# Patient Record
Sex: FEMALE | Race: BLACK OR AFRICAN AMERICAN | NOT HISPANIC OR LATINO | Employment: OTHER | URBAN - METROPOLITAN AREA
[De-identification: names, ages, dates, MRNs, and addresses within clinical notes are randomized per-mention and may not be internally consistent; named-entity substitution may affect disease eponyms.]

---

## 2017-02-03 ENCOUNTER — CLINICAL SUPPORT (OUTPATIENT)
Dept: INTERNAL MEDICINE | Facility: CLINIC | Age: 62
End: 2017-02-03
Payer: COMMERCIAL

## 2017-02-03 DIAGNOSIS — I10 ESSENTIAL HYPERTENSION: ICD-10-CM

## 2017-02-03 DIAGNOSIS — E78.5 HYPERLIPIDEMIA, UNSPECIFIED HYPERLIPIDEMIA TYPE: ICD-10-CM

## 2017-02-03 DIAGNOSIS — E78.5 HYPERLIPIDEMIA: ICD-10-CM

## 2017-02-03 LAB
ALBUMIN SERPL BCP-MCNC: 3.6 G/DL
ALP SERPL-CCNC: 63 U/L
ALT SERPL W/O P-5'-P-CCNC: 21 U/L
ANION GAP SERPL CALC-SCNC: 10 MMOL/L
AST SERPL-CCNC: 22 U/L
BASOPHILS # BLD AUTO: 0.01 K/UL
BASOPHILS NFR BLD: 0.2 %
BILIRUB SERPL-MCNC: 0.4 MG/DL
BUN SERPL-MCNC: 12 MG/DL
CALCIUM SERPL-MCNC: 9.2 MG/DL
CHLORIDE SERPL-SCNC: 105 MMOL/L
CHOLEST/HDLC SERPL: 3 {RATIO}
CO2 SERPL-SCNC: 28 MMOL/L
CREAT SERPL-MCNC: 0.7 MG/DL
DIFFERENTIAL METHOD: ABNORMAL
EOSINOPHIL # BLD AUTO: 0.1 K/UL
EOSINOPHIL NFR BLD: 2.2 %
ERYTHROCYTE [DISTWIDTH] IN BLOOD BY AUTOMATED COUNT: 15.6 %
EST. GFR  (AFRICAN AMERICAN): >60 ML/MIN/1.73 M^2
EST. GFR  (NON AFRICAN AMERICAN): >60 ML/MIN/1.73 M^2
GLUCOSE SERPL-MCNC: 100 MG/DL
HCT VFR BLD AUTO: 37.8 %
HDL/CHOLESTEROL RATIO: 33.5 %
HDLC SERPL-MCNC: 173 MG/DL
HDLC SERPL-MCNC: 58 MG/DL
HGB BLD-MCNC: 12 G/DL
LDLC SERPL CALC-MCNC: 93.6 MG/DL
LYMPHOCYTES # BLD AUTO: 2.2 K/UL
LYMPHOCYTES NFR BLD: 43.7 %
MCH RBC QN AUTO: 23.2 PG
MCHC RBC AUTO-ENTMCNC: 31.7 %
MCV RBC AUTO: 73 FL
MONOCYTES # BLD AUTO: 0.6 K/UL
MONOCYTES NFR BLD: 11.3 %
NEUTROPHILS # BLD AUTO: 2.2 K/UL
NEUTROPHILS NFR BLD: 42.6 %
NONHDLC SERPL-MCNC: 115 MG/DL
PLATELET # BLD AUTO: 210 K/UL
PMV BLD AUTO: 11.1 FL
POTASSIUM SERPL-SCNC: 4 MMOL/L
PROT SERPL-MCNC: 7 G/DL
RBC # BLD AUTO: 5.18 M/UL
SODIUM SERPL-SCNC: 143 MMOL/L
TRIGL SERPL-MCNC: 107 MG/DL
WBC # BLD AUTO: 5.04 K/UL

## 2017-02-03 PROCEDURE — 80053 COMPREHEN METABOLIC PANEL: CPT

## 2017-02-03 PROCEDURE — 80061 LIPID PANEL: CPT

## 2017-02-03 PROCEDURE — 36415 COLL VENOUS BLD VENIPUNCTURE: CPT | Mod: S$GLB,,, | Performed by: NURSE PRACTITIONER

## 2017-02-03 PROCEDURE — 85025 COMPLETE CBC W/AUTO DIFF WBC: CPT

## 2017-02-20 ENCOUNTER — OFFICE VISIT (OUTPATIENT)
Dept: INTERNAL MEDICINE | Facility: CLINIC | Age: 62
End: 2017-02-20
Payer: COMMERCIAL

## 2017-02-20 VITALS
BODY MASS INDEX: 29.12 KG/M2 | HEIGHT: 66 IN | WEIGHT: 181.19 LBS | SYSTOLIC BLOOD PRESSURE: 104 MMHG | RESPIRATION RATE: 18 BRPM | DIASTOLIC BLOOD PRESSURE: 56 MMHG | HEART RATE: 48 BPM

## 2017-02-20 DIAGNOSIS — E78.5 HYPERLIPIDEMIA, UNSPECIFIED HYPERLIPIDEMIA TYPE: ICD-10-CM

## 2017-02-20 DIAGNOSIS — R00.1 BRADYCARDIA: ICD-10-CM

## 2017-02-20 DIAGNOSIS — I10 ESSENTIAL HYPERTENSION: ICD-10-CM

## 2017-02-20 DIAGNOSIS — Z09 FOLLOW UP: Primary | ICD-10-CM

## 2017-02-20 PROCEDURE — 3078F DIAST BP <80 MM HG: CPT | Mod: S$GLB,,, | Performed by: NURSE PRACTITIONER

## 2017-02-20 PROCEDURE — 99213 OFFICE O/P EST LOW 20 MIN: CPT | Mod: S$GLB,,, | Performed by: NURSE PRACTITIONER

## 2017-02-20 PROCEDURE — 3074F SYST BP LT 130 MM HG: CPT | Mod: S$GLB,,, | Performed by: NURSE PRACTITIONER

## 2017-02-20 PROCEDURE — 99999 PR PBB SHADOW E&M-EST. PATIENT-LVL III: CPT | Mod: PBBFAC,,, | Performed by: NURSE PRACTITIONER

## 2017-02-20 RX ORDER — ATORVASTATIN CALCIUM 20 MG/1
20 TABLET, FILM COATED ORAL DAILY
Qty: 30 TABLET | Refills: 5 | Status: SHIPPED | OUTPATIENT
Start: 2017-02-20 | End: 2017-11-09 | Stop reason: SDUPTHER

## 2017-02-20 RX ORDER — LISINOPRIL AND HYDROCHLOROTHIAZIDE 12.5; 2 MG/1; MG/1
1 TABLET ORAL DAILY
Qty: 30 TABLET | Refills: 5 | Status: SHIPPED | OUTPATIENT
Start: 2017-02-20 | End: 2017-08-28 | Stop reason: SDUPTHER

## 2017-02-20 NOTE — MR AVS SNAPSHOT
[facility]  [address] [facility] [address]  Phone: [phone]  Fax: [phone]                  Nan Simms   2017 8:30 AM   Office Visit    Description:  Female : 1955   Provider:  Reny Chambers NP   Department:  [facility]           Reason for Visit     Follow-up           Diagnoses this Visit        Comments    Follow up    -  Primary     Essential hypertension         Hyperlipidemia, unspecified hyperlipidemia type         Bradycardia                To Do List           Future Appointments        Provider Department Dept Phone    2017 8:00 AM NURSE, [facility] [phone]    2017 12:30 PM Reny Chambers NP [facility] [phone]      Goals (5 Years of Data)     None      Follow-Up and Disposition     Return in about 6 months (around 2017).    Follow-up and Disposition History       These Medications        Disp Refills Start End    atorvastatin (LIPITOR) 20 MG tablet 30 tablet 5 2017    Take 1 tablet (20 mg total) by mouth once daily. - Oral    Pharmacy: [facility]/pharmacy #[id number] [place], [place] - [address] Ph #: [phone]       lisinopril-hydrochlorothiazide (PRINZIDE,ZESTORETIC) 20-12.5 mg per tablet 30 tablet 5 2017    Take 1 tablet by mouth once daily. - Oral    Pharmacy: [facility]/pharmacy #[id number] [place], [place] - [address] Ph #: [phone]         [facility] On Call     [facility] Care Line -  Assistance  Registered nurses in the [facility] provide clinical advisement, health education, appointment booking, and other advisory services.  Call for this free service at [phone].             Medications           Message regarding Medications     Verify the changes and/or additions to your medication regime listed below are the same as discussed with your clinician today.  If any of these changes or additions are  "incorrect, please notify your healthcare provider.             Verify that the below list of medications is an accurate representation of the medications you are currently taking.  If none reported, the list may be blank. If incorrect, please contact your healthcare provider. Carry this list with you in case of emergency.           Current Medications     atorvastatin (LIPITOR) 20 MG tablet Take 1 tablet (20 mg total) by mouth once daily.    lisinopril-hydrochlorothiazide (PRINZIDE,ZESTORETIC) 20-12.5 mg per tablet Take 1 tablet by mouth once daily.           Clinical Reference Information           Your Vitals Were     BP Pulse Resp Height Weight BMI    104/56 48 18 5' 6" (1.676 m) 82.2 kg (181 lb 3.5 oz) 29.25 kg/m2      Blood Pressure          Most Recent Value    BP  (!)  104/56      Allergies as of 2/20/2017     No Known Allergies      Immunizations Administered on Date of Encounter - 2/20/2017     None      Orders Placed During Today's Visit     Future Labs/Procedures Expected by Expires    CBC auto differential  8/19/2017 (Approximate) 2/20/2018    Comprehensive metabolic panel  8/19/2017 (Approximate) 2/20/2018    Lipid panel  8/19/2017 (Approximate) 2/20/2018    TSH  8/19/2017 (Approximate) 2/20/2018      [facility] Sign-Up     Activating your [facility] account is as easy as 1-2-3!     1) Visit [email or web address], select Sign Up Now, enter this activation code and your date of birth, then select Next.  Activation code not generated  Current Patient Portal Status: Account disabled      2) Create a username and password to use when you visit [facility] in the future and select a security question in case you lose your password and select Next.    3) Enter your e-mail address and click Sign Up!    Additional Information  If you have questions, please e-mail [email or web address] or call [phone] to talk to our [facility] staff. Remember, [facility] is NOT to be used for urgent needs. For medical emergencies, dial " 191.         Smoking Cessation     If you would like to quit smoking:   You may be eligible for free services if you are a [place] resident and started smoking cigarettes before September 1, 1988.  Call the Smoking Cessation Trust (SCT) toll free at (254) 411-0336 or (225) 964-9442.   Call 1-800-QUIT-NOW if you do not meet the above criteria.            Language Assistance Services     ATTENTION: Language assistance services are available, free of charge. Please call [phone].      ATENCIÓN: Si habla marian, tiene a cobb disposición servicios gratuitos de asistencia lingüística. Llame al [phone].     [facility] Ý: N?u b?n nói Ti?ng Vi?t, có các d?ch v? h? tr? ngôn ng? mi?n phí dành cho b?n. G?i s? [phone].         [facility] complies with applicable Federal civil rights laws and does not discriminate on the basis of race, color, national origin, age, disability, or sex.

## 2017-02-20 NOTE — PROGRESS NOTES
Subjective:       Patient ID: Nan Simms is a 61 y.o. female.    Chief Complaint: Follow-up    HPI : pt known to me presents for f/up. Reports appetite is not as good, but very busy. Has two part-time jobs right now.     Lab Results   Component Value Date    WBC 5.04 02/03/2017    HGB 12.0 02/03/2017    HCT 37.8 02/03/2017     02/03/2017    CHOL 173 02/03/2017    TRIG 107 02/03/2017    HDL 58 02/03/2017    ALT 21 02/03/2017    AST 22 02/03/2017     02/03/2017    K 4.0 02/03/2017     02/03/2017    CREATININE 0.7 02/03/2017    BUN 12 02/03/2017    CO2 28 02/03/2017    TSH 0.877 08/05/2016    HGBA1C 5.8 08/05/2016     Review of Systems   Constitutional: Negative for chills, diaphoresis, fatigue and fever.   Respiratory: Negative for cough, shortness of breath and wheezing.    Cardiovascular: Negative for chest pain.   Gastrointestinal: Negative for abdominal distention, abdominal pain, constipation, diarrhea, nausea and vomiting.   Musculoskeletal: Negative for arthralgias, back pain and myalgias.   Neurological: Negative for headaches.   Psychiatric/Behavioral: Negative for sleep disturbance.       Objective:      Physical Exam   Constitutional: She is oriented to person, place, and time. She appears well-developed and well-nourished.   HENT:   Head: Normocephalic and atraumatic.   Cardiovascular: Normal rate, regular rhythm and normal heart sounds.    Pulmonary/Chest: Effort normal and breath sounds normal.   Abdominal: Soft. Bowel sounds are normal. There is no tenderness.   Musculoskeletal: She exhibits no edema.   Neurological: She is alert and oriented to person, place, and time.   Skin: Skin is warm and dry.   Psychiatric: She has a normal mood and affect. Her behavior is normal. Judgment and thought content normal.   Nursing note and vitals reviewed.      Assessment:       1. Follow up    2. Essential hypertension    3. Hyperlipidemia, unspecified hyperlipidemia type    4. Bradycardia         Plan:   1. Follow up      2. Essential hypertension  Doing well.   - lisinopril-hydrochlorothiazide (PRINZIDE,ZESTORETIC) 20-12.5 mg per tablet; Take 1 tablet by mouth once daily.  Dispense: 30 tablet; Refill: 5  - CBC auto differential; Future  - Comprehensive metabolic panel; Future  - TSH; Future    3. Hyperlipidemia, unspecified hyperlipidemia type  Doing well  - atorvastatin (LIPITOR) 20 MG tablet; Take 1 tablet (20 mg total) by mouth once daily.  Dispense: 30 tablet; Refill: 5  - Lipid panel; Future    4. Bradycardia  Sees Dr. Salas, asymptomatic.     Denies c-scope. Will think about stool cards.

## 2017-07-12 ENCOUNTER — OFFICE VISIT (OUTPATIENT)
Dept: INTERNAL MEDICINE | Facility: CLINIC | Age: 62
End: 2017-07-12
Payer: COMMERCIAL

## 2017-07-12 VITALS
DIASTOLIC BLOOD PRESSURE: 62 MMHG | RESPIRATION RATE: 16 BRPM | WEIGHT: 171.31 LBS | SYSTOLIC BLOOD PRESSURE: 120 MMHG | HEIGHT: 65 IN | BODY MASS INDEX: 28.54 KG/M2 | HEART RATE: 60 BPM

## 2017-07-12 DIAGNOSIS — R00.1 BRADYCARDIA: ICD-10-CM

## 2017-07-12 DIAGNOSIS — R00.2 HEART PALPITATIONS: Primary | ICD-10-CM

## 2017-07-12 PROCEDURE — 99999 PR PBB SHADOW E&M-EST. PATIENT-LVL III: CPT | Mod: PBBFAC,,, | Performed by: NURSE PRACTITIONER

## 2017-07-12 PROCEDURE — 93010 ELECTROCARDIOGRAM REPORT: CPT | Mod: S$GLB,,, | Performed by: INTERNAL MEDICINE

## 2017-07-12 PROCEDURE — 99214 OFFICE O/P EST MOD 30 MIN: CPT | Mod: S$GLB,,, | Performed by: NURSE PRACTITIONER

## 2017-07-12 PROCEDURE — 93005 ELECTROCARDIOGRAM TRACING: CPT | Mod: S$GLB,,, | Performed by: NURSE PRACTITIONER

## 2017-07-12 NOTE — PROGRESS NOTES
Subjective:       Patient ID: Nan Simms is a 62 y.o. female.    Chief Complaint: over heated and Tachycardia    HPI: Pt presents to clinic today new to me but known to Reny with c/o feeling palpitations. She reports that it started Monday. She feels like when she gets up and starts moving around it gets fast. Hen she is sitting and resting she feels normal. It has been coming and going. She reports that it last about 10 min when it happens. She repots that she has no SOB. NO chest pain.     Never felt that before Monday. She does drink coffee every morning and smokes. She reports that she has been smoking a lot lately as well. She also reports that she has been drinking an energy drink the last couple days.   Review of Systems   Constitutional: Negative for chills, fatigue, fever and unexpected weight change.   HENT: Negative for congestion, ear pain, sore throat and trouble swallowing.    Eyes: Negative for pain and visual disturbance.   Respiratory: Negative for cough, chest tightness and shortness of breath.    Cardiovascular: Positive for palpitations. Negative for chest pain and leg swelling.   Gastrointestinal: Negative for abdominal distention, abdominal pain, constipation, diarrhea and vomiting.   Genitourinary: Negative for difficulty urinating, dysuria, flank pain, frequency and hematuria.   Musculoskeletal: Negative for back pain, gait problem, joint swelling, neck pain and neck stiffness.   Skin: Negative for rash and wound.   Neurological: Negative for dizziness, seizures, speech difficulty, light-headedness and headaches.       Objective:      Physical Exam   Constitutional: She is oriented to person, place, and time. She appears well-developed and well-nourished.   HENT:   Head: Normocephalic and atraumatic.   Right Ear: External ear normal.   Left Ear: External ear normal.   Nose: Nose normal.   Mouth/Throat: Oropharynx is clear and moist.   Eyes: Conjunctivae and EOM are normal. Pupils are  equal, round, and reactive to light.   Neck: Normal range of motion. Neck supple.   Cardiovascular: Normal rate, regular rhythm, normal heart sounds and intact distal pulses.    No murmur heard.  SLOW   Pulmonary/Chest: Effort normal and breath sounds normal. No respiratory distress. She has no wheezes. She has no rales.   Abdominal: Soft. Bowel sounds are normal. She exhibits no distension and no mass. There is no tenderness. There is no guarding.   Musculoskeletal: Normal range of motion.   Neurological: She is alert and oriented to person, place, and time.   Skin: Skin is warm and dry.   Psychiatric: She has a normal mood and affect. Her behavior is normal. Judgment and thought content normal.   Nursing note and vitals reviewed.      Assessment:       1. Heart palpitations    2. Bradycardia        Plan:   Nan was seen today for over heated and tachycardia.    Diagnoses and all orders for this visit:    Heart palpitations  -     IN OFFICE EKG 12-LEAD (to [place])  -     Ambulatory Referral to Cardiology    Bradycardia  -     Ambulatory Referral to Cardiology    HR 42 on EKG sinus. I had her walk in hallway and tried to reproduce the feeling of palpitations that she was feeling. We were unable to reproduce that. Last visit with Reny her HR was 48. She denies SOB or chest pain. She reports that she saw Dr Salas 1 year ago for stress test. I will refer back there for bradycardia and palpitations. Till then she was encouraged to decrease caffeine/ nicotene

## 2017-08-14 ENCOUNTER — PATIENT OUTREACH (OUTPATIENT)
Dept: ADMINISTRATIVE | Facility: HOSPITAL | Age: 62
End: 2017-08-14

## 2017-08-14 NOTE — LETTER
August 14, 2017    Nan Simms  [address] [place] [id number]             [facility]  [address]  [facility] [address]  Phone: [phone] Dear MsJames Demi:    [facility] is committed to your overall health.  To help you get the most out of each of your visits, we will review your information to make sure you are up to date on all of your recommended tests and/or procedures.      Payal Moreland NP has found that you may be due for a tetanus vaccine, a pneumonia vaccine, a mammogram, and a colonoscopy.     If you have had any of the above done at another facility, please bring the records or information with you so that your record at [facility] will be complete.  If you would like to schedule any of these, please contact me.    If you are currently taking medication, please bring it with you to your appointment for review.    If you have any questions or concerns, please don't hesitate to call.    Sincerely,        Oralia Dunn LPN Clinical Care Coordinator  [facility]  [phone]

## 2017-08-21 ENCOUNTER — OFFICE VISIT (OUTPATIENT)
Dept: OBSTETRICS AND GYNECOLOGY | Facility: CLINIC | Age: 62
End: 2017-08-21
Payer: COMMERCIAL

## 2017-08-21 ENCOUNTER — CLINICAL SUPPORT (OUTPATIENT)
Dept: INTERNAL MEDICINE | Facility: CLINIC | Age: 62
End: 2017-08-21
Payer: COMMERCIAL

## 2017-08-21 VITALS
SYSTOLIC BLOOD PRESSURE: 116 MMHG | DIASTOLIC BLOOD PRESSURE: 68 MMHG | HEART RATE: 57 BPM | BODY MASS INDEX: 28.99 KG/M2 | WEIGHT: 174 LBS | RESPIRATION RATE: 13 BRPM | HEIGHT: 65 IN

## 2017-08-21 DIAGNOSIS — Z78.0 POSTMENOPAUSAL STATUS: ICD-10-CM

## 2017-08-21 DIAGNOSIS — Z90.79 HISTORY OF TOTAL HYSTERECTOMY WITH BILATERAL SALPINGO-OOPHORECTOMY (BSO): ICD-10-CM

## 2017-08-21 DIAGNOSIS — Z11.59 NEED FOR HEPATITIS C SCREENING TEST: ICD-10-CM

## 2017-08-21 DIAGNOSIS — Z90.710 HISTORY OF TOTAL HYSTERECTOMY WITH BILATERAL SALPINGO-OOPHORECTOMY (BSO): ICD-10-CM

## 2017-08-21 DIAGNOSIS — Z12.31 ENCOUNTER FOR SCREENING MAMMOGRAM FOR BREAST CANCER: ICD-10-CM

## 2017-08-21 DIAGNOSIS — E78.5 HYPERLIPIDEMIA, UNSPECIFIED HYPERLIPIDEMIA TYPE: ICD-10-CM

## 2017-08-21 DIAGNOSIS — Z90.722 HISTORY OF TOTAL HYSTERECTOMY WITH BILATERAL SALPINGO-OOPHORECTOMY (BSO): ICD-10-CM

## 2017-08-21 DIAGNOSIS — I10 ESSENTIAL HYPERTENSION: ICD-10-CM

## 2017-08-21 DIAGNOSIS — Z01.419 WELL WOMAN EXAM WITH ROUTINE GYNECOLOGICAL EXAM: Primary | ICD-10-CM

## 2017-08-21 LAB
ALBUMIN SERPL BCP-MCNC: 3.6 G/DL
ALP SERPL-CCNC: 57 U/L
ALT SERPL W/O P-5'-P-CCNC: 20 U/L
ANION GAP SERPL CALC-SCNC: 9 MMOL/L
AST SERPL-CCNC: 21 U/L
BASOPHILS # BLD AUTO: 0.01 K/UL
BASOPHILS NFR BLD: 0.2 %
BILIRUB SERPL-MCNC: 0.6 MG/DL
BUN SERPL-MCNC: 18 MG/DL
CALCIUM SERPL-MCNC: 9.3 MG/DL
CHLORIDE SERPL-SCNC: 105 MMOL/L
CHOLEST/HDLC SERPL: 2.5 {RATIO}
CO2 SERPL-SCNC: 28 MMOL/L
CREAT SERPL-MCNC: 0.7 MG/DL
DIFFERENTIAL METHOD: ABNORMAL
EOSINOPHIL # BLD AUTO: 0.1 K/UL
EOSINOPHIL NFR BLD: 1.9 %
ERYTHROCYTE [DISTWIDTH] IN BLOOD BY AUTOMATED COUNT: 16.7 %
EST. GFR  (AFRICAN AMERICAN): >60 ML/MIN/1.73 M^2
EST. GFR  (NON AFRICAN AMERICAN): >60 ML/MIN/1.73 M^2
GLUCOSE SERPL-MCNC: 93 MG/DL
HCT VFR BLD AUTO: 38.1 %
HDL/CHOLESTEROL RATIO: 40.2 %
HDLC SERPL-MCNC: 179 MG/DL
HDLC SERPL-MCNC: 72 MG/DL
HGB BLD-MCNC: 12 G/DL
LDLC SERPL CALC-MCNC: 91.2 MG/DL
LYMPHOCYTES # BLD AUTO: 1.9 K/UL
LYMPHOCYTES NFR BLD: 46.4 %
MCH RBC QN AUTO: 23.4 PG
MCHC RBC AUTO-ENTMCNC: 31.5 G/DL
MCV RBC AUTO: 74 FL
MONOCYTES # BLD AUTO: 0.4 K/UL
MONOCYTES NFR BLD: 10.6 %
NEUTROPHILS # BLD AUTO: 1.7 K/UL
NEUTROPHILS NFR BLD: 40.9 %
NONHDLC SERPL-MCNC: 107 MG/DL
PLATELET # BLD AUTO: 226 K/UL
PMV BLD AUTO: 11.8 FL
POTASSIUM SERPL-SCNC: 3.8 MMOL/L
PROT SERPL-MCNC: 7.2 G/DL
RBC # BLD AUTO: 5.13 M/UL
SODIUM SERPL-SCNC: 142 MMOL/L
TRIGL SERPL-MCNC: 79 MG/DL
TSH SERPL DL<=0.005 MIU/L-ACNC: 0.98 UIU/ML
WBC # BLD AUTO: 4.14 K/UL

## 2017-08-21 PROCEDURE — 80061 LIPID PANEL: CPT

## 2017-08-21 PROCEDURE — 86803 HEPATITIS C AB TEST: CPT

## 2017-08-21 PROCEDURE — 85025 COMPLETE CBC W/AUTO DIFF WBC: CPT

## 2017-08-21 PROCEDURE — 80053 COMPREHEN METABOLIC PANEL: CPT

## 2017-08-21 PROCEDURE — 36415 COLL VENOUS BLD VENIPUNCTURE: CPT | Mod: S$GLB,,, | Performed by: NURSE PRACTITIONER

## 2017-08-21 PROCEDURE — 84443 ASSAY THYROID STIM HORMONE: CPT

## 2017-08-21 PROCEDURE — 99999 PR PBB SHADOW E&M-EST. PATIENT-LVL III: CPT | Mod: PBBFAC,,, | Performed by: OBSTETRICS & GYNECOLOGY

## 2017-08-21 PROCEDURE — 99396 PREV VISIT EST AGE 40-64: CPT | Mod: S$GLB,,, | Performed by: OBSTETRICS & GYNECOLOGY

## 2017-08-21 NOTE — PROGRESS NOTES
Subjective:    Patient ID: Nan Simms is a 62 y.o. y.o. female.     Chief Complaint: Annual Well Woman Exam     History of Present Illness:  Nan presents today for Annual Well Woman exam. She has a history of hysterectomy with BSO.  She is currently using no hormone replacement therapy and she reports no problems with menopausal symptoms.   She denies breast tenderness, masses, nipple discharge.  She is due for screening mammogram.  She denies difficulty with urination or bowel movements.   She had colonoscopy last year.  She is current with health maintenance.  She had normal bone density evaluation in 2016.  She has no complaints today.    Past Medical History:   Diagnosis Date    Abnormal Pap smear of cervix     Hypertension     Hyperthyroidism      Past Surgical History:   Procedure Laterality Date    HYSTERECTOMY       Review of patient's allergies indicates:  No Known Allergies  Current Outpatient Prescriptions on File Prior to Visit   Medication Sig Dispense Refill    atorvastatin (LIPITOR) 20 MG tablet Take 1 tablet (20 mg total) by mouth once daily. 30 tablet 5    lisinopril-hydrochlorothiazide (PRINZIDE,ZESTORETIC) 20-12.5 mg per tablet Take 1 tablet by mouth once daily. 30 tablet 5     No current facility-administered medications on file prior to visit.      Social History   Substance Use Topics    Smoking status: Current Some Day Smoker     Packs/day: 0.50     Years: 30.00     Types: Cigarettes    Smokeless tobacco: Current User    Alcohol use 0.6 oz/week     1 Cans of beer per week     Family History   Problem Relation Age of Onset    Cancer Mother     Breast cancer Neg Hx     Colon cancer Neg Hx     Ovarian cancer Neg Hx      The following portions of the patient's history were reviewed and updated as appropriate: allergies, current medications, past family history, past medical history, past social history, past surgical history and problem list.      Menstrual History:   No LMP  recorded. Patient has had a hysterectomy.    OB History      Para Term  AB Living    1       1      SAB TAB Ectopic Multiple Live Births    1                    ROS:   CONSTITUTIONAL: Negative for fever, chills, diaphoresis, weakness, fatigue, weight loss, weight gain  ENT: negative for sore throat, nasal congestion, nasal discharge, epistaxis, tinnitus, hearing loss  EYES: negative for blurry vision, decreased vision, loss of vision, eye pain, diplopia, photophobia, discharge  SKIN: Negative for rash, itching, hives  RESPIRATORY: negative for cough, hemoptysis, shortness of breath, pleuritic chest pain, wheezing  CARDIOVASCULAR: negative for chest pain, dyspnea on exertion, orthopnea, paroxysmal nocturnal dyspnea, edema, palpitations  BREAST: negative for breast pain, mass, nipple changes, nipple discharge  GASTROINTESTINAL: negative for abdominal pain, flank pain, nausea, vomiting, diarrhea, constipation, black stool, blood in stool  GENITOURINARY: negative for abnormal vaginal bleeding, amenorrhea, decreased libido, dysuria, genital sores, hematuria, incontinence, menorrhagia, pelvic pain, urinary frequency, vaginal discharge  HEMATOLOGIC/LYMPHATIC: negative for swollen lymph nodes, bleeding, bruising  MUSCULOSKELETAL: negative for back pain, joint pain, joint stiffness, joint swelling, muscle pain, muscle weakness  NEUROLOGICAL: negative for dizzy/vertigo, headache, focal weakness, numbness/tingling, speech problems, loss of consciousness, confusion, memory loss  BEHAVORIAL/PSYCH: negative for anxiety, depression, psychosis  ENDOCRINE: negative for polydipsia/polyuria, palpitations, skin changes, temperature intolerance, unexpected weight changes  ALLERGIC/IMMUNOLOGIC: negative for urticaria, hay fever, angioedema      Objective:    Vital Signs:  Vitals:    17 0945   BP: 116/68   Pulse: (!) 57   Resp: 13       Physical Exam:  General:  alert; oriented x 4; well-nourished female   Skin:  Skin  color, texture, turgor normal. No rashes or lesions   HEENT:  conjunctivae/corneas clear.    Neck: supple, trachea midline   Respiratory:  clear to auscultation bilaterally   Heart:  regular rate and rhythm   Breasts: Symmetrical; no palpable masses or lymphadenopathy; no discharge, erythema, or tenderness   Abdomen:  soft, non-tender; bowel sounds normal   Pelvis: External genitalia: normal general appearance  Urinary system: urethral meatus normal, bladder nontender  Vaginal: atrophic mucosa; no prolapse or lesions  Cervix: removed surgically  Uterus: removed surgically  Adnexa: removed surgically; nontender; no palpable masses   Extremities: Normal ROM; no edema, no cyanosis   Neurologial: Normal strength and tone. No focal numbness or weakness. Reflexes 2+ and equal.   Psychiatric: normal mood, speech, dress, and thought processes         Assessment:      1. Well woman exam with routine gynecological exam    2. Postmenopausal status    3. History of total hysterectomy with bilateral salpingo-oophorectomy (BSO)    4. Encounter for screening mammogram for breast cancer          Plan:      Well woman exam with routine gynecological exam    Postmenopausal status    History of total hysterectomy with bilateral salpingo-oophorectomy (BSO)    Encounter for screening mammogram for breast cancer  -     Mammo Digital Screening Bilat with CAD; Future; Expected date: 08/21/2017        COUNSELING:  Nan was counseled on A.C.O.G. Pap guidelines and recommendations for yearly pelvic exams in addition to recommendations for yearly mammograms and monthly self breast exams. In addition she was counseled on adequate intake of calcium and vitamin D.  She is to see her PCP for other health maintenance.

## 2017-08-22 ENCOUNTER — HOSPITAL ENCOUNTER (OUTPATIENT)
Dept: RADIOLOGY | Facility: HOSPITAL | Age: 62
Discharge: HOME | End: 2017-08-22
Attending: OBSTETRICS & GYNECOLOGY
Payer: COMMERCIAL

## 2017-08-22 VITALS — WEIGHT: 174 LBS | BODY MASS INDEX: 28.99 KG/M2 | HEIGHT: 65 IN

## 2017-08-22 DIAGNOSIS — Z12.31 ENCOUNTER FOR SCREENING MAMMOGRAM FOR BREAST CANCER: ICD-10-CM

## 2017-08-22 LAB — HCV AB SERPL QL IA: NEGATIVE

## 2017-08-22 PROCEDURE — 77063 BREAST TOMOSYNTHESIS BI: CPT | Mod: 26,,, | Performed by: RADIOLOGY

## 2017-08-22 PROCEDURE — 77067 SCR MAMMO BI INCL CAD: CPT | Mod: TC

## 2017-08-22 PROCEDURE — 77067 SCR MAMMO BI INCL CAD: CPT | Mod: 26,,, | Performed by: RADIOLOGY

## 2017-08-28 ENCOUNTER — OFFICE VISIT (OUTPATIENT)
Dept: INTERNAL MEDICINE | Facility: CLINIC | Age: 62
End: 2017-08-28
Payer: COMMERCIAL

## 2017-08-28 VITALS
TEMPERATURE: 97 F | RESPIRATION RATE: 15 BRPM | DIASTOLIC BLOOD PRESSURE: 64 MMHG | HEART RATE: 41 BPM | BODY MASS INDEX: 27.06 KG/M2 | SYSTOLIC BLOOD PRESSURE: 100 MMHG | HEIGHT: 67 IN | WEIGHT: 172.38 LBS

## 2017-08-28 DIAGNOSIS — Z12.11 ENCOUNTER FOR SCREENING FOR MALIGNANT NEOPLASM OF COLON: ICD-10-CM

## 2017-08-28 DIAGNOSIS — I10 ESSENTIAL HYPERTENSION: ICD-10-CM

## 2017-08-28 DIAGNOSIS — R79.89 ABNORMAL CBC: ICD-10-CM

## 2017-08-28 DIAGNOSIS — E78.5 HYPERLIPIDEMIA, UNSPECIFIED HYPERLIPIDEMIA TYPE: Primary | ICD-10-CM

## 2017-08-28 PROCEDURE — 99999 PR PBB SHADOW E&M-EST. PATIENT-LVL III: CPT | Mod: PBBFAC,,, | Performed by: NURSE PRACTITIONER

## 2017-08-28 PROCEDURE — 3078F DIAST BP <80 MM HG: CPT | Mod: S$GLB,,, | Performed by: NURSE PRACTITIONER

## 2017-08-28 PROCEDURE — 3008F BODY MASS INDEX DOCD: CPT | Mod: S$GLB,,, | Performed by: NURSE PRACTITIONER

## 2017-08-28 PROCEDURE — 3074F SYST BP LT 130 MM HG: CPT | Mod: S$GLB,,, | Performed by: NURSE PRACTITIONER

## 2017-08-28 PROCEDURE — 99214 OFFICE O/P EST MOD 30 MIN: CPT | Mod: S$GLB,,, | Performed by: NURSE PRACTITIONER

## 2017-08-28 RX ORDER — LISINOPRIL AND HYDROCHLOROTHIAZIDE 12.5; 2 MG/1; MG/1
0.5 TABLET ORAL DAILY
Qty: 30 TABLET | Refills: 5 | Status: SHIPPED | OUTPATIENT
Start: 2017-08-28 | End: 2018-02-28 | Stop reason: SDUPTHER

## 2017-08-28 RX ORDER — ASPIRIN 81 MG/1
81 TABLET ORAL DAILY
Refills: 0 | Status: ON HOLD | COMMUNITY
Start: 2017-08-28 | End: 2023-04-12

## 2017-08-28 NOTE — PROGRESS NOTES
Subjective:       Patient ID: Nan Simms is a 62 y.o. female.    Chief Complaint: 6 month checkup    HPI: Pt presents to clinic today known time and clinic with c/o needing f/u. She reports that she has no complaints feels great. We talked about her low HR but she denies fatigue or SOB or syncopy/presyncope episode, Has f/u with Dr Salas in am.   Lab Results   Component Value Date    WBC 4.14 08/21/2017    HGB 12.0 08/21/2017    HCT 38.1 08/21/2017    MCV 74 (L) 08/21/2017     08/21/2017     BMP  Lab Results   Component Value Date     08/21/2017    K 3.8 08/21/2017     08/21/2017    CO2 28 08/21/2017    BUN 18 08/21/2017    CREATININE 0.7 08/21/2017    CALCIUM 9.3 08/21/2017    ANIONGAP 9 08/21/2017    ESTGFRAFRICA >60 08/21/2017    EGFRNONAA >60 08/21/2017       Lab Results   Component Value Date    ALT 20 08/21/2017    AST 21 08/21/2017    ALKPHOS 57 08/21/2017    BILITOT 0.6 08/21/2017     Lab Results   Component Value Date    TSH 0.979 08/21/2017       Review of Systems   Constitutional: Negative for chills, fatigue, fever and unexpected weight change.   HENT: Negative for congestion, ear pain, sore throat and trouble swallowing.    Eyes: Negative for pain and visual disturbance.   Respiratory: Negative for cough, chest tightness and shortness of breath.    Cardiovascular: Negative for chest pain, palpitations and leg swelling.   Gastrointestinal: Negative for abdominal distention, abdominal pain, constipation, diarrhea and vomiting.   Genitourinary: Negative for difficulty urinating, dysuria, flank pain, frequency and hematuria.   Musculoskeletal: Negative for back pain, gait problem, joint swelling, neck pain and neck stiffness.   Skin: Negative for rash and wound.   Neurological: Negative for dizziness, seizures, speech difficulty, light-headedness and headaches.       Objective:      Physical Exam   Constitutional: She is oriented to person, place, and time. She appears well-developed  and well-nourished.   HENT:   Head: Normocephalic and atraumatic.   Right Ear: External ear normal.   Left Ear: External ear normal.   Nose: Nose normal.   Mouth/Throat: Oropharynx is clear and moist.   Eyes: Conjunctivae and EOM are normal. Pupils are equal, round, and reactive to light.   Neck: Normal range of motion. Neck supple.   Cardiovascular: Normal rate, regular rhythm, normal heart sounds and intact distal pulses.    Pulmonary/Chest: Effort normal and breath sounds normal.   Abdominal: Soft. Bowel sounds are normal.   Musculoskeletal: Normal range of motion.   Neurological: She is alert and oriented to person, place, and time.   Skin: Skin is warm and dry.   Psychiatric: She has a normal mood and affect. Her behavior is normal. Judgment and thought content normal.       Assessment:       1. Hyperlipidemia, unspecified hyperlipidemia type    2. Essential hypertension        Plan:   Nan was seen today for 6 month checkup.    Diagnoses and all orders for this visit:    Hyperlipidemia, unspecified hyperlipidemia type  Cont lipitor    Essential hypertension  -     lisinopril-hydrochlorothiazide (PRINZIDE,ZESTORETIC) 20-12.5 mg per tablet; Take 0.5 tablets by mouth once daily.  -     aspirin (ECOTRIN) 81 MG EC tablet; Take 1 tablet (81 mg total) by mouth once daily. Added to med list, she was already onit    She refuses smoking cessation. She report stat she has tied and she is not ready. Smokes 1/2 pack per day.     *She also refuses colonoscopy. She Will do stool study. Ordered FIT test

## 2017-09-21 ENCOUNTER — TELEPHONE (OUTPATIENT)
Dept: OBSTETRICS AND GYNECOLOGY | Facility: CLINIC | Age: 62
End: 2017-09-21

## 2017-09-21 NOTE — TELEPHONE ENCOUNTER
----- Message from Shea Segundo sent at 9/21/2017 12:31 PM CDT -----  Contact: self  Nan Simms  MRN: [id number]  Home Phone      [phone]  Work Phone      Not on file.  Mobile          [phone]    Patient Care Team:  Payal Moreland NP as PCP - General (Family Medicine)  Alexandra Spencer MD as Obstetrician (Obstetrics)  OB? No  What phone number can you be reached at? [phone]  Message: pt is returning a nurse call

## 2017-10-26 ENCOUNTER — HOSPITAL ENCOUNTER (EMERGENCY)
Facility: HOSPITAL | Age: 62
Discharge: HOME | End: 2017-10-26
Attending: SURGERY
Payer: COMMERCIAL

## 2017-10-26 VITALS
HEIGHT: 66 IN | SYSTOLIC BLOOD PRESSURE: 140 MMHG | RESPIRATION RATE: 18 BRPM | WEIGHT: 182 LBS | OXYGEN SATURATION: 98 % | BODY MASS INDEX: 29.25 KG/M2 | DIASTOLIC BLOOD PRESSURE: 70 MMHG | HEART RATE: 70 BPM | TEMPERATURE: 98 F

## 2017-10-26 DIAGNOSIS — L02.91 ABSCESS: Primary | ICD-10-CM

## 2017-10-26 PROCEDURE — 10060 I&D ABSCESS SIMPLE/SINGLE: CPT

## 2017-10-26 PROCEDURE — 99284 EMERGENCY DEPT VISIT MOD MDM: CPT | Mod: 25

## 2017-10-26 PROCEDURE — 87070 CULTURE OTHR SPECIMN AEROBIC: CPT

## 2017-10-26 PROCEDURE — 25000003 PHARM REV CODE 250: Performed by: SURGERY

## 2017-10-26 RX ORDER — MUPIROCIN 20 MG/G
OINTMENT TOPICAL 3 TIMES DAILY
Qty: 15 G | Refills: 0 | Status: SHIPPED | OUTPATIENT
Start: 2017-10-26 | End: 2017-11-05

## 2017-10-26 RX ORDER — MUPIROCIN 20 MG/G
1 OINTMENT TOPICAL
Status: COMPLETED | OUTPATIENT
Start: 2017-10-26 | End: 2017-10-26

## 2017-10-26 RX ORDER — IBUPROFEN 800 MG/1
800 TABLET ORAL EVERY 6 HOURS PRN
Qty: 20 TABLET | Refills: 0 | Status: SHIPPED | OUTPATIENT
Start: 2017-10-26 | End: 2017-12-27 | Stop reason: ALTCHOICE

## 2017-10-26 RX ORDER — CEPHALEXIN 500 MG/1
500 CAPSULE ORAL EVERY 6 HOURS
Qty: 28 CAPSULE | Refills: 0 | Status: SHIPPED | OUTPATIENT
Start: 2017-10-26 | End: 2017-11-02

## 2017-10-26 RX ORDER — LIDOCAINE HYDROCHLORIDE AND EPINEPHRINE 20; 10 MG/ML; UG/ML
5 INJECTION, SOLUTION INFILTRATION; PERINEURAL
Status: COMPLETED | OUTPATIENT
Start: 2017-10-26 | End: 2017-10-26

## 2017-10-26 RX ADMIN — MUPIROCIN 22 G: 20 OINTMENT TOPICAL at 10:10

## 2017-10-26 RX ADMIN — LIDOCAINE HYDROCHLORIDE,EPINEPHRINE BITARTRATE 5 ML: 20; .01 INJECTION, SOLUTION INFILTRATION; PERINEURAL at 10:10

## 2017-10-26 NOTE — ED TRIAGE NOTES
"Patient presents to the ER with an abscess to her right upper back which patient reports has been there for at least 2 months.  Patient reports that "it busted and was stinking."  Patient has bandage over area at this time, and reports constant pain in the area.   "

## 2017-10-26 NOTE — ED NOTES
Patient discharged home with family.  Patient verbalizes understanding of discharge instructions.  Patient will follow up with PCP next week.  Patient has no questions or concerns at this time.

## 2017-10-26 NOTE — ED PROVIDER NOTES
[facility]                                     October 26, 2017                   Chief Complaint  62 y.o. female with Abscess    History of Present Illness  Nan Simms presents to the emergency room with right shoulder abscess  Patient states that she is had pain and swelling in the right posterior shoulder  Patient on exam has a 7 cm abscess in the right posterior shoulder area now  No signs or erythema or cellulitis, appears to be an infected sebaceous cyst     The history is provided by the patient  Medical history: Abnormal Pap smear, hypertension, hypothyroidism  Surgical history: Oophorectomy and hysterectomy  No Known Allergies     Review of Systems and Physical Exam     Review of Systems  -- Constitution - no fever, denies fatigue, no weakness, no chills  -- Eyes - no tearing or redness, no visual disturbance  -- Ear, Nose - no tinnitus or earache, no nasal congestion or discharge  -- Mouth,Throat - no sore throat, no toothache, normal voice, normal swallowing  -- Respiratory - denies cough and congestion, no shortness of breath, no LEWIS  -- Cardiovascular - denies chest pain, no palpitations, denies claudication  -- Gastrointestinal - denies abdominal pain, nausea, vomiting, or diarrhea  -- Musculoskeletal - denies back pain, negative for myalgias and arthralgias   -- Neurological - no headache, denies weakness or seizure; no LOC  -- Skin - right back abscess    Vital Signs  -- Her oral temperature is 97 °F (36.1 °C).   -- Her blood pressure is 157/75 and her pulse is 69.   -- Her respiration is 18 and oxygen saturation is 98%.      Physical Exam  -- Nursing note and vitals reviewed  -- Head: Atraumatic. Normocephalic. No obvious abnormality  -- Eyes: Pupils are equal and reactive to light. Normal conjunctiva and lids  -- Cardiac: Normal rate, regular rhythm and normal heart sounds  -- Pulmonary: Normal respiratory effort, breath sounds clear to auscultation  -- Abdominal: Soft, no  tenderness. Normal bowel sounds. Normal liver edge  -- Musculoskeletal: Normal range of motion, no effusions. Joints stable   -- Neurological: No focal deficits. Showed good interaction with staff  -- Vascular: Posterior tibial, dorsalis pedis and radial pulses 2+ bilaterally    -- Lymphatics: No cervical or peripheral lymphadenopathy. No edema noted  -- Skin: 6 x 7 cm right upper back abscess    Emergency Room Course     I & D - Incision and Drainage  -- Performed by: Red Kelly M.D.  -- Date/Time: 11:29 AM 10/26/2017    -- Type: abscess  -- Location: right back  -- Anesthesia: local infiltration  -- Local anesthetic: lidocaine 1%   -- Anesthetic total: 10 ml  -- Scalpel size: 11  -- Incision type: single straight  -- Complexity: simple  -- Drainage: pus  -- Drainage amount: scant  -- Wound treatment: incision  -- Packing material: 1 in gauze  -- Wound culture taken     Diagnosis  -- The encounter diagnosis was Abscess.    Disposition and Plan  -- Disposition: home  -- Condition: stable  -- Follow-up: Patient to follow up with Payal Moreland NP in 1-2 days.  -- I advised the patient that we have found no life threatening condition today  -- At this time, I believe the patient is clinically stable for discharge.   -- The patient acknowledges that close follow up with a MD is required   -- Patient agrees to comply with all instruction and direction given in the ER    This note is dictated on Dragon Natural Speaking word recognition program.  There are word recognition mistakes that are occasionally missed on review.           Red Kelly MD  10/26/17 2031

## 2017-10-30 ENCOUNTER — OFFICE VISIT (OUTPATIENT)
Dept: INTERNAL MEDICINE | Facility: CLINIC | Age: 62
End: 2017-10-30
Payer: COMMERCIAL

## 2017-10-30 VITALS
HEIGHT: 66 IN | HEART RATE: 44 BPM | BODY MASS INDEX: 28.27 KG/M2 | WEIGHT: 175.94 LBS | DIASTOLIC BLOOD PRESSURE: 52 MMHG | SYSTOLIC BLOOD PRESSURE: 120 MMHG | RESPIRATION RATE: 18 BRPM

## 2017-10-30 DIAGNOSIS — L02.91 ABSCESS: Primary | ICD-10-CM

## 2017-10-30 LAB — BACTERIA SPEC AEROBE CULT: NO GROWTH

## 2017-10-30 PROCEDURE — 99214 OFFICE O/P EST MOD 30 MIN: CPT | Mod: S$GLB,,, | Performed by: NURSE PRACTITIONER

## 2017-10-30 PROCEDURE — 99999 PR PBB SHADOW E&M-EST. PATIENT-LVL III: CPT | Mod: PBBFAC,,, | Performed by: NURSE PRACTITIONER

## 2017-10-30 RX ORDER — HYDROCODONE BITARTRATE AND ACETAMINOPHEN 7.5; 325 MG/1; MG/1
1 TABLET ORAL EVERY 6 HOURS PRN
Qty: 20 TABLET | Refills: 0 | Status: SHIPPED | OUTPATIENT
Start: 2017-10-30 | End: 2018-01-12

## 2017-10-30 NOTE — PROGRESS NOTES
Subjective:       Patient ID: Nan Simms is a 62 y.o. female.    Chief Complaint: Follow-up (cyst removal)    HPI: Pt presents to clinic today known to me for f/u from ER. I reviewed her chart and she had a fairly large abscess opened ion ER. She had it packed and returns today for f/u./ She has minimal redness except for left lower corner of incision still hard and indurated. She has packing and we pulled it. Almost 2ft of 1in packing removed. Pt had a good bit of pain with that removal. She had no drainage small amount of bleeding inside of wound. Has black grey are to lower right inside of wound. The left looked like it tracked  More towards left and pretty deep to the left where she is still indurated and tender. NO fever. Only taking keflex and motrin  Review of Systems   Constitutional: Positive for activity change. Negative for fever.   Respiratory: Negative for cough, choking and shortness of breath.    Cardiovascular: Negative for chest pain and palpitations.   Gastrointestinal: Negative for abdominal distention, abdominal pain, diarrhea, nausea and vomiting.   Skin: Positive for wound.   Neurological: Negative for dizziness, weakness and light-headedness.       Objective:      Physical Exam   Constitutional: She appears well-developed and well-nourished.   Cardiovascular: Normal rate and regular rhythm.    Pulmonary/Chest: Effort normal and breath sounds normal.   Abdominal: Soft. Bowel sounds are normal. She exhibits no distension. There is no tenderness. There is no guarding.   Skin: Skin is warm and dry. There is erythema.        Incision to back packed. See HPI   Nursing note and vitals reviewed.      Assessment:       1. Abscess        Plan:   Nan was seen today for follow-up.    Diagnoses and all orders for this visit:    Abscess  -     Ambulatory Referral to General Surgery  -     hydrocodone-acetaminophen 7.5-325mg (NORCO) 7.5-325 mg per tablet; Take 1 tablet by mouth every 6 (six) hours as  needed for Pain.    not sure if it was a sebaceous cyst he removed and left the wall of it or if she has necrotic tissue starting there. I tried to clean it in office and she was not able to tolerate due to pain. She needs this wound debrided. Appt with Dr Azar surgery 10am tomorrow morning. Given pain meds to take today and prior to visit in am. She was instructed to have some one  her. I repacked the wound with 12in of 1in iodoform gauze and redressed with triple abx oint and gauze dressing

## 2017-11-09 DIAGNOSIS — E78.5 HYPERLIPIDEMIA, UNSPECIFIED HYPERLIPIDEMIA TYPE: ICD-10-CM

## 2017-11-09 RX ORDER — ATORVASTATIN CALCIUM 20 MG/1
20 TABLET, FILM COATED ORAL DAILY
Qty: 30 TABLET | Refills: 5 | Status: SHIPPED | OUTPATIENT
Start: 2017-11-09 | End: 2017-11-14 | Stop reason: SDUPTHER

## 2017-11-14 DIAGNOSIS — E78.5 HYPERLIPIDEMIA, UNSPECIFIED HYPERLIPIDEMIA TYPE: ICD-10-CM

## 2017-11-14 RX ORDER — ATORVASTATIN CALCIUM 20 MG/1
20 TABLET, FILM COATED ORAL DAILY
Qty: 90 TABLET | Refills: 1 | Status: SHIPPED | OUTPATIENT
Start: 2017-11-14 | End: 2018-02-28

## 2017-11-14 NOTE — TELEPHONE ENCOUNTER
LOV: 10/30/2017    Received refill request for #90 day for Atorvastatin 20 mg     Pharmacy: [facility] in [place]

## 2017-12-27 ENCOUNTER — HOSPITAL ENCOUNTER (EMERGENCY)
Facility: HOSPITAL | Age: 62
Discharge: HOME | End: 2017-12-27
Attending: SURGERY
Payer: COMMERCIAL

## 2017-12-27 VITALS
BODY MASS INDEX: 28.25 KG/M2 | HEART RATE: 63 BPM | RESPIRATION RATE: 16 BRPM | WEIGHT: 175 LBS | SYSTOLIC BLOOD PRESSURE: 156 MMHG | TEMPERATURE: 99 F | DIASTOLIC BLOOD PRESSURE: 77 MMHG

## 2017-12-27 DIAGNOSIS — L90.5 SCAR IRRITATION: Primary | ICD-10-CM

## 2017-12-27 PROCEDURE — 63600175 PHARM REV CODE 636 W HCPCS: Performed by: SURGERY

## 2017-12-27 PROCEDURE — 96372 THER/PROPH/DIAG INJ SC/IM: CPT

## 2017-12-27 PROCEDURE — 25000003 PHARM REV CODE 250: Performed by: SURGERY

## 2017-12-27 PROCEDURE — 99283 EMERGENCY DEPT VISIT LOW MDM: CPT | Mod: 25

## 2017-12-27 RX ORDER — MUPIROCIN 20 MG/G
OINTMENT TOPICAL 3 TIMES DAILY
Qty: 15 G | Refills: 0 | Status: SHIPPED | OUTPATIENT
Start: 2017-12-27 | End: 2018-01-06

## 2017-12-27 RX ORDER — SULFAMETHOXAZOLE AND TRIMETHOPRIM 800; 160 MG/1; MG/1
1 TABLET ORAL 2 TIMES DAILY
Qty: 14 TABLET | Refills: 0 | Status: SHIPPED | OUTPATIENT
Start: 2017-12-27 | End: 2018-01-03

## 2017-12-27 RX ORDER — CEFTRIAXONE 1 G/1
1 INJECTION, POWDER, FOR SOLUTION INTRAMUSCULAR; INTRAVENOUS ONCE
Status: COMPLETED | OUTPATIENT
Start: 2017-12-27 | End: 2017-12-27

## 2017-12-27 RX ORDER — KETOROLAC TROMETHAMINE 30 MG/ML
60 INJECTION, SOLUTION INTRAMUSCULAR; INTRAVENOUS
Status: COMPLETED | OUTPATIENT
Start: 2017-12-27 | End: 2017-12-27

## 2017-12-27 RX ORDER — LIDOCAINE HYDROCHLORIDE 10 MG/ML
1 INJECTION INFILTRATION; PERINEURAL ONCE
Status: COMPLETED | OUTPATIENT
Start: 2017-12-27 | End: 2017-12-27

## 2017-12-27 RX ORDER — IBUPROFEN 800 MG/1
800 TABLET ORAL EVERY 6 HOURS PRN
Qty: 20 TABLET | Refills: 0 | Status: SHIPPED | OUTPATIENT
Start: 2017-12-27 | End: 2018-02-28

## 2017-12-27 RX ADMIN — KETOROLAC TROMETHAMINE 60 MG: 30 INJECTION, SOLUTION INTRAMUSCULAR at 05:12

## 2017-12-27 RX ADMIN — LIDOCAINE HYDROCHLORIDE 1 ML: 10 INJECTION, SOLUTION INFILTRATION; PERINEURAL at 05:12

## 2017-12-27 RX ADMIN — CEFTRIAXONE SODIUM 1 G: 1 INJECTION, POWDER, FOR SOLUTION INTRAMUSCULAR; INTRAVENOUS at 05:12

## 2017-12-27 NOTE — ED TRIAGE NOTES
"62 y.o. female presents to ER ED 04/ED 04   Chief Complaint   Patient presents with    Incisional Pain     left side neck incision has been "bothering her" especially at night. Appears to be open slightly   . No acute distress noted.    "

## 2018-01-04 ENCOUNTER — OFFICE VISIT (OUTPATIENT)
Dept: INTERNAL MEDICINE | Facility: CLINIC | Age: 63
End: 2018-01-04
Payer: COMMERCIAL

## 2018-01-04 VITALS
HEIGHT: 66 IN | SYSTOLIC BLOOD PRESSURE: 108 MMHG | BODY MASS INDEX: 28.38 KG/M2 | DIASTOLIC BLOOD PRESSURE: 54 MMHG | RESPIRATION RATE: 18 BRPM | WEIGHT: 176.56 LBS | HEART RATE: 41 BPM

## 2018-01-04 DIAGNOSIS — T14.8XXD DELAYED WOUND HEALING: Primary | ICD-10-CM

## 2018-01-04 DIAGNOSIS — R00.1 BRADYCARDIA: ICD-10-CM

## 2018-01-04 PROCEDURE — 99999 PR PBB SHADOW E&M-EST. PATIENT-LVL III: CPT | Mod: PBBFAC,,, | Performed by: NURSE PRACTITIONER

## 2018-01-04 PROCEDURE — 99213 OFFICE O/P EST LOW 20 MIN: CPT | Mod: S$PBB,,, | Performed by: NURSE PRACTITIONER

## 2018-01-04 NOTE — PROGRESS NOTES
Subjective:       Patient ID: Nan Simms is a 62 y.o. female.    Chief Complaint: ER Follow up (Abscess)    HPI: Pt presents to clinic today known to me with c/o needing f/u from ER. She reports that she saw Dr Azar after leaving this office last rtime.She reports that he cleaned it and repacked it and she had cared for it since then. She was having burning and stinging to the site so she went to ER 12/27. She still had a small 1cm opening to the site when she went there and still has it now. She has no drainage from this site she received rocephin and toradol in ER. She was sent home with bactrim and bactroban oint as well as ibuprofen. She report that she still has a stinging where it is opened. She is not sure if it closed and reopened or never closed. This is since over 2 months ago. She should have healed by now.     She does see Dr Salas every couple months for her HR/palpitations and bp.   Review of Systems   Constitutional: Negative for chills, fatigue, fever and unexpected weight change.   HENT: Negative for congestion, ear pain, sore throat and trouble swallowing.    Eyes: Negative for pain and visual disturbance.   Respiratory: Negative for cough, chest tightness and shortness of breath.    Cardiovascular: Negative for chest pain, palpitations and leg swelling.   Gastrointestinal: Negative for abdominal distention, abdominal pain, constipation, diarrhea and vomiting.   Genitourinary: Negative for difficulty urinating, dysuria, flank pain, frequency and hematuria.   Musculoskeletal: Negative for back pain, gait problem, joint swelling, neck pain and neck stiffness.   Skin: Positive for wound (right upper shoulder scar with 1cm opening). Negative for rash.   Neurological: Negative for dizziness, seizures, speech difficulty, light-headedness and headaches.       Objective:      Physical Exam   Constitutional: She is oriented to person, place, and time. She appears well-developed and well-nourished.    HENT:   Head: Normocephalic and atraumatic.   Right Ear: External ear normal.   Left Ear: External ear normal.   Nose: Nose normal.   Mouth/Throat: Oropharynx is clear and moist.   Eyes: Conjunctivae and EOM are normal. Pupils are equal, round, and reactive to light.   Neck: Normal range of motion. Neck supple.   Cardiovascular: Normal rate, regular rhythm, normal heart sounds and intact distal pulses.    Pulmonary/Chest: Effort normal and breath sounds normal.   Abdominal: Soft. Bowel sounds are normal.   Musculoskeletal: Normal range of motion.   Neurological: She is alert and oriented to person, place, and time.   Skin: Skin is warm and dry.        Healed scar to upper right shoulder blade with 1cm opening, brown/black tissue noted inside. No drainage, no redness around the site   Psychiatric: She has a normal mood and affect. Her behavior is normal. Judgment and thought content normal.   Nursing note and vitals reviewed.      Assessment:       1. Delayed wound healing    2. Bradycardia        Plan:   Nan was seen today for er follow up.    Diagnoses and all orders for this visit:    Delayed wound healing  -     Ambulatory Referral to Wound Clinic  Not sure if this is delayed wound healing or a dehiscence. Will send to wound care for further care. Does not look infected. No need to cont abx. Ok to cont bactroban to site.     Bradycardia    Cont to f/u with Dr Salas

## 2018-01-08 ENCOUNTER — TELEPHONE (OUTPATIENT)
Dept: INTERNAL MEDICINE | Facility: CLINIC | Age: 63
End: 2018-01-08

## 2018-01-08 NOTE — TELEPHONE ENCOUNTER
----- Message from Taylor Ahumada RN sent at 1/8/2018 10:10 AM CST -----  I see a wound care referral on this patient.  Is she being referred to the Wound Center at [facility]?  If so, is your office coordinating this as I am unable to schedule for that facility.  Please do not hesitate to contact me if you have any questions.    Berta ADAMS, RN, CWS  Wound Care Nurse Navigator  [phone]

## 2018-01-12 ENCOUNTER — OFFICE VISIT (OUTPATIENT)
Dept: WOUND CARE | Facility: HOSPITAL | Age: 63
End: 2018-01-12
Attending: NURSE PRACTITIONER
Payer: COMMERCIAL

## 2018-01-12 VITALS
TEMPERATURE: 98 F | RESPIRATION RATE: 20 BRPM | HEART RATE: 43 BPM | DIASTOLIC BLOOD PRESSURE: 69 MMHG | SYSTOLIC BLOOD PRESSURE: 146 MMHG

## 2018-01-12 DIAGNOSIS — T81.89XA NON-HEALING SURGICAL WOUND, INITIAL ENCOUNTER: ICD-10-CM

## 2018-01-12 PROCEDURE — 11042 DBRDMT SUBQ TIS 1ST 20SQCM/<: CPT

## 2018-01-12 PROCEDURE — 99212 OFFICE O/P EST SF 10 MIN: CPT | Mod: 25

## 2018-01-12 PROCEDURE — 27201912 HC WOUND CARE DEBRIDEMENT SUPPLIES

## 2018-01-12 NOTE — PROGRESS NOTES
[facility]  Wound Care  Progress Note    Problem List Items Addressed This Visit        Other    Non-healing surgical wound    Overview     HPI Pt is 62 yr old female with history of HTN, and hyperlipidemia. Had a abscessed cyst opened up in the ER on 10-26-17 was initially packed by ER but pt never packed wound. Now is open with no drainage but filled with old, black sebaceous residue.    Location: right shoulder    Duration: 10-26-17    Context: surgical    Associated Signs & Symptoms: denies pain    Timing:     Severity:     Quality:     Modifying Factors:                  Pt AAO X 3, denies chest pain or SOB, ambulates independently. Old abscessed cyst site cleaned out of old debris and debrided non- viable tissue. Will pack with 1/4 inch packing strip coated with bactroban daily.  See wound doc progress notes. Documents will be scanned.        Graciela Schilling  [facility]

## 2018-01-19 ENCOUNTER — OFFICE VISIT (OUTPATIENT)
Dept: WOUND CARE | Facility: HOSPITAL | Age: 63
End: 2018-01-19
Attending: NURSE PRACTITIONER
Payer: COMMERCIAL

## 2018-01-19 VITALS
RESPIRATION RATE: 18 BRPM | SYSTOLIC BLOOD PRESSURE: 156 MMHG | DIASTOLIC BLOOD PRESSURE: 66 MMHG | TEMPERATURE: 98 F | HEART RATE: 43 BPM

## 2018-01-19 DIAGNOSIS — T81.89XA NON-HEALING SURGICAL WOUND, INITIAL ENCOUNTER: Primary | ICD-10-CM

## 2018-01-19 PROCEDURE — 11042 DBRDMT SUBQ TIS 1ST 20SQCM/<: CPT

## 2018-01-19 PROCEDURE — 27201912 HC WOUND CARE DEBRIDEMENT SUPPLIES

## 2018-01-19 NOTE — PROGRESS NOTES
[facility]  Wound Care  Progress Note    Problem List Items Addressed This Visit        Other    Non-healing surgical wound - Primary    Overview     HPI Pt is 62 yr old female with history of HTN, and hyperlipidemia. Had a abscessed cyst opened up in the ER on 10-26-17 was initially packed by ER but pt never packed wound. Now is open with no drainage but filled with old, black sebaceous residue.    Location: right shoulder    Duration: 10-26-17    Context: surgical    Associated Signs & Symptoms: denies pain    Timing:     Severity:     Quality:     Modifying Factors:                  Pt AAO X 3, denies chest pain or SOB, walks independently, wound is slightly smaller, old debris removed from wound, debrided wound bed of non-viable tissue, will change to moistened mesalt packing  See wound doc progress notes. Documents will be scanned.        Graciela Schilling  [facility]

## 2018-01-26 ENCOUNTER — OFFICE VISIT (OUTPATIENT)
Dept: WOUND CARE | Facility: HOSPITAL | Age: 63
End: 2018-01-26
Attending: NURSE PRACTITIONER
Payer: COMMERCIAL

## 2018-01-26 VITALS
SYSTOLIC BLOOD PRESSURE: 115 MMHG | HEART RATE: 46 BPM | RESPIRATION RATE: 18 BRPM | DIASTOLIC BLOOD PRESSURE: 60 MMHG | TEMPERATURE: 98 F

## 2018-01-26 DIAGNOSIS — T81.89XA NON-HEALING SURGICAL WOUND, INITIAL ENCOUNTER: Primary | ICD-10-CM

## 2018-01-26 PROCEDURE — 99212 OFFICE O/P EST SF 10 MIN: CPT

## 2018-01-26 NOTE — PROGRESS NOTES
[facility]  Wound Care  Progress Note    Problem List Items Addressed This Visit        Other    Non-healing surgical wound - Primary    Overview     HPI Pt is 62 yr old female with history of HTN, and hyperlipidemia. Had a abscessed cyst opened up in the ER on 10-26-17 was initially packed by ER but pt never packed wound. Now is open with no drainage but filled with old, black sebaceous residue.    Location: right shoulder    Duration: 10-26-17    Context: surgical    Associated Signs & Symptoms: denies pain    Timing:     Severity:     Quality:     Modifying Factors:                  Physical Exam   Constitutional: She is oriented to person, place, and time. She appears well-developed and well-nourished.   HENT:   Head: Normocephalic.   Cardiovascular: Normal rate and regular rhythm.    Pulmonary/Chest: Effort normal.   Musculoskeletal: Normal range of motion.   Neurological: She is alert and oriented to person, place, and time.   Skin: Skin is warm and dry.   Psychiatric: She has a normal mood and affect. Her behavior is normal. Judgment and thought content normal.     Wound was an abcess that was opened and not packed and therefore closed with an epithelialized hole. Will send to a surgeon for excision and closure. Discharged from wound care.  See wound doc progress notes. Documents will be scanned.        Graciela Schilling  [facility]

## 2018-02-21 ENCOUNTER — CLINICAL SUPPORT (OUTPATIENT)
Dept: INTERNAL MEDICINE | Facility: CLINIC | Age: 63
End: 2018-02-21
Payer: MEDICAID

## 2018-02-21 DIAGNOSIS — I10 ESSENTIAL HYPERTENSION: ICD-10-CM

## 2018-02-21 DIAGNOSIS — E78.5 HYPERLIPIDEMIA, UNSPECIFIED HYPERLIPIDEMIA TYPE: ICD-10-CM

## 2018-02-21 LAB
ALBUMIN SERPL BCP-MCNC: 3.7 G/DL
ALP SERPL-CCNC: 69 U/L
ALT SERPL W/O P-5'-P-CCNC: 23 U/L
ANION GAP SERPL CALC-SCNC: 8 MMOL/L
AST SERPL-CCNC: 21 U/L
BASOPHILS # BLD AUTO: 0.01 K/UL
BASOPHILS NFR BLD: 0.2 %
BILIRUB SERPL-MCNC: 0.6 MG/DL
BUN SERPL-MCNC: 16 MG/DL
CALCIUM SERPL-MCNC: 9.5 MG/DL
CHLORIDE SERPL-SCNC: 107 MMOL/L
CHOLEST SERPL-MCNC: 213 MG/DL
CHOLEST/HDLC SERPL: 2.8 {RATIO}
CO2 SERPL-SCNC: 27 MMOL/L
CREAT SERPL-MCNC: 0.6 MG/DL
CREAT UR-MCNC: 119.4 MG/DL
DIFFERENTIAL METHOD: ABNORMAL
EOSINOPHIL # BLD AUTO: 0.2 K/UL
EOSINOPHIL NFR BLD: 3.3 %
ERYTHROCYTE [DISTWIDTH] IN BLOOD BY AUTOMATED COUNT: 16.6 %
EST. GFR  (AFRICAN AMERICAN): >60 ML/MIN/1.73 M^2
EST. GFR  (NON AFRICAN AMERICAN): >60 ML/MIN/1.73 M^2
GLUCOSE SERPL-MCNC: 94 MG/DL
HCT VFR BLD AUTO: 38.9 %
HDLC SERPL-MCNC: 75 MG/DL
HDLC SERPL: 35.2 %
HGB BLD-MCNC: 12.4 G/DL
LDLC SERPL CALC-MCNC: 116 MG/DL
LYMPHOCYTES # BLD AUTO: 1.9 K/UL
LYMPHOCYTES NFR BLD: 42.4 %
MCH RBC QN AUTO: 23.1 PG
MCHC RBC AUTO-ENTMCNC: 31.9 G/DL
MCV RBC AUTO: 73 FL
MICROALBUMIN UR DL<=1MG/L-MCNC: 22 UG/ML
MICROALBUMIN/CREATININE RATIO: 18.4 UG/MG
MONOCYTES # BLD AUTO: 0.4 K/UL
MONOCYTES NFR BLD: 8.9 %
NEUTROPHILS # BLD AUTO: 2 K/UL
NEUTROPHILS NFR BLD: 45.2 %
NONHDLC SERPL-MCNC: 138 MG/DL
PLATELET # BLD AUTO: 208 K/UL
PMV BLD AUTO: 11.6 FL
POTASSIUM SERPL-SCNC: 3.9 MMOL/L
PROT SERPL-MCNC: 7.5 G/DL
RBC # BLD AUTO: 5.36 M/UL
SODIUM SERPL-SCNC: 142 MMOL/L
TRIGL SERPL-MCNC: 110 MG/DL
WBC # BLD AUTO: 4.48 K/UL

## 2018-02-21 PROCEDURE — 82043 UR ALBUMIN QUANTITATIVE: CPT

## 2018-02-21 PROCEDURE — 80053 COMPREHEN METABOLIC PANEL: CPT

## 2018-02-21 PROCEDURE — 99211 OFF/OP EST MAY X REQ PHY/QHP: CPT | Mod: PBBFAC

## 2018-02-21 PROCEDURE — 36415 COLL VENOUS BLD VENIPUNCTURE: CPT | Mod: PBBFAC

## 2018-02-21 PROCEDURE — 80061 LIPID PANEL: CPT

## 2018-02-21 PROCEDURE — 99999 PR PBB SHADOW E&M-EST. PATIENT-LVL I: CPT | Mod: PBBFAC,,,

## 2018-02-21 PROCEDURE — 85025 COMPLETE CBC W/AUTO DIFF WBC: CPT

## 2018-02-28 ENCOUNTER — OFFICE VISIT (OUTPATIENT)
Dept: INTERNAL MEDICINE | Facility: CLINIC | Age: 63
End: 2018-02-28
Payer: MEDICAID

## 2018-02-28 VITALS
RESPIRATION RATE: 18 BRPM | TEMPERATURE: 99 F | HEART RATE: 56 BPM | WEIGHT: 174.81 LBS | BODY MASS INDEX: 28.09 KG/M2 | DIASTOLIC BLOOD PRESSURE: 64 MMHG | SYSTOLIC BLOOD PRESSURE: 138 MMHG | HEIGHT: 66 IN

## 2018-02-28 DIAGNOSIS — Z12.11 ENCOUNTER FOR SCREENING FOR MALIGNANT NEOPLASM OF COLON: ICD-10-CM

## 2018-02-28 DIAGNOSIS — E78.5 HYPERLIPIDEMIA, UNSPECIFIED HYPERLIPIDEMIA TYPE: ICD-10-CM

## 2018-02-28 DIAGNOSIS — I10 ESSENTIAL HYPERTENSION: Primary | ICD-10-CM

## 2018-02-28 PROCEDURE — 99999 PR PBB SHADOW E&M-EST. PATIENT-LVL III: CPT | Mod: PBBFAC,,, | Performed by: NURSE PRACTITIONER

## 2018-02-28 PROCEDURE — 99214 OFFICE O/P EST MOD 30 MIN: CPT | Mod: S$PBB,,, | Performed by: NURSE PRACTITIONER

## 2018-02-28 PROCEDURE — 99213 OFFICE O/P EST LOW 20 MIN: CPT | Mod: PBBFAC | Performed by: NURSE PRACTITIONER

## 2018-02-28 RX ORDER — LISINOPRIL AND HYDROCHLOROTHIAZIDE 12.5; 2 MG/1; MG/1
1 TABLET ORAL DAILY
Qty: 30 TABLET | Refills: 5 | Status: SHIPPED | OUTPATIENT
Start: 2018-02-28 | End: 2018-04-30

## 2018-02-28 RX ORDER — PRAVASTATIN SODIUM 40 MG/1
40 TABLET ORAL DAILY
Qty: 30 TABLET | Refills: 3 | Status: SHIPPED | OUTPATIENT
Start: 2018-02-28 | End: 2018-03-14

## 2018-02-28 NOTE — PROGRESS NOTES
"Subjective:       Patient ID: Nan Simms is a 62 y.o. female.    Chief Complaint: 6 month checkup and Cough (at night)    HPI: Pt presents to clinic today for 6 month follow up. Feeling good.    Patient no longer has insurance, waiting on medicaid to be approved. She is not sure how she will continue to pay for her medications they are $400 a month at [facility].       Lab Results   Component Value Date    CHOL 213 (H) 02/21/2018    CHOL 179 08/21/2017    CHOL 173 02/03/2017     Lab Results   Component Value Date    HDL 75 02/21/2018    HDL 72 08/21/2017    HDL 58 02/03/2017     Lab Results   Component Value Date    LDLCALC 116.0 02/21/2018    LDLCALC 91.2 08/21/2017    LDLCALC 93.6 02/03/2017     Lab Results   Component Value Date    TRIG 110 02/21/2018    TRIG 79 08/21/2017    TRIG 107 02/03/2017     Lab Results   Component Value Date    CHOLHDL 35.2 02/21/2018    CHOLHDL 40.2 08/21/2017    CHOLHDL 33.5 02/03/2017       BMP  Lab Results   Component Value Date     02/21/2018    K 3.9 02/21/2018     02/21/2018    CO2 27 02/21/2018    BUN 16 02/21/2018    CREATININE 0.6 02/21/2018    CALCIUM 9.5 02/21/2018    ANIONGAP 8 02/21/2018    ESTGFRAFRICA >60 02/21/2018    EGFRNONAA >60 02/21/2018     Lab Results   Component Value Date    ALT 23 02/21/2018    AST 21 02/21/2018    ALKPHOS 69 02/21/2018    BILITOT 0.6 02/21/2018     Lab Results   Component Value Date    WBC 4.48 02/21/2018    HGB 12.4 02/21/2018    HCT 38.9 02/21/2018    MCV 73 (L) 02/21/2018     02/21/2018   fe studies ordered but not draWN  Review of Systems   Constitutional: Negative for chills, fatigue, fever and unexpected weight change.   HENT: Negative for congestion, ear pain, sore throat and trouble swallowing.    Eyes: Negative for pain and visual disturbance.   Respiratory: Positive for cough (cough at night in the last week). Negative for chest tightness and shortness of breath.         Has not taken anything OTC "not that bad" No " SOB/wheezing   Cardiovascular: Negative for chest pain, palpitations and leg swelling.   Gastrointestinal: Negative for abdominal distention, abdominal pain, constipation, diarrhea and vomiting.   Genitourinary: Negative for difficulty urinating, dysuria, flank pain, frequency and hematuria.   Musculoskeletal: Negative for back pain, gait problem, joint swelling, neck pain and neck stiffness.   Skin: Negative for rash and wound.   Neurological: Negative for dizziness, seizures, speech difficulty, light-headedness and headaches.       Objective:      Physical Exam   Constitutional: She is oriented to person, place, and time. She appears well-developed and well-nourished.   HENT:   Head: Normocephalic.   Right Ear: External ear normal.   Left Ear: External ear normal.   Mouth/Throat: Oropharynx is clear and moist.   Eyes: EOM are normal. Pupils are equal, round, and reactive to light.   Neck: Normal range of motion. Neck supple.   Cardiovascular: Normal rate, regular rhythm and normal heart sounds.    Pulmonary/Chest: Effort normal and breath sounds normal.   Abdominal: Soft. Bowel sounds are normal.   Musculoskeletal: Normal range of motion.   Neurological: She is alert and oriented to person, place, and time.   Skin: Skin is warm and dry.   Nursing note and vitals reviewed.      Assessment:       1. Essential hypertension    2. Hyperlipidemia, unspecified hyperlipidemia type        Plan:   Nan was seen today for 6 month checkup and cough.    Diagnoses and all orders for this visit:    Essential hypertension  -     lisinopril-hydrochlorothiazide (PRINZIDE,ZESTORETIC) 20-12.5 mg per tablet; Take 1 tablet by mouth once daily.    Hyperlipidemia, unspecified hyperlipidemia type  -     pravastatin (PRAVACHOL) 40 MG tablet; Take 1 tablet (40 mg total) by mouth once daily.      Will order 6 month labs, mammo once iron studies complete going in am. FiT test given in office with instructions

## 2018-03-01 ENCOUNTER — LAB VISIT (OUTPATIENT)
Dept: LAB | Facility: HOSPITAL | Age: 63
End: 2018-03-01
Attending: NURSE PRACTITIONER
Payer: MEDICAID

## 2018-03-01 DIAGNOSIS — R79.89 ABNORMAL CBC: ICD-10-CM

## 2018-03-01 LAB
FERRITIN SERPL-MCNC: 64 NG/ML
IRON SERPL-MCNC: 62 UG/DL
SATURATED IRON: 13 %
TOTAL IRON BINDING CAPACITY: 465 UG/DL
TRANSFERRIN SERPL-MCNC: 314 MG/DL

## 2018-03-01 PROCEDURE — 83540 ASSAY OF IRON: CPT

## 2018-03-01 PROCEDURE — 82728 ASSAY OF FERRITIN: CPT

## 2018-03-01 PROCEDURE — 36415 COLL VENOUS BLD VENIPUNCTURE: CPT

## 2018-03-07 ENCOUNTER — LAB VISIT (OUTPATIENT)
Dept: LAB | Facility: HOSPITAL | Age: 63
End: 2018-03-07
Attending: NURSE PRACTITIONER
Payer: MEDICAID

## 2018-03-07 ENCOUNTER — TELEPHONE (OUTPATIENT)
Dept: INTERNAL MEDICINE | Facility: CLINIC | Age: 63
End: 2018-03-07

## 2018-03-07 DIAGNOSIS — Z12.11 COLON CANCER SCREENING: ICD-10-CM

## 2018-03-07 DIAGNOSIS — Z12.11 COLON CANCER SCREENING: Primary | ICD-10-CM

## 2018-03-07 PROCEDURE — 82274 ASSAY TEST FOR BLOOD FECAL: CPT

## 2018-03-09 LAB — HEMOCCULT STL QL IA: NEGATIVE

## 2018-03-13 ENCOUNTER — TELEPHONE (OUTPATIENT)
Dept: INTERNAL MEDICINE | Facility: CLINIC | Age: 63
End: 2018-03-13

## 2018-03-13 NOTE — TELEPHONE ENCOUNTER
Pt requesting refill on prescription atorvastatin 20mg please advised.she says that since she has been tacking the lisinopril 20-12.5 mg makes her leg cramp

## 2018-03-13 NOTE — TELEPHONE ENCOUNTER
We switched her to pravastatin at last visit for cost reasons. Did she get insurance and now wants atorvastatin back?

## 2018-03-13 NOTE — TELEPHONE ENCOUNTER
Contacted pt she did notify me that she did get her insurance back today that is why she would like to switch back to Atorvastatin.please advise

## 2018-03-14 ENCOUNTER — TELEPHONE (OUTPATIENT)
Dept: INTERNAL MEDICINE | Facility: CLINIC | Age: 63
End: 2018-03-14

## 2018-03-14 RX ORDER — ATORVASTATIN CALCIUM 20 MG/1
20 TABLET, FILM COATED ORAL DAILY
Qty: 90 TABLET | Refills: 3 | Status: SHIPPED | OUTPATIENT
Start: 2018-03-14 | End: 2018-05-21 | Stop reason: SDUPTHER

## 2018-03-14 NOTE — TELEPHONE ENCOUNTER
Pt does have Medicaid insurance and is requesting Atorvastatin refill not Pravastatin. Thank you!!        [facility]

## 2018-04-30 ENCOUNTER — OFFICE VISIT (OUTPATIENT)
Dept: INTERNAL MEDICINE | Facility: CLINIC | Age: 63
End: 2018-04-30
Payer: MEDICAID

## 2018-04-30 VITALS
SYSTOLIC BLOOD PRESSURE: 138 MMHG | WEIGHT: 171.31 LBS | BODY MASS INDEX: 27.53 KG/M2 | DIASTOLIC BLOOD PRESSURE: 76 MMHG | RESPIRATION RATE: 18 BRPM | HEART RATE: 56 BPM | HEIGHT: 66 IN

## 2018-04-30 DIAGNOSIS — I10 ESSENTIAL HYPERTENSION: ICD-10-CM

## 2018-04-30 DIAGNOSIS — T81.89XA NON-HEALING SURGICAL WOUND, INITIAL ENCOUNTER: Primary | ICD-10-CM

## 2018-04-30 PROCEDURE — 99999 PR PBB SHADOW E&M-EST. PATIENT-LVL III: CPT | Mod: PBBFAC,,, | Performed by: NURSE PRACTITIONER

## 2018-04-30 PROCEDURE — 99213 OFFICE O/P EST LOW 20 MIN: CPT | Mod: PBBFAC | Performed by: NURSE PRACTITIONER

## 2018-04-30 PROCEDURE — 99214 OFFICE O/P EST MOD 30 MIN: CPT | Mod: S$PBB,,, | Performed by: NURSE PRACTITIONER

## 2018-04-30 RX ORDER — LOSARTAN POTASSIUM AND HYDROCHLOROTHIAZIDE 12.5; 5 MG/1; MG/1
1 TABLET ORAL DAILY
Qty: 30 TABLET | Refills: 3 | Status: SHIPPED | OUTPATIENT
Start: 2018-04-30 | End: 2018-09-05 | Stop reason: SDUPTHER

## 2018-04-30 NOTE — PROGRESS NOTES
Subjective:       Patient ID: Nan Simms is a 62 y.o. female.    Chief Complaint: Back Irritation    HPI: Pt presents to clinic today for pain to wound on her back. She has an I and D of the area  10/2017. Area never close all the way. She saw wound care a few times. Wound care discharge her and told her it was healed but would remain open. Started bothering her about 3 weeks ago. Denies any drainage, fever, or pain. It itch's severely.     Review of Systems   Constitutional: Negative for chills, fatigue, fever and unexpected weight change.   HENT: Negative for congestion, ear pain, sore throat and trouble swallowing.    Eyes: Negative for pain and visual disturbance.   Respiratory: Positive for cough (sicne starting lisinopril only at night). Negative for chest tightness and shortness of breath.    Cardiovascular: Negative for chest pain, palpitations and leg swelling.   Gastrointestinal: Negative for abdominal distention, abdominal pain, constipation, diarrhea and vomiting.   Genitourinary: Negative for difficulty urinating, dysuria, flank pain, frequency and hematuria.   Musculoskeletal: Negative for back pain, gait problem, joint swelling, neck pain and neck stiffness.   Skin: Positive for wound (upper back ). Negative for rash.   Neurological: Negative for dizziness, seizures, speech difficulty, light-headedness and headaches.       Objective:      Physical Exam   Constitutional: She is oriented to person, place, and time. She appears well-developed and well-nourished.   HENT:   Head: Normocephalic and atraumatic.   Right Ear: External ear normal.   Left Ear: External ear normal.   Nose: Nose normal.   Mouth/Throat: Oropharynx is clear and moist.   Eyes: EOM are normal. Pupils are equal, round, and reactive to light.   Neck: Normal range of motion.   Cardiovascular: Normal rate, regular rhythm and normal heart sounds.    Pulmonary/Chest: Effort normal and breath sounds normal.   Abdominal: Soft. Bowel  sounds are normal.   Musculoskeletal: Normal range of motion.   Neurological: She is alert and oriented to person, place, and time.   Skin: Skin is warm and dry.        Scarred cavity to R upper back, no drainage, redness, or swelling    Nursing note and vitals reviewed.      Assessment:       1. Non-healing surgical wound, initial encounter    2. Essential hypertension        Plan:   Nan was seen today for back irritation.    Diagnoses and all orders for this visit:    Non-healing surgical wound, initial encounter  -     Ambulatory Referral to Dermatology    Essential hypertension  -     losartan-hydrochlorothiazide 50-12.5 mg (HYZAAR) 50-12.5 mg per tablet; Take 1 tablet by mouth once daily.  DC lisinopril due to cough     2 week nurse visit for BP check , then FU in Aug as scheduled with labs

## 2018-05-14 ENCOUNTER — TELEPHONE (OUTPATIENT)
Dept: INTERNAL MEDICINE | Facility: CLINIC | Age: 63
End: 2018-05-14

## 2018-05-14 ENCOUNTER — CLINICAL SUPPORT (OUTPATIENT)
Dept: INTERNAL MEDICINE | Facility: CLINIC | Age: 63
End: 2018-05-14
Payer: MEDICAID

## 2018-05-14 VITALS — SYSTOLIC BLOOD PRESSURE: 122 MMHG | DIASTOLIC BLOOD PRESSURE: 60 MMHG

## 2018-05-21 RX ORDER — ATORVASTATIN CALCIUM 20 MG/1
20 TABLET, FILM COATED ORAL DAILY
Qty: 90 TABLET | Refills: 3 | Status: SHIPPED | OUTPATIENT
Start: 2018-05-21 | End: 2019-05-09 | Stop reason: SDUPTHER

## 2018-05-21 NOTE — TELEPHONE ENCOUNTER
----- Message from Celia Davis sent at 2018 10:07 AM CDT -----  Contact: pt  Nan Simms  MRN: [id number]  : 1955  PCP: Payal Moreland  Home Phone      [phone]  Work Phone      Not on file.  Mobile          [phone]      MESSAGE:  Pt misplaced her cholesterol medication (Lipitor?) over the weekend and wants to know if we can call in some more. Please advise.  PHONE:  [phone]  PHARMACY:  [facility]

## 2018-09-05 DIAGNOSIS — I10 ESSENTIAL HYPERTENSION: ICD-10-CM

## 2018-09-06 RX ORDER — LOSARTAN POTASSIUM AND HYDROCHLOROTHIAZIDE 12.5; 5 MG/1; MG/1
TABLET ORAL
Qty: 30 TABLET | Refills: 0 | Status: SHIPPED | OUTPATIENT
Start: 2018-09-06 | End: 2018-10-05 | Stop reason: SDUPTHER

## 2018-09-09 ENCOUNTER — HOSPITAL ENCOUNTER (EMERGENCY)
Facility: HOSPITAL | Age: 63
Discharge: HOME | End: 2018-09-09
Attending: SURGERY
Payer: MEDICAID

## 2018-09-09 VITALS
RESPIRATION RATE: 18 BRPM | HEART RATE: 41 BPM | OXYGEN SATURATION: 100 % | WEIGHT: 171 LBS | TEMPERATURE: 95 F | DIASTOLIC BLOOD PRESSURE: 72 MMHG | SYSTOLIC BLOOD PRESSURE: 145 MMHG | BODY MASS INDEX: 27.6 KG/M2

## 2018-09-09 DIAGNOSIS — R42 DIZZINESS: ICD-10-CM

## 2018-09-09 DIAGNOSIS — N30.00 ACUTE CYSTITIS WITHOUT HEMATURIA: Primary | ICD-10-CM

## 2018-09-09 LAB
ALBUMIN SERPL BCP-MCNC: 3.7 G/DL
ALP SERPL-CCNC: 69 U/L
ALT SERPL W/O P-5'-P-CCNC: 30 U/L
AMPHET+METHAMPHET UR QL: NEGATIVE
ANION GAP SERPL CALC-SCNC: 11 MMOL/L
APTT BLDCRRT: 25.5 SEC
AST SERPL-CCNC: 26 U/L
BACTERIA #/AREA URNS HPF: ABNORMAL /HPF
BARBITURATES UR QL SCN>200 NG/ML: NEGATIVE
BASOPHILS # BLD AUTO: 0.02 K/UL
BASOPHILS NFR BLD: 0.4 %
BENZODIAZ UR QL SCN>200 NG/ML: NEGATIVE
BILIRUB SERPL-MCNC: 0.5 MG/DL
BILIRUB UR QL STRIP: NEGATIVE
BNP SERPL-MCNC: 45 PG/ML
BUN SERPL-MCNC: 17 MG/DL
BZE UR QL SCN: NEGATIVE
CALCIUM SERPL-MCNC: 9.6 MG/DL
CANNABINOIDS UR QL SCN: NEGATIVE
CHLORIDE SERPL-SCNC: 105 MMOL/L
CK MB SERPL-MCNC: 1.3 NG/ML
CK MB SERPL-MCNC: 1.4 NG/ML
CK MB SERPL-RTO: 0.9 %
CK MB SERPL-RTO: 1 %
CK SERPL-CCNC: 144 U/L
CK SERPL-CCNC: 144 U/L
CK SERPL-CCNC: 148 U/L
CK SERPL-CCNC: 148 U/L
CLARITY UR: ABNORMAL
CO2 SERPL-SCNC: 28 MMOL/L
COLOR UR: YELLOW
CREAT SERPL-MCNC: 0.7 MG/DL
CREAT UR-MCNC: 138.8 MG/DL
D DIMER PPP IA.FEU-MCNC: 0.58 MG/L FEU
DIFFERENTIAL METHOD: ABNORMAL
EOSINOPHIL # BLD AUTO: 0.1 K/UL
EOSINOPHIL NFR BLD: 2 %
ERYTHROCYTE [DISTWIDTH] IN BLOOD BY AUTOMATED COUNT: 15.2 %
EST. GFR  (AFRICAN AMERICAN): >60 ML/MIN/1.73 M^2
EST. GFR  (NON AFRICAN AMERICAN): >60 ML/MIN/1.73 M^2
GLUCOSE SERPL-MCNC: 98 MG/DL
GLUCOSE UR QL STRIP: NEGATIVE
HCT VFR BLD AUTO: 39.5 %
HGB BLD-MCNC: 12.8 G/DL
HGB UR QL STRIP: ABNORMAL
INR PPP: 1
KETONES UR QL STRIP: NEGATIVE
LEUKOCYTE ESTERASE UR QL STRIP: ABNORMAL
LYMPHOCYTES # BLD AUTO: 1.9 K/UL
LYMPHOCYTES NFR BLD: 37 %
MAGNESIUM SERPL-MCNC: 2.1 MG/DL
MCH RBC QN AUTO: 23.9 PG
MCHC RBC AUTO-ENTMCNC: 32.4 G/DL
MCV RBC AUTO: 74 FL
METHADONE UR QL SCN>300 NG/ML: NEGATIVE
MICROSCOPIC COMMENT: ABNORMAL
MONOCYTES # BLD AUTO: 0.5 K/UL
MONOCYTES NFR BLD: 9.6 %
NEUTROPHILS # BLD AUTO: 2.6 K/UL
NEUTROPHILS NFR BLD: 51 %
NITRITE UR QL STRIP: NEGATIVE
OPIATES UR QL SCN: NEGATIVE
PCP UR QL SCN>25 NG/ML: NEGATIVE
PH UR STRIP: 6 [PH] (ref 5–8)
PHOSPHATE SERPL-MCNC: 3.4 MG/DL
PLATELET # BLD AUTO: 214 K/UL
PMV BLD AUTO: 11 FL
POTASSIUM SERPL-SCNC: 3.9 MMOL/L
PROT SERPL-MCNC: 7.2 G/DL
PROT UR QL STRIP: NEGATIVE
PROTHROMBIN TIME: 9.8 SEC
RBC # BLD AUTO: 5.35 M/UL
RBC #/AREA URNS HPF: 0 /HPF (ref 0–4)
SODIUM SERPL-SCNC: 144 MMOL/L
SP GR UR STRIP: 1.02 (ref 1–1.03)
SQUAMOUS #/AREA URNS HPF: 10 /HPF
TOXICOLOGY INFORMATION: NORMAL
TROPONIN I SERPL DL<=0.01 NG/ML-MCNC: <0.006 NG/ML
TROPONIN I SERPL DL<=0.01 NG/ML-MCNC: <0.006 NG/ML
TSH SERPL DL<=0.005 MIU/L-ACNC: 0.81 UIU/ML
URN SPEC COLLECT METH UR: ABNORMAL
UROBILINOGEN UR STRIP-ACNC: NEGATIVE EU/DL
WBC # BLD AUTO: 5 K/UL
WBC #/AREA URNS HPF: 15 /HPF (ref 0–5)

## 2018-09-09 PROCEDURE — 93010 ELECTROCARDIOGRAM REPORT: CPT | Mod: ,,, | Performed by: INTERNAL MEDICINE

## 2018-09-09 PROCEDURE — 83880 ASSAY OF NATRIURETIC PEPTIDE: CPT

## 2018-09-09 PROCEDURE — 36415 COLL VENOUS BLD VENIPUNCTURE: CPT

## 2018-09-09 PROCEDURE — 80053 COMPREHEN METABOLIC PANEL: CPT

## 2018-09-09 PROCEDURE — 82553 CREATINE MB FRACTION: CPT

## 2018-09-09 PROCEDURE — 83735 ASSAY OF MAGNESIUM: CPT

## 2018-09-09 PROCEDURE — 84443 ASSAY THYROID STIM HORMONE: CPT

## 2018-09-09 PROCEDURE — 85025 COMPLETE CBC W/AUTO DIFF WBC: CPT

## 2018-09-09 PROCEDURE — 84100 ASSAY OF PHOSPHORUS: CPT

## 2018-09-09 PROCEDURE — 85730 THROMBOPLASTIN TIME PARTIAL: CPT

## 2018-09-09 PROCEDURE — 81000 URINALYSIS NONAUTO W/SCOPE: CPT

## 2018-09-09 PROCEDURE — 87086 URINE CULTURE/COLONY COUNT: CPT

## 2018-09-09 PROCEDURE — 82550 ASSAY OF CK (CPK): CPT

## 2018-09-09 PROCEDURE — 85610 PROTHROMBIN TIME: CPT

## 2018-09-09 PROCEDURE — 80307 DRUG TEST PRSMV CHEM ANLYZR: CPT

## 2018-09-09 PROCEDURE — 96372 THER/PROPH/DIAG INJ SC/IM: CPT

## 2018-09-09 PROCEDURE — 99284 EMERGENCY DEPT VISIT MOD MDM: CPT | Mod: 25

## 2018-09-09 PROCEDURE — 63600175 PHARM REV CODE 636 W HCPCS: Performed by: SURGERY

## 2018-09-09 PROCEDURE — 84484 ASSAY OF TROPONIN QUANT: CPT | Mod: 91

## 2018-09-09 PROCEDURE — 93005 ELECTROCARDIOGRAM TRACING: CPT

## 2018-09-09 PROCEDURE — 85379 FIBRIN DEGRADATION QUANT: CPT

## 2018-09-09 RX ORDER — PHENAZOPYRIDINE HYDROCHLORIDE 200 MG/1
200 TABLET, FILM COATED ORAL 3 TIMES DAILY
Qty: 6 TABLET | Refills: 0 | Status: SHIPPED | OUTPATIENT
Start: 2018-09-09 | End: 2018-09-19

## 2018-09-09 RX ORDER — NITROFURANTOIN 25; 75 MG/1; MG/1
100 CAPSULE ORAL 2 TIMES DAILY
Qty: 14 CAPSULE | Refills: 0 | Status: SHIPPED | OUTPATIENT
Start: 2018-09-09 | End: 2018-09-16

## 2018-09-09 RX ORDER — CEFTRIAXONE 1 G/1
1 INJECTION, POWDER, FOR SOLUTION INTRAMUSCULAR; INTRAVENOUS
Status: COMPLETED | OUTPATIENT
Start: 2018-09-09 | End: 2018-09-09

## 2018-09-09 RX ADMIN — CEFTRIAXONE SODIUM 1 G: 1 INJECTION, POWDER, FOR SOLUTION INTRAMUSCULAR; INTRAVENOUS at 01:09

## 2018-09-09 NOTE — ED NOTES
Patient discharged home after verbalizing understanding of instructions given. Patient will follow up with PCP next week.  Patient has no questions or concerns at this time.

## 2018-09-09 NOTE — ED PROVIDER NOTES
[facility]                                                 Chief Complaint  63 y.o. female with Dizziness    History of Present Illness  Nan Simms presents to the emergency room with dizziness issues today  Patient has dizziness this morning felt weak, stable vital signs in the ER today  Patient now states she no longer feels weak and dizzy, now that she is sitting down  Patient denies chest pain or shortness of breath, no nausea vomiting or diarrhea  Patient denies any abdominal pain, denies any fever or weight loss on interview  Patient on evaluation today has urinary tract infection, stable on ER presentation     The history is provided by the patient   device was not used during this ER visit  Medical history: Abnormal Pap smear, hypertension, hypothyroidism  Surgical history: Oophorectomy and hysterectomy  No Known Allergies     Review of Systems and Physical Exam      Review of Systems  -- Constitution - no fever, denies fatigue, no weakness, no chills  -- Eyes - no tearing or redness, no visual disturbance  -- Ear, Nose - no tinnitus or earache, no nasal congestion or discharge  -- Mouth,Throat - no sore throat, no toothache, normal voice, normal swallowing  -- Respiratory - denies cough and congestion, no shortness of breath, no LEWIS  -- Cardiovascular - denies chest pain, no palpitations, denies claudication  -- Gastrointestinal - denies abdominal pain, nausea, vomiting, or diarrhea  -- Genitourinary - no dysuria, denies flank pain, no hematuria, no STD risk  -- Musculoskeletal - denies back pain, negative for myalgias and arthralgias   -- Neurological - dizziness, no headache, denies weakness or seizure; no LOC  -- Skin - denies pallor, rash, or changes in skin. no hives or welts noted    Vital Signs  Her oral temperature is 94.5 °F (34.7 °C)  Her blood pressure is 145/72 and her pulse is 41  Her respiration is 18 and oxygen saturation is 100%.     Physical  Exam  -- Nursing note and vitals reviewed  -- Constitutional: Appears well-developed and well-nourished  -- Head: Atraumatic. Normocephalic. No obvious abnormality  -- Eyes: Pupils are equal and reactive to light. Normal conjunctiva and lids  -- Cardiac: Normal rate, regular rhythm and normal heart sounds  -- Pulmonary: Normal respiratory effort, breath sounds clear to auscultation  -- Abdominal: Soft, no tenderness. Normal bowel sounds. Normal liver edge  -- Genitourinary: no flank pain on exam, no suprapubic pain by palpation   -- Musculoskeletal: Normal range of motion, no effusions. Joints stable   -- Neurological: No focal deficits. Showed good interaction with staff  -- Vascular: Posterior tibial, dorsalis pedis and radial pulses 2+ bilaterally      Emergency Room Course      Lab Results     K 3.9      CO2 28   BUN 17   CREATININE 0.7   GLU 98   ALKPHOS 69   AST 26   ALT 30   BILITOT 0.5   ALBUMIN 3.7   PROT 7.2   WBC 5.00   HGB 12.8   HCT 39.5            CPKMB 1.4   TROPONINI <0.006   INR 1.0   BNP 45   DDIMER 0.58 (H)   MG 2.1   TSH 0.813     Urinalysis  -- Urinalysis performed during this ER visit showed signs of infection  -- The urine today has been sent for lab culture, results pending      EKG  -- The EKG findings today were without concerning findings from baseline    Radiology  -- The CT of the head performed in the ER today was negative for acute pathology  -- Chest x-ray showed no infiltrate and showed no acute pathology    Medications Given  cefTRIAXone injection 1 g (1 g Intramuscular Given 9/9/18 1309)     Diagnosis  -- The primary encounter diagnosis was Acute cystitis without hematuria.   -- A diagnosis of Dizziness was also pertinent to this visit.    Disposition and Plan  -- Disposition: home  -- Condition: stable  -- Follow-up: Patient to follow up with Payal Moreland NP in 1-2 days.  -- I advised the patient that we have found no life threatening condition  today  -- At this time, I believe the patient is clinically stable for discharge.   -- The patient acknowledges that close follow up with a MD is required   -- Patient agrees to comply with all instruction and direction given in the ER    This note is dictated on Dragon Natural Speaking word recognition program.  There are word recognition mistakes that are occasionally missed on review.         Red Kelly MD  09/09/18 1552

## 2018-09-10 LAB
BACTERIA UR CULT: NORMAL
BACTERIA UR CULT: NORMAL

## 2018-09-16 ENCOUNTER — HOSPITAL ENCOUNTER (EMERGENCY)
Facility: HOSPITAL | Age: 63
Discharge: HOME | End: 2018-09-16
Attending: SURGERY
Payer: MEDICAID

## 2018-09-16 VITALS
HEIGHT: 66 IN | TEMPERATURE: 98 F | RESPIRATION RATE: 17 BRPM | SYSTOLIC BLOOD PRESSURE: 141 MMHG | HEART RATE: 83 BPM | DIASTOLIC BLOOD PRESSURE: 67 MMHG | WEIGHT: 173.06 LBS | BODY MASS INDEX: 27.81 KG/M2

## 2018-09-16 DIAGNOSIS — R19.7 DIARRHEA, UNSPECIFIED TYPE: Primary | ICD-10-CM

## 2018-09-16 DIAGNOSIS — R53.83 FATIGUE: ICD-10-CM

## 2018-09-16 LAB
ALBUMIN SERPL BCP-MCNC: 3.9 G/DL
ALP SERPL-CCNC: 69 U/L
ALT SERPL W/O P-5'-P-CCNC: 21 U/L
ANION GAP SERPL CALC-SCNC: 12 MMOL/L
ANISOCYTOSIS BLD QL SMEAR: SLIGHT
AST SERPL-CCNC: 16 U/L
BACTERIA #/AREA URNS HPF: ABNORMAL /HPF
BASOPHILS # BLD AUTO: 0.01 K/UL
BASOPHILS NFR BLD: 0.2 %
BILIRUB SERPL-MCNC: 0.4 MG/DL
BILIRUB UR QL STRIP: NEGATIVE
BUN SERPL-MCNC: 20 MG/DL
CALCIUM SERPL-MCNC: 10.1 MG/DL
CHLORIDE SERPL-SCNC: 105 MMOL/L
CK MB SERPL-MCNC: 1.1 NG/ML
CK MB SERPL-RTO: 1.3 %
CK SERPL-CCNC: 83 U/L
CK SERPL-CCNC: 83 U/L
CLARITY UR: CLEAR
CO2 SERPL-SCNC: 24 MMOL/L
COLOR UR: YELLOW
CREAT SERPL-MCNC: 0.7 MG/DL
DIFFERENTIAL METHOD: ABNORMAL
EOSINOPHIL # BLD AUTO: 0.1 K/UL
EOSINOPHIL NFR BLD: 2.2 %
ERYTHROCYTE [DISTWIDTH] IN BLOOD BY AUTOMATED COUNT: 15 %
EST. GFR  (AFRICAN AMERICAN): >60 ML/MIN/1.73 M^2
EST. GFR  (NON AFRICAN AMERICAN): >60 ML/MIN/1.73 M^2
GLUCOSE SERPL-MCNC: 109 MG/DL
GLUCOSE UR QL STRIP: NEGATIVE
HCT VFR BLD AUTO: 40.7 %
HGB BLD-MCNC: 13 G/DL
HGB UR QL STRIP: ABNORMAL
KETONES UR QL STRIP: NEGATIVE
LEUKOCYTE ESTERASE UR QL STRIP: ABNORMAL
LIPASE SERPL-CCNC: 88 U/L
LYMPHOCYTES # BLD AUTO: 1.6 K/UL
LYMPHOCYTES NFR BLD: 25 %
MCH RBC QN AUTO: 23.5 PG
MCHC RBC AUTO-ENTMCNC: 31.9 G/DL
MCV RBC AUTO: 74 FL
MICROSCOPIC COMMENT: ABNORMAL
MONOCYTES # BLD AUTO: 0.6 K/UL
MONOCYTES NFR BLD: 10 %
NEUTROPHILS # BLD AUTO: 3.9 K/UL
NEUTROPHILS NFR BLD: 62.8 %
NITRITE UR QL STRIP: NEGATIVE
OVALOCYTES BLD QL SMEAR: ABNORMAL
PH UR STRIP: 6 [PH] (ref 5–8)
PLATELET # BLD AUTO: 230 K/UL
PMV BLD AUTO: 11 FL
POIKILOCYTOSIS BLD QL SMEAR: SLIGHT
POTASSIUM SERPL-SCNC: 3.1 MMOL/L
PROT SERPL-MCNC: 7.8 G/DL
PROT UR QL STRIP: NEGATIVE
RBC # BLD AUTO: 5.54 M/UL
RBC #/AREA URNS HPF: 10 /HPF (ref 0–4)
SODIUM SERPL-SCNC: 141 MMOL/L
SP GR UR STRIP: 1.02 (ref 1–1.03)
TROPONIN I SERPL DL<=0.01 NG/ML-MCNC: <0.006 NG/ML
URN SPEC COLLECT METH UR: ABNORMAL
UROBILINOGEN UR STRIP-ACNC: NEGATIVE EU/DL
WBC # BLD AUTO: 6.29 K/UL
WBC #/AREA URNS HPF: >100 /HPF (ref 0–5)
WBC CLUMPS URNS QL MICRO: ABNORMAL

## 2018-09-16 PROCEDURE — 63600175 PHARM REV CODE 636 W HCPCS: Performed by: SURGERY

## 2018-09-16 PROCEDURE — 80053 COMPREHEN METABOLIC PANEL: CPT

## 2018-09-16 PROCEDURE — 83690 ASSAY OF LIPASE: CPT

## 2018-09-16 PROCEDURE — 96360 HYDRATION IV INFUSION INIT: CPT

## 2018-09-16 PROCEDURE — 99284 EMERGENCY DEPT VISIT MOD MDM: CPT | Mod: 25

## 2018-09-16 PROCEDURE — 85025 COMPLETE CBC W/AUTO DIFF WBC: CPT

## 2018-09-16 PROCEDURE — 96372 THER/PROPH/DIAG INJ SC/IM: CPT

## 2018-09-16 PROCEDURE — 81000 URINALYSIS NONAUTO W/SCOPE: CPT

## 2018-09-16 PROCEDURE — 82550 ASSAY OF CK (CPK): CPT

## 2018-09-16 PROCEDURE — 36415 COLL VENOUS BLD VENIPUNCTURE: CPT

## 2018-09-16 PROCEDURE — 93010 ELECTROCARDIOGRAM REPORT: CPT | Mod: ,,, | Performed by: INTERNAL MEDICINE

## 2018-09-16 PROCEDURE — 25000003 PHARM REV CODE 250: Performed by: SURGERY

## 2018-09-16 PROCEDURE — 82553 CREATINE MB FRACTION: CPT

## 2018-09-16 PROCEDURE — 87086 URINE CULTURE/COLONY COUNT: CPT

## 2018-09-16 PROCEDURE — 84484 ASSAY OF TROPONIN QUANT: CPT

## 2018-09-16 PROCEDURE — 93005 ELECTROCARDIOGRAM TRACING: CPT

## 2018-09-16 RX ORDER — PANTOPRAZOLE SODIUM 40 MG/1
40 TABLET, DELAYED RELEASE ORAL DAILY
Qty: 30 TABLET | Refills: 0 | Status: SHIPPED | OUTPATIENT
Start: 2018-09-16 | End: 2019-05-09

## 2018-09-16 RX ORDER — DICYCLOMINE HYDROCHLORIDE 20 MG/1
20 TABLET ORAL 4 TIMES DAILY PRN
Qty: 15 TABLET | Refills: 0 | Status: SHIPPED | OUTPATIENT
Start: 2018-09-16 | End: 2018-10-16

## 2018-09-16 RX ORDER — CEFTRIAXONE 1 G/1
1 INJECTION, POWDER, FOR SOLUTION INTRAMUSCULAR; INTRAVENOUS
Status: COMPLETED | OUTPATIENT
Start: 2018-09-16 | End: 2018-09-16

## 2018-09-16 RX ORDER — LOPERAMIDE HYDROCHLORIDE 2 MG/1
2 CAPSULE ORAL 4 TIMES DAILY PRN
Qty: 12 CAPSULE | Refills: 0 | Status: SHIPPED | OUTPATIENT
Start: 2018-09-16 | End: 2018-09-26

## 2018-09-16 RX ADMIN — CEFTRIAXONE SODIUM 1 G: 1 INJECTION, POWDER, FOR SOLUTION INTRAMUSCULAR; INTRAVENOUS at 08:09

## 2018-09-16 RX ADMIN — SODIUM CHLORIDE 500 ML: 0.9 INJECTION, SOLUTION INTRAVENOUS at 06:09

## 2018-09-16 RX ADMIN — SODIUM CHLORIDE 500 ML: 0.9 INJECTION, SOLUTION INTRAVENOUS at 07:09

## 2018-09-16 NOTE — ED NOTES
Discharge instruction/escript instruction given.   Patient verbalized understanding.  Discharge home in stable condition.  Ambulated out of ER with steady gait.  No acute distress.

## 2018-09-16 NOTE — ED PROVIDER NOTES
[facility]                                                 Chief Complaint  63 y.o. female with Diarrhea    History of Present Illness  Nan Simms presents to the emergency room with diarrhea this week  Patient was seen in the emergency room last week and diagnosed with UTI  Patient was started on Macrobid antibiotic with watery diarrhea for 2 days  Patient on exam has a soft flat abdomen with no signs of peritonitis noted  Patient has no signs of fever, no weight loss, no blood in the diarrhea today  Patient has no history of Clostridium infection or Clostridium diarrhea issues    The history is provided by the patient   device was not used during this ER visit  Medical history: Abnormal Pap smear, hypertension, hypothyroidism  Surgical history: Oophorectomy and hysterectomy  No Known Allergies     Review of Systems and Physical Exam      Review of Systems  -- Constitution - no fever, denies fatigue, no weakness, no chills  -- Eyes - no tearing or redness, no visual disturbance  -- Ear, Nose - no tinnitus or earache, no nasal congestion or discharge  -- Mouth,Throat - no sore throat, no toothache, normal voice, normal swallowing  -- Respiratory - denies cough and congestion, no shortness of breath, no LEWIS  -- Cardiovascular - denies chest pain, no palpitations, denies claudication  -- Gastrointestinal - diarrhea, denies abdominal pain, nausea, vomiting  -- Genitourinary - no dysuria, denies flank pain, no hematuria, no STD risk  -- Musculoskeletal - denies back pain, negative for myalgias and arthralgias   -- Neurological - no headache, denies weakness or seizure; no LOC  -- Skin - denies pallor, rash, or changes in skin. no hives or welts noted  -- Psychiatric - Denies SI or HI, no psychosis or fractured thought noted     Vital Signs  Her tympanic temperature is 96.4 °F (35.8 °C).   Her blood pressure is 150/68 and her pulse is 66.   Her respiration is 17.     Physical  Exam  -- Nursing note and vitals reviewed  -- Constitutional: Appears well-developed and well-nourished  -- Head: Atraumatic. Normocephalic. No obvious abnormality  -- Eyes: Pupils are equal and reactive to light. Normal conjunctiva and lids  -- Nose: Nose normal in appearance, nares grossly normal. No discharge  -- Throat: Mucous membranes moist, pharynx normal, normal tonsils. No lesions   -- Ears: External ears and TM normal bilaterally. Normal hearing and no drainage  -- Neck: Normal range of motion. Neck supple. No masses, trachea midline  -- Cardiac: Normal rate, regular rhythm and normal heart sounds  -- Pulmonary: Normal respiratory effort, breath sounds clear to auscultation  -- Abdominal: Soft, no tenderness. Normal bowel sounds. Normal liver edge  -- Musculoskeletal: Normal range of motion, no effusions. Joints stable   -- Neurological: No focal deficits. Showed good interaction with staff  -- Vascular: Posterior tibial, dorsalis pedis and radial pulses 2+ bilaterally    -- Lymphatics: No cervical or peripheral lymphadenopathy. No edema noted  -- Skin: Warm and dry. No evidence of rash or cellulitis  -- Psychiatric: Normal mood and affect. Bedside behavior is appropriate    Emergency Room Course      Lab Results     K 3.1 (L)      CO2 24   BUN 20   CREATININE 0.7      ALKPHOS 69   AST 16   ALT 21   BILITOT 0.4   ALBUMIN 3.9   PROT 7.8   WBC 6.29   HGB 13.0   HCT 40.7      CPK 83   CPK 83   CPKMB 1.1   TROPONINI <0.006     EKG  -- The EKG findings today were without concerning findings from baseline    Radiology  -- Flat and erect abdominal x-ray performed in the ER today was within normal limits    Medications Given  sodium chloride 0.9% bolus 500 mL (0 mLs Intravenous Stopped 9/16/18 5515)   sodium chloride 0.9% bolus 500 mL (0 mLs Intravenous Stopped 9/16/18 9401)     Diagnosis  -- The primary encounter diagnosis was Diarrhea, unspecified type.   -- A diagnosis of Fatigue was  also pertinent to this visit.    Disposition and Plan  -- Disposition: home  -- Condition: stable  -- Follow-up: Patient to follow up with Payal Moreland NP in 1-2 days.  -- I advised the patient that we have found no life threatening condition today  -- At this time, I believe the patient is clinically stable for discharge.   -- The patient acknowledges that close follow up with a MD is required   -- Patient agrees to comply with all instruction and direction given in the ER    This note is dictated on Dragon Natural Speaking word recognition program.  There are word recognition mistakes that are occasionally missed on review.         Red Kelly MD  09/16/18 0752

## 2018-09-17 LAB — BACTERIA UR CULT: NO GROWTH

## 2018-09-26 ENCOUNTER — OFFICE VISIT (OUTPATIENT)
Dept: INTERNAL MEDICINE | Facility: CLINIC | Age: 63
End: 2018-09-26
Payer: MEDICAID

## 2018-09-26 VITALS
BODY MASS INDEX: 28.6 KG/M2 | OXYGEN SATURATION: 99 % | WEIGHT: 177.94 LBS | SYSTOLIC BLOOD PRESSURE: 110 MMHG | HEART RATE: 53 BPM | HEIGHT: 66 IN | DIASTOLIC BLOOD PRESSURE: 68 MMHG

## 2018-09-26 DIAGNOSIS — F17.200 SMOKER: ICD-10-CM

## 2018-09-26 DIAGNOSIS — I10 ESSENTIAL HYPERTENSION: Primary | ICD-10-CM

## 2018-09-26 DIAGNOSIS — E78.5 HYPERLIPIDEMIA, UNSPECIFIED HYPERLIPIDEMIA TYPE: ICD-10-CM

## 2018-09-26 DIAGNOSIS — E87.6 HYPOKALEMIA: ICD-10-CM

## 2018-09-26 DIAGNOSIS — Z12.39 BREAST CANCER SCREENING: ICD-10-CM

## 2018-09-26 PROCEDURE — 99999 PR PBB SHADOW E&M-EST. PATIENT-LVL III: CPT | Mod: PBBFAC,,, | Performed by: NURSE PRACTITIONER

## 2018-09-26 PROCEDURE — 99213 OFFICE O/P EST LOW 20 MIN: CPT | Mod: S$PBB,,, | Performed by: NURSE PRACTITIONER

## 2018-09-26 PROCEDURE — 99213 OFFICE O/P EST LOW 20 MIN: CPT | Mod: PBBFAC | Performed by: NURSE PRACTITIONER

## 2018-09-26 NOTE — PROGRESS NOTES
Subjective:           Patient ID: Nan Simms is a 63 y.o. female.    Chief Complaint: Follow-up (ER)    64 YO AAF seen in ER 9/9 for UTI and tx with macrobid; she returned to ER 9/16 with c/o diarrhea. She  Denied  abdominal pain, nausea, vomiting  -- Genitourinary - no dysuria, denies flank pain, no hematuria, no STD risk  9/16 urine cultue negative  She presents today for ER follow up ; known to JENY Moreland NP   She reports feeling much better; no more diarrhea or abdominal pain; no urine symptoms    Defers flu shot- made her sick; defers pneumonia; would like to get Tdap but wants to get on her day off; works in [employer] at [employer].   Planning for MMG; will get lipids and Tdap then   Recent labs reviewed; CMp, CBC, TSH; lipase;   Noted hypokalemia ; K - 3.1+ when having diarrhea- will re- check with lipids      Review of Systems   Constitutional: Negative for chills, fatigue and fever.   HENT: Negative for congestion, ear pain, postnasal drip, sinus pressure, sneezing and sore throat.    Eyes: Negative for discharge.   Respiratory: Negative for cough, chest tightness and shortness of breath.    Cardiovascular: Negative.    Gastrointestinal: Negative for abdominal pain, constipation, diarrhea, nausea and vomiting.   Genitourinary: Negative for difficulty urinating, flank pain and hematuria.   Musculoskeletal: Negative.  Negative for arthralgias and joint swelling.   Skin: Negative.    Neurological: Negative for dizziness and headaches.   Psychiatric/Behavioral: Negative for behavioral problems and confusion.       Objective:      Physical Exam   Constitutional: She is oriented to person, place, and time. She appears well-developed and well-nourished.   HENT:   Head: Normocephalic and atraumatic.   Nose: Nose normal.   Eyes: EOM are normal. Pupils are equal, round, and reactive to light.   Neck: Normal range of motion. Neck supple.   Cardiovascular: Normal rate, regular rhythm and normal heart sounds.   No murmur  heard.  Pulmonary/Chest: Effort normal and breath sounds normal.   Abdominal: Soft. Bowel sounds are normal.   Musculoskeletal: Normal range of motion. She exhibits no edema or tenderness.   Neurological: She is alert and oriented to person, place, and time.   Skin: Skin is warm and dry. Capillary refill takes less than 2 seconds.   Psychiatric: She has a normal mood and affect. Her behavior is normal. Judgment and thought content normal.       Assessment:       1. Essential hypertension    2. Hyperlipidemia, unspecified hyperlipidemia type    3. Hypokalemia    4. Breast cancer screening    5. Smoker        Plan:   Nan was seen today for follow-up.    Diagnoses and all orders for this visit:    Essential hypertension    Hyperlipidemia, unspecified hyperlipidemia type  -     Lipid panel; Future    Hypokalemia  -     Basic metabolic panel; Future    Breast cancer screening  -     Mammo Digital Diagnostic Bilat with Tomosynthesis_CAD; Future  -     Cancel: Mammo Digital Screening Bilat with Tomosynthesis CAD; Future    Smoker  Comments:  1/2 ppd      Problem List Items Addressed This Visit        Cardiac/Vascular    Essential hypertension - Primary    Hyperlipidemia    Relevant Orders    Lipid panel      Other Visit Diagnoses     Hypokalemia        Relevant Orders    Basic metabolic panel    Breast cancer screening        Relevant Orders    Mammo Digital Diagnostic Bilat with Tomosynthesis_CAD    Smoker        1/2 ppd      defers Flu and pneumonia; wants to return for TDap when off  Will schedule MMG; she will get BMP and lipids that morning

## 2018-10-02 ENCOUNTER — LAB VISIT (OUTPATIENT)
Dept: LAB | Facility: HOSPITAL | Age: 63
End: 2018-10-02
Attending: NURSE PRACTITIONER
Payer: MEDICAID

## 2018-10-02 DIAGNOSIS — E87.6 HYPOKALEMIA: ICD-10-CM

## 2018-10-02 DIAGNOSIS — E78.5 HYPERLIPIDEMIA, UNSPECIFIED HYPERLIPIDEMIA TYPE: ICD-10-CM

## 2018-10-02 LAB
ANION GAP SERPL CALC-SCNC: 8 MMOL/L
BUN SERPL-MCNC: 14 MG/DL
CALCIUM SERPL-MCNC: 9.2 MG/DL
CHLORIDE SERPL-SCNC: 108 MMOL/L
CHOLEST SERPL-MCNC: 187 MG/DL
CHOLEST/HDLC SERPL: 3.2 {RATIO}
CO2 SERPL-SCNC: 25 MMOL/L
CREAT SERPL-MCNC: 0.7 MG/DL
EST. GFR  (AFRICAN AMERICAN): >60 ML/MIN/1.73 M^2
EST. GFR  (NON AFRICAN AMERICAN): >60 ML/MIN/1.73 M^2
GLUCOSE SERPL-MCNC: 94 MG/DL
HDLC SERPL-MCNC: 59 MG/DL
HDLC SERPL: 31.6 %
LDLC SERPL CALC-MCNC: 102.2 MG/DL
NONHDLC SERPL-MCNC: 128 MG/DL
POTASSIUM SERPL-SCNC: 4 MMOL/L
SODIUM SERPL-SCNC: 141 MMOL/L
TRIGL SERPL-MCNC: 129 MG/DL

## 2018-10-02 PROCEDURE — 80061 LIPID PANEL: CPT

## 2018-10-02 PROCEDURE — 36415 COLL VENOUS BLD VENIPUNCTURE: CPT

## 2018-10-02 PROCEDURE — 80048 BASIC METABOLIC PNL TOTAL CA: CPT

## 2018-10-04 ENCOUNTER — HOSPITAL ENCOUNTER (OUTPATIENT)
Dept: RADIOLOGY | Facility: HOSPITAL | Age: 63
Discharge: HOME | End: 2018-10-04
Attending: NURSE PRACTITIONER
Payer: MEDICAID

## 2018-10-04 ENCOUNTER — CLINICAL SUPPORT (OUTPATIENT)
Dept: INTERNAL MEDICINE | Facility: CLINIC | Age: 63
End: 2018-10-04
Payer: MEDICAID

## 2018-10-04 VITALS — HEIGHT: 66 IN | BODY MASS INDEX: 28.45 KG/M2 | WEIGHT: 177 LBS

## 2018-10-04 DIAGNOSIS — Z00.00 HEALTH CARE MAINTENANCE: Primary | ICD-10-CM

## 2018-10-04 DIAGNOSIS — Z12.39 BREAST CANCER SCREENING: ICD-10-CM

## 2018-10-04 PROCEDURE — 90471 IMMUNIZATION ADMIN: CPT | Mod: PBBFAC

## 2018-10-04 PROCEDURE — 77063 BREAST TOMOSYNTHESIS BI: CPT | Mod: 26,,, | Performed by: RADIOLOGY

## 2018-10-04 PROCEDURE — 77063 BREAST TOMOSYNTHESIS BI: CPT | Mod: TC

## 2018-10-04 PROCEDURE — 77067 SCR MAMMO BI INCL CAD: CPT | Mod: 26,,, | Performed by: RADIOLOGY

## 2018-10-05 DIAGNOSIS — I10 ESSENTIAL HYPERTENSION: ICD-10-CM

## 2018-10-11 RX ORDER — LOSARTAN POTASSIUM AND HYDROCHLOROTHIAZIDE 12.5; 5 MG/1; MG/1
TABLET ORAL
Qty: 30 TABLET | Refills: 0 | Status: SHIPPED | OUTPATIENT
Start: 2018-10-11 | End: 2018-11-18 | Stop reason: SDUPTHER

## 2018-11-18 DIAGNOSIS — I10 ESSENTIAL HYPERTENSION: ICD-10-CM

## 2018-11-18 RX ORDER — LOSARTAN POTASSIUM AND HYDROCHLOROTHIAZIDE 12.5; 5 MG/1; MG/1
TABLET ORAL
Qty: 30 TABLET | Refills: 0 | Status: SHIPPED | OUTPATIENT
Start: 2018-11-18 | End: 2019-05-09

## 2018-12-15 ENCOUNTER — HOSPITAL ENCOUNTER (EMERGENCY)
Facility: HOSPITAL | Age: 63
Discharge: HOME | End: 2018-12-15
Attending: SURGERY
Payer: MEDICAID

## 2018-12-15 VITALS
HEART RATE: 86 BPM | BODY MASS INDEX: 27.44 KG/M2 | TEMPERATURE: 99 F | OXYGEN SATURATION: 97 % | SYSTOLIC BLOOD PRESSURE: 180 MMHG | DIASTOLIC BLOOD PRESSURE: 71 MMHG | WEIGHT: 170 LBS | RESPIRATION RATE: 16 BRPM

## 2018-12-15 DIAGNOSIS — F43.21 GRIEF REACTION: Primary | ICD-10-CM

## 2018-12-15 PROCEDURE — 99283 EMERGENCY DEPT VISIT LOW MDM: CPT

## 2018-12-15 RX ORDER — HYDROXYZINE PAMOATE 25 MG/1
25 CAPSULE ORAL EVERY 6 HOURS PRN
Qty: 20 CAPSULE | Refills: 0 | Status: SHIPPED | OUTPATIENT
Start: 2018-12-15 | End: 2019-05-09 | Stop reason: SDUPTHER

## 2018-12-15 RX ORDER — LORAZEPAM 2 MG/ML
1 INJECTION INTRAMUSCULAR
Status: DISCONTINUED | OUTPATIENT
Start: 2018-12-15 | End: 2018-12-15
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
## 2019-01-23 NOTE — ED PROVIDER NOTES
"[facility]                                       December 27, 2017                   Chief Complaint  62 y.o. female with Incisional Pain (incision has been "bothering her" especially at night)    History of Present Illness  Nan Simms presents to the emergency room with right shoulder incisional pain today  Patient had an incision and drainage of an infected cyst on her right posterior shoulder area  Patient had this I&D in October 2017, has been residually painful and burning since that time  Patient on exam has a healing I&D area in the right posterior shoulder, small 1 cm opening  This opening has no purulent drainage, no signs of cellulitis, good granulation tissue noted  Patient has no fever complicating symptoms, no history of MRSA infections reported today    The history is provided by the patient  Medical history: Abnormal Pap smear, hypertension, hypothyroidism  Surgical history: Oophorectomy and hysterectomy  No Known Allergies      Review of Systems and Physical Exam     Review of Systems  -- Constitution - no fever, denies fatigue, no weakness, no chills  -- Eyes - no tearing or redness, no visual disturbance  -- Ear, Nose - no tinnitus or earache, no nasal congestion or discharge  -- Mouth,Throat - no sore throat, no toothache, normal voice, normal swallowing  -- Respiratory - denies cough and congestion, no shortness of breath, no LEWIS  -- Cardiovascular - denies chest pain, no palpitations, denies claudication  -- Gastrointestinal - denies abdominal pain, nausea, vomiting, or diarrhea  -- Musculoskeletal - denies back pain, negative for myalgias and arthralgias   -- Neurological - no headache, denies weakness or seizure; no LOC  -- Skin - pain in the area of a previous I&D of the right shoulder    Vital Signs  -- Her blood pressure is 156/77 and her pulse is 63.  -- Her respiration is 16.      Physical Exam  -- Nursing note and vitals reviewed  -- Head: Atraumatic. " Normocephalic. No obvious abnormality  -- Eyes: Pupils are equal and reactive to light. Normal conjunctiva and lids  -- Cardiac: Normal rate, regular rhythm and normal heart sounds  -- Pulmonary: Normal respiratory effort, breath sounds clear to auscultation  -- Abdominal: Soft, no tenderness. Normal bowel sounds. Normal liver edge  -- Musculoskeletal: Normal range of motion, no effusions. Joints stable   -- Neurological: No focal deficits. Showed good interaction with staff  -- Vascular: Posterior tibial, dorsalis pedis and radial pulses 2+ bilaterally    -- Lymphatics: No cervical or peripheral lymphadenopathy. No edema noted  -- Skin: Healing scar in the right posterior shoulder area with a 1 cm opening    Emergency Room Course     Medications Given  -- cefTRIAXone injection 1 g (not administered)   -- lidocaine HCL 10 mg/ml (1%) injection 1 mL (not administered)   -- ketorolac injection 60 mg (not administered)     Diagnosis  -- The encounter diagnosis was Scar irritation.    Disposition and Plan  -- Disposition: home  -- Condition: stable  -- Follow-up: Patient to follow up with Payal Moreland NP in 1-2 days.  -- I advised the patient that we have found no life threatening condition today  -- At this time, I believe the patient is clinically stable for discharge.   -- The patient acknowledges that close follow up with a MD is required   -- Patient agrees to comply with all instruction and direction given in the ER    This note is dictated on Dragon Natural Speaking word recognition program.  There are word recognition mistakes that are occasionally missed on review.           Red Kelly MD  12/27/17 9993     0 = independent

## 2019-05-09 ENCOUNTER — OFFICE VISIT (OUTPATIENT)
Dept: INTERNAL MEDICINE | Facility: CLINIC | Age: 64
End: 2019-05-09
Payer: MEDICAID

## 2019-05-09 VITALS
WEIGHT: 176.13 LBS | HEART RATE: 52 BPM | SYSTOLIC BLOOD PRESSURE: 140 MMHG | RESPIRATION RATE: 16 BRPM | HEIGHT: 66 IN | DIASTOLIC BLOOD PRESSURE: 80 MMHG | BODY MASS INDEX: 28.31 KG/M2

## 2019-05-09 DIAGNOSIS — I10 HYPERTENSION, UNSPECIFIED TYPE: ICD-10-CM

## 2019-05-09 DIAGNOSIS — E78.5 HYPERLIPIDEMIA, UNSPECIFIED HYPERLIPIDEMIA TYPE: ICD-10-CM

## 2019-05-09 DIAGNOSIS — F41.9 ANXIETY: ICD-10-CM

## 2019-05-09 DIAGNOSIS — Z13.29 SCREENING FOR HYPOTHYROIDISM: Primary | ICD-10-CM

## 2019-05-09 DIAGNOSIS — Z13.220 SCREENING FOR HYPERLIPIDEMIA: ICD-10-CM

## 2019-05-09 PROCEDURE — 99213 OFFICE O/P EST LOW 20 MIN: CPT | Mod: PBBFAC | Performed by: NURSE PRACTITIONER

## 2019-05-09 PROCEDURE — 99999 PR PBB SHADOW E&M-EST. PATIENT-LVL III: CPT | Mod: PBBFAC,,, | Performed by: NURSE PRACTITIONER

## 2019-05-09 PROCEDURE — 99214 OFFICE O/P EST MOD 30 MIN: CPT | Mod: S$PBB,,, | Performed by: NURSE PRACTITIONER

## 2019-05-09 PROCEDURE — 99999 PR PBB SHADOW E&M-EST. PATIENT-LVL III: ICD-10-PCS | Mod: PBBFAC,,, | Performed by: NURSE PRACTITIONER

## 2019-05-09 PROCEDURE — 99214 PR OFFICE/OUTPT VISIT, EST, LEVL IV, 30-39 MIN: ICD-10-PCS | Mod: S$PBB,,, | Performed by: NURSE PRACTITIONER

## 2019-05-09 RX ORDER — LISINOPRIL AND HYDROCHLOROTHIAZIDE 12.5; 2 MG/1; MG/1
1 TABLET ORAL DAILY
Qty: 30 TABLET | Refills: 11 | Status: SHIPPED | OUTPATIENT
Start: 2019-05-09 | End: 2020-03-02 | Stop reason: SDUPTHER

## 2019-05-09 RX ORDER — ATORVASTATIN CALCIUM 20 MG/1
20 TABLET, FILM COATED ORAL DAILY
Qty: 90 TABLET | Refills: 3 | Status: SHIPPED | OUTPATIENT
Start: 2019-05-09 | End: 2020-04-29

## 2019-05-09 RX ORDER — LISINOPRIL AND HYDROCHLOROTHIAZIDE 12.5; 2 MG/1; MG/1
1 TABLET ORAL DAILY
Refills: 11 | COMMUNITY
Start: 2019-01-30 | End: 2019-05-09 | Stop reason: SDUPTHER

## 2019-05-09 RX ORDER — HYDROXYZINE PAMOATE 25 MG/1
25 CAPSULE ORAL EVERY 6 HOURS PRN
Qty: 20 CAPSULE | Refills: 0 | Status: SHIPPED | OUTPATIENT
Start: 2019-05-09 | End: 2019-07-30
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
## 2019-05-15 ENCOUNTER — CLINICAL SUPPORT (OUTPATIENT)
Dept: INTERNAL MEDICINE | Facility: CLINIC | Age: 64
End: 2019-05-15
Payer: MEDICAID

## 2019-05-15 DIAGNOSIS — E78.5 HYPERLIPIDEMIA, UNSPECIFIED HYPERLIPIDEMIA TYPE: ICD-10-CM

## 2019-05-15 DIAGNOSIS — I10 HYPERTENSION, UNSPECIFIED TYPE: ICD-10-CM

## 2019-05-15 DIAGNOSIS — Z13.29 SCREENING FOR HYPOTHYROIDISM: ICD-10-CM

## 2019-05-15 LAB
ALBUMIN SERPL BCP-MCNC: 3.6 G/DL (ref 3.5–5.2)
ALP SERPL-CCNC: 70 U/L (ref 55–135)
ALT SERPL W/O P-5'-P-CCNC: 17 U/L (ref 10–44)
ANION GAP SERPL CALC-SCNC: 6 MMOL/L (ref 8–16)
AST SERPL-CCNC: 20 U/L (ref 10–40)
BASOPHILS # BLD AUTO: 0.01 K/UL (ref 0–0.2)
BASOPHILS NFR BLD: 0.2 % (ref 0–1.9)
BILIRUB SERPL-MCNC: 0.5 MG/DL (ref 0.1–1)
BUN SERPL-MCNC: 15 MG/DL (ref 8–23)
CALCIUM SERPL-MCNC: 9.4 MG/DL (ref 8.7–10.5)
CHLORIDE SERPL-SCNC: 105 MMOL/L (ref 95–110)
CHOLEST SERPL-MCNC: 195 MG/DL (ref 120–199)
CHOLEST/HDLC SERPL: 2.7 {RATIO} (ref 2–5)
CO2 SERPL-SCNC: 30 MMOL/L (ref 23–29)
CREAT SERPL-MCNC: 0.7 MG/DL (ref 0.5–1.4)
DIFFERENTIAL METHOD: ABNORMAL
EOSINOPHIL # BLD AUTO: 0.1 K/UL (ref 0–0.5)
EOSINOPHIL NFR BLD: 1.6 % (ref 0–8)
ERYTHROCYTE [DISTWIDTH] IN BLOOD BY AUTOMATED COUNT: 15.8 % (ref 11.5–14.5)
EST. GFR  (AFRICAN AMERICAN): >60 ML/MIN/1.73 M^2
EST. GFR  (NON AFRICAN AMERICAN): >60 ML/MIN/1.73 M^2
GLUCOSE SERPL-MCNC: 91 MG/DL (ref 70–110)
HCT VFR BLD AUTO: 39.6 % (ref 37–48.5)
HDLC SERPL-MCNC: 72 MG/DL (ref 40–75)
HDLC SERPL: 36.9 % (ref 20–50)
HGB BLD-MCNC: 12.6 G/DL (ref 12–16)
LDLC SERPL CALC-MCNC: 101.6 MG/DL (ref 63–159)
LYMPHOCYTES # BLD AUTO: 2.1 K/UL (ref 1–4.8)
LYMPHOCYTES NFR BLD: 36.7 % (ref 18–48)
MCH RBC QN AUTO: 23.7 PG (ref 27–31)
MCHC RBC AUTO-ENTMCNC: 31.8 G/DL (ref 32–36)
MCV RBC AUTO: 75 FL (ref 82–98)
MONOCYTES # BLD AUTO: 0.6 K/UL (ref 0.3–1)
MONOCYTES NFR BLD: 10.2 % (ref 4–15)
NEUTROPHILS # BLD AUTO: 3 K/UL (ref 1.8–7.7)
NEUTROPHILS NFR BLD: 51.3 % (ref 38–73)
NONHDLC SERPL-MCNC: 123 MG/DL
PLATELET # BLD AUTO: 206 K/UL (ref 150–350)
PMV BLD AUTO: 12 FL (ref 9.2–12.9)
POTASSIUM SERPL-SCNC: 4 MMOL/L (ref 3.5–5.1)
PROT SERPL-MCNC: 7 G/DL (ref 6–8.4)
RBC # BLD AUTO: 5.31 M/UL (ref 4–5.4)
SODIUM SERPL-SCNC: 141 MMOL/L (ref 136–145)
TRIGL SERPL-MCNC: 107 MG/DL (ref 30–150)
TSH SERPL DL<=0.005 MIU/L-ACNC: 0.82 UIU/ML (ref 0.4–4)
WBC # BLD AUTO: 5.77 K/UL (ref 3.9–12.7)

## 2019-05-15 PROCEDURE — 80053 COMPREHEN METABOLIC PANEL: CPT

## 2019-05-15 PROCEDURE — 80061 LIPID PANEL: CPT

## 2019-05-15 PROCEDURE — 85025 COMPLETE CBC W/AUTO DIFF WBC: CPT

## 2019-05-15 PROCEDURE — 36415 COLL VENOUS BLD VENIPUNCTURE: CPT | Mod: PBBFAC

## 2019-05-15 PROCEDURE — 84443 ASSAY THYROID STIM HORMONE: CPT

## 2019-07-30 ENCOUNTER — OFFICE VISIT (OUTPATIENT)
Dept: INTERNAL MEDICINE | Facility: CLINIC | Age: 64
End: 2019-07-30
Payer: MEDICARE

## 2019-07-30 VITALS
SYSTOLIC BLOOD PRESSURE: 116 MMHG | DIASTOLIC BLOOD PRESSURE: 60 MMHG | HEIGHT: 66 IN | WEIGHT: 175.69 LBS | RESPIRATION RATE: 18 BRPM | BODY MASS INDEX: 28.23 KG/M2 | HEART RATE: 64 BPM

## 2019-07-30 DIAGNOSIS — Z01.818 PRE-OP EVALUATION: Primary | ICD-10-CM

## 2019-07-30 DIAGNOSIS — E78.5 HYPERLIPIDEMIA, UNSPECIFIED HYPERLIPIDEMIA TYPE: ICD-10-CM

## 2019-07-30 DIAGNOSIS — I10 ESSENTIAL HYPERTENSION: ICD-10-CM

## 2019-07-30 PROCEDURE — 99214 OFFICE O/P EST MOD 30 MIN: CPT | Mod: S$PBB | Performed by: INTERNAL MEDICINE

## 2019-07-30 PROCEDURE — 99999 PR PBB SHADOW E&M-EST. PATIENT-LVL III: CPT | Mod: PBBFAC,,, | Performed by: INTERNAL MEDICINE

## 2019-07-30 PROCEDURE — 99999 PR STA SHADOW: ICD-10-PCS | Mod: PBBFAC,,, | Performed by: INTERNAL MEDICINE

## 2019-07-30 PROCEDURE — 99213 OFFICE O/P EST LOW 20 MIN: CPT | Mod: PBBFAC | Performed by: INTERNAL MEDICINE

## 2019-07-30 PROCEDURE — 99999 PR STA SHADOW: CPT | Mod: PBBFAC,,, | Performed by: INTERNAL MEDICINE

## 2019-07-30 NOTE — H&P (VIEW-ONLY)
Subjective:       Patient ID: Nan Simms is a 64 y.o. female.    Chief Complaint: surgery clearance      HPI:  Patient is new to me but known to clinic and presents for pre-op eval for cataract surgery. She has HTN and HLD. She has no known h/o CAD, CVA, TIA, DM, CKD. Labs from 5/15/19 personally reviewed and discussed with patient. METS > 4. She is a current smoker--about 10 cig a day. Denies daily EtOH use. She is on ASA daily otherwise denies use of blood thinners.     Past Medical History:   Diagnosis Date    Abnormal Pap smear of cervix     Hypertension     Hyperthyroidism        Family History   Problem Relation Age of Onset    Cancer Mother     Breast cancer Neg Hx     Colon cancer Neg Hx     Ovarian cancer Neg Hx        Social History     Socioeconomic History    Marital status:      Spouse name: Not on file    Number of children: Not on file    Years of education: Not on file    Highest education level: Not on file   Occupational History    Not on file   Social Needs    Financial resource strain: Not on file    Food insecurity:     Worry: Not on file     Inability: Not on file    Transportation needs:     Medical: Not on file     Non-medical: Not on file   Tobacco Use    Smoking status: Current Some Day Smoker     Packs/day: 0.50     Years: 30.00     Pack years: 15.00     Types: Cigarettes    Smokeless tobacco: Current User   Substance and Sexual Activity    Alcohol use: Yes     Alcohol/week: 0.6 oz     Types: 1 Cans of beer per week    Drug use: No    Sexual activity: Yes     Partners: Male   Lifestyle    Physical activity:     Days per week: Not on file     Minutes per session: Not on file    Stress: Not on file   Relationships    Social connections:     Talks on phone: Not on file     Gets together: Not on file     Attends Yarsanism service: Not on file     Active member of club or organization: Not on file     Attends meetings of clubs or organizations: Not on file      Relationship status: Not on file   Other Topics Concern    Not on file   Social History Narrative    Not on file       Review of Systems   Constitutional: Negative for activity change, fatigue, fever and unexpected weight change.   HENT: Negative for congestion, ear pain, hearing loss, rhinorrhea and sore throat.    Eyes: Negative for pain, redness and visual disturbance.   Respiratory: Negative for cough, shortness of breath and wheezing.    Cardiovascular: Negative for chest pain, palpitations and leg swelling.   Gastrointestinal: Negative for abdominal pain, constipation, diarrhea, nausea and vomiting.   Genitourinary: Negative for dysuria, frequency, pelvic pain and urgency.   Musculoskeletal: Negative for back pain, joint swelling and neck pain.   Skin: Negative for color change, rash and wound.   Neurological: Negative for dizziness, tremors, weakness, light-headedness and headaches.         Objective:      Physical Exam   Constitutional: She is oriented to person, place, and time. She appears well-developed and well-nourished. No distress.   HENT:   Head: Normocephalic and atraumatic.   Right Ear: External ear normal.   Left Ear: External ear normal.   Eyes: Pupils are equal, round, and reactive to light. Conjunctivae and EOM are normal. Right eye exhibits no discharge. Left eye exhibits no discharge.   Neck: Neck supple. No tracheal deviation present.   Cardiovascular: Normal rate and regular rhythm. Exam reveals no gallop and no friction rub.   No murmur heard.  Pulmonary/Chest: Effort normal and breath sounds normal. No respiratory distress. She has no wheezes. She has no rales.   Abdominal: Soft. Bowel sounds are normal. She exhibits no distension. There is no tenderness.   Musculoskeletal: She exhibits no edema.   Neurological: She is alert and oriented to person, place, and time. No cranial nerve deficit.   Skin: Skin is warm and dry.   Psychiatric: She has a normal mood and affect. Her behavior is  normal.   Vitals reviewed.      Assessment:       1. Pre-op evaluation    2. Essential hypertension    3. Hyperlipidemia, unspecified hyperlipidemia type        Plan:       Nan was seen today for surgery clearance.    Diagnoses and all orders for this visit:    Pre-op evaluation    Essential hypertension    Hyperlipidemia, unspecified hyperlipidemia type       Using Aleksander Revised Cardiac Risk Index she is low risk for surgery. From a medical standpoint she can proceed with her scheduled surgery without any further testing.  CAD: no  CHF: no  CVA: no  DM on insulin: no  Cr >2: no  High risk surgery: no  MET >4: yes  Tobacco: yes  EtOH: no    She can continue all medications in the perioperative period including her ASA as this is safe during cataract surgery which has very low risk of bleeding.

## 2019-07-30 NOTE — PROGRESS NOTES
Subjective:       Patient ID: Nan Simms is a 64 y.o. female.    Chief Complaint: surgery clearance      HPI:  Patient is new to me but known to clinic and presents for pre-op eval for cataract surgery. She has HTN and HLD. She has no known h/o CAD, CVA, TIA, DM, CKD. Labs from 5/15/19 personally reviewed and discussed with patient. METS > 4. She is a current smoker--about 10 cig a day. Denies daily EtOH use. She is on ASA daily otherwise denies use of blood thinners.     Past Medical History:   Diagnosis Date    Abnormal Pap smear of cervix     Hypertension     Hyperthyroidism        Family History   Problem Relation Age of Onset    Cancer Mother     Breast cancer Neg Hx     Colon cancer Neg Hx     Ovarian cancer Neg Hx        Social History     Socioeconomic History    Marital status:      Spouse name: Not on file    Number of children: Not on file    Years of education: Not on file    Highest education level: Not on file   Occupational History    Not on file   Social Needs    Financial resource strain: Not on file    Food insecurity:     Worry: Not on file     Inability: Not on file    Transportation needs:     Medical: Not on file     Non-medical: Not on file   Tobacco Use    Smoking status: Current Some Day Smoker     Packs/day: 0.50     Years: 30.00     Pack years: 15.00     Types: Cigarettes    Smokeless tobacco: Current User   Substance and Sexual Activity    Alcohol use: Yes     Alcohol/week: 0.6 oz     Types: 1 Cans of beer per week    Drug use: No    Sexual activity: Yes     Partners: Male   Lifestyle    Physical activity:     Days per week: Not on file     Minutes per session: Not on file    Stress: Not on file   Relationships    Social connections:     Talks on phone: Not on file     Gets together: Not on file     Attends Jainism service: Not on file     Active member of club or organization: Not on file     Attends meetings of clubs or organizations: Not on file      Relationship status: Not on file   Other Topics Concern    Not on file   Social History Narrative    Not on file       Review of Systems   Constitutional: Negative for activity change, fatigue, fever and unexpected weight change.   HENT: Negative for congestion, ear pain, hearing loss, rhinorrhea and sore throat.    Eyes: Negative for pain, redness and visual disturbance.   Respiratory: Negative for cough, shortness of breath and wheezing.    Cardiovascular: Negative for chest pain, palpitations and leg swelling.   Gastrointestinal: Negative for abdominal pain, constipation, diarrhea, nausea and vomiting.   Genitourinary: Negative for dysuria, frequency, pelvic pain and urgency.   Musculoskeletal: Negative for back pain, joint swelling and neck pain.   Skin: Negative for color change, rash and wound.   Neurological: Negative for dizziness, tremors, weakness, light-headedness and headaches.         Objective:      Physical Exam   Constitutional: She is oriented to person, place, and time. She appears well-developed and well-nourished. No distress.   HENT:   Head: Normocephalic and atraumatic.   Right Ear: External ear normal.   Left Ear: External ear normal.   Eyes: Pupils are equal, round, and reactive to light. Conjunctivae and EOM are normal. Right eye exhibits no discharge. Left eye exhibits no discharge.   Neck: Neck supple. No tracheal deviation present.   Cardiovascular: Normal rate and regular rhythm. Exam reveals no gallop and no friction rub.   No murmur heard.  Pulmonary/Chest: Effort normal and breath sounds normal. No respiratory distress. She has no wheezes. She has no rales.   Abdominal: Soft. Bowel sounds are normal. She exhibits no distension. There is no tenderness.   Musculoskeletal: She exhibits no edema.   Neurological: She is alert and oriented to person, place, and time. No cranial nerve deficit.   Skin: Skin is warm and dry.   Psychiatric: She has a normal mood and affect. Her behavior is  normal.   Vitals reviewed.      Assessment:       1. Pre-op evaluation    2. Essential hypertension    3. Hyperlipidemia, unspecified hyperlipidemia type        Plan:       Nan was seen today for surgery clearance.    Diagnoses and all orders for this visit:    Pre-op evaluation    Essential hypertension    Hyperlipidemia, unspecified hyperlipidemia type       Using Aleksander Revised Cardiac Risk Index she is low risk for surgery. From a medical standpoint she can proceed with her scheduled surgery without any further testing.  CAD: no  CHF: no  CVA: no  DM on insulin: no  Cr >2: no  High risk surgery: no  MET >4: yes  Tobacco: yes  EtOH: no    She can continue all medications in the perioperative period including her ASA as this is safe during cataract surgery which has very low risk of bleeding.

## 2019-08-06 ENCOUNTER — HOSPITAL ENCOUNTER (OUTPATIENT)
Dept: PREADMISSION TESTING | Facility: HOSPITAL | Age: 64
Discharge: HOME | End: 2019-08-06
Attending: OPHTHALMOLOGY
Payer: MEDICARE

## 2019-08-06 DIAGNOSIS — H26.8 OTHER CATARACT OF RIGHT EYE: Primary | ICD-10-CM

## 2019-08-06 DIAGNOSIS — H26.9 CATARACT, RIGHT EYE: ICD-10-CM

## 2019-08-06 NOTE — DISCHARGE INSTRUCTIONS
Pre Admit Instructions    Day and Date of your Surgery :   Thursday 8/8/19  Arrival time: 815am    · Call your doctor if you become ill before your surgery  · Someone will call you between 1 p.m. And 5 p.m.the workday before the procedure to give you an arrival time       - Before 7 a.m. Enter through Emergency Room       - 7 a.m. To 5 p.m. Enter through Patient Registration Main Lobby  · You must have a responsible  to bring you home    Do NOT eat or drink anything   past midnight before your procedure day    Please    · Do not wear makeup, jewelry, nail polish or body piercings  · Bring containers/solution for contacts, dentures, bridges - these and hearing aids will be removed before your procedure  · Do not bring cash, jewelry or valuables the day of your procedure   · No smoking at least 24 hours before your procedure  · Wear clothing that is comfortable and easy to take off and put on  · Do NOT shave for at least 5 days before your surgery      Information About Your Procedure  We would like to welcome you to [facility].      1. Cafeteria Meals: 7am to 10am; 11am to 1:30 pm; Dinner/Supper must may be ordered between 11:00 am and 4 pm from the [facility] [employer] After [facility] Menu. Food will be available to  between 5 pm and 6 pm. The kitchen phone extension is [phone].  2. After Checking in someone will escort you to 4th floor  3. Wear loose fitting clothes that button down the front.  The hospital will provide a gown.  4. Wash hair the night before surgery with your regular soap/shampoo.  5. [facility] is a non-smoking hospital.  NO SMOKING is allowed inside or outside of the hospital.  6. The nurse will review and follow the doctors orders.  The nurse will check vital signs, lungs and heart.  A nurse will begin putting eye drops every 10 minutes until the eyes are dilated (30 mins - 1 hr).    INFORMATION ON THE SURGERY  · The Nurse Henriquez from surgery will bring  you to a holding area by wheel chair.  · In the holding area, an IV, heart monitor, and oxygen will be started.  · Medication jelly will be put in your eye to numb your eye and then surgery will begin.  · You will be awake during the surgery, so if pain is felt, let the physician know at that time.  · After surgery let someone know if you feel any pain.  · The physician will put a clear patch over your eye to allow time to heal.  The physician will remove patch at the office the following day.  · The IV will be discontinued after the procedure.  · You will return upstairs after the surgery.   INFORMATION After the Surgery  · Notify nurse if you feel any eye pain, headache, weakness or dizziness.  · You must have a nurse present for the first time up to the bathroom.  · Instructions will be given on home care at time of discharge.  · The following activities cause increased pressure to the surgery eye.  Please do not lift, strain, bend down to tie shoelaces,  objects on the floor, or lie on the side of the surgery until the physician instructs you differently.  · Follow home care instructions recommended by:     Dr. Azar  - GOALS FOR ONE DAY SURGERY:  Pain controlled by oral medication; tolerating liquids well; able to walk without feeling dizzy or weak; able to urinate without difficulty.  - PATIENT WILL NEED SOMEONE TO DRIVE HIM/HER HOME FOLLOWING SURGERY.  We do want your stay to be as pleasant as possible.  We value your business and ask that you make us aware of those things you liked or did not like about your   care on the forthcoming questionnaire so we can work on constantly improving our service.

## 2019-08-08 ENCOUNTER — HOSPITAL ENCOUNTER (OUTPATIENT)
Facility: HOSPITAL | Age: 64
Discharge: HOME | End: 2019-08-08
Attending: OPHTHALMOLOGY | Admitting: OPHTHALMOLOGY
Payer: MEDICARE

## 2019-08-08 ENCOUNTER — ANESTHESIA EVENT (OUTPATIENT)
Dept: SURGERY | Facility: HOSPITAL | Age: 64
End: 2019-08-08
Payer: MEDICARE

## 2019-08-08 ENCOUNTER — ANESTHESIA (OUTPATIENT)
Dept: SURGERY | Facility: HOSPITAL | Age: 64
End: 2019-08-08
Payer: MEDICARE

## 2019-08-08 VITALS
RESPIRATION RATE: 18 BRPM | OXYGEN SATURATION: 96 % | SYSTOLIC BLOOD PRESSURE: 131 MMHG | DIASTOLIC BLOOD PRESSURE: 67 MMHG | HEART RATE: 43 BPM | TEMPERATURE: 97 F

## 2019-08-08 DIAGNOSIS — H26.9 CATARACT, RIGHT EYE: ICD-10-CM

## 2019-08-08 DIAGNOSIS — H26.8 OTHER CATARACT OF RIGHT EYE: ICD-10-CM

## 2019-08-08 PROCEDURE — 37000009 HC ANESTHESIA EA ADD 15 MINS: Performed by: OPHTHALMOLOGY

## 2019-08-08 PROCEDURE — 37000008 HC ANESTHESIA 1ST 15 MINUTES: Performed by: OPHTHALMOLOGY

## 2019-08-08 PROCEDURE — V2632 POST CHMBR INTRAOCULAR LENS: HCPCS | Performed by: OPHTHALMOLOGY

## 2019-08-08 PROCEDURE — 25000003 PHARM REV CODE 250: Performed by: OPHTHALMOLOGY

## 2019-08-08 PROCEDURE — 36000706: Performed by: OPHTHALMOLOGY

## 2019-08-08 PROCEDURE — 36000707: Performed by: OPHTHALMOLOGY

## 2019-08-08 PROCEDURE — 71000033 HC RECOVERY, INTIAL HOUR: Performed by: OPHTHALMOLOGY

## 2019-08-08 PROCEDURE — 00142 ANES PX ON EYE LENS SURGERY: CPT | Mod: QZ,P3,QS | Performed by: NURSE ANESTHETIST, CERTIFIED REGISTERED

## 2019-08-08 PROCEDURE — 63600175 PHARM REV CODE 636 W HCPCS: Performed by: NURSE ANESTHETIST, CERTIFIED REGISTERED

## 2019-08-08 DEVICE — IMPLANTABLE DEVICE: Type: IMPLANTABLE DEVICE | Site: EYE | Status: FUNCTIONAL

## 2019-08-08 RX ORDER — PROPOFOL 10 MG/ML
INJECTION, EMULSION INTRAVENOUS
Status: DISCONTINUED | OUTPATIENT
Start: 2019-08-08 | End: 2019-08-08

## 2019-08-08 RX ORDER — TETRACAINE HYDROCHLORIDE 5 MG/ML
1 SOLUTION OPHTHALMIC
Status: DISCONTINUED | OUTPATIENT
Start: 2019-08-08 | End: 2019-08-08 | Stop reason: HOSPADM

## 2019-08-08 RX ORDER — LIDOCAINE HYDROCHLORIDE 20 MG/ML
JELLY TOPICAL ONCE
Status: COMPLETED | OUTPATIENT
Start: 2019-08-08 | End: 2019-08-08

## 2019-08-08 RX ORDER — NEOMYCIN SULFATE, POLYMYXIN B SULFATE, AND DEXAMETHASONE 3.5; 10000; 1 MG/G; [USP'U]/G; MG/G
OINTMENT OPHTHALMIC
Status: DISCONTINUED | OUTPATIENT
Start: 2019-08-08 | End: 2019-08-08 | Stop reason: HOSPADM

## 2019-08-08 RX ORDER — PHENYLEPHRINE HYDROCHLORIDE 25 MG/ML
1 SOLUTION/ DROPS OPHTHALMIC
Status: COMPLETED | OUTPATIENT
Start: 2019-08-08 | End: 2019-08-08

## 2019-08-08 RX ORDER — CYCLOPENTOLATE HYDROCHLORIDE 10 MG/ML
1 SOLUTION/ DROPS OPHTHALMIC
Status: COMPLETED | OUTPATIENT
Start: 2019-08-08 | End: 2019-08-08

## 2019-08-08 RX ORDER — LIDOCAINE HYDROCHLORIDE 20 MG/ML
JELLY TOPICAL
Status: DISCONTINUED | OUTPATIENT
Start: 2019-08-08 | End: 2019-08-08 | Stop reason: HOSPADM

## 2019-08-08 RX ORDER — LIDOCAINE HCL/PF 100 MG/5ML
SYRINGE (ML) INTRAVENOUS
Status: DISCONTINUED | OUTPATIENT
Start: 2019-08-08 | End: 2019-08-08

## 2019-08-08 RX ORDER — ONDANSETRON 2 MG/ML
4 INJECTION INTRAMUSCULAR; INTRAVENOUS EVERY 12 HOURS PRN
Status: DISCONTINUED | OUTPATIENT
Start: 2019-08-08 | End: 2019-08-08 | Stop reason: HOSPADM

## 2019-08-08 RX ORDER — ACETAMINOPHEN 325 MG/1
650 TABLET ORAL EVERY 4 HOURS PRN
Status: DISCONTINUED | OUTPATIENT
Start: 2019-08-08 | End: 2019-08-08 | Stop reason: HOSPADM

## 2019-08-08 RX ADMIN — LIDOCAINE HYDROCHLORIDE 20 MG: 20 INJECTION, SOLUTION INTRAVENOUS at 10:08

## 2019-08-08 RX ADMIN — PROPOFOL 40 MG: 10 INJECTION, EMULSION INTRAVENOUS at 10:08

## 2019-08-08 RX ADMIN — TETRACAINE HYDROCHLORIDE 1 DROP: 5 SOLUTION OPHTHALMIC at 09:08

## 2019-08-08 RX ADMIN — PHENYLEPHRINE HYDROCHLORIDE 1 DROP: 25 SOLUTION/ DROPS OPHTHALMIC at 09:08

## 2019-08-08 RX ADMIN — PHENYLEPHRINE HYDROCHLORIDE 1 DROP: 25 SOLUTION/ DROPS OPHTHALMIC at 10:08

## 2019-08-08 RX ADMIN — CYCLOPENTOLATE HYDROCHLORIDE 1 DROP: 10 SOLUTION/ DROPS OPHTHALMIC at 10:08

## 2019-08-08 RX ADMIN — CYCLOPENTOLATE HYDROCHLORIDE 1 DROP: 10 SOLUTION/ DROPS OPHTHALMIC at 09:08

## 2019-08-08 RX ADMIN — LIDOCAINE HYDROCHLORIDE 10 ML: 20 JELLY TOPICAL at 10:08

## 2019-08-08 NOTE — BRIEF OP NOTE
Operative Note     SUMMARY     Surgery Date: 8/8/2019     Surgeon(s) and Role:     * Spenser Lee MD - Primary    Pre-op Diagnosis:  Cataract, right eye [H26.9]    Post-op Diagnosis:  same    Procedure(s) (LRB):  PHACOEMULSIFICATION, CATARACT (Right)  EXTRACTION, CATARACT, WITH IOL INSERTION (Right)    Anesthesia: Monitor Anesthesia Care    Estimated Blood Loss: None             Discharge Note      SUMMARY     Admit Date: 8/8/2019    Attending Physician: Spenser Lee MD     Discharge Physician: Spenser Lee MD    Discharge Date: 8/8/2019     Condition at Discharge: Good    Problem List:   Active Hospital Problems   No active problems to display.      Resolved Hospital Problems    Diagnosis Date Resolved POA    Cataract, right eye [H26.9] 08/08/2019 Yes       Final Diagnosis: Same    Disposition: Home or Self Care    Patient Instructions:   Current Discharge Medication List      CONTINUE these medications which have NOT CHANGED    Details   aspirin (ECOTRIN) 81 MG EC tablet Take 1 tablet (81 mg total) by mouth once daily.  Refills: 0    Associated Diagnoses: Essential hypertension      atorvastatin (LIPITOR) 20 MG tablet Take 1 tablet (20 mg total) by mouth once daily.  Qty: 90 tablet, Refills: 3    Associated Diagnoses: Hyperlipidemia, unspecified hyperlipidemia type      ciprofloxacin HCl (CILOXAN) 0.3 % ophthalmic solution Instill 1 drop into the surgical eye four times a day  Qty: 10 mL, Refills: 1      lisinopril-hydrochlorothiazide (PRINZIDE,ZESTORETIC) 20-12.5 mg per tablet Take 1 tablet by mouth once daily.  Qty: 30 tablet, Refills: 11    Associated Diagnoses: Hypertension, unspecified type      prednisoLONE acetate (PRED FORTE) 1 % DrpS Instill 1 drop into the surgical eye four times a day  Qty: 10 mL, Refills: 1             Discharge Procedure Orders (must include Diet, Follow-up, Activity)   Discharge Procedure Orders (must include Diet, Follow-up, Activity)   Diet general      Lifting restrictions     Leave dressing on - Keep it clean, dry, and intact until clinic visit        Follow-up Information     Evelyne Buckner, OD In 1 day.    Specialty:  Optometry  Contact information:  [address] [address] [phone]

## 2019-08-08 NOTE — INTERVAL H&P NOTE
H&P completed   It has been reviewed, the patient has been examined and:  I concur with the findings and no changes have occurred since H&P was written.      Patient cleared for Anesthesia:  MAC    Anesthesia/Surgery risks, benefits, and alternative options discussed and understood by patient/family.    There are no hospital problems to display for this patient.      Family History   Problem Relation Age of Onset    Cancer Mother     Breast cancer Neg Hx     Colon cancer Neg Hx     Ovarian cancer Neg Hx        Social History     Socioeconomic History    Marital status:      Spouse name: Not on file    Number of children: Not on file    Years of education: Not on file    Highest education level: Not on file   Occupational History    Not on file   Social Needs    Financial resource strain: Not on file    Food insecurity:     Worry: Not on file     Inability: Not on file    Transportation needs:     Medical: Not on file     Non-medical: Not on file   Tobacco Use    Smoking status: Current Some Day Smoker     Packs/day: 0.50     Years: 30.00     Pack years: 15.00     Types: Cigarettes    Smokeless tobacco: Current User   Substance and Sexual Activity    Alcohol use: Yes     Alcohol/week: 0.6 oz     Types: 1 Cans of beer per week    Drug use: No    Sexual activity: Yes     Partners: Male   Lifestyle    Physical activity:     Days per week: Not on file     Minutes per session: Not on file    Stress: Not on file   Relationships    Social connections:     Talks on phone: Not on file     Gets together: Not on file     Attends Jain service: Not on file     Active member of club or organization: Not on file     Attends meetings of clubs or organizations: Not on file     Relationship status: Not on file   Other Topics Concern    Not on file   Social History Narrative    Not on file       Review of patient's allergies indicates:   Allergen Reactions    Pcn [penicillins]      Childhood

## 2019-08-08 NOTE — ANESTHESIA PREPROCEDURE EVALUATION
08/08/2019  Nan Simms is a 64 y.o., female.    Anesthesia Evaluation    I have reviewed the Patient Summary Reports.    I have reviewed the Nursing Notes.   I have reviewed the Medications.     Review of Systems  Anesthesia Hx:  No problems with previous Anesthesia    Social:  Non-Smoker, No Alcohol Use    Hematology/Oncology:  Hematology Normal   Oncology Normal     EENT/Dental:EENT/Dental Normal   Cardiovascular:   Exercise tolerance: good Hypertension    Pulmonary:  Pulmonary Normal    Renal/:  Renal/ Normal     Hepatic/GI:  Hepatic/GI Normal    Musculoskeletal:  Musculoskeletal Normal    Neurological:  Neurology Normal    Endocrine:   Hyperthyroidism    Dermatological:  Skin Normal    Psych:  Psychiatric Normal           Physical Exam  General:  Well nourished    Airway/Jaw/Neck:  Airway Findings: Mouth Opening: Normal Tongue: Normal  General Airway Assessment: Adult  Mallampati: II  TM Distance: Normal, at least 6 cm  Jaw/Neck Findings:  Neck ROM: Normal ROM      Dental:  Dental Findings: In tact        Mental Status:  Mental Status Findings:  Cooperative         Anesthesia Plan  Type of Anesthesia, risks & benefits discussed:  Anesthesia Type:  general, MAC  Patient's Preference:   Intra-op Monitoring Plan: standard ASA monitors  Intra-op Monitoring Plan Comments:   Post Op Pain Control Plan: multimodal analgesia  Post Op Pain Control Plan Comments:   Induction:   IV  Beta Blocker:  Patient is not currently on a Beta-Blocker (No further documentation required).       Informed Consent: Patient understands risks and agrees with Anesthesia plan.  Questions answered. Anesthesia consent signed with patient.  ASA Score: 3     Day of Surgery Review of History & Physical: I have interviewed and examined the patient. I have reviewed the patient's H&P dated: 8/8/19. There are no significant changes.  H&P  update referred to the surgeon.         Ready For Surgery From Anesthesia Perspective.

## 2019-08-08 NOTE — OP NOTE
DATE OF PROCEDURE: 08/08/2019    PREOPERATIVE DIAGNOSIS: Cataract OD    POSTOPERATIVE DIAGNOSIS: Same    PROCEDURE PERFORMED:   1. complex Cataract extraction with phacoemulsification and posterior   chamber intraocular lens placement OD    SURGEON: Christiano Lee MD    COMPLICATIONS: None.     BLOOD LOSS: Less than 5 mL.     ANESTHESIA: Monitored anesthesia care.     IMPLANT: See chart    PROCEDURE IN DETAIL: After informed consent including the risks, benefits and alternatives, the patient was brought to the Operating Room table and placed in a supine position. Monitored anesthesia care was used throughout the entire procedure without any complications. Once adequate anesthesia was achieved with topical tetracaine, the patient was then prepped and draped in usual sterile fashion for intraocular surgery.  A 1.0mm sideport blade was used to make a paracentesis wound. Vision blue was used to stain the anterior capsule due to poor red reflex.  Viscoat was used to fill the anterior chamber. A 2.4 mm keratome blade was then used to make a clear corneal cataract wound. Cystotome was used to make a tear in the anterior capsular flap, which was continued around with Utrata forceps for continuous curvilinear capsulorrhexis. BSS was then used for hydrodissection and hydrodelineation of lens. The lens was noted to spin freely in the bag. Lens was then removed in a divide and conquer manner with the phacoemulsification handpiece.  Irrigation and aspiration handpiece was then used to remove the remaining cortical material. A viscocohesive was then used to fill the capsular bag and an intraocular lens  was placed in the bag without any complications. Irrigation aspiration handpiece removed the remaining viscocohesive and the wounds were hydrated with BSS.The eye was noted to have a good physiological pressure and all wounds were water tight. The lid speculum was removed under microscope without any shallowing. Topical Tobradex  ointment was placed in the eye. The eye was then covered with a shield. The patient tolerated the procedure well without any complication. The patient will follow-up with the next day.

## 2019-08-08 NOTE — ANESTHESIA POSTPROCEDURE EVALUATION
Anesthesia Post Evaluation    Patient: Nan Simms    Procedure(s) Performed: Procedure(s) (LRB):  PHACOEMULSIFICATION, CATARACT (Right)  EXTRACTION, CATARACT, WITH IOL INSERTION (Right)    Final Anesthesia Type: MAC  Patient location during evaluation: PACU  Patient participation: Yes- Able to Participate  Level of consciousness: awake and alert, oriented and awake  Post-procedure vital signs: reviewed and stable  Pain management: adequate  Airway patency: patent  PONV status at discharge: No PONV  Anesthetic complications: no      Cardiovascular status: blood pressure returned to baseline, hemodynamically stable and stable  Respiratory status: unassisted, spontaneous ventilation and room air  Hydration status: euvolemic  Follow-up not needed.          Vitals Value Taken Time   /68 8/8/2019  9:52 AM   Temp 36.3 °C (97.3 °F) 8/8/2019  9:52 AM   Pulse 42 8/8/2019  9:52 AM   Resp 16   8/8/2019 11:15 AM   SpO2 97 % 8/8/2019  9:52 AM         No case tracking events are documented in the log.      Pain/Tapan Score: No data recorded

## 2019-09-25 ENCOUNTER — TELEPHONE (OUTPATIENT)
Dept: INTERNAL MEDICINE | Facility: CLINIC | Age: 64
End: 2019-09-25

## 2019-09-25 DIAGNOSIS — Z12.39 BREAST CANCER SCREENING: Primary | ICD-10-CM

## 2019-09-25 NOTE — TELEPHONE ENCOUNTER
----- Message from Pat Rees sent at 2019  2:36 PM CDT -----  Contact: Self  Nan Simms  MRN: [id number]  : 1955  PCP: Payal Moreland  Home Phone      [phone]  Work Phone      Not on file.  Mobile          [phone]      MESSAGE:   Patient requesting orders  Name of test (labs, mammo, x-ray, etc.):  mammo  Where is test to be performed: [facility]  Date of test (if scheduled): any day  Reason (be specific): Annual  Current Insurance: medicare  Comments:      Phone: [phone]

## 2019-10-28 ENCOUNTER — TELEPHONE (OUTPATIENT)
Dept: INTERNAL MEDICINE | Facility: CLINIC | Age: 64
End: 2019-10-28

## 2019-10-28 DIAGNOSIS — Z12.31 ENCOUNTER FOR SCREENING MAMMOGRAM FOR BREAST CANCER: Primary | ICD-10-CM

## 2019-10-28 NOTE — TELEPHONE ENCOUNTER
----- Message from Hailee Campos sent at 10/28/2019  1:55 PM CDT -----  Contact: Self  Nan Simms  MRN: [id number]  : 1955  PCP: Payal Moreland  Home Phone      [phone]  Work Phone      Not on file.  Mobile          [phone]      MESSAGE:   Calling to schedule mammogram. Please call.    Phone: [phone]

## 2019-10-31 ENCOUNTER — TELEPHONE (OUTPATIENT)
Dept: ADMINISTRATIVE | Facility: HOSPITAL | Age: 64
End: 2019-10-31

## 2019-10-31 DIAGNOSIS — Z12.11 COLON CANCER SCREENING: Primary | ICD-10-CM

## 2019-11-05 ENCOUNTER — HOSPITAL ENCOUNTER (OUTPATIENT)
Dept: RADIOLOGY | Facility: HOSPITAL | Age: 64
Discharge: HOME | End: 2019-11-05
Attending: NURSE PRACTITIONER
Payer: MEDICARE

## 2019-11-05 VITALS — HEIGHT: 66 IN | BODY MASS INDEX: 28.12 KG/M2 | WEIGHT: 175 LBS

## 2019-11-05 DIAGNOSIS — Z12.31 ENCOUNTER FOR SCREENING MAMMOGRAM FOR BREAST CANCER: ICD-10-CM

## 2019-11-05 PROCEDURE — 77067 SCR MAMMO BI INCL CAD: CPT | Mod: TC

## 2019-11-05 PROCEDURE — 77067 MAMMO DIGITAL SCREENING BILAT WITH TOMOSYNTHESIS_CAD: ICD-10-PCS | Mod: 26,ICN,, | Performed by: RADIOLOGY

## 2019-11-05 PROCEDURE — 77063 MAMMO DIGITAL SCREENING BILAT WITH TOMOSYNTHESIS_CAD: ICD-10-PCS | Mod: 26,ICN,, | Performed by: RADIOLOGY

## 2019-11-05 PROCEDURE — 77063 BREAST TOMOSYNTHESIS BI: CPT | Mod: 26,ICN,, | Performed by: RADIOLOGY

## 2019-11-05 PROCEDURE — 77067 SCR MAMMO BI INCL CAD: CPT | Mod: 26,ICN,, | Performed by: RADIOLOGY

## 2019-12-12 ENCOUNTER — TELEPHONE (OUTPATIENT)
Dept: ADMINISTRATIVE | Facility: HOSPITAL | Age: 64
End: 2019-12-12

## 2019-12-21 ENCOUNTER — LAB VISIT (OUTPATIENT)
Dept: LAB | Facility: HOSPITAL | Age: 64
End: 2019-12-21
Attending: NURSE PRACTITIONER
Payer: MEDICARE

## 2019-12-21 DIAGNOSIS — Z12.11 COLON CANCER SCREENING: ICD-10-CM

## 2019-12-21 PROCEDURE — 82274 ASSAY TEST FOR BLOOD FECAL: CPT

## 2019-12-30 LAB — HEMOCCULT STL QL IA: NEGATIVE

## 2020-01-07 ENCOUNTER — TELEPHONE (OUTPATIENT)
Dept: INTERNAL MEDICINE | Facility: CLINIC | Age: 65
End: 2020-01-07

## 2020-01-07 DIAGNOSIS — Z13.29 SCREENING FOR HYPOTHYROIDISM: ICD-10-CM

## 2020-01-07 DIAGNOSIS — D64.9 ANEMIA, UNSPECIFIED TYPE: Primary | ICD-10-CM

## 2020-01-07 DIAGNOSIS — E11.65 TYPE 2 DIABETES MELLITUS WITH HYPERGLYCEMIA, WITHOUT LONG-TERM CURRENT USE OF INSULIN: ICD-10-CM

## 2020-01-07 DIAGNOSIS — Z13.220 SCREENING FOR HYPERLIPIDEMIA: ICD-10-CM

## 2020-01-07 NOTE — TELEPHONE ENCOUNTER
----- Message from Hailee Campos sent at 2020  2:24 PM CST -----  Contact: Self  Nan Simms  MRN: [id number]  : 1955  PCP: Payal Moreland  Home Phone      [phone]  Work Phone      Not on file.  Mobile          [phone]      MESSAGE:   Would like to get Payal to add orders for her to have labs drawn. Please call to let her know when this is done.    Phone: [phone]

## 2020-01-09 ENCOUNTER — LAB VISIT (OUTPATIENT)
Dept: LAB | Facility: HOSPITAL | Age: 65
End: 2020-01-09
Attending: NURSE PRACTITIONER
Payer: MEDICARE

## 2020-01-09 DIAGNOSIS — Z13.220 SCREENING FOR HYPERLIPIDEMIA: ICD-10-CM

## 2020-01-09 DIAGNOSIS — E11.65 TYPE 2 DIABETES MELLITUS WITH HYPERGLYCEMIA, WITHOUT LONG-TERM CURRENT USE OF INSULIN: ICD-10-CM

## 2020-01-09 DIAGNOSIS — D64.9 ANEMIA, UNSPECIFIED TYPE: ICD-10-CM

## 2020-01-09 DIAGNOSIS — Z13.29 SCREENING FOR HYPOTHYROIDISM: ICD-10-CM

## 2020-01-09 LAB
ALBUMIN SERPL BCP-MCNC: 3.5 G/DL (ref 3.5–5.2)
ALP SERPL-CCNC: 63 U/L (ref 55–135)
ALT SERPL W/O P-5'-P-CCNC: 23 U/L (ref 10–44)
ANION GAP SERPL CALC-SCNC: 6 MMOL/L (ref 8–16)
AST SERPL-CCNC: 19 U/L (ref 10–40)
BASOPHILS # BLD AUTO: 0.02 K/UL (ref 0–0.2)
BASOPHILS NFR BLD: 0.4 % (ref 0–1.9)
BILIRUB SERPL-MCNC: 0.5 MG/DL (ref 0.1–1)
BUN SERPL-MCNC: 14 MG/DL (ref 8–23)
CALCIUM SERPL-MCNC: 9.5 MG/DL (ref 8.7–10.5)
CHLORIDE SERPL-SCNC: 103 MMOL/L (ref 95–110)
CHOLEST SERPL-MCNC: 203 MG/DL (ref 120–199)
CHOLEST/HDLC SERPL: 2.9 {RATIO} (ref 2–5)
CO2 SERPL-SCNC: 33 MMOL/L (ref 23–29)
CREAT SERPL-MCNC: 0.7 MG/DL (ref 0.5–1.4)
DIFFERENTIAL METHOD: ABNORMAL
EOSINOPHIL # BLD AUTO: 0.1 K/UL (ref 0–0.5)
EOSINOPHIL NFR BLD: 2 % (ref 0–8)
ERYTHROCYTE [DISTWIDTH] IN BLOOD BY AUTOMATED COUNT: 16 % (ref 11.5–14.5)
EST. GFR  (AFRICAN AMERICAN): >60 ML/MIN/1.73 M^2
EST. GFR  (NON AFRICAN AMERICAN): >60 ML/MIN/1.73 M^2
ESTIMATED AVG GLUCOSE: 114 MG/DL (ref 68–131)
GLUCOSE SERPL-MCNC: 99 MG/DL (ref 70–110)
HBA1C MFR BLD HPLC: 5.6 % (ref 4–5.6)
HCT VFR BLD AUTO: 42.3 % (ref 37–48.5)
HDLC SERPL-MCNC: 71 MG/DL (ref 40–75)
HDLC SERPL: 35 % (ref 20–50)
HGB BLD-MCNC: 12.9 G/DL (ref 12–16)
IMM GRANULOCYTES # BLD AUTO: 0.02 K/UL (ref 0–0.04)
IMM GRANULOCYTES NFR BLD AUTO: 0.4 % (ref 0–0.5)
LDLC SERPL CALC-MCNC: 107 MG/DL (ref 63–159)
LYMPHOCYTES # BLD AUTO: 2.2 K/UL (ref 1–4.8)
LYMPHOCYTES NFR BLD: 40.9 % (ref 18–48)
MCH RBC QN AUTO: 23.5 PG (ref 27–31)
MCHC RBC AUTO-ENTMCNC: 30.5 G/DL (ref 32–36)
MCV RBC AUTO: 77 FL (ref 82–98)
MONOCYTES # BLD AUTO: 0.6 K/UL (ref 0.3–1)
MONOCYTES NFR BLD: 10.5 % (ref 4–15)
NEUTROPHILS # BLD AUTO: 2.5 K/UL (ref 1.8–7.7)
NEUTROPHILS NFR BLD: 45.8 % (ref 38–73)
NONHDLC SERPL-MCNC: 132 MG/DL
NRBC BLD-RTO: 0 /100 WBC
PLATELET # BLD AUTO: 204 K/UL (ref 150–350)
PMV BLD AUTO: 10.9 FL (ref 9.2–12.9)
POTASSIUM SERPL-SCNC: 3.8 MMOL/L (ref 3.5–5.1)
PROT SERPL-MCNC: 7.1 G/DL (ref 6–8.4)
RBC # BLD AUTO: 5.48 M/UL (ref 4–5.4)
SODIUM SERPL-SCNC: 142 MMOL/L (ref 136–145)
TRIGL SERPL-MCNC: 125 MG/DL (ref 30–150)
TSH SERPL DL<=0.005 MIU/L-ACNC: 1.17 UIU/ML (ref 0.4–4)
WBC # BLD AUTO: 5.43 K/UL (ref 3.9–12.7)

## 2020-01-09 PROCEDURE — 82728 ASSAY OF FERRITIN: CPT

## 2020-01-09 PROCEDURE — 36415 COLL VENOUS BLD VENIPUNCTURE: CPT

## 2020-01-09 PROCEDURE — 84443 ASSAY THYROID STIM HORMONE: CPT

## 2020-01-09 PROCEDURE — 80061 LIPID PANEL: CPT

## 2020-01-09 PROCEDURE — 83540 ASSAY OF IRON: CPT

## 2020-01-09 PROCEDURE — 80053 COMPREHEN METABOLIC PANEL: CPT

## 2020-01-09 PROCEDURE — 83036 HEMOGLOBIN GLYCOSYLATED A1C: CPT

## 2020-01-09 PROCEDURE — 85025 COMPLETE CBC W/AUTO DIFF WBC: CPT

## 2020-01-10 LAB
FERRITIN SERPL-MCNC: 40 NG/ML (ref 20–300)
IRON SERPL-MCNC: 117 UG/DL (ref 30–160)
SATURATED IRON: 23 % (ref 20–50)
TOTAL IRON BINDING CAPACITY: 511 UG/DL (ref 250–450)
TRANSFERRIN SERPL-MCNC: 345 MG/DL (ref 200–375)

## 2020-01-23 ENCOUNTER — OFFICE VISIT (OUTPATIENT)
Dept: INTERNAL MEDICINE | Facility: CLINIC | Age: 65
End: 2020-01-23
Payer: MEDICARE

## 2020-01-23 VITALS
DIASTOLIC BLOOD PRESSURE: 78 MMHG | SYSTOLIC BLOOD PRESSURE: 144 MMHG | HEIGHT: 66 IN | BODY MASS INDEX: 28.91 KG/M2 | HEART RATE: 54 BPM | WEIGHT: 179.88 LBS | RESPIRATION RATE: 16 BRPM | OXYGEN SATURATION: 98 %

## 2020-01-23 DIAGNOSIS — N95.9 UNSPECIFIED MENOPAUSAL AND PERIMENOPAUSAL DISORDER: ICD-10-CM

## 2020-01-23 DIAGNOSIS — M79.674 TOE PAIN, RIGHT: ICD-10-CM

## 2020-01-23 DIAGNOSIS — I10 ESSENTIAL HYPERTENSION: Primary | ICD-10-CM

## 2020-01-23 DIAGNOSIS — E78.5 HYPERLIPIDEMIA, UNSPECIFIED HYPERLIPIDEMIA TYPE: ICD-10-CM

## 2020-01-23 DIAGNOSIS — Z13.820 SCREENING FOR OSTEOPOROSIS: ICD-10-CM

## 2020-01-23 PROCEDURE — 99999 PR STA SHADOW: CPT | Mod: PBBFAC,,, | Performed by: NURSE PRACTITIONER

## 2020-01-23 PROCEDURE — 99214 OFFICE O/P EST MOD 30 MIN: CPT | Mod: S$PBB | Performed by: NURSE PRACTITIONER

## 2020-01-23 PROCEDURE — 99999 PR STA SHADOW: ICD-10-PCS | Mod: PBBFAC,,, | Performed by: NURSE PRACTITIONER

## 2020-01-23 PROCEDURE — 99999 PR PBB SHADOW E&M-EST. PATIENT-LVL III: CPT | Mod: PBBFAC,,, | Performed by: NURSE PRACTITIONER

## 2020-01-23 PROCEDURE — 99213 OFFICE O/P EST LOW 20 MIN: CPT | Mod: PBBFAC | Performed by: NURSE PRACTITIONER

## 2020-01-30 ENCOUNTER — CLINICAL SUPPORT (OUTPATIENT)
Dept: INTERNAL MEDICINE | Facility: CLINIC | Age: 65
End: 2020-01-30
Payer: MEDICARE

## 2020-01-30 VITALS — DIASTOLIC BLOOD PRESSURE: 74 MMHG | HEART RATE: 73 BPM | OXYGEN SATURATION: 96 % | SYSTOLIC BLOOD PRESSURE: 160 MMHG

## 2020-01-30 PROCEDURE — 99999 PR PBB SHADOW E&M-EST. PATIENT-LVL II: CPT | Mod: PBBFAC,,,

## 2020-01-30 PROCEDURE — 99999 PR PBB SHADOW E&M-EST. PATIENT-LVL II: ICD-10-PCS | Mod: PBBFAC,,,

## 2020-01-30 PROCEDURE — 99212 OFFICE O/P EST SF 10 MIN: CPT | Mod: PBBFAC

## 2020-02-06 ENCOUNTER — CLINICAL SUPPORT (OUTPATIENT)
Dept: INTERNAL MEDICINE | Facility: CLINIC | Age: 65
End: 2020-02-06
Payer: MEDICARE

## 2020-02-06 VITALS — DIASTOLIC BLOOD PRESSURE: 60 MMHG | SYSTOLIC BLOOD PRESSURE: 130 MMHG

## 2020-02-26 ENCOUNTER — HOSPITAL ENCOUNTER (EMERGENCY)
Facility: HOSPITAL | Age: 65
Discharge: HOME | End: 2020-02-26
Attending: SURGERY
Payer: MEDICARE

## 2020-02-26 VITALS
WEIGHT: 185.06 LBS | RESPIRATION RATE: 18 BRPM | DIASTOLIC BLOOD PRESSURE: 73 MMHG | BODY MASS INDEX: 29.87 KG/M2 | OXYGEN SATURATION: 97 % | TEMPERATURE: 99 F | HEART RATE: 68 BPM | SYSTOLIC BLOOD PRESSURE: 154 MMHG

## 2020-02-26 DIAGNOSIS — M54.32 SCIATICA OF LEFT SIDE: Primary | ICD-10-CM

## 2020-02-26 DIAGNOSIS — M79.606 LEG PAIN: ICD-10-CM

## 2020-02-26 PROCEDURE — 25000003 PHARM REV CODE 250: Performed by: NURSE PRACTITIONER

## 2020-02-26 PROCEDURE — 99284 EMERGENCY DEPT VISIT MOD MDM: CPT | Mod: 25

## 2020-02-26 PROCEDURE — 63600175 PHARM REV CODE 636 W HCPCS: Performed by: NURSE PRACTITIONER

## 2020-02-26 PROCEDURE — 96372 THER/PROPH/DIAG INJ SC/IM: CPT

## 2020-02-26 RX ORDER — CYCLOBENZAPRINE HCL 10 MG
10 TABLET ORAL EVERY 8 HOURS PRN
Qty: 12 TABLET | Refills: 0 | Status: SHIPPED | OUTPATIENT
Start: 2020-02-26 | End: 2020-03-09 | Stop reason: SDUPTHER

## 2020-02-26 RX ORDER — KETOROLAC TROMETHAMINE 30 MG/ML
30 INJECTION, SOLUTION INTRAMUSCULAR; INTRAVENOUS
Status: COMPLETED | OUTPATIENT
Start: 2020-02-26 | End: 2020-02-26

## 2020-02-26 RX ORDER — METHYLPREDNISOLONE SOD SUCC 125 MG
125 VIAL (EA) INJECTION
Status: COMPLETED | OUTPATIENT
Start: 2020-02-26 | End: 2020-02-26

## 2020-02-26 RX ORDER — NAPROXEN 500 MG/1
500 TABLET ORAL EVERY 12 HOURS PRN
Qty: 10 TABLET | Refills: 0 | Status: SHIPPED | OUTPATIENT
Start: 2020-02-26 | End: 2020-03-09 | Stop reason: SDUPTHER

## 2020-02-26 RX ORDER — CLONIDINE HYDROCHLORIDE 0.1 MG/1
0.2 TABLET ORAL
Status: COMPLETED | OUTPATIENT
Start: 2020-02-26 | End: 2020-02-26

## 2020-02-26 RX ORDER — CYCLOBENZAPRINE HCL 10 MG
10 TABLET ORAL
Status: DISCONTINUED | OUTPATIENT
Start: 2020-02-26 | End: 2020-02-26 | Stop reason: HOSPADM

## 2020-02-26 RX ADMIN — METHYLPREDNISOLONE SODIUM SUCCINATE 125 MG: 125 INJECTION, POWDER, FOR SOLUTION INTRAMUSCULAR; INTRAVENOUS at 07:02

## 2020-02-26 RX ADMIN — KETOROLAC TROMETHAMINE 30 MG: 30 INJECTION, SOLUTION INTRAMUSCULAR at 07:02

## 2020-02-26 RX ADMIN — CLONIDINE HYDROCHLORIDE 0.2 MG: 0.1 TABLET ORAL at 07:02

## 2020-02-26 NOTE — ED TRIAGE NOTES
64 y.o. female presents to ER Room/bed info not found   Chief Complaint   Patient presents with    Leg Pain   .   C/o left leg nontraumatic pain for three days

## 2020-02-27 NOTE — DISCHARGE INSTRUCTIONS
**Follow up with PCP in 24-48 hours. Return to ER with worsening of symptoms.     **Our goal in the emergency department is to always give you outstanding care and exceptional service. You may receive a survey by mail or e-mail in the next week regarding your experience in our ED. We would greatly appreciate your completing and returning the survey. Your feedback provides us with a way to recognize our staff who give very good care and it helps us learn how to improve when your experience was below our aspiration of excellence.

## 2020-02-27 NOTE — ED PROVIDER NOTES
"Encounter Date: 2/26/2020       History     Chief Complaint   Patient presents with    Leg Pain     64-year-old female presents with 3 day history left leg pain. She denies pain to her foot, ankle, or knee.  Denies back or hip pain. Denies any injury or trauma.  Reports intermittent numbness.  No swelling.  No erythema.  No calf tenderness. Remains with full range of motion. No tenderness. Reports that pain is "in the bone."    The history is provided by the patient.     Review of patient's allergies indicates:   Allergen Reactions    Pcn [penicillins]      Childhood      Past Medical History:   Diagnosis Date    Abnormal Pap smear of cervix     Hypertension     Hyperthyroidism      Past Surgical History:   Procedure Laterality Date    CATARACT EXTRACTION W/  INTRAOCULAR LENS IMPLANT Right 8/8/2019    Procedure: EXTRACTION, CATARACT, WITH IOL INSERTION;  Surgeon: Spenser Lee MD;  Location: [facility];  Service: Ophthalmology;  Laterality: Right;    HYSTERECTOMY      OOPHORECTOMY      PHACOEMULSIFICATION OF CATARACT Right 8/8/2019    Procedure: PHACOEMULSIFICATION, CATARACT;  Surgeon: Spenser Lee MD;  Location[facility] OR;  Service: Ophthalmology;  Laterality: Right;     Family History   Problem Relation Age of Onset    Cancer Mother     Breast cancer Neg Hx     Colon cancer Neg Hx     Ovarian cancer Neg Hx      Social History     Tobacco Use    Smoking status: Current Some Day Smoker     Packs/day: 0.50     Years: 30.00     Pack years: 15.00     Types: Cigarettes    Smokeless tobacco: Current User   Substance Use Topics    Alcohol use: Yes     Alcohol/week: 1.0 standard drinks     Types: 1 Cans of beer per week    Drug use: No     Review of Systems   Constitutional: Negative for fever.   HENT: Negative for congestion, ear pain, rhinorrhea, sore throat and trouble swallowing.    Eyes: Negative for pain and redness.   Respiratory: Negative for cough and shortness of breath.  "   Cardiovascular: Negative for chest pain.   Gastrointestinal: Negative for abdominal pain.   Genitourinary: Negative for difficulty urinating and dysuria.   Musculoskeletal: Positive for myalgias (L leg). Negative for arthralgias, back pain and neck pain.   Skin: Negative for rash and wound.   Neurological: Negative for seizures, weakness and headaches.   Psychiatric/Behavioral: Negative.        Physical Exam     Initial Vitals [02/26/20 1754]   BP Pulse Resp Temp SpO2   (!) 225/98 73 19 97.3 °F (36.3 °C) 99 %      MAP       --         Physical Exam    Nursing note and vitals reviewed.  Constitutional: No distress.   HENT:   Head: Normocephalic and atraumatic.   Right Ear: External ear normal.   Left Ear: External ear normal.   Nose: Nose normal.   Mouth/Throat: Oropharynx is clear and moist and mucous membranes are normal.   Eyes: Conjunctivae, EOM and lids are normal. Pupils are equal, round, and reactive to light.   Neck: Neck supple.   Cardiovascular: Normal rate, regular rhythm, S1 normal, S2 normal, normal heart sounds and intact distal pulses.   Pulmonary/Chest: Effort normal and breath sounds normal. No respiratory distress.   Abdominal: Soft. Bowel sounds are normal. There is no tenderness.   Musculoskeletal: Normal range of motion.        Left hip: Normal.        Lumbar back: Normal.        Left upper leg: She exhibits no tenderness, no bony tenderness, no swelling, no edema, no deformity and no laceration.        Left lower leg: She exhibits no tenderness, no bony tenderness, no swelling, no edema, no deformity and no laceration.   Neurological: She is alert and oriented to person, place, and time. She has normal strength. GCS eye subscore is 4. GCS verbal subscore is 5. GCS motor subscore is 6.   Skin: Skin is warm and dry. Capillary refill takes less than 2 seconds. No rash noted.   Psychiatric: She has a normal mood and affect. Her speech is normal and behavior is normal.         ED Course    Procedures  Labs Reviewed - No data to display       Imaging Results          X-Ray Lumbar Spine Ap And Lateral (Final result)  Result time 02/26/20 19:05:27    Final result by Ervin Ramirez III, MD (02/26/20 19:05:27)                 Impression:      Slight scoliosis.  Minimal multilevel arthritic lipping.  No acute lumbar abnormality suggested.      Electronically signed by: Ervin Ramirez MD  Date:    02/26/2020  Time:    19:05             Narrative:    EXAMINATION:  XR LUMBAR SPINE AP AND LATERAL    CLINICAL HISTORY:  Low back pain, risk factors (osteoporosis or chronic steroid use or elderly);    FINDINGS:  Slight scoliosis, convexity to the right.  Minimal multilevel arthritic lipping.  No acute lumbar fracture or significant subluxation.                               X-Ray Tibia Fibula 2 View Left (Final result)  Result time 02/26/20 19:11:01    Final result by Ervin Ramirez III, MD (02/26/20 19:11:01)                 Impression:      No acute bony abnormality suggested.      Electronically signed by: Ervin Ramirez MD  Date:    02/26/2020  Time:    19:11             Narrative:    EXAMINATION:  XR TIBIA FIBULA 2 VIEW LEFT    CLINICAL HISTORY:  Pain in leg, unspecified    COMPARISON:  None    FINDINGS:  No fracture.  No dislocation.  No acute abnormality suggested.  Mild degenerative change about the knee medially.                               X-Ray Femur 2 View Left (Final result)  Result time 02/26/20 19:06:22    Final result by Ervin Ramirez III, MD (02/26/20 19:06:22)                 Impression:      No acute abnormality suggested about the femur.      Electronically signed by: Ervin Ramirez MD  Date:    02/26/2020  Time:    19:06             Narrative:    EXAMINATION:  XR FEMUR 2 VIEW LEFT    CLINICAL HISTORY:  Pain in leg, unspecified    COMPARISON:  None    FINDINGS:  No fracture or dislocation.  No lytic or blastic change.  Degenerative change about the knee, greatest  medially and along the patellofemoral articulation.                                              Medications   cyclobenzaprine tablet 10 mg (10 mg Oral Not Given 2/26/20 1935)   cloNIDine tablet 0.2 mg (0.2 mg Oral Given 2/26/20 1935)   methylPREDNISolone sodium succinate injection 125 mg (125 mg Intramuscular Given 2/26/20 1935)   ketorolac injection 30 mg (30 mg Intramuscular Given 2/26/20 1935)                           Clinical Impression:       ICD-10-CM ICD-9-CM   1. Sciatica of left side M54.32 724.3   2. Leg pain M79.606 729.5     The patient acknowledges that close follow up with medical provider is required. Instructed to follow up with PCP within 2 days. Patient was given specific return precautions. The patient agrees to comply with all instruction and directions given in the ER.       Disposition:   Disposition: Discharged  Condition: Stable     ED Disposition Condition    Discharge Stable        ED Prescriptions     Medication Sig Dispense Start Date End Date Auth. Provider    naproxen (NAPROSYN) 500 MG tablet Take 1 tablet (500 mg total) by mouth every 12 (twelve) hours as needed (pain). Take with food. 10 tablet 2/26/2020  Shanae Webb NP    cyclobenzaprine (FLEXERIL) 10 MG tablet Take 1 tablet (10 mg total) by mouth every 8 (eight) hours as needed for Muscle spasms. 12 tablet 2/26/2020  Shanae Webb NP        Follow-up Information     Follow up With Specialties Details Why Contact Info    Payal Moreland NP Family Medicine Schedule an appointment as soon as possible for a visit in 2 days  [address] [facility] [address]  [phone]                                       Shanae Webb NP  02/26/20 2036

## 2020-02-28 ENCOUNTER — TELEPHONE (OUTPATIENT)
Dept: INTERNAL MEDICINE | Facility: CLINIC | Age: 65
End: 2020-02-28

## 2020-02-28 NOTE — TELEPHONE ENCOUNTER
Payal does not have any availability for next week. Appt scheduled for 3/9/20 @ 3:30 pm. Patient notified.

## 2020-02-28 NOTE — TELEPHONE ENCOUNTER
----- Message from Hailee Campos sent at 2020  3:18 PM CST -----  Contact: Self  Nan Simms  MRN: [id number]  : 1955  PCP: Payal Moreland  Home Phone      [phone]  Work Phone      Not on file.  Mobile          [phone]    MESSAGE:     Requesting assistance in scheduling an ER F/U with Payal for one day next week. Please call to assist in scheduling.    Phone: [phone]

## 2020-03-02 DIAGNOSIS — I10 HYPERTENSION, UNSPECIFIED TYPE: ICD-10-CM

## 2020-03-02 NOTE — TELEPHONE ENCOUNTER
----- Message from Pat Rees sent at 3/2/2020  9:17 AM CST -----  Contact: Self  Nan Simms  MRN: [id number]  : 1955  PCP: Payal Moreland  Home Phone      [phone]  Work Phone      Not on file.  Mobile          [phone]      MESSAGE:   Pt requesting refill or new Rx.   Is this a refill or new RX:  refill  RX name and strength: Blood pressure pills, patient doesn't know the name states she threw away the bottle  Last office visit:   Is this a 30-day or 90-day RX:  30-Day  Pharmacy name and location:  [facility]  Comments:      Phone:  [phone]

## 2020-03-03 RX ORDER — LISINOPRIL AND HYDROCHLOROTHIAZIDE 12.5; 2 MG/1; MG/1
1 TABLET ORAL DAILY
Qty: 30 TABLET | Refills: 11 | Status: SHIPPED | OUTPATIENT
Start: 2020-03-03 | End: 2020-12-03 | Stop reason: SDUPTHER

## 2020-03-09 ENCOUNTER — OFFICE VISIT (OUTPATIENT)
Dept: INTERNAL MEDICINE | Facility: CLINIC | Age: 65
End: 2020-03-09
Payer: MEDICARE

## 2020-03-09 VITALS
BODY MASS INDEX: 29.16 KG/M2 | SYSTOLIC BLOOD PRESSURE: 132 MMHG | OXYGEN SATURATION: 99 % | RESPIRATION RATE: 16 BRPM | DIASTOLIC BLOOD PRESSURE: 64 MMHG | HEART RATE: 54 BPM | WEIGHT: 181.44 LBS | HEIGHT: 66 IN

## 2020-03-09 DIAGNOSIS — E78.5 HYPERLIPIDEMIA, UNSPECIFIED HYPERLIPIDEMIA TYPE: ICD-10-CM

## 2020-03-09 DIAGNOSIS — M79.605 PAIN OF LEFT LOWER EXTREMITY: ICD-10-CM

## 2020-03-09 DIAGNOSIS — I10 ESSENTIAL HYPERTENSION: Primary | ICD-10-CM

## 2020-03-09 PROCEDURE — 99999 PR PBB SHADOW E&M-EST. PATIENT-LVL III: ICD-10-PCS | Mod: PBBFAC,,, | Performed by: NURSE PRACTITIONER

## 2020-03-09 PROCEDURE — 99999 PR STA SHADOW: CPT | Mod: PBBFAC,,, | Performed by: NURSE PRACTITIONER

## 2020-03-09 PROCEDURE — 99999 PR PBB SHADOW E&M-EST. PATIENT-LVL III: CPT | Mod: PBBFAC,,, | Performed by: NURSE PRACTITIONER

## 2020-03-09 PROCEDURE — 99213 OFFICE O/P EST LOW 20 MIN: CPT | Mod: PBBFAC | Performed by: NURSE PRACTITIONER

## 2020-03-09 PROCEDURE — 99214 OFFICE O/P EST MOD 30 MIN: CPT | Mod: S$PBB | Performed by: NURSE PRACTITIONER

## 2020-03-09 RX ORDER — CYCLOBENZAPRINE HCL 10 MG
10 TABLET ORAL EVERY 8 HOURS PRN
Qty: 12 TABLET | Refills: 0 | OUTPATIENT
Start: 2020-03-09 | End: 2020-08-14

## 2020-03-09 RX ORDER — NAPROXEN 500 MG/1
500 TABLET ORAL EVERY 12 HOURS PRN
Qty: 10 TABLET | Refills: 0 | OUTPATIENT
Start: 2020-03-09 | End: 2020-08-14

## 2020-03-09 NOTE — PROGRESS NOTES
Subjective:       Patient ID: Nan Simms is a 64 y.o. female.    Chief Complaint: Hospital Follow Up    HPI: Pt presents to clinic today known to me with c/o f/u from ER. She reports that she was seen there for leg pain. She was goven 2 inj medrol and toradol and she was sent rx of flexeril and naproxen. She reports that she took it all and it did help. She did have x rays while there.     X ray femur No acute abnormality suggested about the femur.  X ray tibia No acute bony abnormality suggested.  X ray lumbar Slight scoliosis.  Minimal multilevel arthritic lipping.  No acute lumbar abnormality suggested.  Review of Systems   Constitutional: Negative for chills, fatigue, fever and unexpected weight change.   HENT: Negative for congestion, ear pain, sore throat and trouble swallowing.    Eyes: Negative for pain and visual disturbance.   Respiratory: Negative for cough, chest tightness and shortness of breath.    Cardiovascular: Negative for chest pain, palpitations and leg swelling.   Gastrointestinal: Negative for abdominal distention, abdominal pain, constipation, diarrhea and vomiting.   Genitourinary: Negative for difficulty urinating, dysuria, flank pain, frequency and hematuria.   Musculoskeletal: Positive for arthralgias and myalgias. Negative for back pain, gait problem, joint swelling, neck pain and neck stiffness.        Nothing hurts today  Pain is usually in lower left leg   Skin: Negative for rash and wound.   Neurological: Negative for dizziness, seizures, speech difficulty, light-headedness and headaches.       Objective:      Physical Exam   Constitutional: She is oriented to person, place, and time. She appears well-developed and well-nourished.   HENT:   Head: Normocephalic and atraumatic.   Right Ear: External ear normal.   Left Ear: External ear normal.   Nose: Nose normal.   Mouth/Throat: Oropharynx is clear and moist. No oropharyngeal exudate.   Eyes: Pupils are equal, round, and reactive  to light. Conjunctivae and EOM are normal.   Neck: Normal range of motion. Neck supple. No thyromegaly present.   Cardiovascular: Normal rate, regular rhythm, normal heart sounds and intact distal pulses.   No murmur heard.  Pulmonary/Chest: Effort normal and breath sounds normal. No stridor. No respiratory distress. She has no wheezes. She has no rales.   Abdominal: Soft. Bowel sounds are normal. She exhibits no distension and no mass. There is no tenderness. There is no guarding.   Musculoskeletal: Normal range of motion. She exhibits no edema.   Lymphadenopathy:     She has no cervical adenopathy.   Neurological: She is alert and oriented to person, place, and time.   Skin: Skin is warm and dry. Capillary refill takes less than 2 seconds.   Psychiatric: She has a normal mood and affect. Her behavior is normal. Judgment and thought content normal.   Nursing note and vitals reviewed.      Assessment:       1. Essential hypertension    2. Hyperlipidemia, unspecified hyperlipidemia type    3. Pain of left lower extremity        Plan:     Problem List Items Addressed This Visit     Essential hypertension - Primary    Hyperlipidemia      Other Visit Diagnoses     Pain of left lower extremity        Relevant Medications    naproxen (NAPROSYN) 500 MG tablet    cyclobenzaprine (FLEXERIL) 10 MG tablet      [facility] in Summer for labs and f/u  bp much better today  Refilled NSAID and flexeril as needed no pain today.

## 2020-04-29 DIAGNOSIS — E78.5 HYPERLIPIDEMIA, UNSPECIFIED HYPERLIPIDEMIA TYPE: ICD-10-CM

## 2020-04-30 RX ORDER — ATORVASTATIN CALCIUM 20 MG/1
20 TABLET, FILM COATED ORAL DAILY
Qty: 90 TABLET | Refills: 3 | Status: SHIPPED | OUTPATIENT
Start: 2020-04-30 | End: 2020-12-03 | Stop reason: SDUPTHER

## 2020-07-15 DIAGNOSIS — Z71.89 COMPLEX CARE COORDINATION: ICD-10-CM

## 2020-07-17 ENCOUNTER — OFFICE VISIT (OUTPATIENT)
Dept: INTERNAL MEDICINE | Facility: CLINIC | Age: 65
End: 2020-07-17
Payer: COMMERCIAL

## 2020-07-17 VITALS
SYSTOLIC BLOOD PRESSURE: 140 MMHG | WEIGHT: 187.19 LBS | RESPIRATION RATE: 19 BRPM | HEART RATE: 67 BPM | HEIGHT: 66 IN | BODY MASS INDEX: 30.08 KG/M2 | DIASTOLIC BLOOD PRESSURE: 90 MMHG | TEMPERATURE: 97 F | OXYGEN SATURATION: 98 %

## 2020-07-17 DIAGNOSIS — E78.5 HYPERLIPIDEMIA, UNSPECIFIED HYPERLIPIDEMIA TYPE: ICD-10-CM

## 2020-07-17 DIAGNOSIS — Z01.818 PRE-OP EVALUATION: Primary | ICD-10-CM

## 2020-07-17 DIAGNOSIS — I10 ESSENTIAL HYPERTENSION: ICD-10-CM

## 2020-07-17 PROCEDURE — 99999 PR PBB SHADOW E&M-EST. PATIENT-LVL IV: CPT | Mod: PBBFAC,,, | Performed by: INTERNAL MEDICINE

## 2020-07-17 PROCEDURE — 3080F PR MOST RECENT DIASTOLIC BLOOD PRESSURE >= 90 MM HG: ICD-10-PCS | Mod: CPTII,S$GLB,, | Performed by: INTERNAL MEDICINE

## 2020-07-17 PROCEDURE — 99214 PR OFFICE/OUTPT VISIT, EST, LEVL IV, 30-39 MIN: ICD-10-PCS | Mod: S$GLB,,, | Performed by: INTERNAL MEDICINE

## 2020-07-17 PROCEDURE — 1101F PT FALLS ASSESS-DOCD LE1/YR: CPT | Mod: CPTII,S$GLB,, | Performed by: INTERNAL MEDICINE

## 2020-07-17 PROCEDURE — 1159F PR MEDICATION LIST DOCUMENTED IN MEDICAL RECORD: ICD-10-PCS | Mod: S$GLB,,, | Performed by: INTERNAL MEDICINE

## 2020-07-17 PROCEDURE — 1101F PR PT FALLS ASSESS DOC 0-1 FALLS W/OUT INJ PAST YR: ICD-10-PCS | Mod: CPTII,S$GLB,, | Performed by: INTERNAL MEDICINE

## 2020-07-17 PROCEDURE — 3008F PR BODY MASS INDEX (BMI) DOCUMENTED: ICD-10-PCS | Mod: CPTII,S$GLB,, | Performed by: INTERNAL MEDICINE

## 2020-07-17 PROCEDURE — 1126F AMNT PAIN NOTED NONE PRSNT: CPT | Mod: S$GLB,,, | Performed by: INTERNAL MEDICINE

## 2020-07-17 PROCEDURE — 99214 OFFICE O/P EST MOD 30 MIN: CPT | Mod: S$GLB,,, | Performed by: INTERNAL MEDICINE

## 2020-07-17 PROCEDURE — 3077F SYST BP >= 140 MM HG: CPT | Mod: CPTII,S$GLB,, | Performed by: INTERNAL MEDICINE

## 2020-07-17 PROCEDURE — 99999 PR PBB SHADOW E&M-EST. PATIENT-LVL IV: ICD-10-PCS | Mod: PBBFAC,,, | Performed by: INTERNAL MEDICINE

## 2020-07-17 PROCEDURE — 1126F PR PAIN SEVERITY QUANTIFIED, NO PAIN PRESENT: ICD-10-PCS | Mod: S$GLB,,, | Performed by: INTERNAL MEDICINE

## 2020-07-17 PROCEDURE — 3077F PR MOST RECENT SYSTOLIC BLOOD PRESSURE >= 140 MM HG: ICD-10-PCS | Mod: CPTII,S$GLB,, | Performed by: INTERNAL MEDICINE

## 2020-07-17 PROCEDURE — 3008F BODY MASS INDEX DOCD: CPT | Mod: CPTII,S$GLB,, | Performed by: INTERNAL MEDICINE

## 2020-07-17 PROCEDURE — 1159F MED LIST DOCD IN RCRD: CPT | Mod: S$GLB,,, | Performed by: INTERNAL MEDICINE

## 2020-07-17 PROCEDURE — 3080F DIAST BP >= 90 MM HG: CPT | Mod: CPTII,S$GLB,, | Performed by: INTERNAL MEDICINE

## 2020-07-17 NOTE — PROGRESS NOTES
Subjective:       Patient ID: Nan Simms is a 65 y.o. female.    Chief Complaint: Pre-op Exam      HPI:  Patient is new to me but known to clinic and presents for pre-op eval for cataract surgery. She has HTN and HLD. She has no known h/o CAD, CVA, TIA, DM, CKD. Labs from Jan 2020 personally reviewed and discussed with patient. METS > 4. She is a current smoker--<1/2 PPD per patient. Denies daily EtOH use. She is on ASA daily otherwise denies use of blood thinners.       Past Medical History:   Diagnosis Date    Abnormal Pap smear of cervix     Hypertension     Hyperthyroidism        Family History   Problem Relation Age of Onset    Cancer Mother     Breast cancer Neg Hx     Colon cancer Neg Hx     Ovarian cancer Neg Hx        Social History     Socioeconomic History    Marital status:      Spouse name: Not on file    Number of children: Not on file    Years of education: Not on file    Highest education level: Not on file   Occupational History    Not on file   Social Needs    Financial resource strain: Not on file    Food insecurity     Worry: Not on file     Inability: Not on file    Transportation needs     Medical: Not on file     Non-medical: Not on file   Tobacco Use    Smoking status: Current Some Day Smoker     Packs/day: 0.50     Years: 30.00     Pack years: 15.00     Types: Cigarettes    Smokeless tobacco: Current User   Substance and Sexual Activity    Alcohol use: Yes     Alcohol/week: 1.0 standard drinks     Types: 1 Cans of beer per week    Drug use: No    Sexual activity: Yes     Partners: Male   Lifestyle    Physical activity     Days per week: Not on file     Minutes per session: Not on file    Stress: Not on file   Relationships    Social connections     Talks on phone: Not on file     Gets together: Not on file     Attends Druze service: Not on file     Active member of club or organization: Not on file     Attends meetings of clubs or organizations: Not on  file     Relationship status: Not on file   Other Topics Concern    Not on file   Social History Narrative    Not on file       Review of Systems   Constitutional: Negative for activity change, fatigue, fever and unexpected weight change.   HENT: Negative for congestion, ear pain, hearing loss, rhinorrhea and sore throat.    Eyes: Positive for visual disturbance. Negative for pain and redness.   Respiratory: Negative for cough, shortness of breath and wheezing.    Cardiovascular: Negative for chest pain, palpitations and leg swelling.   Gastrointestinal: Negative for abdominal pain, constipation, diarrhea, nausea and vomiting.   Genitourinary: Negative for dysuria, frequency, pelvic pain and urgency.   Musculoskeletal: Negative for back pain, joint swelling and neck pain.   Skin: Negative for color change, rash and wound.   Neurological: Negative for dizziness, tremors, weakness, light-headedness and headaches.         Objective:      Physical Exam  Vitals signs reviewed.   Constitutional:       General: She is not in acute distress.     Appearance: She is well-developed.   HENT:      Head: Normocephalic and atraumatic.      Right Ear: External ear normal.      Left Ear: External ear normal.      Nose: Nose normal.   Eyes:      General:         Right eye: No discharge.         Left eye: No discharge.      Conjunctiva/sclera: Conjunctivae normal.      Pupils: Pupils are equal, round, and reactive to light.   Neck:      Musculoskeletal: Neck supple.      Thyroid: No thyromegaly.   Cardiovascular:      Rate and Rhythm: Normal rate and regular rhythm.      Heart sounds: No murmur. No friction rub. No gallop.    Pulmonary:      Effort: Pulmonary effort is normal. No respiratory distress.      Breath sounds: Normal breath sounds. No wheezing or rales.   Abdominal:      General: Bowel sounds are normal. There is no distension.      Palpations: Abdomen is soft.      Tenderness: There is no abdominal tenderness.    Lymphadenopathy:      Cervical: No cervical adenopathy.   Skin:     General: Skin is warm and dry.   Neurological:      Mental Status: She is alert and oriented to person, place, and time.      Cranial Nerves: No cranial nerve deficit.   Psychiatric:         Behavior: Behavior normal.         Assessment:       1. Pre-op evaluation    2. Essential hypertension    3. Hyperlipidemia, unspecified hyperlipidemia type        Plan:       Nan was seen today for pre-op exam.    Diagnoses and all orders for this visit:    Pre-op evaluation    Essential hypertension    Hyperlipidemia, unspecified hyperlipidemia type         Using Aleksander Revised Cardiac Risk Index she is low risk for surgery. From a medical standpoint she can proceed with her scheduled surgery without any further testing.  CAD: no  CHF: no  CVA: no  DM on insulin: no  Cr >2: no  High risk surgery: no  MET >4: yes  Tobacco: yes  EtOH: no     She can continue all medications in the perioperative period including her ASA as this is safe during cataract surgery which has very low risk of bleeding.

## 2020-07-21 ENCOUNTER — HOSPITAL ENCOUNTER (OUTPATIENT)
Dept: RADIOLOGY | Facility: HOSPITAL | Age: 65
Discharge: HOME | End: 2020-07-21
Attending: NURSE PRACTITIONER
Payer: COMMERCIAL

## 2020-07-21 DIAGNOSIS — N95.9 UNSPECIFIED MENOPAUSAL AND PERIMENOPAUSAL DISORDER: ICD-10-CM

## 2020-07-21 DIAGNOSIS — Z13.820 SCREENING FOR OSTEOPOROSIS: ICD-10-CM

## 2020-07-21 PROCEDURE — 77080 DXA BONE DENSITY AXIAL: CPT | Mod: TC

## 2020-08-05 ENCOUNTER — HOSPITAL ENCOUNTER (OUTPATIENT)
Dept: PREADMISSION TESTING | Facility: HOSPITAL | Age: 65
Discharge: HOME | End: 2020-08-05
Attending: OPHTHALMOLOGY
Payer: COMMERCIAL

## 2020-08-05 DIAGNOSIS — H26.9 CATARACT: ICD-10-CM

## 2020-08-05 DIAGNOSIS — H26.9 CATARACT, UNSPECIFIED CATARACT TYPE, UNSPECIFIED LATERALITY: Primary | ICD-10-CM

## 2020-08-05 RX ORDER — CYCLOPENTOLATE HYDROCHLORIDE 10 MG/ML
1 SOLUTION/ DROPS OPHTHALMIC
OUTPATIENT
Start: 2020-08-05 | End: 2020-08-05

## 2020-08-05 RX ORDER — PHENYLEPHRINE HYDROCHLORIDE 25 MG/ML
1 SOLUTION/ DROPS OPHTHALMIC
OUTPATIENT
Start: 2020-08-05 | End: 2020-08-05

## 2020-08-05 RX ORDER — TETRACAINE HYDROCHLORIDE 5 MG/ML
1 SOLUTION OPHTHALMIC ONCE
OUTPATIENT
Start: 2020-08-05

## 2020-08-05 RX ORDER — DEXTROSE MONOHYDRATE AND SODIUM CHLORIDE 5; .45 G/100ML; G/100ML
INJECTION, SOLUTION INTRAVENOUS CONTINUOUS
OUTPATIENT
Start: 2020-08-05

## 2020-08-05 RX ORDER — LIDOCAINE HYDROCHLORIDE 20 MG/ML
JELLY TOPICAL ONCE
OUTPATIENT
Start: 2020-08-05

## 2020-08-10 ENCOUNTER — HOSPITAL ENCOUNTER (OUTPATIENT)
Dept: PREADMISSION TESTING | Facility: HOSPITAL | Age: 65
Discharge: HOME | End: 2020-08-10
Attending: NURSE PRACTITIONER
Payer: COMMERCIAL

## 2020-08-10 DIAGNOSIS — Z01.818 PRE-OP TESTING: ICD-10-CM

## 2020-08-10 PROCEDURE — U0003 INFECTIOUS AGENT DETECTION BY NUCLEIC ACID (DNA OR RNA); SEVERE ACUTE RESPIRATORY SYNDROME CORONAVIRUS 2 (SARS-COV-2) (CORONAVIRUS DISEASE [COVID-19]), AMPLIFIED PROBE TECHNIQUE, MAKING USE OF HIGH THROUGHPUT TECHNOLOGIES AS DESCRIBED BY CMS-2020-01-R: HCPCS

## 2020-08-11 LAB — SARS-COV-2 RNA RESP QL NAA+PROBE: NOT DETECTED

## 2020-08-14 ENCOUNTER — HOSPITAL ENCOUNTER (EMERGENCY)
Facility: HOSPITAL | Age: 65
Discharge: HOME | End: 2020-08-14
Attending: SURGERY
Payer: COMMERCIAL

## 2020-08-14 ENCOUNTER — TELEPHONE (OUTPATIENT)
Dept: INTERNAL MEDICINE | Facility: CLINIC | Age: 65
End: 2020-08-14

## 2020-08-14 VITALS
WEIGHT: 185.19 LBS | SYSTOLIC BLOOD PRESSURE: 178 MMHG | TEMPERATURE: 99 F | RESPIRATION RATE: 18 BRPM | OXYGEN SATURATION: 98 % | HEART RATE: 47 BPM | DIASTOLIC BLOOD PRESSURE: 72 MMHG | BODY MASS INDEX: 29.89 KG/M2

## 2020-08-14 DIAGNOSIS — M25.561 ACUTE PAIN OF RIGHT KNEE: Primary | ICD-10-CM

## 2020-08-14 PROCEDURE — 63600175 PHARM REV CODE 636 W HCPCS: Performed by: SURGERY

## 2020-08-14 PROCEDURE — 96372 THER/PROPH/DIAG INJ SC/IM: CPT

## 2020-08-14 PROCEDURE — 99284 EMERGENCY DEPT VISIT MOD MDM: CPT | Mod: 25

## 2020-08-14 PROCEDURE — 25000003 PHARM REV CODE 250: Performed by: SURGERY

## 2020-08-14 RX ORDER — CLONIDINE HYDROCHLORIDE 0.1 MG/1
0.1 TABLET ORAL
Status: COMPLETED | OUTPATIENT
Start: 2020-08-14 | End: 2020-08-14

## 2020-08-14 RX ORDER — KETOROLAC TROMETHAMINE 30 MG/ML
30 INJECTION, SOLUTION INTRAMUSCULAR; INTRAVENOUS
Status: COMPLETED | OUTPATIENT
Start: 2020-08-14 | End: 2020-08-14

## 2020-08-14 RX ORDER — TIZANIDINE 4 MG/1
4 TABLET ORAL 4 TIMES DAILY PRN
Qty: 10 TABLET | Refills: 0 | Status: SHIPPED | OUTPATIENT
Start: 2020-08-14 | End: 2020-08-24

## 2020-08-14 RX ORDER — IBUPROFEN 800 MG/1
800 TABLET ORAL EVERY 6 HOURS PRN
Qty: 20 TABLET | Refills: 0 | Status: SHIPPED | OUTPATIENT
Start: 2020-08-14 | End: 2020-08-26 | Stop reason: SDUPTHER

## 2020-08-14 RX ADMIN — CLONIDINE HYDROCHLORIDE 0.1 MG: 0.1 TABLET ORAL at 11:08

## 2020-08-14 RX ADMIN — KETOROLAC TROMETHAMINE 30 MG: 30 INJECTION, SOLUTION INTRAMUSCULAR at 11:08

## 2020-08-14 NOTE — TELEPHONE ENCOUNTER
----- Message from Fany Maloney sent at 2020 12:21 PM CDT -----  Regarding: Appointment Acess with PCP  Contact: self  Nan Simms  MRN: [id number]  : 1955  PCP: Payal Moreland  Home Phone      [phone]  Work Phone      Not on file.  Mobile          [phone]      MESSAGE:    Patient request appointment access with PCP from ER visit today. . The ER MD instructed to see PCP in 2 days.     Phone # [phone]    Pharmacy - [facility]

## 2020-08-14 NOTE — ED TRIAGE NOTES
65 y.o. female presents to ER qTrack 02/qTrk 02   Chief Complaint   Patient presents with    Knee Pain     Right knee pain and swelling,  pt denies injury   . No acute distress noted.  PT states she normally has a low heart rate and she took her Blood pressure medication this morning PTA

## 2020-08-14 NOTE — ED PROVIDER NOTES
[facility]                                                 Chief Complaint  65 y.o. female with Knee Pain (Right knee pain and swelling,  pt denies injury)      History of Present Illness  Nan Simms presents to the emergency room with right knee pain today  Patient with chronic right knee pain for several years, history of osteoarthritis  Patient states that any activity makes any worse this past year, no trauma HX  Patient denies any acute injury the patient denies any acute trauma history  Patient denies any gout history, no erythema, degenerative changes noted    The history is provided by the patient   device was not used during this ER visit  Medical history: Abnormal Pap smear, hypertension, hypothyroidism  Surgical history: Oophorectomy and hysterectomy  No Known Allergies      I have reviewed all of this patient's past medical, surgical, family, and social   histories as well as active allergies and medications documented in the  electronic medical record    Review of Systems and Physical Exam      Review of Systems  -- Constitution - no fever, denies fatigue, no weakness, no chills  -- Eyes - no tearing or redness, no visual disturbance  -- Ear, Nose - no tinnitus or earache, no nasal congestion or discharge  -- Mouth,Throat - no sore throat, no toothache, normal voice, normal swallowing  -- Respiratory - denies cough and congestion, no shortness of breath, no LEWIS  -- Cardiovascular - denies chest pain, no palpitations, denies claudication  -- Gastrointestinal - denies abdominal pain, nausea, vomiting, or diarrhea  -- Genitourinary - no dysuria, denies flank pain, no hematuria, no STD risk  -- Musculoskeletal - denies back pain, negative for trauma or injury  -- Neurological - no headache, denies weakness or seizure; no LOC  -- Skin - denies pallor, rash, or changes in skin. no hives or welts noted    Vital Signs  Her oral temperature is 98.7 °F (37.1 °C).   Her  blood pressure is 194/75 and her pulse is 45  Her respiration is 20 and oxygen saturation is 98%.     Physical Exam  -- Nursing note and vitals reviewed  -- Constitutional: Appears well-developed and well-nourished  -- Head: Atraumatic. Normocephalic. No obvious abnormality  -- Eyes: Pupils are equal and reactive to light. Normal conjunctiva and lids  -- Cardiac: Normal rate, regular rhythm and normal heart sounds  -- Pulmonary: Normal respiratory effort, breath sounds clear to auscultation  -- Abdominal: Soft, no tenderness. Normal bowel sounds. Normal liver edge  -- Musculoskeletal: right-sided osteoarthritis changes, no deformity  -- Neurological: No focal deficits. Showed good interaction with staff  -- Vascular: Posterior tibial, dorsalis pedis and radial pulses 2+ bilaterally      Emergency Room Course      Right knee x-ray  Mild degenerative change without fracture or dislocation.     Medications Given  ketorolac injection 30 mg (has no administration in time range)   cloNIDine tablet 0.1 mg (has no administration in time range)     ED Physician Management  -- Diagnosis management comments: 65 y.o. female with right knee pain  -- osteoarthritic changes in the right knee on x-ray, denies acute injury now  -- patient has had longstanding issues with right knee pain, worse recently  -- describes a history of chronic degenerative issues with the right knee  -- instructed to follow-up with orthopedic as an outpatient for evaluation    Diagnosis  [M25.561] Acute pain of right knee (Primary)    Disposition and Plan  -- Disposition: home  -- Condition: stable  -- Follow-up: Patient to follow up with Payal Moreland NP in 1-2 days.  -- I advised the patient that we have found no life threatening condition today  -- At this time, I believe the patient is clinically stable for discharge.   -- The patient acknowledges that close follow up with a MD is required   -- Patient agrees to comply with all instruction and  direction given in the ER    This note is dictated on M*Modal word recognition program.  There are word recognition mistakes that are occasionally missed on review.         Red Kelly MD  08/14/20 2434

## 2020-08-14 NOTE — ED NOTES
Reported patient's blood pressure, heart rate and patient's statements about her Heart rate history and taking her medication PTA to Dr Leticia MATIAS and states he will review her history and order medication for her blood pressure

## 2020-08-18 ENCOUNTER — OFFICE VISIT (OUTPATIENT)
Dept: INTERNAL MEDICINE | Facility: CLINIC | Age: 65
End: 2020-08-18
Payer: COMMERCIAL

## 2020-08-18 VITALS
TEMPERATURE: 97 F | HEIGHT: 66 IN | WEIGHT: 184.06 LBS | SYSTOLIC BLOOD PRESSURE: 128 MMHG | HEART RATE: 60 BPM | DIASTOLIC BLOOD PRESSURE: 78 MMHG | BODY MASS INDEX: 29.58 KG/M2 | RESPIRATION RATE: 16 BRPM

## 2020-08-18 DIAGNOSIS — E11.65 TYPE 2 DIABETES MELLITUS WITH HYPERGLYCEMIA, WITHOUT LONG-TERM CURRENT USE OF INSULIN: ICD-10-CM

## 2020-08-18 DIAGNOSIS — S86.911A KNEE STRAIN, RIGHT, INITIAL ENCOUNTER: ICD-10-CM

## 2020-08-18 DIAGNOSIS — I10 ESSENTIAL HYPERTENSION: ICD-10-CM

## 2020-08-18 DIAGNOSIS — M17.11 PRIMARY OSTEOARTHRITIS OF RIGHT KNEE: Primary | ICD-10-CM

## 2020-08-18 DIAGNOSIS — Z09 HOSPITAL DISCHARGE FOLLOW-UP: ICD-10-CM

## 2020-08-18 PROCEDURE — 3074F PR MOST RECENT SYSTOLIC BLOOD PRESSURE < 130 MM HG: ICD-10-PCS | Mod: CPTII,S$GLB,, | Performed by: NURSE PRACTITIONER

## 2020-08-18 PROCEDURE — 1159F PR MEDICATION LIST DOCUMENTED IN MEDICAL RECORD: ICD-10-PCS | Mod: S$GLB,,, | Performed by: NURSE PRACTITIONER

## 2020-08-18 PROCEDURE — 3078F DIAST BP <80 MM HG: CPT | Mod: CPTII,S$GLB,, | Performed by: NURSE PRACTITIONER

## 2020-08-18 PROCEDURE — 3044F PR MOST RECENT HEMOGLOBIN A1C LEVEL <7.0%: ICD-10-PCS | Mod: CPTII,S$GLB,, | Performed by: NURSE PRACTITIONER

## 2020-08-18 PROCEDURE — 1101F PT FALLS ASSESS-DOCD LE1/YR: CPT | Mod: CPTII,S$GLB,, | Performed by: NURSE PRACTITIONER

## 2020-08-18 PROCEDURE — 3008F BODY MASS INDEX DOCD: CPT | Mod: CPTII,S$GLB,, | Performed by: NURSE PRACTITIONER

## 2020-08-18 PROCEDURE — 1101F PR PT FALLS ASSESS DOC 0-1 FALLS W/OUT INJ PAST YR: ICD-10-PCS | Mod: CPTII,S$GLB,, | Performed by: NURSE PRACTITIONER

## 2020-08-18 PROCEDURE — 3074F SYST BP LT 130 MM HG: CPT | Mod: CPTII,S$GLB,, | Performed by: NURSE PRACTITIONER

## 2020-08-18 PROCEDURE — 99213 OFFICE O/P EST LOW 20 MIN: CPT | Mod: S$GLB,,, | Performed by: NURSE PRACTITIONER

## 2020-08-18 PROCEDURE — 1159F MED LIST DOCD IN RCRD: CPT | Mod: S$GLB,,, | Performed by: NURSE PRACTITIONER

## 2020-08-18 PROCEDURE — 99999 PR PBB SHADOW E&M-EST. PATIENT-LVL IV: ICD-10-PCS | Mod: PBBFAC,,, | Performed by: NURSE PRACTITIONER

## 2020-08-18 PROCEDURE — 3008F PR BODY MASS INDEX (BMI) DOCUMENTED: ICD-10-PCS | Mod: CPTII,S$GLB,, | Performed by: NURSE PRACTITIONER

## 2020-08-18 PROCEDURE — 99999 PR PBB SHADOW E&M-EST. PATIENT-LVL IV: CPT | Mod: PBBFAC,,, | Performed by: NURSE PRACTITIONER

## 2020-08-18 PROCEDURE — 1125F PR PAIN SEVERITY QUANTIFIED, PAIN PRESENT: ICD-10-PCS | Mod: S$GLB,,, | Performed by: NURSE PRACTITIONER

## 2020-08-18 PROCEDURE — 1125F AMNT PAIN NOTED PAIN PRSNT: CPT | Mod: S$GLB,,, | Performed by: NURSE PRACTITIONER

## 2020-08-18 PROCEDURE — 3044F HG A1C LEVEL LT 7.0%: CPT | Mod: CPTII,S$GLB,, | Performed by: NURSE PRACTITIONER

## 2020-08-18 PROCEDURE — 3078F PR MOST RECENT DIASTOLIC BLOOD PRESSURE < 80 MM HG: ICD-10-PCS | Mod: CPTII,S$GLB,, | Performed by: NURSE PRACTITIONER

## 2020-08-18 PROCEDURE — 99213 PR OFFICE/OUTPT VISIT, EST, LEVL III, 20-29 MIN: ICD-10-PCS | Mod: S$GLB,,, | Performed by: NURSE PRACTITIONER

## 2020-08-18 RX ORDER — DICLOFENAC SODIUM 10 MG/G
4 GEL TOPICAL 4 TIMES DAILY
Qty: 150 G | Refills: 2 | Status: SHIPPED | OUTPATIENT
Start: 2020-08-18 | End: 2020-10-13

## 2020-08-18 NOTE — PATIENT INSTRUCTIONS
Living with Osteoarthritis    Osteoarthritis is a chronic disease and the most common type of arthritis. But it doesnt have to keep you from leading an active life. You can help control symptoms by exercising and losing weight if you are overweight. Using special tools also helps make life easier. Be sure to see your healthcare provider for scheduled checkups and lab work. If you have questions or concerns between office visits, call your healthcare provider's office.  Make exercise part of your life  Gentle exercise can help lessen your pain. Keep the following in mind:  · Choose exercises that improve joint motion and make your muscles stronger. Your healthcare provider or a physical therapist may suggest a few.  · Stretching and flexibility activities such as yoga and ru chi may improve pain and joint motion.  · Try low-impact sports, such as walking, biking, or doing exercises in a warm pool.  · Most people should exercise for at least 30 minutes a day on most days of the week. This can be broken up into shorter periods throughout the day.  · Dont push yourself too hard at first. Slowly build up over time.  · Make sure you warm up for 5 to 10 minutes before you exercise.  · If pain and stiffness increase, don't exercise as hard or as long.  Watch your weight  If you weigh more than you should, your weight-bearing joints are under extra pressure. This makes your symptoms worse. To reduce pain and stiffness, try shedding a few of those extra pounds. The tips below may help:  · Start a weight-loss program with the help of your healthcare provider.  · Ask your friends and family for support.  · Join a weight-loss group.  Use special tools  Even simple tasks can be hard to do when your joints hurt. Special tools called assistive devices can make things easier by reducing strain and protecting your joints. Ask your healthcare provider where to find these and other helpful tools:  · Long-handled reachers or  grabbers  · Jar openers and button threaders  · Large  for pencils, garden tools, and other hand-held objects  Use mobility and other aids  People with arthritis and other joint problems often use mobility aids to help with walking. For example, they may use canes or walkers. They may also use splints or braces to support joints. Talk with your healthcare provider or physical therapist about these aids:  · A cane to reduce knee or hip pain and help prevent falls  · Splints for your wrists or other joints  · A brace to support a weak knee joint  · Orthotics for toe and foot involvement  Medical and surgical treatments  Discuss medical treatments with your healthcare provider to help reduce your pain and improve joint mobility.  · Topical medicines such as lidocaine, capsaicin, and diclofenac gel  · Oral medicines such as acetaminophen, NSAIDs (nonsteroidal anti-inflammatory medicines) such as ibuprofen and naproxen, or opioid narcotics  · Injections in affected joints such as corticosteroids in various joints, or hyaluronic acid in the knee joints  · Surgical repair or surgical joint replacement with artificial joints  · Complementary therapies such as heat and cold treatment, massage, acupuncture, supplements, cognitive training, meditation, and others. Discuss these options with your healthcare provider.  Date Last Reviewed: 2/14/2016 © 2000-2017 [employer]. [address] [place] [address], [place] [address]. All rights reserved. This information is not intended as a substitute for professional medical care. Always follow your healthcare professional's instructions.        Osteoarthritis: Common Sites  Osteoarthritis (OA) is sometimes called degenerative joint disease or wear-and-tear arthritis. It's the most common type of arthritis. In OA, the cartilage wears away. Cartilage covers the ends of bones and acts as a cushion. If enough cartilage wears away, bone rubs against bone. The joint changes in OA  cause pain, stiffness, and trouble moving. OA may occur in any joint. Some joints that are commonly affected are the spine, neck,  hips, knees, fingers, and toes.     Neck  Joints between small bones in the neck may wear out. Pain may travel to the shoulder or the base of the skull.  Lumbar Spine  Bony spurs may form on the joints between the vertebrae (spinal bones). And disks (cushions of cartilage between vertebrae) may wear down. Pain may affect the lower back or leg.  Hip  Cartilage damage can occur in the large ball and socket joint that connects the pelvis and thighbone (femur). Pain may travel to the groin, buttocks, or knee.  Knee  The cartilage in the knee joint may wear down. Weakness or instability in the knee joint may make walking or climbing stairs difficult.  Fingers  Finger joints may become enlarged and knobby. Grasping objects may be hard, especially if the joint at the base of the thumb is affected.  Toes  Toes may be affected. Arthritis may cause a bunion, a bump at the base of the big toe. Standing or walking may be painful.  Date Last Reviewed: 2/14/2016  © 7889-7938 [employer]. [address] [place] [address], [place] [address]. All rights reserved. This information is not intended as a substitute for professional medical care. Always follow your healthcare professional's instructions.

## 2020-08-18 NOTE — LETTER
August 18, 2020      [facility]  [address] [place] [id number]  Phone: [phone]  Fax: [phone]       Patient: Nan Simms   YOB: 1955  Date of Visit: 08/18/2020    To Whom It May Concern:    Nan Simms  was at [facility] on 08/18/2020. She may return to work on 08/21/2020 with no restrictions. If you have any questions or concerns, or if I can be of further assistance, please do not hesitate to contact me.    Sincerely,          Shea Aguirre LPN

## 2020-08-18 NOTE — PROGRESS NOTES
Subjective:           Patient ID: Nan Simms is a 65 y.o. female.    Chief Complaint: Follow-up (Er )    Patient is new to me but known to clinic and presents for ER follow up he has no known h/o CAD, CVA, TIA, DM, CKD.  She is a current smoker--<1/2 PPD per patient. Denies daily EtOH use. She is on ASA daily otherwise denies use of blood thinners.       Seen in ER 8/14/2020  for Knee Pain (Right knee pain and swelling,  pt denies injury  Right knee xray- demonstrates- Bones are osteopenic.  Tricompartmental degenerative changes with mild joint space narrowing and subchondral sclerosis.  No fracture or dislocations are seen.  No joint effusions  She was given IM  toradol and Rx for ibuprofen and tizanidine. Not meds have been helping. Right knee much better, pain mild now. Pain to medial right knee. Denies injury. Notes worse with overuse. Xray reviewed- + joint space narrowing noted to medial knee. Discussed tx measures for osteoarthritis.   Can reserve referral to ortho if worsens     BP in /25 - given oral clonidine. , Taking Lisinopril HCT 20/25mg   At July appnt with Dr. Tejeda /90   Today /60; Repeated /75 ; instructed pt to check BP at home and record  She has f/u appnt with PCP 8/27         Review of Systems   Constitutional: Negative for chills, fatigue, fever and unexpected weight change.   HENT: Negative for congestion, ear pain, sore throat and trouble swallowing.    Eyes: Negative for pain and visual disturbance.   Respiratory: Negative for cough, chest tightness and shortness of breath.    Cardiovascular: Negative for chest pain, palpitations and leg swelling.   Gastrointestinal: Negative for abdominal distention, abdominal pain, constipation, diarrhea and vomiting.   Genitourinary: Negative for difficulty urinating, dysuria, flank pain, frequency and hematuria.   Musculoskeletal: Positive for arthralgias. Negative for back pain, gait problem, joint swelling, myalgias, neck  pain and neck stiffness.        Right medial knee tenderness    Skin: Negative for rash and wound.   Neurological: Negative for dizziness, seizures, speech difficulty, light-headedness and headaches.       Objective:      Physical Exam  Vitals signs and nursing note reviewed.   Constitutional:       Appearance: She is well-developed.   HENT:      Head: Normocephalic and atraumatic.      Right Ear: External ear normal.      Left Ear: External ear normal.      Nose: Nose normal.      Mouth/Throat:      Pharynx: No oropharyngeal exudate.   Eyes:      Conjunctiva/sclera: Conjunctivae normal.      Pupils: Pupils are equal, round, and reactive to light.   Neck:      Musculoskeletal: Normal range of motion and neck supple.      Thyroid: No thyromegaly.   Cardiovascular:      Rate and Rhythm: Normal rate and regular rhythm.      Heart sounds: Normal heart sounds. No murmur.   Pulmonary:      Effort: Pulmonary effort is normal. No respiratory distress.      Breath sounds: Normal breath sounds. No stridor. No wheezing or rales.   Abdominal:      General: Bowel sounds are normal. There is no distension.      Palpations: Abdomen is soft. There is no mass.      Tenderness: There is no abdominal tenderness. There is no guarding.   Musculoskeletal: Normal range of motion.         General: Tenderness (rigth medial knee, + Crepitus ) present.   Lymphadenopathy:      Cervical: No cervical adenopathy.   Skin:     General: Skin is warm and dry.      Capillary Refill: Capillary refill takes less than 2 seconds.   Neurological:      Mental Status: She is alert and oriented to person, place, and time.   Psychiatric:         Behavior: Behavior normal.         Thought Content: Thought content normal.         Judgment: Judgment normal.         Assessment:       1. Primary osteoarthritis of right knee    2. Essential hypertension    3. Knee strain, right, initial encounter    4. Type 2 diabetes mellitus with hyperglycemia, without long-term  current use of insulin    5. Hospital discharge follow-up        Plan:   Nan was seen today for follow-up.    Diagnoses and all orders for this visit:    Primary osteoarthritis of right knee  -     diclofenac sodium (VOLTAREN) 1 % Gel; Apply 4 g topically 4 (four) times daily. Right knee    Essential hypertension    Knee strain, right, initial encounter  -     diclofenac sodium (VOLTAREN) 1 % Gel; Apply 4 g topically 4 (four) times daily. Right knee    Type 2 diabetes mellitus with hyperglycemia, without long-term current use of insulin    Hospital discharge follow-up      Problem List Items Addressed This Visit        Cardiac/Vascular    Essential hypertension      Other Visit Diagnoses     Primary osteoarthritis of right knee    -  Primary    Relevant Medications    diclofenac sodium (VOLTAREN) 1 % Gel    Knee strain, right, initial encounter        Relevant Medications    diclofenac sodium (VOLTAREN) 1 % Gel    Type 2 diabetes mellitus with hyperglycemia, without long-term current use of insulin        Hospital discharge follow-up          monitor BP at home   Reviewed home measures for knee pain/osteoarthitis   If worsens refer to ortho

## 2020-08-26 ENCOUNTER — TELEPHONE (OUTPATIENT)
Dept: INTERNAL MEDICINE | Facility: CLINIC | Age: 65
End: 2020-08-26

## 2020-08-26 RX ORDER — TIZANIDINE 4 MG/1
4 TABLET ORAL EVERY 6 HOURS PRN
Qty: 12 TABLET | Refills: 0 | Status: SHIPPED | OUTPATIENT
Start: 2020-08-26 | End: 2020-09-05

## 2020-08-26 RX ORDER — IBUPROFEN 800 MG/1
800 TABLET ORAL EVERY 6 HOURS PRN
Qty: 20 TABLET | Refills: 0 | Status: SHIPPED | OUTPATIENT
Start: 2020-08-26 | End: 2020-08-26 | Stop reason: SDUPTHER

## 2020-08-26 RX ORDER — IBUPROFEN 800 MG/1
800 TABLET ORAL EVERY 6 HOURS PRN
Qty: 12 TABLET | Refills: 0 | Status: SHIPPED | OUTPATIENT
Start: 2020-08-26 | End: 2020-10-12 | Stop reason: SDUPTHER

## 2020-08-26 NOTE — TELEPHONE ENCOUNTER
----- Message from Hailee Campos sent at 2020 10:51 AM CDT -----  Contact: Self  Nan Simms  MRN: [id number]  : 1955  PCP: Payal Moreland  Home Phone      [phone]  Work Phone      Not on file.  Mobile          [phone]      MESSAGE:   Refill  ibuprofen (ADVIL,MOTRIN) 800 MG tablet      [facility]    Phone: [phone]

## 2020-08-26 NOTE — TELEPHONE ENCOUNTER
----- Message from Fany Maloney sent at 2020 12:01 PM CDT -----  Regarding: Rx Refills  Contact: tiffanie Simms  MRN: [id number]  : 1955  PCP: Payal Moreland  Home Phone      [phone]  Work Phone      Not on file.  Mobile          [phone]      MESSAGE:    Rx Refills:  ibuprofen (ADVIL,MOTRIN) 800 MG tablet  Tizanidine 4 mg - 1 4 times daily    Phone #  [phone]    Pharmacy - [facility]

## 2020-08-27 ENCOUNTER — OFFICE VISIT (OUTPATIENT)
Dept: INTERNAL MEDICINE | Facility: CLINIC | Age: 65
End: 2020-08-27
Payer: COMMERCIAL

## 2020-08-27 VITALS
RESPIRATION RATE: 16 BRPM | SYSTOLIC BLOOD PRESSURE: 138 MMHG | HEIGHT: 66 IN | HEART RATE: 53 BPM | OXYGEN SATURATION: 98 % | BODY MASS INDEX: 29.72 KG/M2 | DIASTOLIC BLOOD PRESSURE: 60 MMHG | WEIGHT: 184.94 LBS

## 2020-08-27 DIAGNOSIS — Z01.818 PREOPERATIVE CLEARANCE: Primary | ICD-10-CM

## 2020-08-27 DIAGNOSIS — I10 HYPERTENSION, UNSPECIFIED TYPE: ICD-10-CM

## 2020-08-27 DIAGNOSIS — E78.5 HYPERLIPIDEMIA, UNSPECIFIED HYPERLIPIDEMIA TYPE: ICD-10-CM

## 2020-08-27 PROCEDURE — 1159F PR MEDICATION LIST DOCUMENTED IN MEDICAL RECORD: ICD-10-PCS | Mod: S$GLB,,, | Performed by: NURSE PRACTITIONER

## 2020-08-27 PROCEDURE — 3078F PR MOST RECENT DIASTOLIC BLOOD PRESSURE < 80 MM HG: ICD-10-PCS | Mod: CPTII,S$GLB,, | Performed by: NURSE PRACTITIONER

## 2020-08-27 PROCEDURE — 1101F PT FALLS ASSESS-DOCD LE1/YR: CPT | Mod: CPTII,S$GLB,, | Performed by: NURSE PRACTITIONER

## 2020-08-27 PROCEDURE — 99214 PR OFFICE/OUTPT VISIT, EST, LEVL IV, 30-39 MIN: ICD-10-PCS | Mod: S$GLB,,, | Performed by: NURSE PRACTITIONER

## 2020-08-27 PROCEDURE — 3008F BODY MASS INDEX DOCD: CPT | Mod: CPTII,S$GLB,, | Performed by: NURSE PRACTITIONER

## 2020-08-27 PROCEDURE — 3075F SYST BP GE 130 - 139MM HG: CPT | Mod: CPTII,S$GLB,, | Performed by: NURSE PRACTITIONER

## 2020-08-27 PROCEDURE — 99214 OFFICE O/P EST MOD 30 MIN: CPT | Mod: S$GLB,,, | Performed by: NURSE PRACTITIONER

## 2020-08-27 PROCEDURE — 1126F AMNT PAIN NOTED NONE PRSNT: CPT | Mod: S$GLB,,, | Performed by: NURSE PRACTITIONER

## 2020-08-27 PROCEDURE — 99999 PR PBB SHADOW E&M-EST. PATIENT-LVL IV: ICD-10-PCS | Mod: PBBFAC,,, | Performed by: NURSE PRACTITIONER

## 2020-08-27 PROCEDURE — 3078F DIAST BP <80 MM HG: CPT | Mod: CPTII,S$GLB,, | Performed by: NURSE PRACTITIONER

## 2020-08-27 PROCEDURE — 1126F PR PAIN SEVERITY QUANTIFIED, NO PAIN PRESENT: ICD-10-PCS | Mod: S$GLB,,, | Performed by: NURSE PRACTITIONER

## 2020-08-27 PROCEDURE — 3008F PR BODY MASS INDEX (BMI) DOCUMENTED: ICD-10-PCS | Mod: CPTII,S$GLB,, | Performed by: NURSE PRACTITIONER

## 2020-08-27 PROCEDURE — 99999 PR PBB SHADOW E&M-EST. PATIENT-LVL IV: CPT | Mod: PBBFAC,,, | Performed by: NURSE PRACTITIONER

## 2020-08-27 PROCEDURE — 1101F PR PT FALLS ASSESS DOC 0-1 FALLS W/OUT INJ PAST YR: ICD-10-PCS | Mod: CPTII,S$GLB,, | Performed by: NURSE PRACTITIONER

## 2020-08-27 PROCEDURE — 1159F MED LIST DOCD IN RCRD: CPT | Mod: S$GLB,,, | Performed by: NURSE PRACTITIONER

## 2020-08-27 PROCEDURE — 3075F PR MOST RECENT SYSTOLIC BLOOD PRESS GE 130-139MM HG: ICD-10-PCS | Mod: CPTII,S$GLB,, | Performed by: NURSE PRACTITIONER

## 2020-08-27 NOTE — PROGRESS NOTES
Subjective:       Patient ID: Nan Simms is a 65 y.o. female.    Chief Complaint: Pre-op Exam    HPI: Pt presents to clinic today known to me with c/o needing pre op. She reports that she is having eye surgery on the 8th for cataract. She had right done and now needs rhe left. She is feeling well with no complaints today. No labs since 1/2020. Still taking bp meds as well as lipitor and asa   Review of Systems   Constitutional: Negative for chills, fatigue, fever and unexpected weight change.   HENT: Negative for congestion, ear pain, sore throat and trouble swallowing.    Eyes: Negative for pain and visual disturbance.   Respiratory: Negative for cough, chest tightness and shortness of breath.    Cardiovascular: Negative for chest pain, palpitations and leg swelling.   Gastrointestinal: Negative for abdominal distention, abdominal pain, constipation, diarrhea and vomiting.   Genitourinary: Negative for difficulty urinating, dysuria, flank pain, frequency and hematuria.   Musculoskeletal: Negative for back pain, gait problem, joint swelling, neck pain and neck stiffness.   Skin: Negative for rash and wound.   Neurological: Negative for dizziness, seizures, speech difficulty, light-headedness and headaches.       Objective:      Physical Exam  Vitals signs and nursing note reviewed.   Constitutional:       Appearance: Normal appearance. She is well-developed.   HENT:      Head: Normocephalic and atraumatic.      Right Ear: External ear normal.      Left Ear: External ear normal.      Nose: Nose normal.   Eyes:      Conjunctiva/sclera: Conjunctivae normal.      Pupils: Pupils are equal, round, and reactive to light.   Neck:      Musculoskeletal: Normal range of motion and neck supple.   Cardiovascular:      Rate and Rhythm: Normal rate and regular rhythm.      Heart sounds: Normal heart sounds.   Pulmonary:      Effort: Pulmonary effort is normal.      Breath sounds: Normal breath sounds.   Abdominal:       General: Bowel sounds are normal.      Palpations: Abdomen is soft.   Musculoskeletal: Normal range of motion.   Skin:     General: Skin is warm and dry.   Neurological:      Mental Status: She is alert and oriented to person, place, and time.   Psychiatric:         Behavior: Behavior normal.         Thought Content: Thought content normal.         Judgment: Judgment normal.         Assessment:       1. Preoperative clearance    2. Hyperlipidemia, unspecified hyperlipidemia type    3. Hypertension, unspecified type        Plan:     Problem List Items Addressed This Visit     Hyperlipidemia    Relevant Orders    Lipid Panel      Other Visit Diagnoses     Preoperative clearance    -  Primary  Pt has labs ordered for routine f/u. She is cleared for her cataract surgery      Hypertension, unspecified type        Relevant Orders    CBC auto differential    Comprehensive metabolic panel

## 2020-09-01 ENCOUNTER — LAB VISIT (OUTPATIENT)
Dept: LAB | Facility: HOSPITAL | Age: 65
End: 2020-09-01
Attending: NURSE PRACTITIONER
Payer: COMMERCIAL

## 2020-09-01 DIAGNOSIS — I10 HYPERTENSION, UNSPECIFIED TYPE: ICD-10-CM

## 2020-09-01 DIAGNOSIS — E78.5 HYPERLIPIDEMIA, UNSPECIFIED HYPERLIPIDEMIA TYPE: ICD-10-CM

## 2020-09-01 LAB
ALBUMIN SERPL BCP-MCNC: 3.8 G/DL (ref 3.5–5.2)
ALP SERPL-CCNC: 65 U/L (ref 55–135)
ALT SERPL W/O P-5'-P-CCNC: 20 U/L (ref 10–44)
ANION GAP SERPL CALC-SCNC: 12 MMOL/L (ref 8–16)
AST SERPL-CCNC: 20 U/L (ref 10–40)
BASOPHILS # BLD AUTO: 0.01 K/UL (ref 0–0.2)
BASOPHILS NFR BLD: 0.2 % (ref 0–1.9)
BILIRUB SERPL-MCNC: 0.5 MG/DL (ref 0.1–1)
BUN SERPL-MCNC: 19 MG/DL (ref 8–23)
CALCIUM SERPL-MCNC: 9.6 MG/DL (ref 8.7–10.5)
CHLORIDE SERPL-SCNC: 104 MMOL/L (ref 95–110)
CHOLEST SERPL-MCNC: 177 MG/DL (ref 120–199)
CHOLEST/HDLC SERPL: 2.6 {RATIO} (ref 2–5)
CO2 SERPL-SCNC: 29 MMOL/L (ref 23–29)
CREAT SERPL-MCNC: 0.8 MG/DL (ref 0.5–1.4)
DIFFERENTIAL METHOD: ABNORMAL
EOSINOPHIL # BLD AUTO: 0.1 K/UL (ref 0–0.5)
EOSINOPHIL NFR BLD: 2.1 % (ref 0–8)
ERYTHROCYTE [DISTWIDTH] IN BLOOD BY AUTOMATED COUNT: 15.6 % (ref 11.5–14.5)
EST. GFR  (AFRICAN AMERICAN): >60 ML/MIN/1.73 M^2
EST. GFR  (NON AFRICAN AMERICAN): >60 ML/MIN/1.73 M^2
GLUCOSE SERPL-MCNC: 95 MG/DL (ref 70–110)
HCT VFR BLD AUTO: 39.9 % (ref 37–48.5)
HDLC SERPL-MCNC: 68 MG/DL (ref 40–75)
HDLC SERPL: 38.4 % (ref 20–50)
HGB BLD-MCNC: 12.2 G/DL (ref 12–16)
IMM GRANULOCYTES # BLD AUTO: 0.01 K/UL (ref 0–0.04)
IMM GRANULOCYTES NFR BLD AUTO: 0.2 % (ref 0–0.5)
LDLC SERPL CALC-MCNC: 76.6 MG/DL (ref 63–159)
LYMPHOCYTES # BLD AUTO: 1.5 K/UL (ref 1–4.8)
LYMPHOCYTES NFR BLD: 28.2 % (ref 18–48)
MCH RBC QN AUTO: 23.5 PG (ref 27–31)
MCHC RBC AUTO-ENTMCNC: 30.6 G/DL (ref 32–36)
MCV RBC AUTO: 77 FL (ref 82–98)
MONOCYTES # BLD AUTO: 0.6 K/UL (ref 0.3–1)
MONOCYTES NFR BLD: 12.3 % (ref 4–15)
NEUTROPHILS # BLD AUTO: 3 K/UL (ref 1.8–7.7)
NEUTROPHILS NFR BLD: 57 % (ref 38–73)
NONHDLC SERPL-MCNC: 109 MG/DL
NRBC BLD-RTO: 0 /100 WBC
PLATELET # BLD AUTO: 219 K/UL (ref 150–350)
PMV BLD AUTO: 11.9 FL (ref 9.2–12.9)
POTASSIUM SERPL-SCNC: 3.5 MMOL/L (ref 3.5–5.1)
PROT SERPL-MCNC: 7.6 G/DL (ref 6–8.4)
RBC # BLD AUTO: 5.19 M/UL (ref 4–5.4)
SODIUM SERPL-SCNC: 145 MMOL/L (ref 136–145)
TRIGL SERPL-MCNC: 162 MG/DL (ref 30–150)
WBC # BLD AUTO: 5.21 K/UL (ref 3.9–12.7)

## 2020-09-01 PROCEDURE — 80061 LIPID PANEL: CPT

## 2020-09-01 PROCEDURE — 85025 COMPLETE CBC W/AUTO DIFF WBC: CPT

## 2020-09-01 PROCEDURE — 80053 COMPREHEN METABOLIC PANEL: CPT

## 2020-09-01 PROCEDURE — 36415 COLL VENOUS BLD VENIPUNCTURE: CPT

## 2020-09-02 ENCOUNTER — TELEPHONE (OUTPATIENT)
Dept: INTERNAL MEDICINE | Facility: CLINIC | Age: 65
End: 2020-09-02

## 2020-09-02 NOTE — TELEPHONE ENCOUNTER
----- Message from Fany Maloney sent at 2020 11:50 AM CDT -----  Regarding: Speak to a nurse  Contact: tiffanie Simms  MRN: [id number]  : 1955  PCP: Payal Moreland  Home Phone      [phone]  Work Phone      Not on file.  Mobile          [phone]      MESSAGE:    Request to speak to a nurse regarding surgery scheduled on 2020, and required COVID 19 testing prior to surgery.     Phone # [phone]    Pharmacy - [facility] [address] [place] [address]

## 2020-10-12 ENCOUNTER — OFFICE VISIT (OUTPATIENT)
Dept: INTERNAL MEDICINE | Facility: CLINIC | Age: 65
End: 2020-10-12
Payer: COMMERCIAL

## 2020-10-12 VITALS
DIASTOLIC BLOOD PRESSURE: 85 MMHG | SYSTOLIC BLOOD PRESSURE: 139 MMHG | WEIGHT: 186.94 LBS | HEART RATE: 68 BPM | RESPIRATION RATE: 16 BRPM | HEIGHT: 66 IN | OXYGEN SATURATION: 98 % | TEMPERATURE: 98 F | BODY MASS INDEX: 30.04 KG/M2

## 2020-10-12 DIAGNOSIS — M25.561 CHRONIC PAIN OF RIGHT KNEE: Primary | ICD-10-CM

## 2020-10-12 DIAGNOSIS — G89.29 CHRONIC PAIN OF RIGHT KNEE: Primary | ICD-10-CM

## 2020-10-12 PROCEDURE — 3075F PR MOST RECENT SYSTOLIC BLOOD PRESS GE 130-139MM HG: ICD-10-PCS | Mod: CPTII,S$GLB,, | Performed by: NURSE PRACTITIONER

## 2020-10-12 PROCEDURE — 3079F DIAST BP 80-89 MM HG: CPT | Mod: CPTII,S$GLB,, | Performed by: NURSE PRACTITIONER

## 2020-10-12 PROCEDURE — 99999 PR PBB SHADOW E&M-EST. PATIENT-LVL IV: CPT | Mod: PBBFAC,,, | Performed by: NURSE PRACTITIONER

## 2020-10-12 PROCEDURE — 99213 OFFICE O/P EST LOW 20 MIN: CPT | Mod: S$GLB,,, | Performed by: NURSE PRACTITIONER

## 2020-10-12 PROCEDURE — 1159F MED LIST DOCD IN RCRD: CPT | Mod: S$GLB,,, | Performed by: NURSE PRACTITIONER

## 2020-10-12 PROCEDURE — 3008F PR BODY MASS INDEX (BMI) DOCUMENTED: ICD-10-PCS | Mod: CPTII,S$GLB,, | Performed by: NURSE PRACTITIONER

## 2020-10-12 PROCEDURE — 99999 PR PBB SHADOW E&M-EST. PATIENT-LVL IV: ICD-10-PCS | Mod: PBBFAC,,, | Performed by: NURSE PRACTITIONER

## 2020-10-12 PROCEDURE — 1125F PR PAIN SEVERITY QUANTIFIED, PAIN PRESENT: ICD-10-PCS | Mod: S$GLB,,, | Performed by: NURSE PRACTITIONER

## 2020-10-12 PROCEDURE — 1125F AMNT PAIN NOTED PAIN PRSNT: CPT | Mod: S$GLB,,, | Performed by: NURSE PRACTITIONER

## 2020-10-12 PROCEDURE — 1101F PT FALLS ASSESS-DOCD LE1/YR: CPT | Mod: CPTII,S$GLB,, | Performed by: NURSE PRACTITIONER

## 2020-10-12 PROCEDURE — 99213 PR OFFICE/OUTPT VISIT, EST, LEVL III, 20-29 MIN: ICD-10-PCS | Mod: S$GLB,,, | Performed by: NURSE PRACTITIONER

## 2020-10-12 PROCEDURE — 1101F PR PT FALLS ASSESS DOC 0-1 FALLS W/OUT INJ PAST YR: ICD-10-PCS | Mod: CPTII,S$GLB,, | Performed by: NURSE PRACTITIONER

## 2020-10-12 PROCEDURE — 1159F PR MEDICATION LIST DOCUMENTED IN MEDICAL RECORD: ICD-10-PCS | Mod: S$GLB,,, | Performed by: NURSE PRACTITIONER

## 2020-10-12 PROCEDURE — 3075F SYST BP GE 130 - 139MM HG: CPT | Mod: CPTII,S$GLB,, | Performed by: NURSE PRACTITIONER

## 2020-10-12 PROCEDURE — 3079F PR MOST RECENT DIASTOLIC BLOOD PRESSURE 80-89 MM HG: ICD-10-PCS | Mod: CPTII,S$GLB,, | Performed by: NURSE PRACTITIONER

## 2020-10-12 PROCEDURE — 3008F BODY MASS INDEX DOCD: CPT | Mod: CPTII,S$GLB,, | Performed by: NURSE PRACTITIONER

## 2020-10-12 RX ORDER — IBUPROFEN 800 MG/1
800 TABLET ORAL EVERY 6 HOURS PRN
Qty: 20 TABLET | Refills: 0 | Status: SHIPPED | OUTPATIENT
Start: 2020-10-12 | End: 2020-12-03

## 2020-10-12 NOTE — PROGRESS NOTES
Subjective:       Patient ID: Nan Simms is a 65 y.o. female.    Chief Complaint: Knee Pain (pt requests ortho referral)    HPI: Pt presents to clinic today known to me with c/o knee pain.last seen by co worker 8/18 and treated with NSAID. Prior to that was seen in ER  X ray Right knee xray- demonstrates- Bones are osteopenic.  Tricompartmental degenerative changes with mild joint space narrowing and subchondral sclerosis.  No fracture or dislocations are seen.  No joint effusions    Now ready for ortho referral- takign ibuprofen which helps but it does not get rid of it  Review of Systems   Constitutional: Negative for chills, fatigue, fever and unexpected weight change.   HENT: Negative for congestion, ear pain, sore throat and trouble swallowing.    Eyes: Negative for pain and visual disturbance.   Respiratory: Negative for cough, chest tightness and shortness of breath.    Cardiovascular: Negative for chest pain, palpitations and leg swelling.   Gastrointestinal: Negative for abdominal distention, abdominal pain, constipation, diarrhea and vomiting.   Genitourinary: Negative for difficulty urinating, dysuria, flank pain, frequency and hematuria.   Musculoskeletal: Positive for arthralgias (right knee/medial). Negative for back pain, gait problem, joint swelling, neck pain and neck stiffness.   Skin: Negative for rash and wound.   Neurological: Negative for dizziness, seizures, speech difficulty, light-headedness and headaches.       Objective:      Physical Exam  Vitals signs and nursing note reviewed.   Constitutional:       Appearance: Normal appearance. She is well-developed and normal weight.   HENT:      Head: Normocephalic and atraumatic.      Right Ear: External ear normal.      Left Ear: External ear normal.      Nose: Nose normal.   Eyes:      Conjunctiva/sclera: Conjunctivae normal.      Pupils: Pupils are equal, round, and reactive to light.   Neck:      Musculoskeletal: Normal range of motion and  neck supple.   Cardiovascular:      Rate and Rhythm: Normal rate and regular rhythm.      Heart sounds: Normal heart sounds.   Pulmonary:      Effort: Pulmonary effort is normal.      Breath sounds: Normal breath sounds.   Abdominal:      General: Bowel sounds are normal.      Palpations: Abdomen is soft.   Musculoskeletal: Normal range of motion.         General: Tenderness present. No swelling or deformity.        Legs:       Comments: Full ROM point tenderness to area marked   Skin:     General: Skin is warm and dry.      Capillary Refill: Capillary refill takes less than 2 seconds.   Neurological:      General: No focal deficit present.      Mental Status: She is alert and oriented to person, place, and time.   Psychiatric:         Behavior: Behavior normal.         Thought Content: Thought content normal.         Judgment: Judgment normal.         Assessment:       1. Chronic pain of right knee        Plan:     Problem List Items Addressed This Visit     None      Visit Diagnoses     Chronic pain of right knee    -  Primary    Relevant Medications    ibuprofen (ADVIL,MOTRIN) 800 MG tablet    Other Relevant Orders    Ambulatory referral/consult to Orthopedics        Has had inj in past with relief. Would like to repeat that

## 2020-10-13 ENCOUNTER — OFFICE VISIT (OUTPATIENT)
Dept: ORTHOPEDICS | Facility: CLINIC | Age: 65
End: 2020-10-13
Payer: COMMERCIAL

## 2020-10-13 ENCOUNTER — HOSPITAL ENCOUNTER (OUTPATIENT)
Dept: RADIOLOGY | Facility: HOSPITAL | Age: 65
Discharge: HOME | End: 2020-10-13
Attending: PHYSICIAN ASSISTANT
Payer: COMMERCIAL

## 2020-10-13 VITALS
DIASTOLIC BLOOD PRESSURE: 74 MMHG | RESPIRATION RATE: 18 BRPM | HEIGHT: 66 IN | SYSTOLIC BLOOD PRESSURE: 134 MMHG | HEART RATE: 70 BPM | BODY MASS INDEX: 30.09 KG/M2 | WEIGHT: 187.25 LBS | TEMPERATURE: 97 F

## 2020-10-13 DIAGNOSIS — M25.561 PAIN IN BOTH KNEES, UNSPECIFIED CHRONICITY: ICD-10-CM

## 2020-10-13 DIAGNOSIS — M25.561 PAIN IN BOTH KNEES, UNSPECIFIED CHRONICITY: Primary | ICD-10-CM

## 2020-10-13 DIAGNOSIS — G89.29 CHRONIC PAIN OF RIGHT KNEE: Primary | ICD-10-CM

## 2020-10-13 DIAGNOSIS — M25.562 PAIN IN BOTH KNEES, UNSPECIFIED CHRONICITY: ICD-10-CM

## 2020-10-13 DIAGNOSIS — M25.561 CHRONIC PAIN OF RIGHT KNEE: Primary | ICD-10-CM

## 2020-10-13 DIAGNOSIS — M25.562 PAIN IN BOTH KNEES, UNSPECIFIED CHRONICITY: Primary | ICD-10-CM

## 2020-10-13 PROCEDURE — 3075F SYST BP GE 130 - 139MM HG: CPT | Mod: CPTII,S$GLB,, | Performed by: PHYSICIAN ASSISTANT

## 2020-10-13 PROCEDURE — 1159F PR MEDICATION LIST DOCUMENTED IN MEDICAL RECORD: ICD-10-PCS | Mod: S$GLB,,, | Performed by: PHYSICIAN ASSISTANT

## 2020-10-13 PROCEDURE — 73564 X-RAY EXAM KNEE 4 OR MORE: CPT | Mod: TC,50

## 2020-10-13 PROCEDURE — 99999 PR PBB SHADOW E&M-EST. PATIENT-LVL IV: ICD-10-PCS | Mod: PBBFAC,,, | Performed by: PHYSICIAN ASSISTANT

## 2020-10-13 PROCEDURE — 1101F PT FALLS ASSESS-DOCD LE1/YR: CPT | Mod: CPTII,S$GLB,, | Performed by: PHYSICIAN ASSISTANT

## 2020-10-13 PROCEDURE — 99203 OFFICE O/P NEW LOW 30 MIN: CPT | Mod: S$GLB,,, | Performed by: PHYSICIAN ASSISTANT

## 2020-10-13 PROCEDURE — 3078F PR MOST RECENT DIASTOLIC BLOOD PRESSURE < 80 MM HG: ICD-10-PCS | Mod: CPTII,S$GLB,, | Performed by: PHYSICIAN ASSISTANT

## 2020-10-13 PROCEDURE — 73564 X-RAY EXAM KNEE 4 OR MORE: CPT | Mod: 26,RT,, | Performed by: RADIOLOGY

## 2020-10-13 PROCEDURE — 73564 X-RAY EXAM KNEE 4 OR MORE: CPT | Mod: 26,LT,, | Performed by: RADIOLOGY

## 2020-10-13 PROCEDURE — 99999 PR PBB SHADOW E&M-EST. PATIENT-LVL IV: CPT | Mod: PBBFAC,,, | Performed by: PHYSICIAN ASSISTANT

## 2020-10-13 PROCEDURE — 73564 PR  X-RAY KNEE 4+ VIEW: ICD-10-PCS | Mod: 26,LT,, | Performed by: RADIOLOGY

## 2020-10-13 PROCEDURE — 99203 PR OFFICE/OUTPT VISIT, NEW, LEVL III, 30-44 MIN: ICD-10-PCS | Mod: S$GLB,,, | Performed by: PHYSICIAN ASSISTANT

## 2020-10-13 PROCEDURE — 3008F PR BODY MASS INDEX (BMI) DOCUMENTED: ICD-10-PCS | Mod: CPTII,S$GLB,, | Performed by: PHYSICIAN ASSISTANT

## 2020-10-13 PROCEDURE — 1159F MED LIST DOCD IN RCRD: CPT | Mod: S$GLB,,, | Performed by: PHYSICIAN ASSISTANT

## 2020-10-13 PROCEDURE — 1101F PR PT FALLS ASSESS DOC 0-1 FALLS W/OUT INJ PAST YR: ICD-10-PCS | Mod: CPTII,S$GLB,, | Performed by: PHYSICIAN ASSISTANT

## 2020-10-13 PROCEDURE — 1125F AMNT PAIN NOTED PAIN PRSNT: CPT | Mod: S$GLB,,, | Performed by: PHYSICIAN ASSISTANT

## 2020-10-13 PROCEDURE — 1125F PR PAIN SEVERITY QUANTIFIED, PAIN PRESENT: ICD-10-PCS | Mod: S$GLB,,, | Performed by: PHYSICIAN ASSISTANT

## 2020-10-13 PROCEDURE — 3078F DIAST BP <80 MM HG: CPT | Mod: CPTII,S$GLB,, | Performed by: PHYSICIAN ASSISTANT

## 2020-10-13 PROCEDURE — 3075F PR MOST RECENT SYSTOLIC BLOOD PRESS GE 130-139MM HG: ICD-10-PCS | Mod: CPTII,S$GLB,, | Performed by: PHYSICIAN ASSISTANT

## 2020-10-13 PROCEDURE — 3008F BODY MASS INDEX DOCD: CPT | Mod: CPTII,S$GLB,, | Performed by: PHYSICIAN ASSISTANT

## 2020-10-13 RX ORDER — MELOXICAM 15 MG/1
15 TABLET ORAL DAILY
Qty: 30 TABLET | Refills: 0 | Status: SHIPPED | OUTPATIENT
Start: 2020-10-13 | End: 2020-11-24 | Stop reason: SDUPTHER

## 2020-10-13 NOTE — PROGRESS NOTES
Subjective:      Patient ID: Nan Simms is a 65 y.o. female.    Chief Complaint: Pain of the Right Knee    Review of patient's allergies indicates:   Allergen Reactions    Pcn [penicillins]      Childhood       64 yo F presents to clinic with c/o intermittent bilateral knee pain x years.  Right worse than left.  No injury or trauma.  Pain is located to medial knees, described as sharp and achy, worse with prolonged walking/stanging and at night.  No swelling.  No numbness or tingling.  No mechanical symptoms.  She has been taking ibuprofen with little improvement of sx.        Review of Systems   Constitution: Negative for chills, diaphoresis and fever.   HENT: Negative for congestion, ear discharge and ear pain.    Eyes: Negative for blurred vision, discharge, double vision and pain.   Cardiovascular: Negative for chest pain, claudication and cyanosis.   Respiratory: Negative for cough, hemoptysis and shortness of breath.    Endocrine: Negative for cold intolerance and heat intolerance.   Skin: Negative for color change, dry skin, itching and rash.   Musculoskeletal: Positive for joint pain. Negative for arthritis, back pain, falls, gout, joint swelling, muscle weakness and neck pain.   Gastrointestinal: Negative for abdominal pain and change in bowel habit.   Neurological: Negative for brief paralysis, disturbances in coordination and dizziness.   Psychiatric/Behavioral: Negative for altered mental status and depression.         Objective:          General    Constitutional: She is oriented to person, place, and time. She appears well-developed and well-nourished. No distress.   HENT:   Head: Atraumatic.   Eyes: EOM are normal. Right eye exhibits no discharge. Left eye exhibits no discharge.   Cardiovascular: Normal rate.    Pulmonary/Chest: Effort normal. No respiratory distress.   Abdominal: Soft.   Neurological: She is alert and oriented to person, place, and time.   Psychiatric: She has a normal mood and  affect. Her behavior is normal.     General Musculoskeletal Exam   Gait: normal       Right Knee Exam     Inspection   Erythema: absent  Scars: absent  Swelling: absent  Effusion: absent  Deformity: absent  Bruising: absent    Tenderness   The patient is tender to palpation of the medial joint line.    Crepitus   The patient has crepitus of the patella and medial joint line.    Range of Motion   Extension: 0   Flexion: 140     Tests   Ligament Examination   MCL - Valgus: normal (0 to 2mm)  LCL - Varus: normal    Left Knee Exam     Inspection   Erythema: absent  Scars: absent  Swelling: absent  Effusion: absent  Deformity: absent  Bruising: absent    Tenderness   The patient tender to palpation of the medial joint line.    Crepitus   The patient has crepitus of the patella and medial joint line.    Range of Motion   Extension: 0   Flexion: 140     Tests   Stability   MCL - Valgus: normal (0 to 2mm)  LCL - Varus: normal (0 to 2mm)              Assessment:         Xray Bilateral Knees 10/13/20:  There are moderate to advanced degenerative change involving the knees greatest along the medial knee compartments and the patellofemoral joints.  No significant joint effusion.     There is no evidence of fracture, dislocation or other acute osseous abnormality.    Encounter Diagnosis   Name Primary?    Chronic pain of right knee Yes    Chronic pain of right knee  -     Ambulatory referral/consult to Orthopedics  -     meloxicam (MOBIC) 15 MG tablet; Take 1 tablet (15 mg total) by mouth once daily. As needed  Dispense: 30 tablet; Refill: 0               Plan:         I made the decision to obtain old records of the patient including previous notes and imaging. New imaging was ordered today of the extremity or extremities evaluated. I independently reviewed and interpreted the radiographs and/or MRIs today as well as prior imaging.    The total face-to-face encounter time with this patient was 30 minutes and greater than 50%  of of the encounter time was spent counseling the patient, coordinating care, and education regarding the pathology and natural history of his diagnosis. We have discussed a variety of treatment options including medications, injections, physical therapy and other alternative treatments. Pt is requesting to try oral antiinflammatories and home exercise program at this time. She may consider injections in the future if no relief.    1. Mobic 15 mg 1 time daily PRN for pain management. Patient understands to take with food and/or OTC prilosec to decrease GI side effects.  2. HEP 04124 - I instructed and demonstrated a patellofemoral HEP. The patient then demonstrated understanding of exercises and proper technique. This program was performed for 10 minutes.   3. Ice compress to the affected area 2-3x a day for 15-20 minutes as needed for pain management.  4. RTC in 6 weeks for follow-up, sooner if needed.      Patient voices understanding of and agreement with treatment plan. All of the patient's questions were answered and the patient will contact us if she has any questions or concerns in the interim.

## 2020-10-13 NOTE — LETTER
October 13, 2020      Payal Moreland NP  [address] [facility] [address]           [place] [facility]  [address] [facility] [address]  Phone: [phone]          Patient: Nan Simms   MR Number: [id number]   YOB: 1955   Date of Visit: 10/13/2020       Dear Payal Moreland:    Thank you for referring Nan Simms to me for evaluation. Attached you will find relevant portions of my assessment and plan of care.    If you have questions, please do not hesitate to call me. I look forward to following Nan Simms along with you.    Sincerely,    Cecile Alberts PA-C    Enclosure  CC:  No Recipients    If you would like to receive this communication electronically, please contact [email or web address] or [phone] to request more information on [facility] access.    For providers and/or their staff who would like to refer a patient to [facility], please contact us through our one-stop-shop provider referral line, Efraín Vizcaino, at [phone].    If you feel you have received this communication in error or would no longer like to receive these types of communications, please e-mail [email or web address]

## 2020-10-30 ENCOUNTER — TELEPHONE (OUTPATIENT)
Dept: INTERNAL MEDICINE | Facility: CLINIC | Age: 65
End: 2020-10-30

## 2020-10-30 DIAGNOSIS — Z12.31 ENCOUNTER FOR SCREENING MAMMOGRAM FOR BREAST CANCER: Primary | ICD-10-CM

## 2020-10-30 NOTE — TELEPHONE ENCOUNTER
----- Message from Hailee Campos sent at 10/30/2020 10:47 AM CDT -----  Contact: self  Nan Simms  MRN: [id number]  : 1955  PCP: Payal Moreland  Home Phone      [phone]  Work Phone      Not on file.  Mobile          [phone]      MESSAGE:   Calling to get mammogram ordered and scheduled. Please call to assist.      [phone]

## 2020-11-17 ENCOUNTER — PATIENT OUTREACH (OUTPATIENT)
Dept: ADMINISTRATIVE | Facility: OTHER | Age: 65
End: 2020-11-17

## 2020-11-17 NOTE — PROGRESS NOTES
Updates were requested from care everywhere.  Chart was reviewed for overdue Proactive [facility] Encounters (BHAVANI) topics (CRS, Breast Cancer Screening, Eye exam)  Health Maintenance has been updated.  LINKS not responding.

## 2020-11-24 ENCOUNTER — OFFICE VISIT (OUTPATIENT)
Dept: ORTHOPEDICS | Facility: CLINIC | Age: 65
End: 2020-11-24
Payer: COMMERCIAL

## 2020-11-24 VITALS
HEIGHT: 66 IN | SYSTOLIC BLOOD PRESSURE: 142 MMHG | DIASTOLIC BLOOD PRESSURE: 74 MMHG | HEART RATE: 72 BPM | BODY MASS INDEX: 30.04 KG/M2 | RESPIRATION RATE: 18 BRPM | WEIGHT: 186.94 LBS | TEMPERATURE: 97 F

## 2020-11-24 DIAGNOSIS — M25.562 BILATERAL CHRONIC KNEE PAIN: ICD-10-CM

## 2020-11-24 DIAGNOSIS — G89.29 BILATERAL CHRONIC KNEE PAIN: ICD-10-CM

## 2020-11-24 DIAGNOSIS — M17.0 BILATERAL PRIMARY OSTEOARTHRITIS OF KNEE: Primary | ICD-10-CM

## 2020-11-24 DIAGNOSIS — G89.29 CHRONIC PAIN OF RIGHT KNEE: ICD-10-CM

## 2020-11-24 DIAGNOSIS — M25.561 CHRONIC PAIN OF RIGHT KNEE: ICD-10-CM

## 2020-11-24 DIAGNOSIS — M25.561 BILATERAL CHRONIC KNEE PAIN: ICD-10-CM

## 2020-11-24 PROCEDURE — 1101F PR PT FALLS ASSESS DOC 0-1 FALLS W/OUT INJ PAST YR: ICD-10-PCS | Mod: CPTII,S$GLB,, | Performed by: PHYSICIAN ASSISTANT

## 2020-11-24 PROCEDURE — 1126F AMNT PAIN NOTED NONE PRSNT: CPT | Mod: S$GLB,,, | Performed by: PHYSICIAN ASSISTANT

## 2020-11-24 PROCEDURE — 1159F MED LIST DOCD IN RCRD: CPT | Mod: S$GLB,,, | Performed by: PHYSICIAN ASSISTANT

## 2020-11-24 PROCEDURE — 3008F BODY MASS INDEX DOCD: CPT | Mod: CPTII,S$GLB,, | Performed by: PHYSICIAN ASSISTANT

## 2020-11-24 PROCEDURE — 3288F PR FALLS RISK ASSESSMENT DOCUMENTED: ICD-10-PCS | Mod: CPTII,S$GLB,, | Performed by: PHYSICIAN ASSISTANT

## 2020-11-24 PROCEDURE — 99213 PR OFFICE/OUTPT VISIT, EST, LEVL III, 20-29 MIN: ICD-10-PCS | Mod: S$GLB,,, | Performed by: PHYSICIAN ASSISTANT

## 2020-11-24 PROCEDURE — 1159F PR MEDICATION LIST DOCUMENTED IN MEDICAL RECORD: ICD-10-PCS | Mod: S$GLB,,, | Performed by: PHYSICIAN ASSISTANT

## 2020-11-24 PROCEDURE — 3077F SYST BP >= 140 MM HG: CPT | Mod: CPTII,S$GLB,, | Performed by: PHYSICIAN ASSISTANT

## 2020-11-24 PROCEDURE — 3078F DIAST BP <80 MM HG: CPT | Mod: CPTII,S$GLB,, | Performed by: PHYSICIAN ASSISTANT

## 2020-11-24 PROCEDURE — 3078F PR MOST RECENT DIASTOLIC BLOOD PRESSURE < 80 MM HG: ICD-10-PCS | Mod: CPTII,S$GLB,, | Performed by: PHYSICIAN ASSISTANT

## 2020-11-24 PROCEDURE — 99213 OFFICE O/P EST LOW 20 MIN: CPT | Mod: S$GLB,,, | Performed by: PHYSICIAN ASSISTANT

## 2020-11-24 PROCEDURE — 1101F PT FALLS ASSESS-DOCD LE1/YR: CPT | Mod: CPTII,S$GLB,, | Performed by: PHYSICIAN ASSISTANT

## 2020-11-24 PROCEDURE — 3288F FALL RISK ASSESSMENT DOCD: CPT | Mod: CPTII,S$GLB,, | Performed by: PHYSICIAN ASSISTANT

## 2020-11-24 PROCEDURE — 99999 PR PBB SHADOW E&M-EST. PATIENT-LVL III: ICD-10-PCS | Mod: PBBFAC,,, | Performed by: PHYSICIAN ASSISTANT

## 2020-11-24 PROCEDURE — 3008F PR BODY MASS INDEX (BMI) DOCUMENTED: ICD-10-PCS | Mod: CPTII,S$GLB,, | Performed by: PHYSICIAN ASSISTANT

## 2020-11-24 PROCEDURE — 1126F PR PAIN SEVERITY QUANTIFIED, NO PAIN PRESENT: ICD-10-PCS | Mod: S$GLB,,, | Performed by: PHYSICIAN ASSISTANT

## 2020-11-24 PROCEDURE — 3077F PR MOST RECENT SYSTOLIC BLOOD PRESSURE >= 140 MM HG: ICD-10-PCS | Mod: CPTII,S$GLB,, | Performed by: PHYSICIAN ASSISTANT

## 2020-11-24 PROCEDURE — 99999 PR PBB SHADOW E&M-EST. PATIENT-LVL III: CPT | Mod: PBBFAC,,, | Performed by: PHYSICIAN ASSISTANT

## 2020-11-24 RX ORDER — MELOXICAM 15 MG/1
15 TABLET ORAL DAILY
Qty: 30 TABLET | Refills: 0 | Status: SHIPPED | OUTPATIENT
Start: 2020-11-24 | End: 2021-03-09 | Stop reason: SDUPTHER

## 2020-11-24 NOTE — PROGRESS NOTES
Subjective:      Patient ID: Nan Simms is a 65 y.o. female.    Chief Complaint: Follow-up    Review of patient's allergies indicates:   Allergen Reactions    Pcn [penicillins]      Childhood       Interval:  Patient returns to clinic for follow up of bilateral knee pain.  States that she has had complete relief of symptoms since starting Mobic.  She has also been completing home exercise program.  No complaints today.    HPI 10/13/20:  66 yo F presents to clinic with c/o intermittent bilateral knee pain x years.  Right worse than left.  No injury or trauma.  Pain is located to medial knees, described as sharp and achy, worse with prolonged walking/stanging and at night.  No swelling.  No numbness or tingling.  No mechanical symptoms.  She has been taking ibuprofen with little improvement of sx.      Follow-up  Pertinent negatives include no abdominal pain, change in bowel habit, chest pain, chills, congestion, coughing, diaphoresis, fever, joint swelling, neck pain or rash.       Review of Systems   Constitution: Negative for chills, diaphoresis and fever.   HENT: Negative for congestion, ear discharge and ear pain.    Eyes: Negative for blurred vision, discharge, double vision and pain.   Cardiovascular: Negative for chest pain, claudication and cyanosis.   Respiratory: Negative for cough, hemoptysis and shortness of breath.    Endocrine: Negative for cold intolerance and heat intolerance.   Skin: Negative for color change, dry skin, itching and rash.   Musculoskeletal: Positive for joint pain (resolved). Negative for arthritis, back pain, falls, gout, joint swelling, muscle weakness and neck pain.   Gastrointestinal: Negative for abdominal pain and change in bowel habit.   Neurological: Negative for brief paralysis, disturbances in coordination and dizziness.   Psychiatric/Behavioral: Negative for altered mental status and depression.         Objective:          General    Constitutional: She is oriented to  person, place, and time. She appears well-developed and well-nourished. No distress.   HENT:   Head: Atraumatic.   Eyes: EOM are normal. Right eye exhibits no discharge. Left eye exhibits no discharge.   Cardiovascular: Normal rate.    Pulmonary/Chest: Effort normal. No respiratory distress.   Abdominal: Soft.   Neurological: She is alert and oriented to person, place, and time.   Psychiatric: She has a normal mood and affect. Her behavior is normal.     General Musculoskeletal Exam   Gait: normal       Right Knee Exam     Inspection   Erythema: absent  Scars: absent  Swelling: absent  Effusion: absent  Deformity: absent  Bruising: absent    Crepitus   The patient has crepitus of the patella and medial joint line.    Range of Motion   Extension: 0   Flexion: 140     Tests   Ligament Examination   MCL - Valgus: normal (0 to 2mm)  LCL - Varus: normal    Left Knee Exam     Inspection   Erythema: absent  Scars: absent  Swelling: absent  Effusion: absent  Deformity: absent  Bruising: absent    Crepitus   The patient has crepitus of the patella and medial joint line.    Range of Motion   Extension: 0   Flexion: 140     Tests   Stability   MCL - Valgus: normal (0 to 2mm)  LCL - Varus: normal (0 to 2mm)    Muscle Strength   Right Lower Extremity   Hip Abduction: 5/5   Quadriceps:  5/5   Hamstrin/5   Left Lower Extremity   Hip Abduction: 5/5   Quadriceps:  5/5   Hamstrin/5     Vascular Exam     Right Pulses  Dorsalis Pedis:      2+          Left Pulses  Dorsalis Pedis:      2+          Edema  Right Lower Leg: absent  Left Lower Leg: absent              Assessment:         Xray Bilateral Knees 10/13/20:  There are moderate to advanced degenerative change involving the knees greatest along the medial knee compartments and the patellofemoral joints.  No significant joint effusion.     There is no evidence of fracture, dislocation or other acute osseous abnormality.    Encounter Diagnosis   Name Primary?    Chronic  pain of right knee Yes    Chronic pain of right knee  -     meloxicam (MOBIC) 15 MG tablet; Take 1 tablet (15 mg total) by mouth once daily. As needed  Dispense: 30 tablet; Refill: 0               Plan:         I made the decision to obtain old records of the patient including previous notes and imaging. New imaging was ordered today of the extremity or extremities evaluated. I independently reviewed and interpreted the radiographs and/or MRIs today as well as prior imaging.    The total face-to-face encounter time with this patient was 30 minutes and greater than 50% of of the encounter time was spent counseling the patient, coordinating care, and education regarding the pathology and natural history of his diagnosis. We have discussed a variety of treatment options including medications, injections, physical therapy and other alternative treatments.    Patient states that she would like to continue Mobic and does not feel that she needs further treatment at this time.       1. Continue Mobic 15 mg 1 time daily PRN for pain management. Patient understands to take with food and/or OTC prilosec to decrease GI side effects.  2.Continue home exercise program as demonstrated at last visit.  3. Ice compress to the affected area 2-3x a day for 15-20 minutes as needed for pain management.  4. RTC for follow up as needed.      Patient voices understanding of and agreement with treatment plan. All of the patient's questions were answered and the patient will contact us if she has any questions or concerns in the interim.

## 2020-11-30 ENCOUNTER — HOSPITAL ENCOUNTER (EMERGENCY)
Facility: HOSPITAL | Age: 65
Discharge: HOME | End: 2020-11-30
Attending: SURGERY
Payer: COMMERCIAL

## 2020-11-30 VITALS
WEIGHT: 187.63 LBS | TEMPERATURE: 97 F | BODY MASS INDEX: 30.16 KG/M2 | DIASTOLIC BLOOD PRESSURE: 73 MMHG | RESPIRATION RATE: 18 BRPM | SYSTOLIC BLOOD PRESSURE: 188 MMHG | OXYGEN SATURATION: 100 % | HEART RATE: 45 BPM | HEIGHT: 66 IN

## 2020-11-30 DIAGNOSIS — M17.11 ARTHRITIS OF RIGHT KNEE: Primary | ICD-10-CM

## 2020-11-30 PROCEDURE — 99282 EMERGENCY DEPT VISIT SF MDM: CPT

## 2020-11-30 NOTE — ED TRIAGE NOTES
"65 y.o. female presents to ER   Chief Complaint   Patient presents with    Knee Pain     right knee pain x 2 days   . No acute distress noted.  States seen here for same in the past and was diagnosed with arthritis.  States was seen last week at the clinic for the same but "it hurts worse today and I couldn't go to work."  States it starting again with the cold weather.    "

## 2020-11-30 NOTE — Clinical Note
"Nan Mcfarlane" Demi was seen and treated in our emergency department on 11/30/2020.  She may return to work on 12/02/2020.       If you have any questions or concerns, please don't hesitate to call.      Shanae Webb NP"

## 2020-11-30 NOTE — Clinical Note
"Nan Simms (Barbara) was seen and treated in our emergency department on 11/30/2020.  She may return to work on 12/02/2020.       If you have any questions or concerns, please don't hesitate to call.       RN    "

## 2020-11-30 NOTE — ED PROVIDER NOTES
Encounter Date: 11/30/2020       History     Chief Complaint   Patient presents with    Knee Pain     right knee pain x 2 days     65-year-old female with a history of arthritis complains of chronic right knee pain.  Patient denies any injury or trauma.  Reports that she has had recent x-rays by orthopedic.  Reports that she was started on Mobic and this medication has been working, but states that she feels like the cold weather cause an acute flare up.  She was unable to go to work and is requesting a work excuse.  She does not want to try any new medications because she would like to follow the treatment plan prescribed by Orthopedic. Denies need for repeat xray. She is confident that current pain is caused by arthritis.    The history is provided by the patient.     Review of patient's allergies indicates:   Allergen Reactions    Pcn [penicillins]      Childhood      Past Medical History:   Diagnosis Date    Abnormal Pap smear of cervix     Arthritis     Hypertension     Hyperthyroidism      Past Surgical History:   Procedure Laterality Date    CATARACT EXTRACTION W/  INTRAOCULAR LENS IMPLANT Right 8/8/2019    Procedure: EXTRACTION, CATARACT, WITH IOL INSERTION;  Surgeon: Spenser Lee MD;  Location: [facility];  Service: Ophthalmology;  Laterality: Right;    HYSTERECTOMY      OOPHORECTOMY      PHACOEMULSIFICATION OF CATARACT Right 8/8/2019    Procedure: PHACOEMULSIFICATION, CATARACT;  Surgeon: Spenser Lee MD;  Location: [facility] OR;  Service: Ophthalmology;  Laterality: Right;     Family History   Problem Relation Age of Onset    Cancer Mother     Breast cancer Neg Hx     Colon cancer Neg Hx     Ovarian cancer Neg Hx      Social History     Tobacco Use    Smoking status: Current Every Day Smoker     Packs/day: 0.50     Years: 30.00     Pack years: 15.00     Types: Cigarettes    Smokeless tobacco: Current User   Substance Use Topics    Alcohol use: Yes     Alcohol/week: 1.0 standard  drinks     Types: 1 Cans of beer per week    Drug use: No     Review of Systems   Constitutional: Negative for fever.   HENT: Negative for congestion, ear pain, rhinorrhea and sore throat.    Respiratory: Negative for cough and shortness of breath.    Cardiovascular: Negative for chest pain.   Gastrointestinal: Negative for abdominal pain.   Genitourinary: Negative for difficulty urinating and dysuria.   Musculoskeletal: Positive for arthralgias. Negative for back pain, myalgias and neck pain.   Skin: Negative for rash and wound.   Neurological: Negative for weakness and headaches.   Psychiatric/Behavioral: Negative.        Physical Exam     Initial Vitals   BP Pulse Resp Temp SpO2   11/30/20 1225 11/30/20 1225 11/30/20 1225 11/30/20 1225 11/30/20 1236   (!) 188/73 (!) 41 16 97.2 °F (36.2 °C) 100 %      MAP       --                Physical Exam    Nursing note and vitals reviewed.  Constitutional: No distress.   HENT:   Head: Normocephalic and atraumatic.   Nose: Nose normal.   Mouth/Throat: Oropharynx is clear and moist and mucous membranes are normal.   Eyes: Conjunctivae, EOM and lids are normal. Right pupil is round. Left pupil is round. Pupils are equal.   Neck: Neck supple.   Cardiovascular: Normal rate, regular rhythm and normal heart sounds.   Pulmonary/Chest: Effort normal and breath sounds normal. No respiratory distress.   Abdominal: Normal appearance.   Musculoskeletal: Normal range of motion.      Right knee: She exhibits normal range of motion, no swelling, no effusion, no ecchymosis, no deformity, no laceration, no erythema and normal alignment. Tenderness found. Medial joint line tenderness noted.   Neurological: She is alert and oriented to person, place, and time. She has normal strength. GCS eye subscore is 4. GCS verbal subscore is 5. GCS motor subscore is 6.   Skin: Skin is warm and dry. Capillary refill takes less than 2 seconds. No rash noted.   Psychiatric: She has a normal mood and affect.  Her speech is normal and behavior is normal.         ED Course   Procedures       --Recent knee x-ray reviewed.                                   Clinical Impression:       ICD-10-CM ICD-9-CM   1. Arthritis of right knee  M17.11 716.96                      Disposition:   Disposition: Discharged  Condition: Stable    The patient acknowledges that close follow up with medical provider is required. Instructed to follow up with ortho within 2 days. Patient was given specific return precautions. The patient agrees to comply with all instruction and directions given in the ER.      ED Disposition Condition    Discharge Stable        ED Prescriptions     None        Follow-up Information     Follow up With Specialties Details Why Contact Sear Alberts  Schedule an appointment as soon as possible for a visit in 2 days                                         Shanae Webb NP  11/30/20 3841

## 2020-12-01 ENCOUNTER — PES CALL (OUTPATIENT)
Dept: ADMINISTRATIVE | Facility: CLINIC | Age: 65
End: 2020-12-01

## 2020-12-03 ENCOUNTER — OFFICE VISIT (OUTPATIENT)
Dept: INTERNAL MEDICINE | Facility: CLINIC | Age: 65
End: 2020-12-03
Payer: COMMERCIAL

## 2020-12-03 VITALS
HEART RATE: 42 BPM | BODY MASS INDEX: 29.8 KG/M2 | TEMPERATURE: 98 F | WEIGHT: 185.44 LBS | OXYGEN SATURATION: 98 % | HEIGHT: 66 IN | SYSTOLIC BLOOD PRESSURE: 110 MMHG | RESPIRATION RATE: 16 BRPM | DIASTOLIC BLOOD PRESSURE: 58 MMHG

## 2020-12-03 DIAGNOSIS — M17.11 OSTEOARTHRITIS OF RIGHT KNEE, UNSPECIFIED OSTEOARTHRITIS TYPE: ICD-10-CM

## 2020-12-03 DIAGNOSIS — I10 HYPERTENSION, UNSPECIFIED TYPE: ICD-10-CM

## 2020-12-03 DIAGNOSIS — I10 ESSENTIAL HYPERTENSION: Primary | ICD-10-CM

## 2020-12-03 DIAGNOSIS — E78.5 HYPERLIPIDEMIA, UNSPECIFIED HYPERLIPIDEMIA TYPE: ICD-10-CM

## 2020-12-03 DIAGNOSIS — Z12.11 SCREEN FOR COLON CANCER: ICD-10-CM

## 2020-12-03 DIAGNOSIS — D64.9 ANEMIA, UNSPECIFIED TYPE: ICD-10-CM

## 2020-12-03 PROCEDURE — 1101F PT FALLS ASSESS-DOCD LE1/YR: CPT | Mod: CPTII,S$GLB,, | Performed by: NURSE PRACTITIONER

## 2020-12-03 PROCEDURE — 1126F PR PAIN SEVERITY QUANTIFIED, NO PAIN PRESENT: ICD-10-PCS | Mod: S$GLB,,, | Performed by: NURSE PRACTITIONER

## 2020-12-03 PROCEDURE — 3074F PR MOST RECENT SYSTOLIC BLOOD PRESSURE < 130 MM HG: ICD-10-PCS | Mod: CPTII,S$GLB,, | Performed by: NURSE PRACTITIONER

## 2020-12-03 PROCEDURE — 1159F PR MEDICATION LIST DOCUMENTED IN MEDICAL RECORD: ICD-10-PCS | Mod: S$GLB,,, | Performed by: NURSE PRACTITIONER

## 2020-12-03 PROCEDURE — 99999 PR PBB SHADOW E&M-EST. PATIENT-LVL III: ICD-10-PCS | Mod: PBBFAC,,, | Performed by: NURSE PRACTITIONER

## 2020-12-03 PROCEDURE — 99214 OFFICE O/P EST MOD 30 MIN: CPT | Mod: S$GLB,,, | Performed by: NURSE PRACTITIONER

## 2020-12-03 PROCEDURE — 3074F SYST BP LT 130 MM HG: CPT | Mod: CPTII,S$GLB,, | Performed by: NURSE PRACTITIONER

## 2020-12-03 PROCEDURE — 3288F PR FALLS RISK ASSESSMENT DOCUMENTED: ICD-10-PCS | Mod: CPTII,S$GLB,, | Performed by: NURSE PRACTITIONER

## 2020-12-03 PROCEDURE — 1126F AMNT PAIN NOTED NONE PRSNT: CPT | Mod: S$GLB,,, | Performed by: NURSE PRACTITIONER

## 2020-12-03 PROCEDURE — 1159F MED LIST DOCD IN RCRD: CPT | Mod: S$GLB,,, | Performed by: NURSE PRACTITIONER

## 2020-12-03 PROCEDURE — 1101F PR PT FALLS ASSESS DOC 0-1 FALLS W/OUT INJ PAST YR: ICD-10-PCS | Mod: CPTII,S$GLB,, | Performed by: NURSE PRACTITIONER

## 2020-12-03 PROCEDURE — 3008F BODY MASS INDEX DOCD: CPT | Mod: CPTII,S$GLB,, | Performed by: NURSE PRACTITIONER

## 2020-12-03 PROCEDURE — 99999 PR PBB SHADOW E&M-EST. PATIENT-LVL III: CPT | Mod: PBBFAC,,, | Performed by: NURSE PRACTITIONER

## 2020-12-03 PROCEDURE — 3288F FALL RISK ASSESSMENT DOCD: CPT | Mod: CPTII,S$GLB,, | Performed by: NURSE PRACTITIONER

## 2020-12-03 PROCEDURE — 3078F PR MOST RECENT DIASTOLIC BLOOD PRESSURE < 80 MM HG: ICD-10-PCS | Mod: CPTII,S$GLB,, | Performed by: NURSE PRACTITIONER

## 2020-12-03 PROCEDURE — 3078F DIAST BP <80 MM HG: CPT | Mod: CPTII,S$GLB,, | Performed by: NURSE PRACTITIONER

## 2020-12-03 PROCEDURE — 3008F PR BODY MASS INDEX (BMI) DOCUMENTED: ICD-10-PCS | Mod: CPTII,S$GLB,, | Performed by: NURSE PRACTITIONER

## 2020-12-03 PROCEDURE — 99214 PR OFFICE/OUTPT VISIT, EST, LEVL IV, 30-39 MIN: ICD-10-PCS | Mod: S$GLB,,, | Performed by: NURSE PRACTITIONER

## 2020-12-03 RX ORDER — LISINOPRIL AND HYDROCHLOROTHIAZIDE 12.5; 2 MG/1; MG/1
1 TABLET ORAL DAILY
Qty: 30 TABLET | Refills: 11 | Status: SHIPPED | OUTPATIENT
Start: 2020-12-03 | End: 2022-02-08 | Stop reason: SDUPTHER

## 2020-12-03 RX ORDER — ATORVASTATIN CALCIUM 20 MG/1
20 TABLET, FILM COATED ORAL DAILY
Qty: 90 TABLET | Refills: 3 | Status: SHIPPED | OUTPATIENT
Start: 2020-12-03 | End: 2021-06-24

## 2020-12-03 NOTE — PROGRESS NOTES
Subjective:       Patient ID: Nan Simms is a 65 y.o. female.    Chief Complaint: Follow-up    HPI: Pt presents to clinic today known to me with c/o needing routine f/u. She reports that she is doing well. She reports that she has no complaints. She is seeing mea for arthritis. She has mobic which helps.   Lab Results   Component Value Date    CHOL 177 09/01/2020    CHOL 203 (H) 01/09/2020    CHOL 195 05/15/2019     Lab Results   Component Value Date    HDL 68 09/01/2020    HDL 71 01/09/2020    HDL 72 05/15/2019     Lab Results   Component Value Date    LDLCALC 76.6 09/01/2020    LDLCALC 107.0 01/09/2020    LDLCALC 101.6 05/15/2019     Lab Results   Component Value Date    TRIG 162 (H) 09/01/2020    TRIG 125 01/09/2020    TRIG 107 05/15/2019     Lab Results   Component Value Date    CHOLHDL 38.4 09/01/2020    CHOLHDL 35.0 01/09/2020    CHOLHDL 36.9 05/15/2019     BMP  Lab Results   Component Value Date     09/01/2020    K 3.5 09/01/2020     09/01/2020    CO2 29 09/01/2020    BUN 19 09/01/2020    CREATININE 0.8 09/01/2020    CALCIUM 9.6 09/01/2020    ANIONGAP 12 09/01/2020    ESTGFRAFRICA >60 09/01/2020    EGFRNONAA >60 09/01/2020     Lab Results   Component Value Date    ALT 20 09/01/2020    AST 20 09/01/2020    ALKPHOS 65 09/01/2020    BILITOT 0.5 09/01/2020     Lab Results   Component Value Date    WBC 5.21 09/01/2020    HGB 12.2 09/01/2020    HCT 39.9 09/01/2020    MCV 77 (L) 09/01/2020     09/01/2020     Lab Results   Component Value Date    FERRITIN 40 01/09/2020         Review of Systems   Constitutional: Negative for chills, fatigue, fever and unexpected weight change.   HENT: Negative for congestion, ear pain, sore throat and trouble swallowing.    Eyes: Negative for pain and visual disturbance.   Respiratory: Negative for cough, chest tightness and shortness of breath.    Cardiovascular: Negative for chest pain, palpitations and leg swelling.   Gastrointestinal: Negative for  abdominal distention, abdominal pain, constipation, diarrhea and vomiting.   Genitourinary: Negative for difficulty urinating, dysuria, flank pain, frequency and hematuria.   Musculoskeletal: Negative for back pain, gait problem, joint swelling, neck pain and neck stiffness.   Skin: Negative for rash and wound.   Neurological: Negative for dizziness, seizures, speech difficulty, light-headedness and headaches.       Objective:      Physical Exam  Vitals signs and nursing note reviewed.   Constitutional:       Appearance: Normal appearance. She is well-developed and normal weight.   HENT:      Head: Normocephalic and atraumatic.      Right Ear: External ear normal.      Left Ear: External ear normal.      Nose: Nose normal.   Eyes:      Conjunctiva/sclera: Conjunctivae normal.      Pupils: Pupils are equal, round, and reactive to light.   Neck:      Musculoskeletal: Normal range of motion and neck supple.   Cardiovascular:      Rate and Rhythm: Normal rate and regular rhythm.      Heart sounds: Normal heart sounds. No murmur.   Pulmonary:      Effort: Pulmonary effort is normal. No respiratory distress.      Breath sounds: Normal breath sounds. No stridor. No wheezing.   Abdominal:      General: Bowel sounds are normal. There is no distension.      Palpations: Abdomen is soft.      Tenderness: There is no abdominal tenderness. There is no guarding.   Musculoskeletal: Normal range of motion.   Skin:     General: Skin is warm and dry.      Capillary Refill: Capillary refill takes less than 2 seconds.   Neurological:      General: No focal deficit present.      Mental Status: She is alert and oriented to person, place, and time. Mental status is at baseline.   Psychiatric:         Behavior: Behavior normal.         Thought Content: Thought content normal.         Judgment: Judgment normal.         Assessment:       1. Essential hypertension    2. Hyperlipidemia, unspecified hyperlipidemia type    3. Hypertension,  unspecified type    4. Osteoarthritis of right knee, unspecified osteoarthritis type        Plan:     Problem List Items Addressed This Visit     Essential hypertension - Primary    Hyperlipidemia    Relevant Medications    atorvastatin (LIPITOR) 20 MG tablet      Other Visit Diagnoses     Hypertension, unspecified type        Relevant Medications    lisinopriL-hydrochlorothiazide (PRINZIDE,ZESTORETIC) 20-12.5 mg per tablet    Osteoarthritis of right knee, unspecified osteoarthritis type          cont mobic as needed for pain      Declines flu shot  Has mammo scheduled   cologaurd ordered. Refused colonospocy. FOBT negative 12/2019

## 2020-12-04 ENCOUNTER — PATIENT OUTREACH (OUTPATIENT)
Dept: ADMINISTRATIVE | Facility: OTHER | Age: 65
End: 2020-12-04

## 2020-12-09 ENCOUNTER — TELEPHONE (OUTPATIENT)
Dept: ADMINISTRATIVE | Facility: HOSPITAL | Age: 65
End: 2020-12-09

## 2020-12-11 ENCOUNTER — HOSPITAL ENCOUNTER (OUTPATIENT)
Dept: RADIOLOGY | Facility: HOSPITAL | Age: 65
Discharge: HOME | End: 2020-12-11
Attending: NURSE PRACTITIONER
Payer: COMMERCIAL

## 2020-12-11 VITALS — WEIGHT: 185 LBS | HEIGHT: 66 IN | BODY MASS INDEX: 29.73 KG/M2

## 2020-12-11 DIAGNOSIS — Z12.31 ENCOUNTER FOR SCREENING MAMMOGRAM FOR BREAST CANCER: ICD-10-CM

## 2020-12-11 PROCEDURE — 77067 SCR MAMMO BI INCL CAD: CPT | Mod: TC

## 2020-12-11 PROCEDURE — 77067 SCR MAMMO BI INCL CAD: CPT | Mod: 26,,, | Performed by: RADIOLOGY

## 2020-12-11 PROCEDURE — 77067 MAMMO DIGITAL SCREENING BILAT WITH TOMO: ICD-10-PCS | Mod: 26,,, | Performed by: RADIOLOGY

## 2020-12-11 PROCEDURE — 77063 BREAST TOMOSYNTHESIS BI: CPT | Mod: 26,,, | Performed by: RADIOLOGY

## 2020-12-11 PROCEDURE — 77063 MAMMO DIGITAL SCREENING BILAT WITH TOMO: ICD-10-PCS | Mod: 26,,, | Performed by: RADIOLOGY

## 2021-01-25 ENCOUNTER — TELEPHONE (OUTPATIENT)
Dept: INTERNAL MEDICINE | Facility: CLINIC | Age: 66
End: 2021-01-25

## 2021-01-28 ENCOUNTER — PATIENT OUTREACH (OUTPATIENT)
Dept: ADMINISTRATIVE | Facility: HOSPITAL | Age: 66
End: 2021-01-28

## 2021-01-31 ENCOUNTER — EXTERNAL CHRONIC CARE MANAGEMENT (OUTPATIENT)
Dept: PRIMARY CARE CLINIC | Facility: CLINIC | Age: 66
End: 2021-01-31
Payer: COMMERCIAL

## 2021-01-31 PROCEDURE — 99490 CHRNC CARE MGMT STAFF 1ST 20: CPT | Mod: S$GLB,,, | Performed by: NURSE PRACTITIONER

## 2021-01-31 PROCEDURE — 99490 PR CHRONIC CARE MGMT, 1ST 20 MIN: ICD-10-PCS | Mod: S$GLB,,, | Performed by: NURSE PRACTITIONER

## 2021-02-28 ENCOUNTER — EXTERNAL CHRONIC CARE MANAGEMENT (OUTPATIENT)
Dept: PRIMARY CARE CLINIC | Facility: CLINIC | Age: 66
End: 2021-02-28
Payer: COMMERCIAL

## 2021-02-28 PROCEDURE — 99490 PR CHRONIC CARE MGMT, 1ST 20 MIN: ICD-10-PCS | Mod: S$GLB,,, | Performed by: NURSE PRACTITIONER

## 2021-02-28 PROCEDURE — 99490 CHRNC CARE MGMT STAFF 1ST 20: CPT | Mod: S$GLB,,, | Performed by: NURSE PRACTITIONER

## 2021-03-09 DIAGNOSIS — M25.561 CHRONIC PAIN OF RIGHT KNEE: ICD-10-CM

## 2021-03-09 DIAGNOSIS — G89.29 CHRONIC PAIN OF RIGHT KNEE: ICD-10-CM

## 2021-03-09 RX ORDER — MELOXICAM 15 MG/1
15 TABLET ORAL DAILY
Qty: 30 TABLET | Refills: 0 | Status: SHIPPED | OUTPATIENT
Start: 2021-03-09 | End: 2022-02-08 | Stop reason: SDUPTHER

## 2021-03-31 ENCOUNTER — EXTERNAL CHRONIC CARE MANAGEMENT (OUTPATIENT)
Dept: PRIMARY CARE CLINIC | Facility: CLINIC | Age: 66
End: 2021-03-31
Payer: COMMERCIAL

## 2021-03-31 PROCEDURE — 99490 CHRNC CARE MGMT STAFF 1ST 20: CPT | Mod: S$GLB,,, | Performed by: NURSE PRACTITIONER

## 2021-03-31 PROCEDURE — 99490 PR CHRONIC CARE MGMT, 1ST 20 MIN: ICD-10-PCS | Mod: S$GLB,,, | Performed by: NURSE PRACTITIONER

## 2021-04-30 ENCOUNTER — EXTERNAL CHRONIC CARE MANAGEMENT (OUTPATIENT)
Dept: PRIMARY CARE CLINIC | Facility: CLINIC | Age: 66
End: 2021-04-30
Payer: COMMERCIAL

## 2021-04-30 PROCEDURE — 99490 CHRNC CARE MGMT STAFF 1ST 20: CPT | Mod: S$GLB,,, | Performed by: NURSE PRACTITIONER

## 2021-04-30 PROCEDURE — 99490 PR CHRONIC CARE MGMT, 1ST 20 MIN: ICD-10-PCS | Mod: S$GLB,,, | Performed by: NURSE PRACTITIONER

## 2021-05-31 ENCOUNTER — EXTERNAL CHRONIC CARE MANAGEMENT (OUTPATIENT)
Dept: PRIMARY CARE CLINIC | Facility: CLINIC | Age: 66
End: 2021-05-31
Payer: COMMERCIAL

## 2021-05-31 PROCEDURE — 99490 CHRNC CARE MGMT STAFF 1ST 20: CPT | Mod: S$GLB,,, | Performed by: NURSE PRACTITIONER

## 2021-05-31 PROCEDURE — 99490 PR CHRONIC CARE MGMT, 1ST 20 MIN: ICD-10-PCS | Mod: S$GLB,,, | Performed by: NURSE PRACTITIONER

## 2021-06-14 ENCOUNTER — OFFICE VISIT (OUTPATIENT)
Dept: INTERNAL MEDICINE | Facility: CLINIC | Age: 66
End: 2021-06-14
Payer: MEDICARE

## 2021-06-14 VITALS
DIASTOLIC BLOOD PRESSURE: 82 MMHG | WEIGHT: 183.63 LBS | HEART RATE: 67 BPM | SYSTOLIC BLOOD PRESSURE: 134 MMHG | OXYGEN SATURATION: 99 % | RESPIRATION RATE: 16 BRPM | BODY MASS INDEX: 29.51 KG/M2 | HEIGHT: 66 IN

## 2021-06-14 DIAGNOSIS — R53.83 FATIGUE, UNSPECIFIED TYPE: Primary | ICD-10-CM

## 2021-06-14 DIAGNOSIS — E78.5 HYPERLIPIDEMIA, UNSPECIFIED HYPERLIPIDEMIA TYPE: ICD-10-CM

## 2021-06-14 DIAGNOSIS — I10 ESSENTIAL HYPERTENSION: ICD-10-CM

## 2021-06-14 DIAGNOSIS — R41.9 UNSPECIFIED SYMPTOMS AND SIGNS INVOLVING COGNITIVE FUNCTIONS AND AWARENESS: ICD-10-CM

## 2021-06-14 PROCEDURE — 99999 PR PBB SHADOW E&M-EST. PATIENT-LVL III: ICD-10-PCS | Mod: PBBFAC,,, | Performed by: NURSE PRACTITIONER

## 2021-06-14 PROCEDURE — 99213 OFFICE O/P EST LOW 20 MIN: CPT | Mod: PBBFAC | Performed by: NURSE PRACTITIONER

## 2021-06-14 PROCEDURE — 99214 OFFICE O/P EST MOD 30 MIN: CPT | Mod: S$PBB | Performed by: NURSE PRACTITIONER

## 2021-06-14 PROCEDURE — 99999 PR PBB SHADOW E&M-EST. PATIENT-LVL III: CPT | Mod: PBBFAC,,, | Performed by: NURSE PRACTITIONER

## 2021-06-14 PROCEDURE — 99999 PR STA SHADOW: CPT | Mod: PBBFAC,,, | Performed by: NURSE PRACTITIONER

## 2021-06-17 ENCOUNTER — LAB VISIT (OUTPATIENT)
Dept: INTERNAL MEDICINE | Facility: CLINIC | Age: 66
End: 2021-06-17
Payer: COMMERCIAL

## 2021-06-17 DIAGNOSIS — D64.9 ANEMIA, UNSPECIFIED TYPE: ICD-10-CM

## 2021-06-17 DIAGNOSIS — R53.83 FATIGUE, UNSPECIFIED TYPE: ICD-10-CM

## 2021-06-17 DIAGNOSIS — E78.5 HYPERLIPIDEMIA, UNSPECIFIED HYPERLIPIDEMIA TYPE: ICD-10-CM

## 2021-06-17 DIAGNOSIS — I10 ESSENTIAL HYPERTENSION: ICD-10-CM

## 2021-06-17 DIAGNOSIS — I10 HYPERTENSION, UNSPECIFIED TYPE: ICD-10-CM

## 2021-06-17 DIAGNOSIS — R41.9 UNSPECIFIED SYMPTOMS AND SIGNS INVOLVING COGNITIVE FUNCTIONS AND AWARENESS: ICD-10-CM

## 2021-06-17 LAB
25(OH)D3+25(OH)D2 SERPL-MCNC: 29 NG/ML (ref 30–96)
ALBUMIN SERPL BCP-MCNC: 3.5 G/DL (ref 3.5–5.2)
ALP SERPL-CCNC: 67 U/L (ref 55–135)
ALT SERPL W/O P-5'-P-CCNC: 18 U/L (ref 10–44)
ANION GAP SERPL CALC-SCNC: 9 MMOL/L (ref 8–16)
AST SERPL-CCNC: 22 U/L (ref 10–40)
BASOPHILS # BLD AUTO: 0.04 K/UL (ref 0–0.2)
BASOPHILS NFR BLD: 0.8 % (ref 0–1.9)
BILIRUB SERPL-MCNC: 0.3 MG/DL (ref 0.1–1)
BUN SERPL-MCNC: 19 MG/DL (ref 8–23)
CALCIUM SERPL-MCNC: 9.3 MG/DL (ref 8.7–10.5)
CHLORIDE SERPL-SCNC: 103 MMOL/L (ref 95–110)
CHOLEST SERPL-MCNC: 236 MG/DL (ref 120–199)
CHOLEST/HDLC SERPL: 3.6 {RATIO} (ref 2–5)
CO2 SERPL-SCNC: 29 MMOL/L (ref 23–29)
CREAT SERPL-MCNC: 0.7 MG/DL (ref 0.5–1.4)
DIFFERENTIAL METHOD: ABNORMAL
EOSINOPHIL # BLD AUTO: 0.1 K/UL (ref 0–0.5)
EOSINOPHIL NFR BLD: 1.9 % (ref 0–8)
ERYTHROCYTE [DISTWIDTH] IN BLOOD BY AUTOMATED COUNT: 16.7 % (ref 11.5–14.5)
EST. GFR  (AFRICAN AMERICAN): >60 ML/MIN/1.73 M^2
EST. GFR  (NON AFRICAN AMERICAN): >60 ML/MIN/1.73 M^2
GLUCOSE SERPL-MCNC: 94 MG/DL (ref 70–110)
HCT VFR BLD AUTO: 41.1 % (ref 37–48.5)
HDLC SERPL-MCNC: 65 MG/DL (ref 40–75)
HDLC SERPL: 27.5 % (ref 20–50)
HGB BLD-MCNC: 12.5 G/DL (ref 12–16)
IMM GRANULOCYTES # BLD AUTO: 0.01 K/UL (ref 0–0.04)
IMM GRANULOCYTES NFR BLD AUTO: 0.2 % (ref 0–0.5)
LDLC SERPL CALC-MCNC: 136.2 MG/DL (ref 63–159)
LYMPHOCYTES # BLD AUTO: 1.9 K/UL (ref 1–4.8)
LYMPHOCYTES NFR BLD: 36.2 % (ref 18–48)
MCH RBC QN AUTO: 22.9 PG (ref 27–31)
MCHC RBC AUTO-ENTMCNC: 30.4 G/DL (ref 32–36)
MCV RBC AUTO: 75 FL (ref 82–98)
MONOCYTES # BLD AUTO: 0.6 K/UL (ref 0.3–1)
MONOCYTES NFR BLD: 11.6 % (ref 4–15)
NEUTROPHILS # BLD AUTO: 2.6 K/UL (ref 1.8–7.7)
NEUTROPHILS NFR BLD: 49.3 % (ref 38–73)
NONHDLC SERPL-MCNC: 171 MG/DL
NRBC BLD-RTO: 0 /100 WBC
PLATELET # BLD AUTO: 201 K/UL (ref 150–450)
PMV BLD AUTO: 12.1 FL (ref 9.2–12.9)
POTASSIUM SERPL-SCNC: 3.8 MMOL/L (ref 3.5–5.1)
PROT SERPL-MCNC: 7.2 G/DL (ref 6–8.4)
RBC # BLD AUTO: 5.45 M/UL (ref 4–5.4)
SODIUM SERPL-SCNC: 141 MMOL/L (ref 136–145)
TRIGL SERPL-MCNC: 174 MG/DL (ref 30–150)
TSH SERPL DL<=0.005 MIU/L-ACNC: 1 UIU/ML (ref 0.4–4)
VIT B12 SERPL-MCNC: 1254 PG/ML (ref 210–950)
WBC # BLD AUTO: 5.33 K/UL (ref 3.9–12.7)

## 2021-06-17 PROCEDURE — 80061 LIPID PANEL: CPT | Performed by: NURSE PRACTITIONER

## 2021-06-17 PROCEDURE — 82306 VITAMIN D 25 HYDROXY: CPT | Performed by: NURSE PRACTITIONER

## 2021-06-17 PROCEDURE — 85025 COMPLETE CBC W/AUTO DIFF WBC: CPT | Performed by: NURSE PRACTITIONER

## 2021-06-17 PROCEDURE — 99999 PR STA SHADOW: ICD-10-PCS | Mod: PBBFAC,,,

## 2021-06-17 PROCEDURE — 80053 COMPREHEN METABOLIC PANEL: CPT | Performed by: NURSE PRACTITIONER

## 2021-06-17 PROCEDURE — 36415 COLL VENOUS BLD VENIPUNCTURE: CPT | Mod: PBBFAC

## 2021-06-17 PROCEDURE — 84443 ASSAY THYROID STIM HORMONE: CPT | Performed by: NURSE PRACTITIONER

## 2021-06-17 PROCEDURE — 99999 PR STA SHADOW: CPT | Mod: PBBFAC,,,

## 2021-06-17 PROCEDURE — 82607 VITAMIN B-12: CPT | Performed by: NURSE PRACTITIONER

## 2021-06-24 DIAGNOSIS — E78.5 HYPERLIPIDEMIA, UNSPECIFIED HYPERLIPIDEMIA TYPE: Primary | ICD-10-CM

## 2021-06-24 RX ORDER — ROSUVASTATIN CALCIUM 20 MG/1
20 TABLET, COATED ORAL DAILY
Qty: 90 TABLET | Refills: 3 | Status: SHIPPED | OUTPATIENT
Start: 2021-06-24 | End: 2022-02-08

## 2021-06-30 ENCOUNTER — EXTERNAL CHRONIC CARE MANAGEMENT (OUTPATIENT)
Dept: PRIMARY CARE CLINIC | Facility: CLINIC | Age: 66
End: 2021-06-30
Payer: COMMERCIAL

## 2021-06-30 PROCEDURE — 99490 PR CHRONIC CARE MGMT, 1ST 20 MIN: ICD-10-PCS | Mod: S$GLB,,, | Performed by: NURSE PRACTITIONER

## 2021-06-30 PROCEDURE — 99490 CHRNC CARE MGMT STAFF 1ST 20: CPT | Mod: S$GLB,,, | Performed by: NURSE PRACTITIONER

## 2021-07-31 ENCOUNTER — EXTERNAL CHRONIC CARE MANAGEMENT (OUTPATIENT)
Dept: PRIMARY CARE CLINIC | Facility: CLINIC | Age: 66
End: 2021-07-31
Payer: COMMERCIAL

## 2021-07-31 PROCEDURE — 99490 PR CHRONIC CARE MGMT, 1ST 20 MIN: ICD-10-PCS | Mod: S$GLB,,, | Performed by: NURSE PRACTITIONER

## 2021-07-31 PROCEDURE — 99490 CHRNC CARE MGMT STAFF 1ST 20: CPT | Mod: S$GLB,,, | Performed by: NURSE PRACTITIONER

## 2021-08-31 ENCOUNTER — EXTERNAL CHRONIC CARE MANAGEMENT (OUTPATIENT)
Dept: PRIMARY CARE CLINIC | Facility: CLINIC | Age: 66
End: 2021-08-31
Payer: COMMERCIAL

## 2021-08-31 PROCEDURE — 99490 PR CHRONIC CARE MGMT, 1ST 20 MIN: ICD-10-PCS | Mod: S$GLB,,, | Performed by: NURSE PRACTITIONER

## 2021-08-31 PROCEDURE — 99490 CHRNC CARE MGMT STAFF 1ST 20: CPT | Mod: S$GLB,,, | Performed by: NURSE PRACTITIONER

## 2021-09-30 ENCOUNTER — EXTERNAL CHRONIC CARE MANAGEMENT (OUTPATIENT)
Dept: PRIMARY CARE CLINIC | Facility: CLINIC | Age: 66
End: 2021-09-30
Payer: COMMERCIAL

## 2021-09-30 PROCEDURE — 99490 PR CHRONIC CARE MGMT, 1ST 20 MIN: ICD-10-PCS | Mod: S$GLB,,, | Performed by: NURSE PRACTITIONER

## 2021-09-30 PROCEDURE — 99490 CHRNC CARE MGMT STAFF 1ST 20: CPT | Mod: S$GLB,,, | Performed by: NURSE PRACTITIONER

## 2021-10-31 ENCOUNTER — EXTERNAL CHRONIC CARE MANAGEMENT (OUTPATIENT)
Dept: PRIMARY CARE CLINIC | Facility: CLINIC | Age: 66
End: 2021-10-31
Payer: COMMERCIAL

## 2021-10-31 PROCEDURE — 99490 PR CHRONIC CARE MGMT, 1ST 20 MIN: ICD-10-PCS | Mod: S$GLB,,, | Performed by: NURSE PRACTITIONER

## 2021-10-31 PROCEDURE — 99490 CHRNC CARE MGMT STAFF 1ST 20: CPT | Mod: S$GLB,,, | Performed by: NURSE PRACTITIONER

## 2021-11-30 ENCOUNTER — EXTERNAL CHRONIC CARE MANAGEMENT (OUTPATIENT)
Dept: PRIMARY CARE CLINIC | Facility: CLINIC | Age: 66
End: 2021-11-30
Payer: COMMERCIAL

## 2021-11-30 PROCEDURE — 99490 CHRNC CARE MGMT STAFF 1ST 20: CPT | Mod: S$GLB,,, | Performed by: NURSE PRACTITIONER

## 2021-11-30 PROCEDURE — 99490 PR CHRONIC CARE MGMT, 1ST 20 MIN: ICD-10-PCS | Mod: S$GLB,,, | Performed by: NURSE PRACTITIONER

## 2021-12-07 ENCOUNTER — TELEPHONE (OUTPATIENT)
Dept: INTERNAL MEDICINE | Facility: CLINIC | Age: 66
End: 2021-12-07
Payer: COMMERCIAL

## 2021-12-07 DIAGNOSIS — Z12.31 ENCOUNTER FOR SCREENING MAMMOGRAM FOR BREAST CANCER: Primary | ICD-10-CM

## 2021-12-27 ENCOUNTER — PATIENT OUTREACH (OUTPATIENT)
Dept: ADMINISTRATIVE | Facility: HOSPITAL | Age: 66
End: 2021-12-27
Payer: COMMERCIAL

## 2021-12-27 DIAGNOSIS — E78.5 HYPERLIPIDEMIA, UNSPECIFIED HYPERLIPIDEMIA TYPE: Primary | ICD-10-CM

## 2021-12-27 DIAGNOSIS — I10 ESSENTIAL HYPERTENSION: ICD-10-CM

## 2021-12-27 DIAGNOSIS — Z86.39 HISTORY OF ELEVATED GLUCOSE: ICD-10-CM

## 2022-01-10 ENCOUNTER — HOSPITAL ENCOUNTER (OUTPATIENT)
Dept: RADIOLOGY | Facility: HOSPITAL | Age: 67
Discharge: HOME | End: 2022-01-10
Attending: NURSE PRACTITIONER
Payer: COMMERCIAL

## 2022-01-10 VITALS — WEIGHT: 183 LBS | HEIGHT: 66 IN | BODY MASS INDEX: 29.41 KG/M2

## 2022-01-10 DIAGNOSIS — Z12.31 ENCOUNTER FOR SCREENING MAMMOGRAM FOR BREAST CANCER: ICD-10-CM

## 2022-01-10 PROCEDURE — 77063 BREAST TOMOSYNTHESIS BI: CPT | Mod: TC

## 2022-01-10 PROCEDURE — 77067 MAMMO DIGITAL SCREENING BILAT WITH TOMO: ICD-10-PCS | Mod: 26,,, | Performed by: RADIOLOGY

## 2022-01-10 PROCEDURE — 77067 SCR MAMMO BI INCL CAD: CPT | Mod: 26,,, | Performed by: RADIOLOGY

## 2022-01-10 PROCEDURE — 77063 MAMMO DIGITAL SCREENING BILAT WITH TOMO: ICD-10-PCS | Mod: 26,,, | Performed by: RADIOLOGY

## 2022-01-10 PROCEDURE — 77063 BREAST TOMOSYNTHESIS BI: CPT | Mod: 26,,, | Performed by: RADIOLOGY

## 2022-02-08 ENCOUNTER — OFFICE VISIT (OUTPATIENT)
Dept: INTERNAL MEDICINE | Facility: CLINIC | Age: 67
End: 2022-02-08
Payer: COMMERCIAL

## 2022-02-08 VITALS
HEART RATE: 67 BPM | DIASTOLIC BLOOD PRESSURE: 64 MMHG | HEIGHT: 66 IN | BODY MASS INDEX: 28.14 KG/M2 | WEIGHT: 175.06 LBS | OXYGEN SATURATION: 98 % | SYSTOLIC BLOOD PRESSURE: 124 MMHG | RESPIRATION RATE: 16 BRPM

## 2022-02-08 DIAGNOSIS — I10 HYPERTENSION, UNSPECIFIED TYPE: ICD-10-CM

## 2022-02-08 DIAGNOSIS — M25.561 CHRONIC PAIN OF RIGHT KNEE: ICD-10-CM

## 2022-02-08 DIAGNOSIS — G89.29 CHRONIC PAIN OF RIGHT KNEE: ICD-10-CM

## 2022-02-08 DIAGNOSIS — E78.5 HYPERLIPIDEMIA, UNSPECIFIED HYPERLIPIDEMIA TYPE: Primary | ICD-10-CM

## 2022-02-08 DIAGNOSIS — I10 ESSENTIAL HYPERTENSION: ICD-10-CM

## 2022-02-08 PROCEDURE — 99214 OFFICE O/P EST MOD 30 MIN: CPT | Mod: S$GLB,,, | Performed by: NURSE PRACTITIONER

## 2022-02-08 PROCEDURE — 3074F PR MOST RECENT SYSTOLIC BLOOD PRESSURE < 130 MM HG: ICD-10-PCS | Mod: CPTII,S$GLB,, | Performed by: NURSE PRACTITIONER

## 2022-02-08 PROCEDURE — 3044F PR MOST RECENT HEMOGLOBIN A1C LEVEL <7.0%: ICD-10-PCS | Mod: CPTII,S$GLB,, | Performed by: NURSE PRACTITIONER

## 2022-02-08 PROCEDURE — 99214 PR OFFICE/OUTPT VISIT, EST, LEVL IV, 30-39 MIN: ICD-10-PCS | Mod: S$GLB,,, | Performed by: NURSE PRACTITIONER

## 2022-02-08 PROCEDURE — 3288F FALL RISK ASSESSMENT DOCD: CPT | Mod: CPTII,S$GLB,, | Performed by: NURSE PRACTITIONER

## 2022-02-08 PROCEDURE — 1101F PT FALLS ASSESS-DOCD LE1/YR: CPT | Mod: CPTII,S$GLB,, | Performed by: NURSE PRACTITIONER

## 2022-02-08 PROCEDURE — 1160F PR REVIEW ALL MEDS BY PRESCRIBER/CLIN PHARMACIST DOCUMENTED: ICD-10-PCS | Mod: CPTII,S$GLB,, | Performed by: NURSE PRACTITIONER

## 2022-02-08 PROCEDURE — 3288F PR FALLS RISK ASSESSMENT DOCUMENTED: ICD-10-PCS | Mod: CPTII,S$GLB,, | Performed by: NURSE PRACTITIONER

## 2022-02-08 PROCEDURE — 1159F PR MEDICATION LIST DOCUMENTED IN MEDICAL RECORD: ICD-10-PCS | Mod: CPTII,S$GLB,, | Performed by: NURSE PRACTITIONER

## 2022-02-08 PROCEDURE — 3008F PR BODY MASS INDEX (BMI) DOCUMENTED: ICD-10-PCS | Mod: CPTII,S$GLB,, | Performed by: NURSE PRACTITIONER

## 2022-02-08 PROCEDURE — 3008F BODY MASS INDEX DOCD: CPT | Mod: CPTII,S$GLB,, | Performed by: NURSE PRACTITIONER

## 2022-02-08 PROCEDURE — 1126F PR PAIN SEVERITY QUANTIFIED, NO PAIN PRESENT: ICD-10-PCS | Mod: CPTII,S$GLB,, | Performed by: NURSE PRACTITIONER

## 2022-02-08 PROCEDURE — 1126F AMNT PAIN NOTED NONE PRSNT: CPT | Mod: CPTII,S$GLB,, | Performed by: NURSE PRACTITIONER

## 2022-02-08 PROCEDURE — 1160F RVW MEDS BY RX/DR IN RCRD: CPT | Mod: CPTII,S$GLB,, | Performed by: NURSE PRACTITIONER

## 2022-02-08 PROCEDURE — 1101F PR PT FALLS ASSESS DOC 0-1 FALLS W/OUT INJ PAST YR: ICD-10-PCS | Mod: CPTII,S$GLB,, | Performed by: NURSE PRACTITIONER

## 2022-02-08 PROCEDURE — 3078F PR MOST RECENT DIASTOLIC BLOOD PRESSURE < 80 MM HG: ICD-10-PCS | Mod: CPTII,S$GLB,, | Performed by: NURSE PRACTITIONER

## 2022-02-08 PROCEDURE — 99999 PR PBB SHADOW E&M-EST. PATIENT-LVL III: ICD-10-PCS | Mod: PBBFAC,,, | Performed by: NURSE PRACTITIONER

## 2022-02-08 PROCEDURE — 1159F MED LIST DOCD IN RCRD: CPT | Mod: CPTII,S$GLB,, | Performed by: NURSE PRACTITIONER

## 2022-02-08 PROCEDURE — 99999 PR PBB SHADOW E&M-EST. PATIENT-LVL III: CPT | Mod: PBBFAC,,, | Performed by: NURSE PRACTITIONER

## 2022-02-08 PROCEDURE — 3074F SYST BP LT 130 MM HG: CPT | Mod: CPTII,S$GLB,, | Performed by: NURSE PRACTITIONER

## 2022-02-08 PROCEDURE — 3078F DIAST BP <80 MM HG: CPT | Mod: CPTII,S$GLB,, | Performed by: NURSE PRACTITIONER

## 2022-02-08 PROCEDURE — 3044F HG A1C LEVEL LT 7.0%: CPT | Mod: CPTII,S$GLB,, | Performed by: NURSE PRACTITIONER

## 2022-02-08 RX ORDER — MELOXICAM 15 MG/1
15 TABLET ORAL DAILY
Qty: 90 TABLET | Refills: 1 | Status: ON HOLD | OUTPATIENT
Start: 2022-02-08 | End: 2023-04-12

## 2022-02-08 RX ORDER — ATORVASTATIN CALCIUM 20 MG/1
20 TABLET, FILM COATED ORAL DAILY
Qty: 90 TABLET | Refills: 3 | Status: SHIPPED | OUTPATIENT
Start: 2022-02-08 | End: 2022-12-06 | Stop reason: SDUPTHER

## 2022-02-08 RX ORDER — LISINOPRIL AND HYDROCHLOROTHIAZIDE 12.5; 2 MG/1; MG/1
1 TABLET ORAL DAILY
Qty: 90 TABLET | Refills: 3 | Status: SHIPPED | OUTPATIENT
Start: 2022-02-08 | End: 2022-12-06 | Stop reason: SDUPTHER

## 2022-02-08 NOTE — PROGRESS NOTES
Subjective:       Patient ID: Nan Simms is a 66 y.o. female.    Chief Complaint: Follow-up    HPI: Pt presents to clinic today known to me with c/o needing routine f./u. She has been out of her mobic and she has been aching at [facility]. Has been using OTC ibuprofen with relief.     Total 260, trig 232, hdl 63, ldl 150-- she report sthat she has been out of her cholesterol meds a couple days prior to this.m though maybe that was causing her aches     A1C 5.8    Lab Results   Component Value Date    WBC 5.88 01/10/2022    HGB 12.0 01/10/2022    HCT 38.1 01/10/2022    MCV 76 (L) 01/10/2022     01/10/2022       BMP  Lab Results   Component Value Date     01/10/2022    K 4.0 01/10/2022     01/10/2022    CO2 27 01/10/2022    BUN 22 01/10/2022    CREATININE 0.7 01/10/2022    CALCIUM 9.6 01/10/2022    ANIONGAP 10 01/10/2022    ESTGFRAFRICA >60 01/10/2022    EGFRNONAA >60 01/10/2022     Lab Results   Component Value Date    ALT 19 01/10/2022    AST 20 01/10/2022    ALKPHOS 65 01/10/2022    BILITOT 0.5 01/10/2022     mammo Routine screening mammogram in 1 year is recommended.  Review of Systems   Constitutional: Negative for chills, fatigue, fever and unexpected weight change.   HENT: Negative for congestion, ear pain, sore throat and trouble swallowing.    Eyes: Negative for pain and visual disturbance.   Respiratory: Negative for cough, chest tightness and shortness of breath.    Cardiovascular: Negative for chest pain, palpitations and leg swelling.   Gastrointestinal: Negative for abdominal distention, abdominal pain, constipation, diarrhea and vomiting.   Genitourinary: Negative for difficulty urinating, dysuria, flank pain, frequency and hematuria.   Musculoskeletal: Negative for back pain, gait problem, joint swelling, neck pain and neck stiffness.   Skin: Negative for rash and wound.   Neurological: Negative for dizziness, seizures, speech difficulty, light-headedness and headaches.        Objective:      Physical Exam  Constitutional:       Appearance: She is well-developed and well-nourished.   HENT:      Head: Normocephalic and atraumatic.      Right Ear: External ear normal.      Left Ear: External ear normal.      Nose: Nose normal.      Mouth/Throat:      Mouth: Oropharynx is clear and moist.   Eyes:      Extraocular Movements: EOM normal.      Conjunctiva/sclera: Conjunctivae normal.      Pupils: Pupils are equal, round, and reactive to light.   Cardiovascular:      Rate and Rhythm: Normal rate and regular rhythm.      Pulses: Intact distal pulses.      Heart sounds: Normal heart sounds.   Pulmonary:      Effort: Pulmonary effort is normal.      Breath sounds: Normal breath sounds.   Abdominal:      General: Bowel sounds are normal.      Palpations: Abdomen is soft.   Musculoskeletal:         General: Normal range of motion.      Cervical back: Normal range of motion and neck supple.   Skin:     General: Skin is warm and dry.   Neurological:      Mental Status: She is alert and oriented to person, place, and time.   Psychiatric:         Mood and Affect: Mood and affect normal.         Behavior: Behavior normal.         Thought Content: Thought content normal.         Judgment: Judgment normal.         Assessment:       1. Hyperlipidemia, unspecified hyperlipidemia type    2. Chronic pain of right knee    3. Essential hypertension    4. Hypertension, unspecified type        Plan:     Problem List Items Addressed This Visit     Essential hypertension    Hyperlipidemia - Primary    Relevant Medications    atorvastatin (LIPITOR) 20 MG tablet    Other Relevant Orders    Comprehensive Metabolic Panel    Lipid Panel      Other Visit Diagnoses     Chronic pain of right knee        Relevant Medications    meloxicam (MOBIC) 15 MG tablet    Hypertension, unspecified type        Relevant Medications    lisinopriL-hydrochlorothiazide (PRINZIDE,ZESTORETIC) 20-12.5 mg per tablet        cologaurd last  year  artie completed this year. Labs ok but cholesterol elevated. Will try and change crestor to lipitor due to c/o myalgias and f/u cholesterol in 3 months     Declines flu and pneumonia vaccines

## 2022-05-25 ENCOUNTER — OFFICE VISIT (OUTPATIENT)
Dept: INTERNAL MEDICINE | Facility: CLINIC | Age: 67
End: 2022-05-25
Payer: COMMERCIAL

## 2022-05-25 VITALS
HEIGHT: 66 IN | BODY MASS INDEX: 27.85 KG/M2 | DIASTOLIC BLOOD PRESSURE: 64 MMHG | OXYGEN SATURATION: 96 % | SYSTOLIC BLOOD PRESSURE: 124 MMHG | HEART RATE: 60 BPM | WEIGHT: 173.31 LBS | RESPIRATION RATE: 16 BRPM

## 2022-05-25 DIAGNOSIS — I10 ESSENTIAL HYPERTENSION: Primary | ICD-10-CM

## 2022-05-25 DIAGNOSIS — E78.5 HYPERLIPIDEMIA, UNSPECIFIED HYPERLIPIDEMIA TYPE: ICD-10-CM

## 2022-05-25 DIAGNOSIS — E11.9 DIABETES MELLITUS TYPE 2, DIET-CONTROLLED: ICD-10-CM

## 2022-05-25 DIAGNOSIS — J41.0 SIMPLE CHRONIC BRONCHITIS: ICD-10-CM

## 2022-05-25 PROCEDURE — 1126F PR PAIN SEVERITY QUANTIFIED, NO PAIN PRESENT: ICD-10-PCS | Mod: CPTII,S$GLB,, | Performed by: NURSE PRACTITIONER

## 2022-05-25 PROCEDURE — 3008F PR BODY MASS INDEX (BMI) DOCUMENTED: ICD-10-PCS | Mod: CPTII,S$GLB,, | Performed by: NURSE PRACTITIONER

## 2022-05-25 PROCEDURE — 99499 UNLISTED E&M SERVICE: CPT | Mod: S$GLB,,, | Performed by: NURSE PRACTITIONER

## 2022-05-25 PROCEDURE — 4010F PR ACE/ARB THEARPY RXD/TAKEN: ICD-10-PCS | Mod: CPTII,S$GLB,, | Performed by: NURSE PRACTITIONER

## 2022-05-25 PROCEDURE — 3288F FALL RISK ASSESSMENT DOCD: CPT | Mod: CPTII,S$GLB,, | Performed by: NURSE PRACTITIONER

## 2022-05-25 PROCEDURE — 1126F AMNT PAIN NOTED NONE PRSNT: CPT | Mod: CPTII,S$GLB,, | Performed by: NURSE PRACTITIONER

## 2022-05-25 PROCEDURE — 1159F PR MEDICATION LIST DOCUMENTED IN MEDICAL RECORD: ICD-10-PCS | Mod: CPTII,S$GLB,, | Performed by: NURSE PRACTITIONER

## 2022-05-25 PROCEDURE — 99214 PR OFFICE/OUTPT VISIT, EST, LEVL IV, 30-39 MIN: ICD-10-PCS | Mod: S$GLB,,, | Performed by: NURSE PRACTITIONER

## 2022-05-25 PROCEDURE — 1160F RVW MEDS BY RX/DR IN RCRD: CPT | Mod: CPTII,S$GLB,, | Performed by: NURSE PRACTITIONER

## 2022-05-25 PROCEDURE — 1160F PR REVIEW ALL MEDS BY PRESCRIBER/CLIN PHARMACIST DOCUMENTED: ICD-10-PCS | Mod: CPTII,S$GLB,, | Performed by: NURSE PRACTITIONER

## 2022-05-25 PROCEDURE — 99999 PR PBB SHADOW E&M-EST. PATIENT-LVL III: CPT | Mod: PBBFAC,,, | Performed by: NURSE PRACTITIONER

## 2022-05-25 PROCEDURE — 1101F PR PT FALLS ASSESS DOC 0-1 FALLS W/OUT INJ PAST YR: ICD-10-PCS | Mod: CPTII,S$GLB,, | Performed by: NURSE PRACTITIONER

## 2022-05-25 PROCEDURE — 3008F BODY MASS INDEX DOCD: CPT | Mod: CPTII,S$GLB,, | Performed by: NURSE PRACTITIONER

## 2022-05-25 PROCEDURE — 4010F ACE/ARB THERAPY RXD/TAKEN: CPT | Mod: CPTII,S$GLB,, | Performed by: NURSE PRACTITIONER

## 2022-05-25 PROCEDURE — 1101F PT FALLS ASSESS-DOCD LE1/YR: CPT | Mod: CPTII,S$GLB,, | Performed by: NURSE PRACTITIONER

## 2022-05-25 PROCEDURE — 3074F PR MOST RECENT SYSTOLIC BLOOD PRESSURE < 130 MM HG: ICD-10-PCS | Mod: CPTII,S$GLB,, | Performed by: NURSE PRACTITIONER

## 2022-05-25 PROCEDURE — 3078F DIAST BP <80 MM HG: CPT | Mod: CPTII,S$GLB,, | Performed by: NURSE PRACTITIONER

## 2022-05-25 PROCEDURE — 3044F PR MOST RECENT HEMOGLOBIN A1C LEVEL <7.0%: ICD-10-PCS | Mod: CPTII,S$GLB,, | Performed by: NURSE PRACTITIONER

## 2022-05-25 PROCEDURE — 99214 OFFICE O/P EST MOD 30 MIN: CPT | Mod: S$GLB,,, | Performed by: NURSE PRACTITIONER

## 2022-05-25 PROCEDURE — 3044F HG A1C LEVEL LT 7.0%: CPT | Mod: CPTII,S$GLB,, | Performed by: NURSE PRACTITIONER

## 2022-05-25 PROCEDURE — 3288F PR FALLS RISK ASSESSMENT DOCUMENTED: ICD-10-PCS | Mod: CPTII,S$GLB,, | Performed by: NURSE PRACTITIONER

## 2022-05-25 PROCEDURE — 99999 PR PBB SHADOW E&M-EST. PATIENT-LVL III: ICD-10-PCS | Mod: PBBFAC,,, | Performed by: NURSE PRACTITIONER

## 2022-05-25 PROCEDURE — 3074F SYST BP LT 130 MM HG: CPT | Mod: CPTII,S$GLB,, | Performed by: NURSE PRACTITIONER

## 2022-05-25 PROCEDURE — 1159F MED LIST DOCD IN RCRD: CPT | Mod: CPTII,S$GLB,, | Performed by: NURSE PRACTITIONER

## 2022-05-25 PROCEDURE — 3078F PR MOST RECENT DIASTOLIC BLOOD PRESSURE < 80 MM HG: ICD-10-PCS | Mod: CPTII,S$GLB,, | Performed by: NURSE PRACTITIONER

## 2022-05-25 PROCEDURE — 99499 RISK ADDL DX/OHS AUDIT: ICD-10-PCS | Mod: S$GLB,,, | Performed by: NURSE PRACTITIONER

## 2022-05-25 NOTE — PROGRESS NOTES
Subjective:       Patient ID: Nan Simms is a 66 y.o. female.    Chief Complaint: Follow-up    HPI: Pt presents to clinic today known to me with c/o needing routine f/u. She was last seen IN Jan     Last visit >>mammo completed this year. Labs ok but cholesterol elevated. Will try and change crestor to lipitor due to c/o myalgias and f/u cholesterol in 3 months     She reports that since changing her leg cramps have resolved. She dod not go for labs   Review of Systems   Constitutional: Negative for chills, fatigue, fever and unexpected weight change.   HENT: Negative for congestion, ear pain, sore throat and trouble swallowing.    Eyes: Negative for pain and visual disturbance.   Respiratory: Negative for cough, chest tightness and shortness of breath.    Cardiovascular: Negative for chest pain, palpitations and leg swelling.   Gastrointestinal: Negative for abdominal distention, abdominal pain, constipation, diarrhea and vomiting.   Genitourinary: Negative for difficulty urinating, dysuria, flank pain, frequency and hematuria.   Musculoskeletal: Negative for back pain, gait problem, joint swelling, neck pain and neck stiffness.   Skin: Negative for rash and wound.   Neurological: Negative for dizziness, seizures, speech difficulty, light-headedness and headaches.       Objective:      Physical Exam  Vitals and nursing note reviewed.   Constitutional:       Appearance: Normal appearance. She is well-developed.   HENT:      Head: Normocephalic and atraumatic.      Right Ear: External ear normal.      Left Ear: External ear normal.      Nose: Nose normal.   Eyes:      Conjunctiva/sclera: Conjunctivae normal.      Pupils: Pupils are equal, round, and reactive to light.   Cardiovascular:      Rate and Rhythm: Normal rate and regular rhythm.      Heart sounds: Normal heart sounds. No murmur heard.  Pulmonary:      Effort: Pulmonary effort is normal. No respiratory distress.      Breath sounds: Normal breath sounds.  No wheezing.   Abdominal:      General: Bowel sounds are normal. There is no distension.      Palpations: Abdomen is soft. There is no mass.      Tenderness: There is no abdominal tenderness.   Musculoskeletal:         General: No swelling. Normal range of motion.      Cervical back: Normal range of motion and neck supple.   Skin:     General: Skin is warm and dry.      Capillary Refill: Capillary refill takes less than 2 seconds.   Neurological:      Mental Status: She is alert and oriented to person, place, and time.   Psychiatric:         Behavior: Behavior normal.         Thought Content: Thought content normal.         Judgment: Judgment normal.         Assessment:       1. Essential hypertension    2. Hyperlipidemia, unspecified hyperlipidemia type    3. Simple chronic bronchitis    4. Diabetes mellitus type 2, diet-controlled        Plan:     Problem List Items Addressed This Visit     Essential hypertension - Primary well controlled cont lisinopril hctz    Hyperlipidemia- cont lipitor myalgias resolved     Simple chronic bronchitis- no wheezing- cutting down on smoking. NOt ready to quit     Diabetes mellitus type 2, diet-controlled-   Lab Results   Component Value Date    HGBA1C 5.8 (H) 01/10/2022                 Schedule cmp and cholesterol since statin change

## 2022-05-27 ENCOUNTER — LAB VISIT (OUTPATIENT)
Dept: LAB | Facility: HOSPITAL | Age: 67
End: 2022-05-27
Attending: NURSE PRACTITIONER
Payer: COMMERCIAL

## 2022-05-27 DIAGNOSIS — E78.5 HYPERLIPIDEMIA, UNSPECIFIED HYPERLIPIDEMIA TYPE: ICD-10-CM

## 2022-05-27 LAB
ALBUMIN SERPL BCP-MCNC: 3.3 G/DL (ref 3.5–5.2)
ALP SERPL-CCNC: 69 U/L (ref 55–135)
ALT SERPL W/O P-5'-P-CCNC: 15 U/L (ref 10–44)
ANION GAP SERPL CALC-SCNC: 9 MMOL/L (ref 8–16)
AST SERPL-CCNC: 15 U/L (ref 10–40)
BILIRUB SERPL-MCNC: 0.4 MG/DL (ref 0.1–1)
BUN SERPL-MCNC: 20 MG/DL (ref 8–23)
CALCIUM SERPL-MCNC: 9.3 MG/DL (ref 8.7–10.5)
CHLORIDE SERPL-SCNC: 104 MMOL/L (ref 95–110)
CHOLEST SERPL-MCNC: 203 MG/DL (ref 120–199)
CHOLEST/HDLC SERPL: 3.1 {RATIO} (ref 2–5)
CO2 SERPL-SCNC: 31 MMOL/L (ref 23–29)
CREAT SERPL-MCNC: 0.7 MG/DL (ref 0.5–1.4)
EST. GFR  (AFRICAN AMERICAN): >60 ML/MIN/1.73 M^2
EST. GFR  (NON AFRICAN AMERICAN): >60 ML/MIN/1.73 M^2
GLUCOSE SERPL-MCNC: 102 MG/DL (ref 70–110)
HDLC SERPL-MCNC: 65 MG/DL (ref 40–75)
HDLC SERPL: 32 % (ref 20–50)
LDLC SERPL CALC-MCNC: 106.2 MG/DL (ref 63–159)
NONHDLC SERPL-MCNC: 138 MG/DL
POTASSIUM SERPL-SCNC: 3.4 MMOL/L (ref 3.5–5.1)
PROT SERPL-MCNC: 7 G/DL (ref 6–8.4)
SODIUM SERPL-SCNC: 144 MMOL/L (ref 136–145)
TRIGL SERPL-MCNC: 159 MG/DL (ref 30–150)

## 2022-05-27 PROCEDURE — 36415 COLL VENOUS BLD VENIPUNCTURE: CPT | Performed by: NURSE PRACTITIONER

## 2022-05-27 PROCEDURE — 80053 COMPREHEN METABOLIC PANEL: CPT | Performed by: NURSE PRACTITIONER

## 2022-05-27 PROCEDURE — 80061 LIPID PANEL: CPT | Performed by: NURSE PRACTITIONER

## 2022-06-01 DIAGNOSIS — E11.9 TYPE 2 DIABETES MELLITUS WITHOUT COMPLICATION: ICD-10-CM

## 2022-07-20 DIAGNOSIS — E11.9 TYPE 2 DIABETES MELLITUS WITHOUT COMPLICATION: ICD-10-CM

## 2022-11-11 ENCOUNTER — LAB VISIT (OUTPATIENT)
Dept: LAB | Facility: HOSPITAL | Age: 67
End: 2022-11-11
Attending: NURSE PRACTITIONER
Payer: COMMERCIAL

## 2022-11-11 DIAGNOSIS — E11.9 TYPE 2 DIABETES MELLITUS WITHOUT COMPLICATION: ICD-10-CM

## 2022-11-11 LAB
ESTIMATED AVG GLUCOSE: 114 MG/DL (ref 68–131)
HBA1C MFR BLD: 5.6 % (ref 4–5.6)

## 2022-11-11 PROCEDURE — 83036 HEMOGLOBIN GLYCOSYLATED A1C: CPT | Performed by: NURSE PRACTITIONER

## 2022-11-11 PROCEDURE — 36415 COLL VENOUS BLD VENIPUNCTURE: CPT | Performed by: NURSE PRACTITIONER

## 2022-12-05 ENCOUNTER — PES CALL (OUTPATIENT)
Dept: ADMINISTRATIVE | Facility: CLINIC | Age: 67
End: 2022-12-05
Payer: COMMERCIAL

## 2022-12-06 ENCOUNTER — OFFICE VISIT (OUTPATIENT)
Dept: INTERNAL MEDICINE | Facility: CLINIC | Age: 67
End: 2022-12-06
Payer: COMMERCIAL

## 2022-12-06 VITALS
DIASTOLIC BLOOD PRESSURE: 68 MMHG | HEIGHT: 66 IN | HEART RATE: 50 BPM | WEIGHT: 170.44 LBS | RESPIRATION RATE: 18 BRPM | OXYGEN SATURATION: 99 % | BODY MASS INDEX: 27.39 KG/M2 | SYSTOLIC BLOOD PRESSURE: 138 MMHG

## 2022-12-06 DIAGNOSIS — I10 ESSENTIAL HYPERTENSION: Primary | ICD-10-CM

## 2022-12-06 DIAGNOSIS — E11.9 DIABETES MELLITUS TYPE 2, DIET-CONTROLLED: ICD-10-CM

## 2022-12-06 DIAGNOSIS — E78.5 HYPERLIPIDEMIA, UNSPECIFIED HYPERLIPIDEMIA TYPE: ICD-10-CM

## 2022-12-06 DIAGNOSIS — I10 HYPERTENSION, UNSPECIFIED TYPE: ICD-10-CM

## 2022-12-06 DIAGNOSIS — Z12.31 ENCOUNTER FOR SCREENING MAMMOGRAM FOR BREAST CANCER: ICD-10-CM

## 2022-12-06 DIAGNOSIS — J01.80 ACUTE NON-RECURRENT SINUSITIS OF OTHER SINUS: ICD-10-CM

## 2022-12-06 PROCEDURE — 3008F BODY MASS INDEX DOCD: CPT | Mod: CPTII,S$GLB,, | Performed by: NURSE PRACTITIONER

## 2022-12-06 PROCEDURE — 3075F PR MOST RECENT SYSTOLIC BLOOD PRESS GE 130-139MM HG: ICD-10-PCS | Mod: CPTII,S$GLB,, | Performed by: NURSE PRACTITIONER

## 2022-12-06 PROCEDURE — 3078F DIAST BP <80 MM HG: CPT | Mod: CPTII,S$GLB,, | Performed by: NURSE PRACTITIONER

## 2022-12-06 PROCEDURE — 99999 PR PBB SHADOW E&M-EST. PATIENT-LVL III: ICD-10-PCS | Mod: PBBFAC,,, | Performed by: NURSE PRACTITIONER

## 2022-12-06 PROCEDURE — 3044F PR MOST RECENT HEMOGLOBIN A1C LEVEL <7.0%: ICD-10-PCS | Mod: CPTII,S$GLB,, | Performed by: NURSE PRACTITIONER

## 2022-12-06 PROCEDURE — 1125F PR PAIN SEVERITY QUANTIFIED, PAIN PRESENT: ICD-10-PCS | Mod: CPTII,S$GLB,, | Performed by: NURSE PRACTITIONER

## 2022-12-06 PROCEDURE — 1160F PR REVIEW ALL MEDS BY PRESCRIBER/CLIN PHARMACIST DOCUMENTED: ICD-10-PCS | Mod: CPTII,S$GLB,, | Performed by: NURSE PRACTITIONER

## 2022-12-06 PROCEDURE — 99214 PR OFFICE/OUTPT VISIT, EST, LEVL IV, 30-39 MIN: ICD-10-PCS | Mod: S$GLB,,, | Performed by: NURSE PRACTITIONER

## 2022-12-06 PROCEDURE — 1101F PR PT FALLS ASSESS DOC 0-1 FALLS W/OUT INJ PAST YR: ICD-10-PCS | Mod: CPTII,S$GLB,, | Performed by: NURSE PRACTITIONER

## 2022-12-06 PROCEDURE — 99214 OFFICE O/P EST MOD 30 MIN: CPT | Mod: S$GLB,,, | Performed by: NURSE PRACTITIONER

## 2022-12-06 PROCEDURE — 3044F HG A1C LEVEL LT 7.0%: CPT | Mod: CPTII,S$GLB,, | Performed by: NURSE PRACTITIONER

## 2022-12-06 PROCEDURE — 1159F PR MEDICATION LIST DOCUMENTED IN MEDICAL RECORD: ICD-10-PCS | Mod: CPTII,S$GLB,, | Performed by: NURSE PRACTITIONER

## 2022-12-06 PROCEDURE — 1160F RVW MEDS BY RX/DR IN RCRD: CPT | Mod: CPTII,S$GLB,, | Performed by: NURSE PRACTITIONER

## 2022-12-06 PROCEDURE — 3288F PR FALLS RISK ASSESSMENT DOCUMENTED: ICD-10-PCS | Mod: CPTII,S$GLB,, | Performed by: NURSE PRACTITIONER

## 2022-12-06 PROCEDURE — 1101F PT FALLS ASSESS-DOCD LE1/YR: CPT | Mod: CPTII,S$GLB,, | Performed by: NURSE PRACTITIONER

## 2022-12-06 PROCEDURE — 4010F PR ACE/ARB THEARPY RXD/TAKEN: ICD-10-PCS | Mod: CPTII,S$GLB,, | Performed by: NURSE PRACTITIONER

## 2022-12-06 PROCEDURE — 3075F SYST BP GE 130 - 139MM HG: CPT | Mod: CPTII,S$GLB,, | Performed by: NURSE PRACTITIONER

## 2022-12-06 PROCEDURE — 1159F MED LIST DOCD IN RCRD: CPT | Mod: CPTII,S$GLB,, | Performed by: NURSE PRACTITIONER

## 2022-12-06 PROCEDURE — 3078F PR MOST RECENT DIASTOLIC BLOOD PRESSURE < 80 MM HG: ICD-10-PCS | Mod: CPTII,S$GLB,, | Performed by: NURSE PRACTITIONER

## 2022-12-06 PROCEDURE — 99999 PR PBB SHADOW E&M-EST. PATIENT-LVL III: CPT | Mod: PBBFAC,,, | Performed by: NURSE PRACTITIONER

## 2022-12-06 PROCEDURE — 1125F AMNT PAIN NOTED PAIN PRSNT: CPT | Mod: CPTII,S$GLB,, | Performed by: NURSE PRACTITIONER

## 2022-12-06 PROCEDURE — 3008F PR BODY MASS INDEX (BMI) DOCUMENTED: ICD-10-PCS | Mod: CPTII,S$GLB,, | Performed by: NURSE PRACTITIONER

## 2022-12-06 PROCEDURE — 4010F ACE/ARB THERAPY RXD/TAKEN: CPT | Mod: CPTII,S$GLB,, | Performed by: NURSE PRACTITIONER

## 2022-12-06 PROCEDURE — 3288F FALL RISK ASSESSMENT DOCD: CPT | Mod: CPTII,S$GLB,, | Performed by: NURSE PRACTITIONER

## 2022-12-06 RX ORDER — LISINOPRIL AND HYDROCHLOROTHIAZIDE 12.5; 2 MG/1; MG/1
1 TABLET ORAL DAILY
Qty: 90 TABLET | Refills: 3 | Status: ON HOLD | OUTPATIENT
Start: 2022-12-06 | End: 2023-04-12

## 2022-12-06 RX ORDER — ATORVASTATIN CALCIUM 20 MG/1
20 TABLET, FILM COATED ORAL DAILY
Qty: 90 TABLET | Refills: 3 | Status: ON HOLD | OUTPATIENT
Start: 2022-12-06 | End: 2023-04-12

## 2022-12-06 RX ORDER — AZITHROMYCIN 250 MG/1
TABLET, FILM COATED ORAL
Qty: 6 TABLET | Refills: 0 | Status: SHIPPED | OUTPATIENT
Start: 2022-12-06 | End: 2023-03-06

## 2022-12-06 NOTE — PROGRESS NOTES
Subjective:       Patient ID: Nan Simms is a 67 y.o. female.    Chief Complaint: Follow-up    HPI: Pt presents to clinic today known to me for routine f/u. No mediation changes.   Lab Results   Component Value Date    HGBA1C 5.6 11/11/2022     She started with stuffy nose 2 days ago. Using mucinex and feels better.     She did have a1c prior to visit but no other labs. She does have bp elevated today but took bp med 2 hours prior to coming in which is late for her.     Review of Systems   Constitutional:  Negative for chills, fatigue, fever and unexpected weight change.   HENT:  Negative for congestion, ear pain, sore throat and trouble swallowing.    Eyes:  Negative for pain and visual disturbance.   Respiratory:  Negative for cough, chest tightness and shortness of breath.    Cardiovascular:  Negative for chest pain, palpitations and leg swelling.   Gastrointestinal:  Negative for abdominal distention, abdominal pain, constipation, diarrhea and vomiting.   Genitourinary:  Negative for difficulty urinating, dysuria, flank pain, frequency and hematuria.   Musculoskeletal:  Negative for back pain, gait problem, joint swelling, neck pain and neck stiffness.   Skin:  Negative for rash and wound.   Neurological:  Negative for dizziness, seizures, speech difficulty, light-headedness and headaches.     Objective:      Physical Exam  Vitals and nursing note reviewed.   Constitutional:       Appearance: Normal appearance. She is well-developed.   HENT:      Head: Normocephalic and atraumatic.      Right Ear: External ear normal.      Left Ear: External ear normal.      Nose: Nose normal.   Eyes:      Conjunctiva/sclera: Conjunctivae normal.      Pupils: Pupils are equal, round, and reactive to light.   Cardiovascular:      Rate and Rhythm: Regular rhythm. Bradycardia present.      Heart sounds: Normal heart sounds. No murmur heard.  Pulmonary:      Effort: Pulmonary effort is normal.      Breath sounds: Normal breath  sounds.   Abdominal:      General: Bowel sounds are normal.      Palpations: Abdomen is soft.   Musculoskeletal:         General: No swelling. Normal range of motion.      Cervical back: Normal range of motion and neck supple.   Skin:     General: Skin is warm and dry.      Coloration: Skin is not jaundiced.   Neurological:      Mental Status: She is alert and oriented to person, place, and time.   Psychiatric:         Behavior: Behavior normal.         Thought Content: Thought content normal.         Judgment: Judgment normal.       Assessment:       1. Essential hypertension    2. Hyperlipidemia, unspecified hyperlipidemia type    3. Diabetes mellitus type 2, diet-controlled    4. Hypertension, unspecified type        Plan:     Problem List Items Addressed This Visit       Essential hypertension - Primary- well controlled     Hyperlipidemia    Relevant Medications    atorvastatin (LIPITOR) 20 MG tablet    Diabetes mellitus type 2, diet-controlled- diet controlled      Other Visit Diagnoses       Hypertension, unspecified type        Relevant Medications    lisinopriL-hydrochlorothiazide (PRINZIDE,ZESTORETIC) 20-12.5 mg per tablet            Full panel labs 6 months   Mammo UTD due in Jan 1/2021 negative cologaurd

## 2023-01-17 ENCOUNTER — TELEPHONE (OUTPATIENT)
Dept: INTERNAL MEDICINE | Facility: CLINIC | Age: 68
End: 2023-01-17
Payer: MEDICARE

## 2023-01-17 ENCOUNTER — HOSPITAL ENCOUNTER (OUTPATIENT)
Dept: RADIOLOGY | Facility: HOSPITAL | Age: 68
Discharge: HOME | End: 2023-01-17
Attending: NURSE PRACTITIONER
Payer: MEDICARE

## 2023-01-17 VITALS — BODY MASS INDEX: 27.32 KG/M2 | WEIGHT: 170 LBS | HEIGHT: 66 IN

## 2023-01-17 DIAGNOSIS — Z12.31 ENCOUNTER FOR SCREENING MAMMOGRAM FOR BREAST CANCER: ICD-10-CM

## 2023-01-17 PROCEDURE — 77063 BREAST TOMOSYNTHESIS BI: CPT | Mod: 26,,, | Performed by: RADIOLOGY

## 2023-01-17 PROCEDURE — 77067 SCR MAMMO BI INCL CAD: CPT | Mod: 26,,, | Performed by: RADIOLOGY

## 2023-01-17 PROCEDURE — 77067 SCR MAMMO BI INCL CAD: CPT | Mod: TC

## 2023-01-17 PROCEDURE — 77067 MAMMO DIGITAL SCREENING BILAT WITH TOMO: ICD-10-PCS | Mod: 26,,, | Performed by: RADIOLOGY

## 2023-01-17 PROCEDURE — 77063 MAMMO DIGITAL SCREENING BILAT WITH TOMO: ICD-10-PCS | Mod: 26,,, | Performed by: RADIOLOGY

## 2023-01-17 NOTE — TELEPHONE ENCOUNTER
Advised patient to go to Urgent Care if she is not feeling well and thinks she needs medication. Since we do not have any available appointments and she would need to be seen to be treated correctly.

## 2023-01-17 NOTE — TELEPHONE ENCOUNTER
----- Message from Meredith Cardoza MA sent at 2023  1:33 PM CST -----  Contact: Self  Nan Simms  MRN: [id number]  : 1955  PCP: Payal Moreland  Home Phone      [phone]  Work Phone      Not on file.  Mobile          [phone]      MESSAGE:   Patient has a bad cold and would like to know if antibiotics can be sent in    PHONE:  [phone]    PHARMACY: [facility]

## 2023-02-24 ENCOUNTER — HOSPITAL ENCOUNTER (EMERGENCY)
Facility: HOSPITAL | Age: 68
Discharge: HOME | End: 2023-02-24
Attending: STUDENT IN AN ORGANIZED HEALTH CARE EDUCATION/TRAINING PROGRAM
Payer: MEDICARE

## 2023-02-24 VITALS
DIASTOLIC BLOOD PRESSURE: 88 MMHG | RESPIRATION RATE: 18 BRPM | TEMPERATURE: 99 F | BODY MASS INDEX: 26.85 KG/M2 | OXYGEN SATURATION: 100 % | WEIGHT: 166.31 LBS | HEART RATE: 66 BPM | SYSTOLIC BLOOD PRESSURE: 186 MMHG

## 2023-02-24 DIAGNOSIS — N39.0 URINARY TRACT INFECTION WITHOUT HEMATURIA, SITE UNSPECIFIED: ICD-10-CM

## 2023-02-24 DIAGNOSIS — R10.9 ABDOMINAL PAIN, UNSPECIFIED ABDOMINAL LOCATION: Primary | ICD-10-CM

## 2023-02-24 LAB
BACTERIA #/AREA URNS HPF: ABNORMAL /HPF
BILIRUB UR QL STRIP: NEGATIVE
CLARITY UR: CLEAR
COLOR UR: YELLOW
GLUCOSE UR QL STRIP: NEGATIVE
HGB UR QL STRIP: ABNORMAL
KETONES UR QL STRIP: NEGATIVE
LEUKOCYTE ESTERASE UR QL STRIP: ABNORMAL
MICROSCOPIC COMMENT: ABNORMAL
NITRITE UR QL STRIP: NEGATIVE
OTHER ELEMENTS URNS MICRO: ABNORMAL
PH UR STRIP: 7 [PH] (ref 5–8)
PROT UR QL STRIP: NEGATIVE
RBC #/AREA URNS HPF: 7 /HPF (ref 0–4)
SP GR UR STRIP: 1.02 (ref 1–1.03)
SQUAMOUS #/AREA URNS HPF: 13 /HPF
URN SPEC COLLECT METH UR: ABNORMAL
UROBILINOGEN UR STRIP-ACNC: ABNORMAL EU/DL
WBC #/AREA URNS HPF: 55 /HPF (ref 0–5)
WBC CLUMPS URNS QL MICRO: ABNORMAL

## 2023-02-24 PROCEDURE — 87088 URINE BACTERIA CULTURE: CPT | Performed by: STUDENT IN AN ORGANIZED HEALTH CARE EDUCATION/TRAINING PROGRAM

## 2023-02-24 PROCEDURE — 87077 CULTURE AEROBIC IDENTIFY: CPT | Performed by: STUDENT IN AN ORGANIZED HEALTH CARE EDUCATION/TRAINING PROGRAM

## 2023-02-24 PROCEDURE — 25000003 PHARM REV CODE 250: Performed by: STUDENT IN AN ORGANIZED HEALTH CARE EDUCATION/TRAINING PROGRAM

## 2023-02-24 PROCEDURE — 87186 SC STD MICRODIL/AGAR DIL: CPT | Performed by: STUDENT IN AN ORGANIZED HEALTH CARE EDUCATION/TRAINING PROGRAM

## 2023-02-24 PROCEDURE — 87086 URINE CULTURE/COLONY COUNT: CPT | Performed by: STUDENT IN AN ORGANIZED HEALTH CARE EDUCATION/TRAINING PROGRAM

## 2023-02-24 PROCEDURE — 99283 EMERGENCY DEPT VISIT LOW MDM: CPT

## 2023-02-24 PROCEDURE — 81000 URINALYSIS NONAUTO W/SCOPE: CPT | Performed by: STUDENT IN AN ORGANIZED HEALTH CARE EDUCATION/TRAINING PROGRAM

## 2023-02-24 RX ORDER — MAG HYDROX/ALUMINUM HYD/SIMETH 200-200-20
30 SUSPENSION, ORAL (FINAL DOSE FORM) ORAL
Qty: 300 ML | Refills: 0 | Status: ON HOLD | OUTPATIENT
Start: 2023-02-24 | End: 2023-04-12

## 2023-02-24 RX ORDER — SULFAMETHOXAZOLE AND TRIMETHOPRIM 800; 160 MG/1; MG/1
1 TABLET ORAL 2 TIMES DAILY
Qty: 14 TABLET | Refills: 0 | Status: SHIPPED | OUTPATIENT
Start: 2023-02-24 | End: 2023-02-24 | Stop reason: SDUPTHER

## 2023-02-24 RX ORDER — MAG HYDROX/ALUMINUM HYD/SIMETH 200-200-20
30 SUSPENSION, ORAL (FINAL DOSE FORM) ORAL
Qty: 300 ML | Refills: 0 | Status: SHIPPED | OUTPATIENT
Start: 2023-02-24 | End: 2023-02-24 | Stop reason: SDUPTHER

## 2023-02-24 RX ORDER — SULFAMETHOXAZOLE AND TRIMETHOPRIM 800; 160 MG/1; MG/1
1 TABLET ORAL 2 TIMES DAILY
Qty: 14 TABLET | Refills: 0 | Status: SHIPPED | OUTPATIENT
Start: 2023-02-24 | End: 2023-03-03

## 2023-02-24 RX ORDER — SULFAMETHOXAZOLE AND TRIMETHOPRIM 800; 160 MG/1; MG/1
1 TABLET ORAL
Status: COMPLETED | OUTPATIENT
Start: 2023-02-24 | End: 2023-02-24

## 2023-02-24 RX ORDER — MAG HYDROX/ALUMINUM HYD/SIMETH 200-200-20
30 SUSPENSION, ORAL (FINAL DOSE FORM) ORAL ONCE
Status: COMPLETED | OUTPATIENT
Start: 2023-02-24 | End: 2023-02-24

## 2023-02-24 RX ORDER — LIDOCAINE HYDROCHLORIDE 20 MG/ML
15 SOLUTION OROPHARYNGEAL ONCE
Status: COMPLETED | OUTPATIENT
Start: 2023-02-24 | End: 2023-02-24

## 2023-02-24 RX ADMIN — SULFAMETHOXAZOLE AND TRIMETHOPRIM 1 TABLET: 800; 160 TABLET ORAL at 05:02

## 2023-02-24 RX ADMIN — LIDOCAINE HYDROCHLORIDE 15 ML: 20 SOLUTION ORAL at 04:02

## 2023-02-24 RX ADMIN — ALUMINUM HYDROXIDE, MAGNESIUM HYDROXIDE, AND DIMETHICONE 30 ML: 200; 20; 200 SUSPENSION ORAL at 04:02

## 2023-02-24 NOTE — ED PROVIDER NOTES
Encounter Date: 2/24/2023       History     Chief Complaint   Patient presents with    Abdominal Pain     Pt c/o abdominal pain that started 3-4 days ago.     67-year-old female with history of hypertension presenting with 3/10 diffuse abdominal pain that began three days ago.  Denies any nausea, vomiting, diarrhea.  Reports pain comes and goes.  No back pain or dysuria.  No fever, chest pain, shortness of breath.  Patient reports a decreased appetite, however then states that she ate a meal of fried fish today, and was hungry afterwards.  She states the pain is worse when she has a belching sensation.    Review of patient's allergies indicates:   Allergen Reactions    Pcn [penicillins]      Childhood      Past Medical History:   Diagnosis Date    Abnormal Pap smear of cervix     Arthritis     Hypertension     Hyperthyroidism      Past Surgical History:   Procedure Laterality Date    CATARACT EXTRACTION W/  INTRAOCULAR LENS IMPLANT Right 8/8/2019    Procedure: EXTRACTION, CATARACT, WITH IOL INSERTION;  Surgeon: Spenser Lee MD;  Location: [facility] OR;  Service: Ophthalmology;  Laterality: Right;    EYE SURGERY      HYSTERECTOMY      OOPHORECTOMY      PHACOEMULSIFICATION OF CATARACT Right 8/8/2019    Procedure: PHACOEMULSIFICATION, CATARACT;  Surgeon: Spenser Lee MD;  Location: [facility] OR;  Service: Ophthalmology;  Laterality: Right;     Family History   Problem Relation Age of Onset    Cancer Mother     Breast cancer Neg Hx     Colon cancer Neg Hx     Ovarian cancer Neg Hx      Social History     Tobacco Use    Smoking status: Every Day     Packs/day: 0.50     Years: 30.00     Pack years: 15.00     Types: Cigarettes    Smokeless tobacco: Current   Substance Use Topics    Alcohol use: Yes     Alcohol/week: 1.0 standard drink     Types: 1 Cans of beer per week    Drug use: No     Review of Systems   Constitutional:  Negative for fever.   HENT:  Negative for sore throat.    Respiratory:  Negative for  shortness of breath.    Cardiovascular:  Negative for chest pain.   Gastrointestinal:  Positive for abdominal pain. Negative for diarrhea, nausea and vomiting.   Genitourinary:  Negative for dysuria.   Musculoskeletal:  Negative for back pain.   Skin:  Negative for rash.   Neurological:  Negative for weakness.   Hematological:  Does not bruise/bleed easily.     Physical Exam     Initial Vitals [02/24/23 1640]   BP Pulse Resp Temp SpO2   (!) 186/88 66 18 98.6 °F (37 °C) 100 %      MAP       --         Physical Exam    Nursing note and vitals reviewed.  Constitutional: She appears well-developed.   HENT:   Head: Normocephalic.   Eyes: Pupils are equal, round, and reactive to light.   Neck:   Normal range of motion.  Cardiovascular:            No murmur heard.  Pulmonary/Chest: No respiratory distress.   Abdominal: Abdomen is soft.   No TTP diffusely. No guarding, rebound, or masses. No CVAT bilaterally.     Musculoskeletal:         General: No edema.      Cervical back: Normal range of motion.     Neurological: She is alert.   Skin: Skin is warm.   Psychiatric: She has a normal mood and affect.       ED Course   Procedures  Labs Reviewed   URINALYSIS, REFLEX TO URINE CULTURE          Imaging Results    None          Medications   aluminum-magnesium hydroxide-simethicone 200-200-20 mg/5 mL suspension 30 mL (has no administration in time range)     And   LIDOcaine HCl 2% oral solution 15 mL (has no administration in time range)     Medical Decision Making:   Differential Diagnosis:   DDX: Unlikely acute abdominal pathology such as appendicitis/cholecystitis given benign abdomen. Do not suspect pancreatitis as no epigastric TTP. Do not suspect UTI as no dysuria, hematuria, or suprapubic TTP. Possible GERD/gastritis vs. AGE given history, benign abdomen.  DX: UA. Will treat symptomatically at this time and reassess with serial abdominal exams. If patient not improved or worsened, will consider labwork consisting of BMP,  CBC, LFT, lipase. Consider CT A/P if change in abdominal exam to assess for early appendicitis. Consider ultrasound if change in abdominal exam to assess for cholecystitis.  TX: Analgesia PRN. Antiemetic PRN. Treatment/consult as indicated by clinical status and studies if performed.  Dispo: If studies WNL, symptoms controlled, tolerating PO, discharge to follow up with primary doctor within 2 days with recommendations for supportive care at home and strict precautions for return.                          Clinical Impression:   Final diagnoses:  [R10.9] Abdominal pain, unspecified abdominal location (Primary)               Spenser Rothman MD  02/24/23 2622

## 2023-02-26 LAB — BACTERIA UR CULT: ABNORMAL

## 2023-02-27 ENCOUNTER — TELEPHONE (OUTPATIENT)
Dept: INTERNAL MEDICINE | Facility: CLINIC | Age: 68
End: 2023-02-27
Payer: MEDICARE

## 2023-02-27 NOTE — TELEPHONE ENCOUNTER
I called patient to give her  message from GRANT Moe;   They gave her bactrim which should cover the bacteria that her urine grew out. We can f/u up for stomach pain next week. Patient voiced understanding.

## 2023-02-27 NOTE — TELEPHONE ENCOUNTER
----- Message from Danita Faulkner sent at 2023 10:19 AM CST -----  Contact: pt  Nan Simms  MRN: [id number]  : 1955  PCP: Payal Moreland  Home Phone      [phone]  Work Phone      Not on file.  Mobile          [phone]      MESSAGE:     Pt was at the er 23 and would like a f/u appt she went for stomach pains and a UTI.       Please advise  [phone]

## 2023-02-27 NOTE — TELEPHONE ENCOUNTER
----- Message from Payal Moreland NP sent at 2023  1:30 PM CST -----  Contact: pt  They gave her bactrim which should cover the bacteria that her urine grew out. We can f/u samira up for stomach pain next week   ----- Message -----  From: Nesha Baeza LPN  Sent: 2023  11:20 AM CST  To: Payal Moreland NP    Please advise.  ----- Message -----  From: Danita Faulkner  Sent: 2023  10:20 AM CST  To: Merly MA Staff    Nan Simms  MRN: [id number]  : 1955  PCP: Payal Moreland  Home Phone      [phone]  Work Phone      Not on file.  Mobile          [phone]      MESSAGE:     Pt was at the er 23 and would like a f/u appt she went for stomach pains and a UTI.       Please advise  [phone]

## 2023-02-27 NOTE — TELEPHONE ENCOUNTER
I called patient back from the message she left:Pt was at the er 2/24/23 and would like a f/u appt she went for stomach pains and a UTI.I told patient there are no providers here at this time, but I will send her message to [facility] Moe. Patient voiced understanding.

## 2023-03-03 ENCOUNTER — HOSPITAL ENCOUNTER (EMERGENCY)
Facility: HOSPITAL | Age: 68
Discharge: HOME | End: 2023-03-03
Attending: SURGERY
Payer: MEDICARE

## 2023-03-03 VITALS
BODY MASS INDEX: 26.69 KG/M2 | TEMPERATURE: 97 F | OXYGEN SATURATION: 99 % | WEIGHT: 165.38 LBS | RESPIRATION RATE: 18 BRPM | HEART RATE: 52 BPM | SYSTOLIC BLOOD PRESSURE: 171 MMHG | DIASTOLIC BLOOD PRESSURE: 71 MMHG

## 2023-03-03 DIAGNOSIS — K59.00 CONSTIPATION: Primary | ICD-10-CM

## 2023-03-03 PROCEDURE — 99283 EMERGENCY DEPT VISIT LOW MDM: CPT

## 2023-03-03 PROCEDURE — 25000003 PHARM REV CODE 250: Performed by: SURGERY

## 2023-03-03 RX ORDER — POLYETHYLENE GLYCOL 3350 17 G/17G
17 POWDER, FOR SOLUTION ORAL DAILY
Qty: 3 PACKET | Refills: 0 | Status: SHIPPED | OUTPATIENT
Start: 2023-03-03 | End: 2023-03-03 | Stop reason: ALTCHOICE

## 2023-03-03 RX ORDER — POLYETHYLENE GLYCOL 3350 17 G/17G
17 POWDER, FOR SOLUTION ORAL DAILY
Qty: 3 PACKET | Refills: 0 | Status: ON HOLD | OUTPATIENT
Start: 2023-03-03 | End: 2023-04-12

## 2023-03-03 RX ORDER — MAG HYDROX/ALUMINUM HYD/SIMETH 200-200-20
30 SUSPENSION, ORAL (FINAL DOSE FORM) ORAL ONCE
Status: COMPLETED | OUTPATIENT
Start: 2023-03-03 | End: 2023-03-03

## 2023-03-03 RX ORDER — MAG HYDROX/ALUMINUM HYD/SIMETH 200-200-20
30 SUSPENSION, ORAL (FINAL DOSE FORM) ORAL ONCE
Status: DISCONTINUED | OUTPATIENT
Start: 2023-03-03 | End: 2023-03-03

## 2023-03-03 RX ORDER — LIDOCAINE HYDROCHLORIDE 20 MG/ML
15 SOLUTION OROPHARYNGEAL ONCE
Status: COMPLETED | OUTPATIENT
Start: 2023-03-03 | End: 2023-03-03

## 2023-03-03 RX ORDER — LIDOCAINE HYDROCHLORIDE 20 MG/ML
15 SOLUTION OROPHARYNGEAL ONCE
Status: DISCONTINUED | OUTPATIENT
Start: 2023-03-03 | End: 2023-03-03

## 2023-03-03 RX ADMIN — ALUMINUM HYDROXIDE, MAGNESIUM HYDROXIDE, AND DIMETHICONE 30 ML: 200; 20; 200 SUSPENSION ORAL at 04:03

## 2023-03-03 RX ADMIN — LIDOCAINE HYDROCHLORIDE 15 ML: 20 SOLUTION ORAL at 04:03

## 2023-03-03 NOTE — DISCHARGE INSTRUCTIONS
Take MiraLax as directed for constipation   Please drink plenty of clear fluids   Please follow-up with primary care doctor on Monday

## 2023-03-03 NOTE — ED TRIAGE NOTES
67 y.o. female presents to ER ED 02/ED 02A   Chief Complaint   Patient presents with    Bloated   .   C/o abd bloating for a few days, last BM Tuesday  Denies n/v/d

## 2023-03-03 NOTE — ED PROVIDER NOTES
Encounter Date: 3/3/2023       History     Chief Complaint   Patient presents with    Bloated     Chief complaint:  Bloating   59-year-old female with a history of arthritis hypertension hypothyroid presents to be evaluated for intermittent bloating and constipation for the last 4 days.  Patient states symptoms began shortly after eating crawfish she is been having some gastric irritation and has been feeling bloated.  She reports she was also been constipated.  Reports she did take an enema which did afford her a good bowel movement.  Denies any abdominal pain denies any diarrhea denies any nausea or vomiting.  She also reports diminished appetite since symptoms began. She is not taking any medications for symptoms    Review of patient's allergies indicates:   Allergen Reactions    Pcn [penicillins]      Childhood      Past Medical History:   Diagnosis Date    Abnormal Pap smear of cervix     Arthritis     Hypertension     Hyperthyroidism      Past Surgical History:   Procedure Laterality Date    CATARACT EXTRACTION W/  INTRAOCULAR LENS IMPLANT Right 8/8/2019    Procedure: EXTRACTION, CATARACT, WITH IOL INSERTION;  Surgeon: Spenser Lee MD;  Location: [facility];  Service: Ophthalmology;  Laterality: Right;    EYE SURGERY      HYSTERECTOMY      OOPHORECTOMY      PHACOEMULSIFICATION OF CATARACT Right 8/8/2019    Procedure: PHACOEMULSIFICATION, CATARACT;  Surgeon: Spenser Lee MD;  Location: [facility] OR;  Service: Ophthalmology;  Laterality: Right;     Family History   Problem Relation Age of Onset    Cancer Mother     Breast cancer Neg Hx     Colon cancer Neg Hx     Ovarian cancer Neg Hx      Social History     Tobacco Use    Smoking status: Every Day     Packs/day: 0.50     Years: 30.00     Pack years: 15.00     Types: Cigarettes    Smokeless tobacco: Current   Substance Use Topics    Alcohol use: Yes     Alcohol/week: 1.0 standard drink     Types: 1 Cans of beer per week    Drug use: No     Review of  Systems   Constitutional:  Positive for appetite change. Negative for fever.   Respiratory:  Negative for chest tightness and shortness of breath.    Cardiovascular:  Negative for chest pain.   Gastrointestinal:  Positive for abdominal distention and constipation. Negative for abdominal pain, diarrhea, nausea and vomiting.     Physical Exam     Initial Vitals   BP Pulse Resp Temp SpO2   03/03/23 1628 03/03/23 1627 03/03/23 1627 03/03/23 1627 03/03/23 1627   (!) 171/71 (!) 52 18 97.1 °F (36.2 °C) 99 %      MAP       --                Physical Exam    Nursing note and vitals reviewed.  Constitutional: She appears well-developed and well-nourished.   HENT:   Head: Normocephalic and atraumatic.   Cardiovascular:  Normal rate.     Exam reveals no gallop and no friction rub.       No murmur heard.  Pulmonary/Chest: Breath sounds normal. She has no wheezes. She has no rhonchi. She has no rales.   Abdominal: Abdomen is soft. She exhibits no distension and no mass. There is no abdominal tenderness. There is no rebound and no guarding.     Skin: Skin is warm and dry.       ED Course   Procedures  Labs Reviewed - No data to display       Imaging Results              X-Ray Abdomen Flat And Erect (Final result)  Result time 03/03/23 17:00:47      Final result by Rey Vallejo MD (03/03/23 17:00:47)                   Impression:      No acute abdominal process.      Electronically signed by: Rey Vallejo MD  Date:    03/03/2023  Time:    17:00               Narrative:    EXAMINATION:  XR ABDOMEN FLAT AND ERECT    CLINICAL HISTORY:  Constipation, unspecified    TECHNIQUE:  Flat and erect AP views of the abdomen were performed.    COMPARISON:  None    FINDINGS:  The bowel gas pattern is nonobstructed.  Stool volume is low to moderate although a moderate rectal stool ball is noted.  Right upper quadrant calcifications are unchanged.  There is no free air.  The lung bases are clear.  The bones are intact.                                        Medications   aluminum-magnesium hydroxide-simethicone 200-200-20 mg/5 mL suspension 30 mL (30 mLs Oral Given 3/3/23 1644)     And   LIDOcaine HCl 2% oral solution 15 mL (15 mLs Oral Given 3/3/23 1644)     Medical Decision Making:   Differential Diagnosis:   Constipation, bowel obstruction,   Clinical Tests:   Lab Tests: Reviewed  ED Management:  Patient with benign abdominal exam in ED today  Patient had a flat and erect done in ED which showed nonobstructive bowel gas pattern as well as moderate stool ball   Patient was given GI cocktail in ED which did improve some symptoms   No acute process today in ED stable for discharge   Will DC on medication for constipation  Instructed to follow-up with primary care and given return precautions                          Clinical Impression:   Final diagnoses:  [K59.00] Constipation (Primary)        ED Disposition Condition    Discharge Stable          ED Prescriptions       Medication Sig Dispense Start Date End Date Auth. Provider    polyethylene glycol (GLYCOLAX) 17 gram PwPk  (Status: Discontinued) Take 17 g by mouth once daily. 3 packet 3/3/2023 3/3/2023 Prtety Storm NP    polyethylene glycol (GLYCOLAX) 17 gram PwPk Take 17 g by mouth once daily. 3 packet 3/3/2023 -- Pretty Storm NP          Follow-up Information    None          Pretty Storm NP  03/03/23 1935

## 2023-03-06 ENCOUNTER — OFFICE VISIT (OUTPATIENT)
Dept: INTERNAL MEDICINE | Facility: CLINIC | Age: 68
End: 2023-03-06
Payer: MEDICARE

## 2023-03-06 VITALS
RESPIRATION RATE: 16 BRPM | HEART RATE: 45 BPM | SYSTOLIC BLOOD PRESSURE: 138 MMHG | DIASTOLIC BLOOD PRESSURE: 50 MMHG | BODY MASS INDEX: 26.05 KG/M2 | HEIGHT: 66 IN | OXYGEN SATURATION: 100 % | WEIGHT: 162.06 LBS

## 2023-03-06 DIAGNOSIS — K59.00 CONSTIPATION, UNSPECIFIED CONSTIPATION TYPE: ICD-10-CM

## 2023-03-06 DIAGNOSIS — K21.9 GASTROESOPHAGEAL REFLUX DISEASE, UNSPECIFIED WHETHER ESOPHAGITIS PRESENT: ICD-10-CM

## 2023-03-06 DIAGNOSIS — R63.4 WEIGHT LOSS: ICD-10-CM

## 2023-03-06 DIAGNOSIS — J41.0 SIMPLE CHRONIC BRONCHITIS: ICD-10-CM

## 2023-03-06 DIAGNOSIS — R30.0 DYSURIA: Primary | ICD-10-CM

## 2023-03-06 DIAGNOSIS — R10.9 ABDOMINAL PAIN, UNSPECIFIED ABDOMINAL LOCATION: ICD-10-CM

## 2023-03-06 LAB
AMORPH CRY URNS QL MICRO: ABNORMAL
BACTERIA #/AREA URNS HPF: ABNORMAL /HPF
BILIRUB SERPL-MCNC: NORMAL MG/DL
BILIRUB UR QL STRIP: NEGATIVE
BLOOD URINE, POC: NORMAL
CLARITY UR: CLEAR
CLARITY, POC UA: NORMAL
COLOR UR: YELLOW
COLOR, POC UA: NORMAL
GLUCOSE UR QL STRIP: NEGATIVE
GLUCOSE UR QL STRIP: NORMAL
HGB UR QL STRIP: NEGATIVE
KETONES UR QL STRIP: NEGATIVE
KETONES UR QL STRIP: NORMAL
LEUKOCYTE ESTERASE UR QL STRIP: ABNORMAL
LEUKOCYTE ESTERASE URINE, POC: NORMAL
MICROSCOPIC COMMENT: ABNORMAL
NITRITE UR QL STRIP: NEGATIVE
NITRITE, POC UA: NORMAL
OTHER ELEMENTS URNS MICRO: ABNORMAL
PH UR STRIP: 7 [PH] (ref 5–8)
PH, POC UA: 7
PROT UR QL STRIP: ABNORMAL
PROTEIN, POC: NORMAL
SP GR UR STRIP: 1.02 (ref 1–1.03)
SPECIFIC GRAVITY, POC UA: 1.01
SQUAMOUS #/AREA URNS HPF: 8 /HPF
URN SPEC COLLECT METH UR: ABNORMAL
UROBILINOGEN UR STRIP-ACNC: 1 EU/DL
UROBILINOGEN, POC UA: NORMAL
WBC #/AREA URNS HPF: 20 /HPF (ref 0–5)

## 2023-03-06 PROCEDURE — 3075F SYST BP GE 130 - 139MM HG: CPT | Mod: CPTII,S$GLB,, | Performed by: NURSE PRACTITIONER

## 2023-03-06 PROCEDURE — 3075F PR MOST RECENT SYSTOLIC BLOOD PRESS GE 130-139MM HG: ICD-10-PCS | Mod: CPTII,S$GLB,, | Performed by: NURSE PRACTITIONER

## 2023-03-06 PROCEDURE — 99214 PR OFFICE/OUTPT VISIT, EST, LEVL IV, 30-39 MIN: ICD-10-PCS | Mod: S$GLB,,, | Performed by: NURSE PRACTITIONER

## 2023-03-06 PROCEDURE — 1126F PR PAIN SEVERITY QUANTIFIED, NO PAIN PRESENT: ICD-10-PCS | Mod: CPTII,S$GLB,, | Performed by: NURSE PRACTITIONER

## 2023-03-06 PROCEDURE — 3008F PR BODY MASS INDEX (BMI) DOCUMENTED: ICD-10-PCS | Mod: CPTII,S$GLB,, | Performed by: NURSE PRACTITIONER

## 2023-03-06 PROCEDURE — 99999 PR PBB SHADOW E&M-EST. PATIENT-LVL III: CPT | Mod: PBBFAC,,, | Performed by: NURSE PRACTITIONER

## 2023-03-06 PROCEDURE — 81002 POCT URINE DIPSTICK WITHOUT MICROSCOPE: ICD-10-PCS | Mod: S$GLB,,, | Performed by: NURSE PRACTITIONER

## 2023-03-06 PROCEDURE — 3008F BODY MASS INDEX DOCD: CPT | Mod: CPTII,S$GLB,, | Performed by: NURSE PRACTITIONER

## 2023-03-06 PROCEDURE — 87086 URINE CULTURE/COLONY COUNT: CPT | Performed by: NURSE PRACTITIONER

## 2023-03-06 PROCEDURE — 81002 URINALYSIS NONAUTO W/O SCOPE: CPT | Mod: S$GLB,,, | Performed by: NURSE PRACTITIONER

## 2023-03-06 PROCEDURE — 3288F FALL RISK ASSESSMENT DOCD: CPT | Mod: CPTII,S$GLB,, | Performed by: NURSE PRACTITIONER

## 2023-03-06 PROCEDURE — 99214 OFFICE O/P EST MOD 30 MIN: CPT | Mod: S$GLB,,, | Performed by: NURSE PRACTITIONER

## 2023-03-06 PROCEDURE — 81000 URINALYSIS NONAUTO W/SCOPE: CPT | Performed by: NURSE PRACTITIONER

## 2023-03-06 PROCEDURE — 1159F PR MEDICATION LIST DOCUMENTED IN MEDICAL RECORD: ICD-10-PCS | Mod: CPTII,S$GLB,, | Performed by: NURSE PRACTITIONER

## 2023-03-06 PROCEDURE — 3288F PR FALLS RISK ASSESSMENT DOCUMENTED: ICD-10-PCS | Mod: CPTII,S$GLB,, | Performed by: NURSE PRACTITIONER

## 2023-03-06 PROCEDURE — 1126F AMNT PAIN NOTED NONE PRSNT: CPT | Mod: CPTII,S$GLB,, | Performed by: NURSE PRACTITIONER

## 2023-03-06 PROCEDURE — 1101F PR PT FALLS ASSESS DOC 0-1 FALLS W/OUT INJ PAST YR: ICD-10-PCS | Mod: CPTII,S$GLB,, | Performed by: NURSE PRACTITIONER

## 2023-03-06 PROCEDURE — 3078F PR MOST RECENT DIASTOLIC BLOOD PRESSURE < 80 MM HG: ICD-10-PCS | Mod: CPTII,S$GLB,, | Performed by: NURSE PRACTITIONER

## 2023-03-06 PROCEDURE — 99999 PR PBB SHADOW E&M-EST. PATIENT-LVL III: ICD-10-PCS | Mod: PBBFAC,,, | Performed by: NURSE PRACTITIONER

## 2023-03-06 PROCEDURE — 1101F PT FALLS ASSESS-DOCD LE1/YR: CPT | Mod: CPTII,S$GLB,, | Performed by: NURSE PRACTITIONER

## 2023-03-06 PROCEDURE — 3078F DIAST BP <80 MM HG: CPT | Mod: CPTII,S$GLB,, | Performed by: NURSE PRACTITIONER

## 2023-03-06 PROCEDURE — 1159F MED LIST DOCD IN RCRD: CPT | Mod: CPTII,S$GLB,, | Performed by: NURSE PRACTITIONER

## 2023-03-06 RX ORDER — OMEPRAZOLE 40 MG/1
40 CAPSULE, DELAYED RELEASE ORAL DAILY
Qty: 30 CAPSULE | Refills: 1 | Status: ON HOLD | OUTPATIENT
Start: 2023-03-06 | End: 2023-03-24 | Stop reason: HOSPADM

## 2023-03-06 RX ORDER — DOCUSATE SODIUM 100 MG/1
100 CAPSULE, LIQUID FILLED ORAL DAILY
Qty: 30 CAPSULE | Refills: 1 | Status: ON HOLD | OUTPATIENT
Start: 2023-03-06 | End: 2023-04-12

## 2023-03-06 NOTE — PROGRESS NOTES
Subjective:       Patient ID: Nan Simms is a 67 y.o. female.    Chief Complaint: No chief complaint on file.    HPI: Pt presents to clinic today known to me for hospital f/u. She was there for abd pain. Dx with UTI and rx bactrim. She completed the rx today and leuks in office 2+. She then went back for abd pain and dx with constipation. She reports that she has mid abd pain. She feels like she belching sour. She feels like her stomach is hungry but she has not appetite. She has lost weight feels it in her clothes. She was 175 one year ago in clinic and has lost  couple pounds every visit but in last 3 month has lost 7 #. She reports she has vomited a couple times. She reports that she vomits food. She has never had colonoscopy or EGD. She reports that she has done stool test. She has been given miralax and did have a complete BM over the weekend. She report sthat that is new in last couple weeks. NO fever.+night swat but reports that she has always had that .     Cologuard 2021 negative   Review of Systems   Constitutional:  Positive for unexpected weight change. Negative for chills, fatigue and fever.   HENT:  Negative for congestion, ear pain, sore throat and trouble swallowing.    Eyes:  Negative for pain and visual disturbance.   Respiratory:  Negative for cough, chest tightness and shortness of breath.    Cardiovascular:  Negative for chest pain, palpitations and leg swelling.   Gastrointestinal:  Positive for abdominal pain, constipation, nausea and vomiting. Negative for abdominal distention, blood in stool and diarrhea.   Genitourinary:  Negative for difficulty urinating, dysuria, flank pain, frequency and hematuria.   Musculoskeletal:  Negative for back pain, gait problem, joint swelling, neck pain and neck stiffness.   Skin:  Negative for rash and wound.   Neurological:  Negative for dizziness, seizures, speech difficulty, light-headedness and headaches.     Objective:      Physical Exam  Vitals and  nursing note reviewed.   Constitutional:       Appearance: Normal appearance. She is well-developed.   HENT:      Head: Normocephalic and atraumatic.      Right Ear: External ear normal.      Left Ear: External ear normal.      Nose: Nose normal.   Eyes:      Conjunctiva/sclera: Conjunctivae normal.      Pupils: Pupils are equal, round, and reactive to light.   Cardiovascular:      Rate and Rhythm: Normal rate and regular rhythm.      Heart sounds: Normal heart sounds. No murmur heard.  Pulmonary:      Effort: Pulmonary effort is normal. No respiratory distress.      Breath sounds: Normal breath sounds. No wheezing or rales.   Abdominal:      General: Bowel sounds are normal. There is no distension.      Palpations: Abdomen is soft. There is no mass.      Tenderness: There is no abdominal tenderness.   Musculoskeletal:         General: No swelling. Normal range of motion.      Cervical back: Normal range of motion and neck supple.   Skin:     General: Skin is warm and dry.   Neurological:      Mental Status: She is alert and oriented to person, place, and time.   Psychiatric:         Behavior: Behavior normal.         Thought Content: Thought content normal.         Judgment: Judgment normal.       Assessment:       1. Dysuria    2. Simple chronic bronchitis    3. Constipation, unspecified constipation type    4. Gastroesophageal reflux disease, unspecified whether esophagitis present    5. Abdominal pain, unspecified abdominal location    6. Weight loss        Plan:     Problem List Items Addressed This Visit       Simple chronic bronchitis> still smokes. 1/2 pack a day lungs clear- not interested in quiting. Denies wheezing or SOB      Other Visit Diagnoses       Dysuria    -  Primary    Relevant Orders    Urinalysis, Reflex to Urine Culture Urine, Clean Catch    POCT urine dipstick without microscope (Completed)    Constipation, unspecified constipation type        Relevant Orders    Ambulatory referral/consult  to Gastroenterology    Gastroesophageal reflux disease, unspecified whether esophagitis present        Relevant Orders    Ambulatory referral/consult to Gastroenterology    Abdominal pain, unspecified abdominal location        Weight loss        Relevant Orders    Ambulatory referral/consult to Gastroenterology            Send urine for culture still 2+ leuko- no dysuria  Also refer to GI. She needs EGD and colonoscoy- will start PPI as well a stool softener for now. Request Thib   She is past due for her routine exam. Labs ordered 12/22 will schedule this and f/u after she sees GI

## 2023-03-08 ENCOUNTER — TELEPHONE (OUTPATIENT)
Dept: INTERNAL MEDICINE | Facility: CLINIC | Age: 68
End: 2023-03-08
Payer: MEDICARE

## 2023-03-08 LAB — BACTERIA UR CULT: NO GROWTH

## 2023-03-08 NOTE — TELEPHONE ENCOUNTER
Pt notified of referral and that they would contact her. States they did, but she missed their call. Offered to give her their phone number so she can call them back, but pt states she does have it. She will contact them.

## 2023-03-08 NOTE — TELEPHONE ENCOUNTER
----- Message from Danita Faulkner sent at 3/8/2023  8:06 AM CST -----  Contact: pt  Nan Simms  MRN: [id number]  : 1955  PCP: Payal Moreland  Home Phone      [phone]  Work Phone      Not on file.  Mobile          [phone]      MESSAGE:     Pt states she was supposed to get a scope done. She wants to know if we can get that set up. Pt states she missed the appointment.       Please advise  [phone]

## 2023-03-08 NOTE — TELEPHONE ENCOUNTER
Attempted to contact pt. Phone just rang-no VM. Unable to LVM.     Pt had recent appt and referral was faxed to Dr. Manuel on 3/6/2023. She can contact their office at [phone].

## 2023-03-09 ENCOUNTER — HOSPITAL ENCOUNTER (EMERGENCY)
Facility: HOSPITAL | Age: 68
Discharge: ANOTHER HOSPITAL | End: 2023-03-09
Attending: SURGERY
Payer: MEDICARE

## 2023-03-09 ENCOUNTER — HOSPITAL ENCOUNTER (INPATIENT)
Facility: HOSPITAL | Age: 68
LOS: 15 days | Discharge: HOME HEALTH | DRG: 327 | End: 2023-03-24
Attending: HOSPITALIST | Admitting: HOSPITALIST
Payer: MEDICARE

## 2023-03-09 VITALS
DIASTOLIC BLOOD PRESSURE: 84 MMHG | BODY MASS INDEX: 26.03 KG/M2 | RESPIRATION RATE: 18 BRPM | TEMPERATURE: 99 F | OXYGEN SATURATION: 98 % | HEART RATE: 52 BPM | WEIGHT: 161.25 LBS | SYSTOLIC BLOOD PRESSURE: 195 MMHG

## 2023-03-09 DIAGNOSIS — Z01.818 PRE-OP EXAMINATION: ICD-10-CM

## 2023-03-09 DIAGNOSIS — E11.9 DIABETES MELLITUS TYPE 2, DIET-CONTROLLED: ICD-10-CM

## 2023-03-09 DIAGNOSIS — R00.1 BRADYCARDIA: ICD-10-CM

## 2023-03-09 DIAGNOSIS — K31.1 GASTRIC OUTLET OBSTRUCTION: Primary | ICD-10-CM

## 2023-03-09 DIAGNOSIS — C16.9 MALIGNANT NEOPLASM OF STOMACH, UNSPECIFIED LOCATION: ICD-10-CM

## 2023-03-09 DIAGNOSIS — R19.00 ABDOMINAL MASS, UNSPECIFIED ABDOMINAL LOCATION: ICD-10-CM

## 2023-03-09 DIAGNOSIS — N30.00 ACUTE CYSTITIS WITHOUT HEMATURIA: Primary | ICD-10-CM

## 2023-03-09 DIAGNOSIS — R07.9 CHEST PAIN: ICD-10-CM

## 2023-03-09 DIAGNOSIS — I51.7 LEFT VENTRICULAR HYPERTROPHY: ICD-10-CM

## 2023-03-09 DIAGNOSIS — D50.0 IRON DEFICIENCY ANEMIA DUE TO CHRONIC BLOOD LOSS: ICD-10-CM

## 2023-03-09 DIAGNOSIS — Z01.89 ENCOUNTER FOR IMAGING STUDY TO CONFIRM NASOGASTRIC (NG) TUBE PLACEMENT: ICD-10-CM

## 2023-03-09 DIAGNOSIS — R93.3 ABNORMAL CT SCAN, GASTROINTESTINAL TRACT: ICD-10-CM

## 2023-03-09 DIAGNOSIS — I10 ESSENTIAL HYPERTENSION: ICD-10-CM

## 2023-03-09 DIAGNOSIS — E78.5 HYPERLIPIDEMIA, UNSPECIFIED HYPERLIPIDEMIA TYPE: ICD-10-CM

## 2023-03-09 DIAGNOSIS — K31.1 GASTRIC OUT LET OBSTRUCTION: ICD-10-CM

## 2023-03-09 PROBLEM — D64.9 ANEMIA: Status: ACTIVE | Noted: 2023-03-09

## 2023-03-09 LAB
ALBUMIN SERPL BCP-MCNC: 3.6 G/DL (ref 3.5–5.2)
ALBUMIN SERPL BCP-MCNC: 3.7 G/DL (ref 3.5–5.2)
ALP SERPL-CCNC: 70 U/L (ref 55–135)
ALP SERPL-CCNC: 74 U/L (ref 55–135)
ALT SERPL W/O P-5'-P-CCNC: 11 U/L (ref 10–44)
ALT SERPL W/O P-5'-P-CCNC: 14 U/L (ref 10–44)
ANION GAP SERPL CALC-SCNC: 13 MMOL/L (ref 8–16)
ANION GAP SERPL CALC-SCNC: 9 MMOL/L (ref 8–16)
AST SERPL-CCNC: 12 U/L (ref 10–40)
AST SERPL-CCNC: 15 U/L (ref 10–40)
BACTERIA #/AREA URNS HPF: ABNORMAL /HPF
BASOPHILS # BLD AUTO: 0.02 K/UL (ref 0–0.2)
BASOPHILS NFR BLD: 0.3 % (ref 0–1.9)
BILIRUB SERPL-MCNC: 0.3 MG/DL (ref 0.1–1)
BILIRUB SERPL-MCNC: 0.4 MG/DL (ref 0.1–1)
BILIRUB UR QL STRIP: NEGATIVE
BUN SERPL-MCNC: 20 MG/DL (ref 8–23)
BUN SERPL-MCNC: 28 MG/DL (ref 8–23)
CALCIUM SERPL-MCNC: 10.5 MG/DL (ref 8.7–10.5)
CALCIUM SERPL-MCNC: 9.8 MG/DL (ref 8.7–10.5)
CHLORIDE SERPL-SCNC: 101 MMOL/L (ref 95–110)
CHLORIDE SERPL-SCNC: 103 MMOL/L (ref 95–110)
CLARITY UR: ABNORMAL
CO2 SERPL-SCNC: 25 MMOL/L (ref 23–29)
CO2 SERPL-SCNC: 29 MMOL/L (ref 23–29)
COLOR UR: YELLOW
CREAT SERPL-MCNC: 0.7 MG/DL (ref 0.5–1.4)
CREAT SERPL-MCNC: 0.8 MG/DL (ref 0.5–1.4)
DIFFERENTIAL METHOD: ABNORMAL
EOSINOPHIL # BLD AUTO: 0.2 K/UL (ref 0–0.5)
EOSINOPHIL NFR BLD: 2.8 % (ref 0–8)
ERYTHROCYTE [DISTWIDTH] IN BLOOD BY AUTOMATED COUNT: 15.9 % (ref 11.5–14.5)
EST. GFR  (NO RACE VARIABLE): >60 ML/MIN/1.73 M^2
EST. GFR  (NO RACE VARIABLE): >60 ML/MIN/1.73 M^2
ESTIMATED AVG GLUCOSE: 114 MG/DL (ref 68–131)
GLUCOSE SERPL-MCNC: 114 MG/DL (ref 70–110)
GLUCOSE SERPL-MCNC: 96 MG/DL (ref 70–110)
GLUCOSE UR QL STRIP: NEGATIVE
HBA1C MFR BLD: 5.6 % (ref 4–5.6)
HCT VFR BLD AUTO: 35.4 % (ref 37–48.5)
HGB BLD-MCNC: 11 G/DL (ref 12–16)
HGB UR QL STRIP: ABNORMAL
IMM GRANULOCYTES # BLD AUTO: 0.03 K/UL (ref 0–0.04)
IMM GRANULOCYTES NFR BLD AUTO: 0.4 % (ref 0–0.5)
KETONES UR QL STRIP: NEGATIVE
LEUKOCYTE ESTERASE UR QL STRIP: ABNORMAL
LIPASE SERPL-CCNC: 113 U/L (ref 4–60)
LYMPHOCYTES # BLD AUTO: 1.8 K/UL (ref 1–4.8)
LYMPHOCYTES NFR BLD: 24.6 % (ref 18–48)
MCH RBC QN AUTO: 22.6 PG (ref 27–31)
MCHC RBC AUTO-ENTMCNC: 31.1 G/DL (ref 32–36)
MCV RBC AUTO: 73 FL (ref 82–98)
MICROSCOPIC COMMENT: ABNORMAL
MONOCYTES # BLD AUTO: 0.8 K/UL (ref 0.3–1)
MONOCYTES NFR BLD: 10.7 % (ref 4–15)
NEUTROPHILS # BLD AUTO: 4.5 K/UL (ref 1.8–7.7)
NEUTROPHILS NFR BLD: 61.2 % (ref 38–73)
NITRITE UR QL STRIP: NEGATIVE
NRBC BLD-RTO: 0 /100 WBC
PH UR STRIP: 6 [PH] (ref 5–8)
PLATELET # BLD AUTO: 289 K/UL (ref 150–450)
PMV BLD AUTO: 11.4 FL (ref 9.2–12.9)
POTASSIUM SERPL-SCNC: 4.1 MMOL/L (ref 3.5–5.1)
POTASSIUM SERPL-SCNC: 4.2 MMOL/L (ref 3.5–5.1)
PROT SERPL-MCNC: 7.4 G/DL (ref 6–8.4)
PROT SERPL-MCNC: 7.4 G/DL (ref 6–8.4)
PROT UR QL STRIP: NEGATIVE
RBC # BLD AUTO: 4.87 M/UL (ref 4–5.4)
RBC #/AREA URNS HPF: 12 /HPF (ref 0–4)
SODIUM SERPL-SCNC: 139 MMOL/L (ref 136–145)
SODIUM SERPL-SCNC: 141 MMOL/L (ref 136–145)
SP GR UR STRIP: 1.02 (ref 1–1.03)
URN SPEC COLLECT METH UR: ABNORMAL
UROBILINOGEN UR STRIP-ACNC: NEGATIVE EU/DL
WBC # BLD AUTO: 7.27 K/UL (ref 3.9–12.7)
WBC #/AREA URNS HPF: 85 /HPF (ref 0–5)

## 2023-03-09 PROCEDURE — 96365 THER/PROPH/DIAG IV INF INIT: CPT | Mod: 59

## 2023-03-09 PROCEDURE — 85025 COMPLETE CBC W/AUTO DIFF WBC: CPT | Performed by: SURGERY

## 2023-03-09 PROCEDURE — 99222 1ST HOSP IP/OBS MODERATE 55: CPT | Mod: ,,, | Performed by: STUDENT IN AN ORGANIZED HEALTH CARE EDUCATION/TRAINING PROGRAM

## 2023-03-09 PROCEDURE — 83690 ASSAY OF LIPASE: CPT | Performed by: SURGERY

## 2023-03-09 PROCEDURE — 93010 EKG 12-LEAD: ICD-10-PCS | Mod: ,,, | Performed by: INTERNAL MEDICINE

## 2023-03-09 PROCEDURE — 99285 EMERGENCY DEPT VISIT HI MDM: CPT | Mod: 25

## 2023-03-09 PROCEDURE — 25000003 PHARM REV CODE 250: Performed by: STUDENT IN AN ORGANIZED HEALTH CARE EDUCATION/TRAINING PROGRAM

## 2023-03-09 PROCEDURE — 80053 COMPREHEN METABOLIC PANEL: CPT | Performed by: SURGERY

## 2023-03-09 PROCEDURE — 99222 PR INITIAL HOSPITAL CARE,LEVL II: ICD-10-PCS | Mod: ,,, | Performed by: STUDENT IN AN ORGANIZED HEALTH CARE EDUCATION/TRAINING PROGRAM

## 2023-03-09 PROCEDURE — 25000003 PHARM REV CODE 250: Performed by: SURGERY

## 2023-03-09 PROCEDURE — 36415 COLL VENOUS BLD VENIPUNCTURE: CPT | Performed by: SURGERY

## 2023-03-09 PROCEDURE — 25500020 PHARM REV CODE 255: Performed by: SURGERY

## 2023-03-09 PROCEDURE — 93005 ELECTROCARDIOGRAM TRACING: CPT

## 2023-03-09 PROCEDURE — 80053 COMPREHEN METABOLIC PANEL: CPT | Mod: 91 | Performed by: STUDENT IN AN ORGANIZED HEALTH CARE EDUCATION/TRAINING PROGRAM

## 2023-03-09 PROCEDURE — 36415 COLL VENOUS BLD VENIPUNCTURE: CPT | Performed by: STUDENT IN AN ORGANIZED HEALTH CARE EDUCATION/TRAINING PROGRAM

## 2023-03-09 PROCEDURE — 11000001 HC ACUTE MED/SURG PRIVATE ROOM

## 2023-03-09 PROCEDURE — 81000 URINALYSIS NONAUTO W/SCOPE: CPT | Performed by: SURGERY

## 2023-03-09 PROCEDURE — 83036 HEMOGLOBIN GLYCOSYLATED A1C: CPT | Performed by: STUDENT IN AN ORGANIZED HEALTH CARE EDUCATION/TRAINING PROGRAM

## 2023-03-09 PROCEDURE — 93010 ELECTROCARDIOGRAM REPORT: CPT | Mod: ,,, | Performed by: INTERNAL MEDICINE

## 2023-03-09 PROCEDURE — 63600175 PHARM REV CODE 636 W HCPCS: Performed by: SURGERY

## 2023-03-09 PROCEDURE — 87086 URINE CULTURE/COLONY COUNT: CPT | Performed by: SURGERY

## 2023-03-09 PROCEDURE — 96361 HYDRATE IV INFUSION ADD-ON: CPT

## 2023-03-09 RX ORDER — LEVOFLOXACIN 5 MG/ML
500 INJECTION, SOLUTION INTRAVENOUS
Status: COMPLETED | OUTPATIENT
Start: 2023-03-09 | End: 2023-03-09

## 2023-03-09 RX ORDER — ACETAMINOPHEN 325 MG/1
650 TABLET ORAL EVERY 8 HOURS PRN
Status: DISCONTINUED | OUTPATIENT
Start: 2023-03-09 | End: 2023-03-15

## 2023-03-09 RX ORDER — IBUPROFEN 200 MG
16 TABLET ORAL
Status: DISCONTINUED | OUTPATIENT
Start: 2023-03-09 | End: 2023-03-09

## 2023-03-09 RX ORDER — HYDROCHLOROTHIAZIDE 12.5 MG/1
12.5 TABLET ORAL
Status: COMPLETED | OUTPATIENT
Start: 2023-03-09 | End: 2023-03-09

## 2023-03-09 RX ORDER — DEXTROSE 40 %
15 GEL (GRAM) ORAL
Status: DISCONTINUED | OUTPATIENT
Start: 2023-03-09 | End: 2023-03-21

## 2023-03-09 RX ORDER — TALC
6 POWDER (GRAM) TOPICAL NIGHTLY PRN
Status: DISCONTINUED | OUTPATIENT
Start: 2023-03-09 | End: 2023-03-24 | Stop reason: HOSPADM

## 2023-03-09 RX ORDER — GLUCAGON 1 MG
1 KIT INJECTION
Status: DISCONTINUED | OUTPATIENT
Start: 2023-03-09 | End: 2023-03-24 | Stop reason: HOSPADM

## 2023-03-09 RX ORDER — IBUPROFEN 200 MG
24 TABLET ORAL
Status: DISCONTINUED | OUTPATIENT
Start: 2023-03-09 | End: 2023-03-09

## 2023-03-09 RX ORDER — SODIUM CHLORIDE 0.9 % (FLUSH) 0.9 %
10 SYRINGE (ML) INJECTION
Status: DISCONTINUED | OUTPATIENT
Start: 2023-03-09 | End: 2023-03-16

## 2023-03-09 RX ORDER — DEXTROSE MONOHYDRATE 100 MG/ML
INJECTION, SOLUTION INTRAVENOUS CONTINUOUS
Status: DISCONTINUED | OUTPATIENT
Start: 2023-03-09 | End: 2023-03-12

## 2023-03-09 RX ORDER — DEXTROSE 40 %
30 GEL (GRAM) ORAL
Status: DISCONTINUED | OUTPATIENT
Start: 2023-03-09 | End: 2023-03-21

## 2023-03-09 RX ORDER — LEVOFLOXACIN 5 MG/ML
500 INJECTION, SOLUTION INTRAVENOUS
Status: DISCONTINUED | OUTPATIENT
Start: 2023-03-10 | End: 2023-03-09 | Stop reason: HOSPADM

## 2023-03-09 RX ORDER — LISINOPRIL 20 MG/1
20 TABLET ORAL
Status: COMPLETED | OUTPATIENT
Start: 2023-03-09 | End: 2023-03-09

## 2023-03-09 RX ADMIN — SODIUM CHLORIDE 500 ML: 9 INJECTION, SOLUTION INTRAVENOUS at 04:03

## 2023-03-09 RX ADMIN — IOHEXOL 75 ML: 350 INJECTION, SOLUTION INTRAVENOUS at 04:03

## 2023-03-09 RX ADMIN — LISINOPRIL 20 MG: 20 TABLET ORAL at 07:03

## 2023-03-09 RX ADMIN — HYDROCHLOROTHIAZIDE 12.5 MG: 12.5 TABLET ORAL at 07:03

## 2023-03-09 RX ADMIN — LEVOFLOXACIN 500 MG: 500 INJECTION, SOLUTION INTRAVENOUS at 06:03

## 2023-03-09 NOTE — ED NOTES
Pt. laying on ED stretcher, well rested, AAOx3, no distress noted, respirations equal and unlabored, NG tube in place, on intermittent suction, call bell within reach, bed locked, on lowest position. Awaiting placement.

## 2023-03-09 NOTE — ED NOTES
Pt. laying on ED stretcher, well rested, AAOx3, no distress noted, respirations equal and unlabored, NG tube in place, on intermittent suction, call bell within reach, bed locked, on lowest position. Pt given bedside commode. Awaiting placement.

## 2023-03-09 NOTE — ED PROVIDER NOTES
Encounter Date: 3/9/2023       History     Chief Complaint   Patient presents with    Abdominal Pain     Pt c/c of abd pain for several days.  Pt recently Dx with constipation.  LBM Monday.  Pt took medications to produce a BM,  Little stool produced.  Pt c/o N/V and no appetite.  Denies diarrhea and flatulence.      Nan Simms is a 67 y.o. female that presents with abdominal pain today  Patient with abdominal pain for last couple days constipation issues also  Patient has been to her PCP as well as the ER several times with week  No signs of peritonitis rebound no guarding on ER evaluation this morning  Patient states she still has a hard time having a bowel movement this week    Review of patient's allergies indicates:   Allergen Reactions    Pcn [penicillins]      Childhood      Past Medical History:   Diagnosis Date    Abnormal Pap smear of cervix     Arthritis     Hypertension     Hyperthyroidism      Past Surgical History:   Procedure Laterality Date    CATARACT EXTRACTION W/  INTRAOCULAR LENS IMPLANT Right 8/8/2019    Procedure: EXTRACTION, CATARACT, WITH IOL INSERTION;  Surgeon: Spenser Lee MD;  Location: [facility] OR;  Service: Ophthalmology;  Laterality: Right;    EYE SURGERY      HYSTERECTOMY      OOPHORECTOMY      PHACOEMULSIFICATION OF CATARACT Right 8/8/2019    Procedure: PHACOEMULSIFICATION, CATARACT;  Surgeon: Spenser Lee MD;  Location: [facility] OR;  Service: Ophthalmology;  Laterality: Right;     Family History   Problem Relation Age of Onset    Cancer Mother     Breast cancer Neg Hx     Colon cancer Neg Hx     Ovarian cancer Neg Hx      Social History     Tobacco Use    Smoking status: Every Day     Packs/day: 0.50     Years: 30.00     Pack years: 15.00     Types: Cigarettes    Smokeless tobacco: Current   Substance Use Topics    Alcohol use: Yes     Alcohol/week: 1.0 standard drink     Types: 1 Cans of beer per week    Drug use: No     Review of Systems   Constitutional: Negative.     HENT: Negative.     Eyes: Negative.    Respiratory: Negative.     Cardiovascular: Negative.    Gastrointestinal:  Positive for abdominal pain.   Genitourinary: Negative.    Musculoskeletal: Negative.    Skin: Negative.    Neurological: Negative.    Psychiatric/Behavioral: Negative.       Physical Exam     Initial Vitals [03/09/23 0328]   BP Pulse Resp Temp SpO2   (!) 174/75 (!) 53 18 98.7 °F (37.1 °C) 100 %      MAP       --         Physical Exam    Nursing note and vitals reviewed.  Constitutional: Vital signs are normal. She appears well-developed and well-nourished. She is cooperative.   HENT:   Head: Normocephalic and atraumatic.   Right Ear: External ear normal.   Left Ear: External ear normal.   Nose: Nose normal.   Mouth/Throat: Oropharynx is clear and moist.   Eyes: Conjunctivae, EOM and lids are normal. Pupils are equal, round, and reactive to light.   Neck: Trachea normal and phonation normal. Neck supple. No JVD present.   Normal range of motion.   Full passive range of motion without pain.     Cardiovascular:  Normal rate, regular rhythm, S1 normal, S2 normal, normal heart sounds, intact distal pulses and normal pulses.           Pulmonary/Chest: Effort normal and breath sounds normal.   Abdominal: Abdomen is soft and flat. Bowel sounds are normal.   Musculoskeletal:         General: Normal range of motion.      Cervical back: Full passive range of motion without pain, normal range of motion and neck supple.     Neurological: She is alert and oriented to person, place, and time. She has normal strength.   Skin: Skin is warm, dry and intact. Capillary refill takes less than 2 seconds.       ED Course   Procedures  Labs Reviewed   CBC W/ AUTO DIFFERENTIAL - Abnormal; Notable for the following components:       Result Value    Hemoglobin 11.0 (*)     Hematocrit 35.4 (*)     MCV 73 (*)     MCH 22.6 (*)     MCHC 31.1 (*)     RDW 15.9 (*)     All other components within normal limits   LIPASE - Abnormal;  Notable for the following components:    Lipase 113 (*)     All other components within normal limits   COMPREHENSIVE METABOLIC PANEL - Abnormal; Notable for the following components:    Glucose 114 (*)     BUN 28 (*)     All other components within normal limits   URINALYSIS, REFLEX TO URINE CULTURE - Abnormal; Notable for the following components:    Appearance, UA Hazy (*)     Occult Blood UA Trace (*)     Leukocytes, UA 2+ (*)     All other components within normal limits    Narrative:     Specimen Source->Urine   URINALYSIS MICROSCOPIC - Abnormal; Notable for the following components:    RBC, UA 12 (*)     WBC, UA 85 (*)     Bacteria Few (*)     All other components within normal limits    Narrative:     Specimen Source->Urine   CULTURE, URINE          Imaging Results              CT Abdomen Pelvis With Contrast (In process)                      Medications   levoFLOXacin 500 mg/100 mL IVPB 500 mg (has no administration in time range)   sodium chloride 0.9% bolus 500 mL 500 mL (500 mLs Intravenous New Bag 3/9/23 0400)   iohexoL (OMNIPAQUE 350) injection 75 mL (75 mLs Intravenous Given 3/9/23 0453)     Medical Decision Making:   Stable lab work & a UTI noted on urinalysis in the emergency room today  CT scan currently pending, oncoming ER physician to take over at 6:00 a.m.                        Clinical Impression:   Final diagnoses:  [N30.00] Acute cystitis without hematuria (Primary)               Red Kelly MD  03/09/23 0601

## 2023-03-09 NOTE — PROVIDER TRANSFER
Outside Transfer Acceptance Note / Regional Referral Center    Referring facility: [facility]   Referring provider: ALEX BAILEY  Accepting facility: [facility]  Accepting provider: TYLER SAUCEDO  Reason for transfer: Higher Level of Care   Transfer diagnosis: gatric outlet obstruction  Transfer specialty requested: Gastroenterology  Transfer specialty notified: no  Transfer level: NUMBER 1-5: 2  Isolation status: No active isolations   Admission class or status: IP- Inpatient    Narrative     67-year-old woman PMH HTN and hypothyroidism presented to [facility] today with abd pain and constipation starting several weeks ago.    WBC 7.2, Hb 11.0, Plts 289, CMP unremarkable, lipase 113, U/A RBC 12, WBC 85, bacteria few.  CT abd pos for a gastric mass highly concerning malignancy a/w gastric outlet obstruction.  An NGT was placed with return of a large amount of gastric contents    Transfer requested for Gastroenterology.  Transfer diagnosis gastric mass concerning for malignancy and gastric outlet obstruction    Instructions      Lai Clarke-  Admit to Hospital Medicine  Upon patient arrival to floor, please send SecureChat to [facility] or call extension [phone] (if no answer, do NOT leave a callback number after the beep, rather please send a SecureChat to [facility]), for Hospital Medicine admit team assignment and for additional admit orders for the patient.  Do not page the attending physician associated with the patient on arrival (this physician may not be on duty at the time of arrival).  Rather, always send a SecureChat to [facility] or call [phone] to reach the triage physician for orders and team assignment.

## 2023-03-10 ENCOUNTER — ANESTHESIA EVENT (OUTPATIENT)
Dept: ENDOSCOPY | Facility: HOSPITAL | Age: 68
DRG: 327 | End: 2023-03-10
Payer: MEDICARE

## 2023-03-10 ENCOUNTER — ANESTHESIA (OUTPATIENT)
Dept: ENDOSCOPY | Facility: HOSPITAL | Age: 68
DRG: 327 | End: 2023-03-10
Payer: MEDICARE

## 2023-03-10 PROBLEM — R19.00 ABDOMINAL MASS: Status: ACTIVE | Noted: 2023-03-10

## 2023-03-10 PROBLEM — N30.00 ACUTE CYSTITIS: Status: ACTIVE | Noted: 2023-03-10

## 2023-03-10 PROBLEM — R93.3 ABNORMAL CT SCAN, GASTROINTESTINAL TRACT: Status: ACTIVE | Noted: 2023-03-10

## 2023-03-10 PROBLEM — N30.00 ACUTE CYSTITIS: Status: RESOLVED | Noted: 2023-03-10 | Resolved: 2023-03-10

## 2023-03-10 LAB
ANION GAP SERPL CALC-SCNC: 10 MMOL/L (ref 8–16)
APTT BLDCRRT: 23.7 SEC (ref 21–32)
ASCENDING AORTA: 4.31 CM
AV INDEX (PROSTH): 0.97
AV MEAN GRADIENT: 8 MMHG
AV PEAK GRADIENT: 15 MMHG
AV VALVE AREA: 3.28 CM2
AV VELOCITY RATIO: 0.85
BACTERIA #/AREA URNS AUTO: ABNORMAL /HPF
BACTERIA UR CULT: NORMAL
BASOPHILS # BLD AUTO: 0.02 K/UL (ref 0–0.2)
BASOPHILS NFR BLD: 0.3 % (ref 0–1.9)
BILIRUB UR QL STRIP: NEGATIVE
BSA FOR ECHO PROCEDURE: 1.84 M2
BUN SERPL-MCNC: 22 MG/DL (ref 8–23)
CALCIUM SERPL-MCNC: 9.9 MG/DL (ref 8.7–10.5)
CHLORIDE SERPL-SCNC: 101 MMOL/L (ref 95–110)
CLARITY UR REFRACT.AUTO: ABNORMAL
CO2 SERPL-SCNC: 27 MMOL/L (ref 23–29)
COLOR UR AUTO: YELLOW
CREAT SERPL-MCNC: 0.7 MG/DL (ref 0.5–1.4)
CV ECHO LV RWT: 0.3 CM
DIFFERENTIAL METHOD: ABNORMAL
DOP CALC AO PEAK VEL: 1.91 M/S
DOP CALC AO VTI: 33.55 CM
DOP CALC LVOT AREA: 3.4 CM2
DOP CALC LVOT DIAMETER: 2.08 CM
DOP CALC LVOT PEAK VEL: 1.63 M/S
DOP CALC LVOT STROKE VOLUME: 110.07 CM3
DOP CALCLVOT PEAK VEL VTI: 32.41 CM
E WAVE DECELERATION TIME: 292.57 MSEC
E/A RATIO: 1.25
E/E' RATIO: 11.64 M/S
ECHO LV POSTERIOR WALL: 0.77 CM (ref 0.6–1.1)
EJECTION FRACTION: 68 %
EOSINOPHIL # BLD AUTO: 0.2 K/UL (ref 0–0.5)
EOSINOPHIL NFR BLD: 2.5 % (ref 0–8)
ERYTHROCYTE [DISTWIDTH] IN BLOOD BY AUTOMATED COUNT: 16.1 % (ref 11.5–14.5)
EST. GFR  (NO RACE VARIABLE): >60 ML/MIN/1.73 M^2
FERRITIN SERPL-MCNC: 32 NG/ML (ref 20–300)
FRACTIONAL SHORTENING: 35 % (ref 28–44)
GLUCOSE SERPL-MCNC: 111 MG/DL (ref 70–110)
GLUCOSE UR QL STRIP: NEGATIVE
HCT VFR BLD AUTO: 36.5 % (ref 37–48.5)
HGB BLD-MCNC: 11.5 G/DL (ref 12–16)
HGB UR QL STRIP: ABNORMAL
IMM GRANULOCYTES # BLD AUTO: 0.01 K/UL (ref 0–0.04)
IMM GRANULOCYTES NFR BLD AUTO: 0.1 % (ref 0–0.5)
INR PPP: 1 (ref 0.8–1.2)
INTERVENTRICULAR SEPTUM: 0.8 CM (ref 0.6–1.1)
IRON SERPL-MCNC: 23 UG/DL (ref 30–160)
KETONES UR QL STRIP: NEGATIVE
LA MAJOR: 5.22 CM
LA MINOR: 5.66 CM
LA WIDTH: 4.26 CM
LEFT ATRIUM SIZE: 2.91 CM
LEFT ATRIUM VOLUME INDEX MOD: 38.3 ML/M2
LEFT ATRIUM VOLUME INDEX: 31.4 ML/M2
LEFT ATRIUM VOLUME MOD: 69.65 CM3
LEFT ATRIUM VOLUME: 57.23 CM3
LEFT INTERNAL DIMENSION IN SYSTOLE: 3.36 CM (ref 2.1–4)
LEFT VENTRICLE DIASTOLIC VOLUME INDEX: 70.71 ML/M2
LEFT VENTRICLE DIASTOLIC VOLUME: 128.69 ML
LEFT VENTRICLE MASS INDEX: 78 G/M2
LEFT VENTRICLE SYSTOLIC VOLUME INDEX: 25.4 ML/M2
LEFT VENTRICLE SYSTOLIC VOLUME: 46.25 ML
LEFT VENTRICULAR INTERNAL DIMENSION IN DIASTOLE: 5.19 CM (ref 3.5–6)
LEFT VENTRICULAR MASS: 141.31 G
LEUKOCYTE ESTERASE UR QL STRIP: ABNORMAL
LV LATERAL E/E' RATIO: 10.67 M/S
LV SEPTAL E/E' RATIO: 12.8 M/S
LYMPHOCYTES # BLD AUTO: 1.3 K/UL (ref 1–4.8)
LYMPHOCYTES NFR BLD: 18.3 % (ref 18–48)
MAGNESIUM SERPL-MCNC: 2.1 MG/DL (ref 1.6–2.6)
MCH RBC QN AUTO: 22.5 PG (ref 27–31)
MCHC RBC AUTO-ENTMCNC: 31.5 G/DL (ref 32–36)
MCV RBC AUTO: 71 FL (ref 82–98)
MICROSCOPIC COMMENT: ABNORMAL
MONOCYTES # BLD AUTO: 0.7 K/UL (ref 0.3–1)
MONOCYTES NFR BLD: 10.7 % (ref 4–15)
MV PEAK A VEL: 0.51 M/S
MV PEAK E VEL: 0.64 M/S
MV STENOSIS PRESSURE HALF TIME: 84.84 MS
MV VALVE AREA P 1/2 METHOD: 2.59 CM2
NEUTROPHILS # BLD AUTO: 4.7 K/UL (ref 1.8–7.7)
NEUTROPHILS NFR BLD: 68.1 % (ref 38–73)
NITRITE UR QL STRIP: NEGATIVE
NRBC BLD-RTO: 0 /100 WBC
PH UR STRIP: 6 [PH] (ref 5–8)
PISA TR MAX VEL: 2.61 M/S
PLATELET # BLD AUTO: 306 K/UL (ref 150–450)
PMV BLD AUTO: 11.2 FL (ref 9.2–12.9)
POTASSIUM SERPL-SCNC: 3.9 MMOL/L (ref 3.5–5.1)
PROT UR QL STRIP: NEGATIVE
PROTHROMBIN TIME: 10.1 SEC (ref 9–12.5)
RA MAJOR: 4.48 CM
RA PRESSURE: 3 MMHG
RA WIDTH: 3.59 CM
RBC # BLD AUTO: 5.11 M/UL (ref 4–5.4)
RBC #/AREA URNS AUTO: 12 /HPF (ref 0–4)
RIGHT VENTRICULAR END-DIASTOLIC DIMENSION: 2.91 CM
RV TISSUE DOPPLER FREE WALL SYSTOLIC VELOCITY 1 (APICAL 4 CHAMBER VIEW): 20.67 CM/S
SATURATED IRON: 5 % (ref 20–50)
SINUS: 3.48 CM
SODIUM SERPL-SCNC: 138 MMOL/L (ref 136–145)
SP GR UR STRIP: 1.02 (ref 1–1.03)
SQUAMOUS #/AREA URNS AUTO: 3 /HPF
STJ: 3.08 CM
TDI LATERAL: 0.06 M/S
TDI SEPTAL: 0.05 M/S
TDI: 0.06 M/S
TOTAL IRON BINDING CAPACITY: 508 UG/DL (ref 250–450)
TR MAX PG: 27 MMHG
TRANSFERRIN SERPL-MCNC: 343 MG/DL (ref 200–375)
TRICUSPID ANNULAR PLANE SYSTOLIC EXCURSION: 1.7 CM
TV REST PULMONARY ARTERY PRESSURE: 30 MMHG
URN SPEC COLLECT METH UR: ABNORMAL
WBC # BLD AUTO: 6.89 K/UL (ref 3.9–12.7)
WBC #/AREA URNS AUTO: 78 /HPF (ref 0–5)

## 2023-03-10 PROCEDURE — 37000009 HC ANESTHESIA EA ADD 15 MINS: Performed by: INTERNAL MEDICINE

## 2023-03-10 PROCEDURE — 99233 SBSQ HOSP IP/OBS HIGH 50: CPT | Mod: ,,, | Performed by: HOSPITALIST

## 2023-03-10 PROCEDURE — D9220A PRA ANESTHESIA: ICD-10-PCS | Mod: ANES,,, | Performed by: ANESTHESIOLOGY

## 2023-03-10 PROCEDURE — D9220A PRA ANESTHESIA: ICD-10-PCS | Mod: CRNA,,, | Performed by: NURSE ANESTHETIST, CERTIFIED REGISTERED

## 2023-03-10 PROCEDURE — 99223 PR INITIAL HOSPITAL CARE,LEVL III: ICD-10-PCS | Mod: 25,GC,, | Performed by: INTERNAL MEDICINE

## 2023-03-10 PROCEDURE — 81001 URINALYSIS AUTO W/SCOPE: CPT | Performed by: STUDENT IN AN ORGANIZED HEALTH CARE EDUCATION/TRAINING PROGRAM

## 2023-03-10 PROCEDURE — 85025 COMPLETE CBC W/AUTO DIFF WBC: CPT | Performed by: STUDENT IN AN ORGANIZED HEALTH CARE EDUCATION/TRAINING PROGRAM

## 2023-03-10 PROCEDURE — 83735 ASSAY OF MAGNESIUM: CPT | Performed by: STUDENT IN AN ORGANIZED HEALTH CARE EDUCATION/TRAINING PROGRAM

## 2023-03-10 PROCEDURE — 85730 THROMBOPLASTIN TIME PARTIAL: CPT | Performed by: STUDENT IN AN ORGANIZED HEALTH CARE EDUCATION/TRAINING PROGRAM

## 2023-03-10 PROCEDURE — 88305 TISSUE EXAM BY PATHOLOGIST: CPT | Mod: 26,,, | Performed by: PATHOLOGY

## 2023-03-10 PROCEDURE — 80048 BASIC METABOLIC PNL TOTAL CA: CPT | Performed by: STUDENT IN AN ORGANIZED HEALTH CARE EDUCATION/TRAINING PROGRAM

## 2023-03-10 PROCEDURE — 99233 PR SUBSEQUENT HOSPITAL CARE,LEVL III: ICD-10-PCS | Mod: ,,, | Performed by: HOSPITALIST

## 2023-03-10 PROCEDURE — 99223 1ST HOSP IP/OBS HIGH 75: CPT | Mod: 25,GC,, | Performed by: INTERNAL MEDICINE

## 2023-03-10 PROCEDURE — 88305 TISSUE EXAM BY PATHOLOGIST: CPT | Performed by: PATHOLOGY

## 2023-03-10 PROCEDURE — 63600175 PHARM REV CODE 636 W HCPCS: Performed by: HOSPITALIST

## 2023-03-10 PROCEDURE — C9113 INJ PANTOPRAZOLE SODIUM, VIA: HCPCS | Performed by: HOSPITALIST

## 2023-03-10 PROCEDURE — 36415 COLL VENOUS BLD VENIPUNCTURE: CPT | Performed by: STUDENT IN AN ORGANIZED HEALTH CARE EDUCATION/TRAINING PROGRAM

## 2023-03-10 PROCEDURE — 94761 N-INVAS EAR/PLS OXIMETRY MLT: CPT

## 2023-03-10 PROCEDURE — 25000003 PHARM REV CODE 250: Performed by: STUDENT IN AN ORGANIZED HEALTH CARE EDUCATION/TRAINING PROGRAM

## 2023-03-10 PROCEDURE — 84466 ASSAY OF TRANSFERRIN: CPT | Performed by: STUDENT IN AN ORGANIZED HEALTH CARE EDUCATION/TRAINING PROGRAM

## 2023-03-10 PROCEDURE — 88360 TUMOR IMMUNOHISTOCHEM/MANUAL: CPT | Mod: 26,,, | Performed by: PATHOLOGY

## 2023-03-10 PROCEDURE — 37000008 HC ANESTHESIA 1ST 15 MINUTES: Performed by: INTERNAL MEDICINE

## 2023-03-10 PROCEDURE — 85610 PROTHROMBIN TIME: CPT | Performed by: STUDENT IN AN ORGANIZED HEALTH CARE EDUCATION/TRAINING PROGRAM

## 2023-03-10 PROCEDURE — 43239 EGD BIOPSY SINGLE/MULTIPLE: CPT | Mod: 51,,, | Performed by: INTERNAL MEDICINE

## 2023-03-10 PROCEDURE — 88360 TUMOR IMMUNOHISTOCHEM/MANUAL: CPT | Performed by: PATHOLOGY

## 2023-03-10 PROCEDURE — 27201012 HC FORCEPS, HOT/COLD, DISP: Performed by: INTERNAL MEDICINE

## 2023-03-10 PROCEDURE — 43241 PR EGD, FLEX, W/INSERT, INTRALUMINAL TUBE/CATH: ICD-10-PCS | Mod: GC,,, | Performed by: INTERNAL MEDICINE

## 2023-03-10 PROCEDURE — 88360 PR  TUMOR IMMUNOHISTOCHEM/MANUAL: ICD-10-PCS | Mod: 26,,, | Performed by: PATHOLOGY

## 2023-03-10 PROCEDURE — 87086 URINE CULTURE/COLONY COUNT: CPT | Performed by: STUDENT IN AN ORGANIZED HEALTH CARE EDUCATION/TRAINING PROGRAM

## 2023-03-10 PROCEDURE — 25000003 PHARM REV CODE 250: Performed by: NURSE ANESTHETIST, CERTIFIED REGISTERED

## 2023-03-10 PROCEDURE — D9220A PRA ANESTHESIA: Mod: CRNA,,, | Performed by: NURSE ANESTHETIST, CERTIFIED REGISTERED

## 2023-03-10 PROCEDURE — D9220A PRA ANESTHESIA: Mod: ANES,,, | Performed by: ANESTHESIOLOGY

## 2023-03-10 PROCEDURE — 88305 TISSUE EXAM BY PATHOLOGIST: ICD-10-PCS | Mod: 26,,, | Performed by: PATHOLOGY

## 2023-03-10 PROCEDURE — 82728 ASSAY OF FERRITIN: CPT | Performed by: STUDENT IN AN ORGANIZED HEALTH CARE EDUCATION/TRAINING PROGRAM

## 2023-03-10 PROCEDURE — 63600175 PHARM REV CODE 636 W HCPCS: Performed by: NURSE ANESTHETIST, CERTIFIED REGISTERED

## 2023-03-10 PROCEDURE — 43239 PR EGD, FLEX, W/BIOPSY, SGL/MULTI: ICD-10-PCS | Mod: 51,,, | Performed by: INTERNAL MEDICINE

## 2023-03-10 PROCEDURE — 43241 EGD TUBE/CATH INSERTION: CPT | Performed by: INTERNAL MEDICINE

## 2023-03-10 PROCEDURE — 43241 EGD TUBE/CATH INSERTION: CPT | Mod: GC,,, | Performed by: INTERNAL MEDICINE

## 2023-03-10 PROCEDURE — 99900035 HC TECH TIME PER 15 MIN (STAT)

## 2023-03-10 PROCEDURE — 43239 EGD BIOPSY SINGLE/MULTIPLE: CPT | Performed by: INTERNAL MEDICINE

## 2023-03-10 PROCEDURE — 11000001 HC ACUTE MED/SURG PRIVATE ROOM

## 2023-03-10 RX ORDER — PROPOFOL 10 MG/ML
VIAL (ML) INTRAVENOUS
Status: DISCONTINUED | OUTPATIENT
Start: 2023-03-10 | End: 2023-03-10

## 2023-03-10 RX ORDER — SODIUM CHLORIDE 0.9 % (FLUSH) 0.9 %
10 SYRINGE (ML) INJECTION
Status: DISCONTINUED | OUTPATIENT
Start: 2023-03-10 | End: 2023-03-10 | Stop reason: HOSPADM

## 2023-03-10 RX ORDER — FAMOTIDINE 10 MG/ML
INJECTION INTRAVENOUS
Status: DISCONTINUED | OUTPATIENT
Start: 2023-03-10 | End: 2023-03-10

## 2023-03-10 RX ORDER — VASOPRESSIN 20 [USP'U]/ML
INJECTION, SOLUTION INTRAMUSCULAR; SUBCUTANEOUS
Status: DISCONTINUED | OUTPATIENT
Start: 2023-03-10 | End: 2023-03-10

## 2023-03-10 RX ORDER — ROCURONIUM BROMIDE 10 MG/ML
INJECTION, SOLUTION INTRAVENOUS
Status: DISCONTINUED | OUTPATIENT
Start: 2023-03-10 | End: 2023-03-10

## 2023-03-10 RX ORDER — DEXAMETHASONE SODIUM PHOSPHATE 4 MG/ML
INJECTION, SOLUTION INTRA-ARTICULAR; INTRALESIONAL; INTRAMUSCULAR; INTRAVENOUS; SOFT TISSUE
Status: DISCONTINUED | OUTPATIENT
Start: 2023-03-10 | End: 2023-03-10

## 2023-03-10 RX ORDER — LIDOCAINE HYDROCHLORIDE 10 MG/ML
INJECTION, SOLUTION INTRAVENOUS
Status: DISCONTINUED | OUTPATIENT
Start: 2023-03-10 | End: 2023-03-10

## 2023-03-10 RX ORDER — PHENYLEPHRINE HYDROCHLORIDE 10 MG/ML
INJECTION INTRAVENOUS
Status: DISCONTINUED | OUTPATIENT
Start: 2023-03-10 | End: 2023-03-10

## 2023-03-10 RX ORDER — PANTOPRAZOLE SODIUM 40 MG/10ML
40 INJECTION, POWDER, LYOPHILIZED, FOR SOLUTION INTRAVENOUS DAILY
Status: DISCONTINUED | OUTPATIENT
Start: 2023-03-10 | End: 2023-03-15

## 2023-03-10 RX ORDER — ONDANSETRON 2 MG/ML
INJECTION INTRAMUSCULAR; INTRAVENOUS
Status: DISCONTINUED | OUTPATIENT
Start: 2023-03-10 | End: 2023-03-10

## 2023-03-10 RX ORDER — SUCCINYLCHOLINE CHLORIDE 20 MG/ML
INJECTION INTRAMUSCULAR; INTRAVENOUS
Status: DISCONTINUED | OUTPATIENT
Start: 2023-03-10 | End: 2023-03-10

## 2023-03-10 RX ORDER — HYDROMORPHONE HYDROCHLORIDE 1 MG/ML
0.2 INJECTION, SOLUTION INTRAMUSCULAR; INTRAVENOUS; SUBCUTANEOUS EVERY 5 MIN PRN
Status: DISCONTINUED | OUTPATIENT
Start: 2023-03-10 | End: 2023-03-10 | Stop reason: HOSPADM

## 2023-03-10 RX ORDER — EPHEDRINE SULFATE 50 MG/ML
INJECTION, SOLUTION INTRAVENOUS
Status: DISCONTINUED | OUTPATIENT
Start: 2023-03-10 | End: 2023-03-10

## 2023-03-10 RX ORDER — MIDAZOLAM HYDROCHLORIDE 1 MG/ML
INJECTION INTRAMUSCULAR; INTRAVENOUS
Status: DISCONTINUED | OUTPATIENT
Start: 2023-03-10 | End: 2023-03-10

## 2023-03-10 RX ORDER — ENALAPRILAT 1.25 MG/ML
1.25 INJECTION INTRAVENOUS EVERY 6 HOURS PRN
Status: DISCONTINUED | OUTPATIENT
Start: 2023-03-10 | End: 2023-03-13

## 2023-03-10 RX ADMIN — LIDOCAINE HYDROCHLORIDE 100 MG: 10 INJECTION, SOLUTION INTRAVENOUS at 01:03

## 2023-03-10 RX ADMIN — FAMOTIDINE 20 MG: 10 INJECTION, SOLUTION INTRAVENOUS at 01:03

## 2023-03-10 RX ADMIN — VASOPRESSIN 2 UNITS: 20 INJECTION INTRAVENOUS at 01:03

## 2023-03-10 RX ADMIN — PROPOFOL 150 MG: 10 INJECTION, EMULSION INTRAVENOUS at 01:03

## 2023-03-10 RX ADMIN — PHENYLEPHRINE HYDROCHLORIDE 200 MCG: 10 INJECTION INTRAVENOUS at 01:03

## 2023-03-10 RX ADMIN — SODIUM CHLORIDE: 0.9 INJECTION, SOLUTION INTRAVENOUS at 01:03

## 2023-03-10 RX ADMIN — PANTOPRAZOLE SODIUM 40 MG: 40 INJECTION, POWDER, FOR SOLUTION INTRAVENOUS at 02:03

## 2023-03-10 RX ADMIN — DEXAMETHASONE SODIUM PHOSPHATE 4 MG: 4 INJECTION, SOLUTION INTRAMUSCULAR; INTRAVENOUS at 01:03

## 2023-03-10 RX ADMIN — DEXTROSE MONOHYDRATE: 100 INJECTION, SOLUTION INTRAVENOUS at 12:03

## 2023-03-10 RX ADMIN — EPHEDRINE SULFATE 10 MG: 50 INJECTION INTRAVENOUS at 01:03

## 2023-03-10 RX ADMIN — ROCURONIUM BROMIDE 10 MG: 10 INJECTION INTRAVENOUS at 01:03

## 2023-03-10 RX ADMIN — SUCCINYLCHOLINE CHLORIDE 140 MG: 20 INJECTION, SOLUTION INTRAMUSCULAR; INTRAVENOUS at 01:03

## 2023-03-10 RX ADMIN — MIDAZOLAM HYDROCHLORIDE 2 MG: 1 INJECTION, SOLUTION INTRAMUSCULAR; INTRAVENOUS at 01:03

## 2023-03-10 RX ADMIN — ONDANSETRON 4 MG: 2 INJECTION INTRAMUSCULAR; INTRAVENOUS at 01:03

## 2023-03-10 NOTE — ASSESSMENT & PLAN NOTE
Nan Simms is a 68 yo female with PMHx of HTN and hypothyroidism here for gastric outlet obstruction being caused by gastric mass concerning for malignancy.    - EGD today to obtain tissue biopsy  - recommend continuing NGT  - recommend NPO + mIVF  - pending tissue biopsy result xx

## 2023-03-10 NOTE — NURSING
Pt b/p high Pt wants ng tube out Pt hr is running low 44-55  Notified [facility] team Md made aware via secure chat

## 2023-03-10 NOTE — H&P
[facility] Medicine  History & Physical    Patient Name: Nan Simms  MRN: [id number]  Patient Class: IP- Inpatient  Admission Date: 3/9/2023  Attending Physician: Eden Bautista MD   Primary Care Provider: Payal Moreland NP    Patient information was obtained from patient and ER records.     Subjective:     Principal Problem:Gastric cancer     Chief Complaint:   Chief Complaint   Patient presents with    Abdominal Pain      HPI: 67-year-old woman Hx of HTN and hypothyroidism presented to [facility] today with abdominal pain and constipation for several weeks ago. She initially presented to the ED on 2/24 and again on 3/3 for abdominal bloating and constipation, abdominal x-ray was obtained and patient was then discharged home; but she returned today with worsening abdominal pain. Patient has tried taking laxatives/stool softeners but was not able to have a bowel movement. Endoreses chills the last few nights. States she has lost around 10 pounds the past 3 months. Appetite remains unchanged. Patient is a current smoker 0.5 pack per day for last 30 years. Family history significant for mother with pancreatic cancer.     In the ED , patient was found to have WBC 7.2, Hb 11.0, Plts 289, CMP unremarkable, lipase 113, U/A RBC 12, WBC 85, bacteria few.  CT abdomen concerning for a malignancy involving the pylorus and duodenum associated with gastric outlet obstruction  An NGT was placed with return of a large amount of gastric contents and patient was transferred for higher level of care.     Past Medical History:   Diagnosis Date    Abnormal Pap smear of cervix     Arthritis     Hypertension     Hyperthyroidism        Past Surgical History:   Procedure Laterality Date    CATARACT EXTRACTION W/  INTRAOCULAR LENS IMPLANT Right 8/8/2019    Procedure: EXTRACTION, CATARACT, WITH IOL INSERTION;  Surgeon: Spenser Lee MD;  Location: [facility];  Service: Ophthalmology;  Laterality: Right;    EYE SURGERY       HYSTERECTOMY      OOPHORECTOMY      PHACOEMULSIFICATION OF CATARACT Right 8/8/2019    Procedure: PHACOEMULSIFICATION, CATARACT;  Surgeon: Spenser Lee MD;  Location: [facility];  Service: Ophthalmology;  Laterality: Right;       Review of patient's allergies indicates:   Allergen Reactions    Pcn [penicillins]      Childhood        Current Facility-Administered Medications on File Prior to Encounter   Medication    [COMPLETED] hydroCHLOROthiazide tablet 12.5 mg    [COMPLETED] iohexoL (OMNIPAQUE 350) injection 75 mL    [COMPLETED] levoFLOXacin 500 mg/100 mL IVPB 500 mg    [COMPLETED] lisinopriL tablet 20 mg    [COMPLETED] sodium chloride 0.9% bolus 500 mL 500 mL    [DISCONTINUED] levoFLOXacin 500 mg/100 mL IVPB 500 mg     Current Outpatient Medications on File Prior to Encounter   Medication Sig    aluminum-magnesium hydroxide-simethicone (MAALOX ADVANCED) 200-200-20 mg/5 mL Susp Take 30 mLs by mouth 4 (four) times daily before meals and nightly.    aspirin (ECOTRIN) 81 MG EC tablet Take 1 tablet (81 mg total) by mouth once daily.    atorvastatin (LIPITOR) 20 MG tablet Take 1 tablet (20 mg total) by mouth once daily.    docusate sodium (COLACE) 100 MG capsule Take 1 capsule (100 mg total) by mouth once daily.    lisinopriL-hydrochlorothiazide (PRINZIDE,ZESTORETIC) 20-12.5 mg per tablet Take 1 tablet by mouth once daily.    meloxicam (MOBIC) 15 MG tablet Take 1 tablet (15 mg total) by mouth once daily. As needed    omeprazole (PRILOSEC) 40 MG capsule Take 1 capsule (40 mg total) by mouth once daily.    polyethylene glycol (GLYCOLAX) 17 gram PwPk Take 17 g by mouth once daily.     Family History       Problem Relation (Age of Onset)    Cancer Mother          Tobacco Use    Smoking status: Every Day     Packs/day: 0.50     Years: 30.00     Pack years: 15.00     Types: Cigarettes    Smokeless tobacco: Current   Substance and Sexual Activity    Alcohol use: Yes     Alcohol/week: 1.0 standard drink     Types: 1 Cans  of beer per week    Drug use: No    Sexual activity: Yes     Partners: Male       Objective:     Vital Signs (Most Recent):  Pulse: (!) 47 (03/09/23 2100)  Resp: 20 (03/09/23 2100)  BP: (!) 166/74 (03/09/23 2100)  SpO2: 98 % (03/09/23 2100)   Vital Signs (24h Range):  Temp:  [98.7 °F (37.1 °C)] 98.7 °F (37.1 °C)  Pulse:  [45-64] 47  Resp:  [18-20] 20  SpO2:  [96 %-100 %] 98 %  BP: (134-204)/(68-84) 166/74        There is no height or weight on file to calculate BMI.    Physical Exam  Constitutional:       Appearance: Normal appearance.   Eyes:      General: No scleral icterus.     Conjunctiva/sclera: Conjunctivae normal.   Cardiovascular:      Rate and Rhythm: Normal rate and regular rhythm.      Heart sounds: No murmur heard.     Comments: Loud S2  Pulmonary:      Effort: Pulmonary effort is normal.      Breath sounds: No wheezing, rhonchi or rales.   Abdominal:      General: There is no distension.      Palpations: Abdomen is soft.      Tenderness: There is no abdominal tenderness. There is no guarding.      Comments: Hypoactive bowel sounds   Musculoskeletal:      Right lower leg: No edema.      Left lower leg: No edema.   Skin:     General: Skin is warm.   Neurological:      General: No focal deficit present.      Mental Status: She is alert.           Significant Labs: All pertinent labs within the past 24 hours have been reviewed.    Significant Imaging: I have reviewed all pertinent imaging results/findings within the past 24 hours.  Assessment/Plan:     * Gastric cancer  - CT Concerning for gastric cancer  - Will need further work up   - Irregular appearance to the iliac bones.While this could be degenerative in nature, malignancy is not entirely excluded.  - See Gastric outlet obstruction    Gastric outlet obstruction  - IV Protonix 40 mg, daily  - NPO; Continue with NGT to suction  - Zofran 4 mg IVP q8h PRN for nausea  - Pain management  - Consult Gastroenterology and General Surgery    Anemia  - Follow up  Iron studies  - Monitor H&H    Diabetes mellitus type 2, diet-controlled  - Last A1c: 5.6 (11/11/2022)  - FS + LSS  - Patient is currently NPO; monitor for hypoglycemia  - Hold Oral hypoglycemics while patient is in the hospital.    Hyperlipidemia  - Atorvastatin 20 mg, daily  - Will hold for now    Essential hypertension  - Resume home medication      VTE Risk Mitigation (From admission, onward)           Ordered     Place sequential compression device  Until discontinued         03/09/23 2137     IP VTE LOW RISK PATIENT  Once         03/09/23 2137                     Billie Grayson MD  Department of Hospital Medicine   [facility] S[facility]

## 2023-03-10 NOTE — SUBJECTIVE & OBJECTIVE
Interval History: Denies any abdominal pain at the moment. Stated that her nausea, vomiting and abdominal pain resolved after the NGT placement.     Review of Systems   Constitutional:  Negative for chills and fever.   HENT:  Negative for sore throat.    Respiratory:  Negative for cough and shortness of breath.    Cardiovascular:  Negative for chest pain and palpitations.   Gastrointestinal:  Negative for abdominal pain, nausea and vomiting.   Genitourinary:  Negative for dysuria, frequency and urgency.   Musculoskeletal:  Negative for back pain and myalgias.   Skin:  Negative for rash.   Neurological:  Negative for dizziness.   Psychiatric/Behavioral:  Negative for confusion.    Objective:     Vital Signs (Most Recent):  Temp: 98.1 °F (36.7 °C) (03/10/23 0900)  Pulse: (!) 45 (03/10/23 0950)  Resp: 18 (03/10/23 0900)  BP: (!) 159/70 (03/10/23 0950)  SpO2: 98 % (03/10/23 0900)   Vital Signs (24h Range):  Temp:  [98.1 °F (36.7 °C)-98.6 °F (37 °C)] 98.1 °F (36.7 °C)  Pulse:  [45-60] 45  Resp:  [18-20] 18  SpO2:  [96 %-99 %] 98 %  BP: (134-197)/(70-84) 159/70     Weight: 73 kg (161 lb)  Body mass index is 25.99 kg/m².    Intake/Output Summary (Last 24 hours) at 3/10/2023 1138  Last data filed at 3/10/2023 0530  Gross per 24 hour   Intake --   Output 200 ml   Net -200 ml      Physical Exam  Vitals reviewed.   Constitutional:       Appearance: Normal appearance.   HENT:      Head: Normocephalic and atraumatic.      Mouth/Throat:      Mouth: Mucous membranes are moist.   Eyes:      Extraocular Movements: Extraocular movements intact.      Conjunctiva/sclera: Conjunctivae normal.      Pupils: Pupils are equal, round, and reactive to light.   Cardiovascular:      Rate and Rhythm: Normal rate and regular rhythm.   Pulmonary:      Effort: Pulmonary effort is normal.      Breath sounds: Normal breath sounds.   Abdominal:      General: Abdomen is flat.      Palpations: Abdomen is soft.   Musculoskeletal:         General: No  swelling or tenderness. Normal range of motion.      Cervical back: Normal range of motion and neck supple.   Skin:     General: Skin is warm.   Neurological:      General: No focal deficit present.      Mental Status: She is alert and oriented to person, place, and time.   Psychiatric:         Mood and Affect: Mood normal.         Behavior: Behavior normal.       Significant Labs: All pertinent labs within the past 24 hours have been reviewed.  BMP:   Recent Labs   Lab 03/10/23  0511   *      K 3.9      CO2 27   BUN 22   CREATININE 0.7   CALCIUM 9.9   MG 2.1     CBC:   Recent Labs   Lab 03/09/23  0349 03/10/23  0511   WBC 7.27 6.89   HGB 11.0* 11.5*   HCT 35.4* 36.5*    306     CMP:   Recent Labs   Lab 03/09/23 0349 03/09/23  2153 03/10/23  0511    139 138   K 4.1 4.2 3.9    101 101   CO2 29 25 27   * 96 111*   BUN 28* 20 22   CREATININE 0.8 0.7 0.7   CALCIUM 10.5 9.8 9.9   PROT 7.4 7.4  --    ALBUMIN 3.7 3.6  --    BILITOT 0.3 0.4  --    ALKPHOS 70 74  --    AST 12 15  --    ALT 14 11  --    ANIONGAP 9 13 10       Significant Imaging: I have reviewed all pertinent imaging results/findings within the past 24 hours.

## 2023-03-10 NOTE — [employer] PATIENT INSTRUCTIONS
Discharge Summary/Instructions after an Endoscopic Procedure  Patient Name: Nan Simms  Patient MRN: [id number]  Patient YOB: 1955  Friday, March 10, 2023  Shashi Tapia MD  Dear patient,  As a result of recent federal legislation (The Federal Cures Act), you may   receive lab or pathology results from your procedure in your [facility]   account before your physician is able to contact you. Your physician or   their representative will relay the results to you with their   recommendations at their soonest availability.  Thank you,  RESTRICTIONS:  During your procedure today, you received medications for sedation.  These   medications may affect your judgment, balance and coordination.  Therefore,   for 24 hours, you have the following restrictions:   - DO NOT drive a car, operate machinery, make legal/financial decisions,   sign important papers or drink alcohol.    ACTIVITY:  Today: no heavy lifting, straining or running due to procedural   sedation/anesthesia.  The following day: return to full activity including work.  DIET:  Eat and drink normally unless instructed otherwise.     TREATMENT FOR COMMON SIDE EFFECTS:  - Mild abdominal pain, nausea, belching, bloating or excessive gas:  rest,   eat lightly and use a heating pad.  - Sore Throat: treat with throat lozenges and/or gargle with warm salt   water.  - Because air was used during the procedure, expelling large amounts of air   from your rectum or belching is normal.  - If a bowel prep was taken, you may not have a bowel movement for 1-3 days.    This is normal.  SYMPTOMS TO WATCH FOR AND REPORT TO YOUR PHYSICIAN:  1. Abdominal pain or bloating, other than gas cramps.  2. Chest pain.  3. Back pain.  4. Signs of infection such as: chills or fever occurring within 24 hours   after the procedure.  5. Rectal bleeding, which would show as bright red, maroon, or black stools.   (A tablespoon of blood from the rectum is not serious, especially if    hemorrhoids are present.)  6. Vomiting.  7. Weakness or dizziness.  GO DIRECTLY TO THE NEAREST EMERGENCY ROOM IF YOU HAVE ANY OF THE FOLLOWING:      Difficulty breathing              Chills and/or fever over 101 F   Persistent vomiting and/or vomiting blood   Severe abdominal pain   Severe chest pain   Black, tarry stools   Bleeding- more than one tablespoon   Any other symptom or condition that you feel may need urgent attention  Your doctor recommends these additional instructions:  If any biopsies were taken, your doctors clinic will contact you in 1 to 2   weeks with any results.  - Return patient to [facility] for ongoing care.   - NPO.   - Continue present medications.   - Await pathology results.   - Refer to an oncologic surgeon and oncologist at appointment to be   scheduled.   - Perform a flat plate abdominal x-ray to confirm NGT in the stomach.  For questions, problems or results please call your physician - Shashi Tapia MD at Work:  [phone].  [facility], EMERGENCY ROOM PHONE NUMBER: [phone]  IF A COMPLICATION OR EMERGENCY SITUATION ARISES AND YOU ARE UNABLE TO REACH   YOUR PHYSICIAN - GO DIRECTLY TO THE EMERGENCY ROOM.  Shashi Tapia MD  3/10/2023 1:59:12 PM  This report has been verified and signed electronically.  Dear patient,  As a result of recent federal legislation (The Federal Cures Act), you may   receive lab or pathology results from your procedure in your [facility]   account before your physician is able to contact you. Your physician or   their representative will relay the results to you with their   recommendations at their soonest availability.  Thank you,  [employer]OVATION

## 2023-03-10 NOTE — TRANSFER OF CARE
"Anesthesia Transfer of Care Note    Patient: Nan Simms    Procedure(s) Performed: Procedure(s) (LRB):  EGD (ESOPHAGOGASTRODUODENOSCOPY) (N/A)    Patient location: PACU    Anesthesia Type: general    Transport from OR: Transported from OR on room air with adequate spontaneous ventilation. Transported from OR on 6-10 L/min O2 by face mask with adequate spontaneous ventilation    Post pain: adequate analgesia    Post assessment: no apparent anesthetic complications and tolerated procedure well    Post vital signs: stable    Level of consciousness: awake    Nausea/Vomiting: no nausea/vomiting    Complications: none    Transfer of care protocol was followed      Last vitals:   Visit Vitals  BP (!) 203/83 (BP Location: Left arm, Patient Position: Lying)   Pulse (!) 51   Temp 36.7 °C (98.1 °F) (Temporal)   Resp 18   Ht 5' 6" (1.676 m)   Wt 73 kg (161 lb)   SpO2 (!) 94%   Breastfeeding No   BMI 25.99 kg/m²     "
5

## 2023-03-10 NOTE — NURSING
Pt arrived via stretcher  Pt AAOx4  Pt skin assessment done Pt skin intact Pt able to make needs known none at this time.Call button within  reach No acute distress noted.

## 2023-03-10 NOTE — H&P
Short Stay Endoscopy History and Physical    PCP - Payal Moreland NP    Procedure - EGD  ASA - per anesthesia  Mallampati - per anesthesia  History of Anesthesia problems - no  Family history Anesthesia problems -  no   Plan of anesthesia - General    HPI:  This is a 67 y.o. female here for evaluation of : abdominal pain    ROS:  Constitutional: No fevers, chills  CV: No chest pain  Pulm: No shortness of breath  GI: see HPI  Derm: No rash    Medical History:  has a past medical history of Abnormal Pap smear of cervix, Arthritis, Hypertension, and Hyperthyroidism.    Surgical History:  has a past surgical history that includes Hysterectomy; Oophorectomy; Phacoemulsification of cataract (Right, 8/8/2019); Cataract extraction w/  intraocular lens implant (Right, 8/8/2019); and Eye surgery.    Family History: family history includes Cancer in her mother.. Otherwise no colon cancer, inflammatory bowel disease, or GI malignancies.    Social History:  reports that she has been smoking cigarettes. She has a 15.00 pack-year smoking history. She uses smokeless tobacco. She reports current alcohol use of about 1.0 standard drink per week. She reports that she does not use drugs.    Review of patient's allergies indicates:   Allergen Reactions    Pcn [penicillins]      Childhood        Medications:   Medications Prior to Admission   Medication Sig Dispense Refill Last Dose    aluminum-magnesium hydroxide-simethicone (MAALOX ADVANCED) 200-200-20 mg/5 mL Susp Take 30 mLs by mouth 4 (four) times daily before meals and nightly. 300 mL 0     aspirin (ECOTRIN) 81 MG EC tablet Take 1 tablet (81 mg total) by mouth once daily.  0     atorvastatin (LIPITOR) 20 MG tablet Take 1 tablet (20 mg total) by mouth once daily. 90 tablet 3     docusate sodium (COLACE) 100 MG capsule Take 1 capsule (100 mg total) by mouth once daily. 30 capsule 1     lisinopriL-hydrochlorothiazide (PRINZIDE,ZESTORETIC) 20-12.5 mg per tablet Take 1 tablet by  mouth once daily. 90 tablet 3     meloxicam (MOBIC) 15 MG tablet Take 1 tablet (15 mg total) by mouth once daily. As needed 90 tablet 1     omeprazole (PRILOSEC) 40 MG capsule Take 1 capsule (40 mg total) by mouth once daily. 30 capsule 1     polyethylene glycol (GLYCOLAX) 17 gram PwPk Take 17 g by mouth once daily. 3 packet 0          Physical Exam:    Vital Signs:   Vitals:    03/10/23 1246   BP: (!) 203/83   Pulse: (!) 51   Resp: 18   Temp: 98.1 °F (36.7 °C)       General Appearance: Well appearing in no acute distress  Eyes:    No scleral icterus  ENT: lips and tongue normal  Lungs: no use of accessory muscles  Heart:  normal rate, regular rhythm  Abdomen: Soft, non tender, non distended   Extremities: no edema  Skin: No rash      Labs:  Lab Results   Component Value Date    WBC 6.89 03/10/2023    HGB 11.5 (L) 03/10/2023    HCT 36.5 (L) 03/10/2023     03/10/2023    CHOL 203 (H) 05/27/2022    TRIG 159 (H) 05/27/2022    HDL 65 05/27/2022    ALT 11 03/09/2023    AST 15 03/09/2023     03/10/2023    K 3.9 03/10/2023     03/10/2023    CREATININE 0.7 03/10/2023    BUN 22 03/10/2023    CO2 27 03/10/2023    TSH 0.996 06/17/2021    INR 1.0 03/10/2023    HGBA1C 5.6 03/09/2023       I have explained the risks and benefits of endoscopy procedures to the patient including but not limited to bleeding, perforation, infection, and death.  The patient was asked if they understand and allowed to ask any further questions to their satisfaction.    Dee Dee Saldaña MD

## 2023-03-10 NOTE — NURSING
Nurses Note -- 4 Eyes      3/9/2023   9:29 PM      Skin assessed during: Admit      [x] No Pressure Injuries Present    [x]Prevention Measures Documented      [] Yes- Altered Skin Integrity Present or Discovered   [] LDA Added if Not in Epic (Describe Wound)   [] New Altered Skin Integrity was Present on Admit and Documented in LDA   [] Wound Image Taken    Wound Care Consulted? No    Attending Nurse:  Clau Grubbs LPN     Second RN/Staff Member:  Mo KURTZ

## 2023-03-10 NOTE — CONSULTS
[facility]  Gastroenterology  Consult Note    Patient Name: Nan Simms  MRN: [id number]  Admission Date: 3/9/2023  Hospital Length of Stay: 1 days  Code Status: Full Code   Attending Provider: Eden Bautista MD   Consulting Provider: Derian Cardoza MD  Primary Care Physician: Payal Moreland NP  Principal Problem:Gastric cancer    Inpatient consult to Gastroenterology  Consult performed by: Derian Cardoza MD  Consult ordered by: Billie Grayson MD      Subjective:     HPI: Nan Simms is a 67 y.o. female with history of HTN and hypothyroidism who presented to [facility] for abd pain and constipation starting several weeks ago. Tried laxatives and has been unable to have a bowel movement. Has had chills the past few nights. Lost 10 lbs in 3 months. CT shows thickening of the pylorus with luminal narrowing with extension into soft tissues around duodenum. Stomach distended with fluid. Also some stomach wall thickening and wall edema in cardia, fundus and distal esophagus. Concerning for malignancy causing gastric outlet obstruction.    Reports brother had gastric cancer around age 78 and mother had pancreatic cancer. Denies abdominal pain. Feels significantly improved since NGT was placed.          Past Medical History:   Diagnosis Date    Abnormal Pap smear of cervix     Arthritis     Hypertension     Hyperthyroidism        Past Surgical History:   Procedure Laterality Date    CATARACT EXTRACTION W/  INTRAOCULAR LENS IMPLANT Right 8/8/2019    Procedure: EXTRACTION, CATARACT, WITH IOL INSERTION;  Surgeon: Spenser Lee MD;  Location: [facility];  Service: Ophthalmology;  Laterality: Right;    EYE SURGERY      HYSTERECTOMY      OOPHORECTOMY      PHACOEMULSIFICATION OF CATARACT Right 8/8/2019    Procedure: PHACOEMULSIFICATION, CATARACT;  Surgeon: Spenser Lee MD;  Location: [facility];  Service: Ophthalmology;  Laterality: Right;       Family History   Problem Relation Age of Onset     Cancer Mother     Breast cancer Neg Hx     Colon cancer Neg Hx     Ovarian cancer Neg Hx        Social History     Socioeconomic History    Marital status:    Tobacco Use    Smoking status: Every Day     Packs/day: 0.50     Years: 30.00     Pack years: 15.00     Types: Cigarettes    Smokeless tobacco: Current   Substance and Sexual Activity    Alcohol use: Yes     Alcohol/week: 1.0 standard drink     Types: 1 Cans of beer per week    Drug use: No    Sexual activity: Yes     Partners: Male       Current Facility-Administered Medications on File Prior to Encounter   Medication Dose Route Frequency Provider Last Rate Last Admin    [COMPLETED] hydroCHLOROthiazide tablet 12.5 mg  12.5 mg Oral ED 1 Time Spenser Rothman MD   12.5 mg at 03/09/23 0747    [COMPLETED] levoFLOXacin 500 mg/100 mL IVPB 500 mg  500 mg Intravenous ED 1 Time Red Kelly MD   Stopped at 03/09/23 0728    [COMPLETED] lisinopriL tablet 20 mg  20 mg Oral ED 1 Time Spenser Rothman MD   20 mg at 03/09/23 0747    [DISCONTINUED] levoFLOXacin 500 mg/100 mL IVPB 500 mg  500 mg Intravenous Q24H Spenser Rothman MD         Current Outpatient Medications on File Prior to Encounter   Medication Sig Dispense Refill    aluminum-magnesium hydroxide-simethicone (MAALOX ADVANCED) 200-200-20 mg/5 mL Susp Take 30 mLs by mouth 4 (four) times daily before meals and nightly. 300 mL 0    aspirin (ECOTRIN) 81 MG EC tablet Take 1 tablet (81 mg total) by mouth once daily.  0    atorvastatin (LIPITOR) 20 MG tablet Take 1 tablet (20 mg total) by mouth once daily. 90 tablet 3    docusate sodium (COLACE) 100 MG capsule Take 1 capsule (100 mg total) by mouth once daily. 30 capsule 1    lisinopriL-hydrochlorothiazide (PRINZIDE,ZESTORETIC) 20-12.5 mg per tablet Take 1 tablet by mouth once daily. 90 tablet 3    meloxicam (MOBIC) 15 MG tablet Take 1 tablet (15 mg total) by mouth once daily. As needed 90 tablet 1    omeprazole (PRILOSEC) 40 MG capsule Take 1  capsule (40 mg total) by mouth once daily. 30 capsule 1    polyethylene glycol (GLYCOLAX) 17 gram PwPk Take 17 g by mouth once daily. 3 packet 0       Review of patient's allergies indicates:   Allergen Reactions    Pcn [penicillins]      Childhood        Review of Systems   Constitutional:  Positive for weight loss. Negative for chills and fever.   HENT:  Negative for congestion and sore throat.    Eyes:  Negative for blurred vision and double vision.   Respiratory:  Negative for cough and shortness of breath.    Cardiovascular:  Negative for chest pain and palpitations.   Gastrointestinal:  Positive for constipation.        See HPI   Genitourinary:  Negative for frequency and urgency.   Musculoskeletal:  Negative for joint pain and myalgias.   Skin:  Negative for itching and rash.   Neurological:  Negative for sensory change and focal weakness.      Objective:     Vitals:    03/10/23 0316   BP: (!) 158/76   Pulse: (!) 56   Resp: 19   Temp: 98.6 °F (37 °C)         Constitutional:  not in acute distress and well developed  HENT: Head: Normal, normocephalic, atraumatic. NGT in place  Eyes: conjunctiva clear and sclera nonicteric  Cardiovascular: regular rate and rhythm and no murmur  Respiratory: normal chest expansion & respiratory effort   and no accessory muscle use  GI: soft, non-tender, without masses or organomegaly  Musculoskeletal: no muscular tenderness noted  Skin: normal color  Neurological: alert, oriented x3  Psychiatric: mood and affect are within normal limits, pt is a good historian; no memory problems were noted      Significant Labs:  Recent Labs   Lab 03/09/23  0349 03/10/23  0511   HGB 11.0* 11.5*       Lab Results   Component Value Date    WBC 6.89 03/10/2023    HGB 11.5 (L) 03/10/2023    HCT 36.5 (L) 03/10/2023    MCV 71 (L) 03/10/2023     03/10/2023       Lab Results   Component Value Date     03/10/2023    K 3.9 03/10/2023     03/10/2023    CO2 27 03/10/2023    BUN 22  03/10/2023    CREATININE 0.7 03/10/2023    CALCIUM 9.9 03/10/2023    ANIONGAP 10 03/10/2023    ESTGFRAFRICA >60 05/27/2022    EGFRNONAA >60 05/27/2022       Lab Results   Component Value Date    ALT 11 03/09/2023    AST 15 03/09/2023    ALKPHOS 74 03/09/2023    BILITOT 0.4 03/09/2023       Lab Results   Component Value Date    INR 1.0 03/10/2023    INR 1.0 09/09/2018       Significant Imaging:  Reviewed pertinent radiology findings.       Assessment/Plan:     Nan Simms is a 67 y.o. female with history of HTN and hypothyroidism who presents from [facility] for abdominal pain and constipation. Also with early satiety, n/v, abd distension. CT shows pyloric wall thickening with extension around duodenum some local lymphadenopathy. C/f gastric cancer. EGD with biopsies 3/10.    Problem List:  Pyloric mass  Gastric outlet obstruction      Recommendations:  - NPO  - NGT to low intermittent suction  - EGD 3/10    Thank you for involving us in the care of Nan Simms. Please call with any additional questions, concerns or changes in the patient's clinical status. We will continue to follow.     Derian Cardoza MD  Gastroenterology Fellow PGY IV  [facility]

## 2023-03-10 NOTE — ANESTHESIA PROCEDURE NOTES
Intubation    Date/Time: 3/10/2023 1:10 PM  Performed by: Celia Delgado CRNA  Authorized by: Yumiko Francis MD     Intubation:     Induction:  Intravenous    Intubated:  Postinduction    Mask Ventilation:  Not attempted    Attempts:  1    Attempted By:  CRNA    Method of Intubation:  Direct    Blade:  Quinteros 2    Laryngeal View Grade: Grade I - full view of cords      Difficult Airway Encountered?: No      Complications:  None    Airway Device:  Oral endotracheal tube    Airway Device Size:  7.0    Style/Cuff Inflation:  Cuffed    Inflation Amount (mL):  6    Tube secured:  21    Secured at:  The lips    Placement Verified By:  Capnometry    Complicating Factors:  None    Findings Post-Intubation:  BS equal bilateral

## 2023-03-10 NOTE — HPI
Nan Simms is a 67 year old female with PMHx of HTN and hypothyroidism who has had constipation and abdominal pain for 3 weeks. Says she became increasingly bloated and abdominal pain worsened and went to [facility] yesterday and was transferred to [facility] for higher level of care. She reports using enemas to relieve constipation but was unable to find relief and came to ED. She has not been nauseous however reports she vomited 3 times two days ago. She has been tolerating a solid and liquid diet. Reports has lost 13 lbs in last 3 weeks. Of note, she also notes a loss of appetite and increased fatigue over last several weeks. Reports she cannot recall if she has ever gotten a colonoscopy but daughter reports she does Cologuard yearly; patient says she has not done Cologuard in 4 years.    Upon admission, she was HDS and labs were consistent with iron deficiency anemia. CT A/P obtained 3/9 showed findings concerning for malignancy involving the pylorus and duodenum with component of gastric outlet obstruction.  NGT was placed 3/9 and patient has had resolution of her abdominal pain, nausea, and vomiting since placement.

## 2023-03-10 NOTE — ASSESSMENT & PLAN NOTE
- IV Protonix 40 mg, daily  - NPO; Continue with NGT to suction  - Zofran 4 mg IVP q8h PRN for nausea  - consulted Surgrical oncology for further recs.

## 2023-03-10 NOTE — TREATMENT PLAN
GI Post-Procedure Treatment Plan    EGD complete    Impression:            - LA Grade D esophagitis with no bleeding.                          - A large amount of food (residue) in the stomach.                          - Likely malignant gastric tumor at the pylorus                          extending to the first portion of the duodenum.                          The mass was partially obstructive. Biopsied.                          - Feeding tube placement was successfully                          performed.     Recommendation:        - Return patient to [facility] for ongoing care.                          - NPO.                          - Continue present medications.                          - Await pathology results.                          - Refer to an oncologic surgeon and oncologist at                          appointment to be scheduled.                          - Perform a flat plate abdominal x-ray to confirm                          NGT in the stomach.     Derian Cardoza  Gastroenterology Fellow, PGY-IV

## 2023-03-10 NOTE — PROGRESS NOTES
Discussed case briefly with GI Fellow on call; patient will be seen and evaluated in the AM.    Billie Grayson MD  [facility]

## 2023-03-10 NOTE — SUBJECTIVE & OBJECTIVE
Past Medical History:   Diagnosis Date    Abnormal Pap smear of cervix     Arthritis     Hypertension     Hyperthyroidism        Past Surgical History:   Procedure Laterality Date    CATARACT EXTRACTION W/  INTRAOCULAR LENS IMPLANT Right 8/8/2019    Procedure: EXTRACTION, CATARACT, WITH IOL INSERTION;  Surgeon: Spenser Lee MD;  Location: [facility];  Service: Ophthalmology;  Laterality: Right;    EYE SURGERY      HYSTERECTOMY      OOPHORECTOMY      PHACOEMULSIFICATION OF CATARACT Right 8/8/2019    Procedure: PHACOEMULSIFICATION, CATARACT;  Surgeon: Spenser Lee MD;  Location: [facility];  Service: Ophthalmology;  Laterality: Right;       Review of patient's allergies indicates:   Allergen Reactions    Pcn [penicillins]      Childhood        Current Facility-Administered Medications on File Prior to Encounter   Medication    [COMPLETED] hydroCHLOROthiazide tablet 12.5 mg    [COMPLETED] iohexoL (OMNIPAQUE 350) injection 75 mL    [COMPLETED] levoFLOXacin 500 mg/100 mL IVPB 500 mg    [COMPLETED] lisinopriL tablet 20 mg    [COMPLETED] sodium chloride 0.9% bolus 500 mL 500 mL    [DISCONTINUED] levoFLOXacin 500 mg/100 mL IVPB 500 mg     Current Outpatient Medications on File Prior to Encounter   Medication Sig    aluminum-magnesium hydroxide-simethicone (MAALOX ADVANCED) 200-200-20 mg/5 mL Susp Take 30 mLs by mouth 4 (four) times daily before meals and nightly.    aspirin (ECOTRIN) 81 MG EC tablet Take 1 tablet (81 mg total) by mouth once daily.    atorvastatin (LIPITOR) 20 MG tablet Take 1 tablet (20 mg total) by mouth once daily.    docusate sodium (COLACE) 100 MG capsule Take 1 capsule (100 mg total) by mouth once daily.    lisinopriL-hydrochlorothiazide (PRINZIDE,ZESTORETIC) 20-12.5 mg per tablet Take 1 tablet by mouth once daily.    meloxicam (MOBIC) 15 MG tablet Take 1 tablet (15 mg total) by mouth once daily. As needed    omeprazole (PRILOSEC) 40 MG capsule Take 1 capsule (40 mg total) by mouth once  daily.    polyethylene glycol (GLYCOLAX) 17 gram PwPk Take 17 g by mouth once daily.     Family History       Problem Relation (Age of Onset)    Cancer Mother          Tobacco Use    Smoking status: Every Day     Packs/day: 0.50     Years: 30.00     Pack years: 15.00     Types: Cigarettes    Smokeless tobacco: Current   Substance and Sexual Activity    Alcohol use: Yes     Alcohol/week: 1.0 standard drink     Types: 1 Cans of beer per week    Drug use: No    Sexual activity: Yes     Partners: Male       Objective:     Vital Signs (Most Recent):  Pulse: (!) 47 (03/09/23 2100)  Resp: 20 (03/09/23 2100)  BP: (!) 166/74 (03/09/23 2100)  SpO2: 98 % (03/09/23 2100)   Vital Signs (24h Range):  Temp:  [98.7 °F (37.1 °C)] 98.7 °F (37.1 °C)  Pulse:  [45-64] 47  Resp:  [18-20] 20  SpO2:  [96 %-100 %] 98 %  BP: (134-204)/(68-84) 166/74        There is no height or weight on file to calculate BMI.    Physical Exam  Constitutional:       Appearance: Normal appearance.   Eyes:      General: No scleral icterus.     Conjunctiva/sclera: Conjunctivae normal.   Cardiovascular:      Rate and Rhythm: Normal rate and regular rhythm.      Heart sounds: No murmur heard.     Comments: Loud S2  Pulmonary:      Effort: Pulmonary effort is normal.      Breath sounds: No wheezing, rhonchi or rales.   Abdominal:      General: There is no distension.      Palpations: Abdomen is soft.      Tenderness: There is no abdominal tenderness. There is no guarding.      Comments: Hypoactive bowel sounds   Musculoskeletal:      Right lower leg: No edema.      Left lower leg: No edema.   Skin:     General: Skin is warm.   Neurological:      General: No focal deficit present.      Mental Status: She is alert.           Significant Labs: All pertinent labs within the past 24 hours have been reviewed.    Significant Imaging: I have reviewed all pertinent imaging results/findings within the past 24 hours.

## 2023-03-10 NOTE — ASSESSMENT & PLAN NOTE
- Last A1c: 5.6 (11/11/2022)  - FS + LSS  - Patient is currently NPO; monitor for hypoglycemia  - Hold Oral hypoglycemics while patient is in the hospital.

## 2023-03-10 NOTE — ASSESSMENT & PLAN NOTE
- Iron deficiency anemia. Possibly mild chronic bleeding from her gastric mass.   - EGD today.  - will start iron tablets after pt is no longer npo.  - transfuse if hgb<7

## 2023-03-10 NOTE — PLAN OF CARE
Problem: Adult Inpatient Plan of Care  Goal: Optimal Comfort and Wellbeing  Outcome: Ongoing, Progressing     Problem: Adult Inpatient Plan of Care  Goal: Absence of Hospital-Acquired Illness or Injury  Outcome: Ongoing, Progressing     Problem: Adult Inpatient Plan of Care  Goal: Plan of Care Review  Outcome: Ongoing, Progressing     Problem: Adult Inpatient Plan of Care  Goal: Plan of Care Review  Outcome: Ongoing, Progressing  Goal: Patient-Specific Goal (Individualized)  Outcome: Ongoing, Progressing  Goal: Absence of Hospital-Acquired Illness or Injury  Outcome: Ongoing, Progressing  Goal: Optimal Comfort and Wellbeing  Outcome: Ongoing, Progressing  Goal: Readiness for Transition of Care  Outcome: Ongoing, Progressing

## 2023-03-10 NOTE — SUBJECTIVE & OBJECTIVE
Current Facility-Administered Medications on File Prior to Encounter   Medication    [DISCONTINUED] levoFLOXacin 500 mg/100 mL IVPB 500 mg     Current Outpatient Medications on File Prior to Encounter   Medication Sig    aluminum-magnesium hydroxide-simethicone (MAALOX ADVANCED) 200-200-20 mg/5 mL Susp Take 30 mLs by mouth 4 (four) times daily before meals and nightly.    aspirin (ECOTRIN) 81 MG EC tablet Take 1 tablet (81 mg total) by mouth once daily.    atorvastatin (LIPITOR) 20 MG tablet Take 1 tablet (20 mg total) by mouth once daily.    docusate sodium (COLACE) 100 MG capsule Take 1 capsule (100 mg total) by mouth once daily.    lisinopriL-hydrochlorothiazide (PRINZIDE,ZESTORETIC) 20-12.5 mg per tablet Take 1 tablet by mouth once daily.    meloxicam (MOBIC) 15 MG tablet Take 1 tablet (15 mg total) by mouth once daily. As needed    omeprazole (PRILOSEC) 40 MG capsule Take 1 capsule (40 mg total) by mouth once daily.    polyethylene glycol (GLYCOLAX) 17 gram PwPk Take 17 g by mouth once daily.       Review of patient's allergies indicates:   Allergen Reactions    Pcn [penicillins]      Childhood        Past Medical History:   Diagnosis Date    Abnormal Pap smear of cervix     Arthritis     Hypertension     Hyperthyroidism      Past Surgical History:   Procedure Laterality Date    CATARACT EXTRACTION W/  INTRAOCULAR LENS IMPLANT Right 8/8/2019    Procedure: EXTRACTION, CATARACT, WITH IOL INSERTION;  Surgeon: Spenser Lee MD;  Location: [facility];  Service: Ophthalmology;  Laterality: Right;    EYE SURGERY      HYSTERECTOMY      OOPHORECTOMY      PHACOEMULSIFICATION OF CATARACT Right 8/8/2019    Procedure: PHACOEMULSIFICATION, CATARACT;  Surgeon: Spenser Lee MD;  Location: [facility];  Service: Ophthalmology;  Laterality: Right;     Family History       Problem Relation (Age of Onset)    Cancer Mother          Tobacco Use    Smoking status: Every Day     Packs/day: 0.50     Years: 30.00     Pack years:  15.00     Types: Cigarettes    Smokeless tobacco: Current   Substance and Sexual Activity    Alcohol use: Yes     Alcohol/week: 1.0 standard drink     Types: 1 Cans of beer per week    Drug use: No    Sexual activity: Yes     Partners: Male     Review of Systems   Constitutional:  Positive for fatigue. Negative for chills, diaphoresis and fever.   Respiratory:  Negative for cough, chest tightness and shortness of breath.    Cardiovascular:  Negative for chest pain and palpitations.   Gastrointestinal:  Negative for abdominal distention, abdominal pain, nausea and vomiting.   Neurological:  Negative for dizziness, weakness and light-headedness.   Psychiatric/Behavioral:  Negative for agitation.    Objective:     Vital Signs (Most Recent):  Temp: 98.1 °F (36.7 °C) (03/10/23 1246)  Pulse: (!) 51 (03/10/23 1246)  Resp: 18 (03/10/23 1246)  BP: (!) 203/83 (03/10/23 1246)  SpO2: (!) 94 % (03/10/23 1246)   Vital Signs (24h Range):  Temp:  [98.1 °F (36.7 °C)-98.6 °F (37 °C)] 98.1 °F (36.7 °C)  Pulse:  [45-60] 51  Resp:  [18-20] 18  SpO2:  [94 %-98 %] 94 %  BP: (134-203)/(70-84) 203/83     Weight: 73 kg (161 lb)  Body mass index is 25.99 kg/m².    Physical Exam  Constitutional:       General: She is not in acute distress.  Cardiovascular:      Rate and Rhythm: Normal rate and regular rhythm.   Pulmonary:      Effort: Pulmonary effort is normal. No respiratory distress.      Breath sounds: Normal breath sounds.   Abdominal:      General: Abdomen is flat. Bowel sounds are normal. There is no distension.      Palpations: Abdomen is soft.      Tenderness: There is no abdominal tenderness. There is no guarding.       Significant Labs:  I have reviewed all pertinent lab results within the past 24 hours.  CBC:   Recent Labs   Lab 03/10/23  0511   WBC 6.89   RBC 5.11   HGB 11.5*   HCT 36.5*      MCV 71*   MCH 22.5*   MCHC 31.5*     BMP:   Recent Labs   Lab 03/10/23  0511   *      K 3.9      CO2 27   BUN 22    CREATININE 0.7   CALCIUM 9.9   MG 2.1       Significant Diagnostics:  I have reviewed all pertinent imaging results/findings within the past 24 hours.

## 2023-03-10 NOTE — H&P
Pre-Procedure H and P Addendum    Patient seen.    Procedure:EGD  Indication: Gastric outlet obstruction  ASA Class:III  Mallampatti score: per anesthesia    Anesthesia Plan: General    The impression and plan was discussed in detail with the patient by Dr Saldaña (GI Fellow). All questions have been answered and the patient voices understanding of our plan at this point. The risk of the procedure was discussed in detail which includes but not limited to bleeding, infection, perforation in some cases requiring surgery with its spectrum of complications.

## 2023-03-10 NOTE — NURSING
REPORT REC., CARE ASSUMED, VSS, NAD, DENIES PAIN, ABD DIST., L RENA NGT TO LIWS, PT TIFFANI WELL, FULL ASSESSMENT COMPLETED, WILL MONITOR.

## 2023-03-10 NOTE — CONSULTS
[facility]  General Surgery  Consult Note    Patient Name: Nan Simms  MRN: [id number]  Code Status: Full Code  Admission Date: 3/9/2023  Hospital Length of Stay: 1 days  Attending Physician: Eden Bautista MD  Primary Care Provider: Payal Moreland NP    Patient information was obtained from patient, relative(s) and ER records.     Inpatient consult to Surgical Oncology  Consult performed by: Chase English MD  Consult ordered by: Eden Bautista MD        Subjective:     Principal Problem: Gastric outlet obstruction    History of Present Illness: Nan Simms is a 67 year old female with PMHx of HTN and hypothyroidism who has had constipation and abdominal pain for 3 weeks. Says she became increasingly bloated and abdominal pain worsened and went to [facility] yesterday and was transferred to [facility] for higher level of care. She reports using enemas to relieve constipation but was unable to find relief and came to ED. She has not been nauseous however reports she vomited 3 times two days ago. She has been tolerating a solid and liquid diet. Reports has lost 13 lbs in last 3 weeks. Of note, she also notes a loss of appetite and increased fatigue over last several weeks. Reports she cannot recall if she has ever gotten a colonoscopy but daughter reports she does Cologuard yearly; patient says she has not done Cologuard in 4 years.    Upon admission, she was HDS and labs were consistent with iron deficiency anemia. CT A/P obtained 3/9 showed findings concerning for malignancy involving the pylorus and duodenum with component of gastric outlet obstruction.  NGT was placed 3/9 and patient has had resolution of her abdominal pain, nausea, and vomiting since placement.      Current Facility-Administered Medications on File Prior to Encounter   Medication    [DISCONTINUED] levoFLOXacin 500 mg/100 mL IVPB 500 mg     Current Outpatient Medications on File Prior to Encounter   Medication Sig     aluminum-magnesium hydroxide-simethicone (MAALOX ADVANCED) 200-200-20 mg/5 mL Susp Take 30 mLs by mouth 4 (four) times daily before meals and nightly.    aspirin (ECOTRIN) 81 MG EC tablet Take 1 tablet (81 mg total) by mouth once daily.    atorvastatin (LIPITOR) 20 MG tablet Take 1 tablet (20 mg total) by mouth once daily.    docusate sodium (COLACE) 100 MG capsule Take 1 capsule (100 mg total) by mouth once daily.    lisinopriL-hydrochlorothiazide (PRINZIDE,ZESTORETIC) 20-12.5 mg per tablet Take 1 tablet by mouth once daily.    meloxicam (MOBIC) 15 MG tablet Take 1 tablet (15 mg total) by mouth once daily. As needed    omeprazole (PRILOSEC) 40 MG capsule Take 1 capsule (40 mg total) by mouth once daily.    polyethylene glycol (GLYCOLAX) 17 gram PwPk Take 17 g by mouth once daily.       Review of patient's allergies indicates:   Allergen Reactions    Pcn [penicillins]      Childhood        Past Medical History:   Diagnosis Date    Abnormal Pap smear of cervix     Arthritis     Hypertension     Hyperthyroidism      Past Surgical History:   Procedure Laterality Date    CATARACT EXTRACTION W/  INTRAOCULAR LENS IMPLANT Right 8/8/2019    Procedure: EXTRACTION, CATARACT, WITH IOL INSERTION;  Surgeon: Spenser Lee MD;  Location: [facility];  Service: Ophthalmology;  Laterality: Right;    EYE SURGERY      HYSTERECTOMY      OOPHORECTOMY      PHACOEMULSIFICATION OF CATARACT Right 8/8/2019    Procedure: PHACOEMULSIFICATION, CATARACT;  Surgeon: Spenser Lee MD;  Location: [facility];  Service: Ophthalmology;  Laterality: Right;     Family History       Problem Relation (Age of Onset)    Cancer Mother          Tobacco Use    Smoking status: Every Day     Packs/day: 0.50     Years: 30.00     Pack years: 15.00     Types: Cigarettes    Smokeless tobacco: Current   Substance and Sexual Activity    Alcohol use: Yes     Alcohol/week: 1.0 standard drink     Types: 1 Cans of beer per week    Drug use: No    Sexual activity:  Yes     Partners: Male     Review of Systems   Constitutional:  Positive for fatigue. Negative for chills, diaphoresis and fever.   Respiratory:  Negative for cough, chest tightness and shortness of breath.    Cardiovascular:  Negative for chest pain and palpitations.   Gastrointestinal:  Negative for abdominal distention, abdominal pain, nausea and vomiting.   Neurological:  Negative for dizziness, weakness and light-headedness.   Psychiatric/Behavioral:  Negative for agitation.    Objective:     Vital Signs (Most Recent):  Temp: 98.1 °F (36.7 °C) (03/10/23 1246)  Pulse: (!) 51 (03/10/23 1246)  Resp: 18 (03/10/23 1246)  BP: (!) 203/83 (03/10/23 1246)  SpO2: (!) 94 % (03/10/23 1246)   Vital Signs (24h Range):  Temp:  [98.1 °F (36.7 °C)-98.6 °F (37 °C)] 98.1 °F (36.7 °C)  Pulse:  [45-60] 51  Resp:  [18-20] 18  SpO2:  [94 %-98 %] 94 %  BP: (134-203)/(70-84) 203/83     Weight: 73 kg (161 lb)  Body mass index is 25.99 kg/m².    Physical Exam  Constitutional:       General: She is not in acute distress.  Cardiovascular:      Rate and Rhythm: Normal rate and regular rhythm.   Pulmonary:      Effort: Pulmonary effort is normal. No respiratory distress.      Breath sounds: Normal breath sounds.   Abdominal:      General: Abdomen is flat. Bowel sounds are normal. There is no distension.      Palpations: Abdomen is soft.      Tenderness: There is no abdominal tenderness. There is no guarding.       Significant Labs:  I have reviewed all pertinent lab results within the past 24 hours.  CBC:   Recent Labs   Lab 03/10/23  0511   WBC 6.89   RBC 5.11   HGB 11.5*   HCT 36.5*      MCV 71*   MCH 22.5*   MCHC 31.5*     BMP:   Recent Labs   Lab 03/10/23  0511   *      K 3.9      CO2 27   BUN 22   CREATININE 0.7   CALCIUM 9.9   MG 2.1       Significant Diagnostics:  I have reviewed all pertinent imaging results/findings within the past 24 hours.      Assessment/Plan:     * Gastric outlet obstruction  Nan  Demi is a 66 yo female with PMHx of HTN and hypothyroidism here for gastric outlet obstruction being caused by gastric mass concerning for malignancy.    - EGD today to obtain tissue biopsy  - recommend continuing NGT  - recommend NPO + mIVF  - recommend staging CT chest  - pending tissue biopsy result, surgical planning next week  - surgical oncology will continue to follow      VTE Risk Mitigation (From admission, onward)           Ordered     Place sequential compression device  Until discontinued         03/09/23 2137     IP VTE LOW RISK PATIENT  Once         03/09/23 2137                    Thank you for your consult. I will follow-up with patient. Please contact us if you have any additional questions.    Chase English MD  General Surgery  [facility]

## 2023-03-10 NOTE — ANESTHESIA POSTPROCEDURE EVALUATION
Anesthesia Post Evaluation    Patient: Nan Simms    Procedure(s) Performed: Procedure(s) (LRB):  EGD (ESOPHAGOGASTRODUODENOSCOPY) (N/A)    Final Anesthesia Type: general      Patient location during evaluation: PACU  Patient participation: Yes- Able to Participate  Level of consciousness: awake and alert and oriented  Post-procedure vital signs: reviewed and stable  Pain management: adequate  Airway patency: patent    PONV status at discharge: No PONV  Anesthetic complications: no      Cardiovascular status: blood pressure returned to baseline and hemodynamically stable  Respiratory status: unassisted and spontaneous ventilation  Hydration status: euvolemic  Follow-up not needed.          Vitals Value Taken Time   /70 03/10/23 1527   Temp 37.1 °C (98.7 °F) 03/10/23 1527   Pulse 89 03/10/23 1527   Resp 18 03/10/23 1527   SpO2 100 % 03/10/23 1527         Event Time   Out of Recovery 14:51:00         Pain/Tapan Score: Tapan Score: 9 (3/10/2023  2:00 PM)

## 2023-03-10 NOTE — ASSESSMENT & PLAN NOTE
- CT Concerning for gastric cancer  - Will need further work up   - Irregular appearance to the iliac bones.While this could be degenerative in nature, malignancy is not entirely excluded.  - See Gastric outlet obstruction

## 2023-03-10 NOTE — PROGRESS NOTES
[facility] Medicine  Progress Note    Patient Name: Nan Simms  MRN: [id number]  Patient Class: IP- Inpatient   Admission Date: 3/9/2023  Length of Stay: 1 days  Attending Physician: Eden Bautista MD  Primary Care Provider: Payal Moreland NP        Subjective:     Principal Problem:Gastric outlet obstruction        HPI:  67-year-old woman Hx of HTN and hypothyroidism presented to [facility] today with abdominal pain and constipation for several weeks ago. She initially presented to the ED on 2/24 and again on 3/3 for abdominal bloating and constipation, abdominal x-ray was obtained and patient was then discharged home; but she returned today with worsening abdominal pain. Patient has tried taking laxatives/stool softeners but was not able to have a bowel movement. Endoreses chills the last few nights. States she has lost around 10 pounds the past 3 months. Appetite remains unchanged. Patient is a current smoker 0.5 pack per day for last 30 years. Family history significant for mother with pancreatic cancer.     In the ED , patient was found to have WBC 7.2, Hb 11.0, Plts 289, CMP unremarkable, lipase 113, U/A RBC 12, WBC 85, bacteria few.  CT abdomen concerning for a malignancy involving the pylorus and duodenum associated with gastric outlet obstruction  An NGT was placed with return of a large amount of gastric contents and patient was transferred for higher level of care.       Overview/Hospital Course:  Seen by GI, plan is for EGD today.  Surgical oncology team has been consulted.         Interval History: Denies any abdominal pain at the moment. Stated that her nausea, vomiting and abdominal pain resolved after the NGT placement.     Review of Systems   Constitutional:  Negative for chills and fever.   HENT:  Negative for sore throat.    Respiratory:  Negative for cough and shortness of breath.    Cardiovascular:  Negative for chest pain and palpitations.   Gastrointestinal:  Negative for  abdominal pain, nausea and vomiting.   Genitourinary:  Negative for dysuria, frequency and urgency.   Musculoskeletal:  Negative for back pain and myalgias.   Skin:  Negative for rash.   Neurological:  Negative for dizziness.   Psychiatric/Behavioral:  Negative for confusion.    Objective:     Vital Signs (Most Recent):  Temp: 98.1 °F (36.7 °C) (03/10/23 0900)  Pulse: (!) 45 (03/10/23 0950)  Resp: 18 (03/10/23 0900)  BP: (!) 159/70 (03/10/23 0950)  SpO2: 98 % (03/10/23 0900)   Vital Signs (24h Range):  Temp:  [98.1 °F (36.7 °C)-98.6 °F (37 °C)] 98.1 °F (36.7 °C)  Pulse:  [45-60] 45  Resp:  [18-20] 18  SpO2:  [96 %-99 %] 98 %  BP: (134-197)/(70-84) 159/70     Weight: 73 kg (161 lb)  Body mass index is 25.99 kg/m².    Intake/Output Summary (Last 24 hours) at 3/10/2023 1138  Last data filed at 3/10/2023 0530  Gross per 24 hour   Intake --   Output 200 ml   Net -200 ml      Physical Exam  Vitals reviewed.   Constitutional:       Appearance: Normal appearance.   HENT:      Head: Normocephalic and atraumatic.      Mouth/Throat:      Mouth: Mucous membranes are moist.   Eyes:      Extraocular Movements: Extraocular movements intact.      Conjunctiva/sclera: Conjunctivae normal.      Pupils: Pupils are equal, round, and reactive to light.   Cardiovascular:      Rate and Rhythm: Normal rate and regular rhythm.   Pulmonary:      Effort: Pulmonary effort is normal.      Breath sounds: Normal breath sounds.   Abdominal:      General: Abdomen is flat.      Palpations: Abdomen is soft.   Musculoskeletal:         General: No swelling or tenderness. Normal range of motion.      Cervical back: Normal range of motion and neck supple.   Skin:     General: Skin is warm.   Neurological:      General: No focal deficit present.      Mental Status: She is alert and oriented to person, place, and time.   Psychiatric:         Mood and Affect: Mood normal.         Behavior: Behavior normal.       Significant Labs: All pertinent labs within  the past 24 hours have been reviewed.  BMP:   Recent Labs   Lab 03/10/23  0511   *      K 3.9      CO2 27   BUN 22   CREATININE 0.7   CALCIUM 9.9   MG 2.1     CBC:   Recent Labs   Lab 03/09/23  0349 03/10/23  0511   WBC 7.27 6.89   HGB 11.0* 11.5*   HCT 35.4* 36.5*    306     CMP:   Recent Labs   Lab 03/09/23  0349 03/09/23  2153 03/10/23  0511    139 138   K 4.1 4.2 3.9    101 101   CO2 29 25 27   * 96 111*   BUN 28* 20 22   CREATININE 0.8 0.7 0.7   CALCIUM 10.5 9.8 9.9   PROT 7.4 7.4  --    ALBUMIN 3.7 3.6  --    BILITOT 0.3 0.4  --    ALKPHOS 70 74  --    AST 12 15  --    ALT 14 11  --    ANIONGAP 9 13 10       Significant Imaging: I have reviewed all pertinent imaging results/findings within the past 24 hours.      Assessment/Plan:      * Gastric outlet obstruction  - IV Protonix 40 mg, daily  - NPO; Continue with NGT to suction  - Zofran 4 mg IVP q8h PRN for nausea  - consulted Surgrical oncology for further recs.       Gastric cancer  - CT Concerning for gastric cancer  - going for EGD today for bx.       Essential hypertension  - will order Vasotec q8h for her elevated bp.     Anemia  - Iron deficiency anemia. Possibly mild chronic bleeding from her gastric mass.   - EGD today.  - will start iron tablets after pt is no longer npo.  - transfuse if hgb<7      Diabetes mellitus type 2, diet-controlled  - Last A1c: 5.6 (11/11/2022)  - FS + LSS  - Patient is currently NPO; monitor for hypoglycemia  - Hold Oral hypoglycemics while patient is in the hospital.    Hyperlipidemia  - Atorvastatin 20 mg, daily  - Will hold for now      VTE Risk Mitigation (From admission, onward)         Ordered     Place sequential compression device  Until discontinued         03/09/23 2137     IP VTE LOW RISK PATIENT  Once         03/09/23 2137                Discharge Planning   LOREN: 3/17/2023     Code Status: Full Code   Is the patient medically ready for discharge?: No    Reason for  patient still in hospital (select all that apply): Patient trending condition           Case discussed with GI and surgical oncology team.      Eden Bautista MD  Department of Hospital Medicine   [facility] Surg

## 2023-03-10 NOTE — ANESTHESIA PREPROCEDURE EVALUATION
03/10/2023  Nan Simms is a 67 y.o., female.  Per ROXANNE note:  Nan Simms is a 67 y.o. female with history of HTN and hypothyroidism who presented to [facility] for abd pain and constipation starting several weeks ago. Tried laxatives and has been unable to have a bowel movement. Has had chills the past few nights. Lost 10 lbs in 3 months. CT shows thickening of the pylorus with luminal narrowing with extension into soft tissues around duodenum. Stomach distended with fluid. Also some stomach wall thickening and wall edema in cardia, fundus and distal esophagus. Concerning for malignancy causing gastric outlet obstruction.     Reports brother had gastric cancer around age 78 and mother had pancreatic cancer. Denies abdominal pain. Feels significantly improved since NGT was placed.     Will plan for GETA    Pre-op Assessment    I have reviewed the Patient Summary Reports.     I have reviewed the Nursing Notes. I have reviewed the NPO Status.      Review of Systems  Anesthesia Hx:  No problems with previous Anesthesia    Cardiovascular:   Hypertension    Pulmonary:   Denies Shortness of breath.    Hepatic/GI:   GERD Dilated gastric antrum; NGT placed for decompression   Endocrine:   Diabetes Hyperthyroidism        Physical Exam  General: Well nourished, Cooperative, Alert and Oriented    Airway:  Mallampati: III   Mouth Opening: Small, but > 3cm  TM Distance: Normal  Neck ROM: Normal ROM  NG tube in place  Dental:  Intact, Dentures        Anesthesia Plan  Type of Anesthesia, risks & benefits discussed:    Anesthesia Type: Gen ETT  Intra-op Monitoring Plan: Standard ASA Monitors  Post Op Pain Control Plan: multimodal analgesia  Induction:  IV  Airway Plan: Direct, Post-Induction  Informed Consent: Informed consent signed with the Patient and all parties understand the risks and agree with anesthesia plan.  All  questions answered. Patient consented to blood products? Yes  ASA Score: 3    Ready For Surgery From Anesthesia Perspective.     .

## 2023-03-10 NOTE — NURSING TRANSFER
Nursing Transfer Note      3/10/2023     Reason patient is being transferred: Post EGD    Transfer To: 608, report given to Eveline RN    Transfer via stretcher    Transfer with : NG tube, top  denture in a denture pot    Transported by: patient transport    Medicines sent: none    Any special needs or follow-up needed:  NGT to LIWS when get in to the room    Chart send with patient: Yes        Patient reassessed at: 3-10-23

## 2023-03-10 NOTE — ASSESSMENT & PLAN NOTE
- IV Protonix 40 mg, daily  - NPO; Continue with NGT to suction  - Zofran 4 mg IVP q8h PRN for nausea  - Pain management  - Consult Gastroenterology and General Surgery

## 2023-03-10 NOTE — HOSPITAL COURSE
3/10: On Seen by GI, plan is for EGD today.  Surgical oncology team has been consulted.   - EGD complete     Impression:            - LA Grade D esophagitis with no bleeding.                          - A large amount of food (residue) in the stomach.                          - Likely malignant gastric tumor at the pylorus                          extending to the first portion of the duodenum.                          The mass was partially obstructive. Biopsied.                          - Feeding tube placement was successfully                          performed.     Recommendation:        - [facility] patient to [facility] for ongoing care.                          - NPO.                          - Continue present medications.                          - Await pathology results.                          - Refer to an oncologic surgeon and oncologist at                          appointment to be scheduled.                          - Perform a flat plate abdominal x-ray to confirm                          NGT in the stomach.       On 3/11; surgical oncology started TPN. Per surgery, pending tissue biopsy result, surgical planning next week  I have consulted nutrition.  CT chest for staging showed Multiple bilateral thyroid lobe hypodensities measuring up to 1.5 cm on the right. A few pulmonary nodules measuring up to 5 mm, nonspecific.

## 2023-03-11 PROBLEM — R19.00 ABDOMINAL MASS: Status: RESOLVED | Noted: 2023-03-10 | Resolved: 2023-03-11

## 2023-03-11 PROBLEM — K20.90 ESOPHAGITIS: Status: ACTIVE | Noted: 2023-03-11

## 2023-03-11 PROBLEM — R93.3 ABNORMAL CT SCAN, GASTROINTESTINAL TRACT: Status: RESOLVED | Noted: 2023-03-10 | Resolved: 2023-03-11

## 2023-03-11 LAB
ANION GAP SERPL CALC-SCNC: 10 MMOL/L (ref 8–16)
BACTERIA UR CULT: NO GROWTH
BASOPHILS # BLD AUTO: 0.02 K/UL (ref 0–0.2)
BASOPHILS NFR BLD: 0.2 % (ref 0–1.9)
BUN SERPL-MCNC: 23 MG/DL (ref 8–23)
CALCIUM SERPL-MCNC: 10.1 MG/DL (ref 8.7–10.5)
CHLORIDE SERPL-SCNC: 100 MMOL/L (ref 95–110)
CO2 SERPL-SCNC: 28 MMOL/L (ref 23–29)
CREAT SERPL-MCNC: 0.7 MG/DL (ref 0.5–1.4)
DIFFERENTIAL METHOD: ABNORMAL
EOSINOPHIL # BLD AUTO: 0.1 K/UL (ref 0–0.5)
EOSINOPHIL NFR BLD: 1 % (ref 0–8)
ERYTHROCYTE [DISTWIDTH] IN BLOOD BY AUTOMATED COUNT: 16 % (ref 11.5–14.5)
EST. GFR  (NO RACE VARIABLE): >60 ML/MIN/1.73 M^2
GLUCOSE SERPL-MCNC: 97 MG/DL (ref 70–110)
HCT VFR BLD AUTO: 35.8 % (ref 37–48.5)
HGB BLD-MCNC: 10.9 G/DL (ref 12–16)
IMM GRANULOCYTES # BLD AUTO: 0.03 K/UL (ref 0–0.04)
IMM GRANULOCYTES NFR BLD AUTO: 0.3 % (ref 0–0.5)
LYMPHOCYTES # BLD AUTO: 1.6 K/UL (ref 1–4.8)
LYMPHOCYTES NFR BLD: 18.3 % (ref 18–48)
MCH RBC QN AUTO: 21.9 PG (ref 27–31)
MCHC RBC AUTO-ENTMCNC: 30.4 G/DL (ref 32–36)
MCV RBC AUTO: 72 FL (ref 82–98)
MONOCYTES # BLD AUTO: 0.9 K/UL (ref 0.3–1)
MONOCYTES NFR BLD: 10.4 % (ref 4–15)
NEUTROPHILS # BLD AUTO: 6.1 K/UL (ref 1.8–7.7)
NEUTROPHILS NFR BLD: 69.8 % (ref 38–73)
NRBC BLD-RTO: 0 /100 WBC
PLATELET # BLD AUTO: 284 K/UL (ref 150–450)
PMV BLD AUTO: 11 FL (ref 9.2–12.9)
POTASSIUM SERPL-SCNC: 4.5 MMOL/L (ref 3.5–5.1)
RBC # BLD AUTO: 4.98 M/UL (ref 4–5.4)
SODIUM SERPL-SCNC: 138 MMOL/L (ref 136–145)
WBC # BLD AUTO: 8.72 K/UL (ref 3.9–12.7)

## 2023-03-11 PROCEDURE — 25000003 PHARM REV CODE 250: Performed by: HOSPITALIST

## 2023-03-11 PROCEDURE — 80048 BASIC METABOLIC PNL TOTAL CA: CPT | Performed by: HOSPITALIST

## 2023-03-11 PROCEDURE — 25000003 PHARM REV CODE 250: Performed by: STUDENT IN AN ORGANIZED HEALTH CARE EDUCATION/TRAINING PROGRAM

## 2023-03-11 PROCEDURE — 36415 COLL VENOUS BLD VENIPUNCTURE: CPT | Performed by: HOSPITALIST

## 2023-03-11 PROCEDURE — 63600175 PHARM REV CODE 636 W HCPCS: Performed by: HOSPITALIST

## 2023-03-11 PROCEDURE — 99232 SBSQ HOSP IP/OBS MODERATE 35: CPT | Mod: ,,, | Performed by: HOSPITALIST

## 2023-03-11 PROCEDURE — 99232 PR SUBSEQUENT HOSPITAL CARE,LEVL II: ICD-10-PCS | Mod: ,,, | Performed by: HOSPITALIST

## 2023-03-11 PROCEDURE — C1751 CATH, INF, PER/CENT/MIDLINE: HCPCS

## 2023-03-11 PROCEDURE — 36573 INSJ PICC RS&I 5 YR+: CPT

## 2023-03-11 PROCEDURE — 85025 COMPLETE CBC W/AUTO DIFF WBC: CPT | Performed by: HOSPITALIST

## 2023-03-11 PROCEDURE — C9113 INJ PANTOPRAZOLE SODIUM, VIA: HCPCS | Performed by: HOSPITALIST

## 2023-03-11 PROCEDURE — A4216 STERILE WATER/SALINE, 10 ML: HCPCS | Performed by: HOSPITALIST

## 2023-03-11 PROCEDURE — B4185 PARENTERAL SOL 10 GM LIPIDS: HCPCS | Performed by: HOSPITALIST

## 2023-03-11 PROCEDURE — 11000001 HC ACUTE MED/SURG PRIVATE ROOM

## 2023-03-11 PROCEDURE — 76937 US GUIDE VASCULAR ACCESS: CPT

## 2023-03-11 PROCEDURE — A4217 STERILE WATER/SALINE, 500 ML: HCPCS | Performed by: HOSPITALIST

## 2023-03-11 RX ORDER — SODIUM CHLORIDE 0.9 % (FLUSH) 0.9 %
10 SYRINGE (ML) INJECTION EVERY 6 HOURS
Status: DISCONTINUED | OUTPATIENT
Start: 2023-03-11 | End: 2023-03-24 | Stop reason: HOSPADM

## 2023-03-11 RX ORDER — SODIUM CHLORIDE 0.9 % (FLUSH) 0.9 %
10 SYRINGE (ML) INJECTION
Status: DISCONTINUED | OUTPATIENT
Start: 2023-03-11 | End: 2023-03-24 | Stop reason: HOSPADM

## 2023-03-11 RX ADMIN — MAGNESIUM SULFATE HEPTAHYDRATE: 500 INJECTION, SOLUTION INTRAMUSCULAR; INTRAVENOUS at 10:03

## 2023-03-11 RX ADMIN — Medication 10 ML: at 06:03

## 2023-03-11 RX ADMIN — I.V. FAT EMULSION 250 ML: 20 EMULSION INTRAVENOUS at 10:03

## 2023-03-11 RX ADMIN — Medication 10 ML: at 01:03

## 2023-03-11 RX ADMIN — PANTOPRAZOLE SODIUM 40 MG: 40 INJECTION, POWDER, FOR SOLUTION INTRAVENOUS at 09:03

## 2023-03-11 RX ADMIN — DEXTROSE MONOHYDRATE: 100 INJECTION, SOLUTION INTRAVENOUS at 03:03

## 2023-03-11 RX ADMIN — ENALAPRILAT 1.25 MG: 2.5 INJECTION INTRAVENOUS at 10:03

## 2023-03-11 NOTE — PROCEDURES
"Nan Simms is a 67 y.o. female patient.    Temp: 98.3 °F (36.8 °C) (03/11/23 0900)  Pulse: (!) 49 (03/11/23 0501)  Resp: 14 (03/11/23 0900)  BP: (!) 174/75 (03/11/23 0901)  SpO2: 97 % (03/11/23 0900)  Weight: 73 kg (161 lb) (03/10/23 0950)  Height: 5' 6" (167.6 cm) (03/10/23 0950)    PICC  Date/Time: 3/11/2023 10:11 AM  Location procedure was performed: [facility] PICC LINE PLACEMENT  Performed by: Gisele Weber RN  Assisting provider: Rissa De Luna LPN  Consent Done: Yes  Time out: Immediately prior to procedure a time out was called to verify the correct patient, procedure, equipment, support staff and site/side marked as required  Indications: med administration  Anesthesia: local infiltration  Local anesthetic: lidocaine 1% without epinephrine  Anesthetic Total (mL): 3  Preparation: skin prepped with ChloraPrep  Skin prep agent dried: skin prep agent completely dried prior to procedure  Sterile barriers: all five maximum sterile barriers used - cap, mask, sterile gown, sterile gloves, and large sterile sheet  Hand hygiene: hand hygiene performed prior to central venous catheter insertion  Location details: right basilic  Catheter type: double lumen  Catheter size: 5 Fr  Catheter Length: 36cm    Ultrasound guidance: yes  Vessel Caliber: medium and patent, compressibility normal  Vascular Doppler: not done  Needle advanced into vessel with real time Ultrasound guidance.  Guidewire confirmed in vessel.  Image recorded and saved.  Sterile sheath used.  no esophageal manometryNumber of attempts: 1  Post-procedure: blood return through all ports, sterile dressing applied and chlorhexidine patch  Technical procedures used: 3CG  Specimens: No  Implants: No  Assessment: placement verified by x-ray  Complications: none        Name Rissa De Luna LPN   3/11/2023    "

## 2023-03-11 NOTE — ASSESSMENT & PLAN NOTE
- Iron deficiency anemia. Possibly mild chronic bleeding from her gastric mass and esophagitis.   - will start iron tablets after pt is no longer npo.  - transfuse if hgb<7

## 2023-03-11 NOTE — PLAN OF CARE
Patient in bed awake and alert, denies abdominal discomfort, Ng tube to low intermittent suction, no output noted during shift, Picc line placed to right arm, uneventful shift, will continue to observe.    Problem: Adult Inpatient Plan of Care  Goal: Plan of Care Review  Outcome: Ongoing, Progressing  Flowsheets (Taken 3/11/2023 1733)  Plan of Care Reviewed With: patient  Goal: Optimal Comfort and Wellbeing  Outcome: Ongoing, Progressing  Intervention: Monitor Pain and Promote Comfort  Flowsheets (Taken 3/11/2023 1733)  Pain Management Interventions:   pillow support provided   relaxation techniques promoted

## 2023-03-11 NOTE — ASSESSMENT & PLAN NOTE
- 2/2 to the gastric cancer. See gastric cancer A/P.  - NPO; Continue with NGT to suction  - Zofran 4 mg IVP q8h PRN for nausea

## 2023-03-11 NOTE — ASSESSMENT & PLAN NOTE
- CT Concerning for gastric cancer  - EGD on 3/10 showed Likely malignant gastric tumor at the pylorus extending to the first portion of the duodenum. The mass was partially obstructive. bx was taken.  - Per surgery, pending tissue biopsy result, surgical planning next week  - Surgical oncology started TPN. I have consulted nutrition.  - CT chest ordered for staging per surgical oncology request.

## 2023-03-11 NOTE — PROGRESS NOTES
[facility] Medicine  Progress Note    Patient Name: Nan Simms  MRN: [id number]  Patient Class: IP- Inpatient   Admission Date: 3/9/2023  Length of Stay: 2 days  Attending Physician: Edne Bautista MD  Primary Care Provider: Payal Moreland NP        Subjective:     Principal Problem:Gastric cancer        HPI:  67-year-old woman Hx of HTN and hypothyroidism presented to [facility] today with abdominal pain and constipation for several weeks ago. She initially presented to the ED on 2/24 and again on 3/3 for abdominal bloating and constipation, abdominal x-ray was obtained and patient was then discharged home; but she returned today with worsening abdominal pain. Patient has tried taking laxatives/stool softeners but was not able to have a bowel movement. Endoreses chills the last few nights. States she has lost around 10 pounds the past 3 months. Appetite remains unchanged. Patient is a current smoker 0.5 pack per day for last 30 years. Family history significant for mother with pancreatic cancer.     In the ED , patient was found to have WBC 7.2, Hb 11.0, Plts 289, CMP unremarkable, lipase 113, U/A RBC 12, WBC 85, bacteria few.  CT abdomen concerning for a malignancy involving the pylorus and duodenum associated with gastric outlet obstruction  An NGT was placed with return of a large amount of gastric contents and patient was transferred for higher level of care.       Overview/Hospital Course:  3/10: On Seen by GI, plan is for EGD today.  Surgical oncology team has been consulted.   - EGD complete     Impression:            - LA Grade D esophagitis with no bleeding.                          - A large amount of food (residue) in the stomach.                          - Likely malignant gastric tumor at the pylorus                          extending to the first portion of the duodenum.                          The mass was partially obstructive. Biopsied.                          - Feeding tube  "placement was successfully                          performed.     Recommendation:        - [facility] for [facility] care.                          - NPO.                          - Continue present medications.                          - Await pathology results.                          - Refer to an oncologic surgeon and oncologist at                          appointment to be scheduled.                          - Perform a flat plate abdominal x-ray to confirm                          NGT in the stomach.       On 3/11; surgical oncology started TPN. Per surgery, pending tissue biopsy result, surgical planning next week  I have consulted nutrition.  CT chest ordered for staging per surgical oncology request.        Interval History: Feels "great." Denies any abdominal pain. Stated that her nausea, vomiting and abdominal pain resolved in the ER after the NGT placement.     Review of Systems   Constitutional:  Negative for chills and fever.   HENT:  Negative for sore throat.    Respiratory:  Negative for cough and shortness of breath.    Cardiovascular:  Negative for chest pain and palpitations.   Gastrointestinal:  Negative for abdominal pain, nausea and vomiting.   Genitourinary:  Negative for dysuria, frequency and urgency.   Musculoskeletal:  Negative for back pain and myalgias.   Skin:  Negative for rash.   Neurological:  Negative for dizziness.   Psychiatric/Behavioral:  Negative for confusion.    Objective:     Vital Signs (Most Recent):  Temp: 98.3 °F (36.8 °C) (03/11/23 0900)  Pulse: 69 (03/11/23 1026)  Resp: 14 (03/11/23 0900)  BP: (!) 185/74 (03/11/23 1026)  SpO2: 97 % (03/11/23 0900)   Vital Signs (24h Range):  Temp:  [97.5 °F (36.4 °C)-98.7 °F (37.1 °C)] 98.3 °F (36.8 °C)  Pulse:  [49-89] 69  Resp:  [14-20] 14  SpO2:  [94 %-100 %] 97 %  BP: (136-203)/(63-83) 185/74     Weight: 73 kg (161 lb)  Body mass index is 25.99 kg/m².    Intake/Output Summary (Last 24 hours) at 3/11/2023 " 1033  Last data filed at 3/10/2023 1830  Gross per 24 hour   Intake 300 ml   Output 100 ml   Net 200 ml        Physical Exam  Vitals reviewed.   Constitutional:       Appearance: Normal appearance.   HENT:      Head: Normocephalic and atraumatic.      Mouth/Throat:      Mouth: Mucous membranes are moist.   Eyes:      Extraocular Movements: Extraocular movements intact.      Conjunctiva/sclera: Conjunctivae normal.      Pupils: Pupils are equal, round, and reactive to light.   Cardiovascular:      Rate and Rhythm: Normal rate and regular rhythm.   Pulmonary:      Effort: Pulmonary effort is normal.      Breath sounds: Normal breath sounds.   Abdominal:      General: Abdomen is flat.      Palpations: Abdomen is soft.   Musculoskeletal:         General: No swelling or tenderness. Normal range of motion.      Cervical back: Normal range of motion and neck supple.   Skin:     General: Skin is warm.   Neurological:      General: No focal deficit present.      Mental Status: She is alert and oriented to person, place, and time.   Psychiatric:         Mood and Affect: Mood normal.         Behavior: Behavior normal.       Significant Labs: All pertinent labs within the past 24 hours have been reviewed.  BMP:   Recent Labs   Lab 03/10/23  0511 03/11/23  0612   * 97    138   K 3.9 4.5    100   CO2 27 28   BUN 22 23   CREATININE 0.7 0.7   CALCIUM 9.9 10.1   MG 2.1  --        CBC:   Recent Labs   Lab 03/10/23  0511 03/11/23  0612   WBC 6.89 8.72   HGB 11.5* 10.9*   HCT 36.5* 35.8*    284       CMP:   Recent Labs   Lab 03/09/23  2153 03/10/23  0511 03/11/23  0612    138 138   K 4.2 3.9 4.5    101 100   CO2 25 27 28   GLU 96 111* 97   BUN 20 22 23   CREATININE 0.7 0.7 0.7   CALCIUM 9.8 9.9 10.1   PROT 7.4  --   --    ALBUMIN 3.6  --   --    BILITOT 0.4  --   --    ALKPHOS 74  --   --    AST 15  --   --    ALT 11  --   --    ANIONGAP 13 10 10         Significant Imaging: I have reviewed all  pertinent imaging results/findings within the past 24 hours.      Assessment/Plan:      * Gastric cancer  - CT Concerning for gastric cancer  - EGD on 3/10 showed Likely malignant gastric tumor at the pylorus extending to the first portion of the duodenum. The mass was partially obstructive. bx was taken.  - Per surgery, pending tissue biopsy result, surgical planning next week  - Surgical oncology started TPN. I have consulted nutrition.  - CT chest ordered for staging per surgical oncology request.      Gastric outlet obstruction  - 2/2 to the gastric cancer. See gastric cancer A/P.  - NPO; Continue with NGT to suction  - Zofran 4 mg IVP q8h PRN for nausea        Essential hypertension  -Vasotec q6h prn for sbp>160    Esophagitis  Seen on EGD done on 3/10.  Continue protonix iv.      Anemia  - Iron deficiency anemia. Possibly mild chronic bleeding from her gastric mass and esophagitis.   - will start iron tablets after pt is no longer npo.  - transfuse if hgb<7      Diabetes mellitus type 2, diet-controlled  - Last A1c: 5.6 (11/11/2022)  - FS + LSS  - Patient is currently NPO; monitor for hypoglycemia  - Hold Oral hypoglycemics while patient is in the hospital.    Hyperlipidemia  - Atorvastatin 20 mg, daily  - Will hold for now      VTE Risk Mitigation (From admission, onward)         Ordered     Place sequential compression device  Until discontinued         03/09/23 2137     IP VTE LOW RISK PATIENT  Once         03/09/23 2137                Discharge Planning   LOREN: 3/17/2023     Code Status: Full Code   Is the patient medically ready for discharge?: No    Reason for patient still in hospital (select all that apply): Patient trending condition               Eden Bautista MD  Department of Hospital Medicine   [facility] Surg

## 2023-03-11 NOTE — SUBJECTIVE & OBJECTIVE
"Interval History: Feels "great." Denies any abdominal pain. Stated that her nausea, vomiting and abdominal pain resolved in the ER after the NGT placement.     Review of Systems   Constitutional:  Negative for chills and fever.   HENT:  Negative for sore throat.    Respiratory:  Negative for cough and shortness of breath.    Cardiovascular:  Negative for chest pain and palpitations.   Gastrointestinal:  Negative for abdominal pain, nausea and vomiting.   Genitourinary:  Negative for dysuria, frequency and urgency.   Musculoskeletal:  Negative for back pain and myalgias.   Skin:  Negative for rash.   Neurological:  Negative for dizziness.   Psychiatric/Behavioral:  Negative for confusion.    Objective:     Vital Signs (Most Recent):  Temp: 98.3 °F (36.8 °C) (03/11/23 0900)  Pulse: 69 (03/11/23 1026)  Resp: 14 (03/11/23 0900)  BP: (!) 185/74 (03/11/23 1026)  SpO2: 97 % (03/11/23 0900)   Vital Signs (24h Range):  Temp:  [97.5 °F (36.4 °C)-98.7 °F (37.1 °C)] 98.3 °F (36.8 °C)  Pulse:  [49-89] 69  Resp:  [14-20] 14  SpO2:  [94 %-100 %] 97 %  BP: (136-203)/(63-83) 185/74     Weight: 73 kg (161 lb)  Body mass index is 25.99 kg/m².    Intake/Output Summary (Last 24 hours) at 3/11/2023 1033  Last data filed at 3/10/2023 1830  Gross per 24 hour   Intake 300 ml   Output 100 ml   Net 200 ml        Physical Exam  Vitals reviewed.   Constitutional:       Appearance: Normal appearance.   HENT:      Head: Normocephalic and atraumatic.      Mouth/Throat:      Mouth: Mucous membranes are moist.   Eyes:      Extraocular Movements: Extraocular movements intact.      Conjunctiva/sclera: Conjunctivae normal.      Pupils: Pupils are equal, round, and reactive to light.   Cardiovascular:      Rate and Rhythm: Normal rate and regular rhythm.   Pulmonary:      Effort: Pulmonary effort is normal.      Breath sounds: Normal breath sounds.   Abdominal:      General: Abdomen is flat.      Palpations: Abdomen is soft.   Musculoskeletal:         " General: No swelling or tenderness. Normal range of motion.      Cervical back: Normal range of motion and neck supple.   Skin:     General: Skin is warm.   Neurological:      General: No focal deficit present.      Mental Status: She is alert and oriented to person, place, and time.   Psychiatric:         Mood and Affect: Mood normal.         Behavior: Behavior normal.       Significant Labs: All pertinent labs within the past 24 hours have been reviewed.  BMP:   Recent Labs   Lab 03/10/23  0511 03/11/23  0612   * 97    138   K 3.9 4.5    100   CO2 27 28   BUN 22 23   CREATININE 0.7 0.7   CALCIUM 9.9 10.1   MG 2.1  --        CBC:   Recent Labs   Lab 03/10/23  0511 03/11/23  0612   WBC 6.89 8.72   HGB 11.5* 10.9*   HCT 36.5* 35.8*    284       CMP:   Recent Labs   Lab 03/09/23  2153 03/10/23  0511 03/11/23  0612    138 138   K 4.2 3.9 4.5    101 100   CO2 25 27 28   GLU 96 111* 97   BUN 20 22 23   CREATININE 0.7 0.7 0.7   CALCIUM 9.8 9.9 10.1   PROT 7.4  --   --    ALBUMIN 3.6  --   --    BILITOT 0.4  --   --    ALKPHOS 74  --   --    AST 15  --   --    ALT 11  --   --    ANIONGAP 13 10 10         Significant Imaging: I have reviewed all pertinent imaging results/findings within the past 24 hours.

## 2023-03-11 NOTE — CONSULTS
D/L PICC placed in right basilic vein, 36cm in length with 0cm exposed. Arm circumference 32cm. Lot# [id number]

## 2023-03-12 PROBLEM — E44.0 MODERATE MALNUTRITION: Status: ACTIVE | Noted: 2023-03-12

## 2023-03-12 PROBLEM — E04.2 MULTIPLE THYROID NODULES: Status: ACTIVE | Noted: 2023-03-12

## 2023-03-12 LAB
ANION GAP SERPL CALC-SCNC: 8 MMOL/L (ref 8–16)
BASOPHILS # BLD AUTO: 0.03 K/UL (ref 0–0.2)
BASOPHILS NFR BLD: 0.5 % (ref 0–1.9)
BUN SERPL-MCNC: 20 MG/DL (ref 8–23)
CALCIUM SERPL-MCNC: 9.6 MG/DL (ref 8.7–10.5)
CEA SERPL-MCNC: 3.2 NG/ML (ref 0–5)
CHLORIDE SERPL-SCNC: 100 MMOL/L (ref 95–110)
CO2 SERPL-SCNC: 26 MMOL/L (ref 23–29)
CREAT SERPL-MCNC: 0.6 MG/DL (ref 0.5–1.4)
DIFFERENTIAL METHOD: ABNORMAL
EOSINOPHIL # BLD AUTO: 0.2 K/UL (ref 0–0.5)
EOSINOPHIL NFR BLD: 2.8 % (ref 0–8)
ERYTHROCYTE [DISTWIDTH] IN BLOOD BY AUTOMATED COUNT: 15.6 % (ref 11.5–14.5)
EST. GFR  (NO RACE VARIABLE): >60 ML/MIN/1.73 M^2
GLUCOSE SERPL-MCNC: 124 MG/DL (ref 70–110)
HCT VFR BLD AUTO: 35.7 % (ref 37–48.5)
HGB BLD-MCNC: 10.7 G/DL (ref 12–16)
IMM GRANULOCYTES # BLD AUTO: 0.02 K/UL (ref 0–0.04)
IMM GRANULOCYTES NFR BLD AUTO: 0.3 % (ref 0–0.5)
LYMPHOCYTES # BLD AUTO: 1.4 K/UL (ref 1–4.8)
LYMPHOCYTES NFR BLD: 24.2 % (ref 18–48)
MAGNESIUM SERPL-MCNC: 2 MG/DL (ref 1.6–2.6)
MCH RBC QN AUTO: 22.1 PG (ref 27–31)
MCHC RBC AUTO-ENTMCNC: 30 G/DL (ref 32–36)
MCV RBC AUTO: 74 FL (ref 82–98)
MONOCYTES # BLD AUTO: 0.7 K/UL (ref 0.3–1)
MONOCYTES NFR BLD: 11.7 % (ref 4–15)
NEUTROPHILS # BLD AUTO: 3.5 K/UL (ref 1.8–7.7)
NEUTROPHILS NFR BLD: 60.5 % (ref 38–73)
NRBC BLD-RTO: 0 /100 WBC
PHOSPHATE SERPL-MCNC: 2.9 MG/DL (ref 2.7–4.5)
PLATELET # BLD AUTO: 285 K/UL (ref 150–450)
PMV BLD AUTO: 11.7 FL (ref 9.2–12.9)
POCT GLUCOSE: 125 MG/DL (ref 70–110)
POTASSIUM SERPL-SCNC: 3.7 MMOL/L (ref 3.5–5.1)
PREALB SERPL-MCNC: 19 MG/DL (ref 20–43)
RBC # BLD AUTO: 4.85 M/UL (ref 4–5.4)
SODIUM SERPL-SCNC: 134 MMOL/L (ref 136–145)
T4 FREE SERPL-MCNC: 1.24 NG/DL (ref 0.71–1.51)
TRIGL SERPL-MCNC: 168 MG/DL (ref 30–150)
TSH SERPL DL<=0.005 MIU/L-ACNC: 0.62 UIU/ML (ref 0.4–4)
WBC # BLD AUTO: 5.75 K/UL (ref 3.9–12.7)

## 2023-03-12 PROCEDURE — A4216 STERILE WATER/SALINE, 10 ML: HCPCS | Performed by: HOSPITALIST

## 2023-03-12 PROCEDURE — 25500020 PHARM REV CODE 255: Performed by: HOSPITALIST

## 2023-03-12 PROCEDURE — 25000003 PHARM REV CODE 250: Performed by: HOSPITALIST

## 2023-03-12 PROCEDURE — 36415 COLL VENOUS BLD VENIPUNCTURE: CPT | Performed by: HOSPITALIST

## 2023-03-12 PROCEDURE — 85025 COMPLETE CBC W/AUTO DIFF WBC: CPT | Performed by: HOSPITALIST

## 2023-03-12 PROCEDURE — 11000001 HC ACUTE MED/SURG PRIVATE ROOM

## 2023-03-12 PROCEDURE — 84478 ASSAY OF TRIGLYCERIDES: CPT | Performed by: HOSPITALIST

## 2023-03-12 PROCEDURE — 84100 ASSAY OF PHOSPHORUS: CPT | Performed by: HOSPITALIST

## 2023-03-12 PROCEDURE — 84443 ASSAY THYROID STIM HORMONE: CPT | Performed by: HOSPITALIST

## 2023-03-12 PROCEDURE — 84439 ASSAY OF FREE THYROXINE: CPT | Performed by: HOSPITALIST

## 2023-03-12 PROCEDURE — 36415 COLL VENOUS BLD VENIPUNCTURE: CPT | Performed by: STUDENT IN AN ORGANIZED HEALTH CARE EDUCATION/TRAINING PROGRAM

## 2023-03-12 PROCEDURE — C9113 INJ PANTOPRAZOLE SODIUM, VIA: HCPCS | Performed by: HOSPITALIST

## 2023-03-12 PROCEDURE — 84134 ASSAY OF PREALBUMIN: CPT | Performed by: STUDENT IN AN ORGANIZED HEALTH CARE EDUCATION/TRAINING PROGRAM

## 2023-03-12 PROCEDURE — 80048 BASIC METABOLIC PNL TOTAL CA: CPT | Performed by: HOSPITALIST

## 2023-03-12 PROCEDURE — 82378 CARCINOEMBRYONIC ANTIGEN: CPT | Performed by: STUDENT IN AN ORGANIZED HEALTH CARE EDUCATION/TRAINING PROGRAM

## 2023-03-12 PROCEDURE — 99232 PR SUBSEQUENT HOSPITAL CARE,LEVL II: ICD-10-PCS | Mod: ,,, | Performed by: HOSPITALIST

## 2023-03-12 PROCEDURE — 83735 ASSAY OF MAGNESIUM: CPT | Performed by: HOSPITALIST

## 2023-03-12 PROCEDURE — 63600175 PHARM REV CODE 636 W HCPCS: Performed by: HOSPITALIST

## 2023-03-12 PROCEDURE — B4185 PARENTERAL SOL 10 GM LIPIDS: HCPCS | Performed by: HOSPITALIST

## 2023-03-12 PROCEDURE — 99232 SBSQ HOSP IP/OBS MODERATE 35: CPT | Mod: ,,, | Performed by: HOSPITALIST

## 2023-03-12 PROCEDURE — A4217 STERILE WATER/SALINE, 500 ML: HCPCS | Performed by: HOSPITALIST

## 2023-03-12 RX ORDER — INSULIN ASPART 100 [IU]/ML
0-5 INJECTION, SOLUTION INTRAVENOUS; SUBCUTANEOUS EVERY 6 HOURS PRN
Status: DISCONTINUED | OUTPATIENT
Start: 2023-03-12 | End: 2023-03-16

## 2023-03-12 RX ADMIN — IOHEXOL 75 ML: 350 INJECTION, SOLUTION INTRAVENOUS at 12:03

## 2023-03-12 RX ADMIN — MAGNESIUM SULFATE HEPTAHYDRATE: 500 INJECTION, SOLUTION INTRAMUSCULAR; INTRAVENOUS at 11:03

## 2023-03-12 RX ADMIN — PANTOPRAZOLE SODIUM 40 MG: 40 INJECTION, POWDER, FOR SOLUTION INTRAVENOUS at 08:03

## 2023-03-12 RX ADMIN — Medication 10 ML: at 05:03

## 2023-03-12 RX ADMIN — Medication 10 ML: at 12:03

## 2023-03-12 RX ADMIN — Medication 10 ML: at 06:03

## 2023-03-12 RX ADMIN — I.V. FAT EMULSION 250 ML: 20 EMULSION INTRAVENOUS at 11:03

## 2023-03-12 RX ADMIN — Medication 10 ML: at 11:03

## 2023-03-12 NOTE — ASSESSMENT & PLAN NOTE
Nutrition Problem:  Moderate Protein-Calorie Malnutrition  Malnutrition in the context of Acute Illness/Injury    Related to (etiology):  Inability to consume sufficient energy     Signs and Symptoms (as evidenced by):  Body Fat Depletion: moderate depletion of orbitals and triceps   Muscle Mass Depletion: moderate depletion of temples, clavicle region, scapular region and lower extremities   Weight Loss: 7.5% x 3 months     Interventions(treatment strategy):  Collaboration of nutrition care w/ other providers  PN    Nutrition Diagnosis Status:  New

## 2023-03-12 NOTE — PROGRESS NOTES
[facility] Medicine  Progress Note    Patient Name: Nan Simms  MRN: [id number]  Patient Class: IP- Inpatient   Admission Date: 3/9/2023  Length of Stay: 3 days  Attending Physician: Eden Bautista MD  Primary Care Provider: Payal Moreland NP        Subjective:     Principal Problem:Gastric cancer        HPI:  67-year-old woman Hx of HTN and hypothyroidism presented to [facility] today with abdominal pain and constipation for several weeks ago. She initially presented to the ED on 2/24 and again on 3/3 for abdominal bloating and constipation, abdominal x-ray was obtained and patient was then discharged home; but she returned today with worsening abdominal pain. Patient has tried taking laxatives/stool softeners but was not able to have a bowel movement. Endoreses chills the last few nights. States she has lost around 10 pounds the past 3 months. Appetite remains unchanged. Patient is a current smoker 0.5 pack per day for last 30 years. Family history significant for mother with pancreatic cancer.     In the ED , patient was found to have WBC 7.2, Hb 11.0, Plts 289, CMP unremarkable, lipase 113, U/A RBC 12, WBC 85, bacteria few.  CT abdomen concerning for a malignancy involving the pylorus and duodenum associated with gastric outlet obstruction  An NGT was placed with return of a large amount of gastric contents and patient was transferred for higher level of care.       Overview/Hospital Course:  3/10: On Seen by GI, plan is for EGD today.  Surgical oncology team has been consulted.   - EGD complete     Impression:            - LA Grade D esophagitis with no bleeding.                          - A large amount of food (residue) in the stomach.                          - Likely malignant gastric tumor at the pylorus                          extending to the first portion of the duodenum.                          The mass was partially obstructive. Biopsied.                          - Feeding tube  placement was successfully                          performed.     Recommendation:        - Return patient to [facility] for ongoing care.                          - NPO.                          - Continue present medications.                          - Await pathology results.                          - Refer to an oncologic surgeon and oncologist at                          appointment to be scheduled.                          - Perform a flat plate abdominal x-ray to confirm                          NGT in the stomach.       On 3/11; surgical oncology started TPN. Per surgery, pending tissue biopsy result, surgical planning next week  I have consulted nutrition.  CT chest for staging showed Multiple bilateral thyroid lobe hypodensities measuring up to 1.5 cm on the right. A few pulmonary nodules measuring up to 5 mm, nonspecific.               Interval History: Feels ok, sometimes she gets epigastric abdominal pain that lasts for minutes, achy, 5/10 on pain scale that later resolve. Denies any nausea or vomiting.     Review of Systems   Constitutional:  Negative for chills and fever.   HENT:  Negative for sore throat.    Respiratory:  Negative for cough and shortness of breath.    Cardiovascular:  Negative for chest pain and palpitations.   Gastrointestinal:  Negative for nausea and vomiting.        See above.   Genitourinary:  Negative for dysuria, frequency and urgency.   Musculoskeletal:  Negative for back pain and myalgias.   Skin:  Negative for rash.   Neurological:  Negative for dizziness.   Psychiatric/Behavioral:  Negative for confusion.    Objective:     Vital Signs (Most Recent):  Temp: 98.5 °F (36.9 °C) (03/12/23 1154)  Pulse: (!) 42 (03/12/23 1154)  Resp: 16 (03/12/23 0830)  BP: 133/61 (03/12/23 1154)  SpO2: 97 % (03/12/23 1154)   Vital Signs (24h Range):  Temp:  [98.5 °F (36.9 °C)-99.2 °F (37.3 °C)] 98.5 °F (36.9 °C)  Pulse:  [42-63] 42  Resp:  [16-19] 16  SpO2:  [97 %-100 %] 97 %  BP:  (131-175)/(60-70) 133/61     Weight: 73 kg (160 lb 15 oz)  Body mass index is 25.98 kg/m².    Intake/Output Summary (Last 24 hours) at 3/12/2023 1327  Last data filed at 3/11/2023 1800  Gross per 24 hour   Intake --   Output 0 ml   Net 0 ml        Physical Exam  Vitals reviewed.   Constitutional:       Appearance: Normal appearance.   HENT:      Head: Normocephalic and atraumatic.      Mouth/Throat:      Mouth: Mucous membranes are moist.   Eyes:      Extraocular Movements: Extraocular movements intact.      Conjunctiva/sclera: Conjunctivae normal.      Pupils: Pupils are equal, round, and reactive to light.   Cardiovascular:      Rate and Rhythm: Normal rate and regular rhythm.   Pulmonary:      Effort: Pulmonary effort is normal.      Breath sounds: Normal breath sounds.   Abdominal:      General: Abdomen is flat.      Palpations: Abdomen is soft.   Musculoskeletal:         General: No swelling or tenderness. Normal range of motion.      Cervical back: Normal range of motion and neck supple.   Skin:     General: Skin is warm.   Neurological:      General: No focal deficit present.      Mental Status: She is alert and oriented to person, place, and time.   Psychiatric:         Mood and Affect: Mood normal.         Behavior: Behavior normal.       Significant Labs: All pertinent labs within the past 24 hours have been reviewed.  BMP:   Recent Labs   Lab 03/12/23  0506   *   *   K 3.7      CO2 26   BUN 20   CREATININE 0.6   CALCIUM 9.6   MG 2.0       CBC:   Recent Labs   Lab 03/11/23  0612 03/12/23  0506   WBC 8.72 5.75   HGB 10.9* 10.7*   HCT 35.8* 35.7*    285       CMP:   Recent Labs   Lab 03/11/23  0612 03/12/23  0506    134*   K 4.5 3.7    100   CO2 28 26   GLU 97 124*   BUN 23 20   CREATININE 0.7 0.6   CALCIUM 10.1 9.6   ANIONGAP 10 8         Significant Imaging: I have reviewed all pertinent imaging results/findings within the past 24 hours.      Assessment/Plan:      *  Gastric cancer  - CT Concerning for gastric cancer  - EGD on 3/10 showed Likely malignant gastric tumor at the pylorus extending to the first portion of the duodenum. The mass was partially obstructive. bx was taken.  - Per surgery, pending tissue biopsy result, surgical planning next week  - Surgical oncology started TPN on 3/11. Nutrition has been consulted  - CT chest ordered for staging showed A few pulmonary nodules measuring up to 5 mm, nonspecific.  For the patient with suspected gastric malignancy, clinical parameters will determine further evaluation.       Gastric outlet obstruction  - 2/2 to the gastric cancer. See gastric cancer A/P.  - NPO; Continue with NGT to suction  - Zofran 4 mg IVP q8h PRN for nausea        Essential hypertension  -Vasotec q6h prn for sbp>160    Multiple thyroid nodules  CT thorax showed Multiple bilateral thyroid lobe hypodensities measuring up to 1.5 cm on the right.  Recommend thyroid ultrasound for further evaluation. Will defer to outpt.    Will order TSH and free T4.         Esophagitis  Seen on EGD done on 3/10.  Continue protonix iv.      Anemia  - Iron deficiency anemia. Possibly mild chronic bleeding from her gastric mass and esophagitis.   - will start iron tablets after pt is no longer npo.  - transfuse if hgb<7      Diabetes mellitus type 2, diet-controlled  - Last A1c: 5.6 (11/11/2022)  - Patient is currently NPO. TPN started on 3/11.  Will order humalog sliding scale while being on TPN.      Hyperlipidemia  - Atorvastatin 20 mg, daily  - Will hold for now    VTE Risk Mitigation (From admission, onward)         Ordered     Place sequential compression device  Until discontinued         03/09/23 2137     IP VTE LOW RISK PATIENT  Once         03/09/23 2137                Discharge Planning   LOREN: 3/17/2023     Code Status: Full Code   Is the patient medically ready for discharge?: No    Reason for patient still in hospital (select all that apply): Patient trending  condition                     Eden Bautista MD  Department of Hospital Medicine   [facility] Surg

## 2023-03-12 NOTE — CONSULTS
"  [facility]  Adult Nutrition  Consult Note    SUMMARY     Recommendations    TPN recommendations: 270g D, 95g AA + IV Lipids to provide 1800 kcals & 95 g of protein (GIR: 2.57).  Diet advancement per MD. Rec'd Regular diet.  RD to monitor & follow-up.    Goals: Meet % EEN, EPN by RD f/u date  Nutrition Goal Status: new  Communication of RD Recs: reviewed with RN    Assessment and Plan    Moderate malnutrition    Nutrition Problem:  Moderate Protein-Calorie Malnutrition  Malnutrition in the context of Acute Illness/Injury    Related to (etiology):  Inability to consume sufficient energy     Signs and Symptoms (as evidenced by):  Body Fat Depletion: moderate depletion of orbitals and triceps   Muscle Mass Depletion: moderate depletion of temples, clavicle region, scapular region and lower extremities   Weight Loss: 7.5% x 3 months     Interventions(treatment strategy):  Collaboration of nutrition care w/ other providers  PN    Nutrition Diagnosis Status:  New    Malnutrition Assessment    Weight Loss (Malnutrition): 7.5% in 3 months  Subcutaneous Fat (Malnutrition): moderate depletion  Muscle Mass (Malnutrition): moderate depletion     Reason for Assessment    Reason For Assessment: consult  Diagnosis: other (see comments) (Gastric ca)  Interdisciplinary Rounds: did not attend    General Information Comments: NPO, NGT to suction - TPN/Lipids infusing. Pt reports good appetite PTA, however 15# weight loss; chart review confirms. Pt meets criteria for moderate malnutrition. Please see PES statement for details. NFPE complete today.  Nutrition Discharge Planning: Pending clinical course    Nutrition/Diet History    Factors Affecting Nutritional Intake: NPO, altered gastrointestinal function    Anthropometrics    Temp: 98.8 °F (37.1 °C)  Height: 5' 6" (167.6 cm)  Height (inches): 66 in  Weight: 73 kg (160 lb 15 oz)  Weight (lb): 160.94 lb  Ideal Body Weight (IBW), Female: 130 lb  % Ideal Body Weight, " Female (lb): 123.8 %  BMI (Calculated): 26  BMI Grade: 25 - 29.9 - overweight  Usual Body Weight (UBW), k.9 kg  % Usual Body Weight: 92.72  % Weight Change From Usual Weight: -7.48 %    Lab/Procedures/Meds    Pertinent Labs Reviewed: reviewed  Pertinent Labs Comments: Na 134,   Pertinent Medications Reviewed: reviewed  Pertinent Medications Comments: D10    Estimated/Assessed Needs    Weight Used For Calorie Calculations: 73 kg (160 lb 15 oz)    Energy Calorie Requirements (kcal): 1825 kcal/d  Energy Need Method: Kcal/kg (25 kcal/kg)    Protein Requirements: 95 g/d (1.3 g/kg)  Weight Used For Protein Calculations: 73 kg (160 lb 15 oz)    Estimated Fluid Requirement Method: other (see comments) (Per MD or 1 mL/kcal)  RDA Method (mL): 1825    Nutrition Prescription Ordered    Current Diet Order: NPO  Current Nutrition Support Formula Ordered: Other (Comment) (TPN: 151g D, 50g AA @ 42 mL/hr + IV Lipids)    Evaluation of Received Nutrient/Fluid Intake    Parenteral Calories (kcal): 1213  Parenteral Protein (gm): 50  Parenteral Fluid (mL): 1008  Lipid Calories (kcals): 500 kcals  GIR (Glucose Infusion Rate) (mg/kg/min): 1.44 mg/kg/min    I/O: -    Energy Calories Required: not meeting needs  Protein Required: not meeting needs  Fluid Required: other (see comments) (Per MD)    Comments: LBM: 3/7    Tolerance: tolerating    Nutrition Risk    Level of Risk/Frequency of Follow-up:  (1x/week)     Monitor and Evaluation    Food and Nutrient Intake: energy intake, food and beverage intake, parenteral nutrition intake  Food and Nutrient Adminstration: diet order, enteral and parenteral nutrition administration  Physical Activity and Function: nutrition-related ADLs and IADLs  Anthropometric Measurements: weight, weight change  Biochemical Data, Medical Tests and Procedures: inflammatory profile, lipid profile, glucose/endocrine profile, gastrointestinal profile, electrolyte and renal panel  Nutrition-Focused Physical  Findings: overall appearance     Nutrition Follow-Up    RD Follow-up?: Yes

## 2023-03-12 NOTE — PLAN OF CARE
Recommendations     TPN recommendations: 270g D, 95g AA + IV Lipids to provide 1800 kcals & 95 g of protein (GIR: 2.57).  Diet advancement per MD. Rec'd Regular diet.  RD to monitor & follow-up.     Goals: Meet % EEN, EPN by RD f/u date  Nutrition Goal Status: new  Communication of RD Recs: reviewed with RN

## 2023-03-12 NOTE — ASSESSMENT & PLAN NOTE
- CT Concerning for gastric cancer  - EGD on 3/10 showed Likely malignant gastric tumor at the pylorus extending to the first portion of the duodenum. The mass was partially obstructive. bx was taken.  - Per surgery, pending tissue biopsy result, surgical planning next week  - Surgical oncology started TPN on 3/11. Nutrition has been consulted  - CT chest ordered for staging showed A few pulmonary nodules measuring up to 5 mm, nonspecific.  For the patient with suspected gastric malignancy, clinical parameters will determine further evaluation.

## 2023-03-12 NOTE — ASSESSMENT & PLAN NOTE
CT thorax showed Multiple bilateral thyroid lobe hypodensities measuring up to 1.5 cm on the right.  Recommend thyroid ultrasound for further evaluation. Will defer to outpt.    Will order TSH and free T4.

## 2023-03-12 NOTE — PLAN OF CARE
Patient in bed awake and alert, complains of hunger pains, pt states she would like to speak with the doctor regarding how long NG tube will be in place, pt states she will let me know when her family arrives so they can be present when doctor comes by.  Problem: Adult Inpatient Plan of Care  Goal: Plan of Care Review  Outcome: Ongoing, Progressing  Flowsheets (Taken 3/12/2023 1259)  Plan of Care Reviewed With: patient  Goal: Optimal Comfort and Wellbeing  Outcome: Ongoing, Progressing  Intervention: Monitor Pain and Promote Comfort  Flowsheets (Taken 3/12/2023 1259)  Pain Management Interventions:   pillow support provided   position adjusted     Problem: Diabetes Comorbidity  Goal: Blood Glucose Level Within Targeted Range  Outcome: Ongoing, Progressing  Intervention: Monitor and Manage Glycemia  Flowsheets (Taken 3/12/2023 1259)  Glycemic Management: blood glucose monitored

## 2023-03-12 NOTE — PLAN OF CARE
Patient went down for Ct scan today. NG tube still intact to LIWS. Patient rested comfortably with no other significant changes during the shift.

## 2023-03-13 PROBLEM — R00.1 BRADYCARDIA: Status: ACTIVE | Noted: 2023-03-13

## 2023-03-13 LAB
ALBUMIN SERPL BCP-MCNC: 3.2 G/DL (ref 3.5–5.2)
ALP SERPL-CCNC: 68 U/L (ref 55–135)
ALT SERPL W/O P-5'-P-CCNC: 10 U/L (ref 10–44)
ANION GAP SERPL CALC-SCNC: 11 MMOL/L (ref 8–16)
AST SERPL-CCNC: 12 U/L (ref 10–40)
BASOPHILS # BLD AUTO: 0.02 K/UL (ref 0–0.2)
BASOPHILS NFR BLD: 0.3 % (ref 0–1.9)
BILIRUB SERPL-MCNC: 0.2 MG/DL (ref 0.1–1)
BUN SERPL-MCNC: 17 MG/DL (ref 8–23)
CALCIUM SERPL-MCNC: 9.5 MG/DL (ref 8.7–10.5)
CHLORIDE SERPL-SCNC: 100 MMOL/L (ref 95–110)
CO2 SERPL-SCNC: 27 MMOL/L (ref 23–29)
CREAT SERPL-MCNC: 0.6 MG/DL (ref 0.5–1.4)
DIFFERENTIAL METHOD: ABNORMAL
EOSINOPHIL # BLD AUTO: 0.2 K/UL (ref 0–0.5)
EOSINOPHIL NFR BLD: 3.1 % (ref 0–8)
ERYTHROCYTE [DISTWIDTH] IN BLOOD BY AUTOMATED COUNT: 15.5 % (ref 11.5–14.5)
EST. GFR  (NO RACE VARIABLE): >60 ML/MIN/1.73 M^2
GLUCOSE SERPL-MCNC: 124 MG/DL (ref 70–110)
HCT VFR BLD AUTO: 35.2 % (ref 37–48.5)
HGB BLD-MCNC: 10.9 G/DL (ref 12–16)
IMM GRANULOCYTES # BLD AUTO: 0.02 K/UL (ref 0–0.04)
IMM GRANULOCYTES NFR BLD AUTO: 0.3 % (ref 0–0.5)
LYMPHOCYTES # BLD AUTO: 1.1 K/UL (ref 1–4.8)
LYMPHOCYTES NFR BLD: 18.7 % (ref 18–48)
MAGNESIUM SERPL-MCNC: 2.2 MG/DL (ref 1.6–2.6)
MCH RBC QN AUTO: 22.2 PG (ref 27–31)
MCHC RBC AUTO-ENTMCNC: 31 G/DL (ref 32–36)
MCV RBC AUTO: 72 FL (ref 82–98)
MONOCYTES # BLD AUTO: 0.7 K/UL (ref 0.3–1)
MONOCYTES NFR BLD: 12 % (ref 4–15)
NEUTROPHILS # BLD AUTO: 3.8 K/UL (ref 1.8–7.7)
NEUTROPHILS NFR BLD: 65.6 % (ref 38–73)
NRBC BLD-RTO: 0 /100 WBC
PHOSPHATE SERPL-MCNC: 3.7 MG/DL (ref 2.7–4.5)
PLATELET # BLD AUTO: 284 K/UL (ref 150–450)
PMV BLD AUTO: 11.5 FL (ref 9.2–12.9)
POCT GLUCOSE: 117 MG/DL (ref 70–110)
POCT GLUCOSE: 120 MG/DL (ref 70–110)
POCT GLUCOSE: 132 MG/DL (ref 70–110)
POTASSIUM SERPL-SCNC: 4.1 MMOL/L (ref 3.5–5.1)
PROT SERPL-MCNC: 7 G/DL (ref 6–8.4)
RBC # BLD AUTO: 4.9 M/UL (ref 4–5.4)
SODIUM SERPL-SCNC: 138 MMOL/L (ref 136–145)
WBC # BLD AUTO: 5.84 K/UL (ref 3.9–12.7)

## 2023-03-13 PROCEDURE — 84100 ASSAY OF PHOSPHORUS: CPT | Performed by: HOSPITALIST

## 2023-03-13 PROCEDURE — B4185 PARENTERAL SOL 10 GM LIPIDS: HCPCS | Performed by: HOSPITALIST

## 2023-03-13 PROCEDURE — 94761 N-INVAS EAR/PLS OXIMETRY MLT: CPT

## 2023-03-13 PROCEDURE — C9113 INJ PANTOPRAZOLE SODIUM, VIA: HCPCS | Performed by: HOSPITALIST

## 2023-03-13 PROCEDURE — A4216 STERILE WATER/SALINE, 10 ML: HCPCS | Performed by: HOSPITALIST

## 2023-03-13 PROCEDURE — 25000003 PHARM REV CODE 250: Performed by: HOSPITALIST

## 2023-03-13 PROCEDURE — 63600175 PHARM REV CODE 636 W HCPCS: Performed by: HOSPITALIST

## 2023-03-13 PROCEDURE — 36415 COLL VENOUS BLD VENIPUNCTURE: CPT | Performed by: HOSPITALIST

## 2023-03-13 PROCEDURE — 11000001 HC ACUTE MED/SURG PRIVATE ROOM

## 2023-03-13 PROCEDURE — A4217 STERILE WATER/SALINE, 500 ML: HCPCS | Performed by: HOSPITALIST

## 2023-03-13 PROCEDURE — 85025 COMPLETE CBC W/AUTO DIFF WBC: CPT | Performed by: HOSPITALIST

## 2023-03-13 PROCEDURE — 99232 SBSQ HOSP IP/OBS MODERATE 35: CPT | Mod: ,,, | Performed by: HOSPITALIST

## 2023-03-13 PROCEDURE — 80053 COMPREHEN METABOLIC PANEL: CPT | Performed by: HOSPITALIST

## 2023-03-13 PROCEDURE — 83735 ASSAY OF MAGNESIUM: CPT | Performed by: HOSPITALIST

## 2023-03-13 PROCEDURE — 99232 PR SUBSEQUENT HOSPITAL CARE,LEVL II: ICD-10-PCS | Mod: ,,, | Performed by: HOSPITALIST

## 2023-03-13 RX ORDER — HYDRALAZINE HYDROCHLORIDE 20 MG/ML
10 INJECTION INTRAMUSCULAR; INTRAVENOUS EVERY 8 HOURS PRN
Status: DISCONTINUED | OUTPATIENT
Start: 2023-03-13 | End: 2023-03-24 | Stop reason: HOSPADM

## 2023-03-13 RX ADMIN — I.V. FAT EMULSION 250 ML: 20 EMULSION INTRAVENOUS at 11:03

## 2023-03-13 RX ADMIN — Medication 10 ML: at 06:03

## 2023-03-13 RX ADMIN — HYDRALAZINE HYDROCHLORIDE 10 MG: 20 INJECTION, SOLUTION INTRAMUSCULAR; INTRAVENOUS at 08:03

## 2023-03-13 RX ADMIN — PANTOPRAZOLE SODIUM 40 MG: 40 INJECTION, POWDER, FOR SOLUTION INTRAVENOUS at 11:03

## 2023-03-13 RX ADMIN — MAGNESIUM SULFATE HEPTAHYDRATE: 500 INJECTION, SOLUTION INTRAMUSCULAR; INTRAVENOUS at 11:03

## 2023-03-13 RX ADMIN — Medication 10 ML: at 11:03

## 2023-03-13 RX ADMIN — Medication 10 ML: at 08:03

## 2023-03-13 NOTE — PROGRESS NOTES
[facility] Medicine  Progress Note    Patient Name: Nan Simms  MRN: [id number]  Patient Class: IP- Inpatient   Admission Date: 3/9/2023  Length of Stay: 4 days  Attending Physician: Eden Bautista MD  Primary Care Provider: Payal Moreland NP        Subjective:     Principal Problem:Gastric cancer        HPI:  67-year-old woman Hx of HTN and hypothyroidism presented to [facility] today with abdominal pain and constipation for several weeks ago. She initially presented to the ED on 2/24 and again on 3/3 for abdominal bloating and constipation, abdominal x-ray was obtained and patient was then discharged home; but she returned today with worsening abdominal pain. Patient has tried taking laxatives/stool softeners but was not able to have a bowel movement. Endoreses chills the last few nights. States she has lost around 10 pounds the past 3 months. Appetite remains unchanged. Patient is a current smoker 0.5 pack per day for last 30 years. Family history significant for mother with pancreatic cancer.     In the ED , patient was found to have WBC 7.2, Hb 11.0, Plts 289, CMP unremarkable, lipase 113, U/A RBC 12, WBC 85, bacteria few.  CT abdomen concerning for a malignancy involving the pylorus and duodenum associated with gastric outlet obstruction  An NGT was placed with return of a large amount of gastric contents and patient was transferred for higher level of care.       Overview/Hospital Course:  3/10: On Seen by GI, plan is for EGD today.  Surgical oncology team has been consulted.   - EGD complete     Impression:            - LA Grade D esophagitis with no bleeding.                          - A large amount of food (residue) in the stomach.                          - Likely malignant gastric tumor at the pylorus                          extending to the first portion of the duodenum.                          The mass was partially obstructive. Biopsied.                          - Feeding tube  placement was successfully                          performed.     Recommendation:        - Return patient to [facility] for ongoing care.                          - NPO.                          - Continue present medications.                          - Await pathology results.                          - Refer to an oncologic surgeon and oncologist at                          appointment to be scheduled.                          - Perform a flat plate abdominal x-ray to confirm                          NGT in the stomach.       On 3/11; surgical oncology started TPN. Per surgery, pending tissue biopsy result, surgical planning next week  I have consulted nutrition.  CT chest for staging showed Multiple bilateral thyroid lobe hypodensities measuring up to 1.5 cm on the right. A few pulmonary nodules measuring up to 5 mm, nonspecific.               Interval History: Feels ok, sometimes she gets a hungry stomach pain she said. Denies any nausea or vomiting.     Review of Systems   Constitutional:  Negative for chills and fever.   HENT:  Negative for sore throat.    Respiratory:  Negative for cough and shortness of breath.    Cardiovascular:  Negative for chest pain and palpitations.   Gastrointestinal:  Negative for nausea and vomiting.        See above.   Genitourinary:  Negative for dysuria, frequency and urgency.   Musculoskeletal:  Negative for back pain and myalgias.   Skin:  Negative for rash.   Neurological:  Negative for dizziness.   Psychiatric/Behavioral:  Negative for confusion.    Objective:     Vital Signs (Most Recent):  Temp: 98.2 °F (36.8 °C) (03/13/23 1117)  Pulse: (!) 42 (03/13/23 1117)  Resp: 18 (03/13/23 1117)  BP: (!) 154/70 (03/13/23 1117)  SpO2: 100 % (03/13/23 1117)   Vital Signs (24h Range):  Temp:  [98 °F (36.7 °C)-99.3 °F (37.4 °C)] 98.2 °F (36.8 °C)  Pulse:  [41-50] 42  Resp:  [18] 18  SpO2:  [97 %-100 %] 100 %  BP: (113-175)/(52-74) 154/70     Weight: 73 kg (160 lb 15 oz)  Body mass  index is 25.98 kg/m².    Intake/Output Summary (Last 24 hours) at 3/13/2023 1312  Last data filed at 3/13/2023 0601  Gross per 24 hour   Intake --   Output 200 ml   Net -200 ml        Physical Exam  Vitals reviewed.   Constitutional:       Appearance: Normal appearance.   HENT:      Head: Normocephalic and atraumatic.      Mouth/Throat:      Mouth: Mucous membranes are moist.   Eyes:      Extraocular Movements: Extraocular movements intact.      Conjunctiva/sclera: Conjunctivae normal.      Pupils: Pupils are equal, round, and reactive to light.   Cardiovascular:      Rate and Rhythm: Normal rate and regular rhythm.   Pulmonary:      Effort: Pulmonary effort is normal.      Breath sounds: Normal breath sounds.   Abdominal:      General: Abdomen is flat.      Palpations: Abdomen is soft.   Musculoskeletal:         General: No swelling or tenderness. Normal range of motion.      Cervical back: Normal range of motion and neck supple.   Skin:     General: Skin is warm.   Neurological:      General: No focal deficit present.      Mental Status: She is alert and oriented to person, place, and time.   Psychiatric:         Mood and Affect: Mood normal.         Behavior: Behavior normal.       Significant Labs: All pertinent labs within the past 24 hours have been reviewed.  BMP:   Recent Labs   Lab 03/13/23  0528   *      K 4.1      CO2 27   BUN 17   CREATININE 0.6   CALCIUM 9.5   MG 2.2       CBC:   Recent Labs   Lab 03/12/23  0506 03/13/23  0528   WBC 5.75 5.84   HGB 10.7* 10.9*   HCT 35.7* 35.2*    284       CMP:   Recent Labs   Lab 03/12/23  0506 03/13/23  0528   * 138   K 3.7 4.1    100   CO2 26 27   * 124*   BUN 20 17   CREATININE 0.6 0.6   CALCIUM 9.6 9.5   PROT  --  7.0   ALBUMIN  --  3.2*   BILITOT  --  0.2   ALKPHOS  --  68   AST  --  12   ALT  --  10   ANIONGAP 8 11         Significant Imaging: I have reviewed all pertinent imaging results/findings within the past 24  hours.      Assessment/Plan:      * Gastric cancer  - CT Concerning for gastric cancer  - EGD on 3/10 showed Likely malignant gastric tumor at the pylorus extending to the first portion of the duodenum. The mass was partially obstructive. bx was taken.  - Surgical oncology started TPN on 3/11. Nutrition has been consulted  - CT chest ordered for staging showed A few pulmonary nodules measuring up to 5 mm, nonspecific.  For the patient with suspected gastric malignancy, clinical parameters will determine further evaluation.  - I was contacted by surgical oncology team. Plan is for whipple versus GJ on Wednesday. Surgery requested that pt would be transferred to their service, and to risk stratify pt for surgery.   - Pt's EKG on admission showed sinus bradycardia which is not new. 2 d echo this admission showed normal left ventricular systolic and diastolic function. Normal right ventricular size with normal right ventricular systolic function. Normal wall motion.   - Pt's pre-operative revised cardiac risk index is 1, which implies 6% risk of death, MI, or cardiac arrest 30 days post-operatively for this elevated risk surgery. Her METS > 4. She can proceed with surgery with no further workup.        Gastric outlet obstruction  - 2/2 to the gastric cancer. See gastric cancer A/P.  - NPO; Continue with NGT to suction  - Zofran 4 mg IVP q8h PRN for nausea        Essential hypertension  -Vasotec q6h prn for sbp>160 was ordered, but despite it was given prior, the RN supervisor notified the writer, that this medication is not allowed to be given on this floor.   - vasotec was cancelled, and hydralazine IV prn was ordered instead.     Bradycardia  - On review of records, pt had bradycardia for years.   - 2 d echo this admission showed normal left ventricular systolic and diastolic function. Normal right ventricular size with normal right ventricular systolic function. Normal wall motion.   - case discussed with the  cardiology fellow, Dr. Mayito Flores on call. EKG Looks like sinus bradycardia. She recently underwent EGD on 3/10/23 and was under gen ETT. She was also bradycardic all the time before procedure, but reviewing anesthesia notes, she was able to increase HR (chronotropic competence). There were no complications.   - will order tele monitor.          Multiple thyroid nodules  CT thorax showed Multiple bilateral thyroid lobe hypodensities measuring up to 1.5 cm on the right.  Recommend thyroid ultrasound for further evaluation. Will defer to outpt.    TSH and free T4 are wnl.          Esophagitis  Seen on EGD done on 3/10.  Continue protonix iv.      Anemia  - Iron deficiency anemia. Possibly mild chronic bleeding from her gastric mass and esophagitis.   - will start iron tablets after pt is no longer npo.  - transfuse if hgb<7      Diabetes mellitus type 2, diet-controlled  - Last A1c: 5.6 (11/11/2022)  - Patient is currently NPO. TPN started on 3/11.  Will order humalog sliding scale while being on TPN.      Hyperlipidemia  - Atorvastatin 20 mg, daily  - Will hold for now      VTE Risk Mitigation (From admission, onward)           Ordered     Place sequential compression device  Until discontinued         03/09/23 2137     IP VTE LOW RISK PATIENT  Once         03/09/23 2137                    Discharge Planning   LOREN: 3/17/2023     Code Status: Full Code   Is the patient medically ready for discharge?: No    Reason for patient still in hospital (select all that apply): Patient trending condition  Discharge Plan A: Home with family        Medicine will sign out. Pt will be transferred to the surgical oncology service. If any questions arise, please consult medicine.         Eden Bautista MD  Department of Hospital Medicine   [facility] Surg

## 2023-03-13 NOTE — SUBJECTIVE & OBJECTIVE
Interval History: Feels ok, sometimes she gets a hungry stomach pain she said. Denies any nausea or vomiting.     Review of Systems   Constitutional:  Negative for chills and fever.   HENT:  Negative for sore throat.    Respiratory:  Negative for cough and shortness of breath.    Cardiovascular:  Negative for chest pain and palpitations.   Gastrointestinal:  Negative for nausea and vomiting.        See above.   Genitourinary:  Negative for dysuria, frequency and urgency.   Musculoskeletal:  Negative for back pain and myalgias.   Skin:  Negative for rash.   Neurological:  Negative for dizziness.   Psychiatric/Behavioral:  Negative for confusion.    Objective:     Vital Signs (Most Recent):  Temp: 98.2 °F (36.8 °C) (03/13/23 1117)  Pulse: (!) 42 (03/13/23 1117)  Resp: 18 (03/13/23 1117)  BP: (!) 154/70 (03/13/23 1117)  SpO2: 100 % (03/13/23 1117)   Vital Signs (24h Range):  Temp:  [98 °F (36.7 °C)-99.3 °F (37.4 °C)] 98.2 °F (36.8 °C)  Pulse:  [41-50] 42  Resp:  [18] 18  SpO2:  [97 %-100 %] 100 %  BP: (113-175)/(52-74) 154/70     Weight: 73 kg (160 lb 15 oz)  Body mass index is 25.98 kg/m².    Intake/Output Summary (Last 24 hours) at 3/13/2023 1312  Last data filed at 3/13/2023 0601  Gross per 24 hour   Intake --   Output 200 ml   Net -200 ml        Physical Exam  Vitals reviewed.   Constitutional:       Appearance: Normal appearance.   HENT:      Head: Normocephalic and atraumatic.      Mouth/Throat:      Mouth: Mucous membranes are moist.   Eyes:      Extraocular Movements: Extraocular movements intact.      Conjunctiva/sclera: Conjunctivae normal.      Pupils: Pupils are equal, round, and reactive to light.   Cardiovascular:      Rate and Rhythm: Normal rate and regular rhythm.   Pulmonary:      Effort: Pulmonary effort is normal.      Breath sounds: Normal breath sounds.   Abdominal:      General: Abdomen is flat.      Palpations: Abdomen is soft.   Musculoskeletal:         General: No swelling or tenderness.  Normal range of motion.      Cervical back: Normal range of motion and neck supple.   Skin:     General: Skin is warm.   Neurological:      General: No focal deficit present.      Mental Status: She is alert and oriented to person, place, and time.   Psychiatric:         Mood and Affect: Mood normal.         Behavior: Behavior normal.       Significant Labs: All pertinent labs within the past 24 hours have been reviewed.  BMP:   Recent Labs   Lab 03/13/23  0528   *      K 4.1      CO2 27   BUN 17   CREATININE 0.6   CALCIUM 9.5   MG 2.2       CBC:   Recent Labs   Lab 03/12/23  0506 03/13/23  0528   WBC 5.75 5.84   HGB 10.7* 10.9*   HCT 35.7* 35.2*    284       CMP:   Recent Labs   Lab 03/12/23  0506 03/13/23  0528   * 138   K 3.7 4.1    100   CO2 26 27   * 124*   BUN 20 17   CREATININE 0.6 0.6   CALCIUM 9.6 9.5   PROT  --  7.0   ALBUMIN  --  3.2*   BILITOT  --  0.2   ALKPHOS  --  68   AST  --  12   ALT  --  10   ANIONGAP 8 11         Significant Imaging: I have reviewed all pertinent imaging results/findings within the past 24 hours.

## 2023-03-13 NOTE — ASSESSMENT & PLAN NOTE
CT thorax showed Multiple bilateral thyroid lobe hypodensities measuring up to 1.5 cm on the right.  Recommend thyroid ultrasound for further evaluation. Will defer to outpt.    TSH and free T4 are wnl.

## 2023-03-13 NOTE — PLAN OF CARE
Problem: Adult Inpatient Plan of Care  Goal: Plan of Care Review  Outcome: Ongoing, Progressing  Goal: Patient-Specific Goal (Individualized)  Outcome: Ongoing, Progressing  Goal: Absence of Hospital-Acquired Illness or Injury  Outcome: Ongoing, Progressing  Goal: Optimal Comfort and Wellbeing  Outcome: Ongoing, Progressing  Goal: Readiness for Transition of Care  Outcome: Ongoing, Progressing     Problem: Diabetes Comorbidity  Goal: Blood Glucose Level Within Targeted Range  Outcome: Ongoing, Progressing     Problem: Infection  Goal: Absence of Infection Signs and Symptoms  Outcome: Ongoing, Progressing         Pt AAO x 4. Patient rested comfortably with no significant changes during the shift, remains free from falls and injuries. Side rails up x2, bed low and locked, call light and personal belongings within reach. VS remain stable. Questions and concerns addressed and answered. Medication compliance. Family remains at bedside. Will continue to monitor.

## 2023-03-13 NOTE — ASSESSMENT & PLAN NOTE
- CT Concerning for gastric cancer  - EGD on 3/10 showed Likely malignant gastric tumor at the pylorus extending to the first portion of the duodenum. The mass was partially obstructive. bx was taken.  - Surgical oncology started TPN on 3/11. Nutrition has been consulted  - CT chest ordered for staging showed A few pulmonary nodules measuring up to 5 mm, nonspecific.  For the patient with suspected gastric malignancy, clinical parameters will determine further evaluation.  - I was contacted by surgical oncology team. Plan is for whipple versus GJ on Wednesday. Surgery requested that pt would be transferred to their service, and to risk stratify pt for surgery.   - Pt's EKG on admission showed sinus bradycardia which is not new. 2 d echo this admission showed normal left ventricular systolic and diastolic function. Normal right ventricular size with normal right ventricular systolic function. Normal wall motion.   - Pt's pre-operative revised cardiac risk index is 1, which implies 6% risk of death, MI, or cardiac arrest 30 days post-operatively for this elevated risk surgery. Her METS > 4. She can proceed with surgery with no further workup.

## 2023-03-14 ENCOUNTER — ANESTHESIA EVENT (OUTPATIENT)
Dept: SURGERY | Facility: HOSPITAL | Age: 68
DRG: 327 | End: 2023-03-14
Payer: MEDICARE

## 2023-03-14 LAB
ANION GAP SERPL CALC-SCNC: 10 MMOL/L (ref 8–16)
BASOPHILS # BLD AUTO: 0.03 K/UL (ref 0–0.2)
BASOPHILS NFR BLD: 0.5 % (ref 0–1.9)
BUN SERPL-MCNC: 19 MG/DL (ref 8–23)
CALCIUM SERPL-MCNC: 10 MG/DL (ref 8.7–10.5)
CHLORIDE SERPL-SCNC: 103 MMOL/L (ref 95–110)
CO2 SERPL-SCNC: 26 MMOL/L (ref 23–29)
COMMENT: NORMAL
CREAT SERPL-MCNC: 0.6 MG/DL (ref 0.5–1.4)
DIFFERENTIAL METHOD: ABNORMAL
EOSINOPHIL # BLD AUTO: 0.1 K/UL (ref 0–0.5)
EOSINOPHIL NFR BLD: 2.3 % (ref 0–8)
ERYTHROCYTE [DISTWIDTH] IN BLOOD BY AUTOMATED COUNT: 15.7 % (ref 11.5–14.5)
EST. GFR  (NO RACE VARIABLE): >60 ML/MIN/1.73 M^2
FINAL PATHOLOGIC DIAGNOSIS: NORMAL
GLUCOSE SERPL-MCNC: 122 MG/DL (ref 70–110)
GROSS: NORMAL
HCT VFR BLD AUTO: 36.3 % (ref 37–48.5)
HGB BLD-MCNC: 11.1 G/DL (ref 12–16)
IMM GRANULOCYTES # BLD AUTO: 0.01 K/UL (ref 0–0.04)
IMM GRANULOCYTES NFR BLD AUTO: 0.2 % (ref 0–0.5)
LYMPHOCYTES # BLD AUTO: 1 K/UL (ref 1–4.8)
LYMPHOCYTES NFR BLD: 17.6 % (ref 18–48)
Lab: NORMAL
MAGNESIUM SERPL-MCNC: 2.3 MG/DL (ref 1.6–2.6)
MCH RBC QN AUTO: 22.3 PG (ref 27–31)
MCHC RBC AUTO-ENTMCNC: 30.6 G/DL (ref 32–36)
MCV RBC AUTO: 73 FL (ref 82–98)
MONOCYTES # BLD AUTO: 0.7 K/UL (ref 0.3–1)
MONOCYTES NFR BLD: 11.6 % (ref 4–15)
NEUTROPHILS # BLD AUTO: 3.9 K/UL (ref 1.8–7.7)
NEUTROPHILS NFR BLD: 67.8 % (ref 38–73)
NRBC BLD-RTO: 0 /100 WBC
PHOSPHATE SERPL-MCNC: 3.6 MG/DL (ref 2.7–4.5)
PLATELET # BLD AUTO: 244 K/UL (ref 150–450)
PMV BLD AUTO: 11.4 FL (ref 9.2–12.9)
POCT GLUCOSE: 126 MG/DL (ref 70–110)
POCT GLUCOSE: 127 MG/DL (ref 70–110)
POCT GLUCOSE: 130 MG/DL (ref 70–110)
POCT GLUCOSE: 134 MG/DL (ref 70–110)
POTASSIUM SERPL-SCNC: 3.8 MMOL/L (ref 3.5–5.1)
RBC # BLD AUTO: 4.98 M/UL (ref 4–5.4)
SODIUM SERPL-SCNC: 139 MMOL/L (ref 136–145)
SUPPLEMENTAL DIAGNOSIS: NORMAL
WBC # BLD AUTO: 5.69 K/UL (ref 3.9–12.7)

## 2023-03-14 PROCEDURE — 93005 ELECTROCARDIOGRAM TRACING: CPT

## 2023-03-14 PROCEDURE — A4216 STERILE WATER/SALINE, 10 ML: HCPCS | Performed by: HOSPITALIST

## 2023-03-14 PROCEDURE — 85025 COMPLETE CBC W/AUTO DIFF WBC: CPT | Performed by: HOSPITALIST

## 2023-03-14 PROCEDURE — C9113 INJ PANTOPRAZOLE SODIUM, VIA: HCPCS | Performed by: HOSPITALIST

## 2023-03-14 PROCEDURE — 84100 ASSAY OF PHOSPHORUS: CPT | Performed by: HOSPITALIST

## 2023-03-14 PROCEDURE — 36415 COLL VENOUS BLD VENIPUNCTURE: CPT | Performed by: HOSPITALIST

## 2023-03-14 PROCEDURE — 93010 EKG 12-LEAD: ICD-10-PCS | Mod: ,,, | Performed by: INTERNAL MEDICINE

## 2023-03-14 PROCEDURE — 83735 ASSAY OF MAGNESIUM: CPT | Performed by: HOSPITALIST

## 2023-03-14 PROCEDURE — 20600001 HC STEP DOWN PRIVATE ROOM

## 2023-03-14 PROCEDURE — 25000003 PHARM REV CODE 250

## 2023-03-14 PROCEDURE — 80048 BASIC METABOLIC PNL TOTAL CA: CPT | Performed by: HOSPITALIST

## 2023-03-14 PROCEDURE — 63600175 PHARM REV CODE 636 W HCPCS: Performed by: HOSPITALIST

## 2023-03-14 PROCEDURE — 25000003 PHARM REV CODE 250: Performed by: HOSPITALIST

## 2023-03-14 PROCEDURE — 93010 ELECTROCARDIOGRAM REPORT: CPT | Mod: ,,, | Performed by: INTERNAL MEDICINE

## 2023-03-14 PROCEDURE — B4185 PARENTERAL SOL 10 GM LIPIDS: HCPCS

## 2023-03-14 PROCEDURE — A4217 STERILE WATER/SALINE, 500 ML: HCPCS

## 2023-03-14 PROCEDURE — 63600175 PHARM REV CODE 636 W HCPCS

## 2023-03-14 RX ORDER — ENOXAPARIN SODIUM 100 MG/ML
40 INJECTION SUBCUTANEOUS EVERY 24 HOURS
Status: DISCONTINUED | OUTPATIENT
Start: 2023-03-14 | End: 2023-03-24

## 2023-03-14 RX ORDER — PROCHLORPERAZINE EDISYLATE 5 MG/ML
5 INJECTION INTRAMUSCULAR; INTRAVENOUS EVERY 30 MIN PRN
Status: CANCELLED | OUTPATIENT
Start: 2023-03-14

## 2023-03-14 RX ORDER — FENTANYL CITRATE 50 UG/ML
25 INJECTION, SOLUTION INTRAMUSCULAR; INTRAVENOUS EVERY 5 MIN PRN
Status: CANCELLED | OUTPATIENT
Start: 2023-03-14

## 2023-03-14 RX ORDER — ENOXAPARIN SODIUM 150 MG/ML
INJECTION SUBCUTANEOUS
Status: CANCELLED | OUTPATIENT
Start: 2023-03-14

## 2023-03-14 RX ADMIN — Medication 10 ML: at 12:03

## 2023-03-14 RX ADMIN — MAGNESIUM SULFATE HEPTAHYDRATE: 500 INJECTION, SOLUTION INTRAMUSCULAR; INTRAVENOUS at 10:03

## 2023-03-14 RX ADMIN — ENOXAPARIN SODIUM 40 MG: 40 INJECTION SUBCUTANEOUS at 05:03

## 2023-03-14 RX ADMIN — Medication 10 ML: at 05:03

## 2023-03-14 RX ADMIN — Medication 10 ML: at 11:03

## 2023-03-14 RX ADMIN — PANTOPRAZOLE SODIUM 40 MG: 40 INJECTION, POWDER, FOR SOLUTION INTRAVENOUS at 09:03

## 2023-03-14 RX ADMIN — I.V. FAT EMULSION 250 ML: 20 EMULSION INTRAVENOUS at 10:03

## 2023-03-14 NOTE — NURSING
POC reviewed with Pt, verbalized understanding. AAOx4. VSS on RA. NPO, denies n/v. L NG tube to LIWS. Voiding with standby assist to bathroom, adequate UOP. Ambulated halls x1. Bed in lowest position, call light within reach.     Pt stable. Hand off given to JERARDO Cano who will assume care.

## 2023-03-14 NOTE — PHYSICIAN QUERY
PT Name: Nan Simms  MR #: [id number]    DOCUMENTATION CLARIFICATION     CDS/: Pat Mcmahan RN, CDS              Contact information: [email or web address]      This form is a permanent document in the medical record.     Query Date: March 14, 2023    By submitting this query, we are merely seeking further clarification of documentation.. Please utilize your independent clinical judgment when addressing the question(s) below.    The medical record contains the following:   Indicators  Supporting Clinical Findings Location in Medical Record   x Energy Intake Pt reports good appetite PTA, however 15# weight loss; chart review confirms   Nutrition Consult 3/12   x Weight Loss States she has lost around 10 pounds the past 3 months    Weight Loss: 7.5% x 3 months    H&P 3/9       Nutrition Consult 3/12   x Fat Loss Body Fat Depletion: moderate depletion of orbitals and triceps    Nutrition Consult 3/12   x Muscle Loss Muscle Mass Depletion: moderate depletion of temples, clavicle region, scapular region and lower extremities    Nutrition Consult 3/12    Edema/Fluid Accumulation      Reduced  Strength (by dynamometer)     x Weight, BMI, Usual Body Weight BMI (Calculated): 26 Nutrition Consult 3/12     Delayed Wound Healing     x Registered Dietician Diagnosis Moderate Protein-Calorie Malnutrition  Malnutrition in the context of Acute Illness/Injury   Nutrition Consult 3/12   x Acute or Chronic Illness Principal Problem: Gastric cancer     Gastric outlet obstruction    H&P 3/9     H&P 3/9     Social or Environmental Circumstances     x Treatment TPN/Lipids infusing Nutrition Consult 3/12    Other       Academy of Nutrition and Dietetics (Academy) and the American Society for Parenteral and Enteral Nutrition (A.S.P.E.N.) Clinical Characteristics to support Malnutrition   Malnutrition in the Context of Acute Illness or Injury Malnutrition in the Context of Chronic Illness or Injury Malnutrition in the Context  of Social or Environmental Circumstances   Malnutrition Level Moderate Severe Moderate Severe   Moderate   Severe   Energy Intake <75%                   >7 days <50%                 >5 days <75%           >1 month <75%                      >1 month   <75% for >3 months   <50% for >1 month   Weight Loss   1-2% in 1 week >2% in 1 week 5% in 1 month >5% in 1 month 5% in 1 month >5% in 1 month    5% in 1 month >5% in 1 month 7.5% in 3 months >7.5% in 3 months 7.5% in 3 months >7.5% in 3 months    7.5% in 3 months >7.5% in 3 months 10% in 6 months >10% in 6 months 10% in 6 months >10% in 6 months        20% in 1 year                    >20% in 1 year                                                                  20% in 1 year                            >20% in 1 year                                                  Subcutaneous Fat Loss Mild  Moderate  Mild  Severe    Mild   Severe   Muscle Loss Mild  Moderate  Mild  Severe    Mild   Severe   Edema/Fluid Accumulation Mild Moderate to severe  Mild  Severe   Mild   Severe   Reduced  Strength         (based on standards supplied by  of dynamometer) N/A Measurably reduced N/A Measurably reduced N/A Measurably reduced     Criteria for mild malnutrition is defined as 1 characteristic outlined above within the established moderate or severe parameters.  A minimum of 2 out of the 6 characteristics noted above are recommended for a diagnosis of moderate or severe malnutrition.  Chronic illness/injury is a disease/condition lasting 3 months or longer.    The noted clinical guidelines are only system guidelines and do not replace the providers clinical judgment.    Provider, please specify diagnosis or diagnoses associated with above clinical findings.    [ x] Moderate Malnutrition - a minimum of 2 of the 6 moderate malnutrition characteristics noted above    [  ] Malnutrition, Unspecified degree   [  ] Other Nutritional Diagnosis or clarification (please  specify): ______________   [  ] Clinically Undetermined         Please document in your progress notes daily for the duration of treatment until resolved and  include in your discharge summary.      References:    ELVIRA Garnett, & WEI Caballero (2022, April). Assessment and management of anorexia and cachexia in palliative care. Retrieved May 23, 2022, from https://www.[employer]/contents/assessment-and-management-of-anorexia-and-cachexia-in-palliative-care?xvrvvNbe=6650&source=see_link     TABATHA Page, PhD, RD, ABDIEL, JONATHAN Scott, PhD, RN, JAYLEN Reynolds MD, PhD, Mandy MUNIZ A., MS, RD, [facility], VALERIO Wheatley, MS, RD, The Academy Malnutrition Work Group, The A.S.P.E.N. Board of Directors. (2012). Consensus Statement: Academy of Nutrition and Dietetics and American Society for Parenteral and Enteral Nutrition: Characteristics Recommended for the Identification and Documentation of Adult Malnutrition (Undernutrition). Journal of Parenteral and Enteral Nutrition, 36(3), 275-283. doi:10.1177/8514069572905851     Form No. 31541

## 2023-03-14 NOTE — PROGRESS NOTES
[facility] - [facility]  General Surgery  Progress Note    Subjective:     History of Present Illness:  Nan Simms is a 67 year old female with PMHx of HTN and hypothyroidism who has had constipation and abdominal pain for 3 weeks. Says she became increasingly bloated and abdominal pain worsened and went to [facility] yesterday and was transferred to [facility] for higher level of care. She reports using enemas to relieve constipation but was unable to find relief and came to ED. She has not been nauseous however reports she vomited 3 times two days ago. She has been tolerating a solid and liquid diet. Reports has lost 13 lbs in last 3 weeks. Of note, she also notes a loss of appetite and increased fatigue over last several weeks. Reports she cannot recall if she has ever gotten a colonoscopy but daughter reports she does Cologuard yearly; patient says she has not done Cologuard in 4 years.    Upon admission, she was HDS and labs were consistent with iron deficiency anemia. CT A/P obtained 3/9 showed findings concerning for malignancy involving the pylorus and duodenum with component of gastric outlet obstruction.  NGT was placed 3/9 and patient has had resolution of her abdominal pain, nausea, and vomiting since placement.      Post-Op Info:  Procedure(s) (LRB):  EGD (ESOPHAGOGASTRODUODENOSCOPY) (N/A)   4 Days Post-Op     Interval History: No issues overnight. Receiving TPN/lipids    Medications:  Continuous Infusions:   TPN ADULT CENTRAL LINE CUSTOM 45 mL/hr at 03/13/23 5030     Scheduled Meds:   enoxaparin  40 mg Subcutaneous Daily    fat emulsion 20%  250 mL Intravenous Daily    pantoprazole  40 mg Intravenous Daily    sodium chloride 0.9%  10 mL Intravenous Q6H     PRN Meds:acetaminophen, dextrose 10%, dextrose 10%, dextrose 10%, dextrose 10%, dextrose, dextrose, glucagon (human recombinant), hydrALAZINE, insulin aspart U-100, melatonin, sodium chloride 0.9%, Flushing PICC Protocol **AND** sodium chloride 0.9%  **AND** sodium chloride 0.9%     Review of patient's allergies indicates:   Allergen Reactions    Pcn [penicillins]      Childhood      Objective:     Vital Signs (Most Recent):  Temp: 96.7 °F (35.9 °C) (03/14/23 0426)  Pulse: (!) 47 (03/14/23 0426)  Resp: 20 (03/14/23 0317)  BP: (!) 122/58 (03/14/23 0426)  SpO2: 97 % (03/14/23 0426)   Vital Signs (24h Range):  Temp:  [96.7 °F (35.9 °C)-98.7 °F (37.1 °C)] 96.7 °F (35.9 °C)  Pulse:  [40-50] 47  Resp:  [18-20] 20  SpO2:  [97 %-100 %] 97 %  BP: (122-185)/(57-79) 122/58     Weight: 73 kg (160 lb 15 oz)  Body mass index is 25.98 kg/m².    Intake/Output - Last 3 Shifts         03/12 0700  03/13 0659 03/13 0700 03/14 0659    Urine (mL/kg/hr) 0 (0) 500 (0.3)    Drains 200 50    Total Output 200 550    Net -200 -550          Urine Occurrence 6 x 5 x            Physical Exam  Vitals and nursing note reviewed.   Constitutional:       General: She is not in acute distress.  Cardiovascular:      Rate and Rhythm: Normal rate.      Pulses: Normal pulses.   Pulmonary:      Effort: Pulmonary effort is normal. No respiratory distress.   Abdominal:      General: There is no distension.      Palpations: Abdomen is soft.      Tenderness: There is no abdominal tenderness.   Neurological:      General: No focal deficit present.      Mental Status: She is alert and oriented to person, place, and time.       Significant Labs:  I have reviewed all pertinent lab results within the past 24 hours.  CBC:   Recent Labs   Lab 03/14/23  0340   WBC 5.69   RBC 4.98   HGB 11.1*   HCT 36.3*      MCV 73*   MCH 22.3*   MCHC 30.6*     BMP:   Recent Labs   Lab 03/14/23 0340   *      K 3.8      CO2 26   BUN 19   CREATININE 0.6   CALCIUM 10.0   MG 2.3     CMP:   Recent Labs   Lab 03/13/23  0528 03/14/23 0340   * 122*   CALCIUM 9.5 10.0   ALBUMIN 3.2*  --    PROT 7.0  --     139   K 4.1 3.8   CO2 27 26    103   BUN 17 19   CREATININE 0.6 0.6   ALKPHOS 68  --     ALT 10  --    AST 12  --    BILITOT 0.2  --        Significant Diagnostics:  I have reviewed all pertinent imaging results/findings within the past 24 hours.    Assessment/Plan:     Gastric outlet obstruction  Nan Simms is a 66 yo female with PMHx of HTN and hypothyroidism here for gastric outlet obstruction. Discussion with patient at length, will proceed to OR on 3/15/22 for whipple procedure versus gastrojejunostomy.     - NPO  - TPN/lipids  - Appreciate medicine input for preoperative assessment   - DVT PPX  - OR 3/15 for whipple versus gastrojejunostomy. Will obtain informed consent today            Vivian Jefferson MD  General Surgery  [facility]

## 2023-03-14 NOTE — NURSING
Called [facility] to give report to Rhiannon @ [phone]. Said she would call me back. Will attempt again.  Gave report to Rhiannon @[phone].

## 2023-03-14 NOTE — NURSING
Notified Snehal Mcmahan NP of pt's HR running in the 40's on telemetry as well as dropping down to 39. EKG to be ordered. WCTM.

## 2023-03-14 NOTE — ASSESSMENT & PLAN NOTE
-Vasotec q6h prn for sbp>160 was ordered, but despite it was given prior, the RN supervisor notified the writer, that this medication is not allowed to be given on this floor.   - vasotec was cancelled, and hydralazine IV prn was ordered instead.

## 2023-03-14 NOTE — SUBJECTIVE & OBJECTIVE
Interval History: No issues overnight. Receiving TPN/lipids    Medications:  Continuous Infusions:   TPN ADULT CENTRAL LINE CUSTOM 45 mL/hr at 03/13/23 2337     Scheduled Meds:   enoxaparin  40 mg Subcutaneous Daily    fat emulsion 20%  250 mL Intravenous Daily    pantoprazole  40 mg Intravenous Daily    sodium chloride 0.9%  10 mL Intravenous Q6H     PRN Meds:acetaminophen, dextrose 10%, dextrose 10%, dextrose 10%, dextrose 10%, dextrose, dextrose, glucagon (human recombinant), hydrALAZINE, insulin aspart U-100, melatonin, sodium chloride 0.9%, Flushing PICC Protocol **AND** sodium chloride 0.9% **AND** sodium chloride 0.9%     Review of patient's allergies indicates:   Allergen Reactions    Pcn [penicillins]      Childhood      Objective:     Vital Signs (Most Recent):  Temp: 96.7 °F (35.9 °C) (03/14/23 0426)  Pulse: (!) 47 (03/14/23 0426)  Resp: 20 (03/14/23 0317)  BP: (!) 122/58 (03/14/23 0426)  SpO2: 97 % (03/14/23 0426)   Vital Signs (24h Range):  Temp:  [96.7 °F (35.9 °C)-98.7 °F (37.1 °C)] 96.7 °F (35.9 °C)  Pulse:  [40-50] 47  Resp:  [18-20] 20  SpO2:  [97 %-100 %] 97 %  BP: (122-185)/(57-79) 122/58     Weight: 73 kg (160 lb 15 oz)  Body mass index is 25.98 kg/m².    Intake/Output - Last 3 Shifts         03/12 0700  03/13 0659 03/13 0700  03/14 0659    Urine (mL/kg/hr) 0 (0) 500 (0.3)    Drains 200 50    Total Output 200 550    Net -200 -550          Urine Occurrence 6 x 5 x            Physical Exam  Vitals and nursing note reviewed.   Constitutional:       General: She is not in acute distress.  Cardiovascular:      Rate and Rhythm: Normal rate.      Pulses: Normal pulses.   Pulmonary:      Effort: Pulmonary effort is normal. No respiratory distress.   Abdominal:      General: There is no distension.      Palpations: Abdomen is soft.      Tenderness: There is no abdominal tenderness.   Neurological:      General: No focal deficit present.      Mental Status: She is alert and oriented to person, place, and  time.       Significant Labs:  I have reviewed all pertinent lab results within the past 24 hours.  CBC:   Recent Labs   Lab 03/14/23  0340   WBC 5.69   RBC 4.98   HGB 11.1*   HCT 36.3*      MCV 73*   MCH 22.3*   MCHC 30.6*     BMP:   Recent Labs   Lab 03/14/23  0340   *      K 3.8      CO2 26   BUN 19   CREATININE 0.6   CALCIUM 10.0   MG 2.3     CMP:   Recent Labs   Lab 03/13/23  0528 03/14/23  0340   * 122*   CALCIUM 9.5 10.0   ALBUMIN 3.2*  --    PROT 7.0  --     139   K 4.1 3.8   CO2 27 26    103   BUN 17 19   CREATININE 0.6 0.6   ALKPHOS 68  --    ALT 10  --    AST 12  --    BILITOT 0.2  --        Significant Diagnostics:  I have reviewed all pertinent imaging results/findings within the past 24 hours.

## 2023-03-14 NOTE — ASSESSMENT & PLAN NOTE
- On review of records, pt had bradycardia for years.   - 2 d echo this admission showed normal left ventricular systolic and diastolic function. Normal right ventricular size with normal right ventricular systolic function. Normal wall motion.   - case discussed with the cardiology fellow, Dr. Mayito Flores on call. EKG Looks like sinus bradycardia. She recently underwent EGD on 3/10/23 and was under gen ETT. She was also bradycardic all the time before procedure, but reviewing anesthesia notes, she was able to increase HR (chronotropic competence). There were no complications.   - will order tele monitor.

## 2023-03-14 NOTE — NURSING
Patient was brought up with JERARDO Hassan to [facility]. All patient's belongings were with her.

## 2023-03-14 NOTE — ANESTHESIA PREPROCEDURE EVALUATION
[facility]  Anesthesia Pre-Operative Evaluation         Patient Name: Nan Simms  YOB: 1955  MRN: [id number]    SUBJECTIVE:     Pre-operative evaluation for Procedure(s) (LRB):  WHIPPLE PROCEDURE (N/A)  GASTROJEJUNOSTOMY (N/A)     03/14/2023    Nan Simms is a 67 y.o. female w/ a significant PMHx of HTN and hypothyroidism admitted with gastric outlet obstruction. Planning for whipple vs gastrojejunostomy. CT abdomen concerning for malignancy involving pylorus/duodenum. NGT to suction.    Patient now presents for the above procedure(s).    TTE 3/10/23   The left ventricle is normal in size with normal systolic function.   The estimated ejection fraction is 68%.   Normal left ventricular diastolic function.   Normal right ventricular size with normal right ventricular systolic function.   Normal central venous pressure (3 mmHg).   The estimated PA systolic pressure is 30 mmHg.   The ascending aorta is mildly dilated.           LDA:   PICC Double Lumen R Basilic  NG/OG 18Fr L Nostril    Prev airway:   Intubation     Date/Time: 3/10/2023 1:10 PM  Performed by: Celia Delgado CRNA  Authorized by: Yumiko Francis MD      Intubation:     Induction:  Intravenous    Intubated:  Postinduction    Mask Ventilation:  Not attempted    Attempts:  1    Attempted By:  CRNA    Method of Intubation:  Direct    Blade:  Quinteros 2    Laryngeal View Grade: Grade I - full view of cords      Difficult Airway Encountered?: No      Complications:  None    Airway Device:  Oral endotracheal tube    Airway Device Size:  7.0    Style/Cuff Inflation:  Cuffed    Inflation Amount (mL):  6    Tube secured:  21    Secured at:  The lips    Placement Verified By:  Capnometry    Complicating Factors:  None    Findings Post-Intubation:  BS equal bilateral    Drips: None documented.    Patient Active Problem List   Diagnosis    Essential hypertension    Hyperlipidemia    Non-healing surgical wound     Simple chronic bronchitis    Diabetes mellitus type 2, diet-controlled    Gastric outlet obstruction    Gastric cancer    Anemia    Esophagitis    Moderate malnutrition    Multiple thyroid nodules    Bradycardia       Review of patient's allergies indicates:   Allergen Reactions    Pcn [penicillins]      Childhood        Current Outpatient Medications:    Current Facility-Administered Medications:     acetaminophen tablet 650 mg, 650 mg, Oral, Q8H PRN, Billie Grayson MD    dextrose 10% bolus 125 mL 125 mL, 12.5 g, Intravenous, PRN, Billie Grayson MD    dextrose 10% bolus 125 mL 125 mL, 12.5 g, Intravenous, PRN, Eden Bautista MD    dextrose 10% bolus 250 mL 250 mL, 25 g, Intravenous, PRN, Billie Grayson MD    dextrose 10% bolus 250 mL 250 mL, 25 g, Intravenous, PRN, Eden Bautista MD    dextrose 40 % gel 15,000 mg, 15 g, Oral, PRN, Billie Grayson MD    dextrose 40 % gel 30,000 mg, 30 g, Oral, PRN, Billie Grayson MD    enoxaparin injection 40 mg, 40 mg, Subcutaneous, Daily, Chase English MD    fat emulsion 20% infusion 250 mL, 250 mL, Intravenous, Daily, Eden Bautista MD, Last Rate: 20.8 mL/hr at 03/13/23 2333, 250 mL at 03/13/23 2333    fat emulsion 20% infusion 250 mL, 250 mL, Intravenous, Daily, Chase English MD    glucagon (human recombinant) injection 1 mg, 1 mg, Intramuscular, PRN, Billie Grayson MD    hydrALAZINE injection 10 mg, 10 mg, Intravenous, Q8H PRN, Eden Bautista MD, 10 mg at 03/13/23 2010    insulin aspart U-100 pen 0-5 Units, 0-5 Units, Subcutaneous, Q6H PRN, Eden Bautista MD    melatonin tablet 6 mg, 6 mg, Oral, Nightly PRN, Billie Grayson MD    pantoprazole injection 40 mg, 40 mg, Intravenous, Daily, Eden Bautista MD, 40 mg at 03/14/23 0952    sodium chloride 0.9% flush 10 mL, 10 mL, Intravenous, PRN, Billie Grayson MD    Flushing PICC Protocol, , , Until Discontinued **AND** sodium chloride 0.9% flush 10 mL, 10 mL, Intravenous, Q6H, 10 mL at 03/14/23 0005 **AND**  sodium chloride 0.9% flush 10 mL, 10 mL, Intravenous, PRN, Eden Bautista MD    TPN ADULT CENTRAL LINE CUSTOM, , Intravenous, Continuous, Eden Bautista MD, Last Rate: 45 mL/hr at 03/13/23 2337, New Bag at 03/13/23 2337    TPN ADULT CENTRAL LINE CUSTOM, , Intravenous, Continuous, Chase English MD    Past Surgical History:   Procedure Laterality Date    CATARACT EXTRACTION W/  INTRAOCULAR LENS IMPLANT Right 8/8/2019    Procedure: EXTRACTION, CATARACT, WITH IOL INSERTION;  Surgeon: Spenser Lee MD;  Location: [facility];  Service: Ophthalmology;  Laterality: Right;    EYE SURGERY      HYSTERECTOMY      OOPHORECTOMY      PHACOEMULSIFICATION OF CATARACT Right 8/8/2019    Procedure: PHACOEMULSIFICATION, CATARACT;  Surgeon: Spenser Lee MD;  Location: [facility];  Service: Ophthalmology;  Laterality: Right;       Social History     Socioeconomic History    Marital status:    Tobacco Use    Smoking status: Every Day     Packs/day: 0.50     Years: 30.00     Pack years: 15.00     Types: Cigarettes    Smokeless tobacco: Current   Substance and Sexual Activity    Alcohol use: Yes     Alcohol/week: 1.0 standard drink     Types: 1 Cans of beer per week    Drug use: No    Sexual activity: Yes     Partners: Male       OBJECTIVE:     Vital Signs Range (Last 24H):  Temp:  [35.9 °C (96.7 °F)-37.1 °C (98.7 °F)]   Pulse:  [40-64]   Resp:  [18-20]   BP: (122-185)/(57-79)   SpO2:  [97 %-100 %]       Significant Labs:  Lab Results   Component Value Date    WBC 5.69 03/14/2023    HGB 11.1 (L) 03/14/2023    HCT 36.3 (L) 03/14/2023     03/14/2023    CHOL 203 (H) 05/27/2022    TRIG 168 (H) 03/12/2023    HDL 65 05/27/2022    ALT 10 03/13/2023    AST 12 03/13/2023     03/14/2023    K 3.8 03/14/2023     03/14/2023    CREATININE 0.6 03/14/2023    BUN 19 03/14/2023    CO2 26 03/14/2023    TSH 0.621 03/12/2023    INR 1.0 03/10/2023    HGBA1C 5.6 03/09/2023       Diagnostic Studies: No relevant  studies.    EKG:   Results for orders placed or performed during the hospital encounter of 03/09/23   EKG, 12 - Lead    Collection Time: 03/09/23 10:00 PM    Narrative    Test Reason : R07.9,    Vent. Rate : 045 BPM     Atrial Rate : 045 BPM     P-R Int : 120 ms          QRS Dur : 092 ms      QT Int : 452 ms       P-R-T Axes : 059 018 067 degrees     QTc Int : 390 ms    Sinus bradycardia  Voltage criteria for left ventricular hypertrophy ( R in aVL , Sokolow-West   , Hao product )  Nonspecific T wave abnormality  Abnormal ECG  When compared with ECG of 16-SEP-2018 07:15,  Non-specific change in ST segment in Lateral leads  Confirmed by Iggy ALBERTO MD ([id number]) on 3/10/2023 3:31:45 PM    Referred By: ALEX BAILEY           Confirmed By:Iggy ALBERTO MD       2D ECHO:  TTE:  Results for orders placed or performed during the hospital encounter of 03/09/23   Echo   Result Value Ref Range    Ascending aorta 4.31 cm    STJ 3.08 cm    AV mean gradient 8 mmHg    Ao peak mehdi 1.91 m/s    Ao VTI 33.55 cm    IVS 0.80 0.6 - 1.1 cm    LA size 2.91 cm    Left Atrium Major Axis 5.22 cm    Left Atrium Minor Axis 5.66 cm    LVIDd 5.19 3.5 - 6.0 cm    LVIDs 3.36 2.1 - 4.0 cm    LVOT diameter 2.08 cm    LVOT peak VTI 32.41 cm    Posterior Wall 0.77 0.6 - 1.1 cm    MV Peak A Mehdi 0.51 m/s    E wave deceleration time 292.57 msec    MV Peak E Mehdi 0.64 m/s    RA Major Axis 4.48 cm    RA Width 3.59 cm    RVDD 2.91 cm    Sinus 3.48 cm    TAPSE 1.70 cm    TR Max Mehdi 2.61 m/s    TDI LATERAL 0.06 m/s    TDI SEPTAL 0.05 m/s    LA WIDTH 4.26 cm    MV stenosis pressure 1/2 time 84.84 ms    LV Diastolic Volume 128.69 mL    LV Systolic Volume 46.25 mL    RV S' 20.67 cm/s    LVOT peak mehdi 1.63 m/s    LA volume (mod) 69.65 cm3    LV LATERAL E/E' RATIO 10.67 m/s    LV SEPTAL E/E' RATIO 12.80 m/s    FS 35 %    LA volume 57.23 cm3    LV mass 141.31 g    Left Ventricle Relative Wall Thickness 0.30 cm    AV valve area 3.28 cm2    AV Velocity Ratio 0.85      AV index (prosthetic) 0.97     MV valve area p 1/2 method 2.59 cm2    E/A ratio 1.25     Mean e' 0.06 m/s    LVOT area 3.4 cm2    LVOT stroke volume 110.07 cm3    AV peak gradient 15 mmHg    E/E' ratio 11.64 m/s    Triscuspid Valve Regurgitation Peak Gradient 27 mmHg    BSA 1.84 m2    LV Systolic Volume Index 25.4 mL/m2    LV Diastolic Volume Index 70.71 mL/m2    LA Volume Index 31.4 mL/m2    LV Mass Index 78 g/m2    LA Volume Index (Mod) 38.3 mL/m2    Right Atrial Pressure (from IVC) 3 mmHg    EF 68 %    TV rest pulmonary artery pressure 30 mmHg    Narrative    · The left ventricle is normal in size with normal systolic function.  · The estimated ejection fraction is 68%.  · Normal left ventricular diastolic function.  · Normal right ventricular size with normal right ventricular systolic   function.  · Normal central venous pressure (3 mmHg).  · The estimated PA systolic pressure is 30 mmHg.  · The ascending aorta is mildly dilated.          PATTIE:  No results found for this or any previous visit.    ASSESSMENT/PLAN:           Pre-op Assessment    I have reviewed the Patient Summary Reports.     I have reviewed the Nursing Notes.    I have reviewed the Medications.     Review of Systems  Anesthesia Hx:  No problems with previous Anesthesia  History of prior surgery of interest to airway management or planning: Denies Family Hx of Anesthesia complications.   Denies Personal Hx of Anesthesia complications.   Hematology/Oncology:        Current/Recent Cancer.   Cardiovascular:   Hypertension    Pulmonary:   Denies Shortness of breath.    Hepatic/GI:   GERD Dilated gastric antrum; NGT placed for decompression   Neurological:   Denies Seizures.    Endocrine:   Diabetes Hyperthyroidism    Psych:   denies anxiety denies depression          Physical Exam  General: Well nourished, Cooperative, Alert and Oriented    Airway:  Mallampati: III   Mouth Opening: Small, but > 3cm  TM Distance: Normal  Neck ROM: Normal ROM  NG tube  in place  Dental:  Intact, Dentures        Anesthesia Plan  Type of Anesthesia, risks & benefits discussed:    Anesthesia Type: Gen ETT  Intra-op Monitoring Plan: Standard ASA Monitors and Art Line  Post Op Pain Control Plan: multimodal analgesia and IV/PO Opioids PRN  Induction:  IV  Airway Plan: Direct, Post-Induction  Informed Consent: Informed consent signed with the Patient and all parties understand the risks and agree with anesthesia plan.  All questions answered.   ASA Score: 3  Day of Surgery Review of History & Physical: H&P Update referred to the surgeon/provider.    Ready For Surgery From Anesthesia Perspective.     .

## 2023-03-14 NOTE — ASSESSMENT & PLAN NOTE
Nan Simms is a 66 yo female with PMHx of HTN and hypothyroidism here for gastric outlet obstruction. Discussion with patient at length, will proceed to OR on 3/15/22 for whipple procedure versus gastrojejunostomy.     - TPN/lipids  - Appreciate medicine input for preoperative assessment   - Prophylactic subcutaneous dosing of heparin   - OR 3/15 for whipple versus gastrojejunostomy

## 2023-03-15 ENCOUNTER — ANESTHESIA (OUTPATIENT)
Dept: SURGERY | Facility: HOSPITAL | Age: 68
DRG: 327 | End: 2023-03-15
Payer: MEDICARE

## 2023-03-15 LAB
ABO + RH BLD: NORMAL
ABO + RH BLD: NORMAL
ALBUMIN SERPL BCP-MCNC: 2.8 G/DL (ref 3.5–5.2)
ALBUMIN SERPL BCP-MCNC: 2.8 G/DL (ref 3.5–5.2)
ALP SERPL-CCNC: 43 U/L (ref 55–135)
ALP SERPL-CCNC: 43 U/L (ref 55–135)
ALT SERPL W/O P-5'-P-CCNC: 22 U/L (ref 10–44)
ALT SERPL W/O P-5'-P-CCNC: 22 U/L (ref 10–44)
ANION GAP SERPL CALC-SCNC: 10 MMOL/L (ref 8–16)
ANION GAP SERPL CALC-SCNC: 8 MMOL/L (ref 8–16)
ANION GAP SERPL CALC-SCNC: 8 MMOL/L (ref 8–16)
ANISOCYTOSIS BLD QL SMEAR: SLIGHT
APTT BLDCRRT: 22.5 SEC (ref 21–32)
AST SERPL-CCNC: 29 U/L (ref 10–40)
AST SERPL-CCNC: 29 U/L (ref 10–40)
BASOPHILS # BLD AUTO: 0.01 K/UL (ref 0–0.2)
BASOPHILS # BLD AUTO: 0.01 K/UL (ref 0–0.2)
BASOPHILS # BLD AUTO: 0.03 K/UL (ref 0–0.2)
BASOPHILS NFR BLD: 0.1 % (ref 0–1.9)
BASOPHILS NFR BLD: 0.1 % (ref 0–1.9)
BASOPHILS NFR BLD: 0.4 % (ref 0–1.9)
BILIRUB SERPL-MCNC: 0.3 MG/DL (ref 0.1–1)
BILIRUB SERPL-MCNC: 0.3 MG/DL (ref 0.1–1)
BLD GP AB SCN CELLS X3 SERPL QL: NORMAL
BLD GP AB SCN CELLS X3 SERPL QL: NORMAL
BUN SERPL-MCNC: 27 MG/DL (ref 8–23)
BUN SERPL-MCNC: 27 MG/DL (ref 8–23)
BUN SERPL-MCNC: 30 MG/DL (ref 8–23)
CALCIUM SERPL-MCNC: 10.1 MG/DL (ref 8.7–10.5)
CALCIUM SERPL-MCNC: 8 MG/DL (ref 8.7–10.5)
CALCIUM SERPL-MCNC: 8 MG/DL (ref 8.7–10.5)
CHLORIDE SERPL-SCNC: 104 MMOL/L (ref 95–110)
CHLORIDE SERPL-SCNC: 110 MMOL/L (ref 95–110)
CHLORIDE SERPL-SCNC: 110 MMOL/L (ref 95–110)
CO2 SERPL-SCNC: 24 MMOL/L (ref 23–29)
CO2 SERPL-SCNC: 24 MMOL/L (ref 23–29)
CO2 SERPL-SCNC: 26 MMOL/L (ref 23–29)
CREAT SERPL-MCNC: 0.6 MG/DL (ref 0.5–1.4)
CREAT SERPL-MCNC: 0.7 MG/DL (ref 0.5–1.4)
CREAT SERPL-MCNC: 0.7 MG/DL (ref 0.5–1.4)
DACRYOCYTES BLD QL SMEAR: ABNORMAL
DIFFERENTIAL METHOD: ABNORMAL
EOSINOPHIL # BLD AUTO: 0 K/UL (ref 0–0.5)
EOSINOPHIL # BLD AUTO: 0 K/UL (ref 0–0.5)
EOSINOPHIL # BLD AUTO: 0.3 K/UL (ref 0–0.5)
EOSINOPHIL NFR BLD: 0 % (ref 0–8)
EOSINOPHIL NFR BLD: 0 % (ref 0–8)
EOSINOPHIL NFR BLD: 3.6 % (ref 0–8)
ERYTHROCYTE [DISTWIDTH] IN BLOOD BY AUTOMATED COUNT: 15.5 % (ref 11.5–14.5)
ERYTHROCYTE [DISTWIDTH] IN BLOOD BY AUTOMATED COUNT: 15.5 % (ref 11.5–14.5)
ERYTHROCYTE [DISTWIDTH] IN BLOOD BY AUTOMATED COUNT: 15.7 % (ref 11.5–14.5)
EST. GFR  (NO RACE VARIABLE): >60 ML/MIN/1.73 M^2
GIANT PLATELETS BLD QL SMEAR: ABNORMAL
GLUCOSE SERPL-MCNC: 144 MG/DL (ref 70–110)
GLUCOSE SERPL-MCNC: 240 MG/DL (ref 70–110)
GLUCOSE SERPL-MCNC: 240 MG/DL (ref 70–110)
GLUCOSE SERPL-MCNC: 244 MG/DL (ref 70–110)
GLUCOSE SERPL-MCNC: 246 MG/DL (ref 70–110)
HCO3 UR-SCNC: 23.9 MMOL/L (ref 24–28)
HCO3 UR-SCNC: 24.5 MMOL/L (ref 24–28)
HCT VFR BLD AUTO: 25.1 % (ref 37–48.5)
HCT VFR BLD AUTO: 25.1 % (ref 37–48.5)
HCT VFR BLD AUTO: 36.1 % (ref 37–48.5)
HCT VFR BLD CALC: 26 %PCV (ref 36–54)
HCT VFR BLD CALC: 27 %PCV (ref 36–54)
HGB BLD-MCNC: 11.5 G/DL (ref 12–16)
HGB BLD-MCNC: 7.7 G/DL (ref 12–16)
HGB BLD-MCNC: 7.7 G/DL (ref 12–16)
HYPOCHROMIA BLD QL SMEAR: ABNORMAL
IMM GRANULOCYTES # BLD AUTO: 0.02 K/UL (ref 0–0.04)
IMM GRANULOCYTES # BLD AUTO: 0.03 K/UL (ref 0–0.04)
IMM GRANULOCYTES # BLD AUTO: 0.03 K/UL (ref 0–0.04)
IMM GRANULOCYTES NFR BLD AUTO: 0.3 % (ref 0–0.5)
INR PPP: 1 (ref 0.8–1.2)
LYMPHOCYTES # BLD AUTO: 0.4 K/UL (ref 1–4.8)
LYMPHOCYTES # BLD AUTO: 0.4 K/UL (ref 1–4.8)
LYMPHOCYTES # BLD AUTO: 1 K/UL (ref 1–4.8)
LYMPHOCYTES NFR BLD: 15.1 % (ref 18–48)
LYMPHOCYTES NFR BLD: 4.2 % (ref 18–48)
LYMPHOCYTES NFR BLD: 4.2 % (ref 18–48)
MAGNESIUM SERPL-MCNC: 2 MG/DL (ref 1.6–2.6)
MAGNESIUM SERPL-MCNC: 2 MG/DL (ref 1.6–2.6)
MAGNESIUM SERPL-MCNC: 2.3 MG/DL (ref 1.6–2.6)
MCH RBC QN AUTO: 22.5 PG (ref 27–31)
MCH RBC QN AUTO: 22.5 PG (ref 27–31)
MCH RBC QN AUTO: 23.1 PG (ref 27–31)
MCHC RBC AUTO-ENTMCNC: 30.7 G/DL (ref 32–36)
MCHC RBC AUTO-ENTMCNC: 30.7 G/DL (ref 32–36)
MCHC RBC AUTO-ENTMCNC: 31.9 G/DL (ref 32–36)
MCV RBC AUTO: 73 FL (ref 82–98)
MONOCYTES # BLD AUTO: 0.6 K/UL (ref 0.3–1)
MONOCYTES # BLD AUTO: 0.7 K/UL (ref 0.3–1)
MONOCYTES # BLD AUTO: 0.7 K/UL (ref 0.3–1)
MONOCYTES NFR BLD: 8 % (ref 4–15)
MONOCYTES NFR BLD: 8 % (ref 4–15)
MONOCYTES NFR BLD: 8.7 % (ref 4–15)
NEUTROPHILS # BLD AUTO: 5 K/UL (ref 1.8–7.7)
NEUTROPHILS # BLD AUTO: 7.5 K/UL (ref 1.8–7.7)
NEUTROPHILS # BLD AUTO: 7.5 K/UL (ref 1.8–7.7)
NEUTROPHILS NFR BLD: 71.9 % (ref 38–73)
NEUTROPHILS NFR BLD: 87.4 % (ref 38–73)
NEUTROPHILS NFR BLD: 87.4 % (ref 38–73)
NRBC BLD-RTO: 0 /100 WBC
OVALOCYTES BLD QL SMEAR: ABNORMAL
PCO2 BLDA: 36.4 MMHG (ref 35–45)
PCO2 BLDA: 39.3 MMHG (ref 35–45)
PH SMN: 7.4 [PH] (ref 7.35–7.45)
PH SMN: 7.43 [PH] (ref 7.35–7.45)
PHOSPHATE SERPL-MCNC: 2.7 MG/DL (ref 2.7–4.5)
PHOSPHATE SERPL-MCNC: 2.7 MG/DL (ref 2.7–4.5)
PHOSPHATE SERPL-MCNC: 3.4 MG/DL (ref 2.7–4.5)
PLATELET # BLD AUTO: 179 K/UL (ref 150–450)
PLATELET # BLD AUTO: 179 K/UL (ref 150–450)
PLATELET # BLD AUTO: 226 K/UL (ref 150–450)
PLATELET BLD QL SMEAR: ABNORMAL
PMV BLD AUTO: 11.6 FL (ref 9.2–12.9)
PO2 BLDA: 207 MMHG (ref 80–100)
PO2 BLDA: 231 MMHG (ref 80–100)
POC BE: 0 MMOL/L
POC BE: 0 MMOL/L
POC IONIZED CALCIUM: 1.18 MMOL/L (ref 1.06–1.42)
POC IONIZED CALCIUM: 1.27 MMOL/L (ref 1.06–1.42)
POC SATURATED O2: 100 % (ref 95–100)
POC SATURATED O2: 100 % (ref 95–100)
POC TCO2: 25 MMOL/L (ref 23–27)
POC TCO2: 26 MMOL/L (ref 23–27)
POCT GLUCOSE: 136 MG/DL (ref 70–110)
POCT GLUCOSE: 142 MG/DL (ref 70–110)
POIKILOCYTOSIS BLD QL SMEAR: SLIGHT
POLYCHROMASIA BLD QL SMEAR: ABNORMAL
POTASSIUM BLD-SCNC: 3.9 MMOL/L (ref 3.5–5.1)
POTASSIUM BLD-SCNC: 4 MMOL/L (ref 3.5–5.1)
POTASSIUM SERPL-SCNC: 4 MMOL/L (ref 3.5–5.1)
POTASSIUM SERPL-SCNC: 4.1 MMOL/L (ref 3.5–5.1)
POTASSIUM SERPL-SCNC: 4.1 MMOL/L (ref 3.5–5.1)
PROT SERPL-MCNC: 4.9 G/DL (ref 6–8.4)
PROT SERPL-MCNC: 4.9 G/DL (ref 6–8.4)
PROTHROMBIN TIME: 10.9 SEC (ref 9–12.5)
RBC # BLD AUTO: 3.42 M/UL (ref 4–5.4)
RBC # BLD AUTO: 3.42 M/UL (ref 4–5.4)
RBC # BLD AUTO: 4.98 M/UL (ref 4–5.4)
SAMPLE: ABNORMAL
SAMPLE: ABNORMAL
SMUDGE CELLS BLD QL SMEAR: PRESENT
SODIUM BLD-SCNC: 140 MMOL/L (ref 136–145)
SODIUM BLD-SCNC: 142 MMOL/L (ref 136–145)
SODIUM SERPL-SCNC: 140 MMOL/L (ref 136–145)
SODIUM SERPL-SCNC: 142 MMOL/L (ref 136–145)
SODIUM SERPL-SCNC: 142 MMOL/L (ref 136–145)
SPHEROCYTES BLD QL SMEAR: ABNORMAL
TARGETS BLD QL SMEAR: ABNORMAL
WBC # BLD AUTO: 6.91 K/UL (ref 3.9–12.7)
WBC # BLD AUTO: 8.63 K/UL (ref 3.9–12.7)
WBC # BLD AUTO: 8.63 K/UL (ref 3.9–12.7)

## 2023-03-15 PROCEDURE — 37000008 HC ANESTHESIA 1ST 15 MINUTES: Performed by: SURGERY

## 2023-03-15 PROCEDURE — 25000003 PHARM REV CODE 250: Performed by: NURSE ANESTHETIST, CERTIFIED REGISTERED

## 2023-03-15 PROCEDURE — 83735 ASSAY OF MAGNESIUM: CPT | Performed by: HOSPITALIST

## 2023-03-15 PROCEDURE — C9113 INJ PANTOPRAZOLE SODIUM, VIA: HCPCS | Performed by: HOSPITALIST

## 2023-03-15 PROCEDURE — 63600175 PHARM REV CODE 636 W HCPCS: Performed by: ANESTHESIOLOGY

## 2023-03-15 PROCEDURE — 63600175 PHARM REV CODE 636 W HCPCS: Performed by: HOSPITALIST

## 2023-03-15 PROCEDURE — D9220A PRA ANESTHESIA: Mod: ANES,,, | Performed by: ANESTHESIOLOGY

## 2023-03-15 PROCEDURE — 63600175 PHARM REV CODE 636 W HCPCS: Performed by: STUDENT IN AN ORGANIZED HEALTH CARE EDUCATION/TRAINING PROGRAM

## 2023-03-15 PROCEDURE — 85025 COMPLETE CBC W/AUTO DIFF WBC: CPT | Performed by: HOSPITALIST

## 2023-03-15 PROCEDURE — 88342 IMHCHEM/IMCYTCHM 1ST ANTB: CPT | Performed by: PATHOLOGY

## 2023-03-15 PROCEDURE — 88309 TISSUE EXAM BY PATHOLOGIST: CPT | Mod: 26,,, | Performed by: PATHOLOGY

## 2023-03-15 PROCEDURE — 99900035 HC TECH TIME PER 15 MIN (STAT)

## 2023-03-15 PROCEDURE — 25000003 PHARM REV CODE 250: Performed by: ANESTHESIOLOGY

## 2023-03-15 PROCEDURE — 88341 IMHCHEM/IMCYTCHM EA ADD ANTB: CPT | Mod: 26,,, | Performed by: PATHOLOGY

## 2023-03-15 PROCEDURE — 93005 ELECTROCARDIOGRAM TRACING: CPT

## 2023-03-15 PROCEDURE — 25000003 PHARM REV CODE 250: Performed by: SURGERY

## 2023-03-15 PROCEDURE — 36415 COLL VENOUS BLD VENIPUNCTURE: CPT | Performed by: SURGERY

## 2023-03-15 PROCEDURE — 88305 TISSUE EXAM BY PATHOLOGIST: CPT | Performed by: PATHOLOGY

## 2023-03-15 PROCEDURE — 80053 COMPREHEN METABOLIC PANEL: CPT

## 2023-03-15 PROCEDURE — 36620 INSERTION CATHETER ARTERY: CPT | Mod: 59,,, | Performed by: ANESTHESIOLOGY

## 2023-03-15 PROCEDURE — 82962 GLUCOSE BLOOD TEST: CPT | Performed by: SURGERY

## 2023-03-15 PROCEDURE — 48150 PARTIAL REMOVAL OF PANCREAS: CPT | Mod: ,,, | Performed by: SURGERY

## 2023-03-15 PROCEDURE — 27000221 HC OXYGEN, UP TO 24 HOURS

## 2023-03-15 PROCEDURE — 63600175 PHARM REV CODE 636 W HCPCS

## 2023-03-15 PROCEDURE — D9220A PRA ANESTHESIA: Mod: CRNA,,, | Performed by: NURSE ANESTHETIST, CERTIFIED REGISTERED

## 2023-03-15 PROCEDURE — 37000009 HC ANESTHESIA EA ADD 15 MINS: Performed by: SURGERY

## 2023-03-15 PROCEDURE — 88305 TISSUE EXAM BY PATHOLOGIST: ICD-10-PCS | Mod: 26,,, | Performed by: PATHOLOGY

## 2023-03-15 PROCEDURE — 48150 PR PART REMV PANC,PROX+REMV DUOD+ANAST: ICD-10-PCS | Mod: ,,, | Performed by: SURGERY

## 2023-03-15 PROCEDURE — 71000033 HC RECOVERY, INTIAL HOUR: Performed by: SURGERY

## 2023-03-15 PROCEDURE — 88309 PR  SURG PATH,LEVEL VI: ICD-10-PCS | Mod: 26,,, | Performed by: PATHOLOGY

## 2023-03-15 PROCEDURE — 88305 TISSUE EXAM BY PATHOLOGIST: CPT | Mod: 26,,, | Performed by: PATHOLOGY

## 2023-03-15 PROCEDURE — 88309 TISSUE EXAM BY PATHOLOGIST: CPT | Performed by: PATHOLOGY

## 2023-03-15 PROCEDURE — 63600175 PHARM REV CODE 636 W HCPCS: Performed by: SURGERY

## 2023-03-15 PROCEDURE — 85025 COMPLETE CBC W/AUTO DIFF WBC: CPT | Mod: 91

## 2023-03-15 PROCEDURE — 64999 UNLISTED PX NERVOUS SYSTEM: CPT | Mod: ,,, | Performed by: ANESTHESIOLOGY

## 2023-03-15 PROCEDURE — 84100 ASSAY OF PHOSPHORUS: CPT | Performed by: HOSPITALIST

## 2023-03-15 PROCEDURE — 88312 PR  SPECIAL STAINS,GROUP I: ICD-10-PCS | Mod: 26,,, | Performed by: PATHOLOGY

## 2023-03-15 PROCEDURE — 88312 SPECIAL STAINS GROUP 1: CPT | Mod: 26,,, | Performed by: PATHOLOGY

## 2023-03-15 PROCEDURE — 94761 N-INVAS EAR/PLS OXIMETRY MLT: CPT

## 2023-03-15 PROCEDURE — 85730 THROMBOPLASTIN TIME PARTIAL: CPT | Performed by: SURGERY

## 2023-03-15 PROCEDURE — 88342 CHG IMMUNOCYTOCHEMISTRY: ICD-10-PCS | Mod: 26,,, | Performed by: PATHOLOGY

## 2023-03-15 PROCEDURE — 83735 ASSAY OF MAGNESIUM: CPT | Mod: 91

## 2023-03-15 PROCEDURE — 88381 MICRODISSECTION MANUAL: CPT | Performed by: PATHOLOGY

## 2023-03-15 PROCEDURE — 63600175 PHARM REV CODE 636 W HCPCS: Performed by: NURSE ANESTHETIST, CERTIFIED REGISTERED

## 2023-03-15 PROCEDURE — 93010 EKG 12-LEAD: ICD-10-PCS | Mod: ,,, | Performed by: INTERNAL MEDICINE

## 2023-03-15 PROCEDURE — 88304 TISSUE EXAM BY PATHOLOGIST: CPT | Mod: 26,,, | Performed by: PATHOLOGY

## 2023-03-15 PROCEDURE — D9220A PRA ANESTHESIA: ICD-10-PCS | Mod: CRNA,,, | Performed by: NURSE ANESTHETIST, CERTIFIED REGISTERED

## 2023-03-15 PROCEDURE — 85610 PROTHROMBIN TIME: CPT | Performed by: SURGERY

## 2023-03-15 PROCEDURE — 86920 COMPATIBILITY TEST SPIN: CPT | Performed by: STUDENT IN AN ORGANIZED HEALTH CARE EDUCATION/TRAINING PROGRAM

## 2023-03-15 PROCEDURE — 88341 PR IHC OR ICC EACH ADD'L SINGLE ANTIBODY  STAINPR: ICD-10-PCS | Mod: 26,,, | Performed by: PATHOLOGY

## 2023-03-15 PROCEDURE — 25000003 PHARM REV CODE 250: Performed by: HOSPITALIST

## 2023-03-15 PROCEDURE — 25000003 PHARM REV CODE 250: Performed by: STUDENT IN AN ORGANIZED HEALTH CARE EDUCATION/TRAINING PROGRAM

## 2023-03-15 PROCEDURE — 88341 IMHCHEM/IMCYTCHM EA ADD ANTB: CPT | Mod: 59 | Performed by: PATHOLOGY

## 2023-03-15 PROCEDURE — D9220A PRA ANESTHESIA: ICD-10-PCS | Mod: ANES,,, | Performed by: ANESTHESIOLOGY

## 2023-03-15 PROCEDURE — 81288 MLH1 GENE: CPT | Performed by: PATHOLOGY

## 2023-03-15 PROCEDURE — 36000709 HC OR TIME LEV III EA ADD 15 MIN: Performed by: SURGERY

## 2023-03-15 PROCEDURE — A4217 STERILE WATER/SALINE, 500 ML: HCPCS | Performed by: STUDENT IN AN ORGANIZED HEALTH CARE EDUCATION/TRAINING PROGRAM

## 2023-03-15 PROCEDURE — S0030 INJECTION, METRONIDAZOLE: HCPCS | Performed by: NURSE ANESTHETIST, CERTIFIED REGISTERED

## 2023-03-15 PROCEDURE — 88304 TISSUE EXAM BY PATHOLOGIST: CPT | Performed by: PATHOLOGY

## 2023-03-15 PROCEDURE — 88304 PR  SURG PATH,LEVEL III: ICD-10-PCS | Mod: 26,,, | Performed by: PATHOLOGY

## 2023-03-15 PROCEDURE — 84100 ASSAY OF PHOSPHORUS: CPT | Mod: 91

## 2023-03-15 PROCEDURE — 64999 PR BLOCK, ERECTOR SPINAE PLANE, SINGLE SHOT: ICD-10-PCS | Mod: ,,, | Performed by: ANESTHESIOLOGY

## 2023-03-15 PROCEDURE — P9045 ALBUMIN (HUMAN), 5%, 250 ML: HCPCS | Mod: JZ,JG | Performed by: NURSE ANESTHETIST, CERTIFIED REGISTERED

## 2023-03-15 PROCEDURE — A4216 STERILE WATER/SALINE, 10 ML: HCPCS | Performed by: HOSPITALIST

## 2023-03-15 PROCEDURE — 80048 BASIC METABOLIC PNL TOTAL CA: CPT | Performed by: HOSPITALIST

## 2023-03-15 PROCEDURE — 81210 BRAF GENE: CPT | Performed by: PATHOLOGY

## 2023-03-15 PROCEDURE — 76942 ECHO GUIDE FOR BIOPSY: CPT | Performed by: STUDENT IN AN ORGANIZED HEALTH CARE EDUCATION/TRAINING PROGRAM

## 2023-03-15 PROCEDURE — 20000000 HC ICU ROOM

## 2023-03-15 PROCEDURE — C1729 CATH, DRAINAGE: HCPCS | Performed by: SURGERY

## 2023-03-15 PROCEDURE — 88312 SPECIAL STAINS GROUP 1: CPT | Mod: 59 | Performed by: PATHOLOGY

## 2023-03-15 PROCEDURE — 93010 ELECTROCARDIOGRAM REPORT: CPT | Mod: ,,, | Performed by: INTERNAL MEDICINE

## 2023-03-15 PROCEDURE — 86900 BLOOD TYPING SEROLOGIC ABO: CPT | Mod: 91 | Performed by: SURGERY

## 2023-03-15 PROCEDURE — C1768 GRAFT, VASCULAR: HCPCS | Performed by: SURGERY

## 2023-03-15 PROCEDURE — 88342 IMHCHEM/IMCYTCHM 1ST ANTB: CPT | Mod: 26,,, | Performed by: PATHOLOGY

## 2023-03-15 PROCEDURE — 27201423 OPTIME MED/SURG SUP & DEVICES STERILE SUPPLY: Performed by: SURGERY

## 2023-03-15 PROCEDURE — 36620 ARTERIAL: ICD-10-PCS | Mod: 59,,, | Performed by: ANESTHESIOLOGY

## 2023-03-15 PROCEDURE — 36000708 HC OR TIME LEV III 1ST 15 MIN: Performed by: SURGERY

## 2023-03-15 DEVICE — FELT TEFLON 1INX6IN: Type: IMPLANTABLE DEVICE | Site: ABDOMEN | Status: FUNCTIONAL

## 2023-03-15 RX ORDER — FENTANYL CITRATE 50 UG/ML
INJECTION, SOLUTION INTRAMUSCULAR; INTRAVENOUS
Status: DISCONTINUED | OUTPATIENT
Start: 2023-03-15 | End: 2023-03-15

## 2023-03-15 RX ORDER — DEXAMETHASONE SODIUM PHOSPHATE 4 MG/ML
INJECTION, SOLUTION INTRA-ARTICULAR; INTRALESIONAL; INTRAMUSCULAR; INTRAVENOUS; SOFT TISSUE
Status: DISCONTINUED | OUTPATIENT
Start: 2023-03-15 | End: 2023-03-15

## 2023-03-15 RX ORDER — MUPIROCIN 20 MG/G
OINTMENT TOPICAL 2 TIMES DAILY
Status: DISPENSED | OUTPATIENT
Start: 2023-03-15 | End: 2023-03-20

## 2023-03-15 RX ORDER — PROPOFOL 10 MG/ML
VIAL (ML) INTRAVENOUS
Status: DISCONTINUED | OUTPATIENT
Start: 2023-03-15 | End: 2023-03-15

## 2023-03-15 RX ORDER — NALOXONE HCL 0.4 MG/ML
0.02 VIAL (ML) INJECTION
Status: DISCONTINUED | OUTPATIENT
Start: 2023-03-15 | End: 2023-03-17

## 2023-03-15 RX ORDER — ACETAMINOPHEN 10 MG/ML
INJECTION, SOLUTION INTRAVENOUS
Status: DISCONTINUED | OUTPATIENT
Start: 2023-03-15 | End: 2023-03-15

## 2023-03-15 RX ORDER — HYDROMORPHONE HYDROCHLORIDE 1 MG/ML
0.2 INJECTION, SOLUTION INTRAMUSCULAR; INTRAVENOUS; SUBCUTANEOUS EVERY 5 MIN PRN
Status: CANCELLED | OUTPATIENT
Start: 2023-03-15

## 2023-03-15 RX ORDER — SUCCINYLCHOLINE CHLORIDE 20 MG/ML
INJECTION INTRAMUSCULAR; INTRAVENOUS
Status: DISCONTINUED | OUTPATIENT
Start: 2023-03-15 | End: 2023-03-15

## 2023-03-15 RX ORDER — LEVALBUTEROL INHALATION SOLUTION 0.63 MG/3ML
0.63 SOLUTION RESPIRATORY (INHALATION)
Status: DISPENSED | OUTPATIENT
Start: 2023-03-15 | End: 2023-03-17

## 2023-03-15 RX ORDER — NEOSTIGMINE METHYLSULFATE 0.5 MG/ML
INJECTION, SOLUTION INTRAVENOUS
Status: DISCONTINUED | OUTPATIENT
Start: 2023-03-15 | End: 2023-03-15

## 2023-03-15 RX ORDER — SODIUM CHLORIDE 0.9 % (FLUSH) 0.9 %
3 SYRINGE (ML) INJECTION
Status: CANCELLED | OUTPATIENT
Start: 2023-03-15

## 2023-03-15 RX ORDER — CEFAZOLIN SODIUM 1 G/3ML
INJECTION, POWDER, FOR SOLUTION INTRAMUSCULAR; INTRAVENOUS
Status: DISCONTINUED | OUTPATIENT
Start: 2023-03-15 | End: 2023-03-15

## 2023-03-15 RX ORDER — METRONIDAZOLE 500 MG/100ML
INJECTION, SOLUTION INTRAVENOUS
Status: DISCONTINUED | OUTPATIENT
Start: 2023-03-15 | End: 2023-03-15

## 2023-03-15 RX ORDER — PHENYLEPHRINE HCL IN 0.9% NACL 1 MG/10 ML
SYRINGE (ML) INTRAVENOUS
Status: DISCONTINUED | OUTPATIENT
Start: 2023-03-15 | End: 2023-03-15

## 2023-03-15 RX ORDER — CALCIUM CHLORIDE INJECTION 100 MG/ML
INJECTION, SOLUTION INTRAVENOUS
Status: DISCONTINUED | OUTPATIENT
Start: 2023-03-15 | End: 2023-03-15

## 2023-03-15 RX ORDER — ROCURONIUM BROMIDE 10 MG/ML
INJECTION, SOLUTION INTRAVENOUS
Status: DISCONTINUED | OUTPATIENT
Start: 2023-03-15 | End: 2023-03-15

## 2023-03-15 RX ORDER — MIDAZOLAM HYDROCHLORIDE 1 MG/ML
INJECTION, SOLUTION INTRAMUSCULAR; INTRAVENOUS
Status: DISCONTINUED | OUTPATIENT
Start: 2023-03-15 | End: 2023-03-15

## 2023-03-15 RX ORDER — SODIUM CHLORIDE, SODIUM LACTATE, POTASSIUM CHLORIDE, CALCIUM CHLORIDE 600; 310; 30; 20 MG/100ML; MG/100ML; MG/100ML; MG/100ML
INJECTION, SOLUTION INTRAVENOUS CONTINUOUS
Status: DISCONTINUED | OUTPATIENT
Start: 2023-03-15 | End: 2023-03-17

## 2023-03-15 RX ORDER — LIDOCAINE HYDROCHLORIDE 20 MG/ML
INJECTION, SOLUTION EPIDURAL; INFILTRATION; INTRACAUDAL; PERINEURAL
Status: DISCONTINUED | OUTPATIENT
Start: 2023-03-15 | End: 2023-03-15

## 2023-03-15 RX ORDER — ACETAMINOPHEN 10 MG/ML
1000 INJECTION, SOLUTION INTRAVENOUS EVERY 8 HOURS
Status: COMPLETED | OUTPATIENT
Start: 2023-03-16 | End: 2023-03-16

## 2023-03-15 RX ORDER — ALBUMIN HUMAN 50 G/1000ML
SOLUTION INTRAVENOUS CONTINUOUS PRN
Status: DISCONTINUED | OUTPATIENT
Start: 2023-03-15 | End: 2023-03-15

## 2023-03-15 RX ORDER — PANTOPRAZOLE SODIUM 40 MG/10ML
40 INJECTION, POWDER, LYOPHILIZED, FOR SOLUTION INTRAVENOUS DAILY
Status: DISCONTINUED | OUTPATIENT
Start: 2023-03-16 | End: 2023-03-21

## 2023-03-15 RX ORDER — FENTANYL CITRATE 50 UG/ML
25-200 INJECTION, SOLUTION INTRAMUSCULAR; INTRAVENOUS EVERY 5 MIN PRN
Status: DISCONTINUED | OUTPATIENT
Start: 2023-03-15 | End: 2023-03-15 | Stop reason: HOSPADM

## 2023-03-15 RX ORDER — ONDANSETRON 2 MG/ML
INJECTION INTRAMUSCULAR; INTRAVENOUS
Status: DISCONTINUED | OUTPATIENT
Start: 2023-03-15 | End: 2023-03-15

## 2023-03-15 RX ORDER — KETAMINE HCL IN 0.9 % NACL 50 MG/5 ML
SYRINGE (ML) INTRAVENOUS
Status: DISCONTINUED | OUTPATIENT
Start: 2023-03-15 | End: 2023-03-15

## 2023-03-15 RX ORDER — HALOPERIDOL 5 MG/ML
0.5 INJECTION INTRAMUSCULAR EVERY 10 MIN PRN
Status: CANCELLED | OUTPATIENT
Start: 2023-03-15

## 2023-03-15 RX ORDER — HYDROMORPHONE HCL IN 0.9% NACL 6 MG/30 ML
PATIENT CONTROLLED ANALGESIA SYRINGE INTRAVENOUS CONTINUOUS
Status: DISCONTINUED | OUTPATIENT
Start: 2023-03-15 | End: 2023-03-17

## 2023-03-15 RX ORDER — HYDRALAZINE HYDROCHLORIDE 20 MG/ML
10 INJECTION INTRAMUSCULAR; INTRAVENOUS ONCE
Status: COMPLETED | OUTPATIENT
Start: 2023-03-16 | End: 2023-03-15

## 2023-03-15 RX ORDER — EPHEDRINE SULFATE 50 MG/ML
INJECTION, SOLUTION INTRAVENOUS
Status: DISCONTINUED | OUTPATIENT
Start: 2023-03-15 | End: 2023-03-15

## 2023-03-15 RX ORDER — MIDAZOLAM HYDROCHLORIDE 1 MG/ML
.5-4 INJECTION INTRAMUSCULAR; INTRAVENOUS
Status: DISCONTINUED | OUTPATIENT
Start: 2023-03-15 | End: 2023-03-15 | Stop reason: HOSPADM

## 2023-03-15 RX ORDER — BUPIVACAINE HYDROCHLORIDE 7.5 MG/ML
INJECTION, SOLUTION EPIDURAL; RETROBULBAR
Status: COMPLETED | OUTPATIENT
Start: 2023-03-15 | End: 2023-03-15

## 2023-03-15 RX ORDER — SODIUM CHLORIDE 0.9 % (FLUSH) 0.9 %
10 SYRINGE (ML) INJECTION
Status: DISCONTINUED | OUTPATIENT
Start: 2023-03-15 | End: 2023-03-16

## 2023-03-15 RX ADMIN — PROPOFOL 25 MG: 10 INJECTION, EMULSION INTRAVENOUS at 05:03

## 2023-03-15 RX ADMIN — Medication 100 MCG: at 09:03

## 2023-03-15 RX ADMIN — Medication 10 MG: at 06:03

## 2023-03-15 RX ADMIN — FENTANYL CITRATE 100 MCG: 50 INJECTION INTRAMUSCULAR; INTRAVENOUS at 02:03

## 2023-03-15 RX ADMIN — SODIUM CHLORIDE: 0.9 INJECTION, SOLUTION INTRAVENOUS at 02:03

## 2023-03-15 RX ADMIN — CALCIUM CHLORIDE 500 G: 100 INJECTION, SOLUTION INTRAVENOUS at 07:03

## 2023-03-15 RX ADMIN — MIDAZOLAM 2 MG: 1 INJECTION INTRAMUSCULAR; INTRAVENOUS at 02:03

## 2023-03-15 RX ADMIN — PROPOFOL 25 MG: 10 INJECTION, EMULSION INTRAVENOUS at 03:03

## 2023-03-15 RX ADMIN — Medication 200 MCG: at 03:03

## 2023-03-15 RX ADMIN — SODIUM CHLORIDE, SODIUM GLUCONATE, SODIUM ACETATE, POTASSIUM CHLORIDE, MAGNESIUM CHLORIDE, SODIUM PHOSPHATE, DIBASIC, AND POTASSIUM PHOSPHATE: .53; .5; .37; .037; .03; .012; .00082 INJECTION, SOLUTION INTRAVENOUS at 08:03

## 2023-03-15 RX ADMIN — Medication 100 MCG: at 07:03

## 2023-03-15 RX ADMIN — Medication 10 ML: at 05:03

## 2023-03-15 RX ADMIN — ENOXAPARIN SODIUM 40 MG: 40 INJECTION SUBCUTANEOUS at 04:03

## 2023-03-15 RX ADMIN — Medication: at 11:03

## 2023-03-15 RX ADMIN — CEFAZOLIN 2 G: 2 INJECTION, POWDER, FOR SOLUTION INTRAMUSCULAR; INTRAVENOUS at 03:03

## 2023-03-15 RX ADMIN — EPHEDRINE SULFATE 5 MG: 50 INJECTION INTRAVENOUS at 06:03

## 2023-03-15 RX ADMIN — Medication 100 MCG: at 02:03

## 2023-03-15 RX ADMIN — Medication 10 MG: at 03:03

## 2023-03-15 RX ADMIN — GLYCOPYRROLATE 0.6 MG: 0.2 INJECTION, SOLUTION INTRAMUSCULAR; INTRAVENOUS at 09:03

## 2023-03-15 RX ADMIN — EPHEDRINE SULFATE 5 MG: 50 INJECTION INTRAVENOUS at 05:03

## 2023-03-15 RX ADMIN — PROPOFOL 100 MG: 10 INJECTION, EMULSION INTRAVENOUS at 02:03

## 2023-03-15 RX ADMIN — HYDRALAZINE HYDROCHLORIDE 10 MG: 20 INJECTION INTRAMUSCULAR; INTRAVENOUS at 11:03

## 2023-03-15 RX ADMIN — SODIUM CHLORIDE 0.2 MCG/KG/MIN: 9 INJECTION, SOLUTION INTRAVENOUS at 05:03

## 2023-03-15 RX ADMIN — PANTOPRAZOLE SODIUM 40 MG: 40 INJECTION, POWDER, FOR SOLUTION INTRAVENOUS at 08:03

## 2023-03-15 RX ADMIN — ROCURONIUM BROMIDE 10 MG: 10 INJECTION INTRAVENOUS at 06:03

## 2023-03-15 RX ADMIN — MAGNESIUM SULFATE HEPTAHYDRATE: 500 INJECTION, SOLUTION INTRAMUSCULAR; INTRAVENOUS at 10:03

## 2023-03-15 RX ADMIN — SODIUM CHLORIDE 0.5 MCG/KG/MIN: 9 INJECTION, SOLUTION INTRAVENOUS at 07:03

## 2023-03-15 RX ADMIN — ROCURONIUM BROMIDE 10 MG: 10 INJECTION INTRAVENOUS at 08:03

## 2023-03-15 RX ADMIN — DEXAMETHASONE SODIUM PHOSPHATE 4 MG: 4 INJECTION INTRA-ARTICULAR; INTRALESIONAL; INTRAMUSCULAR; INTRAVENOUS; SOFT TISSUE at 03:03

## 2023-03-15 RX ADMIN — BUPIVACAINE HYDROCHLORIDE 30 ML: 7.5 INJECTION, SOLUTION EPIDURAL; RETROBULBAR at 02:03

## 2023-03-15 RX ADMIN — CEFAZOLIN 2 G: 2 INJECTION, POWDER, FOR SOLUTION INTRAMUSCULAR; INTRAVENOUS at 07:03

## 2023-03-15 RX ADMIN — FENTANYL CITRATE 25 MCG: 50 INJECTION INTRAMUSCULAR; INTRAVENOUS at 10:03

## 2023-03-15 RX ADMIN — SODIUM CHLORIDE, SODIUM GLUCONATE, SODIUM ACETATE, POTASSIUM CHLORIDE, MAGNESIUM CHLORIDE, SODIUM PHOSPHATE, DIBASIC, AND POTASSIUM PHOSPHATE: .53; .5; .37; .037; .03; .012; .00082 INJECTION, SOLUTION INTRAVENOUS at 06:03

## 2023-03-15 RX ADMIN — LIDOCAINE HYDROCHLORIDE 70 MG: 20 INJECTION, SOLUTION EPIDURAL; INFILTRATION; INTRACAUDAL at 02:03

## 2023-03-15 RX ADMIN — METRONIDAZOLE 500 MG: 5 INJECTION, SOLUTION INTRAVENOUS at 03:03

## 2023-03-15 RX ADMIN — ROCURONIUM BROMIDE 10 MG: 10 INJECTION INTRAVENOUS at 05:03

## 2023-03-15 RX ADMIN — EPHEDRINE SULFATE 15 MG: 50 INJECTION INTRAVENOUS at 02:03

## 2023-03-15 RX ADMIN — Medication 10 MG: at 05:03

## 2023-03-15 RX ADMIN — ACETAMINOPHEN 1000 MG: 10 INJECTION INTRAVENOUS at 08:03

## 2023-03-15 RX ADMIN — NEOSTIGMINE METHYLSULFATE 5 MG: 0.5 INJECTION, SOLUTION INTRAVENOUS at 09:03

## 2023-03-15 RX ADMIN — EPHEDRINE SULFATE 10 MG: 50 INJECTION INTRAVENOUS at 03:03

## 2023-03-15 RX ADMIN — ALBUMIN (HUMAN): 12.5 SOLUTION INTRAVENOUS at 09:03

## 2023-03-15 RX ADMIN — INSULIN HUMAN 2 UNITS/HR: 1 INJECTION, SOLUTION INTRAVENOUS at 07:03

## 2023-03-15 RX ADMIN — Medication 10 MG: at 07:03

## 2023-03-15 RX ADMIN — ROCURONIUM BROMIDE 10 MG: 10 INJECTION INTRAVENOUS at 04:03

## 2023-03-15 RX ADMIN — HYDRALAZINE HYDROCHLORIDE 10 MG: 20 INJECTION, SOLUTION INTRAMUSCULAR; INTRAVENOUS at 10:03

## 2023-03-15 RX ADMIN — ROCURONIUM BROMIDE 10 MG: 10 INJECTION INTRAVENOUS at 07:03

## 2023-03-15 RX ADMIN — FENTANYL CITRATE 50 MCG: 50 INJECTION INTRAMUSCULAR; INTRAVENOUS at 08:03

## 2023-03-15 RX ADMIN — SODIUM CHLORIDE, POTASSIUM CHLORIDE, SODIUM LACTATE AND CALCIUM CHLORIDE: 600; 310; 30; 20 INJECTION, SOLUTION INTRAVENOUS at 11:03

## 2023-03-15 RX ADMIN — ONDANSETRON 4 MG: 2 INJECTION INTRAMUSCULAR; INTRAVENOUS at 08:03

## 2023-03-15 RX ADMIN — SODIUM CHLORIDE, SODIUM GLUCONATE, SODIUM ACETATE, POTASSIUM CHLORIDE, MAGNESIUM CHLORIDE, SODIUM PHOSPHATE, DIBASIC, AND POTASSIUM PHOSPHATE: .53; .5; .37; .037; .03; .012; .00082 INJECTION, SOLUTION INTRAVENOUS at 03:03

## 2023-03-15 RX ADMIN — ROCURONIUM BROMIDE 20 MG: 10 INJECTION INTRAVENOUS at 03:03

## 2023-03-15 RX ADMIN — Medication 10 ML: at 12:03

## 2023-03-15 RX ADMIN — SUCCINYLCHOLINE CHLORIDE 120 MG: 20 INJECTION, SOLUTION INTRAMUSCULAR; INTRAVENOUS; PARENTERAL at 02:03

## 2023-03-15 NOTE — ASSESSMENT & PLAN NOTE
Nan Simms is a 66 yo female with PMHx of HTN and hypothyroidism here for gastric outlet obstruction. Discussion with patient at length, will proceed to OR on 3/15/22 for whipple procedure versus gastrojejunostomy.     - TPN/lipids  - Appreciate medicine input for preoperative assessment   - Prophylactic subcutaneous dosing of heparin   - OR 3/15 today for whipple

## 2023-03-15 NOTE — PROGRESS NOTES
Telemetry monitor tubed to [address] [place] [address]. [facility] notified that it is being tubed up to them.

## 2023-03-15 NOTE — PROGRESS NOTES
[facility] - [facility]  General Surgery  Progress Note    Subjective:     History of Present Illness:  Nan Simms is a 67 year old female with PMHx of HTN and hypothyroidism who has had constipation and abdominal pain for 3 weeks. Says she became increasingly bloated and abdominal pain worsened and went to [facility] yesterday and was transferred to [facility] for higher level of care. She reports using enemas to relieve constipation but was unable to find relief and came to ED. She has not been nauseous however reports she vomited 3 times two days ago. She has been tolerating a solid and liquid diet. Reports has lost 13 lbs in last 3 weeks. Of note, she also notes a loss of appetite and increased fatigue over last several weeks. Reports she cannot recall if she has ever gotten a colonoscopy but daughter reports she does Cologuard yearly; patient says she has not done Cologuard in 4 years.    Upon admission, she was HDS and labs were consistent with iron deficiency anemia. CT A/P obtained 3/9 showed findings concerning for malignancy involving the pylorus and duodenum with component of gastric outlet obstruction.  NGT was placed 3/9 and patient has had resolution of her abdominal pain, nausea, and vomiting since placement.      Post-Op Info:  Procedure(s) (LRB):  EGD (ESOPHAGOGASTRODUODENOSCOPY) (N/A)   5 Days Post-Op     Interval History: NAEON. Receiving TPN and mIVF overnight. Ready for surgery today.    Medications:  Continuous Infusions:   TPN ADULT CENTRAL LINE CUSTOM 48 mL/hr at 03/14/23 2200    TPN ADULT CENTRAL LINE CUSTOM       Scheduled Meds:   enoxaparin  40 mg Subcutaneous Daily    fat emulsion 20%  250 mL Intravenous Daily    fat emulsion 20%  250 mL Intravenous Daily    pantoprazole  40 mg Intravenous Daily    sodium chloride 0.9%  10 mL Intravenous Q6H     PRN Meds:acetaminophen, dextrose 10%, dextrose 10%, dextrose 10%, dextrose 10%, dextrose, dextrose, glucagon (human recombinant),  hydrALAZINE, insulin aspart U-100, melatonin, sodium chloride 0.9%, Flushing PICC Protocol **AND** sodium chloride 0.9% **AND** sodium chloride 0.9%     Review of patient's allergies indicates:   Allergen Reactions    Pcn [penicillins]      Childhood      Objective:     Vital Signs (Most Recent):  Temp: 99.2 °F (37.3 °C) (03/15/23 0401)  Pulse: (!) 48 (03/15/23 0401)  Resp: 20 (03/15/23 0401)  BP: (!) 153/67 (03/15/23 0401)  SpO2: 98 % (03/15/23 0401)   Vital Signs (24h Range):  Temp:  [97.7 °F (36.5 °C)-99.7 °F (37.6 °C)] 99.2 °F (37.3 °C)  Pulse:  [39-64] 48  Resp:  [18-20] 20  SpO2:  [95 %-99 %] 98 %  BP: (137-174)/(62-75) 153/67     Weight: 73 kg (160 lb 15 oz)  Body mass index is 25.98 kg/m².    Intake/Output - Last 3 Shifts         03/13 0700  03/14 0659 03/14 0700  03/15 0659    Urine (mL/kg/hr) 500 (0.3) 0 (0)    Drains 50 400    Total Output 550 400    Net -550 -400          Urine Occurrence 5 x 1 x    Stool Occurrence  0 x            Physical Exam  Vitals and nursing note reviewed.   Constitutional:       General: She is not in acute distress.  Cardiovascular:      Rate and Rhythm: Normal rate.      Pulses: Normal pulses.   Pulmonary:      Effort: Pulmonary effort is normal. No respiratory distress.   Abdominal:      General: There is no distension.      Palpations: Abdomen is soft.      Tenderness: There is no abdominal tenderness.   Neurological:      General: No focal deficit present.      Mental Status: She is alert and oriented to person, place, and time.     Significant Labs:  I have reviewed all pertinent lab results within the past 24 hours.  CBC:   Recent Labs   Lab 03/15/23  0539   WBC 6.91   RBC 4.98   HGB 11.5*   HCT 36.1*      MCV 73*   MCH 23.1*   MCHC 31.9*     BMP:   Recent Labs   Lab 03/14/23  0340   *      K 3.8      CO2 26   BUN 19   CREATININE 0.6   CALCIUM 10.0   MG 2.3       Significant Diagnostics:  I have reviewed all pertinent imaging results/findings  within the past 24 hours.    Assessment/Plan:     Gastric outlet obstruction  Nan Simms is a 66 yo female with PMHx of HTN and hypothyroidism here for gastric outlet obstruction. Discussion with patient at length, will proceed to OR on 3/15/22 for whipple procedure versus gastrojejunostomy.     - TPN/lipids  - Appreciate medicine input for preoperative assessment   - Prophylactic subcutaneous dosing of heparin   - OR 3/15 today for whipple            Chase English MD  General Surgery  [facility] - [facility]

## 2023-03-15 NOTE — PT/OT/SLP PROGRESS
Physical Therapy      Patient Name:  Nan Simms   MRN:  [id number]    Patient not seen today secondary to scheduled Whipple procedure performed today. Will follow-up tomorrow.    Rowan Ramos, SPT  3/15/2023  .

## 2023-03-15 NOTE — PLAN OF CARE
Patient remained free from falls and injury throughout shift. No acute events overnight. Patient alert and oriented x4; vital signs stable. NGT in place to left nare set to low intermittent suction. Patient NPO. Daughter at bedside.  Safety measures addressed --bed locked and in low position with siderails up x2 and call light/personal items within reach. Plan of care reviewed with patient; all questions and concerns addressed. Patient verbalizes understanding.       Problem: Adult Inpatient Plan of Care  Goal: Plan of Care Review  Outcome: Ongoing, Progressing  Goal: Patient-Specific Goal (Individualized)  Outcome: Ongoing, Progressing  Goal: Absence of Hospital-Acquired Illness or Injury  Outcome: Ongoing, Progressing  Goal: Optimal Comfort and Wellbeing  Outcome: Ongoing, Progressing  Goal: Readiness for Transition of Care  Outcome: Ongoing, Progressing     Problem: Diabetes Comorbidity  Goal: Blood Glucose Level Within Targeted Range  Outcome: Ongoing, Progressing     Problem: Infection  Goal: Absence of Infection Signs and Symptoms  Outcome: Ongoing, Progressing

## 2023-03-15 NOTE — CARE UPDATE
"RAPID RESPONSE NURSE CHART REVIEW        Chart Reviewed: 03/15/2023, 1:49 AM    MRN: [id number]  Bed: 1004/1004 A    Dx: Gastric cancer    Nan Smims has a past medical history of Abnormal Pap smear of cervix, Arthritis, Hypertension, and Hyperthyroidism.    Last VS: BP (!) 140/63 (BP Location: Left arm, Patient Position: Lying)   Pulse (!) 48   Temp 99.7 °F (37.6 °C) (Oral)   Resp 20   Ht 5' 6" (1.676 m)   Wt 73 kg (160 lb 15 oz)   SpO2 95%   Breastfeeding No   BMI 25.98 kg/m²     24H Vital Sign Range:  Temp:  [96.7 °F (35.9 °C)-99.7 °F (37.6 °C)]   Pulse:  [39-64]   Resp:  [18-20]   BP: (122-174)/(57-75)   SpO2:  [95 %-99 %]     Level of Consciousness (AVPU): alert    Recent Labs     03/12/23  0506 03/13/23  0528 03/14/23  0340   WBC 5.75 5.84 5.69   HGB 10.7* 10.9* 11.1*   HCT 35.7* 35.2* 36.3*    284 244       Recent Labs     03/12/23  0506 03/13/23  0528 03/14/23  0340   * 138 139   K 3.7 4.1 3.8    100 103   CO2 26 27 26   CREATININE 0.6 0.6 0.6   * 124* 122*   PHOS 2.9 3.7 3.6   MG 2.0 2.2 2.3        No results for input(s): PH, PCO2, PO2, HCO3, POCSATURATED, BE in the last 72 hours.     OXYGEN:             MEWS score: 2    bedside Kera KURTZ  contacted for bradycardia in the 40s. Patient has been in the 40s-50s since admission, BP is stable. Repeat EKG ordered to confirm no acute change. No additional concerns verbalized at this time. Instructed to call [phone] for further concerns or assistance.    VALERIO Coles RN       "

## 2023-03-15 NOTE — PLAN OF CARE
[address] [facility]  Initial Discharge Assessment       Primary Care Provider: Payal Moreland NP    Admission Diagnosis: Gastric out let obstruction [K31.1]    Admission Date: 3/9/2023  Expected Discharge Date: 3/22/2023    Discharge Barriers Identified: None    Payor: [employer] / Plan: [employer] / Product Type: Medicare Advantage /     Extended Emergency Contact Information  Primary Emergency Contact: Conrad Simms   [facility]  Home Phone: [phone]  Relation: Spouse    Discharge Plan A: Home with family  Discharge Plan B: Home Health      [facility] [address] [place] [address]  Phone: [phone] Fax: [phone]    [facility] SAMANTHA GRAHAM  [address]  [facility] [address]  [place] [address]  Phone: [phone] Fax: [phone]      Initial Assessment (most recent)       Adult Discharge Assessment - 03/13/23 0958          Discharge Assessment    Assessment Type Discharge Planning Assessment     Confirmed/corrected address, phone number and insurance Yes     Confirmed Demographics Correct on Facesheet     Source of Information family;patient     Does patient/caregiver understand observation status Yes     Communicated LOERN with patient/caregiver Yes     Reason For Admission Gastric obstruction     People in Home spouse     Facility Arrived From: Home     Do you expect to return to your current living situation? Yes     Do you have help at home or someone to help you manage your care at home? Yes     Who are your caregiver(s) and their phone number(s)? Spouse Conrad     Prior to hospitilization cognitive status: Alert/Oriented;No Deficits     Current cognitive status: Alert/Oriented;No Deficits     Equipment Currently Used at Home none     Readmission within 30 days? No     Patient currently being followed by outpatient case management? No     Do you currently have service(s) that help you manage your care at home? No      Do you take prescription medications? Yes     Do you have prescription coverage? Yes     Do you have any problems affording any of your prescribed medications? No     Is the patient taking medications as prescribed? yes     Who is going to help you get home at discharge? Spouse/Pt     How do you get to doctors appointments? car, drives self     Are you on dialysis? No     Do you take coumadin? No     Discharge Plan A Home with family     Discharge Plan B Home Health     DME Needed Upon Discharge  none     Discharge Plan discussed with: Spouse/sig other;Patient     Discharge Barriers Identified None        OTHER    Name(s) of People in Home Conrad Spouse

## 2023-03-15 NOTE — PLAN OF CARE
"                                         [facility]       HOME  HEALTH ORDERS                                    FACE TO FACE ENCOUNTER      Patient Name: Nan Simms  YOB: 1955    PCP: Paayl Moreland NP   PCP Address: [address] [place] [address]  PCP Phone Number: [phone]  PCP Fax: [phone]    Encounter Date: 03/24/2023    Admit to Home Health    Diagnoses:  Active Hospital Problems    Diagnosis  POA    *Gastric cancer [C16.9]  Yes    Bradycardia [R00.1]  Yes    Moderate malnutrition [E44.0]  Yes    Multiple thyroid nodules [E04.2]  Yes    Esophagitis [K20.90]  Yes    Anemia [D64.9]  Yes    Diabetes mellitus type 2, diet-controlled [E11.9]  Yes    Essential hypertension [I10]  Yes    Hyperlipidemia [E78.5]  Yes      Resolved Hospital Problems    Diagnosis Date Resolved POA    Acute cystitis [N30.00] 03/10/2023 Yes    Abdominal mass [R19.00] 03/11/2023 Yes    Abnormal CT scan, gastrointestinal tract [R93.3] 03/11/2023 Yes    Gastric outlet obstruction [K31.1] 03/18/2023 Yes       Future Appointments   Date Time Provider Department Center   3/30/2023  9:20 AM [facility]  [place] [facility]   3/30/2023 10:30 AM [facility] Paez [facility] [place] [facility]   3/30/2023 11:00 AM Pamela Martinez RD [facility] INONCS [place] [facility]   5/30/2023  8:30 AM LAB, [facility] STA LAB [facility]   6/6/2023  2:00 PM Payal Moreland NP [facility]       I have seen and examined this patient face to face today. My clinical findings that support the need for the home health skilled services and home bound status are the following:  Weakness/numbness causing balance and gait disturbance due to Surgery making it taxing to leave home.    Allergies:  Review of patient's allergies indicates:   Allergen Reactions    Aspirin Other (See Comments)     Pt states it is "hard on her stomach" She can tolerate a low dose aspirin    Pcn [penicillins]      Childhood  "       Diet: Dysphagia soft diet + boost shakes with all meals.     Activities: activity as tolerated    Nursing:   SN to complete comprehensive assessment including routine vital signs. Instruct on disease process and s/s of complications to report to MD. Review/verify medication list sent home with the patient at time of discharge  and instruct patient/caregiver as needed. Frequency may be adjusted depending on start of care date.    Notify MD if SBP > 160 or < 90; DBP > 90 or < 50; HR > 120 or < 50; Temp > 101    CONSULTS:    Physical Therapy to evaluate and treat. Evaluate for home safety and equipment needs; Establish/upgrade home exercise program. Perform / instruct on therapeutic exercises, gait training, transfer training, and Range of Motion.    Medications: Review discharge medications with patient and family and provide education.      Current Discharge Medication List        START taking these medications    Details   acetaminophen (TYLENOL) 650 MG TbSR Take 1 tablet (650 mg total) by mouth every 8 (eight) hours.  Refills: 0      enoxaparin (LOVENOX) 40 mg/0.4 mL Syrg Inject 0.4 mLs (40 mg total) into the skin Daily.  Qty: 11.2 mL, Refills: 0      methocarbamoL (ROBAXIN) 500 MG Tab Take 1 tablet (500 mg total) by mouth 4 (four) times daily. for 10 days  Qty: 40 tablet, Refills: 0      metoclopramide HCl (REGLAN) 10 MG tablet Take 1 tablet (10 mg total) by mouth 3 (three) times daily before meals.  Qty: 90 tablet, Refills: 0      pantoprazole (PROTONIX) 40 MG tablet Take 1 tablet (40 mg total) by mouth before breakfast.  Qty: 30 tablet, Refills: 11      polyethylene glycol (GLYCOLAX) 17 gram/dose powder Use cap to measure 17 g, then mix in liquid and drink by mouth once daily.  Qty: 510 g, Refills: 0      traMADoL (ULTRAM) 50 mg tablet Take 2 tablets (100 mg total) by mouth every 6 (six) hours as needed for Pain.  Qty: 15 tablet, Refills: 0    Comments: Quantity prescribed more than 7 day supply? Yes,  quantity medically necessary           CONTINUE these medications which have NOT CHANGED    Details   aluminum-magnesium hydroxide-simethicone (MAALOX ADVANCED) 200-200-20 mg/5 mL Susp Take 30 mLs by mouth 4 (four) times daily before meals and nightly.  Qty: 300 mL, Refills: 0      aspirin (ECOTRIN) 81 MG EC tablet Take 1 tablet (81 mg total) by mouth once daily.  Refills: 0    Associated Diagnoses: Essential hypertension      atorvastatin (LIPITOR) 20 MG tablet Take 1 tablet (20 mg total) by mouth once daily.  Qty: 90 tablet, Refills: 3    Associated Diagnoses: Hyperlipidemia, unspecified hyperlipidemia type      docusate sodium (COLACE) 100 MG capsule Take 1 capsule (100 mg total) by mouth once daily.  Qty: 30 capsule, Refills: 1    Associated Diagnoses: Constipation, unspecified constipation type      lisinopriL-hydrochlorothiazide (PRINZIDE,ZESTORETIC) 20-12.5 mg per tablet Take 1 tablet by mouth once daily.  Qty: 90 tablet, Refills: 3    Comments: .  Associated Diagnoses: Hypertension, unspecified type      meloxicam (MOBIC) 15 MG tablet Take 1 tablet (15 mg total) by mouth once daily. As needed  Qty: 90 tablet, Refills: 1    Associated Diagnoses: Chronic pain of right knee      omeprazole (PRILOSEC) 40 MG capsule Take 1 capsule (40 mg total) by mouth once daily.  Qty: 30 capsule, Refills: 1    Associated Diagnoses: Gastroesophageal reflux disease, unspecified whether esophagitis present      polyethylene glycol (GLYCOLAX) 17 gram PwPk Take 17 g by mouth once daily.  Qty: 3 packet, Refills: 0             Disciplines requested: Nursing Services to provide:   Other: S/P Melanie 3/15/2023    Labs: CBC, CMP, Mag, Phos Q Monday x 2 weeks; pre-albumin x 1 on Monday CBC, BMP Q Thursday x 2 weeks.  If it is a holiday or the patient is not available for labs then they can be drawn the next day or the next home health visit.     Monitor abdomen for redness, oozing from staple site, abdominal distension, or pain not  controlled by pain medication.     Vitals signs daily. Call MD with Temperature > 101, SBP > 180, HR < 50.     Physician to follow patient's care (the person listed here will be responsible for signing ongoing orders): Other: Dr. Alfonzo Moore, Dr. WEI Sanchez, Dr. Shakeel Moore [phone], Dr. Nick Paez [phone] or [phone] office [phone] fax Requested Start of Care Date: 3/15/2023    I certify that this patient is confined to her home and needs intermittent skilled nursing care and physical therapy.      Electronically Signed  _________________________________  Snehal Toribio, GRANT  03/24/2023

## 2023-03-15 NOTE — SUBJECTIVE & OBJECTIVE
Interval History: NAEON. Receiving TPN and mIVF overnight. Ready for surgery today.    Medications:  Continuous Infusions:   TPN ADULT CENTRAL LINE CUSTOM 48 mL/hr at 03/14/23 2200    TPN ADULT CENTRAL LINE CUSTOM       Scheduled Meds:   enoxaparin  40 mg Subcutaneous Daily    fat emulsion 20%  250 mL Intravenous Daily    fat emulsion 20%  250 mL Intravenous Daily    pantoprazole  40 mg Intravenous Daily    sodium chloride 0.9%  10 mL Intravenous Q6H     PRN Meds:acetaminophen, dextrose 10%, dextrose 10%, dextrose 10%, dextrose 10%, dextrose, dextrose, glucagon (human recombinant), hydrALAZINE, insulin aspart U-100, melatonin, sodium chloride 0.9%, Flushing PICC Protocol **AND** sodium chloride 0.9% **AND** sodium chloride 0.9%     Review of patient's allergies indicates:   Allergen Reactions    Pcn [penicillins]      Childhood      Objective:     Vital Signs (Most Recent):  Temp: 99.2 °F (37.3 °C) (03/15/23 0401)  Pulse: (!) 48 (03/15/23 0401)  Resp: 20 (03/15/23 0401)  BP: (!) 153/67 (03/15/23 0401)  SpO2: 98 % (03/15/23 0401)   Vital Signs (24h Range):  Temp:  [97.7 °F (36.5 °C)-99.7 °F (37.6 °C)] 99.2 °F (37.3 °C)  Pulse:  [39-64] 48  Resp:  [18-20] 20  SpO2:  [95 %-99 %] 98 %  BP: (137-174)/(62-75) 153/67     Weight: 73 kg (160 lb 15 oz)  Body mass index is 25.98 kg/m².    Intake/Output - Last 3 Shifts         03/13 0700  03/14 0659 03/14 0700  03/15 0659    Urine (mL/kg/hr) 500 (0.3) 0 (0)    Drains 50 400    Total Output 550 400    Net -550 -400          Urine Occurrence 5 x 1 x    Stool Occurrence  0 x            Physical Exam  Vitals and nursing note reviewed.   Constitutional:       General: She is not in acute distress.  Cardiovascular:      Rate and Rhythm: Normal rate.      Pulses: Normal pulses.   Pulmonary:      Effort: Pulmonary effort is normal. No respiratory distress.   Abdominal:      General: There is no distension.      Palpations: Abdomen is soft.      Tenderness: There is no abdominal  tenderness.   Neurological:      General: No focal deficit present.      Mental Status: She is alert and oriented to person, place, and time.     Significant Labs:  I have reviewed all pertinent lab results within the past 24 hours.  CBC:   Recent Labs   Lab 03/15/23  0539   WBC 6.91   RBC 4.98   HGB 11.5*   HCT 36.1*      MCV 73*   MCH 23.1*   MCHC 31.9*     BMP:   Recent Labs   Lab 03/14/23  0340   *      K 3.8      CO2 26   BUN 19   CREATININE 0.6   CALCIUM 10.0   MG 2.3       Significant Diagnostics:  I have reviewed all pertinent imaging results/findings within the past 24 hours.

## 2023-03-16 LAB
AMYLASE, BODY FLUID: 1828 U/L
ANION GAP SERPL CALC-SCNC: 9 MMOL/L (ref 8–16)
ANISOCYTOSIS BLD QL SMEAR: SLIGHT
BASOPHILS # BLD AUTO: 0.01 K/UL (ref 0–0.2)
BASOPHILS NFR BLD: 0.1 % (ref 0–1.9)
BODY FLUID SOURCE AMYLASE: NORMAL
BUN SERPL-MCNC: 27 MG/DL (ref 8–23)
CALCIUM SERPL-MCNC: 8.5 MG/DL (ref 8.7–10.5)
CHLORIDE SERPL-SCNC: 110 MMOL/L (ref 95–110)
CO2 SERPL-SCNC: 24 MMOL/L (ref 23–29)
CREAT SERPL-MCNC: 0.6 MG/DL (ref 0.5–1.4)
DIFFERENTIAL METHOD: ABNORMAL
EOSINOPHIL # BLD AUTO: 0 K/UL (ref 0–0.5)
EOSINOPHIL NFR BLD: 0 % (ref 0–8)
ERYTHROCYTE [DISTWIDTH] IN BLOOD BY AUTOMATED COUNT: 15.4 % (ref 11.5–14.5)
EST. GFR  (NO RACE VARIABLE): >60 ML/MIN/1.73 M^2
GLUCOSE SERPL-MCNC: 214 MG/DL (ref 70–110)
GLUCOSE SERPL-MCNC: 236 MG/DL (ref 70–110)
HCO3 UR-SCNC: 24.5 MMOL/L (ref 24–28)
HCT VFR BLD AUTO: 27.2 % (ref 37–48.5)
HCT VFR BLD CALC: 26 %PCV (ref 36–54)
HGB BLD-MCNC: 8.3 G/DL (ref 12–16)
HYPOCHROMIA BLD QL SMEAR: ABNORMAL
IMM GRANULOCYTES # BLD AUTO: 0.02 K/UL (ref 0–0.04)
IMM GRANULOCYTES NFR BLD AUTO: 0.2 % (ref 0–0.5)
LYMPHOCYTES # BLD AUTO: 0.3 K/UL (ref 1–4.8)
LYMPHOCYTES NFR BLD: 3.4 % (ref 18–48)
MAGNESIUM SERPL-MCNC: 2 MG/DL (ref 1.6–2.6)
MCH RBC QN AUTO: 22.1 PG (ref 27–31)
MCHC RBC AUTO-ENTMCNC: 30.5 G/DL (ref 32–36)
MCV RBC AUTO: 72 FL (ref 82–98)
MONOCYTES # BLD AUTO: 0.8 K/UL (ref 0.3–1)
MONOCYTES NFR BLD: 8.7 % (ref 4–15)
NEUTROPHILS # BLD AUTO: 7.9 K/UL (ref 1.8–7.7)
NEUTROPHILS NFR BLD: 87.6 % (ref 38–73)
NRBC BLD-RTO: 0 /100 WBC
OVALOCYTES BLD QL SMEAR: ABNORMAL
PCO2 BLDA: 39.9 MMHG (ref 35–45)
PH SMN: 7.39 [PH] (ref 7.35–7.45)
PHOSPHATE SERPL-MCNC: 2.7 MG/DL (ref 2.7–4.5)
PLATELET # BLD AUTO: 185 K/UL (ref 150–450)
PMV BLD AUTO: 11.7 FL (ref 9.2–12.9)
PO2 BLDA: 233 MMHG (ref 80–100)
POC BE: 0 MMOL/L
POC IONIZED CALCIUM: 1.38 MMOL/L (ref 1.06–1.42)
POC SATURATED O2: 100 % (ref 95–100)
POC TCO2: 26 MMOL/L (ref 23–27)
POCT GLUCOSE: 151 MG/DL (ref 70–110)
POCT GLUCOSE: 195 MG/DL (ref 70–110)
POIKILOCYTOSIS BLD QL SMEAR: SLIGHT
POLYCHROMASIA BLD QL SMEAR: ABNORMAL
POTASSIUM BLD-SCNC: 3.6 MMOL/L (ref 3.5–5.1)
POTASSIUM SERPL-SCNC: 3.9 MMOL/L (ref 3.5–5.1)
RBC # BLD AUTO: 3.76 M/UL (ref 4–5.4)
SAMPLE: ABNORMAL
SODIUM BLD-SCNC: 141 MMOL/L (ref 136–145)
SODIUM SERPL-SCNC: 143 MMOL/L (ref 136–145)
SPHEROCYTES BLD QL SMEAR: ABNORMAL
WBC # BLD AUTO: 9.06 K/UL (ref 3.9–12.7)

## 2023-03-16 PROCEDURE — 63600175 PHARM REV CODE 636 W HCPCS: Performed by: STUDENT IN AN ORGANIZED HEALTH CARE EDUCATION/TRAINING PROGRAM

## 2023-03-16 PROCEDURE — 63600175 PHARM REV CODE 636 W HCPCS

## 2023-03-16 PROCEDURE — 99900035 HC TECH TIME PER 15 MIN (STAT)

## 2023-03-16 PROCEDURE — 99233 PR SUBSEQUENT HOSPITAL CARE,LEVL III: ICD-10-PCS | Mod: 24,,, | Performed by: STUDENT IN AN ORGANIZED HEALTH CARE EDUCATION/TRAINING PROGRAM

## 2023-03-16 PROCEDURE — 94799 UNLISTED PULMONARY SVC/PX: CPT

## 2023-03-16 PROCEDURE — 25000003 PHARM REV CODE 250

## 2023-03-16 PROCEDURE — 27000221 HC OXYGEN, UP TO 24 HOURS

## 2023-03-16 PROCEDURE — B4185 PARENTERAL SOL 10 GM LIPIDS: HCPCS | Performed by: STUDENT IN AN ORGANIZED HEALTH CARE EDUCATION/TRAINING PROGRAM

## 2023-03-16 PROCEDURE — 25000003 PHARM REV CODE 250: Performed by: STUDENT IN AN ORGANIZED HEALTH CARE EDUCATION/TRAINING PROGRAM

## 2023-03-16 PROCEDURE — 99222 PR INITIAL HOSPITAL CARE,LEVL II: ICD-10-PCS | Mod: ,,, | Performed by: NURSE PRACTITIONER

## 2023-03-16 PROCEDURE — 63600175 PHARM REV CODE 636 W HCPCS: Performed by: SURGERY

## 2023-03-16 PROCEDURE — 27000646 HC AEROBIKA DEVICE

## 2023-03-16 PROCEDURE — B4185 PARENTERAL SOL 10 GM LIPIDS: HCPCS

## 2023-03-16 PROCEDURE — 84100 ASSAY OF PHOSPHORUS: CPT | Performed by: STUDENT IN AN ORGANIZED HEALTH CARE EDUCATION/TRAINING PROGRAM

## 2023-03-16 PROCEDURE — A4216 STERILE WATER/SALINE, 10 ML: HCPCS | Performed by: STUDENT IN AN ORGANIZED HEALTH CARE EDUCATION/TRAINING PROGRAM

## 2023-03-16 PROCEDURE — 82150 ASSAY OF AMYLASE: CPT | Performed by: STUDENT IN AN ORGANIZED HEALTH CARE EDUCATION/TRAINING PROGRAM

## 2023-03-16 PROCEDURE — 20600001 HC STEP DOWN PRIVATE ROOM

## 2023-03-16 PROCEDURE — 83735 ASSAY OF MAGNESIUM: CPT | Performed by: STUDENT IN AN ORGANIZED HEALTH CARE EDUCATION/TRAINING PROGRAM

## 2023-03-16 PROCEDURE — 63600175 PHARM REV CODE 636 W HCPCS: Mod: TB,JG | Performed by: STUDENT IN AN ORGANIZED HEALTH CARE EDUCATION/TRAINING PROGRAM

## 2023-03-16 PROCEDURE — 63600175 PHARM REV CODE 636 W HCPCS: Performed by: NURSE PRACTITIONER

## 2023-03-16 PROCEDURE — 80048 BASIC METABOLIC PNL TOTAL CA: CPT | Performed by: STUDENT IN AN ORGANIZED HEALTH CARE EDUCATION/TRAINING PROGRAM

## 2023-03-16 PROCEDURE — A4217 STERILE WATER/SALINE, 500 ML: HCPCS

## 2023-03-16 PROCEDURE — 94664 DEMO&/EVAL PT USE INHALER: CPT

## 2023-03-16 PROCEDURE — 99222 1ST HOSP IP/OBS MODERATE 55: CPT | Mod: ,,, | Performed by: NURSE PRACTITIONER

## 2023-03-16 PROCEDURE — 94640 AIRWAY INHALATION TREATMENT: CPT

## 2023-03-16 PROCEDURE — 99233 SBSQ HOSP IP/OBS HIGH 50: CPT | Mod: 24,,, | Performed by: STUDENT IN AN ORGANIZED HEALTH CARE EDUCATION/TRAINING PROGRAM

## 2023-03-16 PROCEDURE — 51798 US URINE CAPACITY MEASURE: CPT

## 2023-03-16 PROCEDURE — 25000242 PHARM REV CODE 250 ALT 637 W/ HCPCS: Performed by: STUDENT IN AN ORGANIZED HEALTH CARE EDUCATION/TRAINING PROGRAM

## 2023-03-16 PROCEDURE — 85025 COMPLETE CBC W/AUTO DIFF WBC: CPT | Performed by: STUDENT IN AN ORGANIZED HEALTH CARE EDUCATION/TRAINING PROGRAM

## 2023-03-16 PROCEDURE — C9113 INJ PANTOPRAZOLE SODIUM, VIA: HCPCS | Performed by: STUDENT IN AN ORGANIZED HEALTH CARE EDUCATION/TRAINING PROGRAM

## 2023-03-16 PROCEDURE — 94761 N-INVAS EAR/PLS OXIMETRY MLT: CPT

## 2023-03-16 RX ORDER — ASPIRIN 81 MG/1
81 TABLET ORAL DAILY
Status: DISCONTINUED | OUTPATIENT
Start: 2023-03-16 | End: 2023-03-24 | Stop reason: HOSPADM

## 2023-03-16 RX ORDER — LABETALOL HCL 20 MG/4 ML
10 SYRINGE (ML) INTRAVENOUS EVERY 4 HOURS PRN
Status: DISCONTINUED | OUTPATIENT
Start: 2023-03-16 | End: 2023-03-24 | Stop reason: HOSPADM

## 2023-03-16 RX ORDER — INSULIN ASPART 100 [IU]/ML
2 INJECTION, SOLUTION INTRAVENOUS; SUBCUTANEOUS
Status: DISCONTINUED | OUTPATIENT
Start: 2023-03-16 | End: 2023-03-21

## 2023-03-16 RX ORDER — ATORVASTATIN CALCIUM 20 MG/1
20 TABLET, FILM COATED ORAL DAILY
Status: DISCONTINUED | OUTPATIENT
Start: 2023-03-16 | End: 2023-03-24 | Stop reason: HOSPADM

## 2023-03-16 RX ORDER — FENTANYL CITRATE 50 UG/ML
25 INJECTION, SOLUTION INTRAMUSCULAR; INTRAVENOUS ONCE
Status: DISCONTINUED | OUTPATIENT
Start: 2023-03-16 | End: 2023-03-16

## 2023-03-16 RX ORDER — GLUCAGON 1 MG
1 KIT INJECTION
Status: DISCONTINUED | OUTPATIENT
Start: 2023-03-16 | End: 2023-03-16

## 2023-03-16 RX ORDER — DEXTROSE MONOHYDRATE 100 MG/ML
INJECTION, SOLUTION INTRAVENOUS CONTINUOUS PRN
Status: DISCONTINUED | OUTPATIENT
Start: 2023-03-16 | End: 2023-03-16

## 2023-03-16 RX ORDER — INSULIN ASPART 100 [IU]/ML
0-5 INJECTION, SOLUTION INTRAVENOUS; SUBCUTANEOUS EVERY 4 HOURS PRN
Status: DISCONTINUED | OUTPATIENT
Start: 2023-03-16 | End: 2023-03-21

## 2023-03-16 RX ADMIN — Medication 10 ML: at 06:03

## 2023-03-16 RX ADMIN — HYDRALAZINE HYDROCHLORIDE 10 MG: 20 INJECTION, SOLUTION INTRAMUSCULAR; INTRAVENOUS at 03:03

## 2023-03-16 RX ADMIN — METHOCARBAMOL 500 MG: 100 INJECTION, SOLUTION INTRAMUSCULAR; INTRAVENOUS at 02:03

## 2023-03-16 RX ADMIN — METHOCARBAMOL 500 MG: 100 INJECTION, SOLUTION INTRAMUSCULAR; INTRAVENOUS at 09:03

## 2023-03-16 RX ADMIN — ACETAMINOPHEN 1000 MG: 10 INJECTION INTRAVENOUS at 09:03

## 2023-03-16 RX ADMIN — INSULIN ASPART 2 UNITS: 100 INJECTION, SOLUTION INTRAVENOUS; SUBCUTANEOUS at 08:03

## 2023-03-16 RX ADMIN — HYDRALAZINE HYDROCHLORIDE 10 MG: 20 INJECTION, SOLUTION INTRAMUSCULAR; INTRAVENOUS at 01:03

## 2023-03-16 RX ADMIN — INSULIN ASPART 2 UNITS: 100 INJECTION, SOLUTION INTRAVENOUS; SUBCUTANEOUS at 03:03

## 2023-03-16 RX ADMIN — MAGNESIUM SULFATE HEPTAHYDRATE: 500 INJECTION, SOLUTION INTRAMUSCULAR; INTRAVENOUS at 09:03

## 2023-03-16 RX ADMIN — SODIUM CHLORIDE, POTASSIUM CHLORIDE, SODIUM LACTATE AND CALCIUM CHLORIDE: 600; 310; 30; 20 INJECTION, SOLUTION INTRAVENOUS at 11:03

## 2023-03-16 RX ADMIN — LEVALBUTEROL HYDROCHLORIDE 0.63 MG: 0.63 SOLUTION RESPIRATORY (INHALATION) at 12:03

## 2023-03-16 RX ADMIN — ATORVASTATIN CALCIUM 20 MG: 20 TABLET, FILM COATED ORAL at 03:03

## 2023-03-16 RX ADMIN — SOYBEAN OIL 250 ML: 20 INJECTION, SOLUTION INTRAVENOUS at 09:03

## 2023-03-16 RX ADMIN — LEVALBUTEROL HYDROCHLORIDE 0.63 MG: 0.63 SOLUTION RESPIRATORY (INHALATION) at 07:03

## 2023-03-16 RX ADMIN — INSULIN ASPART 1 UNITS: 100 INJECTION, SOLUTION INTRAVENOUS; SUBCUTANEOUS at 01:03

## 2023-03-16 RX ADMIN — PANTOPRAZOLE SODIUM 40 MG: 40 INJECTION, POWDER, FOR SOLUTION INTRAVENOUS at 09:03

## 2023-03-16 RX ADMIN — INSULIN ASPART 2 UNITS: 100 INJECTION, SOLUTION INTRAVENOUS; SUBCUTANEOUS at 12:03

## 2023-03-16 RX ADMIN — METHOCARBAMOL 500 MG: 100 INJECTION, SOLUTION INTRAMUSCULAR; INTRAVENOUS at 05:03

## 2023-03-16 RX ADMIN — MUPIROCIN: 20 OINTMENT TOPICAL at 09:03

## 2023-03-16 RX ADMIN — ACETAMINOPHEN 1000 MG: 10 INJECTION INTRAVENOUS at 01:03

## 2023-03-16 RX ADMIN — Medication: at 03:03

## 2023-03-16 RX ADMIN — ACETAMINOPHEN 1000 MG: 10 INJECTION INTRAVENOUS at 05:03

## 2023-03-16 RX ADMIN — ASPIRIN 81 MG: 81 TABLET, COATED ORAL at 03:03

## 2023-03-16 RX ADMIN — Medication 10 ML: at 12:03

## 2023-03-16 RX ADMIN — LEVALBUTEROL HYDROCHLORIDE 0.63 MG: 0.63 SOLUTION RESPIRATORY (INHALATION) at 08:03

## 2023-03-16 RX ADMIN — LABETALOL HYDROCHLORIDE 10 MG: 5 INJECTION, SOLUTION INTRAVENOUS at 03:03

## 2023-03-16 RX ADMIN — ENOXAPARIN SODIUM 40 MG: 40 INJECTION SUBCUTANEOUS at 05:03

## 2023-03-16 RX ADMIN — I.V. FAT EMULSION 250 ML: 20 EMULSION INTRAVENOUS at 03:03

## 2023-03-16 NOTE — SUBJECTIVE & OBJECTIVE
"Follow-up For: Procedure(s) (LRB):  WHIPPLE PROCEDURE (N/A)    Post-Operative Day: Day of Surgery     Past Medical History:   Diagnosis Date    Abnormal Pap smear of cervix     Arthritis     Hypertension     Hyperthyroidism        Past Surgical History:   Procedure Laterality Date    CATARACT EXTRACTION W/  INTRAOCULAR LENS IMPLANT Right 8/8/2019    Procedure: EXTRACTION, CATARACT, WITH IOL INSERTION;  Surgeon: Spenser Lee MD;  Location: [facility] OR;  Service: Ophthalmology;  Laterality: Right;    EYE SURGERY      HYSTERECTOMY      OOPHORECTOMY      PHACOEMULSIFICATION OF CATARACT Right 8/8/2019    Procedure: PHACOEMULSIFICATION, CATARACT;  Surgeon: Spenser Lee MD;  Location: [facility] OR;  Service: Ophthalmology;  Laterality: Right;       Review of patient's allergies indicates:   Allergen Reactions    Aspirin Other (See Comments)     Pt states it is "hard on her stomach" She can tolerate a low dose aspirin    Pcn [penicillins]      Childhood        Family History       Problem Relation (Age of Onset)    Cancer Mother          Tobacco Use    Smoking status: Every Day     Packs/day: 0.50     Years: 30.00     Pack years: 15.00     Types: Cigarettes    Smokeless tobacco: Current   Substance and Sexual Activity    Alcohol use: Yes     Alcohol/week: 1.0 standard drink     Types: 1 Cans of beer per week    Drug use: No    Sexual activity: Yes     Partners: Male      Review of Systems   Respiratory:  Negative for chest tightness and shortness of breath.    Cardiovascular:  Negative for chest pain.   Gastrointestinal:  Positive for abdominal pain. Negative for abdominal distention.   Objective:     Vital Signs (Most Recent):  Temp: 98.8 °F (37.1 °C) (03/15/23 1224)  Pulse: 71 (03/15/23 2236)  Resp: (!) 25 (03/15/23 2236)  BP: (!) 147/65 (03/15/23 1224)  SpO2: 100 % (03/15/23 2236)   Vital Signs (24h Range):  Temp:  [98.5 °F (36.9 °C)-99.7 °F (37.6 °C)] 98.8 °F (37.1 °C)  Pulse:  [41-71] 71  Resp:  [18-25] " 25  SpO2:  [95 %-100 %] 100 %  BP: (140-153)/(63-67) 147/65     Weight: 73 kg (160 lb 15 oz)  Body mass index is 25.98 kg/m².      Intake/Output Summary (Last 24 hours) at 3/15/2023 2250  Last data filed at 3/15/2023 2237  Gross per 24 hour   Intake 4500 ml   Output 965 ml   Net 3535 ml       Physical Exam  Vitals and nursing note reviewed.   Constitutional:       General: She is not in acute distress.  Cardiovascular:      Rate and Rhythm: Normal rate.      Pulses: Normal pulses.   Pulmonary:      Effort: Pulmonary effort is normal. No respiratory distress.   Abdominal:      General: There is no distension.      Palpations: Abdomen is soft.      Tenderness: There is abdominal tenderness.   Musculoskeletal:      Comments: Incisions CDI with overlying dermabond    VARGAS bulb to suction       Skin:     General: Skin is warm and dry.   Neurological:      Mental Status: She is alert.       Vents:       Lines/Drains/Airways       Peripherally Inserted Central Catheter Line  Duration             PICC Double Lumen 03/11/23 1016 right basilic 4 days              Drain  Duration                  NG/OG Tube 03/09/23 0827 [place] sump 18 Fr. Left nostril 6 days         Closed/Suction Drain 03/15/23 2126 RLQ Bulb 19 Fr. <1 day         Urethral Catheter 03/15/23 1450 Non-latex;Straight-tip;Silicone 16 Fr. <1 day              Arterial Line  Duration             Arterial Line 03/15/23 1420 Left Radial <1 day              Peripheral Intravenous Line  Duration                  Peripheral IV - Single Lumen 03/15/23 1458 16 G Right Forearm <1 day         Peripheral IV - Single Lumen 03/15/23 1500 18 G Left Hand <1 day                    Significant Labs:    CBC/Anemia Profile:  Recent Labs   Lab 03/14/23  0340 03/15/23  0539 03/15/23  2037 03/15/23  2139   WBC 5.69 6.91  --   --    HGB 11.1* 11.5*  --   --    HCT 36.3* 36.1* 27* 26*    226  --   --    MCV 73* 73*  --   --    RDW 15.7* 15.7*  --   --         Chemistries:  Recent Labs    Lab 03/14/23  0340 03/15/23  0539    140   K 3.8 4.0    104   CO2 26 26   BUN 19 30*   CREATININE 0.6 0.6   CALCIUM 10.0 10.1   MG 2.3 2.3   PHOS 3.6 3.4           Significant Imaging: I have reviewed all pertinent imaging results/findings within the past 24 hours.

## 2023-03-16 NOTE — SUBJECTIVE & OBJECTIVE
Interval HPI:   Overnight events: No acute events overnight. Patient in room 07806/39194 A. Blood glucose stable. BG above goal on current insulin regimen (SSI ). Steroid use- Dexamethasone  4 mg perioperatively. 1 Day Post-Op  Renal function- Normal   Vasopressors-  None       Endocrine will continue to follow and manage insulin orders inpatient.         TPN ADULT CENTRAL LINE CUSTOM  Diet NPO  TPN ADULT CENTRAL LINE CUSTOM     Eating:   NPO  Nausea: No  Hypoglycemia and intervention: No  Fever: No  TPN and/or TF: Yes  If yes, type of TF/TPN and rate: TPN @ 48 ml/hr    PMH, PSH, FH, SH updated and reviewed     ROS:  Review of Systems  Constitutional: Negative for weight changes.  Eyes: Negative for visual disturbance.  Respiratory: Negative for cough.   Cardiovascular: Negative for chest pain.  Gastrointestinal: Negative for nausea.  Endocrine: Negative for polyuria, polydipsia.  Musculoskeletal: Negative for back pain.  Skin: Negative for rash.  Neurological: Negative for syncope.  Psychiatric/Behavioral: Negative for depression.    Current Medications and/or Treatments Impacting Glycemic Control  Immunotherapy:    Immunosuppressants       None          Steroids:   Hormones (From admission, onward)      Start     Stop Route Frequency Ordered    03/09/23 2234  melatonin tablet 6 mg         -- Oral Nightly PRN 03/09/23 2137          Pressors:    Autonomic Drugs (From admission, onward)      None          Hyperglycemia/Diabetes Medications:   Antihyperglycemics (From admission, onward)      Start     Stop Route Frequency Ordered    03/12/23 1432  insulin aspart U-100 pen 0-5 Units         -- SubQ Every 6 hours PRN 03/12/23 1333             PHYSICAL EXAMINATION:  Vitals:    03/16/23 1000   BP: (!) 115/55   Pulse: (!) 50   Resp: 20   Temp:      Body mass index is 25.98 kg/m².    Physical Exam  Constitutional: Well developed, well nourished, NAD.  ENT: External ears no masses with nose patent; normal hearing.  Neck:  Supple; trachea midline.  Cardiovascular: Normal heart sounds, no LE edema. DP +2 bilaterally.  Lungs: Normal effort; lungs anterior bilaterally clear to auscultation.  Abdomen: Soft, no masses, no hernias.  MS: No clubbing or cyanosis of nails noted; unable to assess gait.  Skin: No rashes, lesions, or ulcers; no nodules.   Psychiatric: Good judgement and insight; normal mood and affect.  Neurological: Cranial nerves are grossly intact.   Foot: Nails in good condition, no amputations noted.

## 2023-03-16 NOTE — BRIEF OP NOTE
[facility] ([facility])  Brief Operative Note    SUMMARY     Surgery Date: 3/15/2023     Surgeon(s) and Role:     * Kam Paez MD - Primary     * Vivian Jefferson MD - Resident - Assisting     * Marc Bell MD - Resident - Assisting        Pre-op Diagnosis:  Gastric out let obstruction [K31.1]    Post-op Diagnosis:  Post-Op Diagnosis Codes:     * Gastric out let obstruction [K31.1]    Procedure(s) (LRB):  WHIPPLE PROCEDURE (N/A)    Anesthesia: General    Operative Findings: Whipple procedure. No evidence of gross metastatic disease.     Estimated Blood Loss: 400 mL    Estimated Blood Loss has been documented.         Specimens:   Specimen (24h ago, onward)       Start     Ordered    03/15/23 2057  Specimen to Pathology, Surgery General Surgery  Once        Comments: Pre-op Diagnosis: Gastric out let obstruction [K31.1]Procedure(s):WHIPPLE PROCEDUREGASTROJEJUNOSTOMY Number of specimens: 3Name of specimens: 1.) Hepatic Artery Lymph Node, Permanent.2.) Gallbladder, Permanent.3.) Whipple specimen, long stitch pancreatic duct, short stitch common bile duct, green ink smv margin, black ink retroperitoneum     References:    Click here for ordering Quick Tip   Question Answer Comment   Procedure Type: General Surgery    Which provider would you like to cc? KAM PAEZ    Release to patient Immediate        03/15/23 7173                    [id number]

## 2023-03-16 NOTE — PROGRESS NOTES
[facility] - Surgical Intensive Care  General Surgery  Progress Note    Subjective:     History of Present Illness:  Nan Simms is a 67 year old female with PMHx of HTN and hypothyroidism who has had constipation and abdominal pain for 3 weeks. Says she became increasingly bloated and abdominal pain worsened and went to [facility] yesterday and was transferred to [facility] for higher level of care. She reports using enemas to relieve constipation but was unable to find relief and came to ED. She has not been nauseous however reports she vomited 3 times two days ago. She has been tolerating a solid and liquid diet. Reports has lost 13 lbs in last 3 weeks. Of note, she also notes a loss of appetite and increased fatigue over last several weeks. Reports she cannot recall if she has ever gotten a colonoscopy but daughter reports she does Cologuard yearly; patient says she has not done Cologuard in 4 years.    Upon admission, she was HDS and labs were consistent with iron deficiency anemia. CT A/P obtained 3/9 showed findings concerning for malignancy involving the pylorus and duodenum with component of gastric outlet obstruction.  NGT was placed 3/9 and patient has had resolution of her abdominal pain, nausea, and vomiting since placement.      Post-Op Info:  Procedure(s) (LRB):  WHIPPLE PROCEDURE (N/A)   1 Day Post-Op     Interval History: POD 1 from Whipple. Endorsing appropriate abdominal pain. Minimal output from NGT since surgery. Denies N/V.    Medications:  Continuous Infusions:   hydromorphone in 0.9 % NaCl 6 mg/30 ml      lactated ringers 50 mL/hr at 03/16/23 0649    TPN ADULT CENTRAL LINE CUSTOM 48 mL/hr at 03/15/23 2254    TPN ADULT CENTRAL LINE CUSTOM       Scheduled Meds:   acetaminophen  1,000 mg Intravenous Q8H    enoxaparin  40 mg Subcutaneous Daily    fat emulsion 20%  250 mL Intravenous Daily    fat emulsion 20%  250 mL Intravenous Daily    levalbuterol  0.63 mg Nebulization Q6H WAKE     "methocarbamol (ROBAXIN) IVPB  500 mg Intravenous Q8H    mupirocin   Nasal BID    pantoprazole  40 mg Intravenous Daily    sodium chloride 0.9%  10 mL Intravenous Q6H     PRN Meds:dextrose 10%, dextrose 10%, dextrose 10%, dextrose 10%, dextrose, dextrose, glucagon (human recombinant), hydrALAZINE, insulin aspart U-100, labetalol, melatonin, naloxone, sodium chloride 0.9%, Flushing PICC Protocol **AND** sodium chloride 0.9% **AND** sodium chloride 0.9%, sodium chloride 0.9%     Review of patient's allergies indicates:   Allergen Reactions    Aspirin Other (See Comments)     Pt states it is "hard on her stomach" She can tolerate a low dose aspirin    Pcn [penicillins]      Childhood      Objective:     Vital Signs (Most Recent):  Temp: 98.9 °F (37.2 °C) (03/16/23 0315)  Pulse: 71 (03/16/23 0724)  Resp: 17 (03/16/23 0724)  BP: (!) 161/72 (03/16/23 0600)  SpO2: 99 % (03/16/23 0724)   Vital Signs (24h Range):  Temp:  [98.8 °F (37.1 °C)-99 °F (37.2 °C)] 98.9 °F (37.2 °C)  Pulse:  [42-88] 71  Resp:  [17-26] 17  SpO2:  [98 %-100 %] 99 %  BP: (144-192)/(45-72) 161/72  Arterial Line BP: (113-185)/(36-51) 171/50     Weight: 73 kg (160 lb 15 oz)  Body mass index is 25.98 kg/m².    Intake/Output - Last 3 Shifts         03/14 0700  03/15 0659 03/15 0700  03/16 0659 03/16 0700 03/17 0659    P.O. 0      I.V. (mL/kg)  1489.4 (20.4)     IV Piggyback  4192.2     TPN  13.2     Total Intake(mL/kg) 0 (0) 5694.8 (78)     Urine (mL/kg/hr) 0 (0) 1775 (1)     Drains 400 189     Blood  400     Total Output 400 2364     Net -400 +3330.8            Urine Occurrence 1 x      Stool Occurrence 0 x              Physical Exam  Constitutional:       General: She is not in acute distress.  Cardiovascular:      Rate and Rhythm: Normal rate and regular rhythm.   Pulmonary:      Effort: Pulmonary effort is normal. No respiratory distress.      Breath sounds: Normal breath sounds.   Abdominal:      General: Abdomen is flat. Bowel sounds are normal. " There is no distension.      Palpations: Abdomen is soft.      Tenderness: There is abdominal tenderness (appropriate TTP, non peritonitic). There is no guarding.      Comments: Incisions c/d/I  RLQ VARGAS drain with ss output  NGT in place       Significant Labs:  I have reviewed all pertinent lab results within the past 24 hours.  CBC:   Recent Labs   Lab 03/16/23  0327   WBC 9.06   RBC 3.76*   HGB 8.3*   HCT 27.2*      MCV 72*   MCH 22.1*   MCHC 30.5*     BMP:   Recent Labs   Lab 03/16/23  0327   *      K 3.9      CO2 24   BUN 27*   CREATININE 0.6   CALCIUM 8.5*   MG 2.0       Significant Diagnostics:  I have reviewed all pertinent imaging results/findings within the past 24 hours.    Assessment/Plan:     * Gastric cancer  Nan Simms is a 68 yo female with PMHx of HTN and hypothyroidism here for gastric outlet obstruction. Now s/p Whipple 3/15     - d/c Sousa  - d/c NGT  - IVF @50/hr  - endocrine consult  - PPI  - drain amylase POD1  - home meds  - TPN/lipids  - IS  - PT/OT    Dispo: possible stepdown this afternoon to [facility]          Gastric outlet obstruction  Nan Simms is a 68 yo female with PMHx of HTN and hypothyroidism here for gastric outlet obstruction. Discussion with patient at length, will proceed to OR on 3/15/22 for whipple procedure versus gastrojejunostomy.     - TPN/lipids  - Appreciate medicine input for preoperative assessment   - Prophylactic subcutaneous dosing of heparin   - OR 3/15 today for whipple            Chase English MD  General Surgery  [facility]   no

## 2023-03-16 NOTE — PROGRESS NOTES
[facility] - Surgical Intensive [facility]  Critical Care - Surgery  Progress Note    Patient Name: Nan Simms  MRN: [id number]  Admission Date: 3/9/2023  Hospital Length of Stay: 7 days  Code Status: Full Code  Attending Provider: Nick Paez MD  Primary Care Provider: Payal Moreland NP   Principal Problem: Gastric cancer    Subjective:     Hospital/ICU Course:  No notes on file    Interval History/Significant Events: Hypertensive overnight, responsive to labetalol and hydralazine in short term.       Follow-up For: Procedure(s) (LRB):  WHIPPLE PROCEDURE (N/A)    Post-Operative Day: 1 Day Post-Op    Objective:     Vital Signs (Most Recent):  Temp: 98.9 °F (37.2 °C) (03/16/23 0315)  Pulse: 71 (03/16/23 0724)  Resp: 17 (03/16/23 0724)  BP: (!) 161/72 (03/16/23 0600)  SpO2: 99 % (03/16/23 0724)   Vital Signs (24h Range):  Temp:  [98.8 °F (37.1 °C)-99 °F (37.2 °C)] 98.9 °F (37.2 °C)  Pulse:  [42-88] 71  Resp:  [17-26] 17  SpO2:  [98 %-100 %] 99 %  BP: (144-192)/(45-72) 161/72  Arterial Line BP: (113-185)/(36-51) 171/50     Weight: 73 kg (160 lb 15 oz)  Body mass index is 25.98 kg/m².      Intake/Output Summary (Last 24 hours) at 3/16/2023 0751  Last data filed at 3/16/2023 0649  Gross per 24 hour   Intake 5694.77 ml   Output 2364 ml   Net 3330.77 ml       Physical Exam  Vitals and nursing note reviewed.   Constitutional:       General: She is not in acute distress.  Cardiovascular:      Rate and Rhythm: Normal rate.      Pulses: Normal pulses.   Pulmonary:      Effort: Pulmonary effort is normal. No respiratory distress.   Abdominal:      General: There is no distension.      Palpations: Abdomen is soft.      Tenderness: There is abdominal tenderness.   Musculoskeletal:      Comments: Incisions CDI with overlying dermabond    VARGAS bulb to suction       Skin:     General: Skin is warm and dry.   Neurological:      Mental Status: She is alert.       Vents:       Lines/Drains/Airways       Peripherally Inserted Central  Catheter Line  Duration             PICC Double Lumen 03/11/23 1016 right basilic 4 days              Drain  Duration                  NG/OG Tube 03/09/23 0827 [place] sump 18 Fr. Left nostril 6 days         Closed/Suction Drain 03/15/23 2126 RLQ Bulb 19 Fr. <1 day              Arterial Line  Duration             Arterial Line 03/15/23 1420 Left Radial <1 day              Peripheral Intravenous Line  Duration                  Peripheral IV - Single Lumen 03/15/23 1458 16 G Right Forearm <1 day         Peripheral IV - Single Lumen 03/15/23 1500 18 G Left Hand <1 day                    Significant Labs:    CBC/Anemia Profile:  Recent Labs   Lab 03/15/23  0539 03/15/23  2037 03/15/23  2139 03/15/23  2238 03/16/23  0327   WBC 6.91  --   --  8.63  8.63 9.06   HGB 11.5*  --   --  7.7*  7.7* 8.3*   HCT 36.1*   < > 26* 25.1*  25.1* 27.2*     --   --  179  179 185   MCV 73*  --   --  73*  73* 72*   RDW 15.7*  --   --  15.5*  15.5* 15.4*    < > = values in this interval not displayed.        Chemistries:  Recent Labs   Lab 03/15/23  0539 03/15/23  2238 03/16/23  0327    142  142 143   K 4.0 4.1  4.1 3.9    110  110 110   CO2 26 24  24 24   BUN 30* 27*  27* 27*   CREATININE 0.6 0.7  0.7 0.6   CALCIUM 10.1 8.0*  8.0* 8.5*   ALBUMIN  --  2.8*  2.8*  --    PROT  --  4.9*  4.9*  --    BILITOT  --  0.3  0.3  --    ALKPHOS  --  43*  43*  --    ALT  --  22  22  --    AST  --  29  29  --    MG 2.3 2.0  2.0 2.0   PHOS 3.4 2.7  2.7 2.7           Significant Imaging:  I have reviewed all pertinent imaging results/findings within the past 24 hours.    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 Sp whipple procedure 3/15/23.       Neuro/Psych:   -- Sedation: n/a  -- Pain: jhony tylenol, robaxin. PCA, prn fentynl             Cards:   --  Hypertensive   Administered hydralazine + labetalol   -- Pmh of hypertension. On home lisinopril/hydrocholorothiazide. Holding.   -- Pmh hyperlipidemia. On statin. Will resume per primary.  -- Pt historically bradycardic on the floor prior to surgery      Pulm:   -- Goal O2 sat > 88%.   -- On RA      Renal:  -- Zafar removed this am   -- BUN/Cr 27/0.6  -- Urine output: 1.7L/24hr      FEN / GI:   -- Net +3.3L  -- Replace lytes as needed  -- Nutrition: NPO; TPN   -- mIVF 125mL/hr LR . If needing more volume, will administer albumin over four hours  -- NG tube to LIWS. May remove if output continues to be bland this afternoon     ID:   -- Tm: afebrile; WBC 9.06  -- Abx n/a      Heme/Onc:   -- H/H 8.3/27.2  -- Daily CBC      Endo:   -- Gluc goal 140-180  -- Pmh thyroid disease, no home meds reported      PPx:   Feeding: NPO  Analgesia/Sedation: adequate  Thromboembolic prevention: lovenox/scds  HOB >30: yes  Stress Ulcer ppx: pantoprazole  Glucose control: Critical care goal 140-180 g/dl, ISS  Bowel reg: n/a  Invasive Lines/Drains/Airway:  2xPIV, NG tube, VARGAS drain  Deescalation: dc a line, dc zafar        Dispo/Code Status/Palliative:   -- SICU / Full Code       Critical care was time spent personally by me on the following activities: development of treatment plan with patient or surrogate and bedside caregivers, discussions with consultants, evaluation of patient's response to treatment, examination of patient, ordering and performing treatments and interventions, ordering and review of laboratory studies, ordering and review of radiographic studies, pulse oximetry, re-evaluation of patient's condition.  This critical care time did not overlap with that of any other provider or involve time for any procedures.     Pretty Banuelos MD  Critical Care - Surgery  [facility]

## 2023-03-16 NOTE — SUBJECTIVE & OBJECTIVE
Interval History/Significant Events: Hypertensive overnight, responsive to labetalol and hydralazine in short term.       Follow-up For: Procedure(s) (LRB):  WHIPPLE PROCEDURE (N/A)    Post-Operative Day: 1 Day Post-Op    Objective:     Vital Signs (Most Recent):  Temp: 98.9 °F (37.2 °C) (03/16/23 0315)  Pulse: 71 (03/16/23 0724)  Resp: 17 (03/16/23 0724)  BP: (!) 161/72 (03/16/23 0600)  SpO2: 99 % (03/16/23 0724)   Vital Signs (24h Range):  Temp:  [98.8 °F (37.1 °C)-99 °F (37.2 °C)] 98.9 °F (37.2 °C)  Pulse:  [42-88] 71  Resp:  [17-26] 17  SpO2:  [98 %-100 %] 99 %  BP: (144-192)/(45-72) 161/72  Arterial Line BP: (113-185)/(36-51) 171/50     Weight: 73 kg (160 lb 15 oz)  Body mass index is 25.98 kg/m².      Intake/Output Summary (Last 24 hours) at 3/16/2023 0751  Last data filed at 3/16/2023 0649  Gross per 24 hour   Intake 5694.77 ml   Output 2364 ml   Net 3330.77 ml       Physical Exam  Vitals and nursing note reviewed.   Constitutional:       General: She is not in acute distress.  Cardiovascular:      Rate and Rhythm: Normal rate.      Pulses: Normal pulses.   Pulmonary:      Effort: Pulmonary effort is normal. No respiratory distress.   Abdominal:      General: There is no distension.      Palpations: Abdomen is soft.      Tenderness: There is abdominal tenderness.   Musculoskeletal:      Comments: Incisions CDI with overlying dermabond    VARGAS bulb to suction       Skin:     General: Skin is warm and dry.   Neurological:      Mental Status: She is alert.       Vents:       Lines/Drains/Airways       Peripherally Inserted Central Catheter Line  Duration             PICC Double Lumen 03/11/23 1016 right basilic 4 days              Drain  Duration                  NG/OG Tube 03/09/23 0827 [place] sump 18 Fr. Left nostril 6 days         Closed/Suction Drain 03/15/23 2126 RLQ Bulb 19 Fr. <1 day              Arterial Line  Duration             Arterial Line 03/15/23 1420 Left Radial <1 day              Peripheral  Intravenous Line  Duration                  Peripheral IV - Single Lumen 03/15/23 1458 16 G Right Forearm <1 day         Peripheral IV - Single Lumen 03/15/23 1500 18 G Left Hand <1 day                    Significant Labs:    CBC/Anemia Profile:  Recent Labs   Lab 03/15/23  0539 03/15/23  2037 03/15/23  2139 03/15/23  2238 03/16/23  0327   WBC 6.91  --   --  8.63  8.63 9.06   HGB 11.5*  --   --  7.7*  7.7* 8.3*   HCT 36.1*   < > 26* 25.1*  25.1* 27.2*     --   --  179  179 185   MCV 73*  --   --  73*  73* 72*   RDW 15.7*  --   --  15.5*  15.5* 15.4*    < > = values in this interval not displayed.        Chemistries:  Recent Labs   Lab 03/15/23  0539 03/15/23  2238 03/16/23  0327    142  142 143   K 4.0 4.1  4.1 3.9    110  110 110   CO2 26 24  24 24   BUN 30* 27*  27* 27*   CREATININE 0.6 0.7  0.7 0.6   CALCIUM 10.1 8.0*  8.0* 8.5*   ALBUMIN  --  2.8*  2.8*  --    PROT  --  4.9*  4.9*  --    BILITOT  --  0.3  0.3  --    ALKPHOS  --  43*  43*  --    ALT  --  22  22  --    AST  --  29  29  --    MG 2.3 2.0  2.0 2.0   PHOS 3.4 2.7  2.7 2.7           Significant Imaging:  I have reviewed all pertinent imaging results/findings within the past 24 hours.

## 2023-03-16 NOTE — CONSULTS
[facility]  Endocrinology  Diabetes Consult Note    Consult Requested by: Nick Paez MD   Reason for admit: Gastric cancer    HISTORY OF PRESENT ILLNESS:  Reason for Consult: Management of Type 2 diabetes (in remission), Hyperglycemia     Surgical Procedure and Date: s/p Whipple on 3/15/2023    Patient has a hx. Of type 2 diabetes but it is currently in remission do to lifestyle modification and does not currently take any oral/injectable antidiabetic/hypoglycemic medications.       HPI: 67 year old female with PMHx of HTN and hypothyroidism recently went to [facility] for a three week history of constipation (self treated with enemas) and abdominal pain. She was transferred to [facility] for higher level of care. She has been tolerating a solid and liquid diet. Reports 13 pound weight loss. Of note, she also notes a loss of appetite and increased fatigue over last several weeks. Upon admission 3/09, she was HDS and labs were consistent with iron deficiency anemia. CT A/P obtained 3/9 showed findings concerning for malignancy involving the pylorus and duodenum with component of gastric outlet obstruction. NGT was placed 3/9 and patient has had resolution of her abdominal pain, nausea, and vomiting since placement. Biopsy showed poorly differentiated adenocarcinoma. She has done well in the hospital since NG tube decompression. 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 Pt is now sp Whipple procedure 3/15/23. Endocrine consulted to manage hyperglycemia. Of note, patient on TPN resulting in BG excursions.     Hemoglobin A1C   Date Value Ref Range Status   03/09/2023 5.6 4.0 - 5.6 % Final     Comment:     ADA Screening Guidelines:  5.7-6.4%  Consistent with prediabetes  >or=6.5%  Consistent with diabetes    High levels of fetal hemoglobin interfere with the HbA1C  assay. Heterozygous hemoglobin variants (HbS, HgC, etc)do  not significantly interfere with this  assay.   However, presence of multiple variants may affect accuracy.     11/11/2022 5.6 4.0 - 5.6 % Final     Comment:     ADA Screening Guidelines:  5.7-6.4%  Consistent with prediabetes  >or=6.5%  Consistent with diabetes    High levels of fetal hemoglobin interfere with the HbA1C  assay. Heterozygous hemoglobin variants (HbS, HgC, etc)do  not significantly interfere with this assay.   However, presence of multiple variants may affect accuracy.     01/10/2022 5.8 (H) 4.0 - 5.6 % Final     Comment:     ADA Screening Guidelines:  5.7-6.4%  Consistent with prediabetes  >or=6.5%  Consistent with diabetes    High levels of fetal hemoglobin interfere with the HbA1C  assay. Heterozygous hemoglobin variants (HbS, HgC, etc)do  not significantly interfere with this assay.   However, presence of multiple variants may affect accuracy.             Interval HPI:   Overnight events: No acute events overnight. Patient in room 77777/11704 A. Blood glucose stable. BG above goal on current insulin regimen (SSI ). Steroid use- Dexamethasone  4 mg perioperatively. 1 Day Post-Op  Renal function- Normal   Vasopressors-  None       Endocrine will continue to follow and manage insulin orders inpatient.         TPN ADULT CENTRAL LINE CUSTOM  Diet NPO  TPN ADULT CENTRAL LINE CUSTOM     Eating:   NPO  Nausea: No  Hypoglycemia and intervention: No  Fever: No  TPN and/or TF: Yes  If yes, type of TF/TPN and rate: TPN @ 48 ml/hr    PMH, PSH, FH, SH updated and reviewed     ROS:  Review of Systems  Constitutional: Negative for weight changes.  Eyes: Negative for visual disturbance.  Respiratory: Negative for cough.   Cardiovascular: Negative for chest pain.  Gastrointestinal: Negative for nausea.  Endocrine: Negative for polyuria, polydipsia.  Musculoskeletal: Negative for back pain.  Skin: Negative for rash.  Neurological: Negative for syncope.  Psychiatric/Behavioral: Negative for depression.    Current Medications and/or Treatments Impacting  Glycemic Control  Immunotherapy:    Immunosuppressants       None          Steroids:   Hormones (From admission, onward)      Start     Stop Route Frequency Ordered    03/09/23 2234  melatonin tablet 6 mg         -- Oral Nightly PRN 03/09/23 2137          Pressors:    Autonomic Drugs (From admission, onward)      None          Hyperglycemia/Diabetes Medications:   Antihyperglycemics (From admission, onward)      Start     Stop Route Frequency Ordered    03/12/23 1432  insulin aspart U-100 pen 0-5 Units         -- SubQ Every 6 hours PRN 03/12/23 1333             PHYSICAL EXAMINATION:  Vitals:    03/16/23 1000   BP: (!) 115/55   Pulse: (!) 50   Resp: 20   Temp:      Body mass index is 25.98 kg/m².    Physical Exam  Constitutional: Well developed, well nourished, NAD.  ENT: External ears no masses with nose patent; normal hearing.  Neck: Supple; trachea midline.  Cardiovascular: Normal heart sounds, no LE edema. DP +2 bilaterally.  Lungs: Normal effort; lungs anterior bilaterally clear to auscultation.  Abdomen: Soft, no masses, no hernias.  MS: No clubbing or cyanosis of nails noted; unable to assess gait.  Skin: No rashes, lesions, or ulcers; no nodules.   Psychiatric: Good judgement and insight; normal mood and affect.  Neurological: Cranial nerves are grossly intact.   Foot: Nails in good condition, no amputations noted.        Labs Reviewed and Include   Recent Labs   Lab 03/15/23  2238 03/16/23  0327   *  240* 214*   CALCIUM 8.0*  8.0* 8.5*   ALBUMIN 2.8*  2.8*  --    PROT 4.9*  4.9*  --      142 143   K 4.1  4.1 3.9   CO2 24  24 24     110 110   BUN 27*  27* 27*   CREATININE 0.7  0.7 0.6   ALKPHOS 43*  43*  --    ALT 22  22  --    AST 29  29  --    BILITOT 0.3  0.3  --      Lab Results   Component Value Date    WBC 9.06 03/16/2023    HGB 8.3 (L) 03/16/2023    HCT 27.2 (L) 03/16/2023    MCV 72 (L) 03/16/2023     03/16/2023     Recent Labs   Lab 03/12/23  1652   TSH 0.621    FREET4 1.24     Lab Results   Component Value Date    HGBA1C 5.6 03/09/2023       Nutritional status:   Body mass index is 25.98 kg/m².  Lab Results   Component Value Date    ALBUMIN 2.8 (L) 03/15/2023    ALBUMIN 2.8 (L) 03/15/2023    ALBUMIN 3.2 (L) 03/13/2023     Lab Results   Component Value Date    PREALBUMIN 19 (L) 03/12/2023       Estimated Creatinine Clearance: 91.8 mL/min (based on SCr of 0.6 mg/dL).    Accu-Checks  Recent Labs     03/13/23  1758 03/14/23  0850 03/14/23  1153 03/14/23  1718 03/14/23  2347 03/15/23  0402 03/15/23  1218   POCTGLUCOSE 120* 130* 127* 126* 134* 142* 136*        ASSESSMENT and PLAN    Cardiac/Vascular  Hyperlipidemia  On statin therapy per ADA guidelines       Essential hypertension    On an ACE-I per ADA guidelines     Oncology  * Gastric cancer  Managed per primary team  Avoid hypoglycemia  S/P whipple on 3/15/2023  Optimize BG control to improve wound healing  On TPN May elevate BG readings; May increase insulin requirements        Endocrine  Diabetes mellitus type 2, diet-controlled  Endocrinology consulted for BG management.   BG goal 140-180    - Novolog (Insulin Aspart) 2 units q4hr with TPN and prn for BG excursions LDC SSI (150/50). (Hold scheduled in[place] if TPN is stopped or if BG < 100 mg/dL)  - BG checks q4hr  - Hypoglycemia protocol in place      ** Please notify Endocrine for any change and/or advance in diet**  ** Please call Endocrine for any BG related issues **    Discharge Planning:   TBD. Please notify endocrinology prior to discharge.            Plan discussed with patient, family, and RN at bedside.        Nick Milton, DNP, FNP  Endocrinology  [facility] - [facility]

## 2023-03-16 NOTE — ASSESSMENT & PLAN NOTE
Nan Simms is a 68 yo female with PMHx of HTN and hypothyroidism here for gastric outlet obstruction. Now s/p [place] 3/15     - d/c Lars  - d/c NGT  - IVF @50/hr  - endocrine consult  - PPI  - drain amylase POD1  - home meds  - TPN/lipids  - IS  - PT/OT    Dispo: possible stepdown this afternoon to [facility]

## 2023-03-16 NOTE — ASSESSMENT & PLAN NOTE
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 Sp whipple procedure 3/15/23.       Neuro/Psych:   -- Sedation: n/a  -- Pain: jhony tylenol, robaxin. PCA, prn fentynl             Cards:   -- Hypertensive   Administered hydralazine + labetalol   -- Pmh of hypertension. On home lisinopril/hydrocholorothiazide. Holding.   -- Pmh hyperlipidemia. On statin. Will resume per primary.  -- Pt historically bradycardic on the floor prior to surgery      Pulm:   -- Goal O2 sat > 88%.   -- On RA      Renal:  -- Zafar removed this am   -- BUN/Cr 27/0.6  -- Urine output: 1.7L/24hr      FEN / GI:   -- Net +3.3L  -- Replace lytes as needed  -- Nutrition: NPO; TPN   -- mIVF 125mL/hr LR . If needing more volume, will administer albumin over four hours  -- NG tube to LIWS. May remove if output continues to be bland this afternoon     ID:   -- Tm: afebrile; WBC 9.06  -- Abx n/a      Heme/Onc:   -- H/H 8.3/27.2  -- Daily CBC      Endo:   -- Gluc goal 140-180  -- Pmh thyroid disease, no home meds reported      PPx:   Feeding: NPO  Analgesia/Sedation: adequate  Thromboembolic prevention: lovenox/scds  HOB >30: yes  Stress Ulcer ppx: pantoprazole  Glucose control: Critical care goal 140-180 g/dl, ISS  Bowel reg: n/a  Invasive Lines/Drains/Airway:  2xPIV, NG tube, VARGAS drain  Deescalation: dc a line, dc zafar        Dispo/Code Status/Palliative:   -- SICU / Full Code

## 2023-03-16 NOTE — SUBJECTIVE & OBJECTIVE
"Interval History: POD 1 from [place]. Endorsing appropriate abdominal pain. Minimal output from NGT since surgery. Denies N/V.    Medications:  Continuous Infusions:   hydromorphone in 0.9 % NaCl 6 mg/30 ml      lactated ringers 50 mL/hr at 03/16/23 0649    TPN ADULT CENTRAL LINE CUSTOM 48 mL/hr at 03/15/23 2254    TPN ADULT CENTRAL LINE CUSTOM       Scheduled Meds:   acetaminophen  1,000 mg Intravenous Q8H    enoxaparin  40 mg Subcutaneous Daily    fat emulsion 20%  250 mL Intravenous Daily    fat emulsion 20%  250 mL Intravenous Daily    levalbuterol  0.63 mg Nebulization Q6H WAKE    methocarbamol (ROBAXIN) IVPB  500 mg Intravenous Q8H    mupirocin   Nasal BID    pantoprazole  40 mg Intravenous Daily    sodium chloride 0.9%  10 mL Intravenous Q6H     PRN Meds:dextrose 10%, dextrose 10%, dextrose 10%, dextrose 10%, dextrose, dextrose, glucagon (human recombinant), hydrALAZINE, insulin aspart U-100, labetalol, melatonin, naloxone, sodium chloride 0.9%, Flushing PICC Protocol **AND** sodium chloride 0.9% **AND** sodium chloride 0.9%, sodium chloride 0.9%     Review of patient's allergies indicates:   Allergen Reactions    Aspirin Other (See Comments)     Pt states it is "hard on her stomach" She can tolerate a low dose aspirin    Pcn [penicillins]      Childhood      Objective:     Vital Signs (Most Recent):  Temp: 98.9 °F (37.2 °C) (03/16/23 0315)  Pulse: 71 (03/16/23 0724)  Resp: 17 (03/16/23 0724)  BP: (!) 161/72 (03/16/23 0600)  SpO2: 99 % (03/16/23 0724)   Vital Signs (24h Range):  Temp:  [98.8 °F (37.1 °C)-99 °F (37.2 °C)] 98.9 °F (37.2 °C)  Pulse:  [42-88] 71  Resp:  [17-26] 17  SpO2:  [98 %-100 %] 99 %  BP: (144-192)/(45-72) 161/72  Arterial Line BP: (113-185)/(36-51) 171/50     Weight: 73 kg (160 lb 15 oz)  Body mass index is 25.98 kg/m².    Intake/Output - Last 3 Shifts         03/14 0700  03/15 0659 03/15 0700 03/16 0659 03/16 0700  03/17 0659    P.O. 0      I.V. (mL/kg)  1489.4 (20.4)     IV Piggyback  " 4192.2     TPN  13.2     Total Intake(mL/kg) 0 (0) 5694.8 (78)     Urine (mL/kg/hr) 0 (0) 1775 (1)     Drains 400 189     Blood  400     Total Output 400 2364     Net -400 +3330.8            Urine Occurrence 1 x      Stool Occurrence 0 x              Physical Exam  Constitutional:       General: She is not in acute distress.  Cardiovascular:      Rate and Rhythm: Normal rate and regular rhythm.   Pulmonary:      Effort: Pulmonary effort is normal. No respiratory distress.      Breath sounds: Normal breath sounds.   Abdominal:      General: Abdomen is flat. Bowel sounds are normal. There is no distension.      Palpations: Abdomen is soft.      Tenderness: There is abdominal tenderness (appropriate TTP, non peritonitic). There is no guarding.      Comments: Incisions c/d/I  RLQ VARGAS drain with ss output  NGT in place       Significant Labs:  I have reviewed all pertinent lab results within the past 24 hours.  CBC:   Recent Labs   Lab 03/16/23  0327   WBC 9.06   RBC 3.76*   HGB 8.3*   HCT 27.2*      MCV 72*   MCH 22.1*   MCHC 30.5*     BMP:   Recent Labs   Lab 03/16/23  0327   *      K 3.9      CO2 24   BUN 27*   CREATININE 0.6   CALCIUM 8.5*   MG 2.0       Significant Diagnostics:  I have reviewed all pertinent imaging results/findings within the past 24 hours.

## 2023-03-16 NOTE — ASSESSMENT & PLAN NOTE
Endocrinology consulted for BG management.   BG goal 140-180    - Novolog (Insulin Aspart) 2 units q4hr with TPN and prn for BG excursions LDC SSI (150/50). (Hold scheduled in[place]lin if TPN is stopped or if BG < 100 mg/dL)  - BG checks q4hr  - Hypoglycemia protocol in place      ** Please notify Endocrine for any change and/or advance in diet**  ** Please call Endocrine for any BG related issues **    Discharge Planning:   TBD. Please notify endocrinology prior to discharge.

## 2023-03-16 NOTE — PT/OT/SLP PROGRESS
Physical Therapy      Patient Name:  Nan Simms   MRN:  [id number]    Patient not seen today secondary to  (RN care in AM and MD in room in PM. PT unable to return for 3rd attempt.). Will follow-up tomorrow as appropriate.

## 2023-03-16 NOTE — HPI
67 year old female with PMHx of HTN and hypothyroidism recently went to [facility] for a three week history of constipation (self treated with enemas) and abdominal pain. She was transferred to [facility] for higher level of care. She has been tolerating a solid and liquid diet. Reports 13 pound weight loss. Of note, she also notes a loss of appetite and increased fatigue over last several weeks.      Upon admission 3/09, she was HDS and labs were consistent with iron deficiency anemia. CT A/P obtained 3/9 showed findings concerning for malignancy involving the pylorus and duodenum with component of gastric outlet obstruction. NGT was placed 3/9 and patient has had resolution of her abdominal pain, nausea, and vomiting since placement. Biopsy showed poorly differentiated adenocarcinoma. She has done well in the hospital since NG tube decompression. 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      Pt is now sp Whipple procedure 3/15/23. Transferred to SICU extubated and not on pressor support. Hypertensive on arrival, administered 2 doses of hydralazine. NG tube to [facility].

## 2023-03-16 NOTE — H&P
[facility]  Critical Care - Surgery  History & Physical    Patient Name: Nan Simms  MRN: [id number]  Admission Date: 3/9/2023  Code Status: Full Code  Attending Physician: Nick Paez MD   Primary Care Provider: Payal Moreland NP   Principal Problem: Gastric cancer    Subjective:     HPI:  67 year old female with PMHx of HTN and hypothyroidism recently went to [facility] for a three week history of constipation (self treated with enemas) and abdominal pain. She was [place] [facility] [place] of [place]. She has been tolerating a solid and liquid diet. Reports 13 pound weight loss. Of note, she also notes a loss of appetite and increased fatigue over last several weeks.      Upon admission 3/09, she was HDS and labs were consistent with iron deficiency anemia. CT A/P obtained 3/9 showed findings concerning for malignancy involving the pylorus and duodenum with component of gastric outlet obstruction. NGT was placed 3/9 and patient has had resolution of her abdominal pain, nausea, and vomiting since placement. Biopsy showed poorly differentiated adenocarcinoma. She has done well in the hospital since NG tube decompression. 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      Pt is now sp Whipple procedure 3/15/23. Transferred to SICU extubated and not on pressor support. Hypertensive on arrival, administered 2 doses of hydralazine. NG tube to [facility].       Hospital/ICU Course:  No notes on file    Follow-up For: Procedure(s) (LRB):  WHIPPLE PROCEDURE (N/A)    Post-Operative Day: Day of Surgery     Past Medical History:   Diagnosis Date    Abnormal Pap smear of cervix     Arthritis     Hypertension     Hyperthyroidism        Past Surgical History:   Procedure Laterality Date    CATARACT EXTRACTION W/  INTRAOCULAR LENS IMPLANT Right 8/8/2019    Procedure: EXTRACTION, CATARACT, WITH IOL INSERTION;  Surgeon: Spenser Lee MD;  Location: [facility] OR;   "Service: Ophthalmology;  Laterality: Right;    EYE SURGERY      HYSTERECTOMY      OOPHORECTOMY      PHACOEMULSIFICATION OF CATARACT Right 8/8/2019    Procedure: PHACOEMULSIFICATION, CATARACT;  Surgeon: Spenser Lee MD;  Location: [facility] OR;  Service: Ophthalmology;  Laterality: Right;       Review of patient's allergies indicates:   Allergen Reactions    Aspirin Other (See Comments)     Pt states it is "hard on her stomach" She can tolerate a low dose aspirin    Pcn [penicillins]      Childhood        Family History       Problem Relation (Age of Onset)    Cancer Mother          Tobacco Use    Smoking status: Every Day     Packs/day: 0.50     Years: 30.00     Pack years: 15.00     Types: Cigarettes    Smokeless tobacco: Current   Substance and Sexual Activity    Alcohol use: Yes     Alcohol/week: 1.0 standard drink     Types: 1 Cans of beer per week    Drug use: No    Sexual activity: Yes     Partners: Male      Review of Systems   Respiratory:  Negative for chest tightness and shortness of breath.    Cardiovascular:  Negative for chest pain.   Gastrointestinal:  Positive for abdominal pain. Negative for abdominal distention.   Objective:     Vital Signs (Most Recent):  Temp: 98.8 °F (37.1 °C) (03/15/23 1224)  Pulse: 71 (03/15/23 2236)  Resp: (!) 25 (03/15/23 2236)  BP: (!) 147/65 (03/15/23 1224)  SpO2: 100 % (03/15/23 2236)   Vital Signs (24h Range):  Temp:  [98.5 °F (36.9 °C)-99.7 °F (37.6 °C)] 98.8 °F (37.1 °C)  Pulse:  [41-71] 71  Resp:  [18-25] 25  SpO2:  [95 %-100 %] 100 %  BP: (140-153)/(63-67) 147/65     Weight: 73 kg (160 lb 15 oz)  Body mass index is 25.98 kg/m².      Intake/Output Summary (Last 24 hours) at 3/15/2023 2250  Last data filed at 3/15/2023 2237  Gross per 24 hour   Intake 4500 ml   Output 965 ml   Net 3535 ml       Physical Exam  Vitals and nursing note reviewed.   Constitutional:       General: She is not in acute distress.  Cardiovascular:      Rate and Rhythm: Normal rate.      " Pulses: Normal pulses.   Pulmonary:      Effort: Pulmonary effort is normal. No respiratory distress.   Abdominal:      General: There is no distension.      Palpations: Abdomen is soft.      Tenderness: There is abdominal tenderness.   Musculoskeletal:      Comments: Incisions CDI with overlying dermabond    VARGAS bulb to suction       Skin:     General: Skin is warm and dry.   Neurological:      Mental Status: She is alert.       Vents:       Lines/Drains/Airways       Peripherally Inserted Central Catheter Line  Duration             PICC Double Lumen 03/11/23 1016 right basilic 4 days              Drain  Duration                  NG/OG Tube 03/09/23 0827 [place] sump 18 Fr. Left nostril 6 days         Closed/Suction Drain 03/15/23 2126 RLQ Bulb 19 Fr. <1 day         Urethral Catheter 03/15/23 1450 Non-latex;Straight-tip;Silicone 16 Fr. <1 day              Arterial Line  Duration             Arterial Line 03/15/23 1420 Left Radial <1 day              Peripheral Intravenous Line  Duration                  Peripheral IV - Single Lumen 03/15/23 1458 16 G Right Forearm <1 day         Peripheral IV - Single Lumen 03/15/23 1500 18 G Left Hand <1 day                    Significant Labs:    CBC/Anemia Profile:  Recent Labs   Lab 03/14/23  0340 03/15/23  0539 03/15/23  2037 03/15/23  2139   WBC 5.69 6.91  --   --    HGB 11.1* 11.5*  --   --    HCT 36.3* 36.1* 27* 26*    226  --   --    MCV 73* 73*  --   --    RDW 15.7* 15.7*  --   --         Chemistries:  Recent Labs   Lab 03/14/23  0340 03/15/23  0539    140   K 3.8 4.0    104   CO2 26 26   BUN 19 30*   CREATININE 0.6 0.6   CALCIUM 10.0 10.1   MG 2.3 2.3   PHOS 3.6 3.4           Significant Imaging: I have reviewed all pertinent imaging results/findings within the past 24 hours.    Assessment/Plan:     GI  Gastric outlet obstruction  Nan Simms is a 68 yo female with PMHx of HTN and hypothyroidism here for gastric outlet obstruction 2/2 duodenal  adenocarcinoma. Sp whipple procedure 3/15/23.       Neuro/Psych:   -- Sedation: n/a  -- Pain: jhony tylenol, robaxin. PCA, prn fentynl             Cards:   -- Hypertensive on arrival to 200s systolics. Administered hydralazine x2, responsive. HDS.  -- Pmh of hypertension. On home lisinopril/hydrocholorothiazide. Holding.   -- Pmh hyperlipidemia. On statin. Will resume per primary.  -- Pt historically bradycardic on the floor prior to surgery      Pulm:   -- Goal O2 sat > 88%. On 4L facemask.  -- Wean as able      Renal:  -- Keep zafar for strict I/O  -- BUN/Cr pending  -- Urine output: 565/24hr      FEN / GI:   -- Net +3.5L  -- Replace lytes as needed  -- Nutrition: NPO  -- Restart TPN   -- mIVF 125mL/hr LR tonight. If needing more volume, will administer albumin over four hours  -- NG tube to LIWS        ID:   -- Tm: afebrile; WBC pending   -- Abx n/a      Heme/Onc:   -- H/H pending  -- Daily CBC      Endo:   -- Gluc goal 140-180  -- Pmh thyroid disease, no home meds reported      PPx:   Feeding: NPO  Analgesia/Sedation: adequate  Thromboembolic prevention: lovenox/scds  HOB >30: yes  Stress Ulcer ppx: pantoprazole  Glucose control: Critical care goal 140-180 g/dl, ISS  Bowel reg: n/a  Invasive Lines/Drains/Airway: Zafar, a line, 2xPIV, NG tube, VARGAS drain  Deescalation: extubated, off pressors        Dispo/Code Status/Palliative:   -- SICU / Full Code      Critical care was time spent personally by me on the following activities: development of treatment plan with patient or surrogate and bedside caregivers, discussions with consultants, evaluation of patient's response to treatment, examination of patient, ordering and performing treatments and interventions, ordering and review of laboratory studies, ordering and review of radiographic studies, pulse oximetry, re-evaluation of patient's condition.  This critical care time did not overlap with that of any other provider or involve time for any procedures.      Pretty Banuelos MD  Critical Care - Surgery  [facility] - [facility] Int[facility]

## 2023-03-16 NOTE — ASSESSMENT & PLAN NOTE
Managed per primary team  Avoid hypoglycemia  S/P [place] on 3/15/2023  Optimize BG control to improve wound healing  On TPN May elevate BG readings; May increase insulin requirements

## 2023-03-16 NOTE — ANESTHESIA POSTPROCEDURE EVALUATION
Anesthesia Post Evaluation    Patient: Nan Simms    Procedure(s) Performed: Procedure(s) (LRB):  WHIPPLE PROCEDURE (N/A)    Final Anesthesia Type: general      Patient location during evaluation: ICU  Patient participation: Yes- Able to Participate  Level of consciousness: awake and alert  Post-procedure vital signs: reviewed and stable  Pain management: adequate  Airway patency: patent    PONV status at discharge: No PONV  Anesthetic complications: no      Cardiovascular status: hemodynamically stable  Respiratory status: spontaneous ventilation  Follow-up not needed.          Vitals Value Taken Time   /65 03/15/23 2249   Temp 98.0 03/15/23 2313   Pulse 80 03/15/23 2312   Resp 20 03/15/23 2312   SpO2 100 % 03/15/23 2312   Vitals shown include unvalidated device data.      No case tracking events are documented in the log.      Pain/Tapan Score: Pain Rating Prior to Med Admin: 7 (3/15/2023  7:13 AM)  Pain Rating Post Med Admin: 1 (3/15/2023  7:13 AM)  Tapan Score: 9 (3/15/2023  7:13 AM)

## 2023-03-16 NOTE — TRANSFER OF CARE
"Anesthesia Transfer of Care Note    Patient: Nan Simms    Procedure(s) Performed: Procedure(s) (LRB):  WHIPPLE PROCEDURE (N/A)    Patient location: ICU    Anesthesia Type: general    Transport from OR: Transported from OR on 6-10 L/min O2 by face mask with adequate spontaneous ventilation. Continuous ECG monitoring in transport. Continuous SpO2 monitoring in transport. Continuos invasive BP monitoring in transport    Post pain: adequate analgesia    Post assessment: no apparent anesthetic complications    Post vital signs: stable    Level of consciousness: awake    Nausea/Vomiting: no nausea/vomiting    Complications: none    Transfer of care protocol was followed      Last vitals:   Visit Vitals  BP (!) 147/65 (BP Location: Left arm, Patient Position: Lying)   Pulse (!) 45   Temp 37.1 °C (98.8 °F) (Oral)   Resp (!) 22   Ht 5' 6" (1.676 m)   Wt 73 kg (160 lb 15 oz)   SpO2 99%   Breastfeeding No   BMI 25.98 kg/m²     "

## 2023-03-16 NOTE — OP NOTE
[facility] - Surgical Intensive Care  Surgery Department  Operative Note       Date of Procedure: 3/15/2023     Procedure: Procedure(s) (LRB):  WHIPPLE PROCEDURE (N/A)     Surgeon(s) and Role:     * Nick Paez MD - Primary     * Vivian Jefferson MD - Resident - Assisting     * Marc Bell MD - Resident - Assisting        Pre-Operative Diagnosis: Gastric out let obstruction [K31.1]  Duodenal adenocarcinoma  Gastric outlet obstruction      Pre-Operative Variables:  Preoperative diagnosis: Duodenal adenocarcinoma  Going into surgery, the lesion was believed to be resectable  Chemotherapy within 90 Days: N  Radiation Therapy within 90 Days: N  Preoperative Gastroenteric Obstruction: Y  Preoperative Gastroenteric Stent: N  Preoperative Jaundice: N   Preoperative Biliary Stent: N    Post-Operative Diagnosis:   Same  Same      Comorbidities:  HTN  Current smoker    Anesthesia: General    Operative Findings:   No evidence of distant intraperitoneal metastases   Resection status:  R0 pending final pathology.  No gross disease remaining post dissection.  Reconstruction - modified Blumgart pancreaticojejunostomy, end to side hepaticojejunostomy, antecolic hand-sewn gastrojejunostomy    Procedures:  Whipple    Estimated Blood Loss (EBL):  400 mL           Indications:  Nan Simms is a 67 year old woman who presented to the ED with 3 weeks of constipation and abdominal pain. She was discovered to have a large proximal duodenal adenocarcinoma extending into the pre-pyloric region with associated gastric outlet obstruction. There was no definite evidence of distant metastatic disease on imaging. Given that she was nearly completely obstructed from the tumor, she would not have tolerated neoadjuvant chemotherapy, and therefore, I recommended upfront surgical resection versus gastrojejunostomy in the event that metastatic disease was discovered intraoperatively. I told her that resection would most likely require a  Whipple. I explained the risks of Whipple including but not limited to: infection, bleeding, injury to adjacent organs, need for further procedures, leak from the pancreaticojejunostomy, hepaticojejunostomy, or gastrojejunostomy, delayed gastric emptying, cardiovascular or pulmonary complications, death, and imponderables. She understands the risks and gave informed consent to proceed with surgery.     The patient was transported to the operating room and satisfactory anesthesia established.  Preoperative antibiotics were administered.  The patient was placed in the supine position and extremities were padded and protected throughout. A zafar catheter was placed.      The operative field was prepped and draped in sterile fashion. A timeout was performed. A supraumbilical midline incision was used to gain access to the peritoneal cavity. The round ligament was divided flush with the anterior abdominal wall between ties. The falciform ligament was divided up and over the liver and the round ligament/falciform tucked away for later use as a pedicled flap. The abdomen was explored. There was no evidence of hepatic, visceral, or intraperitoneal metastases.      The omentum was elevated and  from the transverse colon throughout its length. The lesser sac was entered. There was no evidence of involvement of the lesser sac. There was a firm mass in the first portion of the duodenum that extended into the distal antrum and clearly involved the pancreatic head. It was clear at that point that a Whipple would be required for resection. The SMV was identified at the inferior border of the pancreas. The gastrocolic trunk was obliterated by the mass. The infrapancreatic SMV was exposed, and the retropancreatic tunnel was initiated. The right colon was mobilized medially and the hepatic flexure taken down. A wide Kocher maneuver was performed,  the duodenum from the retroperitoneum and exposing the IVC and the  left renal vein.     The gallbladder was removed in top down fashion and the cystic duct and artery divided.      The gastrohepatic ligament was entered, and the right gastric artery was ligated with ties and divided. The proper and common hepatic arteries were identified and dissected. The common hepatic artery was identified. The hepatic artery lymph nodes were dissected off the anterior and superior surface of the common hepatic artery and sent to pathology. The GDA was identified. The GDA was test clamped, and there was no change in the proper hepatic artery pulse. The GDA was then ligated with a silk tie and prolene suture ligature and divided.The bile duct was encircled. The bile duct was then divided and brisk flow of bile was observed. The bile duct was temporarily sutured closed. The portal vein was identified under the bile duct, and the portal lymph nodes were pulled down with the specimen. There were several firm right portal lymph nodes.     The stomach was then divided with the endo-BELLE stapler and tucked in the left upper quadrant ensuring a margin > 5 cm from the tumor.     The transverse mesocolon was then elevated and the ligament of Treitz identified. The proximal jejunum was divided with a stapler and its mesentery divided with the ligasure until the jejunum could be brought through the defect. Just distal to the ligament of Treitz, there was an area of firmness that was included with the proximal jejunum. It was unclear if this represented lymph nodes or calcifications for some other reason.    The infrapancreatic SMV was dissected from the undersurface of the pancreas to create a tunnel. The portal vein was identified as it exited the superior border of the pancreas and the infrapancreatic tunnel was completed.     The pancreatic neck was divided with electrocautery over the SMV/PV. The superior mesenteric vein and portal vein were not grossly involved with tumor and  easily from the  specimen. The SMA was identified and dissected away from the specimen. The majority of this was done with silk ties on the stay side and the Ligasure on the specimen side. The IPDAs were ligated with silk ties. The first jejunal vein was preserved. At that final step of this dissection, the remaining transverse colon mesentery, which was closely adherent to the tumor, was dissected off the tumor. A small piece of the mesentery was kept on the tumor to ensure appropriate margins.     This freed the specimen, which was oriented and inked on the back table. Frozen sections were not obtained as this was a proximal duodenal cancer, and the bile duct and pancreatic duct appeared grossly normal.      A standard reconstruction was performed with a retrocolic end to side, duct-to-mucosa modified Blumgart pancreaticojejunostomy, end to side hepaticojejunostomy, and an antecolic side to side handsewn gastrojejunostomy.      The proximal jejunum was brought through a mesocolic window to the right of the middle colic vessels. 3-0 prolene transpancreatic mattress stitches on pledgets were placed between the pancreas and jejunum, avoiding the pancreatic duct, forming the posterior layer. The pancreas was slightly firm. The duct was 3 mm. Interrupted 5-0 PDS sutures were used to approximate the pancreatic duct to a small jejunotomy. The previously placed transpancreatic sutures were used to hold the jejunum over the pancreas and tied on pledgets, completing the anterior layer.     The stitch occluding the bile duct was removed, and about 10 cm distal to the pancreaticojejunostomy, an end to side hepaticojejunostomy was performed with interrupted 5-0 PDS suture. There was no visible bile leakage.      An antecolic end to side two layer hand sewn gastrojejunostomy was performed. The staple line was removed, and the anastomosis was sewn in two layers with interrupted silk for the outer layer and running 3-0 PDS for the inner layer.       The falciform ligament was loosely tied over the GDA as a pedicled flap. There were too many omental adhesions to place the omentum over the anastomoses.     A 19 Yoruba round Wilbert drain was brought in via a right upper quadrant stab incision, overlying the hepaticojejunostomy and pancreaticojejunostomy and behind the gastrojejunostomy.      The fascia was closed using a #1 non-looped PDS in deep bite, shallow advance fashion.  The wound was irrigated and skin closed with 3-0 Vicryl suture, Monocryl, and dermabond.       All needle, lap, and sponge counts were reported as correct. I communicated the intraoperative findings with the family following the procedure.        Implants:   Implant Name Type Inv. Item Serial No.  Lot No. LRB No. Used Action   FELT RICHARD [id number] - [id number]  F[employer] [id number]  [employer]  N/A 1 Implanted       Specimens:   Specimen (24h ago, onward)       Start     Ordered    03/15/23 2057  Specimen to Pathology, Surgery General Surgery  Once        Comments: Pre-op Diagnosis: Gastric out let obstruction [K31.1]Procedure(s):WHIPPLE PROCEDUREGASTROJEJUNOSTOMY Number of specimens: 3Name of specimens: 1.) Hepatic Artery Lymph Node, Permanent.2.) Gallbladder, Permanent.3.) Whipple specimen, long stitch pancreatic duct, short stitch common bile duct, green ink smv margin, black ink retroperitoneum     References:    Click here for ordering Quick Tip   Question Answer Comment   Procedure Type: General Surgery    Which provider would you like to cc? KAM PAEZ    Release to patient Immediate        03/15/23 2145                            Condition: Good    Disposition: ICU - extubated and stable.    Attestation: I was present and scrubbed for the entire procedure.    Kam Paez MD  Staff Surgeon  Surgical Oncology

## 2023-03-16 NOTE — ASSESSMENT & PLAN NOTE
Nan Simms is a 66 yo female with PMHx of HTN and hypothyroidism here for gastric outlet obstruction 2/2 duodenal adenocarcinoma. Sp whipple procedure 3/15/23.       Neuro/Psych:   -- Sedation: n/a  -- Pain: jhony tylenol, robaxin. PCA              Cards:   -- Hypertensive on arrival to 200s systolics. Administered hydralazine x2, responsive. HDS.  -- Pmh of hypertension. On home lisinopril/hydrocholorothiazide. Holding.   -- Pmh hyperlipidemia. On statin. Will resume per primary.  -- Pt historically bradycardic on the floor prior to surgery      Pulm:   -- Goal O2 sat > 88%. On 4L facemask.  -- Wean as able      Renal:  -- Keep zafar for strict I/O  -- BUN/Cr pending  -- Urine output: 565/24hr      FEN / GI:   -- Net +3.5L  -- Replace lytes as needed  -- Nutrition: NPO  -- Restart TPN   -- mIVF 125mL/hr LR tonight. If needing more volume, will administer albumin over four hours  -- NG tube to LIWS        ID:   -- Tm: afebrile; WBC pending   -- Abx n/a      Heme/Onc:   -- H/H pending  -- Daily CBC      Endo:   -- Gluc goal 140-180  -- Pmh thyroid disease, no home meds reported      PPx:   Feeding: NPO  Analgesia/Sedation: adequate  Thromboembolic prevention: lovenox/scds  HOB >30: yes  Stress Ulcer ppx: pantoprazole  Glucose control: Critical care goal 140-180 g/dl, ISS  Bowel reg: n/a  Invasive Lines/Drains/Airway: Zafar, a line, 2xPIV, NG tube, VARGAS drain  Deescalation: extubated, off pressors        Dispo/Code Status/Palliative:   -- SICU / Full Code

## 2023-03-16 NOTE — PT/OT/SLP PROGRESS
Occupational Therapy      Patient Name:  Nan Simms   MRN:  [id number]    Patient not seen today secondary to pt just returned to bed upon OT arrival  . Will follow-up 3/17.    3/16/2023

## 2023-03-17 LAB
ANION GAP SERPL CALC-SCNC: 5 MMOL/L (ref 8–16)
BASOPHILS # BLD AUTO: 0.02 K/UL (ref 0–0.2)
BASOPHILS NFR BLD: 0.1 % (ref 0–1.9)
BUN SERPL-MCNC: 25 MG/DL (ref 8–23)
CALCIUM SERPL-MCNC: 9.1 MG/DL (ref 8.7–10.5)
CHLORIDE SERPL-SCNC: 106 MMOL/L (ref 95–110)
CO2 SERPL-SCNC: 30 MMOL/L (ref 23–29)
CREAT SERPL-MCNC: 0.6 MG/DL (ref 0.5–1.4)
DIFFERENTIAL METHOD: ABNORMAL
EOSINOPHIL # BLD AUTO: 0.1 K/UL (ref 0–0.5)
EOSINOPHIL NFR BLD: 0.6 % (ref 0–8)
ERYTHROCYTE [DISTWIDTH] IN BLOOD BY AUTOMATED COUNT: 15.7 % (ref 11.5–14.5)
EST. GFR  (NO RACE VARIABLE): >60 ML/MIN/1.73 M^2
GLUCOSE SERPL-MCNC: 140 MG/DL (ref 70–110)
HCT VFR BLD AUTO: 27.4 % (ref 37–48.5)
HGB BLD-MCNC: 8.1 G/DL (ref 12–16)
IMM GRANULOCYTES # BLD AUTO: 0.06 K/UL (ref 0–0.04)
IMM GRANULOCYTES NFR BLD AUTO: 0.4 % (ref 0–0.5)
LYMPHOCYTES # BLD AUTO: 0.5 K/UL (ref 1–4.8)
LYMPHOCYTES NFR BLD: 3.5 % (ref 18–48)
MAGNESIUM SERPL-MCNC: 2.1 MG/DL (ref 1.6–2.6)
MCH RBC QN AUTO: 22 PG (ref 27–31)
MCHC RBC AUTO-ENTMCNC: 29.6 G/DL (ref 32–36)
MCV RBC AUTO: 75 FL (ref 82–98)
MONOCYTES # BLD AUTO: 1.2 K/UL (ref 0.3–1)
MONOCYTES NFR BLD: 8.5 % (ref 4–15)
NEUTROPHILS # BLD AUTO: 12.2 K/UL (ref 1.8–7.7)
NEUTROPHILS NFR BLD: 86.9 % (ref 38–73)
NRBC BLD-RTO: 0 /100 WBC
PHOSPHATE SERPL-MCNC: 2.3 MG/DL (ref 2.7–4.5)
PLATELET # BLD AUTO: 209 K/UL (ref 150–450)
PMV BLD AUTO: 11.7 FL (ref 9.2–12.9)
POCT GLUCOSE: 141 MG/DL (ref 70–110)
POCT GLUCOSE: 146 MG/DL (ref 70–110)
POCT GLUCOSE: 147 MG/DL (ref 70–110)
POCT GLUCOSE: 168 MG/DL (ref 70–110)
POCT GLUCOSE: 179 MG/DL (ref 70–110)
POCT GLUCOSE: 186 MG/DL (ref 70–110)
POCT GLUCOSE: 191 MG/DL (ref 70–110)
POTASSIUM SERPL-SCNC: 3.7 MMOL/L (ref 3.5–5.1)
RBC # BLD AUTO: 3.68 M/UL (ref 4–5.4)
SODIUM SERPL-SCNC: 141 MMOL/L (ref 136–145)
WBC # BLD AUTO: 13.99 K/UL (ref 3.9–12.7)

## 2023-03-17 PROCEDURE — 27000221 HC OXYGEN, UP TO 24 HOURS

## 2023-03-17 PROCEDURE — 63600175 PHARM REV CODE 636 W HCPCS

## 2023-03-17 PROCEDURE — 25000003 PHARM REV CODE 250: Performed by: NURSE PRACTITIONER

## 2023-03-17 PROCEDURE — 97162 PT EVAL MOD COMPLEX 30 MIN: CPT

## 2023-03-17 PROCEDURE — 99232 PR SUBSEQUENT HOSPITAL CARE,LEVL II: ICD-10-PCS | Mod: ,,, | Performed by: NURSE PRACTITIONER

## 2023-03-17 PROCEDURE — 97530 THERAPEUTIC ACTIVITIES: CPT

## 2023-03-17 PROCEDURE — 85025 COMPLETE CBC W/AUTO DIFF WBC: CPT | Performed by: STUDENT IN AN ORGANIZED HEALTH CARE EDUCATION/TRAINING PROGRAM

## 2023-03-17 PROCEDURE — 63600175 PHARM REV CODE 636 W HCPCS: Performed by: STUDENT IN AN ORGANIZED HEALTH CARE EDUCATION/TRAINING PROGRAM

## 2023-03-17 PROCEDURE — A4217 STERILE WATER/SALINE, 500 ML: HCPCS

## 2023-03-17 PROCEDURE — 94761 N-INVAS EAR/PLS OXIMETRY MLT: CPT

## 2023-03-17 PROCEDURE — 25000003 PHARM REV CODE 250: Performed by: STUDENT IN AN ORGANIZED HEALTH CARE EDUCATION/TRAINING PROGRAM

## 2023-03-17 PROCEDURE — 25000003 PHARM REV CODE 250

## 2023-03-17 PROCEDURE — A4216 STERILE WATER/SALINE, 10 ML: HCPCS | Performed by: STUDENT IN AN ORGANIZED HEALTH CARE EDUCATION/TRAINING PROGRAM

## 2023-03-17 PROCEDURE — B4185 PARENTERAL SOL 10 GM LIPIDS: HCPCS

## 2023-03-17 PROCEDURE — 97535 SELF CARE MNGMENT TRAINING: CPT

## 2023-03-17 PROCEDURE — 94640 AIRWAY INHALATION TREATMENT: CPT

## 2023-03-17 PROCEDURE — 83735 ASSAY OF MAGNESIUM: CPT | Performed by: STUDENT IN AN ORGANIZED HEALTH CARE EDUCATION/TRAINING PROGRAM

## 2023-03-17 PROCEDURE — 20600001 HC STEP DOWN PRIVATE ROOM

## 2023-03-17 PROCEDURE — 97116 GAIT TRAINING THERAPY: CPT

## 2023-03-17 PROCEDURE — 94664 DEMO&/EVAL PT USE INHALER: CPT

## 2023-03-17 PROCEDURE — 80048 BASIC METABOLIC PNL TOTAL CA: CPT | Performed by: STUDENT IN AN ORGANIZED HEALTH CARE EDUCATION/TRAINING PROGRAM

## 2023-03-17 PROCEDURE — 25000242 PHARM REV CODE 250 ALT 637 W/ HCPCS: Performed by: STUDENT IN AN ORGANIZED HEALTH CARE EDUCATION/TRAINING PROGRAM

## 2023-03-17 PROCEDURE — 94799 UNLISTED PULMONARY SVC/PX: CPT

## 2023-03-17 PROCEDURE — 99232 SBSQ HOSP IP/OBS MODERATE 35: CPT | Mod: ,,, | Performed by: NURSE PRACTITIONER

## 2023-03-17 PROCEDURE — C9113 INJ PANTOPRAZOLE SODIUM, VIA: HCPCS | Performed by: STUDENT IN AN ORGANIZED HEALTH CARE EDUCATION/TRAINING PROGRAM

## 2023-03-17 PROCEDURE — 84100 ASSAY OF PHOSPHORUS: CPT | Performed by: STUDENT IN AN ORGANIZED HEALTH CARE EDUCATION/TRAINING PROGRAM

## 2023-03-17 PROCEDURE — 97165 OT EVAL LOW COMPLEX 30 MIN: CPT

## 2023-03-17 PROCEDURE — 99900035 HC TECH TIME PER 15 MIN (STAT)

## 2023-03-17 RX ORDER — BISACODYL 10 MG
10 SUPPOSITORY, RECTAL RECTAL ONCE
Status: COMPLETED | OUTPATIENT
Start: 2023-03-17 | End: 2023-03-17

## 2023-03-17 RX ORDER — TRAMADOL HYDROCHLORIDE 50 MG/1
100 TABLET ORAL EVERY 6 HOURS PRN
Status: DISCONTINUED | OUTPATIENT
Start: 2023-03-17 | End: 2023-03-24 | Stop reason: HOSPADM

## 2023-03-17 RX ORDER — ONDANSETRON 2 MG/ML
4 INJECTION INTRAMUSCULAR; INTRAVENOUS EVERY 8 HOURS PRN
Status: DISCONTINUED | OUTPATIENT
Start: 2023-03-17 | End: 2023-03-24 | Stop reason: HOSPADM

## 2023-03-17 RX ORDER — TRAMADOL HYDROCHLORIDE 50 MG/1
50 TABLET ORAL EVERY 6 HOURS PRN
Status: DISCONTINUED | OUTPATIENT
Start: 2023-03-17 | End: 2023-03-24 | Stop reason: HOSPADM

## 2023-03-17 RX ORDER — BISACODYL 10 MG
10 SUPPOSITORY, RECTAL RECTAL DAILY
Status: DISCONTINUED | OUTPATIENT
Start: 2023-03-17 | End: 2023-03-21

## 2023-03-17 RX ADMIN — Medication 10 ML: at 06:03

## 2023-03-17 RX ADMIN — SOYBEAN OIL 250 ML: 20 INJECTION, SOLUTION INTRAVENOUS at 09:03

## 2023-03-17 RX ADMIN — PANTOPRAZOLE SODIUM 40 MG: 40 INJECTION, POWDER, FOR SOLUTION INTRAVENOUS at 08:03

## 2023-03-17 RX ADMIN — BISACODYL 10 MG: 10 SUPPOSITORY RECTAL at 05:03

## 2023-03-17 RX ADMIN — MUPIROCIN: 20 OINTMENT TOPICAL at 09:03

## 2023-03-17 RX ADMIN — ATORVASTATIN CALCIUM 20 MG: 20 TABLET, FILM COATED ORAL at 08:03

## 2023-03-17 RX ADMIN — POTASSIUM PHOSPHATE, MONOBASIC AND POTASSIUM PHOSPHATE, DIBASIC 30 MMOL: 224; 236 INJECTION, SOLUTION, CONCENTRATE INTRAVENOUS at 09:03

## 2023-03-17 RX ADMIN — ASPIRIN 81 MG: 81 TABLET, COATED ORAL at 08:03

## 2023-03-17 RX ADMIN — LEVALBUTEROL HYDROCHLORIDE 0.63 MG: 0.63 SOLUTION RESPIRATORY (INHALATION) at 07:03

## 2023-03-17 RX ADMIN — METHOCARBAMOL 500 MG: 100 INJECTION, SOLUTION INTRAMUSCULAR; INTRAVENOUS at 09:03

## 2023-03-17 RX ADMIN — INSULIN ASPART 2 UNITS: 100 INJECTION, SOLUTION INTRAVENOUS; SUBCUTANEOUS at 04:03

## 2023-03-17 RX ADMIN — INSULIN ASPART 2 UNITS: 100 INJECTION, SOLUTION INTRAVENOUS; SUBCUTANEOUS at 12:03

## 2023-03-17 RX ADMIN — INSULIN ASPART 2 UNITS: 100 INJECTION, SOLUTION INTRAVENOUS; SUBCUTANEOUS at 11:03

## 2023-03-17 RX ADMIN — ENOXAPARIN SODIUM 40 MG: 40 INJECTION SUBCUTANEOUS at 06:03

## 2023-03-17 RX ADMIN — MUPIROCIN: 20 OINTMENT TOPICAL at 08:03

## 2023-03-17 RX ADMIN — TRAMADOL HYDROCHLORIDE 100 MG: 50 TABLET, COATED ORAL at 08:03

## 2023-03-17 RX ADMIN — MAGNESIUM SULFATE HEPTAHYDRATE: 500 INJECTION, SOLUTION INTRAMUSCULAR; INTRAVENOUS at 09:03

## 2023-03-17 RX ADMIN — LEVALBUTEROL HYDROCHLORIDE 0.63 MG: 0.63 SOLUTION RESPIRATORY (INHALATION) at 02:03

## 2023-03-17 RX ADMIN — Medication 10 ML: at 12:03

## 2023-03-17 RX ADMIN — METHOCARBAMOL 500 MG: 100 INJECTION, SOLUTION INTRAMUSCULAR; INTRAVENOUS at 05:03

## 2023-03-17 RX ADMIN — INSULIN ASPART 2 UNITS: 100 INJECTION, SOLUTION INTRAVENOUS; SUBCUTANEOUS at 08:03

## 2023-03-17 RX ADMIN — TRAMADOL HYDROCHLORIDE 100 MG: 50 TABLET, COATED ORAL at 06:03

## 2023-03-17 RX ADMIN — ONDANSETRON 4 MG: 2 INJECTION INTRAMUSCULAR; INTRAVENOUS at 10:03

## 2023-03-17 RX ADMIN — POTASSIUM PHOSPHATE, MONOBASIC AND POTASSIUM PHOSPHATE, DIBASIC 30 MMOL: 224; 236 INJECTION, SOLUTION, CONCENTRATE INTRAVENOUS at 06:03

## 2023-03-17 RX ADMIN — METHOCARBAMOL 500 MG: 100 INJECTION, SOLUTION INTRAMUSCULAR; INTRAVENOUS at 02:03

## 2023-03-17 NOTE — PT/OT/SLP EVAL
Physical Therapy Evaluation    Patient Name:  Nan Simms   MRN:  [id number]    Recommendations:     Discharge Recommendations: home   Discharge Equipment Recommendations: walker, rolling   Barriers to discharge: None    Assessment:     Nan Simms is a 67 y.o. female admitted with a medical diagnosis of Gastric cancer.  She presents with the following impairments/functional limitations: weakness, impaired endurance, impaired self care skills, impaired functional mobility, gait instability, impaired balance.    Rehab Prognosis: Good; patient would benefit from acute skilled PT services to address these deficits and reach maximum level of function.    Recent Surgery: Procedure(s) (LRB):  WHIPPLE PROCEDURE (N/A) 2 Days Post-Op    Plan:     During this hospitalization, patient to be seen 3 x/week to address the identified rehab impairments via gait training, therapeutic activities, therapeutic exercises, neuromuscular re-education and progress toward the following goals:    Plan of Care Expires:  04/16/23    Subjective     Chief Complaint: none verbalized  Patient/Family Comments/goals: pt reported she recently received pain meds  Pain/Comfort:  Pain Rating 1: 0/10  Pain Rating Post-Intervention 1:  (no c/o pain)    Patients cultural, spiritual, Synagogue conflicts given the current situation: no    Living Environment:  Living Environment: Pt lives with  in a 1SH with no ESTELA and has a tub without shower.  Previous level of function: Independent, driving, retired  Roles and Routines: Wife; likes to spend time together; likes driving and shopping, and watching TV  Equipment Used at Home: none  Assistance upon Discharge: Available as needed    Objective:     Communicated with RN prior to session.  Patient found up in chair with PICC line, telemetry, VARGAS drain  upon PT entry to room.    General Precautions: Standard, fall  Orthopedic Precautions:N/A   Braces: N/A  Respiratory Status: Room air    Exams:  Cognitive  Exam:  Patient is oriented to Person, Place, Time, and Situation  Gross Motor Coordination:  WFL  Postural Exam:  Patient presented with the following abnormalities:    -       Rounded shoulders  -       slight increase in trunk flexion during ambulation likely due to abdominal surgery  Sensation:  denies numbness/tingling, endorses normal LT in BLEs  RLE ROM: WFL  RLE Strength: WNL  LLE ROM: WFL  LLE Strength: WNL    Functional Mobility:  Transfers:     Sit to Stand:  contact guard assistance with rolling walker  Gait: 30 ft, RW, SBA; decreased gait speed and step length  Balance: SBA with RW for all functional mobility this date      AM-PAC 6 CLICK MOBILITY  Total Score:17       Treatment & Education:  Patient educated on role of therapy, goals of session, and benefits of mobilizing.   Discussed PT plan of care during hospitalization.   Patient educated on calling for assistance.   Patient educated on how their diagnosis impacts their mobility within PT scope of practice.   All questions answered within PT scope of practice.    Patient left up in chair with all lines intact, call button in reach, and pt's spouse present.    GOALS:   Multidisciplinary Problems       Physical Therapy Goals          Problem: Physical Therapy    Goal Priority Disciplines Outcome Goal Variances Interventions   Physical Therapy Goal     PT, PT/OT Ongoing, Progressing     Description: Goals to be met by: 3/31/2023     Patient will increase functional independence with mobility by performin. Supine to sit with Foley  2. Sit to supine with Foley  3. Sit to stand transfer with Modified Foley with RW  4. Bed to chair transfer with Modified Foley using Rolling Walker  5. Gait  x 30 feet with Modified Foley using Rolling Walker.   6. Lower extremity exercise program x15 reps per handout, with independence                         History:     Past Medical History:   Diagnosis Date    Abnormal Pap smear  of cervix     Arthritis     Hypertension     Hyperthyroidism        Past Surgical History:   Procedure Laterality Date    CATARACT EXTRACTION W/  INTRAOCULAR LENS IMPLANT Right 8/8/2019    Procedure: EXTRACTION, CATARACT, WITH IOL INSERTION;  Surgeon: Spenser Lee MD;  Location: [facility];  Service: Ophthalmology;  Laterality: Right;    ESOPHAGOGASTRODUODENOSCOPY N/A 3/10/2023    Procedure: EGD (ESOPHAGOGASTRODUODENOSCOPY);  Surgeon: Shashi Tapia MD;  Location: [address]);  Service: Endoscopy;  Laterality: N/A;    EYE SURGERY      HYSTERECTOMY      OOPHORECTOMY      PHACOEMULSIFICATION OF CATARACT Right 8/8/2019    Procedure: PHACOEMULSIFICATION, CATARACT;  Surgeon: Spenser Lee MD;  Location: [facility];  Service: Ophthalmology;  Laterality: Right;    WHIPPLE PROCEDURE N/A 3/15/2023    Procedure: WHIPPLE PROCEDURE;  Surgeon: Nick Paez MD;  Location: [address];  Service: General;  Laterality: N/A;       Time Tracking:     PT Received On: 03/17/23  PT Start Time: 1520     PT Stop Time: 1536  PT Total Time (min): 16 min     Billable Minutes: Evaluation 8 and Gait Training 8      03/17/2023

## 2023-03-17 NOTE — PROGRESS NOTES
[facility] - [facility]  Endocrinology  Progress Note    Admit Date: 3/9/2023     Reason for Consult: Management of Type 2 diabetes (in remission), Hyperglycemia     Surgical Procedure and Date: s/p Whipple on 3/15/2023    Patient has a hx. Of type 2 diabetes but it is currently in remission do to lifestyle modification and does not currently take any oral/injectable antidiabetic/hypoglycemic medications.       HPI: 67 year old female with PMHx of HTN and hypothyroidism recently went to [facility] for a three week history of constipation (self treated with enemas) and abdominal pain. She was [place] [facility] [place] level of care. She has been tolerating a solid and liquid diet. Reports 13 pound weight loss. Of note, she also notes a loss of appetite and increased fatigue over last several weeks. Upon admission 3/09, she was HDS and labs were consistent with iron deficiency anemia. CT A/P obtained 3/9 showed findings concerning for malignancy involving the pylorus and duodenum with component of gastric outlet obstruction. NGT was placed 3/9 and patient has had resolution of her abdominal pain, nausea, and vomiting since placement. Biopsy showed poorly differentiated adenocarcinoma. She has done well in the hospital since NG tube decompression. 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 Pt is now sp Whipple procedure 3/15/23. Endocrine consulted to manage hyperglycemia. Of note, patient on TPN resulting in BG excursions.     Hemoglobin A1C   Date Value Ref Range Status   03/09/2023 5.6 4.0 - 5.6 % Final     Comment:     ADA Screening Guidelines:  5.7-6.4%  Consistent with prediabetes  >or=6.5%  Consistent with diabetes    High levels of fetal hemoglobin interfere with the HbA1C  assay. Heterozygous hemoglobin variants (HbS, HgC, etc)do  not significantly interfere with this assay.   However, presence of multiple variants may affect accuracy.     11/11/2022 5.6 4.0 - 5.6 % Final      "Comment:     ADA Screening Guidelines:  5.7-6.4%  Consistent with prediabetes  >or=6.5%  Consistent with diabetes    High levels of fetal hemoglobin interfere with the HbA1C  assay. Heterozygous hemoglobin variants (HbS, HgC, etc)do  not significantly interfere with this assay.   However, presence of multiple variants may affect accuracy.     01/10/2022 5.8 (H) 4.0 - 5.6 % Final     Comment:     ADA Screening Guidelines:  5.7-6.4%  Consistent with prediabetes  >or=6.5%  Consistent with diabetes    High levels of fetal hemoglobin interfere with the HbA1C  assay. Heterozygous hemoglobin variants (HbS, HgC, etc)do  not significantly interfere with this assay.   However, presence of multiple variants may affect accuracy.             Interval HPI:   Overnight events: No acute events overnight. Patient in room 1003/1003 A. Blood glucose stable. BG at goal on current insulin regimen (Schedule Insulin and SSI (TPN/TF)). Steroid use- None. 2 Days Post-Op  Renal function- Normal   Vasopressors-  None       Endocrine will continue to follow and manage insulin orders inpatient.         TPN ADULT CENTRAL LINE CUSTOM  TPN ADULT CENTRAL LINE CUSTOM  Diet clear liquid Fluid - 800mL     Eating:   <25%  Nausea: No  Hypoglycemia and intervention: No  Fever: No  TPN and/or TF: Yes  If yes, type of TF/TPN and rate: TPN @ 48 ml/hr    BP (!) 158/70 (BP Location: Left arm, Patient Position: Lying)   Pulse 84   Temp 98.6 °F (37 °C) (Oral)   Resp 16   Ht 5' 6" (1.676 m)   Wt 73 kg (160 lb 15 oz)   SpO2 96%   Breastfeeding No   BMI 25.98 kg/m²     Labs Reviewed and Include    Recent Labs   Lab 03/17/23  0422   *   CALCIUM 9.1      K 3.7   CO2 30*      BUN 25*   CREATININE 0.6     Lab Results   Component Value Date    WBC 13.99 (H) 03/17/2023    HGB 8.1 (L) 03/17/2023    HCT 27.4 (L) 03/17/2023    MCV 75 (L) 03/17/2023     03/17/2023     Recent Labs   Lab 03/12/23  1652   TSH 0.621   FREET4 1.24     Lab " Results   Component Value Date    HGBA1C 5.6 03/09/2023       Nutritional status:   Body mass index is 25.98 kg/m².  Lab Results   Component Value Date    ALBUMIN 2.8 (L) 03/15/2023    ALBUMIN 2.8 (L) 03/15/2023    ALBUMIN 3.2 (L) 03/13/2023     Lab Results   Component Value Date    PREALBUMIN 19 (L) 03/12/2023       Estimated Creatinine Clearance: 91.8 mL/min (based on SCr of 0.6 mg/dL).    Accu-Checks  Recent Labs     03/14/23  1153 03/14/23  1718 03/14/23  2347 03/15/23  0402 03/15/23  1218 03/16/23  1208 03/16/23  1549 03/16/23 2001 03/17/23  0051 03/17/23  0421   POCTGLUCOSE 127* 126* 134* 142* 136* 195* 191* 151* 147* 141*       Current Medications and/or Treatments Impacting Glycemic Control  Immunotherapy:    Immunosuppressants       None          Steroids:   Hormones (From admission, onward)      Start     Stop Route Frequency Ordered    03/09/23 2234  melatonin tablet 6 mg         -- Oral Nightly PRN 03/09/23 2137          Pressors:    Autonomic Drugs (From admission, onward)      None          Hyperglycemia/Diabetes Medications:   Antihyperglycemics (From admission, onward)      Start     Stop Route Frequency Ordered    03/16/23 1214  insulin aspart U-100 pen 0-5 Units         -- SubQ Every 4 hours PRN 03/16/23 1114    03/16/23 1200  insulin aspart U-100 pen 2 Units         -- SubQ 6 times per day 03/16/23 1114            ASSESSMENT and PLAN    Cardiac/Vascular  Hyperlipidemia  On statin therapy per ADA guidelines       Essential hypertension    On an ACE-I per ADA guidelines     Oncology  * Gastric cancer  Managed per primary team  Avoid hypoglycemia  S/P whipple on 3/15/2023  Optimize BG control to improve wound healing  On TPN May elevate BG readings; May increase insulin requirements        Endocrine  Diabetes mellitus type 2, diet-controlled  Endocrinology consulted for BG management.   BG goal 140-180    - Novolog (Insulin Aspart) 2 units q4hr with TPN and prn for BG excursions LDC SSI (150/50).  (Hold scheduled inuslin if TPN is stopped or if BG < 100 mg/dL)  - BG checks q4hr  - Hypoglycemia protocol in place      ** Please notify Endocrine for any change and/or advance in diet**  ** Please call Endocrine for any BG related issues **    Discharge Planning:   TBD. Please notify endocrinology prior to discharge.             Nick Milton, DNP, FNP  Endocrinology  [facility]

## 2023-03-17 NOTE — PLAN OF CARE
Problem: Occupational Therapy  Goal: Occupational Therapy Goal  Description: Goals to be met by: 3/31/23     Patient will increase functional independence with ADLs by performing:    UE Dressing with Supervision.  LE Dressing with Supervision and Assistive Devices as needed.  Grooming while standing at sink with Supervision.  Toileting from toilet with Supervision for hygiene and clothing management.   Supine to sit with Supervision.  Step transfer with Supervision  Upper extremity exercise program with supervision, to improve strength and function.    Outcome: Ongoing, Progressing     OT eval completed. The above goals are established to improve functional (I) and mobility.

## 2023-03-17 NOTE — ASSESSMENT & PLAN NOTE
Nan Simms is a 66 yo female with PMHx of HTN and hypothyroidism here for gastric outlet obstruction. Now s/p Whipple 3/15     - sips of clears  - d/c IVF  - PPI  - d/c PCA  - adding PRN tramadol  - daily suppository  - home meds  - TPN/lipids  - IS  - PT/OT    Dispo: continue GISSU

## 2023-03-17 NOTE — PLAN OF CARE
Pt Aox4, VS remained stable throughout shift, pain controlled with PCA meds. Pt voiding via purewick, urine output appropriate. Educated on POC, resting comfortably, bed low and locked, call light within reach, will continue to monitor.     Problem: Adult Inpatient Plan of Care  Goal: Plan of Care Review  Outcome: Ongoing, Progressing  Goal: Patient-Specific Goal (Individualized)  Outcome: Ongoing, Progressing  Goal: Absence of Hospital-Acquired Illness or Injury  Outcome: Ongoing, Progressing  Goal: Optimal Comfort and Wellbeing  Outcome: Ongoing, Progressing  Goal: Readiness for Transition of Care  Outcome: Ongoing, Progressing     Problem: Diabetes Comorbidity  Goal: Blood Glucose Level Within Targeted Range  Outcome: Ongoing, Progressing     Problem: Infection  Goal: Absence of Infection Signs and Symptoms  Outcome: Ongoing, Progressing     Problem: Fall Injury Risk  Goal: Absence of Fall and Fall-Related Injury  Outcome: Ongoing, Progressing     Problem: Skin Injury Risk Increased  Goal: Skin Health and Integrity  Outcome: Ongoing, Progressing

## 2023-03-17 NOTE — SUBJECTIVE & OBJECTIVE
"Interval HPI:   Overnight events: No acute events overnight. Patient in room 1003/1003 A. Blood glucose stable. BG at goal on current insulin regimen (Schedule Insulin and SSI (TPN/TF)). Steroid use- None. 2 Days Post-Op  Renal function- Normal   Vasopressors-  None       Endocrine will continue to follow and manage insulin orders inpatient.         TPN ADULT CENTRAL LINE CUSTOM  TPN ADULT CENTRAL LINE CUSTOM  Diet clear liquid Fluid - 800mL     Eating:   <25%  Nausea: No  Hypoglycemia and intervention: No  Fever: No  TPN and/or TF: Yes  If yes, type of TF/TPN and rate: TPN @ 48 ml/hr    BP (!) 158/70 (BP Location: Left arm, Patient Position: Lying)   Pulse 84   Temp 98.6 °F (37 °C) (Oral)   Resp 16   Ht 5' 6" (1.676 m)   Wt 73 kg (160 lb 15 oz)   SpO2 96%   Breastfeeding No   BMI 25.98 kg/m²     Labs Reviewed and Include    Recent Labs   Lab 03/17/23  0422   *   CALCIUM 9.1      K 3.7   CO2 30*      BUN 25*   CREATININE 0.6     Lab Results   Component Value Date    WBC 13.99 (H) 03/17/2023    HGB 8.1 (L) 03/17/2023    HCT 27.4 (L) 03/17/2023    MCV 75 (L) 03/17/2023     03/17/2023     Recent Labs   Lab 03/12/23  1652   TSH 0.621   FREET4 1.24     Lab Results   Component Value Date    HGBA1C 5.6 03/09/2023       Nutritional status:   Body mass index is 25.98 kg/m².  Lab Results   Component Value Date    ALBUMIN 2.8 (L) 03/15/2023    ALBUMIN 2.8 (L) 03/15/2023    ALBUMIN 3.2 (L) 03/13/2023     Lab Results   Component Value Date    PREALBUMIN 19 (L) 03/12/2023       Estimated Creatinine Clearance: 91.8 mL/min (based on SCr of 0.6 mg/dL).    Accu-Checks  Recent Labs     03/14/23  1153 03/14/23  1718 03/14/23  2347 03/15/23  0402 03/15/23  1218 03/16/23  1208 03/16/23  1549 03/16/23 2001 03/17/23  0051 03/17/23  0421   POCTGLUCOSE 127* 126* 134* 142* 136* 195* 191* 151* 147* 141*       Current Medications and/or Treatments Impacting Glycemic Control  Immunotherapy:  "   Immunosuppressants       None          Steroids:   Hormones (From admission, onward)      Start     Stop Route Frequency Ordered    03/09/23 2234  melatonin tablet 6 mg         -- Oral Nightly PRN 03/09/23 2137          Pressors:    Autonomic Drugs (From admission, onward)      None          Hyperglycemia/Diabetes Medications:   Antihyperglycemics (From admission, onward)      Start     Stop Route Frequency Ordered    03/16/23 1214  insulin aspart U-100 pen 0-5 Units         -- SubQ Every 4 hours PRN 03/16/23 1114    03/16/23 1200  insulin aspart U-100 pen 2 Units         -- SubQ 6 times per day 03/16/23 1114

## 2023-03-17 NOTE — NURSING
Pt arrived to [facility] @ 18:15. Tele on pt running NS. Bed in lowest position, call light within reach. WCTM.

## 2023-03-17 NOTE — PLAN OF CARE
POC reviewed with patient who verbalized understanding. VSS on RA. AAOX4. Remains free of falls and injury. Patient up to chair and ambulate w/walker in hallway x1    - ML w/DB  - RLQ VARGAS drain w/serosanguinous drainage  - R PICC  - TPN   - PCA d/c. Pain controlled with PRN medications per MAR.   - Tolerating clear liquid diet, denies nausea  - Up to BSC, adequate UO, no BM    Telemetry being monitored running NSR. Blood glucose being monitored every 4 hours with scheduled insulin given. Educated on IS use. Patient denies chest pain & SOB. No acute events. No distress noted. Bed in lowest position, call light within reach, frequent rounds made for safety.      Problem: Adult Inpatient Plan of Care  Goal: Plan of Care Review  Outcome: Ongoing, Progressing  Goal: Patient-Specific Goal (Individualized)  Outcome: Ongoing, Progressing  Goal: Absence of Hospital-Acquired Illness or Injury  Outcome: Ongoing, Progressing  Goal: Optimal Comfort and Wellbeing  Outcome: Ongoing, Progressing     Problem: Diabetes Comorbidity  Goal: Blood Glucose Level Within Targeted Range  Outcome: Ongoing, Progressing     Problem: Infection  Goal: Absence of Infection Signs and Symptoms  Outcome: Ongoing, Progressing     Problem: Fall Injury Risk  Goal: Absence of Fall and Fall-Related Injury  Outcome: Ongoing, Progressing     Problem: Skin Injury Risk Increased  Goal: Skin Health and Integrity  Outcome: Ongoing, Progressing

## 2023-03-17 NOTE — PT/OT/SLP EVAL
Occupational Therapy   Evaluation    Name: Nan Simms  MRN: [id number]  Admitting Diagnosis: Gastric cancer  Recent Surgery: Procedure(s) (LRB):  WHIPPLE PROCEDURE (N/A) 2 Days Post-Op    Recommendations:     Discharge Recommendations: home  Discharge Equipment Recommendations:   (TBD)  Barriers to discharge:  None    Assessment:     Nan Simms is a 67 y.o. female with a medical diagnosis of Gastric cancer.  She presents with performance deficits affecting function: weakness, impaired endurance, impaired self care skills, impaired functional mobility, gait instability, pain. Pt participates well and is motivated to regain functional independence in mobility and self care.      Rehab Prognosis: Good; patient would benefit from acute skilled OT services to address these deficits and reach maximum level of function.       Plan:     Patient to be seen 3 x/week to address the above listed problems via self-care/home management, therapeutic activities, therapeutic exercises  Plan of Care Expires: 04/17/23  Plan of Care Reviewed with: patient, spouse    Subjective     Chief Complaint: pain  Patient/Family Comments/goals: Return of PLOF    Occupational Profile:  Living Environment: Pt lives with  in a 1SH with no ESTELA and has a tub without shower.  Previous level of function: Independent, driving, retired  Roles and Routines: Wife; likes to spend time together; likes driving and shopping, and watching TV  Equipment Used at Home: none  Assistance upon Discharge: Available as needed    Pain/Comfort:  Pain Rating 1: 7/10  Location - Orientation 1: generalized  Location 1: abdomen  Pain Addressed 1: Pre-medicate for activity, Distraction, Cessation of Activity  Pain Rating Post-Intervention 1: 7/10    Patients cultural, spiritual, Mormon conflicts given the current situation: no    Objective:     Communicated with: RN prior to session.  Patient found up in chair with PICC line, telemetry, VARGAS drain upon OT entry  to room.    General Precautions: Standard, fall  Orthopedic Precautions: N/A  Braces: N/A  Respiratory Status: Room air    Occupational Performance:    Bed Mobility:    Not observed    Functional Mobility/Transfers:  Patient completed Sit <> Stand Transfer with contact guard assistance  with  rolling walker   Patient completed Toilet Transfer Stand Pivot technique with minimal assistance with rolling walker and grab bars  Functional Mobility: SBA with RW, increased time for slow abby and min cues for posture and use of RW    Activities of Daily Living:  Grooming: stand by assistance washing hands in stance at sink  Upper Body Dressing: minimum assistance gown as robe  Lower Body Dressing: maximal assistance socks  Toileting: minimum assistance for clothing management; pt urinated at toilet, able to gather TP and wipe self.    Cognitive/Visual Perceptual:  Cognitive/Psychosocial Skills:     -       Oriented to: Person, Place, Time, and Situation   -       Follows Commands/attention:Follows multistep  commands  -       Safety awareness/insight to disability: intact     Physical Exam:  Upper Extremity Range of Motion:     -       Right Upper Extremity: WFL  -       Left Upper Extremity: WFL  Upper Extremity Strength:    -       Right Upper Extremity: WFL  -       Left Upper Extremity: WFL   Strength:    -       Right Upper Extremity: WFL  -       Left Upper Extremity: WFL  Fine Motor Coordination:    -       Intact  Gross motor coordination:   WFL    AMPAC 6 Click ADL:  AMPAC Total Score: 19    Treatment & Education:  Pt edu re OT role, POC and safety. Pt edu re importance of OOB activity (mobility and ADLs); pt verbalized and demo understanding as she participated well.  Pt performed transfers with CGA/Min A and RW, ambulation with SBA and RW.  Pt performed UBD with Min A and LBD with Max A.  Pt performed toileting at toilet with Min A for clothing management and SBA washing hands at sink.    Patient left up in  chair with all lines intact, call button in reach, and  present    GOALS:   Multidisciplinary Problems       Occupational Therapy Goals          Problem: Occupational Therapy    Goal Priority Disciplines Outcome Interventions   Occupational Therapy Goal     OT, PT/OT Ongoing, Progressing    Description: Goals to be met by: 3/31/23     Patient will increase functional independence with ADLs by performing:    UE Dressing with Supervision.  LE Dressing with Supervision and Assistive Devices as needed.  Grooming while standing at sink with Supervision.  Toileting from toilet with Supervision for hygiene and clothing management.   Supine to sit with Supervision.  Step transfer with Supervision  Upper extremity exercise program with supervision, to improve strength and function.                         History:     Past Medical History:   Diagnosis Date    Abnormal Pap smear of cervix     Arthritis     Hypertension     Hyperthyroidism          Past Surgical History:   Procedure Laterality Date    CATARACT EXTRACTION W/  INTRAOCULAR LENS IMPLANT Right 8/8/2019    Procedure: EXTRACTION, CATARACT, WITH IOL INSERTION;  Surgeon: Spenser Lee MD;  Location: [facility];  Service: Ophthalmology;  Laterality: Right;    ESOPHAGOGASTRODUODENOSCOPY N/A 3/10/2023    Procedure: EGD (ESOPHAGOGASTRODUODENOSCOPY);  Surgeon: Shashi Tapia MD;  Location: [address]);  Service: Endoscopy;  Laterality: N/A;    EYE SURGERY      HYSTERECTOMY      OOPHORECTOMY      PHACOEMULSIFICATION OF CATARACT Right 8/8/2019    Procedure: PHACOEMULSIFICATION, CATARACT;  Surgeon: Spenser Lee MD;  Location: [facility];  Service: Ophthalmology;  Laterality: Right;    WHIPPLE PROCEDURE N/A 3/15/2023    Procedure: WHIPPLE PROCEDURE;  Surgeon: Nick Paez MD;  Location: [address];  Service: General;  Laterality: N/A;       Time Tracking:     OT Date of Treatment: 03/17/23  OT Start Time: 1100  OT Stop Time: 1133  OT Total  Time (min): 33 min    Billable Minutes:Evaluation 10 minutes  Self Care/Home Management 15 minutes  Therapeutic Activity 8 minutes    MARIFER Green  3/17/2023  Pager: [phone]

## 2023-03-17 NOTE — PROGRESS NOTES
[facility]  General Surgery  Progress Note    Subjective:     History of Present Illness:  Nan Simms is a 67 year old female with PMHx of HTN and hypothyroidism who has had constipation and abdominal pain for 3 weeks. Says she became increasingly bloated and abdominal pain worsened and went to [facility] yesterday and was transferred to [facility] [place] higher level of care. She reports using enemas to relieve constipation but was unable to find relief and came to ED. She has not been nauseous however reports she vomited 3 times two days ago. She has been tolerating a solid and liquid diet. Reports has lost 13 lbs in last 3 weeks. Of note, she also notes a loss of appetite and increased fatigue over last several weeks. Reports she cannot recall if she has ever gotten a colonoscopy but daughter reports she does Cologuard yearly; patient says she has not done Cologuard in 4 years.    Upon admission, she was HDS and labs were consistent with iron deficiency anemia. CT A/P obtained 3/9 showed findings concerning for malignancy involving the pylorus and duodenum with component of gastric outlet obstruction.  NGT was placed 3/9 and patient has had resolution of her abdominal pain, nausea, and vomiting since placement.      Post-Op Info:  Procedure(s) (LRB):  WHIPPLE PROCEDURE (N/A)   2 Days Post-Op     Interval History: Afebrile, HDS. Endorsing appropriate abdominal pain. Denies N/V. RLQ VARGAS drain with 85cc output last 24H.    Medications:  Continuous Infusions:   TPN ADULT CENTRAL LINE CUSTOM 50 mL/hr at 03/16/23 2124    TPN ADULT CENTRAL LINE CUSTOM       Scheduled Meds:   aspirin  81 mg Oral Daily    atorvastatin  20 mg Oral Daily    bisacodyL  10 mg Rectal Daily    enoxaparin  40 mg Subcutaneous Daily    fat emulsion 20%  250 mL Intravenous Daily    insulin aspart U-100  2 Units Subcutaneous 6 times per day    levalbuterol  0.63 mg Nebulization Q6H WAKE    methocarbamol (ROBAXIN) IVPB  500 mg  "Intravenous Q8H    mupirocin   Nasal BID    pantoprazole  40 mg Intravenous Daily    potassium phosphate IVPB  30 mmol Intravenous Once    sodium chloride 0.9%  10 mL Intravenous Q6H     PRN Meds:dextrose 10%, dextrose 10%, dextrose, dextrose, glucagon (human recombinant), hydrALAZINE, insulin aspart U-100, labetalol, melatonin, Flushing PICC Protocol **AND** sodium chloride 0.9% **AND** sodium chloride 0.9%, traMADoL, traMADoL     Review of patient's allergies indicates:   Allergen Reactions    Aspirin Other (See Comments)     Pt states it is "hard on her stomach" She can tolerate a low dose aspirin    Pcn [penicillins]      Childhood      Objective:     Vital Signs (Most Recent):  Temp: 98.6 °F (37 °C) (03/17/23 0723)  Pulse: 84 (03/17/23 0728)  Resp: 16 (03/17/23 0834)  BP: (!) 158/70 (03/17/23 0723)  SpO2: 96 % (03/17/23 0728)   Vital Signs (24h Range):  Temp:  [97.6 °F (36.4 °C)-98.9 °F (37.2 °C)] 98.6 °F (37 °C)  Pulse:  [65-91] 84  Resp:  [11-32] 16  SpO2:  [94 %-99 %] 96 %  BP: (128-179)/(56-74) 158/70     Weight: 73 kg (160 lb 15 oz)  Body mass index is 25.98 kg/m².    Intake/Output - Last 3 Shifts         03/15 0700 03/16 0659 03/16 0700 03/17 0659 03/17 0700 03/18 0659    P.O.       I.V. (mL/kg) 1489.4 (20.4) 415.8 (5.7)     IV Piggyback 4192.2 197.8     TPN 13.2 4320.3     Total Intake(mL/kg) 5694.8 (78) 4933.9 (67.6)     Urine (mL/kg/hr) 1775 (1) 500 (0.3)     Drains 189 115     Stool  0     Blood 400      Total Output 2364 615     Net +3330.8 +4318.9            Urine Occurrence  1 x     Stool Occurrence  0 x 0 x            Physical Exam  Constitutional:       General: She is not in acute distress.  Cardiovascular:      Rate and Rhythm: Normal rate and regular rhythm.   Pulmonary:      Effort: Pulmonary effort is normal. No respiratory distress.      Breath sounds: Normal breath sounds.   Abdominal:      General: Abdomen is flat. Bowel sounds are normal. There is no distension.      Palpations: " Abdomen is soft.      Tenderness: There is abdominal tenderness (appropriate TTP, non peritonitic). There is no guarding.      Comments: Incisions c/d/I  RLQ VARGAS drain with ss output      Significant Labs:  I have reviewed all pertinent lab results within the past 24 hours.  CBC:   Recent Labs   Lab 03/17/23 0422   WBC 13.99*   RBC 3.68*   HGB 8.1*   HCT 27.4*      MCV 75*   MCH 22.0*   MCHC 29.6*     BMP:   Recent Labs   Lab 03/17/23 0422   *      K 3.7      CO2 30*   BUN 25*   CREATININE 0.6   CALCIUM 9.1   MG 2.1       Significant Diagnostics:  I have reviewed all pertinent imaging results/findings within the past 24 hours.    Assessment/Plan:     * Gastric cancer  Nan Simms is a 68 yo female with PMHx of HTN and hypothyroidism here for gastric outlet obstruction. Now s/p Whipple 3/15     - sips of clears  - d/c IVF  - PPI  - d/c PCA  - adding PRN tramadol  - daily suppository  - home meds  - TPN/lipids  - IS  - PT/OT    Dispo: continue GISSU          Gastric outlet obstruction  Nan Simms is a 68 yo female with PMHx of HTN and hypothyroidism here for gastric outlet obstruction. Discussion with patient at length, will proceed to OR on 3/15/22 for whipple procedure versus gastrojejunostomy.     - TPN/lipids  - Appreciate medicine input for preoperative assessment   - Prophylactic subcutaneous dosing of heparin   - OR 3/15 today for whipple            Chase English MD  General Surgery  [facility] - [facility]

## 2023-03-17 NOTE — SUBJECTIVE & OBJECTIVE
"Interval History: Afebrile, HDS. Endorsing appropriate abdominal pain. Denies N/V. RLQ VARGAS drain with 85cc output last 24H.    Medications:  Continuous Infusions:   TPN ADULT CENTRAL LINE CUSTOM 50 mL/hr at 03/16/23 2124    TPN ADULT CENTRAL LINE CUSTOM       Scheduled Meds:   aspirin  81 mg Oral Daily    atorvastatin  20 mg Oral Daily    bisacodyL  10 mg Rectal Daily    enoxaparin  40 mg Subcutaneous Daily    fat emulsion 20%  250 mL Intravenous Daily    insulin aspart U-100  2 Units Subcutaneous 6 times per day    levalbuterol  0.63 mg Nebulization Q6H WAKE    methocarbamol (ROBAXIN) IVPB  500 mg Intravenous Q8H    mupirocin   Nasal BID    pantoprazole  40 mg Intravenous Daily    potassium phosphate IVPB  30 mmol Intravenous Once    sodium chloride 0.9%  10 mL Intravenous Q6H     PRN Meds:dextrose 10%, dextrose 10%, dextrose, dextrose, glucagon (human recombinant), hydrALAZINE, insulin aspart U-100, labetalol, melatonin, Flushing PICC Protocol **AND** sodium chloride 0.9% **AND** sodium chloride 0.9%, traMADoL, traMADoL     Review of patient's allergies indicates:   Allergen Reactions    Aspirin Other (See Comments)     Pt states it is "hard on her stomach" She can tolerate a low dose aspirin    Pcn [penicillins]      Childhood      Objective:     Vital Signs (Most Recent):  Temp: 98.6 °F (37 °C) (03/17/23 0723)  Pulse: 84 (03/17/23 0728)  Resp: 16 (03/17/23 0834)  BP: (!) 158/70 (03/17/23 0723)  SpO2: 96 % (03/17/23 0728)   Vital Signs (24h Range):  Temp:  [97.6 °F (36.4 °C)-98.9 °F (37.2 °C)] 98.6 °F (37 °C)  Pulse:  [65-91] 84  Resp:  [11-32] 16  SpO2:  [94 %-99 %] 96 %  BP: (128-179)/(56-74) 158/70     Weight: 73 kg (160 lb 15 oz)  Body mass index is 25.98 kg/m².    Intake/Output - Last 3 Shifts         03/15 0700 03/16 0659 03/16 0700 03/17 0659 03/17 0700 03/18 0659    P.O.       I.V. (mL/kg) 1489.4 (20.4) 415.8 (5.7)     IV Piggyback 4192.2 197.8     TPN 13.2 4320.3     Total Intake(mL/kg) 5694.8 (78) " 4933.9 (67.6)     Urine (mL/kg/hr) 1775 (1) 500 (0.3)     Drains 189 115     Stool  0     Blood 400      Total Output 2364 615     Net +3330.8 +4318.9            Urine Occurrence  1 x     Stool Occurrence  0 x 0 x            Physical Exam  Constitutional:       General: She is not in acute distress.  Cardiovascular:      Rate and Rhythm: Normal rate and regular rhythm.   Pulmonary:      Effort: Pulmonary effort is normal. No respiratory distress.      Breath sounds: Normal breath sounds.   Abdominal:      General: Abdomen is flat. Bowel sounds are normal. There is no distension.      Palpations: Abdomen is soft.      Tenderness: There is abdominal tenderness (appropriate TTP, non peritonitic). There is no guarding.      Comments: Incisions c/d/I  RLQ VARGAS drain with ss output      Significant Labs:  I have reviewed all pertinent lab results within the past 24 hours.  CBC:   Recent Labs   Lab 03/17/23  0422   WBC 13.99*   RBC 3.68*   HGB 8.1*   HCT 27.4*      MCV 75*   MCH 22.0*   MCHC 29.6*     BMP:   Recent Labs   Lab 03/17/23  0422   *      K 3.7      CO2 30*   BUN 25*   CREATININE 0.6   CALCIUM 9.1   MG 2.1       Significant Diagnostics:  I have reviewed all pertinent imaging results/findings within the past 24 hours.

## 2023-03-18 PROBLEM — K31.1 GASTRIC OUTLET OBSTRUCTION: Status: RESOLVED | Noted: 2023-03-09 | Resolved: 2023-03-18

## 2023-03-18 LAB
AMYLASE, BODY FLUID: 124 U/L
AMYLASE, BODY FLUID: 81 U/L
ANION GAP SERPL CALC-SCNC: 9 MMOL/L (ref 8–16)
BASOPHILS # BLD AUTO: 0.02 K/UL (ref 0–0.2)
BASOPHILS NFR BLD: 0.1 % (ref 0–1.9)
BODY FLUID SOURCE AMYLASE: NORMAL
BODY FLUID SOURCE AMYLASE: NORMAL
BUN SERPL-MCNC: 17 MG/DL (ref 8–23)
CALCIUM SERPL-MCNC: 8.8 MG/DL (ref 8.7–10.5)
CHLORIDE SERPL-SCNC: 105 MMOL/L (ref 95–110)
CO2 SERPL-SCNC: 28 MMOL/L (ref 23–29)
CREAT SERPL-MCNC: 0.6 MG/DL (ref 0.5–1.4)
CRP SERPL-MCNC: 275.43 MG/L (ref 0–3.19)
DIFFERENTIAL METHOD: ABNORMAL
EOSINOPHIL # BLD AUTO: 0.2 K/UL (ref 0–0.5)
EOSINOPHIL NFR BLD: 1 % (ref 0–8)
ERYTHROCYTE [DISTWIDTH] IN BLOOD BY AUTOMATED COUNT: 15.6 % (ref 11.5–14.5)
EST. GFR  (NO RACE VARIABLE): >60 ML/MIN/1.73 M^2
GLUCOSE SERPL-MCNC: 122 MG/DL (ref 70–110)
HCT VFR BLD AUTO: 23 % (ref 37–48.5)
HGB BLD-MCNC: 7 G/DL (ref 12–16)
IMM GRANULOCYTES # BLD AUTO: 0.1 K/UL (ref 0–0.04)
IMM GRANULOCYTES NFR BLD AUTO: 0.7 % (ref 0–0.5)
LYMPHOCYTES # BLD AUTO: 1 K/UL (ref 1–4.8)
LYMPHOCYTES NFR BLD: 6.8 % (ref 18–48)
MAGNESIUM SERPL-MCNC: 2 MG/DL (ref 1.6–2.6)
MCH RBC QN AUTO: 21.9 PG (ref 27–31)
MCHC RBC AUTO-ENTMCNC: 30.4 G/DL (ref 32–36)
MCV RBC AUTO: 72 FL (ref 82–98)
MONOCYTES # BLD AUTO: 1.5 K/UL (ref 0.3–1)
MONOCYTES NFR BLD: 10.2 % (ref 4–15)
NEUTROPHILS # BLD AUTO: 11.8 K/UL (ref 1.8–7.7)
NEUTROPHILS NFR BLD: 81.2 % (ref 38–73)
NRBC BLD-RTO: 0 /100 WBC
PHOSPHATE SERPL-MCNC: 3.1 MG/DL (ref 2.7–4.5)
PLATELET # BLD AUTO: 200 K/UL (ref 150–450)
PMV BLD AUTO: 12.3 FL (ref 9.2–12.9)
POCT GLUCOSE: 132 MG/DL (ref 70–110)
POCT GLUCOSE: 134 MG/DL (ref 70–110)
POCT GLUCOSE: 137 MG/DL (ref 70–110)
POCT GLUCOSE: 147 MG/DL (ref 70–110)
POCT GLUCOSE: 147 MG/DL (ref 70–110)
POCT GLUCOSE: 159 MG/DL (ref 70–110)
POTASSIUM SERPL-SCNC: 3.9 MMOL/L (ref 3.5–5.1)
RBC # BLD AUTO: 3.2 M/UL (ref 4–5.4)
SODIUM SERPL-SCNC: 142 MMOL/L (ref 136–145)
WBC # BLD AUTO: 14.56 K/UL (ref 3.9–12.7)

## 2023-03-18 PROCEDURE — 99900035 HC TECH TIME PER 15 MIN (STAT)

## 2023-03-18 PROCEDURE — A4217 STERILE WATER/SALINE, 500 ML: HCPCS | Performed by: STUDENT IN AN ORGANIZED HEALTH CARE EDUCATION/TRAINING PROGRAM

## 2023-03-18 PROCEDURE — 82150 ASSAY OF AMYLASE: CPT | Performed by: STUDENT IN AN ORGANIZED HEALTH CARE EDUCATION/TRAINING PROGRAM

## 2023-03-18 PROCEDURE — 20600001 HC STEP DOWN PRIVATE ROOM

## 2023-03-18 PROCEDURE — 63600175 PHARM REV CODE 636 W HCPCS: Performed by: STUDENT IN AN ORGANIZED HEALTH CARE EDUCATION/TRAINING PROGRAM

## 2023-03-18 PROCEDURE — 80048 BASIC METABOLIC PNL TOTAL CA: CPT | Performed by: STUDENT IN AN ORGANIZED HEALTH CARE EDUCATION/TRAINING PROGRAM

## 2023-03-18 PROCEDURE — 25000003 PHARM REV CODE 250: Performed by: STUDENT IN AN ORGANIZED HEALTH CARE EDUCATION/TRAINING PROGRAM

## 2023-03-18 PROCEDURE — A4216 STERILE WATER/SALINE, 10 ML: HCPCS | Performed by: STUDENT IN AN ORGANIZED HEALTH CARE EDUCATION/TRAINING PROGRAM

## 2023-03-18 PROCEDURE — 83735 ASSAY OF MAGNESIUM: CPT | Performed by: STUDENT IN AN ORGANIZED HEALTH CARE EDUCATION/TRAINING PROGRAM

## 2023-03-18 PROCEDURE — 25000003 PHARM REV CODE 250

## 2023-03-18 PROCEDURE — 84100 ASSAY OF PHOSPHORUS: CPT | Performed by: STUDENT IN AN ORGANIZED HEALTH CARE EDUCATION/TRAINING PROGRAM

## 2023-03-18 PROCEDURE — 86141 C-REACTIVE PROTEIN HS: CPT | Performed by: STUDENT IN AN ORGANIZED HEALTH CARE EDUCATION/TRAINING PROGRAM

## 2023-03-18 PROCEDURE — 94761 N-INVAS EAR/PLS OXIMETRY MLT: CPT

## 2023-03-18 PROCEDURE — C9113 INJ PANTOPRAZOLE SODIUM, VIA: HCPCS | Performed by: STUDENT IN AN ORGANIZED HEALTH CARE EDUCATION/TRAINING PROGRAM

## 2023-03-18 PROCEDURE — 94664 DEMO&/EVAL PT USE INHALER: CPT

## 2023-03-18 PROCEDURE — 82150 ASSAY OF AMYLASE: CPT | Performed by: NURSE PRACTITIONER

## 2023-03-18 PROCEDURE — 85025 COMPLETE CBC W/AUTO DIFF WBC: CPT | Performed by: STUDENT IN AN ORGANIZED HEALTH CARE EDUCATION/TRAINING PROGRAM

## 2023-03-18 PROCEDURE — 94799 UNLISTED PULMONARY SVC/PX: CPT

## 2023-03-18 PROCEDURE — B4185 PARENTERAL SOL 10 GM LIPIDS: HCPCS | Performed by: STUDENT IN AN ORGANIZED HEALTH CARE EDUCATION/TRAINING PROGRAM

## 2023-03-18 RX ORDER — FUROSEMIDE 10 MG/ML
10 INJECTION INTRAMUSCULAR; INTRAVENOUS ONCE
Status: COMPLETED | OUTPATIENT
Start: 2023-03-18 | End: 2023-03-18

## 2023-03-18 RX ADMIN — METHOCARBAMOL 500 MG: 100 INJECTION, SOLUTION INTRAMUSCULAR; INTRAVENOUS at 02:03

## 2023-03-18 RX ADMIN — SOYBEAN OIL 250 ML: 20 INJECTION, SOLUTION INTRAVENOUS at 10:03

## 2023-03-18 RX ADMIN — METHOCARBAMOL 500 MG: 100 INJECTION, SOLUTION INTRAMUSCULAR; INTRAVENOUS at 06:03

## 2023-03-18 RX ADMIN — Medication 10 ML: at 06:03

## 2023-03-18 RX ADMIN — INSULIN ASPART 2 UNITS: 100 INJECTION, SOLUTION INTRAVENOUS; SUBCUTANEOUS at 08:03

## 2023-03-18 RX ADMIN — TRAMADOL HYDROCHLORIDE 50 MG: 50 TABLET, COATED ORAL at 07:03

## 2023-03-18 RX ADMIN — ASPIRIN 81 MG: 81 TABLET, COATED ORAL at 08:03

## 2023-03-18 RX ADMIN — INSULIN ASPART 2 UNITS: 100 INJECTION, SOLUTION INTRAVENOUS; SUBCUTANEOUS at 07:03

## 2023-03-18 RX ADMIN — PANTOPRAZOLE SODIUM 40 MG: 40 INJECTION, POWDER, FOR SOLUTION INTRAVENOUS at 08:03

## 2023-03-18 RX ADMIN — Medication 10 ML: at 12:03

## 2023-03-18 RX ADMIN — INSULIN ASPART 2 UNITS: 100 INJECTION, SOLUTION INTRAVENOUS; SUBCUTANEOUS at 04:03

## 2023-03-18 RX ADMIN — INSULIN ASPART 2 UNITS: 100 INJECTION, SOLUTION INTRAVENOUS; SUBCUTANEOUS at 12:03

## 2023-03-18 RX ADMIN — MUPIROCIN: 20 OINTMENT TOPICAL at 08:03

## 2023-03-18 RX ADMIN — METHOCARBAMOL 500 MG: 100 INJECTION, SOLUTION INTRAMUSCULAR; INTRAVENOUS at 10:03

## 2023-03-18 RX ADMIN — MUPIROCIN: 20 OINTMENT TOPICAL at 09:03

## 2023-03-18 RX ADMIN — ENOXAPARIN SODIUM 40 MG: 40 INJECTION SUBCUTANEOUS at 04:03

## 2023-03-18 RX ADMIN — ATORVASTATIN CALCIUM 20 MG: 20 TABLET, FILM COATED ORAL at 08:03

## 2023-03-18 RX ADMIN — FUROSEMIDE 10 MG: 10 INJECTION, SOLUTION INTRAMUSCULAR; INTRAVENOUS at 10:03

## 2023-03-18 RX ADMIN — MAGNESIUM SULFATE HEPTAHYDRATE: 500 INJECTION, SOLUTION INTRAMUSCULAR; INTRAVENOUS at 10:03

## 2023-03-18 NOTE — SUBJECTIVE & OBJECTIVE
"Interval History: NAEON, AF, HDS. Endorsing appropriate abdominal pain. Denies N/V. Tolerating CLD. Reports small BM. No complaints at the moment    Medications:  Continuous Infusions:   TPN ADULT CENTRAL LINE CUSTOM 50 mL/hr at 03/17/23 2146     Scheduled Meds:   aspirin  81 mg Oral Daily    atorvastatin  20 mg Oral Daily    bisacodyL  10 mg Rectal Daily    enoxaparin  40 mg Subcutaneous Daily    fat emulsion 20%  250 mL Intravenous Daily    insulin aspart U-100  2 Units Subcutaneous 6 times per day    methocarbamol (ROBAXIN) IVPB  500 mg Intravenous Q8H    mupirocin   Nasal BID    pantoprazole  40 mg Intravenous Daily    sodium chloride 0.9%  10 mL Intravenous Q6H     PRN Meds:dextrose 10%, dextrose 10%, dextrose, dextrose, glucagon (human recombinant), hydrALAZINE, insulin aspart U-100, labetalol, melatonin, ondansetron, Flushing PICC Protocol **AND** sodium chloride 0.9% **AND** sodium chloride 0.9%, traMADoL, traMADoL     Review of patient's allergies indicates:   Allergen Reactions    Aspirin Other (See Comments)     Pt states it is "hard on her stomach" She can tolerate a low dose aspirin    Pcn [penicillins]      Childhood      Objective:     Vital Signs (Most Recent):  Temp: 98.6 °F (37 °C) (03/18/23 0723)  Pulse: 78 (03/18/23 0723)  Resp: 16 (03/18/23 0723)  BP: 137/62 (03/18/23 0723)  SpO2: 98 % (03/18/23 0723)   Vital Signs (24h Range):  Temp:  [97.2 °F (36.2 °C)-98.7 °F (37.1 °C)] 98.6 °F (37 °C)  Pulse:  [] 78  Resp:  [16-18] 16  SpO2:  [94 %-98 %] 98 %  BP: (120-167)/(58-71) 137/62     Weight: 73 kg (160 lb 15 oz)  Body mass index is 25.98 kg/m².    Intake/Output - Last 3 Shifts         03/16 0700  03/17 0659 03/17 0700 03/18 0659 03/18 0700 03/19 0659    P.O.  920     I.V. (mL/kg) 415.8 (5.7) 580 (7.9)     IV Piggyback 197.8 798.5     TPN 4320.3 1455.6     Total Intake(mL/kg) 4933.9 (67.6) 3754 (51.4)     Urine (mL/kg/hr) 500 (0.3) 1200 (0.7)     Emesis/NG output  0     Drains 115 130     " Stool 0 0     Blood       Total Output 615 1330     Net +4318.9 +2424            Urine Occurrence 1 x 6 x     Stool Occurrence 0 x 2 x     Emesis Occurrence  3 x             Physical Exam  Constitutional:       General: She is not in acute distress.  Cardiovascular:      Rate and Rhythm: Normal rate and regular rhythm.   Pulmonary:      Effort: Pulmonary effort is normal. No respiratory distress.      Breath sounds: Normal breath sounds.   Abdominal:      General: Abdomen is flat. Bowel sounds are normal. There is no distension.      Palpations: Abdomen is soft.      Tenderness: There is abdominal tenderness (appropriate TTP, non peritonitic). There is no guarding.      Comments: Incisions c/d/I  RLQ VARGAS drain with ss output      Significant Labs:  I have reviewed all pertinent lab results within the past 24 hours.  CBC:   Recent Labs   Lab 03/18/23  0400   WBC 14.56*   RBC 3.20*   HGB 7.0*   HCT 23.0*      MCV 72*   MCH 21.9*   MCHC 30.4*     BMP:   Recent Labs   Lab 03/18/23  0400   *      K 3.9      CO2 28   BUN 17   CREATININE 0.6   CALCIUM 8.8   MG 2.0       Significant Diagnostics:  I have reviewed all pertinent imaging results/findings within the past 24 hours.Interval History:

## 2023-03-18 NOTE — PROGRESS NOTES
[facility]  General Surgery  Progress Note    Subjective:     History of Present Illness:  Nan Simms is a 67 year old female with PMHx of HTN and hypothyroidism who has had constipation and abdominal pain for 3 weeks. Says she became increasingly bloated and abdominal pain worsened and went to [facility] yesterday and was transferred to [facility] for higher level of care. She reports using enemas to relieve constipation but was unable to find relief and came to ED. She has not been nauseous however reports she vomited 3 times two days ago. She has been tolerating a solid and liquid diet. Reports has lost 13 lbs in last 3 weeks. Of note, she also notes a loss of appetite and increased fatigue over last several weeks. Reports she cannot recall if she has ever gotten a colonoscopy but daughter reports she does Cologuard yearly; patient says she has not done Cologuard in 4 years.    Upon admission, she was HDS and labs were consistent with iron deficiency anemia. CT A/P obtained 3/9 showed findings concerning for malignancy involving the pylorus and duodenum with component of gastric outlet obstruction.  NGT was placed 3/9 and patient has had resolution of her abdominal pain, nausea, and vomiting since placement.      Post-Op Info:  Procedure(s) (LRB):  WHIPPLE PROCEDURE (N/A)   3 Days Post-Op     Interval History: DAKOTA, GAEL, HDS. Endorsing appropriate abdominal pain. Denies N/V. Tolerating CLD. Reports small BM. No complaints at the moment    Medications:  Continuous Infusions:   TPN ADULT CENTRAL LINE CUSTOM 50 mL/hr at 03/17/23 0679     Scheduled Meds:   aspirin  81 mg Oral Daily    atorvastatin  20 mg Oral Daily    bisacodyL  10 mg Rectal Daily    enoxaparin  40 mg Subcutaneous Daily    fat emulsion 20%  250 mL Intravenous Daily    insulin aspart U-100  2 Units Subcutaneous 6 times per day    methocarbamol (ROBAXIN) IVPB  500 mg Intravenous Q8H    mupirocin   Nasal BID    pantoprazole  40 mg Intravenous  "Daily    sodium chloride 0.9%  10 mL Intravenous Q6H     PRN Meds:dextrose 10%, dextrose 10%, dextrose, dextrose, glucagon (human recombinant), hydrALAZINE, insulin aspart U-100, labetalol, melatonin, ondansetron, Flushing PICC Protocol **AND** sodium chloride 0.9% **AND** sodium chloride 0.9%, traMADoL, traMADoL     Review of patient's allergies indicates:   Allergen Reactions    Aspirin Other (See Comments)     Pt states it is "hard on her stomach" She can tolerate a low dose aspirin    Pcn [penicillins]      Childhood      Objective:     Vital Signs (Most Recent):  Temp: 98.6 °F (37 °C) (03/18/23 0723)  Pulse: 78 (03/18/23 0723)  Resp: 16 (03/18/23 0723)  BP: 137/62 (03/18/23 0723)  SpO2: 98 % (03/18/23 0723)   Vital Signs (24h Range):  Temp:  [97.2 °F (36.2 °C)-98.7 °F (37.1 °C)] 98.6 °F (37 °C)  Pulse:  [] 78  Resp:  [16-18] 16  SpO2:  [94 %-98 %] 98 %  BP: (120-167)/(58-71) 137/62     Weight: 73 kg (160 lb 15 oz)  Body mass index is 25.98 kg/m².    Intake/Output - Last 3 Shifts         03/16 0700  03/17 0659 03/17 0700  03/18 0659 03/18 0700  03/19 0659    P.O.  920     I.V. (mL/kg) 415.8 (5.7) 580 (7.9)     IV Piggyback 197.8 798.5     TPN 4320.3 1455.6     Total Intake(mL/kg) 4933.9 (67.6) 3754 (51.4)     Urine (mL/kg/hr) 500 (0.3) 1200 (0.7)     Emesis/NG output  0     Drains 115 130     Stool 0 0     Blood       Total Output 615 1330     Net +4318.9 +2424            Urine Occurrence 1 x 6 x     Stool Occurrence 0 x 2 x     Emesis Occurrence  3 x             Physical Exam  Constitutional:       General: She is not in acute distress.  Cardiovascular:      Rate and Rhythm: Normal rate and regular rhythm.   Pulmonary:      Effort: Pulmonary effort is normal. No respiratory distress.      Breath sounds: Normal breath sounds.   Abdominal:      General: Abdomen is flat. Bowel sounds are normal. There is no distension.      Palpations: Abdomen is soft.      Tenderness: There is abdominal tenderness " (appropriate TTP, non peritonitic). There is no guarding.      Comments: Incisions c/d/I  RLQ VARGAS drain with ss output      Significant Labs:  I have reviewed all pertinent lab results within the past 24 hours.  CBC:   Recent Labs   Lab 03/18/23  0400   WBC 14.56*   RBC 3.20*   HGB 7.0*   HCT 23.0*      MCV 72*   MCH 21.9*   MCHC 30.4*     BMP:   Recent Labs   Lab 03/18/23  0400   *      K 3.9      CO2 28   BUN 17   CREATININE 0.6   CALCIUM 8.8   MG 2.0       Significant Diagnostics:  I have reviewed all pertinent imaging results/findings within the past 24 hours.Interval History:     Assessment/Plan:     * Gastric cancer  Nan Simms is a 68 yo female with PMHx of HTN and hypothyroidism here for gastric outlet obstruction. Now s/p Whipple 3/15     - CLD& Boost  - KUB  - Lasix 10mg IV once  - VARGAS drain amylase  - dc telemetry  - PPI  - PRN tramadol  - daily suppository  - home meds  - TPN/lipids  - IS  - PT/OT    Dispo: continue MARGARITA Green MD  General Surgery  [facility]

## 2023-03-18 NOTE — PLAN OF CARE
POC reviewed with patient who verbalized understanding. VSS on RA. AAOX4. Remains free of falls and injury. Patient up to chair and ambulate w/walker in hallway x1    - ML w/DB  - RLQ VARGAS drain removed by MD bedside  - R PICC  - TPN   - Pain controlled with PRN medications per MAR  - Tolerating clear liquid diet, denies nausea  - Up to BSC, adequate UO    Telemetry being monitored running NSR prior to being discontinued. Blood glucose being monitored every 4 hours with scheduled insulin given. Educated on IS use. Patient denies chest pain & SOB. No acute events. No distress noted. Bed in lowest position, call light within reach, frequent rounds made for safety.      Problem: Adult Inpatient Plan of Care  Goal: Plan of Care Review  Outcome: Ongoing, Progressing  Goal: Patient-Specific Goal (Individualized)  Outcome: Ongoing, Progressing  Goal: Absence of Hospital-Acquired Illness or Injury  Outcome: Ongoing, Progressing  Goal: Optimal Comfort and Wellbeing  Outcome: Ongoing, Progressing     Problem: Diabetes Comorbidity  Goal: Blood Glucose Level Within Targeted Range  Outcome: Ongoing, Progressing     Problem: Infection  Goal: Absence of Infection Signs and Symptoms  Outcome: Ongoing, Progressing     Problem: Fall Injury Risk  Goal: Absence of Fall and Fall-Related Injury  Outcome: Ongoing, Progressing     Problem: Skin Injury Risk Increased  Goal: Skin Health and Integrity  Outcome: Ongoing, Progressing

## 2023-03-18 NOTE — CARE UPDATE
Care Update:     No acute events overnight. Patient on the [facility] in room 1003/1003 A. Blood glucose stable. BG at goal on current insulin regimen (Schedule Insulin and SSI (TPN/TF)). Steroid use- None. 3 Days Post-Op  Renal function- Normal   Vasopressors-  None       TPN ADULT CENTRAL LINE CUSTOM  TPN ADULT CENTRAL LINE CUSTOM  Diet clear liquid     TPN@ 48 ml/hr  POCT Glucose   Date Value Ref Range Status   03/18/2023 147 (H) 70 - 110 mg/dL Final   03/18/2023 132 (H) 70 - 110 mg/dL Final   03/17/2023 168 (H) 70 - 110 mg/dL Final   03/17/2023 146 (H) 70 - 110 mg/dL Final   03/17/2023 186 (H) 70 - 110 mg/dL Final   03/17/2023 179 (H) 70 - 110 mg/dL Final   03/17/2023 141 (H) 70 - 110 mg/dL Final   03/17/2023 147 (H) 70 - 110 mg/dL Final   03/16/2023 151 (H) 70 - 110 mg/dL Final   03/16/2023 191 (H) 70 - 110 mg/dL Final   03/16/2023 195 (H) 70 - 110 mg/dL Final   03/15/2023 136 (H) 70 - 110 mg/dL Final     Lab Results   Component Value Date    HGBA1C 5.6 03/09/2023       Endocrinology consulted for BG management.   BG goal 140-180    Hospital Medications    BG checks q4hr           dextrose 40 % gel 15,000 mg 15,000 mg, Oral, As needed (PRN)    dextrose 40 % gel 30,000 mg 30,000 mg, Oral, As needed (PRN)    glucagon (human recombinant) injection 1 mg 1 mg, Intramuscular, As needed (PRN), Turn patient on their side, give IM, and NOTIFY MD IMMEDIATELY.<BR><BR>Feed the patient as soon as patient awakens and is able to swallow.    insulin aspart U-100 pen 0-5 Units 0-5 Units, Subcutaneous, Every 4 hours PRN, **LOW CORRECTION DOSE**<BR>Blood Glucose<BR>mg/dL                   0400<BR>                              0800<BR>                              1200                     2000 -<BR>                              1600                     0000<BR>151-200                0 unit                      0 unit<BR>201-250                2 units                    1 unit<BR>251-300                3 units                    1  unit<BR>301-350                4 units                    2 units<BR>&gt;350                     5 units                    3 units<BR>Administer subcutaneously if needed at times designated by monitoring schedule. <BR>DO NOT HOLD correction dose insulin for patients who are NPO.<BR>&quot;HIGH ALERT MEDICATION&quot; - Administer with meals or TF/TPN.    insulin aspart U-100 pen 2 Units 2 Units, Subcutaneous, Every 4 hours scheduled, **HOLD scheduled insulin if Enteral or Parenteral nutrition is suspended or if BG less than 100 mg/dL.<BR><BR>**Notify MD if scheduled insulin held due to BG less than 100 mg/dL.<BR>&quot;HIGH ALERT MEDICATION&quot; - Administer with meals or TF/TPN.              ** Please notify Endocrine for any change and/or advance in diet**  ** Please call Endocrine for any BG related issues **    Discharge Planning:   TBD. Please notify endocrinology prior to discharge.      Nick Milton DNP, FNP-C  Department of Endocrinology  Inpatient Glycemic Management

## 2023-03-18 NOTE — PLAN OF CARE
Pt Aox4, VS remained stable throughout shift, pain and nausea controlled with PRN meds. Pt to [facility] ambulatory with assist. NS on telemetry. Educated on POC, resting comfortably, bed low and locked, call light within reach, will continue to monitor.     Problem: Adult Inpatient Plan of Care  Goal: Plan of Care Review  Outcome: Ongoing, Progressing  Goal: Patient-Specific Goal (Individualized)  Outcome: Ongoing, Progressing  Goal: Absence of Hospital-Acquired Illness or Injury  Outcome: Ongoing, Progressing  Goal: Optimal Comfort and Wellbeing  Outcome: Ongoing, Progressing  Goal: Readiness for Transition of Care  Outcome: Ongoing, Progressing     Problem: Diabetes Comorbidity  Goal: Blood Glucose Level Within Targeted Range  Outcome: Ongoing, Progressing     Problem: Infection  Goal: Absence of Infection Signs and Symptoms  Outcome: Ongoing, Progressing     Problem: Fall Injury Risk  Goal: Absence of Fall and Fall-Related Injury  Outcome: Ongoing, Progressing     Problem: Skin Injury Risk Increased  Goal: Skin Health and Integrity  Outcome: Ongoing, Progressing

## 2023-03-18 NOTE — ASSESSMENT & PLAN NOTE
Nan Simms is a 66 yo female with PMHx of HTN and hypothyroidism here for gastric outlet obstruction. Now s/p Whipple 3/15     - FLD& Boost  - VARGAS drain discontinued yesterday  - PPI  - PRN tramadol  - daily suppository  - home meds  - TPN/lipids  - IS  - PT/OT    Dispo: continue [facility]

## 2023-03-19 LAB
ANION GAP SERPL CALC-SCNC: 7 MMOL/L (ref 8–16)
BASOPHILS # BLD AUTO: 0.02 K/UL (ref 0–0.2)
BASOPHILS NFR BLD: 0.2 % (ref 0–1.9)
BLD PROD TYP BPU: NORMAL
BLD PROD TYP BPU: NORMAL
BLOOD UNIT EXPIRATION DATE: NORMAL
BLOOD UNIT EXPIRATION DATE: NORMAL
BLOOD UNIT TYPE CODE: 6200
BLOOD UNIT TYPE CODE: 6200
BLOOD UNIT TYPE: NORMAL
BLOOD UNIT TYPE: NORMAL
BUN SERPL-MCNC: 14 MG/DL (ref 8–23)
CALCIUM SERPL-MCNC: 8.9 MG/DL (ref 8.7–10.5)
CHLORIDE SERPL-SCNC: 104 MMOL/L (ref 95–110)
CO2 SERPL-SCNC: 29 MMOL/L (ref 23–29)
CODING SYSTEM: NORMAL
CODING SYSTEM: NORMAL
CREAT SERPL-MCNC: 0.5 MG/DL (ref 0.5–1.4)
CROSSMATCH INTERPRETATION: NORMAL
CROSSMATCH INTERPRETATION: NORMAL
DIFFERENTIAL METHOD: ABNORMAL
DISPENSE STATUS: NORMAL
DISPENSE STATUS: NORMAL
EOSINOPHIL # BLD AUTO: 0.3 K/UL (ref 0–0.5)
EOSINOPHIL NFR BLD: 3 % (ref 0–8)
ERYTHROCYTE [DISTWIDTH] IN BLOOD BY AUTOMATED COUNT: 15.4 % (ref 11.5–14.5)
EST. GFR  (NO RACE VARIABLE): >60 ML/MIN/1.73 M^2
GLUCOSE SERPL-MCNC: 128 MG/DL (ref 70–110)
HCT VFR BLD AUTO: 22.7 % (ref 37–48.5)
HGB BLD-MCNC: 6.9 G/DL (ref 12–16)
IMM GRANULOCYTES # BLD AUTO: 0.13 K/UL (ref 0–0.04)
IMM GRANULOCYTES NFR BLD AUTO: 1.2 % (ref 0–0.5)
LYMPHOCYTES # BLD AUTO: 1 K/UL (ref 1–4.8)
LYMPHOCYTES NFR BLD: 9.8 % (ref 18–48)
MAGNESIUM SERPL-MCNC: 2 MG/DL (ref 1.6–2.6)
MCH RBC QN AUTO: 21.9 PG (ref 27–31)
MCHC RBC AUTO-ENTMCNC: 30.4 G/DL (ref 32–36)
MCV RBC AUTO: 72 FL (ref 82–98)
MONOCYTES # BLD AUTO: 0.9 K/UL (ref 0.3–1)
MONOCYTES NFR BLD: 9 % (ref 4–15)
NEUTROPHILS # BLD AUTO: 8.1 K/UL (ref 1.8–7.7)
NEUTROPHILS NFR BLD: 76.8 % (ref 38–73)
NRBC BLD-RTO: 0 /100 WBC
PHOSPHATE SERPL-MCNC: 2.8 MG/DL (ref 2.7–4.5)
PLATELET # BLD AUTO: 228 K/UL (ref 150–450)
PMV BLD AUTO: 12 FL (ref 9.2–12.9)
POCT GLUCOSE: 149 MG/DL (ref 70–110)
POCT GLUCOSE: 149 MG/DL (ref 70–110)
POCT GLUCOSE: 151 MG/DL (ref 70–110)
POCT GLUCOSE: 155 MG/DL (ref 70–110)
POCT GLUCOSE: 167 MG/DL (ref 70–110)
POCT GLUCOSE: 168 MG/DL (ref 70–110)
POTASSIUM SERPL-SCNC: 3.6 MMOL/L (ref 3.5–5.1)
RBC # BLD AUTO: 3.15 M/UL (ref 4–5.4)
SODIUM SERPL-SCNC: 140 MMOL/L (ref 136–145)
TRANS ERYTHROCYTES VOL PATIENT: NORMAL ML
TRANS ERYTHROCYTES VOL PATIENT: NORMAL ML
TRIGL SERPL-MCNC: 186 MG/DL (ref 30–150)
WBC # BLD AUTO: 10.49 K/UL (ref 3.9–12.7)

## 2023-03-19 PROCEDURE — 25000003 PHARM REV CODE 250: Performed by: STUDENT IN AN ORGANIZED HEALTH CARE EDUCATION/TRAINING PROGRAM

## 2023-03-19 PROCEDURE — 25000003 PHARM REV CODE 250

## 2023-03-19 PROCEDURE — 63600175 PHARM REV CODE 636 W HCPCS: Performed by: STUDENT IN AN ORGANIZED HEALTH CARE EDUCATION/TRAINING PROGRAM

## 2023-03-19 PROCEDURE — A4217 STERILE WATER/SALINE, 500 ML: HCPCS | Performed by: STUDENT IN AN ORGANIZED HEALTH CARE EDUCATION/TRAINING PROGRAM

## 2023-03-19 PROCEDURE — 84100 ASSAY OF PHOSPHORUS: CPT | Performed by: STUDENT IN AN ORGANIZED HEALTH CARE EDUCATION/TRAINING PROGRAM

## 2023-03-19 PROCEDURE — 27000646 HC AEROBIKA DEVICE

## 2023-03-19 PROCEDURE — B4185 PARENTERAL SOL 10 GM LIPIDS: HCPCS | Performed by: STUDENT IN AN ORGANIZED HEALTH CARE EDUCATION/TRAINING PROGRAM

## 2023-03-19 PROCEDURE — A4216 STERILE WATER/SALINE, 10 ML: HCPCS | Performed by: STUDENT IN AN ORGANIZED HEALTH CARE EDUCATION/TRAINING PROGRAM

## 2023-03-19 PROCEDURE — 94799 UNLISTED PULMONARY SVC/PX: CPT

## 2023-03-19 PROCEDURE — 83735 ASSAY OF MAGNESIUM: CPT | Performed by: STUDENT IN AN ORGANIZED HEALTH CARE EDUCATION/TRAINING PROGRAM

## 2023-03-19 PROCEDURE — 94664 DEMO&/EVAL PT USE INHALER: CPT

## 2023-03-19 PROCEDURE — 85025 COMPLETE CBC W/AUTO DIFF WBC: CPT | Performed by: STUDENT IN AN ORGANIZED HEALTH CARE EDUCATION/TRAINING PROGRAM

## 2023-03-19 PROCEDURE — C9113 INJ PANTOPRAZOLE SODIUM, VIA: HCPCS | Performed by: STUDENT IN AN ORGANIZED HEALTH CARE EDUCATION/TRAINING PROGRAM

## 2023-03-19 PROCEDURE — 99900035 HC TECH TIME PER 15 MIN (STAT)

## 2023-03-19 PROCEDURE — 20600001 HC STEP DOWN PRIVATE ROOM

## 2023-03-19 PROCEDURE — 84478 ASSAY OF TRIGLYCERIDES: CPT | Performed by: STUDENT IN AN ORGANIZED HEALTH CARE EDUCATION/TRAINING PROGRAM

## 2023-03-19 PROCEDURE — 97116 GAIT TRAINING THERAPY: CPT | Mod: CQ

## 2023-03-19 PROCEDURE — 94761 N-INVAS EAR/PLS OXIMETRY MLT: CPT

## 2023-03-19 PROCEDURE — 97530 THERAPEUTIC ACTIVITIES: CPT | Mod: CQ

## 2023-03-19 PROCEDURE — 80048 BASIC METABOLIC PNL TOTAL CA: CPT | Performed by: STUDENT IN AN ORGANIZED HEALTH CARE EDUCATION/TRAINING PROGRAM

## 2023-03-19 RX ORDER — FUROSEMIDE 10 MG/ML
20 INJECTION INTRAMUSCULAR; INTRAVENOUS ONCE
Status: COMPLETED | OUTPATIENT
Start: 2023-03-19 | End: 2023-03-19

## 2023-03-19 RX ADMIN — ENOXAPARIN SODIUM 40 MG: 40 INJECTION SUBCUTANEOUS at 05:03

## 2023-03-19 RX ADMIN — MAGNESIUM SULFATE HEPTAHYDRATE: 500 INJECTION, SOLUTION INTRAMUSCULAR; INTRAVENOUS at 09:03

## 2023-03-19 RX ADMIN — INSULIN ASPART 2 UNITS: 100 INJECTION, SOLUTION INTRAVENOUS; SUBCUTANEOUS at 04:03

## 2023-03-19 RX ADMIN — INSULIN ASPART 2 UNITS: 100 INJECTION, SOLUTION INTRAVENOUS; SUBCUTANEOUS at 11:03

## 2023-03-19 RX ADMIN — ASPIRIN 81 MG: 81 TABLET, COATED ORAL at 08:03

## 2023-03-19 RX ADMIN — BISACODYL 10 MG: 10 SUPPOSITORY RECTAL at 11:03

## 2023-03-19 RX ADMIN — MUPIROCIN: 20 OINTMENT TOPICAL at 09:03

## 2023-03-19 RX ADMIN — Medication 10 ML: at 06:03

## 2023-03-19 RX ADMIN — TRAMADOL HYDROCHLORIDE 50 MG: 50 TABLET, COATED ORAL at 09:03

## 2023-03-19 RX ADMIN — Medication 10 ML: at 12:03

## 2023-03-19 RX ADMIN — INSULIN ASPART 2 UNITS: 100 INJECTION, SOLUTION INTRAVENOUS; SUBCUTANEOUS at 12:03

## 2023-03-19 RX ADMIN — ATORVASTATIN CALCIUM 20 MG: 20 TABLET, FILM COATED ORAL at 08:03

## 2023-03-19 RX ADMIN — INSULIN ASPART 2 UNITS: 100 INJECTION, SOLUTION INTRAVENOUS; SUBCUTANEOUS at 08:03

## 2023-03-19 RX ADMIN — MUPIROCIN: 20 OINTMENT TOPICAL at 08:03

## 2023-03-19 RX ADMIN — FUROSEMIDE 20 MG: 10 INJECTION, SOLUTION INTRAMUSCULAR; INTRAVENOUS at 11:03

## 2023-03-19 RX ADMIN — INSULIN ASPART 2 UNITS: 100 INJECTION, SOLUTION INTRAVENOUS; SUBCUTANEOUS at 07:03

## 2023-03-19 RX ADMIN — SOYBEAN OIL 250 ML: 20 INJECTION, SOLUTION INTRAVENOUS at 09:03

## 2023-03-19 RX ADMIN — PANTOPRAZOLE SODIUM 40 MG: 40 INJECTION, POWDER, FOR SOLUTION INTRAVENOUS at 08:03

## 2023-03-19 NOTE — PT/OT/SLP PROGRESS
"Physical Therapy Treatment    Patient Name:  Nan Simms   MRN:  [id number]    Recommendations:     Discharge Recommendations: home  Discharge Equipment Recommendations: walker, rolling  Barriers to discharge: None    Assessment:     Nan Simms is a 67 y.o. female admitted with a medical diagnosis of Gastric cancer.  She presents with the following impairments/functional limitations: weakness, impaired endurance, impaired self care skills, impaired functional mobility, gait instability, impaired balance. Pt was accompanied by  and agreed to PT. Pt participated in therapeutic exercises and gait training in hallway using RW at a very slow abby. Pt will cont to benefit from skilled acute until d/c to improve mobility, strength, and endurance to return to PLOF.    Rehab Prognosis: Good; patient would benefit from acute skilled PT services to address these deficits and reach maximum level of function.    Recent Surgery: Procedure(s) (LRB):  WHIPPLE PROCEDURE (N/A) 4 Days Post-Op    Plan:     During this hospitalization, patient to be seen 3 x/week to address the identified rehab impairments via gait training, therapeutic activities, therapeutic exercises, neuromuscular re-education and progress toward the following goals:    Plan of Care Expires:  04/16/23    Subjective     Chief Complaint: None  Patient/Family Comments/goals: "Thank you"  Pain/Comfort:  Pain Rating 1: 0/10      Objective:     Communicated with nurse prior to session.  Patient found up in chair with PICC line, telemetry upon PT entry to room.     General Precautions: Standard, fall  Orthopedic Precautions: N/A  Braces: N/A  Respiratory Status: Room air     Functional Mobility:  Transfers:     Sit to Stand:  stand by assistance with rolling walker  Bed to Chair: stand by assistance with  rolling walker  using  Step Transfer  Gait: 34*2 ft using RW in hallway @ CGA, decrease gait speed, decrease step length, and no LOB  Balance: Good standing " balance using RW @ SBA      AM-PAC 6 CLICK MOBILITY  Turning over in bed (including adjusting bedclothes, sheets and blankets)?: 3  Sitting down on and standing up from a chair with arms (e.g., wheelchair, bedside commode, etc.): 3  Moving from lying on back to sitting on the side of the bed?: 3  Moving to and from a bed to a chair (including a wheelchair)?: 3  Need to walk in hospital room?: 3  Climbing 3-5 steps with a railing?: 2  Basic Mobility Total Score: 17       Treatment & Education:  Therapeutic exercise: marching, LAQ's, heel/toe raises x 15 reps in chair AROM  Pt was educated on progress towards PT goals    Patient left up in chair with all lines intact, call button in reach, nurse notified, and  present..    GOALS:   Multidisciplinary Problems       Physical Therapy Goals          Problem: Physical Therapy    Goal Priority Disciplines Outcome Goal Variances Interventions   Physical Therapy Goal     PT, PT/OT Ongoing, Progressing     Description: Goals to be met by: 3/31/2023     Patient will increase functional independence with mobility by performin. Supine to sit with [place]  2. Sit to supine with [place]  3. Sit to stand transfer with Modified [place] with RW  4. Bed to chair transfer with Modified [place] using Rolling Walker  5. Gait  x 30 feet with Modified [place] using Rolling Walker.   6. Lower extremity exercise program x15 reps per handout, with independence                         Time Tracking:     PT Received On: 23  PT Start Time: 854     PT Stop Time: 918  PT Total Time (min): 24 min     Billable Minutes: Gait Training 14 and Therapeutic Activity 10    Treatment Type: Treatment  PT/PTA: PTA     Number of PTA visits since last PT visit: 2023

## 2023-03-19 NOTE — SUBJECTIVE & OBJECTIVE
"Interval History: NAEON, AF, HDS. Pain is well controlled. Denies N/V. Tolerating CLD. Reports small BM. No other complaints at the moment.    Medications:  Continuous Infusions:   TPN ADULT CENTRAL LINE CUSTOM 50 mL/hr at 03/18/23 2212    TPN ADULT CENTRAL LINE CUSTOM       Scheduled Meds:   aspirin  81 mg Oral Daily    atorvastatin  20 mg Oral Daily    bisacodyL  10 mg Rectal Daily    enoxaparin  40 mg Subcutaneous Daily    fat emulsion 20%  250 mL Intravenous Daily    fat emulsion 20%  250 mL Intravenous Daily    insulin aspart U-100  2 Units Subcutaneous 6 times per day    mupirocin   Nasal BID    pantoprazole  40 mg Intravenous Daily    sodium chloride 0.9%  10 mL Intravenous Q6H     PRN Meds:dextrose 10%, dextrose 10%, dextrose, dextrose, glucagon (human recombinant), hydrALAZINE, insulin aspart U-100, labetalol, melatonin, ondansetron, Flushing PICC Protocol **AND** sodium chloride 0.9% **AND** sodium chloride 0.9%, traMADoL, traMADoL     Review of patient's allergies indicates:   Allergen Reactions    Aspirin Other (See Comments)     Pt states it is "hard on her stomach" She can tolerate a low dose aspirin    Pcn [penicillins]      Childhood      Objective:     Vital Signs (Most Recent):  Temp: 99 °F (37.2 °C) (03/19/23 0502)  Pulse: 70 (03/19/23 0502)  Resp: 16 (03/19/23 0502)  BP: (!) 140/65 (03/19/23 0502)  SpO2: 97 % (03/19/23 0502)   Vital Signs (24h Range):  Temp:  [97.9 °F (36.6 °C)-99.6 °F (37.6 °C)] 99 °F (37.2 °C)  Pulse:  [70-78] 70  Resp:  [16-18] 16  SpO2:  [95 %-100 %] 97 %  BP: (123-163)/(56-73) 140/65     Weight: 73 kg (160 lb 15 oz)  Body mass index is 25.98 kg/m².    Intake/Output - Last 3 Shifts         03/17 0700  03/18 0659 03/18 0700  03/19 0659 03/19 0700 03/20 0659    P.O. 920 720     I.V. (mL/kg) 580 (7.9)      IV Piggyback 798.5 795.5     TPN 1455.6 1434.1     Total Intake(mL/kg) 3754 (51.4) 2949.6 (40.4)     Urine (mL/kg/hr) 1200 (0.7) 2200 (1.3)     Emesis/NG output 0      Drains " 130 40     Stool 0 0     Total Output 1330 2240     Net +2424 +709.6            Urine Occurrence 6 x      Stool Occurrence 2 x 0 x     Emesis Occurrence 3 x              Physical Exam  Constitutional:       General: She is not in acute distress.  Cardiovascular:      Rate and Rhythm: Normal rate and regular rhythm.   Pulmonary:      Effort: Pulmonary effort is normal. No respiratory distress.      Breath sounds: Normal breath sounds.   Abdominal:      General: Abdomen is flat. Bowel sounds are normal. There is no distension.      Palpations: Abdomen is soft.      Tenderness: There is abdominal tenderness (appropriate TTP, non peritonitic). There is no guarding.      Comments: Incisions c/d/I  RLQ VARGAS drain with ss output      Significant Labs:  I have reviewed all pertinent lab results within the past 24 hours.  CBC:   Recent Labs   Lab 03/19/23  0412   WBC 10.49   RBC 3.15*   HGB 6.9*   HCT 22.7*      MCV 72*   MCH 21.9*   MCHC 30.4*     BMP:   Recent Labs   Lab 03/19/23  0412   *      K 3.6      CO2 29   BUN 14   CREATININE 0.5   CALCIUM 8.9   MG 2.0       Significant Diagnostics:  I have reviewed all pertinent imaging results/findings within the past 24 hours.

## 2023-03-19 NOTE — PROGRESS NOTES
Lai Clarke - [facility]  General Surgery  Progress Note    Subjective:     History of Present Illness:  Nan Simms is a 67 year old female with PMHx of HTN and hypothyroidism who has had constipation and abdominal pain for 3 weeks. Says she became increasingly bloated and abdominal pain worsened and went to [facility] yesterday and was transferred to [facility] for higher level of care. She reports using enemas to relieve constipation but was unable to find relief and came to ED. She has not been nauseous however reports she vomited 3 times two days ago. She has been tolerating a solid and liquid diet. Reports has lost 13 lbs in last 3 weeks. Of note, she also notes a loss of appetite and increased fatigue over last several weeks. Reports she cannot recall if she has ever gotten a colonoscopy but daughter reports she does Cologuard yearly; patient says she has not done Cologuard in 4 years.    Upon admission, she was HDS and labs were consistent with iron deficiency anemia. CT A/P obtained 3/9 showed findings concerning for malignancy involving the pylorus and duodenum with component of gastric outlet obstruction.  NGT was placed 3/9 and patient has had resolution of her abdominal pain, nausea, and vomiting since placement.      Post-Op Info:  Procedure(s) (LRB):  WHIPPLE PROCEDURE (N/A)   4 Days Post-Op     Interval History: DAK[place], AF, HDS. Pain is well controlled. Denies N/V. Tolerating CLD. Reports small BM. No other complaints at the moment.    Medications:  Continuous Infusions:   TPN ADULT CENTRAL LINE CUSTOM 50 mL/hr at 03/18/23 2212    TPN ADULT CENTRAL LINE CUSTOM       Scheduled Meds:   aspirin  81 mg Oral Daily    atorvastatin  20 mg Oral Daily    bisacodyL  10 mg Rectal Daily    enoxaparin  40 mg Subcutaneous Daily    fat emulsion 20%  250 mL Intravenous Daily    fat emulsion 20%  250 mL Intravenous Daily    insulin aspart U-100  2 Units Subcutaneous 6 times per day    mupirocin   Nasal BID     "pantoprazole  40 mg Intravenous Daily    sodium chloride 0.9%  10 mL Intravenous Q6H     PRN Meds:dextrose 10%, dextrose 10%, dextrose, dextrose, glucagon (human recombinant), hydrALAZINE, insulin aspart U-100, labetalol, melatonin, ondansetron, Flushing PICC Protocol **AND** sodium chloride 0.9% **AND** sodium chloride 0.9%, traMADoL, traMADoL     Review of patient's allergies indicates:   Allergen Reactions    Aspirin Other (See Comments)     Pt states it is "hard on her stomach" She can tolerate a low dose aspirin    Pcn [penicillins]      Childhood      Objective:     Vital Signs (Most Recent):  Temp: 99 °F (37.2 °C) (03/19/23 0502)  Pulse: 70 (03/19/23 0502)  Resp: 16 (03/19/23 0502)  BP: (!) 140/65 (03/19/23 0502)  SpO2: 97 % (03/19/23 0502)   Vital Signs (24h Range):  Temp:  [97.9 °F (36.6 °C)-99.6 °F (37.6 °C)] 99 °F (37.2 °C)  Pulse:  [70-78] 70  Resp:  [16-18] 16  SpO2:  [95 %-100 %] 97 %  BP: (123-163)/(56-73) 140/65     Weight: 73 kg (160 lb 15 oz)  Body mass index is 25.98 kg/m².    Intake/Output - Last 3 Shifts         03/17 0700  03/18 0659 03/18 0700  03/19 0659 03/19 0700  03/20 0659    P.O. 920 720     I.V. (mL/kg) 580 (7.9)      IV Piggyback 798.5 795.5     TPN 1455.6 1434.1     Total Intake(mL/kg) 3754 (51.4) 2949.6 (40.4)     Urine (mL/kg/hr) 1200 (0.7) 2200 (1.3)     Emesis/NG output 0      Drains 130 40     Stool 0 0     Total Output 1330 2240     Net +2424 +709.6            Urine Occurrence 6 x      Stool Occurrence 2 x 0 x     Emesis Occurrence 3 x              Physical Exam  Constitutional:       General: She is not in acute distress.  Cardiovascular:      Rate and Rhythm: Normal rate and regular rhythm.   Pulmonary:      Effort: Pulmonary effort is normal. No respiratory distress.      Breath sounds: Normal breath sounds.   Abdominal:      General: Abdomen is flat. Bowel sounds are normal. There is no distension.      Palpations: Abdomen is soft.      Tenderness: There is abdominal " tenderness (appropriate TTP, non peritonitic). There is no guarding.      Comments: Incisions c/d/I  RLQ VARGAS drain with ss output      Significant Labs:  I have reviewed all pertinent lab results within the past 24 hours.  CBC:   Recent Labs   Lab 03/19/23  0412   WBC 10.49   RBC 3.15*   HGB 6.9*   HCT 22.7*      MCV 72*   MCH 21.9*   MCHC 30.4*     BMP:   Recent Labs   Lab 03/19/23  0412   *      K 3.6      CO2 29   BUN 14   CREATININE 0.5   CALCIUM 8.9   MG 2.0       Significant Diagnostics:  I have reviewed all pertinent imaging results/findings within the past 24 hours.    Assessment/Plan:     * Gastric cancer  Nan Simms is a 66 yo female with PMHx of HTN and hypothyroidism here for gastric outlet obstruction. Now s/p Whipple 3/15     - FLD& Boost  - VARGAS drain discontinued yesterday  - PPI  - PRN tramadol  - daily suppository  - home meds  - TPN/lipids  - IS  - PT/OT    Dispo: continue MARGARITA Green MD  General Surgery  Lai AVILA

## 2023-03-19 NOTE — CARE UPDATE
-Glucose Goal 140-180    -A1C:   Hemoglobin A1C   Date Value Ref Range Status   03/09/2023 5.6 4.0 - 5.6 % Final     Comment:     ADA Screening Guidelines:  5.7-6.4%  Consistent with prediabetes  >or=6.5%  Consistent with diabetes    High levels of fetal hemoglobin interfere with the HbA1C  assay. Heterozygous hemoglobin variants (HbS, HgC, etc)do  not significantly interfere with this assay.   However, presence of multiple variants may affect accuracy.              -GLUCOSE TREND FOR THE PAST 24HRS:   Recent Labs   Lab 03/18/23  0820 03/18/23  1123 03/18/23  1618 03/18/23  1952 03/18/23  2341 03/19/23  0411   POCTGLUCOSE 147* 147* 159* 137* 134* 168*         -NO HYPOGYCEMIAS NOTED     - Diet  TPN ADULT CENTRAL LINE CUSTOM  Diet clear liquid    T2DM well controlled not on meds. S/p whipple. On TPN - 50 mL/ hr. Diet switched to full liquid today.        Plan:   -- Continue Novolog (Insulin Aspart) 2 units q4hr with TPN (Hold scheduled inuslin if TPN is stopped or if BG < 100 mg/dL)  -Continue Novolog LDC SSI (150/50).   - BG checks q4hr  - Hypoglycemia protocol in place        ** Please notify Endocrine for any change and/or advance in diet**  ** Please call Endocrine for any BG related issues **     Discharge Planning:   TBD. Please notify endocrinology prior to discharge.

## 2023-03-19 NOTE — PLAN OF CARE
Pt Aox4, VS remained stable throughout shift, pain controlled with PRN meds. Pt to [facility] ambulatory with assist. Educated on POC, resting comfortably, bed low and locked, call light within reach, will continue to monitor.     Problem: Adult Inpatient Plan of Care  Goal: Plan of Care Review  Outcome: Ongoing, Progressing  Goal: Patient-Specific Goal (Individualized)  Outcome: Ongoing, Progressing  Goal: Absence of Hospital-Acquired Illness or Injury  Outcome: Ongoing, Progressing  Goal: Optimal Comfort and Wellbeing  Outcome: Ongoing, Progressing  Goal: Readiness for Transition of Care  Outcome: Ongoing, Progressing     Problem: Diabetes Comorbidity  Goal: Blood Glucose Level Within Targeted Range  Outcome: Ongoing, Progressing     Problem: Infection  Goal: Absence of Infection Signs and Symptoms  Outcome: Ongoing, Progressing     Problem: Fall Injury Risk  Goal: Absence of Fall and Fall-Related Injury  Outcome: Ongoing, Progressing     Problem: Skin Injury Risk Increased  Goal: Skin Health and Integrity  Outcome: Ongoing, Progressing

## 2023-03-19 NOTE — PLAN OF CARE
POC reviewed with patient who verbalized understanding. VSS on RA. AAOX4. Remains free of falls and injury. Patient up to chair and ambulate w/walker in hallway x2     - ML w/DB  - R PICC  - TPN   - Pain controlled with scheduled medications per MAR  - Tolerating full liquid diet, denies nausea  - Up to BSC, adequate UO, no BM    Blood glucose being monitored every 4 hours with scheduled insulin given. Educated on IS use. Patient denies chest pain & SOB. No acute events. No distress noted. Bed in lowest position, call light within reach, frequent rounds made for safety.      Problem: Adult Inpatient Plan of Care  Goal: Plan of Care Review  Outcome: Ongoing, Progressing  Goal: Patient-Specific Goal (Individualized)  Outcome: Ongoing, Progressing  Goal: Absence of Hospital-Acquired Illness or Injury  Outcome: Ongoing, Progressing  Goal: Optimal Comfort and Wellbeing  Outcome: Ongoing, Progressing     Problem: Diabetes Comorbidity  Goal: Blood Glucose Level Within Targeted Range  Outcome: Ongoing, Progressing     Problem: Infection  Goal: Absence of Infection Signs and Symptoms  Outcome: Ongoing, Progressing     Problem: Fall Injury Risk  Goal: Absence of Fall and Fall-Related Injury  Outcome: Ongoing, Progressing     Problem: Skin Injury Risk Increased  Goal: Skin Health and Integrity  Outcome: Ongoing, Progressing

## 2023-03-20 LAB
ABO + RH BLD: NORMAL
ANION GAP SERPL CALC-SCNC: 8 MMOL/L (ref 8–16)
BASOPHILS # BLD AUTO: 0.02 K/UL (ref 0–0.2)
BASOPHILS NFR BLD: 0.3 % (ref 0–1.9)
BLD GP AB SCN CELLS X3 SERPL QL: NORMAL
BLD PROD TYP BPU: NORMAL
BLOOD UNIT EXPIRATION DATE: NORMAL
BLOOD UNIT TYPE CODE: 6200
BLOOD UNIT TYPE: NORMAL
BUN SERPL-MCNC: 15 MG/DL (ref 8–23)
CALCIUM SERPL-MCNC: 9 MG/DL (ref 8.7–10.5)
CHLORIDE SERPL-SCNC: 105 MMOL/L (ref 95–110)
CO2 SERPL-SCNC: 27 MMOL/L (ref 23–29)
CODING SYSTEM: NORMAL
CREAT SERPL-MCNC: 0.5 MG/DL (ref 0.5–1.4)
CROSSMATCH INTERPRETATION: NORMAL
DIFFERENTIAL METHOD: ABNORMAL
DISPENSE STATUS: NORMAL
EOSINOPHIL # BLD AUTO: 0.3 K/UL (ref 0–0.5)
EOSINOPHIL NFR BLD: 3.4 % (ref 0–8)
ERYTHROCYTE [DISTWIDTH] IN BLOOD BY AUTOMATED COUNT: 15.2 % (ref 11.5–14.5)
EST. GFR  (NO RACE VARIABLE): >60 ML/MIN/1.73 M^2
GLUCOSE SERPL-MCNC: 141 MG/DL (ref 70–110)
HCT VFR BLD AUTO: 21.7 % (ref 37–48.5)
HGB BLD-MCNC: 6.7 G/DL (ref 12–16)
IMM GRANULOCYTES # BLD AUTO: 0.13 K/UL (ref 0–0.04)
IMM GRANULOCYTES NFR BLD AUTO: 1.7 % (ref 0–0.5)
LYMPHOCYTES # BLD AUTO: 0.7 K/UL (ref 1–4.8)
LYMPHOCYTES NFR BLD: 9.2 % (ref 18–48)
MAGNESIUM SERPL-MCNC: 2 MG/DL (ref 1.6–2.6)
MCH RBC QN AUTO: 21.9 PG (ref 27–31)
MCHC RBC AUTO-ENTMCNC: 30.9 G/DL (ref 32–36)
MCV RBC AUTO: 71 FL (ref 82–98)
MONOCYTES # BLD AUTO: 0.8 K/UL (ref 0.3–1)
MONOCYTES NFR BLD: 10.2 % (ref 4–15)
NEUTROPHILS # BLD AUTO: 5.9 K/UL (ref 1.8–7.7)
NEUTROPHILS NFR BLD: 75.2 % (ref 38–73)
NRBC BLD-RTO: 0 /100 WBC
PHOSPHATE SERPL-MCNC: 3.4 MG/DL (ref 2.7–4.5)
PLATELET # BLD AUTO: 241 K/UL (ref 150–450)
PMV BLD AUTO: 11.4 FL (ref 9.2–12.9)
POCT GLUCOSE: 109 MG/DL (ref 70–110)
POCT GLUCOSE: 129 MG/DL (ref 70–110)
POCT GLUCOSE: 130 MG/DL (ref 70–110)
POCT GLUCOSE: 157 MG/DL (ref 70–110)
POCT GLUCOSE: 158 MG/DL (ref 70–110)
POCT GLUCOSE: 163 MG/DL (ref 70–110)
POTASSIUM SERPL-SCNC: 3.6 MMOL/L (ref 3.5–5.1)
RBC # BLD AUTO: 3.06 M/UL (ref 4–5.4)
SODIUM SERPL-SCNC: 140 MMOL/L (ref 136–145)
TRANS ERYTHROCYTES VOL PATIENT: NORMAL ML
WBC # BLD AUTO: 7.85 K/UL (ref 3.9–12.7)

## 2023-03-20 PROCEDURE — 25000003 PHARM REV CODE 250

## 2023-03-20 PROCEDURE — 25000003 PHARM REV CODE 250: Performed by: STUDENT IN AN ORGANIZED HEALTH CARE EDUCATION/TRAINING PROGRAM

## 2023-03-20 PROCEDURE — 97535 SELF CARE MNGMENT TRAINING: CPT

## 2023-03-20 PROCEDURE — 63600175 PHARM REV CODE 636 W HCPCS: Performed by: STUDENT IN AN ORGANIZED HEALTH CARE EDUCATION/TRAINING PROGRAM

## 2023-03-20 PROCEDURE — 36430 TRANSFUSION BLD/BLD COMPNT: CPT

## 2023-03-20 PROCEDURE — P9021 RED BLOOD CELLS UNIT: HCPCS | Performed by: STUDENT IN AN ORGANIZED HEALTH CARE EDUCATION/TRAINING PROGRAM

## 2023-03-20 PROCEDURE — 94664 DEMO&/EVAL PT USE INHALER: CPT

## 2023-03-20 PROCEDURE — 83735 ASSAY OF MAGNESIUM: CPT | Performed by: STUDENT IN AN ORGANIZED HEALTH CARE EDUCATION/TRAINING PROGRAM

## 2023-03-20 PROCEDURE — 20600001 HC STEP DOWN PRIVATE ROOM

## 2023-03-20 PROCEDURE — 85025 COMPLETE CBC W/AUTO DIFF WBC: CPT | Performed by: STUDENT IN AN ORGANIZED HEALTH CARE EDUCATION/TRAINING PROGRAM

## 2023-03-20 PROCEDURE — 86900 BLOOD TYPING SEROLOGIC ABO: CPT | Performed by: STUDENT IN AN ORGANIZED HEALTH CARE EDUCATION/TRAINING PROGRAM

## 2023-03-20 PROCEDURE — C9113 INJ PANTOPRAZOLE SODIUM, VIA: HCPCS | Performed by: STUDENT IN AN ORGANIZED HEALTH CARE EDUCATION/TRAINING PROGRAM

## 2023-03-20 PROCEDURE — 99900035 HC TECH TIME PER 15 MIN (STAT)

## 2023-03-20 PROCEDURE — A4216 STERILE WATER/SALINE, 10 ML: HCPCS | Performed by: STUDENT IN AN ORGANIZED HEALTH CARE EDUCATION/TRAINING PROGRAM

## 2023-03-20 PROCEDURE — 80048 BASIC METABOLIC PNL TOTAL CA: CPT | Performed by: STUDENT IN AN ORGANIZED HEALTH CARE EDUCATION/TRAINING PROGRAM

## 2023-03-20 PROCEDURE — 86920 COMPATIBILITY TEST SPIN: CPT | Performed by: STUDENT IN AN ORGANIZED HEALTH CARE EDUCATION/TRAINING PROGRAM

## 2023-03-20 PROCEDURE — 84100 ASSAY OF PHOSPHORUS: CPT | Performed by: STUDENT IN AN ORGANIZED HEALTH CARE EDUCATION/TRAINING PROGRAM

## 2023-03-20 RX ORDER — HYDROCODONE BITARTRATE AND ACETAMINOPHEN 500; 5 MG/1; MG/1
TABLET ORAL
Status: DISCONTINUED | OUTPATIENT
Start: 2023-03-20 | End: 2023-03-24 | Stop reason: HOSPADM

## 2023-03-20 RX ORDER — FUROSEMIDE 10 MG/ML
20 INJECTION INTRAMUSCULAR; INTRAVENOUS ONCE
Status: COMPLETED | OUTPATIENT
Start: 2023-03-20 | End: 2023-03-20

## 2023-03-20 RX ADMIN — ENOXAPARIN SODIUM 40 MG: 40 INJECTION SUBCUTANEOUS at 05:03

## 2023-03-20 RX ADMIN — ATORVASTATIN CALCIUM 20 MG: 20 TABLET, FILM COATED ORAL at 09:03

## 2023-03-20 RX ADMIN — TRAMADOL HYDROCHLORIDE 50 MG: 50 TABLET, COATED ORAL at 02:03

## 2023-03-20 RX ADMIN — TRAMADOL HYDROCHLORIDE 50 MG: 50 TABLET, COATED ORAL at 11:03

## 2023-03-20 RX ADMIN — Medication 10 ML: at 06:03

## 2023-03-20 RX ADMIN — Medication 10 ML: at 12:03

## 2023-03-20 RX ADMIN — INSULIN ASPART 2 UNITS: 100 INJECTION, SOLUTION INTRAVENOUS; SUBCUTANEOUS at 05:03

## 2023-03-20 RX ADMIN — INSULIN ASPART 2 UNITS: 100 INJECTION, SOLUTION INTRAVENOUS; SUBCUTANEOUS at 01:03

## 2023-03-20 RX ADMIN — BISACODYL 10 MG: 10 SUPPOSITORY RECTAL at 09:03

## 2023-03-20 RX ADMIN — ASPIRIN 81 MG: 81 TABLET, COATED ORAL at 09:03

## 2023-03-20 RX ADMIN — INSULIN ASPART 2 UNITS: 100 INJECTION, SOLUTION INTRAVENOUS; SUBCUTANEOUS at 09:03

## 2023-03-20 RX ADMIN — TRAMADOL HYDROCHLORIDE 100 MG: 50 TABLET, COATED ORAL at 07:03

## 2023-03-20 RX ADMIN — INSULIN ASPART 2 UNITS: 100 INJECTION, SOLUTION INTRAVENOUS; SUBCUTANEOUS at 03:03

## 2023-03-20 RX ADMIN — Medication 10 ML: at 01:03

## 2023-03-20 RX ADMIN — FUROSEMIDE 20 MG: 10 INJECTION, SOLUTION INTRAMUSCULAR; INTRAVENOUS at 05:03

## 2023-03-20 RX ADMIN — INSULIN ASPART 2 UNITS: 100 INJECTION, SOLUTION INTRAVENOUS; SUBCUTANEOUS at 08:03

## 2023-03-20 RX ADMIN — MUPIROCIN: 20 OINTMENT TOPICAL at 09:03

## 2023-03-20 RX ADMIN — PANTOPRAZOLE SODIUM 40 MG: 40 INJECTION, POWDER, FOR SOLUTION INTRAVENOUS at 09:03

## 2023-03-20 NOTE — PLAN OF CARE
Pt Aox4, VS remained stable throughout shift, pain controlled with PRN meds. Pt to [facility] ambulatory with assist. Pt tolerating advanced diet of full liquids. Educated on POC, resting comfortably, bed low and locked, call light within reach, will continue to monitor.     Problem: Adult Inpatient Plan of Care  Goal: Plan of Care Review  Outcome: Ongoing, Progressing  Goal: Patient-Specific Goal (Individualized)  Outcome: Ongoing, Progressing  Goal: Absence of Hospital-Acquired Illness or Injury  Outcome: Ongoing, Progressing  Goal: Optimal Comfort and Wellbeing  Outcome: Ongoing, Progressing  Goal: Readiness for Transition of Care  Outcome: Ongoing, Progressing     Problem: Diabetes Comorbidity  Goal: Blood Glucose Level Within Targeted Range  Outcome: Ongoing, Progressing     Problem: Infection  Goal: Absence of Infection Signs and Symptoms  Outcome: Ongoing, Progressing     Problem: Fall Injury Risk  Goal: Absence of Fall and Fall-Related Injury  Outcome: Ongoing, Progressing     Problem: Skin Injury Risk Increased  Goal: Skin Health and Integrity  Outcome: Ongoing, Progressing

## 2023-03-20 NOTE — SUBJECTIVE & OBJECTIVE
"Interval History: No issues overnight. Passing flatus/BM, tolerated fulls/Boosts yesterday. Ambulating out of bed    Medications:  Continuous Infusions:   TPN ADULT CENTRAL LINE CUSTOM 50 mL/hr at 03/19/23 2148    TPN ADULT CENTRAL LINE CUSTOM       Scheduled Meds:   aspirin  81 mg Oral Daily    atorvastatin  20 mg Oral Daily    bisacodyL  10 mg Rectal Daily    enoxaparin  40 mg Subcutaneous Daily    fat emulsion 20%  250 mL Intravenous Daily    fat emulsion 20%  250 mL Intravenous Daily    insulin aspart U-100  2 Units Subcutaneous 6 times per day    mupirocin   Nasal BID    pantoprazole  40 mg Intravenous Daily    sodium chloride 0.9%  10 mL Intravenous Q6H     PRN Meds:sodium chloride, dextrose 10%, dextrose 10%, dextrose, dextrose, glucagon (human recombinant), hydrALAZINE, insulin aspart U-100, labetalol, melatonin, ondansetron, Flushing PICC Protocol **AND** sodium chloride 0.9% **AND** sodium chloride 0.9%, traMADoL, traMADoL     Review of patient's allergies indicates:   Allergen Reactions    Aspirin Other (See Comments)     Pt states it is "hard on her stomach" She can tolerate a low dose aspirin    Pcn [penicillins]      Childhood      Objective:     Vital Signs (Most Recent):  Temp: 99.4 °F (37.4 °C) (03/20/23 0743)  Pulse: 67 (03/20/23 0743)  Resp: 20 (03/20/23 0743)  BP: (!) 129/58 (03/20/23 0743)  SpO2: (!) 93 % (03/20/23 0743)   Vital Signs (24h Range):  Temp:  [98.6 °F (37 °C)-99.7 °F (37.6 °C)] 99.4 °F (37.4 °C)  Pulse:  [64-69] 67  Resp:  [16-20] 20  SpO2:  [93 %-98 %] 93 %  BP: (123-153)/(58-64) 129/58     Weight: 73 kg (160 lb 15 oz)  Body mass index is 25.98 kg/m².    Intake/Output - Last 3 Shifts         03/18 0700 03/19 0659 03/19 0700 03/20 0659 03/20 0700 03/21 0659    P.O. 720 720     I.V. (mL/kg)       IV Piggyback 795.5      TPN 1434.1 1435.7     Total Intake(mL/kg) 2949.6 (40.4) 2155.7 (29.5)     Urine (mL/kg/hr) 2200 (1.3) 850 (0.5)     Emesis/NG output       Drains 40      Stool 0 0 "     Total Output 2240 850     Net +709.6 +1305.7            Urine Occurrence  2 x     Stool Occurrence 0 x 0 x             Physical Exam  Vitals and nursing note reviewed.   Constitutional:       General: She is not in acute distress.  Cardiovascular:      Rate and Rhythm: Normal rate.      Pulses: Normal pulses.   Pulmonary:      Effort: Pulmonary effort is normal. No respiratory distress.   Abdominal:      General: There is no distension.      Palpations: Abdomen is soft.      Tenderness: There is no abdominal tenderness.      Comments: Midline incision clean/dry with overlying dermabond intact; no erythema or drainage around incision   Neurological:      Mental Status: She is alert.       Significant Labs:  I have reviewed all pertinent lab results within the past 24 hours.  CBC:   Recent Labs   Lab 03/20/23  0348   WBC 7.85   RBC 3.06*   HGB 6.7*   HCT 21.7*      MCV 71*   MCH 21.9*   MCHC 30.9*     BMP:   Recent Labs   Lab 03/20/23  0348   *      K 3.6      CO2 27   BUN 15   CREATININE 0.5   CALCIUM 9.0   MG 2.0     CMP:   Recent Labs   Lab 03/15/23  2238 03/16/23  0327 03/20/23  0348   *  240*   < > 141*   CALCIUM 8.0*  8.0*   < > 9.0   ALBUMIN 2.8*  2.8*  --   --    PROT 4.9*  4.9*  --   --      142   < > 140   K 4.1  4.1   < > 3.6   CO2 24  24   < > 27     110   < > 105   BUN 27*  27*   < > 15   CREATININE 0.7  0.7   < > 0.5   ALKPHOS 43*  43*  --   --    ALT 22  22  --   --    AST 29  29  --   --    BILITOT 0.3  0.3  --   --     < > = values in this interval not displayed.       Significant Diagnostics:  I have reviewed all pertinent imaging results/findings within the past 24 hours.

## 2023-03-20 NOTE — PROGRESS NOTES
Lai Clarke - [facility]  General Surgery  Progress Note    Subjective:     History of Present Illness:  Nan Simms is a 67 year old female with PMHx of HTN and hypothyroidism who has had constipation and abdominal pain for 3 weeks. Says she became increasingly bloated and abdominal pain worsened and went to [facility] yesterday and was transferred to [facility] for higher level of care. She reports using enemas to relieve constipation but was unable to find relief and came to ED. She has not been nauseous however reports she vomited 3 times two days ago. She has been tolerating a solid and liquid diet. Reports has lost 13 lbs in last 3 weeks. Of note, she also notes a loss of appetite and increased fatigue over last several weeks. Reports she cannot recall if she has ever gotten a colonoscopy but daughter reports she does Cologuard yearly; patient says she has not done Cologuard in 4 years.    Upon admission, she was HDS and labs were consistent with iron deficiency anemia. CT A/P obtained 3/9 showed findings concerning for malignancy involving the pylorus and duodenum with component of gastric outlet obstruction.  NGT was placed 3/9 and patient has had resolution of her abdominal pain, nausea, and vomiting since placement.      Post-Op Info:  Procedure(s) (LRB):  WHIPPLE PROCEDURE (N/A)   5 Days Post-Op     Interval History: No issues overnight. Passing flatus/BM, tolerated fulls/Boosts yesterday. Ambulating out of bed    Medications:  Continuous Infusions:   TPN ADULT CENTRAL LINE CUSTOM 50 mL/hr at 03/19/23 2148    TPN ADULT CENTRAL LINE CUSTOM       Scheduled Meds:   aspirin  81 mg Oral Daily    atorvastatin  20 mg Oral Daily    bisacodyL  10 mg Rectal Daily    enoxaparin  40 mg Subcutaneous Daily    fat emulsion 20%  250 mL Intravenous Daily    fat emulsion 20%  250 mL Intravenous Daily    insulin aspart U-100  2 Units Subcutaneous 6 times per day    mupirocin   Nasal BID    pantoprazole  40 mg  "Intravenous Daily    sodium chloride 0.9%  10 mL Intravenous Q6H     PRN Meds:sodium chloride, dextrose 10%, dextrose 10%, dextrose, dextrose, glucagon (human recombinant), hydrALAZINE, insulin aspart U-100, labetalol, melatonin, ondansetron, Flushing PICC Protocol **AND** sodium chloride 0.9% **AND** sodium chloride 0.9%, traMADoL, traMADoL     Review of patient's allergies indicates:   Allergen Reactions    Aspirin Other (See Comments)     Pt states it is "hard on her stomach" She can tolerate a low dose aspirin    Pcn [penicillins]      Childhood      Objective:     Vital Signs (Most Recent):  Temp: 99.4 °F (37.4 °C) (03/20/23 0743)  Pulse: 67 (03/20/23 0743)  Resp: 20 (03/20/23 0743)  BP: (!) 129/58 (03/20/23 0743)  SpO2: (!) 93 % (03/20/23 0743)   Vital Signs (24h Range):  Temp:  [98.6 °F (37 °C)-99.7 °F (37.6 °C)] 99.4 °F (37.4 °C)  Pulse:  [64-69] 67  Resp:  [16-20] 20  SpO2:  [93 %-98 %] 93 %  BP: (123-153)/(58-64) 129/58     Weight: 73 kg (160 lb 15 oz)  Body mass index is 25.98 kg/m².    Intake/Output - Last 3 Shifts         03/18 0700  03/19 0659 03/19 0700 03/20 0659 03/20 0700  03/21 0659    P.O. 720 720     I.V. (mL/kg)       IV Piggyback 795.5      TPN 1434.1 1435.7     Total Intake(mL/kg) 2949.6 (40.4) 2155.7 (29.5)     Urine (mL/kg/hr) 2200 (1.3) 850 (0.5)     Emesis/NG output       Drains 40      Stool 0 0     Total Output 2240 850     Net +709.6 +1305.7            Urine Occurrence  2 x     Stool Occurrence 0 x 0 x             Physical Exam  Vitals and nursing note reviewed.   Constitutional:       General: She is not in acute distress.  Cardiovascular:      Rate and Rhythm: Normal rate.      Pulses: Normal pulses.   Pulmonary:      Effort: Pulmonary effort is normal. No respiratory distress.   Abdominal:      General: There is no distension.      Palpations: Abdomen is soft.      Tenderness: There is no abdominal tenderness.      Comments: Midline incision clean/dry with overlying dermabond " intact; no erythema or drainage around incision   Neurological:      Mental Status: She is alert.       Significant Labs:  I have reviewed all pertinent lab results within the past 24 hours.  CBC:   Recent Labs   Lab 03/20/23  0348   WBC 7.85   RBC 3.06*   HGB 6.7*   HCT 21.7*      MCV 71*   MCH 21.9*   MCHC 30.9*     BMP:   Recent Labs   Lab 03/20/23  0348   *      K 3.6      CO2 27   BUN 15   CREATININE 0.5   CALCIUM 9.0   MG 2.0     CMP:   Recent Labs   Lab 03/15/23  2238 03/16/23  0327 03/20/23  0348   *  240*   < > 141*   CALCIUM 8.0*  8.0*   < > 9.0   ALBUMIN 2.8*  2.8*  --   --    PROT 4.9*  4.9*  --   --      142   < > 140   K 4.1  4.1   < > 3.6   CO2 24  24   < > 27     110   < > 105   BUN 27*  27*   < > 15   CREATININE 0.7  0.7   < > 0.5   ALKPHOS 43*  43*  --   --    ALT 22  22  --   --    AST 29  29  --   --    BILITOT 0.3  0.3  --   --     < > = values in this interval not displayed.       Significant Diagnostics:  I have reviewed all pertinent imaging results/findings within the past 24 hours.    Assessment/Plan:     * Gastric cancer  Nan Simms is a 66 yo female with PMHx of HTN and hypothyroidism here for gastric outlet obstruction. Now s/p Whipple 3/15     - Diabetic diet & Boost  - 1 unit RBC this am, Hgb 6.7  - PPI  - PRN tramadol  - daily suppository  - home meds  - TPN/lipids  - IS  - PT/OT    Dispo: continue MARGARITA Jefferson MD  General Surgery  Lai AVILA

## 2023-03-20 NOTE — PT/OT/SLP PROGRESS
In to see mother and infant, infant latched to breast at this time, active sucking. Mother denies pain, but states infant is tugging. Reassured this was normal. Mother denies questions or concerns. Active sucking noted. Occupational Therapy   Treatment    Name: Nan Simms  MRN: [id number]  Admitting Diagnosis:  Gastric cancer  5 Days Post-Op    Recommendations:     Discharge Recommendations: home  Discharge Equipment Recommendations:  walker, rolling  Barriers to discharge:  None    Assessment:     Nan iSmms is a 67 y.o. female with a medical diagnosis of Gastric cancer.  She presents with no c/o of pain and tolerated therapy well. Pt. Used the BSC twice during the session and took a brief walk in the [place] with RW. Performance deficits affecting function are impaired functional mobility, impaired self care skills, impaired endurance, weakness, decreased lower extremity function, decreased safety awareness.     Rehab Prognosis:  Good; patient would benefit from acute skilled OT services to address these deficits and reach maximum level of function.       Plan:     Patient to be seen 3 x/week to address the above listed problems via self-care/home management, therapeutic activities, therapeutic exercises, neuromuscular re-education  Plan of Care Expires: 04/17/23  Plan of Care Reviewed with: patient, family    Subjective   Pt. Reports she has no pain but maybe need to use to go to the bathroom because she recently had a suppository   Pain/Comfort:  Pain Rating 1: 0/10    Objective:     Communicated with: RN prior to session.  Patient found HOB elevated with   upon OT entry to room.    General Precautions: Standard, fall    Orthopedic Precautions:N/A  Braces: N/A  Respiratory Status: Room air     Occupational Performance:     Bed Mobility:    Patient completed Rolling/Turning to Right with contact guard assistance  Patient completed Scooting/Bridging with contact guard assistance  Patient completed Supine to Sit with minimum assistance  Patient completed Sit to Supine with minimum assistance     Functional Mobility/Transfers:  Patient completed Sit <> Stand Transfer with stand by assistance  with  rolling walker   Patient  completed Toilet Transfer Step Transfer technique with contact guard assistance with  rolling walker  Functional Mobility: Pt demonstrated functional mobility training to simulate household and community environment gait training during session at a decreased speed with RW with CGA    Activities of Daily Living:  Grooming: stand by assistance standing at sink side to wash hands  Toileting: minimum assistance for pericare and clothing management  (2 trials)      [facility] 6 Click ADL: 20    Treatment & Education:  the patient educated on the importance of OOB/EOB activities   Pt educated on role of occupational therapy, POC, and safety during ADLs and functional mobility. Pt and OT discussed importance of safe, continued mobility to optimize daily living skills. Pt verbalized understanding.   Pt given instruction to call for medical staff/nurse for assistance.       Patient left up in chair with all lines intact, call button in reach, and family member present    GOALS:   Multidisciplinary Problems       Occupational Therapy Goals          Problem: Occupational Therapy    Goal Priority Disciplines Outcome Interventions   Occupational Therapy Goal     OT, PT/OT Ongoing, Progressing    Description: Goals to be met by: 3/31/23     Patient will increase functional independence with ADLs by performing:    UE Dressing with Supervision.  LE Dressing with Supervision and Assistive Devices as needed.  Grooming while standing at sink with Supervision.  Toileting from toilet with Supervision for hygiene and clothing management.   Supine to sit with Supervision.  Step transfer with Supervision  Upper extremity exercise program with supervision, to improve strength and function.                         Time Tracking:     OT Date of Treatment: 03/20/23  OT Start Time: 1015  OT Stop Time: 1050  OT Total Time (min): 35 min    Billable Minutes:Self Care/Home Management 35    OT/MICHELE: OT          3/20/2023

## 2023-03-20 NOTE — ASSESSMENT & PLAN NOTE
Nan Simms is a 68 yo female with PMHx of HTN and hypothyroidism here for gastric outlet obstruction. Now s/p Whipple 3/15     - Diabetic diet & Boost  - 1 unit RBC this am, Hgb 6.7  - PPI  - PRN tramadol  - daily suppository  - home meds  - TPN/lipids  - IS  - PT/OT    Dispo: continue [facility]

## 2023-03-20 NOTE — CARE UPDATE
-Glucose Goal 140-180    -A1C:   Hemoglobin A1C   Date Value Ref Range Status   03/09/2023 5.6 4.0 - 5.6 % Final     Comment:     ADA Screening Guidelines:  5.7-6.4%  Consistent with prediabetes  >or=6.5%  Consistent with diabetes    High levels of fetal hemoglobin interfere with the HbA1C  assay. Heterozygous hemoglobin variants (HbS, HgC, etc)do  not significantly interfere with this assay.   However, presence of multiple variants may affect accuracy.              -GLUCOSE TREND FOR THE PAST 24HRS:   Recent Labs   Lab 03/19/23  0832 03/19/23  1206 03/19/23  1631 03/19/23  1950 03/19/23  2353 03/20/23  0347   POCTGLUCOSE 149* 149* 151* 167* 155* 158*           -NO HYPOGYCEMIAS NOTED     - Diet  Diet full liquid  TPN ADULT CENTRAL LINE CUSTOM    Remains in 1003/1003 A  T2DM well controlled not on meds. S/p whipple. On TPN - 50 mL/ hr. Full liquid diet. 5 Days Post-Op. BG well controlled with current regimen; no changes to insulin today          Plan:   -- Continue Novolog (Insulin Aspart) 2 units q4hr with TPN (Hold scheduled in[place]lin if TPN is stopped or if BG < 100 mg/dL)  -Continue Novolog LDC SSI (150/50).   - BG checks q4hr  - Hypoglycemia protocol in place        ** Please notify Endocrine for any change and/or advance in diet**  ** Please call Endocrine for any BG related issues **     Discharge Planning:   TBD. Please notify endocrinology prior to discharge.

## 2023-03-21 DIAGNOSIS — K31.1 GASTRIC OUT LET OBSTRUCTION: Primary | ICD-10-CM

## 2023-03-21 LAB
ALBUMIN SERPL BCP-MCNC: 2.3 G/DL (ref 3.5–5.2)
ALP SERPL-CCNC: 103 U/L (ref 55–135)
ALT SERPL W/O P-5'-P-CCNC: 31 U/L (ref 10–44)
ANION GAP SERPL CALC-SCNC: 9 MMOL/L (ref 8–16)
ANION GAP SERPL CALC-SCNC: 9 MMOL/L (ref 8–16)
AST SERPL-CCNC: 30 U/L (ref 10–40)
BASOPHILS # BLD AUTO: 0.03 K/UL (ref 0–0.2)
BASOPHILS NFR BLD: 0.3 % (ref 0–1.9)
BILIRUB SERPL-MCNC: 0.4 MG/DL (ref 0.1–1)
BUN SERPL-MCNC: 18 MG/DL (ref 8–23)
BUN SERPL-MCNC: 18 MG/DL (ref 8–23)
CALCIUM SERPL-MCNC: 9.1 MG/DL (ref 8.7–10.5)
CALCIUM SERPL-MCNC: 9.1 MG/DL (ref 8.7–10.5)
CHLORIDE SERPL-SCNC: 101 MMOL/L (ref 95–110)
CHLORIDE SERPL-SCNC: 101 MMOL/L (ref 95–110)
CO2 SERPL-SCNC: 28 MMOL/L (ref 23–29)
CO2 SERPL-SCNC: 28 MMOL/L (ref 23–29)
CREAT SERPL-MCNC: 0.5 MG/DL (ref 0.5–1.4)
CREAT SERPL-MCNC: 0.5 MG/DL (ref 0.5–1.4)
DIFFERENTIAL METHOD: ABNORMAL
EOSINOPHIL # BLD AUTO: 0.5 K/UL (ref 0–0.5)
EOSINOPHIL NFR BLD: 4.4 % (ref 0–8)
ERYTHROCYTE [DISTWIDTH] IN BLOOD BY AUTOMATED COUNT: 16.8 % (ref 11.5–14.5)
EST. GFR  (NO RACE VARIABLE): >60 ML/MIN/1.73 M^2
EST. GFR  (NO RACE VARIABLE): >60 ML/MIN/1.73 M^2
GLUCOSE SERPL-MCNC: 104 MG/DL (ref 70–110)
GLUCOSE SERPL-MCNC: 104 MG/DL (ref 70–110)
HCT VFR BLD AUTO: 25 % (ref 37–48.5)
HGB BLD-MCNC: 7.8 G/DL (ref 12–16)
IMM GRANULOCYTES # BLD AUTO: 0.16 K/UL (ref 0–0.04)
IMM GRANULOCYTES NFR BLD AUTO: 1.5 % (ref 0–0.5)
LYMPHOCYTES # BLD AUTO: 1 K/UL (ref 1–4.8)
LYMPHOCYTES NFR BLD: 9 % (ref 18–48)
MAGNESIUM SERPL-MCNC: 1.9 MG/DL (ref 1.6–2.6)
MCH RBC QN AUTO: 22.9 PG (ref 27–31)
MCHC RBC AUTO-ENTMCNC: 31.2 G/DL (ref 32–36)
MCV RBC AUTO: 74 FL (ref 82–98)
MONOCYTES # BLD AUTO: 1.3 K/UL (ref 0.3–1)
MONOCYTES NFR BLD: 12 % (ref 4–15)
NEUTROPHILS # BLD AUTO: 7.9 K/UL (ref 1.8–7.7)
NEUTROPHILS NFR BLD: 72.8 % (ref 38–73)
NRBC BLD-RTO: 0 /100 WBC
PHOSPHATE SERPL-MCNC: 3.7 MG/DL (ref 2.7–4.5)
PLATELET # BLD AUTO: 255 K/UL (ref 150–450)
PMV BLD AUTO: 11.6 FL (ref 9.2–12.9)
POCT GLUCOSE: 108 MG/DL (ref 70–110)
POCT GLUCOSE: 111 MG/DL (ref 70–110)
POCT GLUCOSE: 111 MG/DL (ref 70–110)
POCT GLUCOSE: 114 MG/DL (ref 70–110)
POCT GLUCOSE: 116 MG/DL (ref 70–110)
POCT GLUCOSE: 130 MG/DL (ref 70–110)
POCT GLUCOSE: 93 MG/DL (ref 70–110)
POTASSIUM SERPL-SCNC: 3.6 MMOL/L (ref 3.5–5.1)
POTASSIUM SERPL-SCNC: 3.6 MMOL/L (ref 3.5–5.1)
PREALB SERPL-MCNC: 12 MG/DL (ref 20–43)
PROT SERPL-MCNC: 5.8 G/DL (ref 6–8.4)
RBC # BLD AUTO: 3.4 M/UL (ref 4–5.4)
SODIUM SERPL-SCNC: 138 MMOL/L (ref 136–145)
SODIUM SERPL-SCNC: 138 MMOL/L (ref 136–145)
WBC # BLD AUTO: 10.81 K/UL (ref 3.9–12.7)

## 2023-03-21 PROCEDURE — 63600175 PHARM REV CODE 636 W HCPCS: Performed by: STUDENT IN AN ORGANIZED HEALTH CARE EDUCATION/TRAINING PROGRAM

## 2023-03-21 PROCEDURE — 25000003 PHARM REV CODE 250: Performed by: NURSE PRACTITIONER

## 2023-03-21 PROCEDURE — 25000003 PHARM REV CODE 250: Performed by: STUDENT IN AN ORGANIZED HEALTH CARE EDUCATION/TRAINING PROGRAM

## 2023-03-21 PROCEDURE — 27000646 HC AEROBIKA DEVICE

## 2023-03-21 PROCEDURE — 80053 COMPREHEN METABOLIC PANEL: CPT | Performed by: STUDENT IN AN ORGANIZED HEALTH CARE EDUCATION/TRAINING PROGRAM

## 2023-03-21 PROCEDURE — 20600001 HC STEP DOWN PRIVATE ROOM

## 2023-03-21 PROCEDURE — 84134 ASSAY OF PREALBUMIN: CPT | Performed by: STUDENT IN AN ORGANIZED HEALTH CARE EDUCATION/TRAINING PROGRAM

## 2023-03-21 PROCEDURE — 97116 GAIT TRAINING THERAPY: CPT

## 2023-03-21 PROCEDURE — 84100 ASSAY OF PHOSPHORUS: CPT | Performed by: STUDENT IN AN ORGANIZED HEALTH CARE EDUCATION/TRAINING PROGRAM

## 2023-03-21 PROCEDURE — 83735 ASSAY OF MAGNESIUM: CPT | Performed by: STUDENT IN AN ORGANIZED HEALTH CARE EDUCATION/TRAINING PROGRAM

## 2023-03-21 PROCEDURE — 25000003 PHARM REV CODE 250

## 2023-03-21 PROCEDURE — A4216 STERILE WATER/SALINE, 10 ML: HCPCS | Performed by: STUDENT IN AN ORGANIZED HEALTH CARE EDUCATION/TRAINING PROGRAM

## 2023-03-21 PROCEDURE — 85025 COMPLETE CBC W/AUTO DIFF WBC: CPT | Performed by: STUDENT IN AN ORGANIZED HEALTH CARE EDUCATION/TRAINING PROGRAM

## 2023-03-21 PROCEDURE — 94664 DEMO&/EVAL PT USE INHALER: CPT

## 2023-03-21 PROCEDURE — 97530 THERAPEUTIC ACTIVITIES: CPT

## 2023-03-21 PROCEDURE — 99900035 HC TECH TIME PER 15 MIN (STAT)

## 2023-03-21 RX ORDER — METOCLOPRAMIDE 5 MG/1
10 TABLET ORAL
Status: DISCONTINUED | OUTPATIENT
Start: 2023-03-21 | End: 2023-03-24 | Stop reason: HOSPADM

## 2023-03-21 RX ORDER — DEXTROSE 40 %
15 GEL (GRAM) ORAL
Status: DISCONTINUED | OUTPATIENT
Start: 2023-03-21 | End: 2023-03-24 | Stop reason: HOSPADM

## 2023-03-21 RX ORDER — DEXTROSE 40 %
30 GEL (GRAM) ORAL
Status: DISCONTINUED | OUTPATIENT
Start: 2023-03-21 | End: 2023-03-24 | Stop reason: HOSPADM

## 2023-03-21 RX ORDER — SODIUM,POTASSIUM PHOSPHATES 280-250MG
2 POWDER IN PACKET (EA) ORAL
Status: COMPLETED | OUTPATIENT
Start: 2023-03-21 | End: 2023-03-21

## 2023-03-21 RX ORDER — LANOLIN ALCOHOL/MO/W.PET/CERES
800 CREAM (GRAM) TOPICAL ONCE
Status: COMPLETED | OUTPATIENT
Start: 2023-03-21 | End: 2023-03-21

## 2023-03-21 RX ORDER — PANTOPRAZOLE SODIUM 40 MG/1
40 TABLET, DELAYED RELEASE ORAL
Status: DISCONTINUED | OUTPATIENT
Start: 2023-03-21 | End: 2023-03-24 | Stop reason: HOSPADM

## 2023-03-21 RX ORDER — PANTOPRAZOLE SODIUM 40 MG/1
40 TABLET, DELAYED RELEASE ORAL
Qty: 30 TABLET | Refills: 11 | Status: ON HOLD | OUTPATIENT
Start: 2023-03-22 | End: 2023-04-12

## 2023-03-21 RX ORDER — TRAMADOL HYDROCHLORIDE 50 MG/1
100 TABLET ORAL EVERY 6 HOURS PRN
Qty: 15 TABLET | Refills: 0 | Status: SHIPPED | OUTPATIENT
Start: 2023-03-21 | End: 2023-03-27 | Stop reason: SDUPTHER

## 2023-03-21 RX ORDER — DEXTROSE 40 %
15 GEL (GRAM) ORAL
Status: DISCONTINUED | OUTPATIENT
Start: 2023-03-21 | End: 2023-03-21

## 2023-03-21 RX ORDER — TRAMADOL HYDROCHLORIDE 50 MG/1
50 TABLET ORAL EVERY 6 HOURS PRN
Qty: 15 TABLET | Refills: 0 | Status: SHIPPED | OUTPATIENT
Start: 2023-03-21 | End: 2023-03-21 | Stop reason: SDUPTHER

## 2023-03-21 RX ORDER — DEXTROMETHORPHAN HYDROBROMIDE, GUAIFENESIN 5; 100 MG/5ML; MG/5ML
650 LIQUID ORAL EVERY 8 HOURS
Refills: 0 | Status: ON HOLD | COMMUNITY
Start: 2023-03-21 | End: 2023-04-12

## 2023-03-21 RX ORDER — INSULIN ASPART 100 [IU]/ML
0-5 INJECTION, SOLUTION INTRAVENOUS; SUBCUTANEOUS
Status: DISCONTINUED | OUTPATIENT
Start: 2023-03-21 | End: 2023-03-24 | Stop reason: HOSPADM

## 2023-03-21 RX ORDER — METHOCARBAMOL 500 MG/1
500 TABLET, FILM COATED ORAL 4 TIMES DAILY
Qty: 40 TABLET | Refills: 0 | Status: SHIPPED | OUTPATIENT
Start: 2023-03-21 | End: 2023-04-04

## 2023-03-21 RX ORDER — DEXTROSE 40 %
30 GEL (GRAM) ORAL
Status: DISCONTINUED | OUTPATIENT
Start: 2023-03-21 | End: 2023-03-21

## 2023-03-21 RX ORDER — METOCLOPRAMIDE 10 MG/1
10 TABLET ORAL
Qty: 90 TABLET | Refills: 0 | Status: ON HOLD | OUTPATIENT
Start: 2023-03-22 | End: 2023-04-12

## 2023-03-21 RX ORDER — ENOXAPARIN SODIUM 100 MG/ML
40 INJECTION SUBCUTANEOUS DAILY
Qty: 11.2 ML | Refills: 0 | Status: ON HOLD | OUTPATIENT
Start: 2023-03-15 | End: 2023-04-12

## 2023-03-21 RX ORDER — GLUCAGON 1 MG
1 KIT INJECTION
Status: DISCONTINUED | OUTPATIENT
Start: 2023-03-21 | End: 2023-03-24 | Stop reason: HOSPADM

## 2023-03-21 RX ADMIN — POTASSIUM & SODIUM PHOSPHATES POWDER PACK 280-160-250 MG 2 PACKET: 280-160-250 PACK at 09:03

## 2023-03-21 RX ADMIN — Medication 10 ML: at 06:03

## 2023-03-21 RX ADMIN — POTASSIUM & SODIUM PHOSPHATES POWDER PACK 280-160-250 MG 2 PACKET: 280-160-250 PACK at 04:03

## 2023-03-21 RX ADMIN — METOCLOPRAMIDE 10 MG: 5 TABLET ORAL at 04:03

## 2023-03-21 RX ADMIN — INSULIN ASPART 2 UNITS: 100 INJECTION, SOLUTION INTRAVENOUS; SUBCUTANEOUS at 08:03

## 2023-03-21 RX ADMIN — TRAMADOL HYDROCHLORIDE 100 MG: 50 TABLET, COATED ORAL at 11:03

## 2023-03-21 RX ADMIN — INSULIN ASPART 2 UNITS: 100 INJECTION, SOLUTION INTRAVENOUS; SUBCUTANEOUS at 12:03

## 2023-03-21 RX ADMIN — ATORVASTATIN CALCIUM 20 MG: 20 TABLET, FILM COATED ORAL at 09:03

## 2023-03-21 RX ADMIN — Medication 800 MG: at 02:03

## 2023-03-21 RX ADMIN — Medication 10 ML: at 07:03

## 2023-03-21 RX ADMIN — ASPIRIN 81 MG: 81 TABLET, COATED ORAL at 09:03

## 2023-03-21 RX ADMIN — TRAMADOL HYDROCHLORIDE 100 MG: 50 TABLET, COATED ORAL at 03:03

## 2023-03-21 RX ADMIN — ENOXAPARIN SODIUM 40 MG: 40 INJECTION SUBCUTANEOUS at 04:03

## 2023-03-21 RX ADMIN — Medication 10 ML: at 12:03

## 2023-03-21 RX ADMIN — PANTOPRAZOLE SODIUM 40 MG: 40 TABLET, DELAYED RELEASE ORAL at 08:03

## 2023-03-21 RX ADMIN — METOCLOPRAMIDE 10 MG: 5 TABLET ORAL at 11:03

## 2023-03-21 RX ADMIN — POTASSIUM & SODIUM PHOSPHATES POWDER PACK 280-160-250 MG 2 PACKET: 280-160-250 PACK at 02:03

## 2023-03-21 RX ADMIN — Medication 10 ML: at 11:03

## 2023-03-21 NOTE — PT/OT/SLP PROGRESS
Physical Therapy Treatment    Patient Name:  Nan Simms   MRN:  [id number]    Recommendations:     Discharge Recommendations: home  Discharge Equipment Recommendations: walker, rolling  Barriers to discharge: None    Assessment:     Nan Simms is a 67 y.o. female admitted with a medical diagnosis of Gastric cancer.  She presents with the following impairments/functional limitations: weakness, impaired endurance, impaired self care skills, impaired functional mobility, gait instability, impaired balance, decreased upper extremity function, decreased lower extremity function. Pt progressing well with therapy. Pt ambulated 112 ft with SBA and RW.     Rehab Prognosis: Good; patient would benefit from acute skilled PT services to address these deficits and reach maximum level of function.    Recent Surgery: Procedure(s) (LRB):  WHIPPLE PROCEDURE (N/A) 6 Days Post-Op    Plan:     During this hospitalization, patient to be seen 4 x/week to address the identified rehab impairments via gait training, therapeutic activities, therapeutic exercises, neuromuscular re-education and progress toward the following goals:    Plan of Care Expires:  04/16/23    Subjective     Chief Complaint: none verbalized  Patient/Family Comments/goals: return home  Pain/Comfort:  Pain Rating 1: 0/10  Pain Rating Post-Intervention 1: 0/10      Objective:     Communicated with RN prior to session.  Patient found HOB elevated with PICC line, telemetry upon PT entry to room.     General Precautions: Standard, fall  Orthopedic Precautions: N/A  Braces: N/A  Respiratory Status: Room air     Functional Mobility:  Bed Mobility:     Scooting: supervision  Supine to Sit: contact guard assistance  Transfers:     Sit to Stand from bed:  minimum assistance with no AD  Sit to Stand from toilet: SBA with no AD  Gait:   6 ft, CGA, no AD; decreased gait speed and step length, unsteadiness present  112 ft, SBA, RW; decreased gait speed and step length  Balance:    Static Sitting: Supervision  Dynamic Sitting: Supervision  Static Standing: SBA with RW  Dynamic Standing: SBA with RW      AM-PAC 6 CLICK MOBILITY  Turning over in bed (including adjusting bedclothes, sheets and blankets)?: 3  Sitting down on and standing up from a chair with arms (e.g., wheelchair, bedside commode, etc.): 3  Moving from lying on back to sitting on the side of the bed?: 3  Moving to and from a bed to a chair (including a wheelchair)?: 3  Need to walk in hospital room?: 3  Climbing 3-5 steps with a railing?: 3  Basic Mobility Total Score: 18       Treatment & Education:  Patient educated on role of therapy, goals of session, and benefits of mobilizing.   Discussed PT plan of care during hospitalization.   Patient educated on calling for assistance.   Patient educated on how their diagnosis impacts their mobility within PT scope of practice.   All questions answered within PT scope of practice.    Patient left sitting edge of bed with all lines intact, call button in reach, and RN notified and confirmed pt allowed to sit EOB to eat.    GOALS:   Multidisciplinary Problems       Physical Therapy Goals          Problem: Physical Therapy    Goal Priority Disciplines Outcome Goal Variances Interventions   Physical Therapy Goal     PT, PT/OT Ongoing, Progressing     Description: Goals to be met by: 3/31/2023     Patient will increase functional independence with mobility by performin. Supine to sit with [place]  2. Sit to supine with [place]  3. Sit to stand transfer with Modified [place] with RW  4. Bed to chair transfer with Modified [place] using Rolling Walker  5. Gait  x 30 feet with Modified [place] using Rolling Walker.   6. Lower extremity exercise program x15 reps per handout, with independence                         Time Tracking:     PT Received On: 23  PT Start Time: 1147     PT Stop Time: 1210  PT Total Time (min): 23 min     Billable Minutes: Gait  Training 15 and Therapeutic Activity 8    Treatment Type: Treatment  PT/PTA: PT     Number of PTA visits since last PT visit: 0     03/21/2023

## 2023-03-21 NOTE — CONSULTS
[facility]  Adult Nutrition  Consult Note    SUMMARY     Recommendations    1.) Recommend continuing with current diet of mech soft, ADAT.     2.) Recommend continuing with Boost Plus TID to help provide 1080kcal and 42gPRO.     3.) RD to monitor diet advancement, PO intake, labs, wt.      Goals: Meet % of EEN/EPN by next RD f/u  Nutrition Goal Status: goal met  Communication of RD Recs:  (POC)    Assessment and Plan    Endocrine  Moderate malnutrition    Nutrition Problem:  Moderate Protein-Calorie Malnutrition  Malnutrition in the context of Acute Illness/Injury    Related to (etiology):  Inability to consume sufficient energy     Signs and Symptoms (as evidenced by):  Body Fat Depletion: moderate depletion of orbitals and triceps   Muscle Mass Depletion: moderate depletion of temples, clavicle region, scapular region and lower extremities   Weight Loss: 7.5% x 3 months     Interventions(treatment strategy):  Collaboration of nutrition care w/ other providers  PN    Nutrition Diagnosis Status:  New        Malnutrition Assessment    Weight Loss (Malnutrition): 7.5% in 3 months  Subcutaneous Fat (Malnutrition): moderate depletion  Muscle Mass (Malnutrition): moderate depletion       Reason for Assessment    Reason For Assessment: consult  Diagnosis: other (see comments) (Gastric ca)  Interdisciplinary Rounds: did not attend  General Information Comments: Pt consulted for a FLD education and soft food diet education. TPN ran out on 3/21 and diet advanced from FLD to soft diet on 3/21. Pt stated to RD that they have been drinking their liquids as ordered and drinking Boost as ordered TID. Pt stated that they understood the importance of eating PRO. Pt stated that they understood the contents of the materials presented. RD stressed that pt should listen to MD and diet advancement. Pt stated that they understood and had no other questions or concerns at this time. NFPE performed on 3/12- RD found pt met  "criteria of Moderate Malnutrition.  Nutrition Discharge Planning: Pending clinical course    Nutrition Risk Screen    Nutrition Risk Screen: no indicators present    Nutrition/Diet History    Spiritual, Cultural Beliefs, Anabaptism Practices, Values that Affect Care: no  Factors Affecting Nutritional Intake: altered gastrointestinal function    Anthropometrics    Temp: 100.2 °F (37.9 °C)  Height: 5' 6" (167.6 cm)  Height (inches): 66 in  Weight Method: Bed Scale  Weight: 73 kg (160 lb 15 oz)  Weight (lb): 160.94 lb  Ideal Body Weight (IBW), Female: 130 lb  % Ideal Body Weight, Female (lb): 123.8 %  BMI (Calculated): 26  BMI Grade: 25 - 29.9 - overweight  Usual Body Weight (UBW), k.9 kg  % Usual Body Weight: 92.72  % Weight Change From Usual Weight: -7.48 %     Lab/Procedures/Meds    Pertinent Labs Reviewed: reviewed  Pertinent Labs Comments: PRO: 5.8, alb: 2.3, pre-alb: 12  Pertinent Medications Reviewed: reviewed  Pertinent Medications Comments: atorvastatin, enoxaparin, pantoprazole, insulin    Estimated/Assessed Needs    Weight Used For Calorie Calculations: 73 kg (160 lb 15 oz)  Energy Calorie Requirements (kcal): 1825 kcal/d  Energy Need Method: Kcal/kg (25 kcal/kg)  Protein Requirements: 95 g/d (1.3 g/kg)  Weight Used For Protein Calculations: 73 kg (160 lb 15 oz)     Estimated Fluid Requirement Method: other (see comments) (Per MD or 1 mL/kcal)  RDA Method (mL): 1825     Nutrition Prescription Ordered    Current Diet Order: [facility] soft diet  Current Nutrition Support Formula Ordered:  (-)    Evaluation of Received Nutrient/Fluid Intake    Parenteral Calories (kcal): 0  Parenteral Protein (gm): 0  Parenteral Fluid (mL): 0  Lipid Calories (kcals): 0 kcals  GIR (Glucose Infusion Rate) (mg/kg/min): 0 mg/kg/min  I/O: +11.2L since 3/13  Energy Calories Required: meeting needs  Protein Required: meeting needs  Fluid Required:  (as per MD)  Comments: LBM 3/21  Tolerance: tolerating  % Intake of Estimated Energy " Needs: 75 - 100 %  % Meal Intake: 75 - 100 %    Nutrition Risk    Level of Risk/Frequency of Follow-up:  (RD to f/u x1/week)     Monitor and Evaluation    Food and Nutrient Intake: energy intake, food and beverage intake  Food and Nutrient Adminstration: diet order  Knowledge/Beliefs/Attitudes: food and nutrition knowledge/skill, beliefs and attitudes  Physical Activity and Function: nutrition-related ADLs and IADLs  Anthropometric Measurements: weight, weight change, body mass index  Biochemical Data, Medical Tests and Procedures: electrolyte and renal panel, gastrointestinal profile, glucose/endocrine profile, inflammatory profile, lipid profile  Nutrition-Focused Physical Findings: overall appearance, skin     Nutrition Follow-Up    RD Follow-up?: Yes

## 2023-03-21 NOTE — PROGRESS NOTES
[facility]  General Surgery  Progress Note    Subjective:     History of Present Illness:  Nan Simms is a 67 year old female with PMHx of HTN and hypothyroidism who has had constipation and abdominal pain for 3 weeks. Says she became increasingly bloated and abdominal pain worsened and went to [facility] yesterday and was transferred to [facility] for higher level of care. She reports using enemas to relieve constipation but was unable to find relief and came to ED. She has not been nauseous however reports she vomited 3 times two days ago. She has been tolerating a solid and liquid diet. Reports has lost 13 lbs in last 3 weeks. Of note, she also notes a loss of appetite and increased fatigue over last several weeks. Reports she cannot recall if she has ever gotten a colonoscopy but daughter reports she does Cologuard yearly; patient says she has not done Cologuard in 4 years.    Upon admission, she was HDS and labs were consistent with iron deficiency anemia. CT A/P obtained 3/9 showed findings concerning for malignancy involving the pylorus and duodenum with component of gastric outlet obstruction.  NGT was placed 3/9 and patient has had resolution of her abdominal pain, nausea, and vomiting since placement.      Post-Op Info:  Procedure(s) (LRB):  WHIPPLE PROCEDURE (N/A)   6 Days Post-Op     Interval History: Doing well. Tolerating diet without nausea/vomiting. Ambulating out of bed with assistance    Medications:  Continuous Infusions:  Scheduled Meds:   aspirin  81 mg Oral Daily    atorvastatin  20 mg Oral Daily    enoxaparin  40 mg Subcutaneous Daily    magnesium oxide  800 mg Oral Once    metoclopramide HCl  10 mg Oral TID AC    pantoprazole  40 mg Oral Before breakfast    potassium, sodium phosphates  2 packet Oral QID (AC & HS)    sodium chloride 0.9%  10 mL Intravenous Q6H     PRN Meds:sodium chloride, dextrose 10%, dextrose 10%, dextrose, dextrose, glucagon (human recombinant), glucagon  "(human recombinant), hydrALAZINE, insulin aspart U-100, labetalol, melatonin, ondansetron, Flushing PICC Protocol **AND** sodium chloride 0.9% **AND** sodium chloride 0.9%, traMADoL, traMADoL     Review of patient's allergies indicates:   Allergen Reactions    Aspirin Other (See Comments)     Pt states it is "hard on her stomach" She can tolerate a low dose aspirin    Pcn [penicillins]      Childhood      Objective:     Vital Signs (Most Recent):  Temp: 98.1 °F (36.7 °C) (03/21/23 1135)  Pulse: 64 (03/21/23 1135)  Resp: 20 (03/21/23 1135)  BP: 131/71 (03/21/23 1135)  SpO2: (!) 92 % (03/21/23 1135)   Vital Signs (24h Range):  Temp:  [98.1 °F (36.7 °C)-100.2 °F (37.9 °C)] 98.1 °F (36.7 °C)  Pulse:  [64-82] 64  Resp:  [16-20] 20  SpO2:  [92 %-98 %] 92 %  BP: (126-155)/(57-71) 131/71     Weight: 73 kg (160 lb 15 oz)  Body mass index is 25.98 kg/m².    Intake/Output - Last 3 Shifts         03/19 0700  03/20 0659 03/20 0700  03/21 0659 03/21 0700  03/22 0659    P.O. 720 240     IV Piggyback       TPN 1435.7 226.7     Total Intake(mL/kg) 2155.7 (29.5) 466.7 (6.4)     Urine (mL/kg/hr) 850 (0.5)      Drains       Stool 0      Total Output 850      Net +1305.7 +466.7            Urine Occurrence 2 x 7 x 1 x    Stool Occurrence 0 x 7 x 0 x            Physical Exam  Vitals and nursing note reviewed.   Constitutional:       General: She is not in acute distress.  Cardiovascular:      Rate and Rhythm: Normal rate.      Pulses: Normal pulses.      Heart sounds: No murmur heard.  Pulmonary:      Effort: Pulmonary effort is normal. No respiratory distress.   Abdominal:      General: There is no distension.      Palpations: Abdomen is soft.      Tenderness: There is no abdominal tenderness.      Comments: Midline incision clean/dry    Neurological:      Mental Status: She is alert.       Significant Labs:  I have reviewed all pertinent lab results within the past 24 hours.  CBC:   Recent Labs   Lab 03/21/23  0500   WBC 10.81   RBC " 3.40*   HGB 7.8*   HCT 25.0*      MCV 74*   MCH 22.9*   MCHC 31.2*     BMP:   Recent Labs   Lab 03/21/23  0500     104     138   K 3.6  3.6     101   CO2 28  28   BUN 18  18   CREATININE 0.5  0.5   CALCIUM 9.1  9.1   MG 1.9     CMP:   Recent Labs   Lab 03/21/23  0500     104   CALCIUM 9.1  9.1   ALBUMIN 2.3*   PROT 5.8*     138   K 3.6  3.6   CO2 28  28     101   BUN 18  18   CREATININE 0.5  0.5   ALKPHOS 103   ALT 31   AST 30   BILITOT 0.4       Significant Diagnostics:  I have reviewed all pertinent imaging results/findings within the past 24 hours.    Assessment/Plan:     * Gastric cancer  Nan Simms is a 66 yo female with PMHx of HTN and hypothyroidism here for gastric outlet obstruction. Now s/p Whipple 3/15     - Full liquid diet & Boost  - PPI  - PRN tramadol  - daily suppository  - home meds  - TPN/lipids  - IS  - PT/OT    Dispo: continue GISSU, Possible DC tomorrow on a full liquid diet            Vivian Jefferson MD  General Surgery  [facility] [place]

## 2023-03-21 NOTE — PLAN OF CARE
Plan of care reviewed with pt:  -Pt did not sleep much overnight,   -AAOx4, on RA, VS stable, highest temp was 99.6  -ABD midline incision with DB CDI MICHELE  -TPN ran out at 0450, so scheduled 4AM Novolog hold . BG was in the 100s-130  -Denied nausea  -Pain under control  -Passed flatus, had 3 BMs  -Ambulated to the bathroom by herself   -SCD on. Call light in reach, WCTM

## 2023-03-21 NOTE — CARE UPDATE
-Glucose Goal 140-180    -A1C:   Hemoglobin A1C   Date Value Ref Range Status   03/09/2023 5.6 4.0 - 5.6 % Final     Comment:     ADA Screening Guidelines:  5.7-6.4%  Consistent with prediabetes  >or=6.5%  Consistent with diabetes    High levels of fetal hemoglobin interfere with the HbA1C  assay. Heterozygous hemoglobin variants (HbS, HgC, etc)do  not significantly interfere with this assay.   However, presence of multiple variants may affect accuracy.              -GLUCOSE TREND FOR THE PAST 24HRS:   Recent Labs   Lab 03/20/23 2022 03/20/23  2144 03/21/23  0011 03/21/23  0217 03/21/23  0458 03/21/23  0725   POCTGLUCOSE 130* 109 116* 114* 111* 111*           -NO HYPOGYCEMIAS NOTED     - Diet  Diet Dysphagia Mechanical Soft (IDDSI Level 5)    Remains in 1003/1003 A  T2DM well controlled not on meds. S/p whipple. TPN ran out this morning. 6 Days Post-Op. BG at or below goal. Discontinue scheduled insulin.           Plan:   --discontinue scheduled novolog.   -Continue Novolog LDC SSI (150/50).   - BG checks ac/hs  - Hypoglycemia protocol in place        ** Please notify Endocrine for any change and/or advance in diet**  ** Please call Endocrine for any BG related issues **     Discharge Planning:   TBD. Please notify endocrinology prior to discharge.

## 2023-03-21 NOTE — PLAN OF CARE
Per Stephanie Brown with PHN,  has not yet been assigned.  Stephanie Brown will call me bacl later today with assignment.  Will continue to follow for d/c needs.    1020am Update: Pt assigned to [facility]"" by PHN.  Pt on schedule to be seen tomorrow.    1:45pm Update: Pt will not be discharging today according to medical team, updated [facility].      Kaycee Pennington RN CM  Case Management  [phone]

## 2023-03-21 NOTE — PLAN OF CARE
Recommendations     1.) Recommend continuing with current diet of mech soft, ADAT.      2.) Recommend continuing with Boost Plus TID to help provide 1080kcal and 42gPRO.      3.) RD to monitor diet advancement, PO intake, labs, wt.        Goals: Meet % of EEN/EPN by next RD f/u  Nutrition Goal Status: goal met  Communication of RD Recs:  (POC)

## 2023-03-21 NOTE — SUBJECTIVE & OBJECTIVE
"Interval History: Doing well. Tolerating diet without nausea/vomiting. Ambulating out of bed with assistance    Medications:  Continuous Infusions:  Scheduled Meds:   aspirin  81 mg Oral Daily    atorvastatin  20 mg Oral Daily    enoxaparin  40 mg Subcutaneous Daily    magnesium oxide  800 mg Oral Once    metoclopramide HCl  10 mg Oral TID AC    pantoprazole  40 mg Oral Before breakfast    potassium, sodium phosphates  2 packet Oral QID (AC & HS)    sodium chloride 0.9%  10 mL Intravenous Q6H     PRN Meds:sodium chloride, dextrose 10%, dextrose 10%, dextrose, dextrose, glucagon (human recombinant), glucagon (human recombinant), hydrALAZINE, insulin aspart U-100, labetalol, melatonin, ondansetron, Flushing PICC Protocol **AND** sodium chloride 0.9% **AND** sodium chloride 0.9%, traMADoL, traMADoL     Review of patient's allergies indicates:   Allergen Reactions    Aspirin Other (See Comments)     Pt states it is "hard on her stomach" She can tolerate a low dose aspirin    Pcn [penicillins]      Childhood      Objective:     Vital Signs (Most Recent):  Temp: 98.1 °F (36.7 °C) (03/21/23 1135)  Pulse: 64 (03/21/23 1135)  Resp: 20 (03/21/23 1135)  BP: 131/71 (03/21/23 1135)  SpO2: (!) 92 % (03/21/23 1135)   Vital Signs (24h Range):  Temp:  [98.1 °F (36.7 °C)-100.2 °F (37.9 °C)] 98.1 °F (36.7 °C)  Pulse:  [64-82] 64  Resp:  [16-20] 20  SpO2:  [92 %-98 %] 92 %  BP: (126-155)/(57-71) 131/71     Weight: 73 kg (160 lb 15 oz)  Body mass index is 25.98 kg/m².    Intake/Output - Last 3 Shifts         03/19 0700 03/20 0659 03/20 0700 03/21 0659 03/21 0700 03/22 0659    P.O. 720 240     IV Piggyback       TPN 1435.7 226.7     Total Intake(mL/kg) 2155.7 (29.5) 466.7 (6.4)     Urine (mL/kg/hr) 850 (0.5)      Drains       Stool 0      Total Output 850      Net +1305.7 +466.7            Urine Occurrence 2 x 7 x 1 x    Stool Occurrence 0 x 7 x 0 x            Physical Exam  Vitals and nursing note reviewed.   Constitutional:       " General: She is not in acute distress.  Cardiovascular:      Rate and Rhythm: Normal rate.      Pulses: Normal pulses.      Heart sounds: No murmur heard.  Pulmonary:      Effort: Pulmonary effort is normal. No respiratory distress.   Abdominal:      General: There is no distension.      Palpations: Abdomen is soft.      Tenderness: There is no abdominal tenderness.      Comments: Midline incision clean/dry    Neurological:      Mental Status: She is alert.       Significant Labs:  I have reviewed all pertinent lab results within the past 24 hours.  CBC:   Recent Labs   Lab 03/21/23  0500   WBC 10.81   RBC 3.40*   HGB 7.8*   HCT 25.0*      MCV 74*   MCH 22.9*   MCHC 31.2*     BMP:   Recent Labs   Lab 03/21/23  0500     104     138   K 3.6  3.6     101   CO2 28  28   BUN 18  18   CREATININE 0.5  0.5   CALCIUM 9.1  9.1   MG 1.9     CMP:   Recent Labs   Lab 03/21/23  0500     104   CALCIUM 9.1  9.1   ALBUMIN 2.3*   PROT 5.8*     138   K 3.6  3.6   CO2 28  28     101   BUN 18  18   CREATININE 0.5  0.5   ALKPHOS 103   ALT 31   AST 30   BILITOT 0.4       Significant Diagnostics:  I have reviewed all pertinent imaging results/findings within the past 24 hours.

## 2023-03-21 NOTE — ASSESSMENT & PLAN NOTE
Nan Simms is a 68 yo female with PMHx of HTN and hypothyroidism here for gastric outlet obstruction. Now s/p Sintiaipple 3/15     - Diabetic diet & Boost  - PPI  - PRN tramadol  - daily suppository  - home meds  - TPN/lipids  - IS  - PT/OT    Dispo: continue GISSU, Possible DC tomorrow

## 2023-03-22 LAB
ANION GAP SERPL CALC-SCNC: 12 MMOL/L (ref 8–16)
BASOPHILS # BLD AUTO: 0.03 K/UL (ref 0–0.2)
BASOPHILS NFR BLD: 0.2 % (ref 0–1.9)
BUN SERPL-MCNC: 13 MG/DL (ref 8–23)
CALCIUM SERPL-MCNC: 9.4 MG/DL (ref 8.7–10.5)
CHLORIDE SERPL-SCNC: 98 MMOL/L (ref 95–110)
CO2 SERPL-SCNC: 28 MMOL/L (ref 23–29)
CREAT SERPL-MCNC: 0.5 MG/DL (ref 0.5–1.4)
DIFFERENTIAL METHOD: ABNORMAL
EOSINOPHIL # BLD AUTO: 0.5 K/UL (ref 0–0.5)
EOSINOPHIL NFR BLD: 3.1 % (ref 0–8)
ERYTHROCYTE [DISTWIDTH] IN BLOOD BY AUTOMATED COUNT: 17.2 % (ref 11.5–14.5)
EST. GFR  (NO RACE VARIABLE): >60 ML/MIN/1.73 M^2
GLUCOSE SERPL-MCNC: 89 MG/DL (ref 70–110)
HCT VFR BLD AUTO: 25.4 % (ref 37–48.5)
HGB BLD-MCNC: 8.2 G/DL (ref 12–16)
IMM GRANULOCYTES # BLD AUTO: 0.25 K/UL (ref 0–0.04)
IMM GRANULOCYTES NFR BLD AUTO: 1.7 % (ref 0–0.5)
LYMPHOCYTES # BLD AUTO: 1.2 K/UL (ref 1–4.8)
LYMPHOCYTES NFR BLD: 8.2 % (ref 18–48)
MAGNESIUM SERPL-MCNC: 1.8 MG/DL (ref 1.6–2.6)
MCH RBC QN AUTO: 23.3 PG (ref 27–31)
MCHC RBC AUTO-ENTMCNC: 32.3 G/DL (ref 32–36)
MCV RBC AUTO: 72 FL (ref 82–98)
MONOCYTES # BLD AUTO: 1.6 K/UL (ref 0.3–1)
MONOCYTES NFR BLD: 11 % (ref 4–15)
NEUTROPHILS # BLD AUTO: 10.9 K/UL (ref 1.8–7.7)
NEUTROPHILS NFR BLD: 75.8 % (ref 38–73)
NRBC BLD-RTO: 0 /100 WBC
PHOSPHATE SERPL-MCNC: 3.7 MG/DL (ref 2.7–4.5)
PLATELET # BLD AUTO: 277 K/UL (ref 150–450)
PMV BLD AUTO: 10.7 FL (ref 9.2–12.9)
POCT GLUCOSE: 104 MG/DL (ref 70–110)
POCT GLUCOSE: 109 MG/DL (ref 70–110)
POCT GLUCOSE: 109 MG/DL (ref 70–110)
POCT GLUCOSE: 113 MG/DL (ref 70–110)
POTASSIUM SERPL-SCNC: 4.5 MMOL/L (ref 3.5–5.1)
RBC # BLD AUTO: 3.52 M/UL (ref 4–5.4)
SODIUM SERPL-SCNC: 138 MMOL/L (ref 136–145)
WBC # BLD AUTO: 14.44 K/UL (ref 3.9–12.7)

## 2023-03-22 PROCEDURE — 94761 N-INVAS EAR/PLS OXIMETRY MLT: CPT

## 2023-03-22 PROCEDURE — 25000003 PHARM REV CODE 250: Performed by: STUDENT IN AN ORGANIZED HEALTH CARE EDUCATION/TRAINING PROGRAM

## 2023-03-22 PROCEDURE — 99900031 HC PATIENT EDUCATION (STAT)

## 2023-03-22 PROCEDURE — 94799 UNLISTED PULMONARY SVC/PX: CPT

## 2023-03-22 PROCEDURE — 80048 BASIC METABOLIC PNL TOTAL CA: CPT | Performed by: STUDENT IN AN ORGANIZED HEALTH CARE EDUCATION/TRAINING PROGRAM

## 2023-03-22 PROCEDURE — 84100 ASSAY OF PHOSPHORUS: CPT | Performed by: STUDENT IN AN ORGANIZED HEALTH CARE EDUCATION/TRAINING PROGRAM

## 2023-03-22 PROCEDURE — 97116 GAIT TRAINING THERAPY: CPT | Mod: CQ

## 2023-03-22 PROCEDURE — 83735 ASSAY OF MAGNESIUM: CPT | Performed by: STUDENT IN AN ORGANIZED HEALTH CARE EDUCATION/TRAINING PROGRAM

## 2023-03-22 PROCEDURE — 63600175 PHARM REV CODE 636 W HCPCS: Performed by: STUDENT IN AN ORGANIZED HEALTH CARE EDUCATION/TRAINING PROGRAM

## 2023-03-22 PROCEDURE — 25500020 PHARM REV CODE 255

## 2023-03-22 PROCEDURE — A4216 STERILE WATER/SALINE, 10 ML: HCPCS | Performed by: STUDENT IN AN ORGANIZED HEALTH CARE EDUCATION/TRAINING PROGRAM

## 2023-03-22 PROCEDURE — 97530 THERAPEUTIC ACTIVITIES: CPT

## 2023-03-22 PROCEDURE — 20600001 HC STEP DOWN PRIVATE ROOM

## 2023-03-22 PROCEDURE — 25000003 PHARM REV CODE 250: Performed by: NURSE PRACTITIONER

## 2023-03-22 PROCEDURE — 25500020 PHARM REV CODE 255: Performed by: SURGERY

## 2023-03-22 PROCEDURE — 94664 DEMO&/EVAL PT USE INHALER: CPT

## 2023-03-22 PROCEDURE — 99900035 HC TECH TIME PER 15 MIN (STAT)

## 2023-03-22 PROCEDURE — 85025 COMPLETE CBC W/AUTO DIFF WBC: CPT | Performed by: STUDENT IN AN ORGANIZED HEALTH CARE EDUCATION/TRAINING PROGRAM

## 2023-03-22 PROCEDURE — 27000646 HC AEROBIKA DEVICE

## 2023-03-22 PROCEDURE — 97530 THERAPEUTIC ACTIVITIES: CPT | Mod: CQ

## 2023-03-22 RX ORDER — LANOLIN ALCOHOL/MO/W.PET/CERES
800 CREAM (GRAM) TOPICAL ONCE
Status: COMPLETED | OUTPATIENT
Start: 2023-03-22 | End: 2023-03-22

## 2023-03-22 RX ADMIN — Medication 1 ENEMA: at 09:03

## 2023-03-22 RX ADMIN — ATORVASTATIN CALCIUM 20 MG: 20 TABLET, FILM COATED ORAL at 10:03

## 2023-03-22 RX ADMIN — Medication 10 ML: at 05:03

## 2023-03-22 RX ADMIN — ENOXAPARIN SODIUM 40 MG: 40 INJECTION SUBCUTANEOUS at 06:03

## 2023-03-22 RX ADMIN — METOCLOPRAMIDE 10 MG: 5 TABLET ORAL at 06:03

## 2023-03-22 RX ADMIN — Medication 800 MG: at 10:03

## 2023-03-22 RX ADMIN — Medication 10 ML: at 06:03

## 2023-03-22 RX ADMIN — ASPIRIN 81 MG: 81 TABLET, COATED ORAL at 10:03

## 2023-03-22 RX ADMIN — IOHEXOL 75 ML: 350 INJECTION, SOLUTION INTRAVENOUS at 01:03

## 2023-03-22 RX ADMIN — METOCLOPRAMIDE 10 MG: 5 TABLET ORAL at 05:03

## 2023-03-22 RX ADMIN — IOHEXOL 15 ML: 350 INJECTION, SOLUTION INTRAVENOUS at 10:03

## 2023-03-22 RX ADMIN — PANTOPRAZOLE SODIUM 40 MG: 40 TABLET, DELAYED RELEASE ORAL at 05:03

## 2023-03-22 NOTE — ASSESSMENT & PLAN NOTE
Nan Simms is a 68 yo female with PMHx of HTN and hypothyroidism here for gastric outlet obstruction. Now s/p Whipple 3/15. Low grade temp and WBC 14(10) overnight. Will obtain CT abdomen/pelvis and IV, PO contrast today.     - Diabetic diet & Boost  - PPI  - PRN tramadol  - daily suppository  - home meds  - TPN/lipids  - IS  - PT/OT    Dispo: [place]

## 2023-03-22 NOTE — PT/OT/SLP PROGRESS
"Occupational Therapy   Treatment    Name: Nan Simms  MRN: [id number]  Admitting Diagnosis:  Gastric cancer  7 Days Post-Op    Recommendations:     Discharge Recommendations: home  Discharge Equipment Recommendations:  walker, rolling  Barriers to discharge:  None    Assessment:     Nan Simms is a 67 y.o. female with a medical diagnosis of Gastric cancer.  She presents with the following performance deficits affecting function are weakness, impaired endurance, impaired self care skills, impaired functional mobility, impaired balance, gait instability. Pt required supervision for UB dressing, SBA for grooming at sink, supervision for STS with RW from EOC, and SBA x 1 w/ RW during functional mobility as further detailed below. Pt is progressing towards fxnl goals, however pt is a fall risk and would benefit from continued skilled acute OT services to address the above listed deficits so that pt may D/C to least restrictive environment with decreased risk of falls or readmissions.     Rehab Prognosis:  Good; patient would benefit from acute skilled OT services to address these deficits and reach maximum level of function.       Plan:     Patient to be seen 3 x/week to address the above listed problems via self-care/home management, therapeutic activities, therapeutic exercises  Plan of Care Expires: 04/17/23  Plan of Care Reviewed with: patient, spouse    Subjective     Chief Complaint: no c/o pain  Patient/Family Comments/goals: "Ready to get back home!"  Pain/Comfort:  Pain Rating 1: 0/10  Pain Rating Post-Intervention 1: 0/10    Objective:     Communicated with: nurse prior to session.  Patient found up in chair with  (all lines intact and spouse present) upon OT entry to room.    General Precautions: Standard, fall    Orthopedic Precautions:N/A  Braces: N/A  Respiratory Status: Room air    Functional Mobility/Transfers:  Patient completed Sit <> Stand Transfer with supervision  with  rolling walker "   Functional Mobility: Pt engaged in functional mobility to simulate household/community distances with SBA x 1 w/ RW  in order to maximize functional activity tolerance required for engagement in occupations of choice.  Pt ambulated ~80 ft with SBA x 1 w/ RW  Decreased step length and increased time required with very slow abby.      Activities of Daily Living:  Feeding:  independence while seated upright in chair to eat breakfast    Grooming: stand by assistance to wash face and brush teeth at sink side with RW positioned in front  Upper Body Dressing: supervision to prasanna second gown on back seated EOC      [facility] 6 Click ADL: 20    Treatment & Education:  Pt completed 3 x 10 body weight UB exercises seated EOC with good effort (shoulder press, chest press, and bicep curls).  Pt educated on role of OT, POC, and goals for therapy.    POC was dicussed with patient/caregiver, who was included in its development and is in agreement with the identified goals and treatment plan.   Patient and family aware of patient's deficits and therapy progression.   Time provided for therapeutic counseling and discussion of health disposition.   Educated on importance of EOB/OOB mobility, maintaining routine, sitting up in chair, and maximizing independence with ADLs during admission   Pt completed ADLs and functional mobility for treatment session as noted above   Pt/caregiver verbalized understanding and expressed no further concerns/questions.      Patient left up in chair with all lines intact and call button in reach    GOALS:   Multidisciplinary Problems       Occupational Therapy Goals          Problem: Occupational Therapy    Goal Priority Disciplines Outcome Interventions   Occupational Therapy Goal     OT, PT/OT Ongoing, Progressing    Description: Goals to be met by: 3/31/23     Patient will increase functional independence with ADLs by performing:    UE Dressing with Supervision.  LE Dressing with Supervision and  Assistive Devices as needed.  Grooming while standing at sink with Supervision.  Toileting from toilet with Supervision for hygiene and clothing management.   Supine to sit with Supervision.  Step transfer with Supervision  Upper extremity exercise program with supervision, to improve strength and function.                         Time Tracking:     OT Date of Treatment: 03/22/23  OT Start Time: 0912  OT Stop Time: 0926  OT Total Time (min): 14 min    Billable Minutes:Therapeutic Activity 14    OT/MICHELE: OT     Number of MICHELE visits since last OT visit: 0    3/22/2023

## 2023-03-22 NOTE — PROGRESS NOTES
Lai Clarke - [facility]  General Surgery  Progress Note    Subjective:     History of Present Illness:  Nan Simms is a 67 year old female with PMHx of HTN and hypothyroidism who has had constipation and abdominal pain for 3 weeks. Says she became increasingly bloated and abdominal pain worsened and went to [facility] yesterday and was transferred to [facility] for higher level of care. She reports using enemas to relieve constipation but was unable to find relief and came to ED. She has not been nauseous however reports she vomited 3 times two days ago. She has been tolerating a solid and liquid diet. Reports has lost 13 lbs in last 3 weeks. Of note, she also notes a loss of appetite and increased fatigue over last several weeks. Reports she cannot recall if she has ever gotten a colonoscopy but daughter reports she does Cologuard yearly; patient says she has not done Cologuard in 4 years.    Upon admission, she was HDS and labs were consistent with iron deficiency anemia. CT A/P obtained 3/9 showed findings concerning for malignancy involving the pylorus and duodenum with component of gastric outlet obstruction.  NGT was placed 3/9 and patient has had resolution of her abdominal pain, nausea, and vomiting since placement.      Post-Op Info:  Procedure(s) (LRB):  WHIPPLE PROCEDURE (N/A)   7 Days Post-Op     Interval History: Low grade temp 100.4 overnight. WBC 14(10). Some abdominal discomfort this morning     Medications:  Continuous Infusions:  Scheduled Meds:   aspirin  81 mg Oral Daily    atorvastatin  20 mg Oral Daily    enoxaparin  40 mg Subcutaneous Daily    magnesium oxide  800 mg Oral Once    metoclopramide HCl  10 mg Oral TID AC    pantoprazole  40 mg Oral Before breakfast    sodium chloride 0.9%  10 mL Intravenous Q6H     PRN Meds:sodium chloride, dextrose 10%, dextrose 10%, dextrose, dextrose, glucagon (human recombinant), glucagon (human recombinant), hydrALAZINE, insulin aspart U-100, iohexol,  "labetalol, melatonin, ondansetron, Flushing PICC Protocol **AND** sodium chloride 0.9% **AND** sodium chloride 0.9%, traMADoL, traMADoL     Review of patient's allergies indicates:   Allergen Reactions    Aspirin Other (See Comments)     Pt states it is "hard on her stomach" She can tolerate a low dose aspirin    Pcn [penicillins]      Childhood      Objective:     Vital Signs (Most Recent):  Temp: 98.4 °F (36.9 °C) (03/22/23 0803)  Pulse: 70 (03/22/23 0803)  Resp: 20 (03/22/23 0803)  BP: 117/63 (03/22/23 0803)  SpO2: (!) 94 % (03/22/23 0803)   Vital Signs (24h Range):  Temp:  [98.1 °F (36.7 °C)-100.4 °F (38 °C)] 98.4 °F (36.9 °C)  Pulse:  [64-88] 70  Resp:  [16-20] 20  SpO2:  [92 %-99 %] 94 %  BP: (117-160)/(58-71) 117/63     Weight: 73 kg (160 lb 15 oz)  Body mass index is 25.98 kg/m².    Intake/Output - Last 3 Shifts         03/20 0700  03/21 0659 03/21 0700  03/22 0659 03/22 0700  03/23 0659    P.O. 240 480     .7      Total Intake(mL/kg) 466.7 (6.4) 480 (6.6)     Urine (mL/kg/hr)       Stool       Total Output       Net +466.7 +480            Urine Occurrence 7 x 3 x     Stool Occurrence 7 x 0 x             Physical Exam  Vitals and nursing note reviewed.   Constitutional:       General: She is not in acute distress.  Cardiovascular:      Rate and Rhythm: Normal rate.      Pulses: Normal pulses.   Pulmonary:      Effort: Pulmonary effort is normal. No respiratory distress.   Abdominal:      General: There is no distension.      Palpations: Abdomen is soft.      Tenderness: There is abdominal tenderness (epigastric).      Comments: Midline incision clean/dry; vague epigastric tenderness; soft and non-distended   Neurological:      General: No focal deficit present.      Mental Status: She is alert and oriented to person, place, and time.       Significant Labs:  I have reviewed all pertinent lab results within the past 24 hours.  CBC:   Recent Labs   Lab 03/22/23  0301   WBC 14.44*   RBC 3.52*   HGB " 8.2*   HCT 25.4*      MCV 72*   MCH 23.3*   MCHC 32.3     BMP:   Recent Labs   Lab 03/22/23  0301   GLU 89      K 4.5   CL 98   CO2 28   BUN 13   CREATININE 0.5   CALCIUM 9.4   MG 1.8     CMP:   Recent Labs   Lab 03/21/23  0500 03/22/23  0301     104 89   CALCIUM 9.1  9.1 9.4   ALBUMIN 2.3*  --    PROT 5.8*  --      138 138   K 3.6  3.6 4.5   CO2 28  28 28     101 98   BUN 18  18 13   CREATININE 0.5  0.5 0.5   ALKPHOS 103  --    ALT 31  --    AST 30  --    BILITOT 0.4  --        Significant Diagnostics:  I have reviewed all pertinent imaging results/findings within the past 24 hours.    Assessment/Plan:     * Gastric cancer  Nan Simms is a 68 yo female with PMHx of HTN and hypothyroidism here for gastric outlet obstruction. Now s/p Whipple 3/15. Low grade temp and WBC 14(10) overnight. Will obtain CT abdomen/pelvis and IV, PO contrast today.     - Diabetic diet & Boost  - PPI  - PRN tramadol  - daily suppository  - home meds  - TPN/lipids  - IS  - PT/OT    Dispo: MARGARITA Jefferson MD  General Surgery  Lai Clarke - MARGARITA

## 2023-03-22 NOTE — SUBJECTIVE & OBJECTIVE
"Interval History: Low grade temp 100.4 overnight. WBC 14(10). Some abdominal discomfort this morning     Medications:  Continuous Infusions:  Scheduled Meds:   aspirin  81 mg Oral Daily    atorvastatin  20 mg Oral Daily    enoxaparin  40 mg Subcutaneous Daily    magnesium oxide  800 mg Oral Once    metoclopramide HCl  10 mg Oral TID AC    pantoprazole  40 mg Oral Before breakfast    sodium chloride 0.9%  10 mL Intravenous Q6H     PRN Meds:sodium chloride, dextrose 10%, dextrose 10%, dextrose, dextrose, glucagon (human recombinant), glucagon (human recombinant), hydrALAZINE, insulin aspart U-100, iohexol, labetalol, melatonin, ondansetron, Flushing PICC Protocol **AND** sodium chloride 0.9% **AND** sodium chloride 0.9%, traMADoL, traMADoL     Review of patient's allergies indicates:   Allergen Reactions    Aspirin Other (See Comments)     Pt states it is "hard on her stomach" She can tolerate a low dose aspirin    Pcn [penicillins]      Childhood      Objective:     Vital Signs (Most Recent):  Temp: 98.4 °F (36.9 °C) (03/22/23 0803)  Pulse: 70 (03/22/23 0803)  Resp: 20 (03/22/23 0803)  BP: 117/63 (03/22/23 0803)  SpO2: (!) 94 % (03/22/23 0803)   Vital Signs (24h Range):  Temp:  [98.1 °F (36.7 °C)-100.4 °F (38 °C)] 98.4 °F (36.9 °C)  Pulse:  [64-88] 70  Resp:  [16-20] 20  SpO2:  [92 %-99 %] 94 %  BP: (117-160)/(58-71) 117/63     Weight: 73 kg (160 lb 15 oz)  Body mass index is 25.98 kg/m².    Intake/Output - Last 3 Shifts         03/20 0700  03/21 0659 03/21 0700 03/22 0659 03/22 0700 03/23 0659    P.O. 240 480     .7      Total Intake(mL/kg) 466.7 (6.4) 480 (6.6)     Urine (mL/kg/hr)       Stool       Total Output       Net +466.7 +480            Urine Occurrence 7 x 3 x     Stool Occurrence 7 x 0 x             Physical Exam  Vitals and nursing note reviewed.   Constitutional:       General: She is not in acute distress.  Cardiovascular:      Rate and Rhythm: Normal rate.      Pulses: Normal pulses. "   Pulmonary:      Effort: Pulmonary effort is normal. No respiratory distress.   Abdominal:      General: There is no distension.      Palpations: Abdomen is soft.      Tenderness: There is abdominal tenderness (epigastric).      Comments: Midline incision clean/dry; vague epigastric tenderness; soft and non-distended   Neurological:      General: No focal deficit present.      Mental Status: She is alert and oriented to person, place, and time.       Significant Labs:  I have reviewed all pertinent lab results within the past 24 hours.  CBC:   Recent Labs   Lab 03/22/23 0301   WBC 14.44*   RBC 3.52*   HGB 8.2*   HCT 25.4*      MCV 72*   MCH 23.3*   MCHC 32.3     BMP:   Recent Labs   Lab 03/22/23 0301   GLU 89      K 4.5   CL 98   CO2 28   BUN 13   CREATININE 0.5   CALCIUM 9.4   MG 1.8     CMP:   Recent Labs   Lab 03/21/23  0500 03/22/23 0301     104 89   CALCIUM 9.1  9.1 9.4   ALBUMIN 2.3*  --    PROT 5.8*  --      138 138   K 3.6  3.6 4.5   CO2 28  28 28     101 98   BUN 18  18 13   CREATININE 0.5  0.5 0.5   ALKPHOS 103  --    ALT 31  --    AST 30  --    BILITOT 0.4  --        Significant Diagnostics:  I have reviewed all pertinent imaging results/findings within the past 24 hours.

## 2023-03-22 NOTE — CARE UPDATE
Pt on CT table. PICC line flushed with 10 ns on red port and attached to injector, using sterile technique. Pt tolerated exam well and PICC disconnected from injector.

## 2023-03-22 NOTE — PT/OT/SLP PROGRESS
Physical Therapy Treatment    Patient Name:  Nan Simms   MRN:  [id number]    Recommendations:     Discharge Recommendations: home  Discharge Equipment Recommendations: walker, rolling  Barriers to discharge: None    Assessment:     Nan Simms is a 67 y.o. female admitted with a medical diagnosis of Gastric cancer.  She presents with the following impairments/functional limitations: weakness, impaired endurance, impaired self care skills, impaired functional mobility, impaired balance, gait instability . Pt cooperated, and participated well with therapy today. Pt required SBA for t/fs, ambulated 86ft SBA using RW, and tolerated BLE TE well. Pt will continue to benefit from PT services at this time to improve all deficits noted above. Resume PT POC as indicated.      Rehab Prognosis: Good; patient would benefit from acute skilled PT services to address these deficits and reach maximum level of function.    Recent Surgery: Procedure(s) (LRB):  WHIPPLE PROCEDURE (N/A) 7 Days Post-Op    Plan:     During this hospitalization, patient to be seen 3 x/week to address the identified rehab impairments via gait training, therapeutic activities, neuromuscular re-education, therapeutic exercises and progress toward the following goals:    Plan of Care Expires:  04/16/23    Subjective     Chief Complaint: none stated  Patient/Family Comments/goals: none stated  Pain/Comfort:  Pain Rating 1: 0/10  Pain Rating Post-Intervention 1: 0/10      Objective:     Communicated with nursing prior to session.  Patient found up in chair with  (all lines intact and spouse present) upon PT entry to room.     General Precautions: Standard, fall  Orthopedic Precautions: N/A  Braces: N/A  Respiratory Status: Room air     Functional Mobility:  Transfers:  Sit to Stand:  stand by assistance with rolling walker  Gait: 86ft SBA with RW. Pt with slow abby, but no LOB was noted.   Stairs: Deferred due to pt with reports of feeling woozy post gait  trial. Pt's nurse notified.       AM-PAC 6 CLICK MOBILITY  Turning over in bed (including adjusting bedclothes, sheets and blankets)?: 3  Sitting down on and standing up from a chair with arms (e.g., wheelchair, bedside commode, etc.): 3  Moving from lying on back to sitting on the side of the bed?: 3  Moving to and from a bed to a chair (including a wheelchair)?: 3  Need to walk in hospital room?: 3  Climbing 3-5 steps with a railing?: 2  Basic Mobility Total Score: 17       Treatment & Education:  -Pt performed standing/seated exercises x15 reps: HF (standing w/ RW and SBA), Calf raises (standing w/ RW and SBA), Hip abd/add (in sitting), and LAQ (in sitting)  -Pt educated on the importance/benefits of OOB activity.     Patient left up in chair with all lines intact, call button in reach, nurse notified, and spouse present..    GOALS:   Multidisciplinary Problems       Physical Therapy Goals          Problem: Physical Therapy    Goal Priority Disciplines Outcome Goal Variances Interventions   Physical Therapy Goal     PT, PT/OT Ongoing, Progressing     Description: Goals to be met by: 3/31/2023     Patient will increase functional independence with mobility by performin. Supine to sit with Alexander  2. Sit to supine with Alexander  3. Sit to stand transfer with Modified Alexander with RW  4. Bed to chair transfer with Modified Alexander using Rolling Walker  5. Gait  x 30 feet with Modified Alexander using Rolling Walker.   6. Lower extremity exercise program x15 reps per handout, with independence                         Time Tracking:     PT Received On: 23  PT Start Time: 1002     PT Stop Time: 1026  PT Total Time (min): 24 min     Billable Minutes: Gait Training 10 and Therapeutic Activity 13    Treatment Type: Treatment  PT/PTA: PTA     Number of PTA visits since last PT visit: 2     2023

## 2023-03-22 NOTE — PLAN OF CARE
Problem: Adult Inpatient Plan of Care  Goal: Plan of Care Review  Outcome: Ongoing, Progressing     Problem: Diabetes Comorbidity  Goal: Blood Glucose Level Within Targeted Range  Outcome: Ongoing, Progressing  Intervention: Monitor and Manage Glycemia  Flowsheets (Taken 3/22/2023 0412)  Glycemic Management: blood glucose monitored     Problem: Fall Injury Risk  Goal: Absence of Fall and Fall-Related Injury  Outcome: Ongoing, Progressing     Problem: Skin Injury Risk Increased  Goal: Skin Health and Integrity  Outcome: Ongoing, Progressing     POC reviewed with patient and spouse who remained at bedside this shift.  A/O x4.  Respirations unlabored.  Skin w/d.  Continent of b/b.  Up to bedside commode with x1 standby/min assist.  CBG monitoring in progress with no s/s of hypo/hyperglycemia noted.  ABD/incisional pain continues.  PRN Tramadol given x1 thus far in shift.  Tolerated meds whole with water without difficulty.  VSS.  See flowsheet for full assessment.  Able to verbalize wants/needs.  No s/s of distress.  Fall/safety precautions maintained.

## 2023-03-22 NOTE — CARE UPDATE
-Glucose Goal 140-180    -A1C:   Hemoglobin A1C   Date Value Ref Range Status   03/09/2023 5.6 4.0 - 5.6 % Final     Comment:     ADA Screening Guidelines:  5.7-6.4%  Consistent with prediabetes  >or=6.5%  Consistent with diabetes    High levels of fetal hemoglobin interfere with the HbA1C  assay. Heterozygous hemoglobin variants (HbS, HgC, etc)do  not significantly interfere with this assay.   However, presence of multiple variants may affect accuracy.              -GLUCOSE TREND FOR THE PAST 24HRS:   Recent Labs   Lab 03/21/23  0217 03/21/23  0458 03/21/23  0725 03/21/23  1130 03/21/23  1554 03/21/23 2020   POCTGLUCOSE 114* 111* 111* 108 130* 93           -NO HYPOGYCEMIAS NOTED     - Diet  Diet Dysphagia Mechanical Soft (IDDSI Level 5)    Remains in 1003/1003 A  T2DM well controlled not on meds. S/p whipple. 7 Days Post-Op. BG reasonably well controlled without insulin           Plan:     -Continue Novolog LDC SSI (150/50).   - BG checks ac/hs  - Hypoglycemia protocol in place        ** Please notify Endocrine for any change and/or advance in diet**  ** Please call Endocrine for any BG related issues **     Discharge Planning:   Continue DM diet.no other needs at this time.

## 2023-03-23 LAB
ANION GAP SERPL CALC-SCNC: 9 MMOL/L (ref 8–16)
BASOPHILS # BLD AUTO: 0.03 K/UL (ref 0–0.2)
BASOPHILS NFR BLD: 0.2 % (ref 0–1.9)
BUN SERPL-MCNC: 10 MG/DL (ref 8–23)
CALCIUM SERPL-MCNC: 9.4 MG/DL (ref 8.7–10.5)
CHLORIDE SERPL-SCNC: 99 MMOL/L (ref 95–110)
CO2 SERPL-SCNC: 28 MMOL/L (ref 23–29)
CREAT SERPL-MCNC: 0.6 MG/DL (ref 0.5–1.4)
DIFFERENTIAL METHOD: ABNORMAL
EOSINOPHIL # BLD AUTO: 0.3 K/UL (ref 0–0.5)
EOSINOPHIL NFR BLD: 2.6 % (ref 0–8)
ERYTHROCYTE [DISTWIDTH] IN BLOOD BY AUTOMATED COUNT: 17.7 % (ref 11.5–14.5)
EST. GFR  (NO RACE VARIABLE): >60 ML/MIN/1.73 M^2
GLUCOSE SERPL-MCNC: 99 MG/DL (ref 70–110)
HCT VFR BLD AUTO: 25.2 % (ref 37–48.5)
HGB BLD-MCNC: 7.9 G/DL (ref 12–16)
IMM GRANULOCYTES # BLD AUTO: 0.19 K/UL (ref 0–0.04)
IMM GRANULOCYTES NFR BLD AUTO: 1.5 % (ref 0–0.5)
LYMPHOCYTES # BLD AUTO: 1.1 K/UL (ref 1–4.8)
LYMPHOCYTES NFR BLD: 8.5 % (ref 18–48)
MAGNESIUM SERPL-MCNC: 1.9 MG/DL (ref 1.6–2.6)
MCH RBC QN AUTO: 22.9 PG (ref 27–31)
MCHC RBC AUTO-ENTMCNC: 31.3 G/DL (ref 32–36)
MCV RBC AUTO: 73 FL (ref 82–98)
MONOCYTES # BLD AUTO: 1.3 K/UL (ref 0.3–1)
MONOCYTES NFR BLD: 10.1 % (ref 4–15)
NEUTROPHILS # BLD AUTO: 10 K/UL (ref 1.8–7.7)
NEUTROPHILS NFR BLD: 77.1 % (ref 38–73)
NRBC BLD-RTO: 0 /100 WBC
PHOSPHATE SERPL-MCNC: 3.6 MG/DL (ref 2.7–4.5)
PLATELET # BLD AUTO: 310 K/UL (ref 150–450)
PMV BLD AUTO: 10.6 FL (ref 9.2–12.9)
POCT GLUCOSE: 105 MG/DL (ref 70–110)
POCT GLUCOSE: 111 MG/DL (ref 70–110)
POCT GLUCOSE: 119 MG/DL (ref 70–110)
POCT GLUCOSE: 134 MG/DL (ref 70–110)
POTASSIUM SERPL-SCNC: 4.2 MMOL/L (ref 3.5–5.1)
RBC # BLD AUTO: 3.45 M/UL (ref 4–5.4)
SODIUM SERPL-SCNC: 136 MMOL/L (ref 136–145)
WBC # BLD AUTO: 12.93 K/UL (ref 3.9–12.7)

## 2023-03-23 PROCEDURE — 36415 COLL VENOUS BLD VENIPUNCTURE: CPT | Performed by: STUDENT IN AN ORGANIZED HEALTH CARE EDUCATION/TRAINING PROGRAM

## 2023-03-23 PROCEDURE — 99900035 HC TECH TIME PER 15 MIN (STAT)

## 2023-03-23 PROCEDURE — 84100 ASSAY OF PHOSPHORUS: CPT | Performed by: STUDENT IN AN ORGANIZED HEALTH CARE EDUCATION/TRAINING PROGRAM

## 2023-03-23 PROCEDURE — 25000003 PHARM REV CODE 250

## 2023-03-23 PROCEDURE — 80048 BASIC METABOLIC PNL TOTAL CA: CPT | Performed by: STUDENT IN AN ORGANIZED HEALTH CARE EDUCATION/TRAINING PROGRAM

## 2023-03-23 PROCEDURE — 83735 ASSAY OF MAGNESIUM: CPT | Performed by: STUDENT IN AN ORGANIZED HEALTH CARE EDUCATION/TRAINING PROGRAM

## 2023-03-23 PROCEDURE — 94761 N-INVAS EAR/PLS OXIMETRY MLT: CPT

## 2023-03-23 PROCEDURE — 20600001 HC STEP DOWN PRIVATE ROOM

## 2023-03-23 PROCEDURE — 25000003 PHARM REV CODE 250: Performed by: STUDENT IN AN ORGANIZED HEALTH CARE EDUCATION/TRAINING PROGRAM

## 2023-03-23 PROCEDURE — 25000003 PHARM REV CODE 250: Performed by: NURSE PRACTITIONER

## 2023-03-23 PROCEDURE — 85025 COMPLETE CBC W/AUTO DIFF WBC: CPT | Performed by: STUDENT IN AN ORGANIZED HEALTH CARE EDUCATION/TRAINING PROGRAM

## 2023-03-23 PROCEDURE — A4216 STERILE WATER/SALINE, 10 ML: HCPCS | Performed by: STUDENT IN AN ORGANIZED HEALTH CARE EDUCATION/TRAINING PROGRAM

## 2023-03-23 PROCEDURE — 63600175 PHARM REV CODE 636 W HCPCS: Performed by: STUDENT IN AN ORGANIZED HEALTH CARE EDUCATION/TRAINING PROGRAM

## 2023-03-23 RX ORDER — POLYETHYLENE GLYCOL 3350 17 G/17G
17 POWDER, FOR SOLUTION ORAL DAILY
Status: DISCONTINUED | OUTPATIENT
Start: 2023-03-23 | End: 2023-03-24 | Stop reason: HOSPADM

## 2023-03-23 RX ORDER — POLYETHYLENE GLYCOL 3350 17 G/17G
17 POWDER, FOR SOLUTION ORAL DAILY
Status: DISCONTINUED | OUTPATIENT
Start: 2023-03-24 | End: 2023-03-23

## 2023-03-23 RX ADMIN — PANTOPRAZOLE SODIUM 40 MG: 40 TABLET, DELAYED RELEASE ORAL at 06:03

## 2023-03-23 RX ADMIN — Medication 10 ML: at 06:03

## 2023-03-23 RX ADMIN — TRAMADOL HYDROCHLORIDE 100 MG: 50 TABLET, COATED ORAL at 12:03

## 2023-03-23 RX ADMIN — Medication 1 ENEMA: at 09:03

## 2023-03-23 RX ADMIN — POLYETHYLENE GLYCOL 3350 17 G: 17 POWDER, FOR SOLUTION ORAL at 04:03

## 2023-03-23 RX ADMIN — METOCLOPRAMIDE 10 MG: 5 TABLET ORAL at 06:03

## 2023-03-23 RX ADMIN — ATORVASTATIN CALCIUM 20 MG: 20 TABLET, FILM COATED ORAL at 11:03

## 2023-03-23 RX ADMIN — Medication 10 ML: at 12:03

## 2023-03-23 RX ADMIN — METOCLOPRAMIDE 10 MG: 5 TABLET ORAL at 11:03

## 2023-03-23 RX ADMIN — ASPIRIN 81 MG: 81 TABLET, COATED ORAL at 11:03

## 2023-03-23 RX ADMIN — Medication 10 ML: at 11:03

## 2023-03-23 RX ADMIN — ENOXAPARIN SODIUM 40 MG: 40 INJECTION SUBCUTANEOUS at 04:03

## 2023-03-23 RX ADMIN — METOCLOPRAMIDE 10 MG: 5 TABLET ORAL at 04:03

## 2023-03-23 NOTE — PLAN OF CARE
Problem: Adult Inpatient Plan of Care  Goal: Plan of Care Review  Outcome: Ongoing, Progressing     Problem: Diabetes Comorbidity  Goal: Blood Glucose Level Within Targeted Range  Outcome: Ongoing, Progressing     Problem: Infection  Goal: Absence of Infection Signs and Symptoms  Outcome: Ongoing, Progressing     Problem: Fall Injury Risk  Goal: Absence of Fall and Fall-Related Injury  Outcome: Ongoing, Progressing     Problem: Skin Injury Risk Increased  Goal: Skin Health and Integrity  Outcome: Ongoing, Progressing     POC reviewed and discussed with pt and spouse; pt A/Ox4; VSS; COLEEN DL PICC removed per orders; PIV started; enema given-large BM after administration; pt walked hallway with walker and ; abdominal pain controlled with prn pain medication; no signs of distress; will continue to monitor.

## 2023-03-23 NOTE — ASSESSMENT & PLAN NOTE
Nan Simms is a 68 yo female with PMHx of HTN and hypothyroidism here for gastric outlet obstruction. Now s/p Whipple 3/15.  CT abdomen/pelvis and IV, PO contrast 3/22 with no organized fluid collections, large stool burden. Enema last night with BMx2.     - Diabetic diet & Boost  - PPI  - PRN tramadol  - daily suppository  - home meds  - TPN/lipids  - IS  - PT/OT    Dispo: [facility]. Possible discharge today versus tomorrow

## 2023-03-23 NOTE — CARE UPDATE
Care Update:     No acute events overnight. Patient on the [facility] in room 1003/1003 A. Blood glucose stable. BG at goal on current insulin regimen (SSI ). Steroid use- None. 8 Days Post-Op  Renal function- Normal   Vasopressors-  None       Diet Dysphagia Mechanical Soft (IDDSI Level 5)     POCT Glucose   Date Value Ref Range Status   03/23/2023 119 (H) 70 - 110 mg/dL Final   03/22/2023 113 (H) 70 - 110 mg/dL Final   03/22/2023 109 70 - 110 mg/dL Final   03/22/2023 104 70 - 110 mg/dL Final   03/22/2023 109 70 - 110 mg/dL Final   03/21/2023 93 70 - 110 mg/dL Final   03/21/2023 130 (H) 70 - 110 mg/dL Final   03/21/2023 108 70 - 110 mg/dL Final   03/21/2023 111 (H) 70 - 110 mg/dL Final   03/21/2023 111 (H) 70 - 110 mg/dL Final   03/21/2023 114 (H) 70 - 110 mg/dL Final   03/21/2023 116 (H) 70 - 110 mg/dL Final   03/20/2023 109 70 - 110 mg/dL Final   03/20/2023 130 (H) 70 - 110 mg/dL Final   03/20/2023 129 (H) 70 - 110 mg/dL Final   03/20/2023 157 (H) 70 - 110 mg/dL Final     Lab Results   Component Value Date    HGBA1C 5.6 03/09/2023       Endocrinology consulted for BG management.   BG goal 140-180    Hospital Medications    BG checks AC/HS           dextrose 40 % gel 15,000 mg 15,000 mg, Oral, As needed (PRN)    dextrose 40 % gel 30,000 mg 30,000 mg, Oral, As needed (PRN)    glucagon (human recombinant) injection 1 mg 1 mg, Intramuscular, As needed (PRN), Turn patient on their side, give IM, and NOTIFY MD IMMEDIATELY.<BR><BR>Feed the patient as soon as patient awakens and is able to swallow.    glucagon (human recombinant) injection 1 mg 1 mg, Intramuscular, As needed (PRN), Turn patient on their side, give IM, and NOTIFY MD IMMEDIATELY.<BR><BR>Feed the patient as soon as patient awakens and is able to swallow.    insulin aspart U-100 pen 0-5 Units 0-5 Units, Subcutaneous, Before meals &amp; nightly PRN, **LOW CORRECTION DOSE**<BR>Blood Glucose<BR>mg/dL                  Pre-meal                2200<BR>151-200                 0 unit                      0 unit<BR>201-250                2 units                    1 unit<BR>251-300                3 units                    1 unit<BR>301-350                4 units                    2 units<BR>&gt;350                     5 units                    3 units<BR>Administer subcutaneously if needed at times designated by monitoring schedule. <BR>DO NOT HOLD correction dose insulin for patients who are  NPO.<BR>&quot;HIGH ALERT MEDICATION&quot; - Administer with meals or TF/TPN.              ** Please notify Endocrine for any change and/or advance in diet**  ** Please call Endocrine for any BG related issues **    Discharge Planning:   TBD. Please notify endocrinology prior to discharge.      Nick Milton DNP, FNP-C  Department of Endocrinology  Inpatient Glycemic Management

## 2023-03-23 NOTE — PLAN OF CARE
Pt A&Ox4. Pt had multiple BM today. Ordered for daily shower,Once set up pt refused stating her  would help her tonight. VS WNL. Denies pain or needs. Lovenox education and kit provided, will need reinforcement

## 2023-03-23 NOTE — PLAN OF CARE
Pt A&Ox4, spouse at bedside. Pt calls for assistance. Denies pain, PRN ordered if needed. CT abd/pelvis completed today. Shows pt is full of stool and NA/Phosphate enema ordered this evening. Call light in reach

## 2023-03-23 NOTE — PROGRESS NOTES
[facility]  General Surgery  Progress Note    Subjective:     History of Present Illness:  Nan Simms is a 67 year old female with PMHx of HTN and hypothyroidism who has had constipation and abdominal pain for 3 weeks. Says she became increasingly bloated and abdominal pain worsened and went to [facility] yesterday and was transferred to [facility] for higher level of care. She reports using enemas to relieve constipation but was unable to find relief and came to ED. She has not been nauseous however reports she vomited 3 times two days ago. She has been tolerating a solid and liquid diet. Reports has lost 13 lbs in last 3 weeks. Of note, she also notes a loss of appetite and increased fatigue over last several weeks. Reports she cannot recall if she has ever gotten a colonoscopy but daughter reports she does Cologuard yearly; patient says she has not done Cologuard in 4 years.    Upon admission, she was HDS and labs were consistent with iron deficiency anemia. CT A/P obtained 3/9 showed findings concerning for malignancy involving the pylorus and duodenum with component of gastric outlet obstruction.  NGT was placed 3/9 and patient has had resolution of her abdominal pain, nausea, and vomiting since placement.      Post-Op Info:  Procedure(s) (LRB):  WHIPPLE PROCEDURE (N/A)   8 Days Post-Op     Interval History: No issues overnight. BM x2, last night and this morning. Feeling well this morning. Tolerating 3 Boosts yesterday.     Medications:  Continuous Infusions:  Scheduled Meds:   aspirin  81 mg Oral Daily    atorvastatin  20 mg Oral Daily    enoxaparin  40 mg Subcutaneous Daily    metoclopramide HCl  10 mg Oral TID AC    pantoprazole  40 mg Oral Before breakfast    sodium chloride 0.9%  10 mL Intravenous Q6H     PRN Meds:sodium chloride, dextrose 10%, dextrose 10%, dextrose, dextrose, glucagon (human recombinant), glucagon (human recombinant), hydrALAZINE, insulin aspart U-100, iohexol,  "labetalol, melatonin, ondansetron, Flushing PICC Protocol **AND** sodium chloride 0.9% **AND** sodium chloride 0.9%, traMADoL, traMADoL     Review of patient's allergies indicates:   Allergen Reactions    Aspirin Other (See Comments)     Pt states it is "hard on her stomach" She can tolerate a low dose aspirin    Pcn [penicillins]      Childhood      Objective:     Vital Signs (Most Recent):  Temp: 99 °F (37.2 °C) (03/23/23 0729)  Pulse: 60 (03/23/23 0811)  Resp: 18 (03/23/23 0811)  BP: (!) 111/54 (03/23/23 0729)  SpO2: 98 % (03/23/23 0811)   Vital Signs (24h Range):  Temp:  [97.6 °F (36.4 °C)-100.5 °F (38.1 °C)] 99 °F (37.2 °C)  Pulse:  [60-90] 60  Resp:  [18-20] 18  SpO2:  [92 %-98 %] 98 %  BP: (111-132)/(54-61) 111/54     Weight: 73 kg (160 lb 15 oz)  Body mass index is 25.98 kg/m².    Intake/Output - Last 3 Shifts         03/21 0700  03/22 0659 03/22 0700 03/23 0659 03/23 0700  03/24 0659    P.O. 480      TPN       Total Intake(mL/kg) 480 (6.6)      Net +480             Urine Occurrence 3 x      Stool Occurrence 0 x 1 x             Physical Exam  Vitals and nursing note reviewed.   Constitutional:       General: She is not in acute distress.  Cardiovascular:      Rate and Rhythm: Normal rate.      Pulses: Normal pulses.   Pulmonary:      Effort: Pulmonary effort is normal. No respiratory distress.   Abdominal:      General: There is no distension.      Palpations: Abdomen is soft.      Tenderness: There is no abdominal tenderness.      Comments: Midline incision healing appropriately, no erythema or drainage   Neurological:      Mental Status: She is alert.       Significant Labs:  I have reviewed all pertinent lab results within the past 24 hours.  CBC:   Recent Labs   Lab 03/23/23  0358   WBC 12.93*   RBC 3.45*   HGB 7.9*   HCT 25.2*      MCV 73*   MCH 22.9*   MCHC 31.3*     BMP:   Recent Labs   Lab 03/23/23  0358   GLU 99      K 4.2   CL 99   CO2 28   BUN 10   CREATININE 0.6   CALCIUM 9.4   MG " 1.9     CMP:   Recent Labs   Lab 03/21/23  0500 03/22/23  0301 03/23/23  0358     104   < > 99   CALCIUM 9.1  9.1   < > 9.4   ALBUMIN 2.3*  --   --    PROT 5.8*  --   --      138   < > 136   K 3.6  3.6   < > 4.2   CO2 28  28   < > 28     101   < > 99   BUN 18  18   < > 10   CREATININE 0.5  0.5   < > 0.6   ALKPHOS 103  --   --    ALT 31  --   --    AST 30  --   --    BILITOT 0.4  --   --     < > = values in this interval not displayed.       Significant Diagnostics:  I have reviewed all pertinent imaging results/findings within the past 24 hours.    Assessment/Plan:     * Gastric cancer  Nan Simms is a 68 yo female with PMHx of HTN and hypothyroidism here for gastric outlet obstruction. Now s/p Whipple 3/15.  CT abdomen/pelvis and IV, PO contrast 3/22 with no organized fluid collections, large stool burden. Enema last night with BMx2.     - Diabetic diet & Boost  - enema   - PPI  - PRN tramadol  - daily suppository  - home meds  - TPN/lipids  - IS  - PT/OT    Dispo: [facility]. Possible discharge today versus tomorrow               Vivian Jefferson MD  General Surgery  [facility] - [facility]

## 2023-03-23 NOTE — SUBJECTIVE & OBJECTIVE
"Interval History: No issues overnight. BM x2, last night and this morning. Feeling well this morning. Tolerating 3 Boosts yesterday.     Medications:  Continuous Infusions:  Scheduled Meds:   aspirin  81 mg Oral Daily    atorvastatin  20 mg Oral Daily    enoxaparin  40 mg Subcutaneous Daily    metoclopramide HCl  10 mg Oral TID AC    pantoprazole  40 mg Oral Before breakfast    sodium chloride 0.9%  10 mL Intravenous Q6H     PRN Meds:sodium chloride, dextrose 10%, dextrose 10%, dextrose, dextrose, glucagon (human recombinant), glucagon (human recombinant), hydrALAZINE, insulin aspart U-100, iohexol, labetalol, melatonin, ondansetron, Flushing PICC Protocol **AND** sodium chloride 0.9% **AND** sodium chloride 0.9%, traMADoL, traMADoL     Review of patient's allergies indicates:   Allergen Reactions    Aspirin Other (See Comments)     Pt states it is "hard on her stomach" She can tolerate a low dose aspirin    Pcn [penicillins]      Childhood      Objective:     Vital Signs (Most Recent):  Temp: 99 °F (37.2 °C) (03/23/23 0729)  Pulse: 60 (03/23/23 0811)  Resp: 18 (03/23/23 0811)  BP: (!) 111/54 (03/23/23 0729)  SpO2: 98 % (03/23/23 0811)   Vital Signs (24h Range):  Temp:  [97.6 °F (36.4 °C)-100.5 °F (38.1 °C)] 99 °F (37.2 °C)  Pulse:  [60-90] 60  Resp:  [18-20] 18  SpO2:  [92 %-98 %] 98 %  BP: (111-132)/(54-61) 111/54     Weight: 73 kg (160 lb 15 oz)  Body mass index is 25.98 kg/m².    Intake/Output - Last 3 Shifts         03/21 0700  03/22 0659 03/22 0700 03/23 0659 03/23 0700 03/24 0659    P.O. 480      TPN       Total Intake(mL/kg) 480 (6.6)      Net +480             Urine Occurrence 3 x      Stool Occurrence 0 x 1 x             Physical Exam  Vitals and nursing note reviewed.   Constitutional:       General: She is not in acute distress.  Cardiovascular:      Rate and Rhythm: Normal rate.      Pulses: Normal pulses.   Pulmonary:      Effort: Pulmonary effort is normal. No respiratory distress.   Abdominal:      " General: There is no distension.      Palpations: Abdomen is soft.      Tenderness: There is no abdominal tenderness.      Comments: Midline incision healing appropriately, no erythema or drainage   Neurological:      Mental Status: She is alert.       Significant Labs:  I have reviewed all pertinent lab results within the past 24 hours.  CBC:   Recent Labs   Lab 03/23/23 0358   WBC 12.93*   RBC 3.45*   HGB 7.9*   HCT 25.2*      MCV 73*   MCH 22.9*   MCHC 31.3*     BMP:   Recent Labs   Lab 03/23/23  0358   GLU 99      K 4.2   CL 99   CO2 28   BUN 10   CREATININE 0.6   CALCIUM 9.4   MG 1.9     CMP:   Recent Labs   Lab 03/21/23  0500 03/22/23  0301 03/23/23  0358     104   < > 99   CALCIUM 9.1  9.1   < > 9.4   ALBUMIN 2.3*  --   --    PROT 5.8*  --   --      138   < > 136   K 3.6  3.6   < > 4.2   CO2 28  28   < > 28     101   < > 99   BUN 18  18   < > 10   CREATININE 0.5  0.5   < > 0.6   ALKPHOS 103  --   --    ALT 31  --   --    AST 30  --   --    BILITOT 0.4  --   --     < > = values in this interval not displayed.       Significant Diagnostics:  I have reviewed all pertinent imaging results/findings within the past 24 hours.

## 2023-03-24 VITALS
WEIGHT: 160.94 LBS | DIASTOLIC BLOOD PRESSURE: 60 MMHG | HEART RATE: 59 BPM | BODY MASS INDEX: 25.86 KG/M2 | TEMPERATURE: 98 F | HEIGHT: 66 IN | RESPIRATION RATE: 18 BRPM | OXYGEN SATURATION: 96 % | SYSTOLIC BLOOD PRESSURE: 126 MMHG

## 2023-03-24 LAB
ANION GAP SERPL CALC-SCNC: 8 MMOL/L (ref 8–16)
BASOPHILS # BLD AUTO: 0.04 K/UL (ref 0–0.2)
BASOPHILS NFR BLD: 0.4 % (ref 0–1.9)
BUN SERPL-MCNC: 10 MG/DL (ref 8–23)
CALCIUM SERPL-MCNC: 9.2 MG/DL (ref 8.7–10.5)
CHLORIDE SERPL-SCNC: 101 MMOL/L (ref 95–110)
CO2 SERPL-SCNC: 26 MMOL/L (ref 23–29)
CREAT SERPL-MCNC: 0.6 MG/DL (ref 0.5–1.4)
DIFFERENTIAL METHOD: ABNORMAL
EOSINOPHIL # BLD AUTO: 0.3 K/UL (ref 0–0.5)
EOSINOPHIL NFR BLD: 2.6 % (ref 0–8)
ERYTHROCYTE [DISTWIDTH] IN BLOOD BY AUTOMATED COUNT: 17.5 % (ref 11.5–14.5)
EST. GFR  (NO RACE VARIABLE): >60 ML/MIN/1.73 M^2
GLUCOSE SERPL-MCNC: 115 MG/DL (ref 70–110)
HCT VFR BLD AUTO: 26.1 % (ref 37–48.5)
HGB BLD-MCNC: 8 G/DL (ref 12–16)
IMM GRANULOCYTES # BLD AUTO: 0.17 K/UL (ref 0–0.04)
IMM GRANULOCYTES NFR BLD AUTO: 1.6 % (ref 0–0.5)
LYMPHOCYTES # BLD AUTO: 0.9 K/UL (ref 1–4.8)
LYMPHOCYTES NFR BLD: 8.3 % (ref 18–48)
MAGNESIUM SERPL-MCNC: 1.9 MG/DL (ref 1.6–2.6)
MCH RBC QN AUTO: 22.7 PG (ref 27–31)
MCHC RBC AUTO-ENTMCNC: 30.7 G/DL (ref 32–36)
MCV RBC AUTO: 74 FL (ref 82–98)
MONOCYTES # BLD AUTO: 0.9 K/UL (ref 0.3–1)
MONOCYTES NFR BLD: 8.6 % (ref 4–15)
NEUTROPHILS # BLD AUTO: 8.3 K/UL (ref 1.8–7.7)
NEUTROPHILS NFR BLD: 78.5 % (ref 38–73)
NRBC BLD-RTO: 0 /100 WBC
PHOSPHATE SERPL-MCNC: 3.2 MG/DL (ref 2.7–4.5)
PLATELET # BLD AUTO: 362 K/UL (ref 150–450)
PMV BLD AUTO: 10.4 FL (ref 9.2–12.9)
POCT GLUCOSE: 124 MG/DL (ref 70–110)
POTASSIUM SERPL-SCNC: 4.2 MMOL/L (ref 3.5–5.1)
RBC # BLD AUTO: 3.53 M/UL (ref 4–5.4)
SODIUM SERPL-SCNC: 135 MMOL/L (ref 136–145)
WBC # BLD AUTO: 10.55 K/UL (ref 3.9–12.7)

## 2023-03-24 PROCEDURE — 36415 COLL VENOUS BLD VENIPUNCTURE: CPT | Performed by: STUDENT IN AN ORGANIZED HEALTH CARE EDUCATION/TRAINING PROGRAM

## 2023-03-24 PROCEDURE — 25000003 PHARM REV CODE 250: Performed by: STUDENT IN AN ORGANIZED HEALTH CARE EDUCATION/TRAINING PROGRAM

## 2023-03-24 PROCEDURE — 97530 THERAPEUTIC ACTIVITIES: CPT

## 2023-03-24 PROCEDURE — 99900035 HC TECH TIME PER 15 MIN (STAT)

## 2023-03-24 PROCEDURE — 84100 ASSAY OF PHOSPHORUS: CPT | Performed by: STUDENT IN AN ORGANIZED HEALTH CARE EDUCATION/TRAINING PROGRAM

## 2023-03-24 PROCEDURE — 63600175 PHARM REV CODE 636 W HCPCS: Performed by: NURSE PRACTITIONER

## 2023-03-24 PROCEDURE — 25000003 PHARM REV CODE 250

## 2023-03-24 PROCEDURE — 97116 GAIT TRAINING THERAPY: CPT

## 2023-03-24 PROCEDURE — 85025 COMPLETE CBC W/AUTO DIFF WBC: CPT | Performed by: STUDENT IN AN ORGANIZED HEALTH CARE EDUCATION/TRAINING PROGRAM

## 2023-03-24 PROCEDURE — A4216 STERILE WATER/SALINE, 10 ML: HCPCS | Performed by: STUDENT IN AN ORGANIZED HEALTH CARE EDUCATION/TRAINING PROGRAM

## 2023-03-24 PROCEDURE — 80048 BASIC METABOLIC PNL TOTAL CA: CPT | Performed by: STUDENT IN AN ORGANIZED HEALTH CARE EDUCATION/TRAINING PROGRAM

## 2023-03-24 PROCEDURE — 83735 ASSAY OF MAGNESIUM: CPT | Performed by: STUDENT IN AN ORGANIZED HEALTH CARE EDUCATION/TRAINING PROGRAM

## 2023-03-24 RX ORDER — ENOXAPARIN SODIUM 100 MG/ML
40 INJECTION SUBCUTANEOUS EVERY 24 HOURS
Status: DISCONTINUED | OUTPATIENT
Start: 2023-03-24 | End: 2023-03-24 | Stop reason: HOSPADM

## 2023-03-24 RX ORDER — POLYETHYLENE GLYCOL 3350 17 G/17G
17 POWDER, FOR SOLUTION ORAL DAILY
Qty: 510 G | Refills: 0 | Status: ON HOLD | OUTPATIENT
Start: 2023-03-24 | End: 2023-04-12

## 2023-03-24 RX ADMIN — ENOXAPARIN SODIUM 40 MG: 40 INJECTION SUBCUTANEOUS at 12:03

## 2023-03-24 RX ADMIN — METOCLOPRAMIDE 10 MG: 5 TABLET ORAL at 05:03

## 2023-03-24 RX ADMIN — PANTOPRAZOLE SODIUM 40 MG: 40 TABLET, DELAYED RELEASE ORAL at 05:03

## 2023-03-24 RX ADMIN — Medication 10 ML: at 05:03

## 2023-03-24 RX ADMIN — TRAMADOL HYDROCHLORIDE 100 MG: 50 TABLET, COATED ORAL at 05:03

## 2023-03-24 NOTE — PLAN OF CARE
[address] [facility]  Discharge Final Note    Primary Care Provider: Payal Moreland NP    Expected Discharge Date: 3/24/2023    Patient will be discharged with [facility]     Final Discharge Note (most recent)       Final Note - 03/24/23 0938          Final Note    Assessment Type Final Discharge Note     Anticipated Discharge Disposition Home-Health Care [facility]     Hospital Resources/Appts/Education Provided Appointments scheduled and added to AVS        Post-Acute Status    Post-Acute Authorization Home Health     Home Health Status Set-up Complete/Auth obtained                     Important Message from Medicare  Important Message from Medicare regarding Discharge Appeal Rights: Given to patient/caregiver, Explained to patient/caregiver, Signed/date by patient/caregiver     Date IMM was signed: 03/24/23  Time IMM was signed: 0925    Contact Info       Nick Paez MD   Specialty: Surgical Oncology    [address] [place] [id number]   Phone: [phone]       Next Steps: Follow up on 3/30/2023    Instructions: S/p lindaipple at 10:30AM          Aura Gallagher RN, CM   Ext: [phone]

## 2023-03-24 NOTE — PLAN OF CARE
Problem: Adult Inpatient Plan of Care  Goal: Plan of Care Review  Outcome: Ongoing, Progressing  Goal: Optimal Comfort and Wellbeing  Outcome: Ongoing, Progressing     Problem: Diabetes Comorbidity  Goal: Blood Glucose Level Within Targeted Range  Outcome: Ongoing, Progressing     Problem: Infection  Goal: Absence of Infection Signs and Symptoms  Outcome: Ongoing, Progressing     Problem: Fall Injury Risk  Goal: Absence of Fall and Fall-Related Injury  Outcome: Ongoing, Progressing     Problem: Skin Injury Risk Increased  Goal: Skin Health and Integrity  Outcome: Ongoing, Progressing     Problem: Adult Inpatient Plan of Care  Goal: Plan of Care Review  Outcome: Ongoing, Progressing  Goal: Optimal Comfort and Wellbeing  Outcome: Ongoing, Progressing     Problem: Diabetes Comorbidity  Goal: Blood Glucose Level Within Targeted Range  Outcome: Ongoing, Progressing     Problem: Infection  Goal: Absence of Infection Signs and Symptoms  Outcome: Ongoing, Progressing     Problem: Fall Injury Risk  Goal: Absence of Fall and Fall-Related Injury  Outcome: Ongoing, Progressing     Problem: Skin Injury Risk Increased  Goal: Skin Health and Integrity  Outcome: Ongoing, Progressing    POC reviewed and discussed with pt and spouse; pt A/Ox4; VSS; Enema given-large BM after administration; pt walked hallway with walker and ; abdominal pain controlled with prn pain medication; no signs of distress; will continue to monitor.

## 2023-03-24 NOTE — PT/OT/SLP PROGRESS
Physical Therapy Treatment    Patient Name:  Nan Simms   MRN:  [id number]    Recommendations:     Discharge Recommendations: home  Discharge Equipment Recommendations: walker, rolling  Barriers to discharge: None    Assessment:     Nan Simms is a 67 y.o. female admitted with a medical diagnosis of Gastric cancer.  She presents with the following impairments/functional limitations: weakness, impaired endurance, impaired functional mobility, impaired self care skills, gait instability, impaired balance.    Rehab Prognosis: Good; patient would benefit from acute skilled PT services to address these deficits and reach maximum level of function.    Recent Surgery: Procedure(s) (LRB):  WHIPPLE PROCEDURE (N/A) 9 Days Post-Op    Plan:     During this hospitalization, patient to be seen 3 x/week to address the identified rehab impairments via gait training, therapeutic activities, therapeutic exercises, neuromuscular re-education and progress toward the following goals:    Plan of Care Expires:  04/16/23    Subjective     Chief Complaint: none verbalized  Patient/Family Comments/goals: pt is eager and excited to go home  Pain/Comfort:  Pain Rating 1:  (none verbalized)  Pain Rating Post-Intervention 1:  (none verbalized)      Objective:     Communicated with RN prior to session.  Patient found up in chair with peripheral IV upon PT entry to room.     General Precautions: Standard, fall  Orthopedic Precautions: N/A  Braces: N/A  Respiratory Status: Room air     Functional Mobility:  Transfers:     Sit to Stand:  supervision with rolling walker  Gait: ~300 ft, Supervision, RW, decreased gait speed, slight FFP/anterior lean  Balance: Supervision with RW for all mobility this date      AM-PAC 6 CLICK MOBILITY  Turning over in bed (including adjusting bedclothes, sheets and blankets)?: 3  Sitting down on and standing up from a chair with arms (e.g., wheelchair, bedside commode, etc.): 3  Moving from lying on back to  sitting on the side of the bed?: 3  Moving to and from a bed to a chair (including a wheelchair)?: 3  Need to walk in hospital room?: 3  Climbing 3-5 steps with a railing?: 3  Basic Mobility Total Score: 18       Treatment & Education:  Patient educated on role of therapy, goals of session, and benefits of mobilizing.   Discussed PT plan of care during hospitalization.   Patient educated on calling for assistance.   Patient educated on how their diagnosis impacts their mobility within PT scope of practice.   All questions answered within PT scope of practice.    Patient left up in chair with all lines intact, call button in reach, and pt's spouse  present.    GOALS:   Multidisciplinary Problems       Physical Therapy Goals          Problem: Physical Therapy    Goal Priority Disciplines Outcome Goal Variances Interventions   Physical Therapy Goal     PT, PT/OT Ongoing, Progressing     Description: Goals to be met by: 3/31/2023     Patient will increase functional independence with mobility by performin. Supine to sit with [place]  2. Sit to supine with [place]  3. Sit to stand transfer with Modified [place] with RW  4. Bed to chair transfer with Modified [place] using Rolling Walker  5. Gait  x 30 feet with Modified [place] using Rolling Walker.   6. Lower extremity exercise program x15 reps per handout, with independence                         Time Tracking:     PT Received On: 23  PT Start Time: 1055     PT Stop Time: 1111  PT Total Time (min): 16 min     Billable Minutes: Gait Training 16    Treatment Type: Treatment  PT/PTA: PT     Number of PTA visits since last PT visit: 0     2023

## 2023-03-24 NOTE — SUBJECTIVE & OBJECTIVE
"Interval History: No acute events overnight. Tolerating PO. Passing gas. 3 bowel movements. Ambulating. Doing very well.     Medications:  Continuous Infusions:  Scheduled Meds:   aspirin  81 mg Oral Daily    atorvastatin  20 mg Oral Daily    docusate sodium  50 mg Oral Daily    enoxaparin  40 mg Subcutaneous Daily    metoclopramide HCl  10 mg Oral TID AC    pantoprazole  40 mg Oral Before breakfast    polyethylene glycol  17 g Oral Daily    sodium chloride 0.9%  10 mL Intravenous Q6H     PRN Meds:sodium chloride, dextrose 10%, dextrose 10%, dextrose, dextrose, glucagon (human recombinant), glucagon (human recombinant), hydrALAZINE, insulin aspart U-100, iohexol, labetalol, melatonin, ondansetron, Flushing PICC Protocol **AND** sodium chloride 0.9% **AND** sodium chloride 0.9%, traMADoL, traMADoL     Review of patient's allergies indicates:   Allergen Reactions    Aspirin Other (See Comments)     Pt states it is "hard on her stomach" She can tolerate a low dose aspirin    Pcn [penicillins]      Childhood      Objective:     Vital Signs (Most Recent):  Temp: 98.6 °F (37 °C) (03/24/23 0544)  Pulse: 74 (03/24/23 0544)  Resp: 18 (03/24/23 0554)  BP: (!) 118/53 (03/24/23 0544)  SpO2: 96 % (03/24/23 0544)   Vital Signs (24h Range):  Temp:  [97 °F (36.1 °C)-98.7 °F (37.1 °C)] 98.6 °F (37 °C)  Pulse:  [52-74] 74  Resp:  [16-20] 18  SpO2:  [96 %-98 %] 96 %  BP: (118-137)/(53-85) 118/53     Weight: 73 kg (160 lb 15 oz)  Body mass index is 25.98 kg/m².    Intake/Output - Last 3 Shifts         03/22 0700 03/23 0659 03/23 0700 03/24 0659 03/24 0700 03/25 0659    P.O.       Total Intake(mL/kg)       Net              Stool Occurrence 1 x 3 x             Physical Exam  Vitals and nursing note reviewed.   Cardiovascular:      Rate and Rhythm: Normal rate.   Pulmonary:      Effort: Pulmonary effort is normal. No respiratory distress.   Abdominal:      General: There is no distension.      Palpations: Abdomen is soft.      " Tenderness: There is no abdominal tenderness.      Comments: Midline incision healing appropriately, no erythema or drainage       Significant Labs:  I have reviewed all pertinent lab results within the past 24 hours.  CBC:   Recent Labs   Lab 03/24/23  0457   WBC 10.55   RBC 3.53*   HGB 8.0*   HCT 26.1*      MCV 74*   MCH 22.7*   MCHC 30.7*     BMP:   Recent Labs   Lab 03/24/23 0457   *   *   K 4.2      CO2 26   BUN 10   CREATININE 0.6   CALCIUM 9.2   MG 1.9       Significant Diagnostics:  none

## 2023-03-24 NOTE — PT/OT/SLP PROGRESS
Occupational Therapy   Treatment    Name: Nan Simms  MRN: [id number]  Admitting Diagnosis:  Gastric cancer  9 Days Post-Op    Recommendations:     Discharge Recommendations: home  Discharge Equipment Recommendations:  none  Barriers to discharge:  None    Assessment:     Nan Simms is a 67 y.o. female with a medical diagnosis of Gastric cancer. Pt D/Cd home at the time of this note. Performance deficits affecting function are impaired endurance, impaired self care skills, impaired functional mobility, gait instability, impaired balance, decreased coordination.     Rehab Prognosis:  Good; patient would benefit from acute skilled OT services to address these deficits and reach maximum level of function.       Plan:     Patient to be seen 3 x/week to address the above listed problems via self-care/home management, therapeutic activities, therapeutic exercises  Plan of Care Expires: 04/17/23  Plan of Care Reviewed with: patient, spouse    Subjective     Pain/Comfort:  Pain Rating 1: 0/10    Objective:     Communicated with: rn prior to session.  Patient found up in chair with peripheral IV upon OT entry to room.    General Precautions: Standard, fall    Orthopedic Precautions:N/A  Braces: N/A  Respiratory Status: Room air     Occupational Performance:     Functional Mobility/Transfers:  Patient completed Sit <> Stand Transfer with supervision  with  rolling walker   Functional Mobility: Pt walked 100' using a RW and SBA.    Activities of Daily Living:  Pt declined.    [facility] 6 Click ADL: 22    Treatment & Education:  Discussed OT POC and any D/C needs.    Patient left up in chair with all lines intact and call button in reach    GOALS:   Multidisciplinary Problems       Occupational Therapy Goals          Problem: Occupational Therapy    Goal Priority Disciplines Outcome Interventions   Occupational Therapy Goal     OT, PT/OT Ongoing, Progressing    Description: Goals to be met by: 3/31/23     Patient will increase  functional independence with ADLs by performing:    UE Dressing with Supervision.  LE Dressing with Supervision and Assistive Devices as needed.  Grooming while standing at sink with Supervision.  Toileting from toilet with Supervision for hygiene and clothing management.   Supine to sit with Supervision.  Step transfer with Supervision  Upper extremity exercise program with supervision, to improve strength and function.                         Time Tracking:     OT Date of Treatment: 03/24/23  OT Start Time: 0911  OT Stop Time: 0920  OT Total Time (min): 9 min    Billable Minutes:Therapeutic Activity 9    OT/MICHELE: OT     Number of MICHELE visits since last OT visit: 0    3/24/2023

## 2023-03-24 NOTE — CARE UPDATE
Care Update:     No acute events overnight. Patient on the [facility] in room 1003/1003 A. Blood glucose stable. BG at goal on current insulin regimen (SSI ). Steroid use- None. 9 Days Post-Op  Renal function- Normal   Vasopressors-  None       Diet general  Diet full liquid     POCT Glucose   Date Value Ref Range Status   03/24/2023 124 (H) 70 - 110 mg/dL Final   03/23/2023 134 (H) 70 - 110 mg/dL Final   03/23/2023 105 70 - 110 mg/dL Final   03/23/2023 111 (H) 70 - 110 mg/dL Final   03/23/2023 119 (H) 70 - 110 mg/dL Final   03/22/2023 113 (H) 70 - 110 mg/dL Final   03/22/2023 109 70 - 110 mg/dL Final   03/22/2023 104 70 - 110 mg/dL Final   03/22/2023 109 70 - 110 mg/dL Final   03/21/2023 93 70 - 110 mg/dL Final   03/21/2023 130 (H) 70 - 110 mg/dL Final   03/21/2023 108 70 - 110 mg/dL Final     Lab Results   Component Value Date    HGBA1C 5.6 03/09/2023       Endocrinology consulted for BG management.   BG goal 140-180    Hospital Medications    BG checks AC/HS           dextrose 40 % gel 15,000 mg 15,000 mg, Oral, As needed (PRN)    dextrose 40 % gel 30,000 mg 30,000 mg, Oral, As needed (PRN)    glucagon (human recombinant) injection 1 mg 1 mg, Intramuscular, As needed (PRN), Turn patient on their side, give IM, and NOTIFY MD IMMEDIATELY.<BR><BR>Feed the patient as soon as patient awakens and is able to swallow.    glucagon (human recombinant) injection 1 mg 1 mg, Intramuscular, As needed (PRN), Turn patient on their side, give IM, and NOTIFY MD IMMEDIATELY.<BR><BR>Feed the patient as soon as patient awakens and is able to swallow.    insulin aspart U-100 pen 0-5 Units 0-5 Units, Subcutaneous, Before meals &amp; nightly PRN, **LOW CORRECTION DOSE**<BR>Blood Glucose<BR>mg/dL                  Pre-meal                2200<BR>151-200                0 unit                      0 unit<BR>201-250                2 units                    1 unit<BR>251-300                3 units                    1 unit<BR>301-350                 4 units                    2 units<BR>&gt;350                     5 units                    3 units<BR>Administer subcutaneously if needed at times designated by monitoring schedule. <BR>DO NOT HOLD correction dose insulin for patients who are  NPO.<BR>&quot;HIGH ALERT MEDICATION&quot; - Administer with meals or TF/TPN.              ** Please notify Endocrine for any change and/or advance in diet**  ** Please call Endocrine for any BG related issues **    Discharge Planning:   TBD. Please notify endocrinology prior to discharge.      Nick Milton DNP, FNP-C  Department of Endocrinology  Inpatient Glycemic Management

## 2023-03-24 NOTE — PLAN OF CARE
CHW met with patient/family at bedside. Patient experience rounding completed and reviewed the following.     Do you know your discharge plan? Yes or No,    If yes, what is the plan? (Home, Home Health, Rehab, SNF, LTAC, or Other)    Home    If you are discharging home, do you have help at home? Yes or No         Yes    Do you think you will need help at home at discharge? Yes or No   Yes    Have you discussed your needs and preferences with your SW/CM? Yes or No      Yes    Assigned SW/CM notified of any patient/family needs or concerns.    Aura White W  Case Management  [phone]

## 2023-03-24 NOTE — PROGRESS NOTES
"[facility] - [facility]  General Surgery  Progress Note    Subjective:     Post-Op Info:  Procedure(s) (LRB):  WHIPPLE PROCEDURE (N/A)   9 Days Post-Op     Interval History: No acute events overnight. Tolerating PO. Passing gas. 3 bowel movements. Ambulating. Doing very well.     Medications:  Continuous Infusions:  Scheduled Meds:   aspirin  81 mg Oral Daily    atorvastatin  20 mg Oral Daily    docusate sodium  50 mg Oral Daily    enoxaparin  40 mg Subcutaneous Daily    metoclopramide HCl  10 mg Oral TID AC    pantoprazole  40 mg Oral Before breakfast    polyethylene glycol  17 g Oral Daily    sodium chloride 0.9%  10 mL Intravenous Q6H     PRN Meds:sodium chloride, dextrose 10%, dextrose 10%, dextrose, dextrose, glucagon (human recombinant), glucagon (human recombinant), hydrALAZINE, insulin aspart U-100, iohexol, labetalol, melatonin, ondansetron, Flushing PICC Protocol **AND** sodium chloride 0.9% **AND** sodium chloride 0.9%, traMADoL, traMADoL     Review of patient's allergies indicates:   Allergen Reactions    Aspirin Other (See Comments)     Pt states it is "hard on her stomach" She can tolerate a low dose aspirin    Pcn [penicillins]      Childhood      Objective:     Vital Signs (Most Recent):  Temp: 98.6 °F (37 °C) (03/24/23 0544)  Pulse: 74 (03/24/23 0544)  Resp: 18 (03/24/23 0554)  BP: (!) 118/53 (03/24/23 0544)  SpO2: 96 % (03/24/23 0544)   Vital Signs (24h Range):  Temp:  [97 °F (36.1 °C)-98.7 °F (37.1 °C)] 98.6 °F (37 °C)  Pulse:  [52-74] 74  Resp:  [16-20] 18  SpO2:  [96 %-98 %] 96 %  BP: (118-137)/(53-85) 118/53     Weight: 73 kg (160 lb 15 oz)  Body mass index is 25.98 kg/m².    Intake/Output - Last 3 Shifts         03/22 0700 03/23 0659 03/23 0700 03/24 0659 03/24 0700 03/25 0659    P.O.       Total Intake(mL/kg)       Net              Stool Occurrence 1 x 3 x             Physical Exam  Vitals and nursing note reviewed.   Cardiovascular:      Rate and Rhythm: Normal rate.   Pulmonary:      " Effort: Pulmonary effort is normal. No respiratory distress.   Abdominal:      General: There is no distension.      Palpations: Abdomen is soft.      Tenderness: There is no abdominal tenderness.      Comments: Midline incision healing appropriately, no erythema or drainage       Significant Labs:  I have reviewed all pertinent lab results within the past 24 hours.  CBC:   Recent Labs   Lab 03/24/23  0457   WBC 10.55   RBC 3.53*   HGB 8.0*   HCT 26.1*      MCV 74*   MCH 22.7*   MCHC 30.7*     BMP:   Recent Labs   Lab 03/24/23  0457   *   *   K 4.2      CO2 26   BUN 10   CREATININE 0.6   CALCIUM 9.2   MG 1.9       Significant Diagnostics:  none    Assessment/Plan:     * Gastric cancer  Nan Simms is a 68 yo female with PMHx of HTN and hypothyroidism here for gastric outlet obstruction. Now s/p Whipple 3/15.  CT abdomen/pelvis and IV, PO contrast 3/22 with no organized fluid collections, large stool burden. White count normal now. Had several bowel movements. Feels well.     -d/c today with:   reglan PO 10 TID   protonix PO 40 daily   lovenox 28 days from surgery   Robaxin PO   Tramadol PO 50   Tylenol         Red Dodson MD  General Surgery  [facility]

## 2023-03-24 NOTE — ASSESSMENT & PLAN NOTE
Nan Simms is a 68 yo female with PMHx of HTN and hypothyroidism here for gastric outlet obstruction. Now s/p Whipple 3/15.  CT abdomen/pelvis and IV, PO contrast 3/22 with no organized fluid collections, large stool burden. White count normal now. Had several bowel movements. Feels well.     -d/c today with:   reglan PO 10 TID   protonix PO 40 daily   lovenox 28 days from surgery   Robaxin PO   Tramadol PO 50   Tylenol

## 2023-03-25 PROCEDURE — G0180 PR HOME HEALTH MD CERTIFICATION: ICD-10-PCS | Mod: ,,, | Performed by: SURGERY

## 2023-03-25 PROCEDURE — G0180 MD CERTIFICATION HHA PATIENT: HCPCS | Mod: ,,, | Performed by: SURGERY

## 2023-03-27 ENCOUNTER — LAB VISIT (OUTPATIENT)
Dept: LAB | Facility: HOSPITAL | Age: 68
End: 2023-03-27
Attending: NURSE PRACTITIONER
Payer: MEDICARE

## 2023-03-27 ENCOUNTER — TELEPHONE (OUTPATIENT)
Dept: SURGERY | Facility: CLINIC | Age: 68
End: 2023-03-27
Payer: MEDICARE

## 2023-03-27 ENCOUNTER — PATIENT OUTREACH (OUTPATIENT)
Dept: ADMINISTRATIVE | Facility: CLINIC | Age: 68
End: 2023-03-27
Payer: MEDICARE

## 2023-03-27 DIAGNOSIS — C16.9 STOMACH CANCER: Primary | ICD-10-CM

## 2023-03-27 DIAGNOSIS — E44.0 MALNUTRITION OF MODERATE DEGREE: ICD-10-CM

## 2023-03-27 LAB
ALBUMIN SERPL BCP-MCNC: 2.6 G/DL (ref 3.5–5.2)
ALP SERPL-CCNC: 124 U/L (ref 55–135)
ALT SERPL W/O P-5'-P-CCNC: 27 U/L (ref 10–44)
ANION GAP SERPL CALC-SCNC: 12 MMOL/L (ref 8–16)
AST SERPL-CCNC: 18 U/L (ref 10–40)
BASOPHILS # BLD AUTO: 0.06 K/UL (ref 0–0.2)
BASOPHILS NFR BLD: 0.4 % (ref 0–1.9)
BILIRUB SERPL-MCNC: 0.2 MG/DL (ref 0.1–1)
BUN SERPL-MCNC: 11 MG/DL (ref 8–23)
CALCIUM SERPL-MCNC: 9.9 MG/DL (ref 8.7–10.5)
CHLORIDE SERPL-SCNC: 98 MMOL/L (ref 95–110)
CO2 SERPL-SCNC: 26 MMOL/L (ref 23–29)
CREAT SERPL-MCNC: 0.7 MG/DL (ref 0.5–1.4)
DIFFERENTIAL METHOD: ABNORMAL
EOSINOPHIL # BLD AUTO: 0.3 K/UL (ref 0–0.5)
EOSINOPHIL NFR BLD: 2.5 % (ref 0–8)
ERYTHROCYTE [DISTWIDTH] IN BLOOD BY AUTOMATED COUNT: 17.9 % (ref 11.5–14.5)
EST. GFR  (NO RACE VARIABLE): >60 ML/MIN/1.73 M^2
GLUCOSE SERPL-MCNC: 97 MG/DL (ref 70–110)
HCT VFR BLD AUTO: 28.5 % (ref 37–48.5)
HGB BLD-MCNC: 8.9 G/DL (ref 12–16)
IMM GRANULOCYTES # BLD AUTO: 0.37 K/UL (ref 0–0.04)
IMM GRANULOCYTES NFR BLD AUTO: 2.7 % (ref 0–0.5)
LYMPHOCYTES # BLD AUTO: 1.8 K/UL (ref 1–4.8)
LYMPHOCYTES NFR BLD: 13.1 % (ref 18–48)
MAGNESIUM SERPL-MCNC: 2 MG/DL (ref 1.6–2.6)
MCH RBC QN AUTO: 22.9 PG (ref 27–31)
MCHC RBC AUTO-ENTMCNC: 31.2 G/DL (ref 32–36)
MCV RBC AUTO: 73 FL (ref 82–98)
MONOCYTES # BLD AUTO: 1.1 K/UL (ref 0.3–1)
MONOCYTES NFR BLD: 8 % (ref 4–15)
NEUTROPHILS # BLD AUTO: 9.9 K/UL (ref 1.8–7.7)
NEUTROPHILS NFR BLD: 73.3 % (ref 38–73)
NRBC BLD-RTO: 0 /100 WBC
PHOSPHATE SERPL-MCNC: 3.6 MG/DL (ref 2.7–4.5)
PLATELET # BLD AUTO: 520 K/UL (ref 150–450)
PMV BLD AUTO: 9.7 FL (ref 9.2–12.9)
POTASSIUM SERPL-SCNC: 4.4 MMOL/L (ref 3.5–5.1)
PREALB SERPL-MCNC: 8 MG/DL (ref 20–43)
PROT SERPL-MCNC: 7.5 G/DL (ref 6–8.4)
RBC # BLD AUTO: 3.89 M/UL (ref 4–5.4)
SODIUM SERPL-SCNC: 136 MMOL/L (ref 136–145)
WBC # BLD AUTO: 13.55 K/UL (ref 3.9–12.7)

## 2023-03-27 PROCEDURE — 83735 ASSAY OF MAGNESIUM: CPT

## 2023-03-27 PROCEDURE — 84134 ASSAY OF PREALBUMIN: CPT

## 2023-03-27 PROCEDURE — 84100 ASSAY OF PHOSPHORUS: CPT

## 2023-03-27 PROCEDURE — 80053 COMPREHEN METABOLIC PANEL: CPT

## 2023-03-27 PROCEDURE — 85025 COMPLETE CBC W/AUTO DIFF WBC: CPT

## 2023-03-27 RX ORDER — TRAMADOL HYDROCHLORIDE 50 MG/1
50 TABLET ORAL EVERY 12 HOURS PRN
Qty: 14 TABLET | Refills: 0 | Status: SHIPPED | OUTPATIENT
Start: 2023-03-27 | End: 2023-04-03 | Stop reason: SDUPTHER

## 2023-03-27 NOTE — PROGRESS NOTES
C3 nurse spoke with Nan Simms for a TCC post hospital discharge follow up call. The patient does not have a scheduled HOSFU appointment with Payal Moreland NP within 5-7 days post hospital discharge date 3/24. C3 nurse was unable to schedule HOSFU appointment in [facility].    Message sent to PCP staff requesting they contact patient and schedule follow up appointment.

## 2023-03-27 NOTE — TELEPHONE ENCOUNTER
Spoke to Rebeca at [facility], pt is scheduled for labs at [facility] on Thursday. Asking if pt needs to be drawn in addition to those labs, gave verbal order to hold Thursdays scheduled  lab draw and will notify their office if pt will need the following weeks labs.

## 2023-03-27 NOTE — TELEPHONE ENCOUNTER
"----- Message from Yon Miranda sent at 3/27/2023 12:12 PM CDT -----  Consult/Advisory:          Name Of Caller:  Rebeca ([facility])    Contact Preference?: [phone]      Provider Name: Philippe      Does patient feel the need to be seen today? No      What is the nature of the call?: Requesting clarification for 3/30 NFL orders          Additional Notes:  "Thank you for all that you do for our patients"      "

## 2023-03-27 NOTE — TELEPHONE ENCOUNTER
Hospital f/u scheduled with pt.     Pt is also requesting a refill of Tramadol. States she only has 1 tablet left. Please advise.    Pharmacy: [facility].

## 2023-03-29 ENCOUNTER — OFFICE VISIT (OUTPATIENT)
Dept: INTERNAL MEDICINE | Facility: CLINIC | Age: 68
End: 2023-03-29
Payer: MEDICARE

## 2023-03-29 VITALS
HEART RATE: 103 BPM | OXYGEN SATURATION: 97 % | DIASTOLIC BLOOD PRESSURE: 60 MMHG | RESPIRATION RATE: 18 BRPM | BODY MASS INDEX: 24.91 KG/M2 | SYSTOLIC BLOOD PRESSURE: 110 MMHG | WEIGHT: 155 LBS | HEIGHT: 66 IN

## 2023-03-29 DIAGNOSIS — R05.9 COUGH, UNSPECIFIED TYPE: ICD-10-CM

## 2023-03-29 DIAGNOSIS — I70.0 AORTIC ATHEROSCLEROSIS: ICD-10-CM

## 2023-03-29 DIAGNOSIS — D50.9 IRON DEFICIENCY ANEMIA, UNSPECIFIED IRON DEFICIENCY ANEMIA TYPE: Primary | ICD-10-CM

## 2023-03-29 DIAGNOSIS — E78.5 HYPERLIPIDEMIA, UNSPECIFIED HYPERLIPIDEMIA TYPE: ICD-10-CM

## 2023-03-29 DIAGNOSIS — E44.0 MODERATE MALNUTRITION: ICD-10-CM

## 2023-03-29 DIAGNOSIS — C16.9 MALIGNANT NEOPLASM OF STOMACH, UNSPECIFIED LOCATION: ICD-10-CM

## 2023-03-29 DIAGNOSIS — I10 ESSENTIAL HYPERTENSION: ICD-10-CM

## 2023-03-29 PROCEDURE — 3044F HG A1C LEVEL LT 7.0%: CPT | Mod: CPTII,S$GLB,, | Performed by: NURSE PRACTITIONER

## 2023-03-29 PROCEDURE — 3074F SYST BP LT 130 MM HG: CPT | Mod: CPTII,S$GLB,, | Performed by: NURSE PRACTITIONER

## 2023-03-29 PROCEDURE — 3044F PR MOST RECENT HEMOGLOBIN A1C LEVEL <7.0%: ICD-10-PCS | Mod: CPTII,S$GLB,, | Performed by: NURSE PRACTITIONER

## 2023-03-29 PROCEDURE — 1159F PR MEDICATION LIST DOCUMENTED IN MEDICAL RECORD: ICD-10-PCS | Mod: CPTII,S$GLB,, | Performed by: NURSE PRACTITIONER

## 2023-03-29 PROCEDURE — 1126F PR PAIN SEVERITY QUANTIFIED, NO PAIN PRESENT: ICD-10-PCS | Mod: CPTII,S$GLB,, | Performed by: NURSE PRACTITIONER

## 2023-03-29 PROCEDURE — 3288F PR FALLS RISK ASSESSMENT DOCUMENTED: ICD-10-PCS | Mod: CPTII,S$GLB,, | Performed by: NURSE PRACTITIONER

## 2023-03-29 PROCEDURE — 3008F PR BODY MASS INDEX (BMI) DOCUMENTED: ICD-10-PCS | Mod: CPTII,S$GLB,, | Performed by: NURSE PRACTITIONER

## 2023-03-29 PROCEDURE — 99999 PR PBB SHADOW E&M-EST. PATIENT-LVL IV: CPT | Mod: PBBFAC,,, | Performed by: NURSE PRACTITIONER

## 2023-03-29 PROCEDURE — 1101F PR PT FALLS ASSESS DOC 0-1 FALLS W/OUT INJ PAST YR: ICD-10-PCS | Mod: CPTII,S$GLB,, | Performed by: NURSE PRACTITIONER

## 2023-03-29 PROCEDURE — 3074F PR MOST RECENT SYSTOLIC BLOOD PRESSURE < 130 MM HG: ICD-10-PCS | Mod: CPTII,S$GLB,, | Performed by: NURSE PRACTITIONER

## 2023-03-29 PROCEDURE — 3078F PR MOST RECENT DIASTOLIC BLOOD PRESSURE < 80 MM HG: ICD-10-PCS | Mod: CPTII,S$GLB,, | Performed by: NURSE PRACTITIONER

## 2023-03-29 PROCEDURE — 1101F PT FALLS ASSESS-DOCD LE1/YR: CPT | Mod: CPTII,S$GLB,, | Performed by: NURSE PRACTITIONER

## 2023-03-29 PROCEDURE — 99999 PR PBB SHADOW E&M-EST. PATIENT-LVL IV: ICD-10-PCS | Mod: PBBFAC,,, | Performed by: NURSE PRACTITIONER

## 2023-03-29 PROCEDURE — 1126F AMNT PAIN NOTED NONE PRSNT: CPT | Mod: CPTII,S$GLB,, | Performed by: NURSE PRACTITIONER

## 2023-03-29 PROCEDURE — 3078F DIAST BP <80 MM HG: CPT | Mod: CPTII,S$GLB,, | Performed by: NURSE PRACTITIONER

## 2023-03-29 PROCEDURE — 3288F FALL RISK ASSESSMENT DOCD: CPT | Mod: CPTII,S$GLB,, | Performed by: NURSE PRACTITIONER

## 2023-03-29 PROCEDURE — 3008F BODY MASS INDEX DOCD: CPT | Mod: CPTII,S$GLB,, | Performed by: NURSE PRACTITIONER

## 2023-03-29 PROCEDURE — 1159F MED LIST DOCD IN RCRD: CPT | Mod: CPTII,S$GLB,, | Performed by: NURSE PRACTITIONER

## 2023-03-29 PROCEDURE — 99214 PR OFFICE/OUTPT VISIT, EST, LEVL IV, 30-39 MIN: ICD-10-PCS | Mod: S$GLB,,, | Performed by: NURSE PRACTITIONER

## 2023-03-29 PROCEDURE — 99214 OFFICE O/P EST MOD 30 MIN: CPT | Mod: S$GLB,,, | Performed by: NURSE PRACTITIONER

## 2023-03-29 NOTE — PROGRESS NOTES
Subjective:       Patient ID: Nan Simms is a 67 y.o. female.    Chief Complaint: Hospital Follow Up    HPI: Pt presents to clinic today known to me for hospital fu. She was seen in ER 2/24 for abd pain then 3/3 for constipation. Came here for f/u from there and still had complaints of dysuria. She was also reporting a full feeling and weight loss. Her urine was sent for culture as it was still dirty in office after completeing abx from ER but she was also referred to GI for EGD and colonsocopy. Placed on PPI at that time as well, never made it to GI because she went back to ER 3/9 with abd pain, Ct done >>There is an abnormal appearance to the gastric pylorus that extends into the duodenum, with significant abnormal thickening of the walls of the pylorus with associated luminal narrowing in this location with this abnormal soft tissue density extending inferiorly about the duodenum into the adjacent soft tissues, measuring at least 6.0 x 5.0 cm in transverse dimension and at least 6.5 cm in craniocaudal dimension.  Findings are concerning for malignancy, with subsequent gastric outlet obstruction as the stomach is significantly distended with fluid material.  Elsewhere, there is thickening of the walls of the stomach, with some suggestion of wall edema in the region of the cardia and fundus with some thickening of the walls of the distal esophagus.  Prominent lymph nodes seen adjacent to the primary mass measuring up to approximately 7 mm, with infiltration of the fat surrounding the mass also noted.  Constipation.  No free air or free fluid   transferred to [facility] and had Whipple procedure dx with duodenal adenocarcinoma     Hospital Course: Ms. Nan Simms is a 66 yo female with PMHx of HTN and hypothyroidism here for gastric outlet obstruction. She is s/p Whipple 3/15/23. Ms. Simms remained on the whipple pathway while inpatient.  A CT of the abdomen/pelvis with IV & PO contrast 3/22 was done for  leukocytosis and abdominal pain with results of no organized fluid collections and large stool burden. Patient received an enema with results of BM x 3 on 3/23/23 per patient and RN.  White count normal now at 10.55 today.  She has had several bowel movements.  The patient is tolerating a soft diet + boost with meals.  She is voiding without difficulty and ambulating with assistance with walker.  Patient with no complaints of nausea, vomiting, chest pain or shortness of breath.  Her vital signs stable. She is afebrile. She is positive for flatus and positive for bowel sounds.    D/meka on lovenox/robaxin/reglan/protonix/tramadol and was to cont rowdy;ax with other meds . Brought all meds and double checked     She reports that she has not had pain like she was since she had the NGT dropped at [facility].Still using tramadol about once a day/ having BM daily- soft. NO bloody or black     Albumin was low- she does not have a large appetite but is drinking boost TID.   Lab Results   Component Value Date    WBC 13.55 (H) 03/27/2023    HGB 8.9 (L) 03/27/2023    HCT 28.5 (L) 03/27/2023    MCV 73 (L) 03/27/2023     (H) 03/27/2023   Not taking iron because it constipates her     Repeat CT after the procedure. Shows possible developing pneumonia in right lower lobe. Shge does endorse a cough today. NO fever. No SOB     Review of Systems   Constitutional:  Negative for chills, fatigue, fever and unexpected weight change.   HENT:  Negative for congestion, ear pain, sore throat and trouble swallowing.    Eyes:  Negative for pain and visual disturbance.   Respiratory:  Negative for cough, chest tightness and shortness of breath.    Cardiovascular:  Negative for chest pain, palpitations and leg swelling.   Gastrointestinal:  Negative for abdominal distention, abdominal pain, blood in stool, constipation, diarrhea, nausea and vomiting.   Genitourinary:  Negative for difficulty urinating, dysuria, flank pain, frequency and  hematuria.   Musculoskeletal:  Negative for back pain, gait problem, joint swelling, neck pain and neck stiffness.   Skin:  Negative for rash and wound.        Incision mid abd   Neurological:  Negative for dizziness, seizures, speech difficulty, light-headedness and headaches.     Objective:      Physical Exam  Vitals and nursing note reviewed.   Constitutional:       Appearance: Normal appearance. She is well-developed.   HENT:      Head: Normocephalic and atraumatic.      Right Ear: External ear normal.      Left Ear: External ear normal.      Nose: Nose normal.   Eyes:      Conjunctiva/sclera: Conjunctivae normal.      Pupils: Pupils are equal, round, and reactive to light.   Cardiovascular:      Rate and Rhythm: Normal rate and regular rhythm.      Heart sounds: Normal heart sounds. No murmur heard.  Pulmonary:      Effort: Pulmonary effort is normal. No respiratory distress.      Breath sounds: Normal breath sounds. No wheezing or rales.      Comments: BSS CTA  Abdominal:      General: Bowel sounds are normal. There is no distension.      Palpations: Abdomen is soft.      Tenderness: There is no abdominal tenderness.   Musculoskeletal:         General: Normal range of motion.      Cervical back: Normal range of motion and neck supple.   Skin:     General: Skin is warm and dry.      Comments: Upper abd mid freida incision with glue intact   Neurological:      Mental Status: She is alert and oriented to person, place, and time.   Psychiatric:         Behavior: Behavior normal.         Thought Content: Thought content normal.         Judgment: Judgment normal.       Assessment:       1. Iron deficiency anemia, unspecified iron deficiency anemia type    2. Cough, unspecified type    3. Essential hypertension    4. Malignant neoplasm of stomach, unspecified location    5. Hyperlipidemia, unspecified hyperlipidemia type    6. Moderate malnutrition    7. Aortic atherosclerosis        Plan:     Problem List Items  Addressed This Visit       Essential hypertension  Cont lisinopril hctz well controlled     Hyperlipidemia  Cont crestor     Gastric cancer  Follow up with oncology/surgeon tomorrow as scheduled  (she is NOT CLAIMING any other treatment)     Anemia - Primary    Relevant Orders    Ferritin    Iron and TIBCWill add ferritin and fe studies   consider infusion to help with anemia     Moderate malnutrition- repeat albumin ordered Cont boost and f/u dietician tomorrow as scheduled for diet training post whipple    Aortic atherosclerosis    Current Assessment & Plan     Severe aortoiliac calcific atherosclerosis seen ion CT 3/2023  On asa and statin          Other Visit Diagnoses       Cough, unspecified type        Relevant Orders    X-Ray Chest PA And Lateral  Repeat cxr as she does have cough and l;ast CT post op shows poss developing pneumonia.               F/u after oncology and labs   .

## 2023-03-30 ENCOUNTER — OFFICE VISIT (OUTPATIENT)
Dept: SURGERY | Facility: CLINIC | Age: 68
End: 2023-03-30
Payer: MEDICARE

## 2023-03-30 ENCOUNTER — LAB VISIT (OUTPATIENT)
Dept: LAB | Facility: HOSPITAL | Age: 68
End: 2023-03-30
Attending: SURGERY
Payer: MEDICARE

## 2023-03-30 VITALS
WEIGHT: 151 LBS | BODY MASS INDEX: 24.27 KG/M2 | OXYGEN SATURATION: 97 % | SYSTOLIC BLOOD PRESSURE: 130 MMHG | HEART RATE: 53 BPM | DIASTOLIC BLOOD PRESSURE: 61 MMHG | HEIGHT: 66 IN

## 2023-03-30 DIAGNOSIS — K31.1 GASTRIC OUT LET OBSTRUCTION: ICD-10-CM

## 2023-03-30 DIAGNOSIS — C17.0 DUODENAL CANCER: Primary | ICD-10-CM

## 2023-03-30 DIAGNOSIS — D50.9 IRON DEFICIENCY ANEMIA, UNSPECIFIED IRON DEFICIENCY ANEMIA TYPE: ICD-10-CM

## 2023-03-30 LAB
ALBUMIN SERPL BCP-MCNC: 2.8 G/DL (ref 3.5–5.2)
ALP SERPL-CCNC: 119 U/L (ref 55–135)
ALT SERPL W/O P-5'-P-CCNC: 18 U/L (ref 10–44)
ANION GAP SERPL CALC-SCNC: 9 MMOL/L (ref 8–16)
AST SERPL-CCNC: 14 U/L (ref 10–40)
BASOPHILS # BLD AUTO: 0.03 K/UL (ref 0–0.2)
BASOPHILS NFR BLD: 0.2 % (ref 0–1.9)
BILIRUB SERPL-MCNC: 0.2 MG/DL (ref 0.1–1)
BUN SERPL-MCNC: 16 MG/DL (ref 8–23)
CALCIUM SERPL-MCNC: 9.8 MG/DL (ref 8.7–10.5)
CHLORIDE SERPL-SCNC: 99 MMOL/L (ref 95–110)
CO2 SERPL-SCNC: 29 MMOL/L (ref 23–29)
CREAT SERPL-MCNC: 0.7 MG/DL (ref 0.5–1.4)
DIFFERENTIAL METHOD: ABNORMAL
EOSINOPHIL # BLD AUTO: 0.3 K/UL (ref 0–0.5)
EOSINOPHIL NFR BLD: 1.9 % (ref 0–8)
ERYTHROCYTE [DISTWIDTH] IN BLOOD BY AUTOMATED COUNT: 17.9 % (ref 11.5–14.5)
EST. GFR  (NO RACE VARIABLE): >60 ML/MIN/1.73 M^2
FERRITIN SERPL-MCNC: 243 NG/ML (ref 20–300)
GLUCOSE SERPL-MCNC: 128 MG/DL (ref 70–110)
HCT VFR BLD AUTO: 30.4 % (ref 37–48.5)
HGB BLD-MCNC: 9.4 G/DL (ref 12–16)
IMM GRANULOCYTES # BLD AUTO: 0.16 K/UL (ref 0–0.04)
IMM GRANULOCYTES NFR BLD AUTO: 1.2 % (ref 0–0.5)
IRON SERPL-MCNC: 32 UG/DL (ref 30–160)
LYMPHOCYTES # BLD AUTO: 1.5 K/UL (ref 1–4.8)
LYMPHOCYTES NFR BLD: 12 % (ref 18–48)
MCH RBC QN AUTO: 22.4 PG (ref 27–31)
MCHC RBC AUTO-ENTMCNC: 30.9 G/DL (ref 32–36)
MCV RBC AUTO: 72 FL (ref 82–98)
MONOCYTES # BLD AUTO: 0.9 K/UL (ref 0.3–1)
MONOCYTES NFR BLD: 6.8 % (ref 4–15)
NEUTROPHILS # BLD AUTO: 10 K/UL (ref 1.8–7.7)
NEUTROPHILS NFR BLD: 77.9 % (ref 38–73)
NRBC BLD-RTO: 0 /100 WBC
PLATELET # BLD AUTO: 601 K/UL (ref 150–450)
PMV BLD AUTO: 9.2 FL (ref 9.2–12.9)
POTASSIUM SERPL-SCNC: 4.5 MMOL/L (ref 3.5–5.1)
PREALB SERPL-MCNC: 15 MG/DL (ref 20–43)
PROT SERPL-MCNC: 7.5 G/DL (ref 6–8.4)
RBC # BLD AUTO: 4.2 M/UL (ref 4–5.4)
SATURATED IRON: 8 % (ref 20–50)
SODIUM SERPL-SCNC: 137 MMOL/L (ref 136–145)
TOTAL IRON BINDING CAPACITY: 401 UG/DL (ref 250–450)
TRANSFERRIN SERPL-MCNC: 271 MG/DL (ref 200–375)
WBC # BLD AUTO: 12.88 K/UL (ref 3.9–12.7)

## 2023-03-30 PROCEDURE — 36415 COLL VENOUS BLD VENIPUNCTURE: CPT | Performed by: SURGERY

## 2023-03-30 PROCEDURE — 1126F PR PAIN SEVERITY QUANTIFIED, NO PAIN PRESENT: ICD-10-PCS | Mod: CPTII,S$GLB,, | Performed by: SURGERY

## 2023-03-30 PROCEDURE — 1159F MED LIST DOCD IN RCRD: CPT | Mod: CPTII,S$GLB,, | Performed by: SURGERY

## 2023-03-30 PROCEDURE — 3078F PR MOST RECENT DIASTOLIC BLOOD PRESSURE < 80 MM HG: ICD-10-PCS | Mod: CPTII,S$GLB,, | Performed by: SURGERY

## 2023-03-30 PROCEDURE — 3078F DIAST BP <80 MM HG: CPT | Mod: CPTII,S$GLB,, | Performed by: SURGERY

## 2023-03-30 PROCEDURE — 3288F FALL RISK ASSESSMENT DOCD: CPT | Mod: CPTII,S$GLB,, | Performed by: SURGERY

## 2023-03-30 PROCEDURE — 3008F BODY MASS INDEX DOCD: CPT | Mod: CPTII,S$GLB,, | Performed by: SURGERY

## 2023-03-30 PROCEDURE — 1101F PT FALLS ASSESS-DOCD LE1/YR: CPT | Mod: CPTII,S$GLB,, | Performed by: SURGERY

## 2023-03-30 PROCEDURE — 1126F AMNT PAIN NOTED NONE PRSNT: CPT | Mod: CPTII,S$GLB,, | Performed by: SURGERY

## 2023-03-30 PROCEDURE — 3075F SYST BP GE 130 - 139MM HG: CPT | Mod: CPTII,S$GLB,, | Performed by: SURGERY

## 2023-03-30 PROCEDURE — 3044F PR MOST RECENT HEMOGLOBIN A1C LEVEL <7.0%: ICD-10-PCS | Mod: CPTII,S$GLB,, | Performed by: SURGERY

## 2023-03-30 PROCEDURE — 82728 ASSAY OF FERRITIN: CPT | Performed by: NURSE PRACTITIONER

## 2023-03-30 PROCEDURE — 84466 ASSAY OF TRANSFERRIN: CPT | Performed by: NURSE PRACTITIONER

## 2023-03-30 PROCEDURE — 99999 PR PBB SHADOW E&M-EST. PATIENT-LVL III: ICD-10-PCS | Mod: PBBFAC,,, | Performed by: SURGERY

## 2023-03-30 PROCEDURE — 3008F PR BODY MASS INDEX (BMI) DOCUMENTED: ICD-10-PCS | Mod: CPTII,S$GLB,, | Performed by: SURGERY

## 2023-03-30 PROCEDURE — 99024 PR POST-OP FOLLOW-UP VISIT: ICD-10-PCS | Mod: S$GLB,,, | Performed by: SURGERY

## 2023-03-30 PROCEDURE — 1159F PR MEDICATION LIST DOCUMENTED IN MEDICAL RECORD: ICD-10-PCS | Mod: CPTII,S$GLB,, | Performed by: SURGERY

## 2023-03-30 PROCEDURE — 80053 COMPREHEN METABOLIC PANEL: CPT | Performed by: SURGERY

## 2023-03-30 PROCEDURE — 85025 COMPLETE CBC W/AUTO DIFF WBC: CPT | Performed by: SURGERY

## 2023-03-30 PROCEDURE — 84134 ASSAY OF PREALBUMIN: CPT | Performed by: SURGERY

## 2023-03-30 PROCEDURE — 3075F PR MOST RECENT SYSTOLIC BLOOD PRESS GE 130-139MM HG: ICD-10-PCS | Mod: CPTII,S$GLB,, | Performed by: SURGERY

## 2023-03-30 PROCEDURE — 99999 PR PBB SHADOW E&M-EST. PATIENT-LVL III: CPT | Mod: PBBFAC,,, | Performed by: SURGERY

## 2023-03-30 PROCEDURE — 3288F PR FALLS RISK ASSESSMENT DOCUMENTED: ICD-10-PCS | Mod: CPTII,S$GLB,, | Performed by: SURGERY

## 2023-03-30 PROCEDURE — 1101F PR PT FALLS ASSESS DOC 0-1 FALLS W/OUT INJ PAST YR: ICD-10-PCS | Mod: CPTII,S$GLB,, | Performed by: SURGERY

## 2023-03-30 PROCEDURE — 99024 POSTOP FOLLOW-UP VISIT: CPT | Mod: S$GLB,,, | Performed by: SURGERY

## 2023-03-30 PROCEDURE — 3044F HG A1C LEVEL LT 7.0%: CPT | Mod: CPTII,S$GLB,, | Performed by: SURGERY

## 2023-03-30 NOTE — PROGRESS NOTES
"  [facility]   Post Op Note    Post-Op Follow-up Visit:   3/30/2023  Patient ID: Nan Simms is a 67 y.o. female, born 1955  Chief Complaint:  p T3 N0 duodenal adenocarcinoma      Interval History: Sp Whipple on 3/15/23. Doing well, tolerating small amounts of food and 2-3 boosts per day. No fever, nausea, vomiting, diarrhea, chills. No drainage from incision. Ambulating with walker. Pain controlled with tramadol (1 per day) and tylenol.    Physical Exam:  /61   Pulse (!) 53   Ht 5' 6" (1.676 m)   Wt 68.5 kg (151 lb)   SpO2 97%   BMI 24.37 kg/m²     General:  Non-toxic, ambulatory  Abd:  Soft, non-tender  Incision:  CDI with overlying dermabond    Labs: Prealbumin improved (15). CBC/CMP largely normal. WBC decreasing.    Pathology:  Final Pathologic Diagnosis  Abnormal   1. Lymph node, hepatic cautery, resection:   - One lymph node negative for metastatic carcinoma (0/1).   - See synoptic report.   2. Gallbladder, cholecystectomy:   - Benign gallbladder with no significant histologic abnormality.   3. Pancreas, duodenum, common bile duct, and partial gastrectomy,   pancreaticoduodenectomy (Whipple specimen):   - Poorly differentiated carcinoma with medullary features.   - See synoptic report.   - See comment.   Synoptic Report   Procedure: Pancreaticoduodenectomy with partial gastrectomy (Whipple   specimen)   Tumor site:  Pylorus and proximal duodenum   Histologic type: Poorly differentiated carcinoma with medullary features   Histologic grade: G3, poorly differentiated   Tumor size:  6.5 cm in greatest dimension grossly   Tumor extent:   Invades through muscularis propria into subserosa, or extends   into nonperitonealized perimuscular tissue (mesentery or retroperitoneum)   without serosal penetration   Macroscopic tumor perforation:  Not identified   Lymphovascular inv asion:  Not identified, see comment   Margin status for invasive carcinoma:  All margins negative for invasive "   carcinoma   Margin status for dysplasia:  All margins negative for carcinoma in Situ   (high-grade dysplasia)/adenoma   Regional lymph node status: Regional lymph nodes present        All Regional lymph nodes negative for tumor        Number of lymph nodes examined:  15 (including specimens 1 and 3)   PATHOLOGIC STAGE CLASSIFICATION (pTNM, AJCC 8th Edition):  p T3 N0   Additional findings:  Stomach with intestinal metaplasia (immunostain for   H.pylori negative), 2 lymph nodes with fibrosis and calcifications (GMS stain   for fungal organisms and AFB stain negative)   Ancillary studies:  Immunohistochemistry (IHC) Testing for Mismatch Repair   (MMR) Proteins   MLH1- Loss of nuclear expression   PMS2 - Loss of nuclear expression   MSH2 - Intact nuclear expression   MSH6 - Intact nuclear expression   Background nonneoplastic tissue/internal control with intact nuclear   expression   IHC Interpret ation:  Loss of nuclear expression of MLH1 and PMS2: testing for   methylation of the MLH1 promoter and/or mutation of BRAF is indicated (the   presence of a BRAF V600E mutation and/or MLH1 methylation suggests that the   tumor is sporadic and germline evaluation is probably not indicated; absence   of both MLH1 methylation and of BRAF V600E mutation suggests the possibility   of Seaman syndrome, and sequencing and/or large deletion/duplication testing   of germline MLH1 may be indicated)   Testing for methylation of the MLH1 promoter and mutation of BRAF is in   process and results will be supplemented.   There are exceptions to the above IHC interpretations. These results should   not be considered in isolation, and clinical correlation with genetic   counseling is recommended to assess the need for germline testing.   COMMENT:  The stomach and duodenum (specimen 3) shows a 6.5 cm mass involving   the pylorus with the majority of the tumor appearing to be in the duodenum.   The tumor shows poorly d ifferentiated sheets of  "blue cells with necrosis.   There is a significant amount of lymphocytic inflammatory infiltrate around   the edges of the tumor.  Immunostains show the tumor cells to be positive for   CK7 with patchy weak CDX2 and negative for synaptophysin and chromogranin.   There is also loss of MLH1 and PMS2 on MMR stains.  These features are   suggestive of medullary carcinoma.  Select slides reviewed by Dr. ITZEL Sánchez   who agrees with the diagnosis of poorly differentiated carcinoma with   medullary features.  Immunostains for CD 34 performed on blocks 3E and 3H   show no definitive lymphovascular invasion even though some areas suspicious   cells on routine H&E sections.  Appropriate positive controls     Comment: Interp By Meredith Pappas MD, Signed on 03/28/2023 at 10:07   Gross  Abnormal   3 parts   Part 1   Patient ID/MRN:  [id number]   Pathology ID/MRN: [id number]   Received fresh and subsequently fixed in formalin, labeled "hepatic artery   lymph node-permanent", is a 1.3 x 1.0 x 0.7 cm tan-pink tissue consistent   with a lymph node.  Trisected and entirely submitted in cassette   FRV--1-A   Part 2   Patient ID/MRN:  [id number]   Pathology ID/MRN: [id number]   Received fresh and subsequently fixed in formalin, labeled   "gallbladder-permanent", is a 7.0 x 2.8 x 2.0 cm unopened gallbladder.  The   serosa gray-pink.  The wall is soft and measures up to 3 mm in maximum   thickness.  The mucosa is velvety and green with a few pinpoint yellow areas.    No stones are identified.  The specimen is sectioned and representative   sections are submitted in cassette WAA--2-A   Part 3   Received fresh and subsequently fixed in formalin, labeled "Whipple specimen,   long stitch-pancreatic duct, short stitch-CBD, green ink-SMV margin and black   ink-retroperitoneal ", is a Whipple specimen which cons ists of a portion of   stomach (9.9 cm at the greater curvature, 3.5 cm at the lesser curvature), a   26 cm segment of duodenum, a " 6.1 x 4.0 x 3.5 cm head of pancreas and a 6 cm   common bile duct.  The stomach measures 13.5 cm in circumference at the   proximal gastric margin.  The lesser curvature has a 6.5 x 4.5 cm segment of   lesser omentum that extends 4.5 cm from the gastric wall.  The greater   curvature has a 17 cm segment of large omentum which extends up to 19 cm from   the gastric wall.  The gastric mucosa is corrugated, tan-pink.  At the   pylorus and extending into the duodenum there is a 6.5 x 6.5 cm   circumferential, ulcerated tan-pink mass.  Grossly the mass is located at 7.5   cm from the proximal gastric margin and 20 cm from the distal duodenal   margin.  On cut sections the mass involves all layers of the duodenal wall   and extends into the adipose tissue between the pancreas and the duodenum.   The mass abuts the pancreas but does not appear to invade it.   The pancreatic par enchyma grossly it is unremarkable.  The duodenal mucosa,   besides the lesion described above is folded and pink.  The duodenum measures   7.5 cm in circumference at the distal duodenal margin. The duodenal serosa,   at 9.5 cm from the distal duodenal margin, has a 3.0 x 1.5 x 1.2 cm rubbery   pink-yellow tissue (inked orange over green).  On cut sections this tissue   has a glandular appearance resembling pancreatic parenchyma.  The specimen is   carefully searched for lymph nodes.  Eight lymph nodes identified in the   greater curvature omentum and 7 lymph node candidates are identified around   the pancreas.  The specimen is sectioned and representative sections are   submitted in cassettes GYM--3:   The common bile duct measures 1.5 cm in circumference at the surgical margin.   A:  Common bile duct margin   B:  Pancreatic margin   C:  Proximal gastric margin   D:  Distal duodenal margin   E:  Section of lesion at pylorus   F:  Section of lesion and blue inked soft tissue between pancreas a nd   duodenum   G-H:  Relationship between  lesion and pancreas   I-J:  Lesion in duodenum   K:  Pancreas, SMV and retroperitoneal margins   L: Additional section of mass at pylorus   M:  Additional sections of mass in duodenum   N:  Possible pancreatic tissue attached to duodenum at 9.5 cm from the distal   duodenal margin   O-P: 4 lymph nodes in each cassette from greater omentum   Q: 2 peripancreatic lymph node candidates   R: 3 peripancreatic lymph node candidates   S-T: Single bisected peripancreatic lymph node   U-Y:  Single peripancreatic lymph node in multiple pieces.   Darryn PRITCHETT ([facility]) cm      Assessment and Plan:   Ms Simms is a 68 yo woman with p T3 N0 duodenal adenocarcinoma sp whipple on 3/15/23. Doing well, prealbumin and albumin improved with no concerning labs.    Stressed importance of hydration and eating foods high in protein  Continue exercise  Continue lovenox (2 more weeks)  Continue protonix (lifelong med)  Follow-up in two weeks in clinic  Referral to med onc, will try to schedule appt same day as 2 wk f/u  Will plan for follow up in three months after that (July), with a CEA att    Questions were asked and answered to patient's satisfaction.  We discussed the need for continued clinical/radiographic/endoscopic follow-up.      MANOLO Banuelos MD  PGY1  [facility] [place], [place]

## 2023-03-31 DIAGNOSIS — C17.0 DUODENAL ADENOCARCINOMA: Primary | ICD-10-CM

## 2023-03-31 PROBLEM — C16.9 GASTRIC CANCER: Status: RESOLVED | Noted: 2023-03-09 | Resolved: 2023-03-31

## 2023-04-01 ENCOUNTER — HOSPITAL ENCOUNTER (EMERGENCY)
Facility: HOSPITAL | Age: 68
Discharge: HOME | End: 2023-04-02
Attending: STUDENT IN AN ORGANIZED HEALTH CARE EDUCATION/TRAINING PROGRAM
Payer: MEDICARE

## 2023-04-01 DIAGNOSIS — N39.0 ACUTE UTI: Primary | ICD-10-CM

## 2023-04-01 DIAGNOSIS — R42 LIGHTHEADEDNESS: ICD-10-CM

## 2023-04-01 DIAGNOSIS — R05.9 COUGH: ICD-10-CM

## 2023-04-01 LAB
ALBUMIN SERPL BCP-MCNC: 2.7 G/DL (ref 3.5–5.2)
ALP SERPL-CCNC: 96 U/L (ref 55–135)
ALT SERPL W/O P-5'-P-CCNC: 18 U/L (ref 10–44)
ANION GAP SERPL CALC-SCNC: 9 MMOL/L (ref 8–16)
AST SERPL-CCNC: 17 U/L (ref 10–40)
BACTERIA #/AREA URNS HPF: ABNORMAL /HPF
BASOPHILS # BLD AUTO: 0.04 K/UL (ref 0–0.2)
BASOPHILS NFR BLD: 0.5 % (ref 0–1.9)
BILIRUB SERPL-MCNC: 0.1 MG/DL (ref 0.1–1)
BILIRUB UR QL STRIP: NEGATIVE
BUN SERPL-MCNC: 13 MG/DL (ref 8–23)
CALCIUM SERPL-MCNC: 9.3 MG/DL (ref 8.7–10.5)
CHLORIDE SERPL-SCNC: 102 MMOL/L (ref 95–110)
CLARITY UR: ABNORMAL
CO2 SERPL-SCNC: 27 MMOL/L (ref 23–29)
COLOR UR: YELLOW
CREAT SERPL-MCNC: 0.7 MG/DL (ref 0.5–1.4)
DIFFERENTIAL METHOD: ABNORMAL
EOSINOPHIL # BLD AUTO: 0.2 K/UL (ref 0–0.5)
EOSINOPHIL NFR BLD: 2.4 % (ref 0–8)
ERYTHROCYTE [DISTWIDTH] IN BLOOD BY AUTOMATED COUNT: 18.4 % (ref 11.5–14.5)
EST. GFR  (NO RACE VARIABLE): >60 ML/MIN/1.73 M^2
GLUCOSE SERPL-MCNC: 104 MG/DL (ref 70–110)
GLUCOSE UR QL STRIP: NEGATIVE
HCT VFR BLD AUTO: 30.5 % (ref 37–48.5)
HGB BLD-MCNC: 9.4 G/DL (ref 12–16)
HGB UR QL STRIP: NEGATIVE
IMM GRANULOCYTES # BLD AUTO: 0.06 K/UL (ref 0–0.04)
IMM GRANULOCYTES NFR BLD AUTO: 0.7 % (ref 0–0.5)
INFLUENZA A, MOLECULAR: NEGATIVE
INFLUENZA B, MOLECULAR: NEGATIVE
KETONES UR QL STRIP: NEGATIVE
LEUKOCYTE ESTERASE UR QL STRIP: ABNORMAL
LIPASE SERPL-CCNC: 22 U/L (ref 4–60)
LYMPHOCYTES # BLD AUTO: 1.1 K/UL (ref 1–4.8)
LYMPHOCYTES NFR BLD: 13.6 % (ref 18–48)
MCH RBC QN AUTO: 22.4 PG (ref 27–31)
MCHC RBC AUTO-ENTMCNC: 30.8 G/DL (ref 32–36)
MCV RBC AUTO: 73 FL (ref 82–98)
MICROSCOPIC COMMENT: ABNORMAL
MONOCYTES # BLD AUTO: 0.8 K/UL (ref 0.3–1)
MONOCYTES NFR BLD: 9.7 % (ref 4–15)
NEUTROPHILS # BLD AUTO: 6.1 K/UL (ref 1.8–7.7)
NEUTROPHILS NFR BLD: 73.1 % (ref 38–73)
NITRITE UR QL STRIP: NEGATIVE
NRBC BLD-RTO: 0 /100 WBC
PH UR STRIP: 6 [PH] (ref 5–8)
PLATELET # BLD AUTO: 564 K/UL (ref 150–450)
PMV BLD AUTO: 9 FL (ref 9.2–12.9)
POTASSIUM SERPL-SCNC: 3.9 MMOL/L (ref 3.5–5.1)
PROT SERPL-MCNC: 7.1 G/DL (ref 6–8.4)
PROT UR QL STRIP: NEGATIVE
RBC # BLD AUTO: 4.19 M/UL (ref 4–5.4)
RBC #/AREA URNS HPF: 1 /HPF (ref 0–4)
SARS-COV-2 RDRP RESP QL NAA+PROBE: NEGATIVE
SODIUM SERPL-SCNC: 138 MMOL/L (ref 136–145)
SP GR UR STRIP: 1.01 (ref 1–1.03)
SPECIMEN SOURCE: NORMAL
SQUAMOUS #/AREA URNS HPF: 6 /HPF
TRICHOMONAS UR QL MICRO: ABNORMAL
URN SPEC COLLECT METH UR: ABNORMAL
UROBILINOGEN UR STRIP-ACNC: NEGATIVE EU/DL
WBC # BLD AUTO: 8.36 K/UL (ref 3.9–12.7)
WBC #/AREA URNS HPF: >100 /HPF (ref 0–5)

## 2023-04-01 PROCEDURE — 36415 COLL VENOUS BLD VENIPUNCTURE: CPT | Performed by: STUDENT IN AN ORGANIZED HEALTH CARE EDUCATION/TRAINING PROGRAM

## 2023-04-01 PROCEDURE — 81000 URINALYSIS NONAUTO W/SCOPE: CPT | Performed by: STUDENT IN AN ORGANIZED HEALTH CARE EDUCATION/TRAINING PROGRAM

## 2023-04-01 PROCEDURE — 93010 EKG 12-LEAD: ICD-10-PCS | Mod: ,,, | Performed by: INTERNAL MEDICINE

## 2023-04-01 PROCEDURE — 83690 ASSAY OF LIPASE: CPT | Performed by: STUDENT IN AN ORGANIZED HEALTH CARE EDUCATION/TRAINING PROGRAM

## 2023-04-01 PROCEDURE — 99900031 HC PATIENT EDUCATION (STAT)

## 2023-04-01 PROCEDURE — 87502 INFLUENZA DNA AMP PROBE: CPT | Performed by: STUDENT IN AN ORGANIZED HEALTH CARE EDUCATION/TRAINING PROGRAM

## 2023-04-01 PROCEDURE — 99285 EMERGENCY DEPT VISIT HI MDM: CPT | Mod: 25

## 2023-04-01 PROCEDURE — U0002 COVID-19 LAB TEST NON-CDC: HCPCS | Performed by: STUDENT IN AN ORGANIZED HEALTH CARE EDUCATION/TRAINING PROGRAM

## 2023-04-01 PROCEDURE — 93005 ELECTROCARDIOGRAM TRACING: CPT

## 2023-04-01 PROCEDURE — 25000003 PHARM REV CODE 250: Performed by: STUDENT IN AN ORGANIZED HEALTH CARE EDUCATION/TRAINING PROGRAM

## 2023-04-01 PROCEDURE — 85025 COMPLETE CBC W/AUTO DIFF WBC: CPT | Performed by: STUDENT IN AN ORGANIZED HEALTH CARE EDUCATION/TRAINING PROGRAM

## 2023-04-01 PROCEDURE — 99900035 HC TECH TIME PER 15 MIN (STAT)

## 2023-04-01 PROCEDURE — 96361 HYDRATE IV INFUSION ADD-ON: CPT

## 2023-04-01 PROCEDURE — 87086 URINE CULTURE/COLONY COUNT: CPT | Performed by: STUDENT IN AN ORGANIZED HEALTH CARE EDUCATION/TRAINING PROGRAM

## 2023-04-01 PROCEDURE — 80053 COMPREHEN METABOLIC PANEL: CPT | Performed by: STUDENT IN AN ORGANIZED HEALTH CARE EDUCATION/TRAINING PROGRAM

## 2023-04-01 PROCEDURE — 93010 ELECTROCARDIOGRAM REPORT: CPT | Mod: ,,, | Performed by: INTERNAL MEDICINE

## 2023-04-01 RX ORDER — CIPROFLOXACIN 2 MG/ML
400 INJECTION, SOLUTION INTRAVENOUS
Status: COMPLETED | OUTPATIENT
Start: 2023-04-02 | End: 2023-04-02

## 2023-04-01 RX ADMIN — SODIUM CHLORIDE 1000 ML: 9 INJECTION, SOLUTION INTRAVENOUS at 10:04

## 2023-04-02 VITALS
BODY MASS INDEX: 24.27 KG/M2 | TEMPERATURE: 99 F | WEIGHT: 151 LBS | DIASTOLIC BLOOD PRESSURE: 85 MMHG | OXYGEN SATURATION: 99 % | RESPIRATION RATE: 18 BRPM | SYSTOLIC BLOOD PRESSURE: 135 MMHG | HEIGHT: 66 IN | HEART RATE: 77 BPM

## 2023-04-02 PROCEDURE — 96365 THER/PROPH/DIAG IV INF INIT: CPT

## 2023-04-02 PROCEDURE — 63600175 PHARM REV CODE 636 W HCPCS: Performed by: STUDENT IN AN ORGANIZED HEALTH CARE EDUCATION/TRAINING PROGRAM

## 2023-04-02 RX ORDER — ONDANSETRON 4 MG/1
4 TABLET, ORALLY DISINTEGRATING ORAL EVERY 6 HOURS PRN
Qty: 20 TABLET | Refills: 0 | Status: ON HOLD | OUTPATIENT
Start: 2023-04-02 | End: 2023-04-12

## 2023-04-02 RX ORDER — METRONIDAZOLE 500 MG/1
500 TABLET ORAL EVERY 12 HOURS
Qty: 14 TABLET | Refills: 0 | Status: SHIPPED | OUTPATIENT
Start: 2023-04-02 | End: 2023-04-04

## 2023-04-02 RX ORDER — CIPROFLOXACIN 500 MG/1
500 TABLET ORAL 2 TIMES DAILY
Qty: 20 TABLET | Refills: 0 | Status: SHIPPED | OUTPATIENT
Start: 2023-04-02 | End: 2023-04-02 | Stop reason: CLARIF

## 2023-04-02 RX ORDER — NITROFURANTOIN 25; 75 MG/1; MG/1
100 CAPSULE ORAL 2 TIMES DAILY
Qty: 10 CAPSULE | Refills: 0 | Status: SHIPPED | OUTPATIENT
Start: 2023-04-02 | End: 2023-04-07

## 2023-04-02 RX ADMIN — CIPROFLOXACIN 400 MG: 2 INJECTION, SOLUTION INTRAVENOUS at 12:04

## 2023-04-02 NOTE — ED NOTES
ORTHOSTATIC VITAL SIGNS    LYING: BP- 149/67 (97), ME- 81     SITTING: BP- 136/67 (95), ME- 89    STANDING: BP- 137/70 (97), ME- 96

## 2023-04-02 NOTE — ED TRIAGE NOTES
Pt arrived to ED c/o dizziness, chills, and not feeling right. Pt reports she recently had cancer removed from her abdomen about 3 weeks ago. Incision site from upper abdomen to lower abdomen. No redness or drainage noted to incision site. Temperature in triage 99.1 F. Pt recently followed up with surgeon on Thursday. Pt attempted to call on call doctor but they did not respond.

## 2023-04-02 NOTE — ED PROVIDER NOTES
"Encounter Date: 4/1/2023    This document was partially completed using speech recognition software and may contain misspellings, grammatical errors, and/or unexpected word substitutions.       History     Chief Complaint   Patient presents with    Dizziness     Pt arrived to ED c/o dizziness, chills, and not feeling right. Pt reports she recently had cancer removed from her abdomen about 3 weeks ago. Incision site from upper abdomen to lower abdomen. No redness or drainage noted to incision site. Temperature in triage 99.1 F. Pt recently followed up with surgeon on Thursday. Pt attempted to call on call doctor but they did not respond.      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 Patient states her symptoms started last night. No sick contacts. She hasn't been having an appetite and hasn't been eating or drinking much - she drank 2 bottles of water today and had half a donut. Denies nausea, vomiting, diarrhea, abdominal  pain, shortness of breath, or chest pain.      Review of patient's allergies indicates:   Allergen Reactions    Aspirin Other (See Comments)     Pt states it is "hard on her stomach" She can tolerate a low dose aspirin    Pcn [penicillins]      Childhood      Past Medical History:   Diagnosis Date    Abnormal Pap smear of cervix     Arthritis     Hypertension     Hyperthyroidism      Past Surgical History:   Procedure Laterality Date    CATARACT EXTRACTION W/  INTRAOCULAR LENS IMPLANT Right 8/8/2019    Procedure: EXTRACTION, CATARACT, WITH IOL INSERTION;  Surgeon: Spenser Lee MD;  Location: [facility];  Service: Ophthalmology;  Laterality: Right;    ESOPHAGOGASTRODUODENOSCOPY N/A 3/10/2023    Procedure: EGD (ESOPHAGOGASTRODUODENOSCOPY);  Surgeon: Shashi Tapia MD;  Location: [address]);  Service: Endoscopy;  Laterality: N/A;    EYE SURGERY      " HYSTERECTOMY      OOPHORECTOMY      PHACOEMULSIFICATION OF CATARACT Right 8/8/2019    Procedure: PHACOEMULSIFICATION, CATARACT;  Surgeon: Spenser Lee MD;  Location: [facility];  Service: Ophthalmology;  Laterality: Right;    WHIPPLE PROCEDURE N/A 3/15/2023    Procedure: WHIPPLE PROCEDURE;  Surgeon: Nick Paez MD;  Location: [facility] OR [address] [place] [address];  Service: General;  Laterality: N/A;     Family History   Problem Relation Age of Onset    Cancer Mother     Breast cancer Neg Hx     Colon cancer Neg Hx     Ovarian cancer Neg Hx      Social History     Tobacco Use    Smoking status: Some Days     Packs/day: 0.50     Years: 30.00     Pack years: 15.00     Types: Cigarettes    Smokeless tobacco: Current   Substance Use Topics    Alcohol use: Yes     Alcohol/week: 1.0 standard drink     Types: 1 Cans of beer per week    Drug use: No     Review of Systems   Constitutional:  Positive for fatigue. Negative for fever.   HENT:  Negative for congestion, rhinorrhea and sneezing.    Eyes:  Negative for discharge and redness.   Respiratory:  Positive for cough. Negative for shortness of breath.    Cardiovascular:  Negative for chest pain and palpitations.   Gastrointestinal:  Negative for abdominal pain, diarrhea, nausea and vomiting.   Genitourinary:  Negative for dysuria, frequency, vaginal bleeding and vaginal discharge.   Musculoskeletal:  Negative for back pain and neck pain.   Skin:  Negative for rash and wound.   Neurological:  Positive for light-headedness. Negative for weakness, numbness and headaches.     Physical Exam     Initial Vitals [04/01/23 2137]   BP Pulse Resp Temp SpO2   131/73 102 18 99.1 °F (37.3 °C) 98 %      MAP       --         Physical Exam    Nursing note and vitals reviewed.  Constitutional: She appears well-developed. She is not diaphoretic. No distress.   HENT:   Head: Normocephalic and atraumatic.   Right Ear: External ear normal.   Left Ear: External ear normal.   Eyes: Right eye exhibits no  discharge. Left eye exhibits no discharge. No scleral icterus.   Neck: Neck supple.   Cardiovascular:  Normal rate and regular rhythm.           Pulmonary/Chest: Breath sounds normal. No stridor. No respiratory distress. She has no wheezes. She has no rhonchi. She has no rales.   Abdominal: Abdomen is soft. There is no abdominal tenderness.   Abdominal surgical site appears well (see picture) There is no guarding.   Musculoskeletal:         General: No edema.      Cervical back: Neck supple.     Neurological: She is alert and oriented to person, place, and time. She has normal strength. No cranial nerve deficit or sensory deficit. GCS eye subscore is 4. GCS verbal subscore is 5. GCS motor subscore is 6.   Ambulatory   Skin: Skin is warm and dry. Capillary refill takes less than 2 seconds.   Psychiatric: She has a normal mood and affect.           ED Course   Procedures  Labs Reviewed   CBC W/ AUTO DIFFERENTIAL - Abnormal; Notable for the following components:       Result Value    Hemoglobin 9.4 (*)     Hematocrit 30.5 (*)     MCV 73 (*)     MCH 22.4 (*)     MCHC 30.8 (*)     RDW 18.4 (*)     Platelets 564 (*)     MPV 9.0 (*)     Immature Granulocytes 0.7 (*)     Immature Grans (Abs) 0.06 (*)     Gran % 73.1 (*)     Lymph % 13.6 (*)     All other components within normal limits   COMPREHENSIVE METABOLIC PANEL - Abnormal; Notable for the following components:    Albumin 2.7 (*)     All other components within normal limits    Narrative:     Recoll. [id number] by ROBERTO at 04/01/2023 22:10, reason: hemolyzed   URINALYSIS, REFLEX TO URINE CULTURE - Abnormal; Notable for the following components:    Appearance, UA Hazy (*)     Leukocytes, UA 2+ (*)     All other components within normal limits    Narrative:     Specimen Source->Urine   URINALYSIS MICROSCOPIC - Abnormal; Notable for the following components:    WBC, UA >100 (*)     Bacteria Many (*)     Trichomonas, UA Rare (*)     All other components within normal limits     Narrative:     Specimen Source->Urine   INFLUENZA A & B BY MOLECULAR   CULTURE, URINE   LIPASE    Narrative:     Recoll. [id number] by ROBERTO at 04/01/2023 22:10, reason: hemolyzed   SARS-COV-2 RNA AMPLIFICATION, QUAL     EKG Readings: (Independently Interpreted)   Previous EKG: Compared with most recent EKG Previous EKG Date: 3/15/2023.   Sinus rhythm at 63 bpm with sinus arrhythmia. Normal axis. Normal intervals. No STEMI.     Imaging Results              X-Ray Chest AP Portable (Final result)  Result time 04/01/23 22:40:50      Final result by Anthony Oneil MD (04/01/23 22:40:50)                   Impression:      No acute process.      Electronically signed by: Anthony Oneil MD  Date:    04/01/2023  Time:    22:40               Narrative:    EXAMINATION:  XR CHEST AP PORTABLE    CLINICAL HISTORY:  Cough, unspecified    TECHNIQUE:  Single frontal view of the chest was performed.    COMPARISON:  03/11/2023.    FINDINGS:  There has been interval removal of the enteric tube and the right-sided IJ catheter.    The trachea is unremarkable.  There are calcifications of the aortic knob.  The cardiomediastinal silhouette is within normal limits.  The hemidiaphragms are unremarkable.  There are no pleural effusions.  There is no evidence of a pneumothorax.  There is no evidence of pneumomediastinum.  No airspace opacity is present.  There are degenerative changes in the osseous structures.                                       Medications   ciprofloxacin (CIPRO)400mg/200ml D5W IVPB 400 mg (has no administration in time range)   sodium chloride 0.9% bolus 1,000 mL 1,000 mL (0 mLs Intravenous Stopped 4/1/23 3700)     Medical Decision Making:   Differential Diagnosis:   Ddx: COVID19, flu, PNA, UTI, anemia, cardiac dysrhythmia, dehydration, electrolyte derangement  ED Management:  Based on the patient's evaluation - patient appears well for discharge home. [place] much better with IV fluids and ready to go home. Labs with stable  anemia, UTI, trichomonas. Negative CXR for PNA, negative COVID19/flu. Informed patient of her vaginal infection and bladder infection. Treating with IV cipro and discharging with PO flagyl for trich and macrobid. Patient is in agreement.                        Clinical Impression:   Final diagnoses:  [R05.9] Cough  [R42] Lightheadedness  [N39.0] Acute UTI (Primary)        ED Disposition Condition    Discharge Stable          ED Prescriptions       Medication Sig Dispense Start Date End Date Auth. Provider    ciprofloxacin HCl (CIPRO) 500 MG tablet  (Status: Discontinued) Take 1 tablet (500 mg total) by mouth 2 (two) times daily. for 10 days 20 tablet 4/2/2023 4/2/2023 Mitchel Tucker DO    ondansetron (ZOFRAN-ODT) 4 MG TbDL Take 1 tablet (4 mg total) by mouth every 6 (six) hours as needed (nausea/vomiting). 20 tablet 4/2/2023 -- Mitchel Tucker DO    nitrofurantoin, macrocrystal-monohydrate, (MACROBID) 100 MG capsule Take 1 capsule (100 mg total) by mouth 2 (two) times daily. for 5 days 10 capsule 4/2/2023 4/7/2023 Mitchel Tucker DO    metroNIDAZOLE (FLAGYL) 500 MG tablet Take 1 tablet (500 mg total) by mouth every 12 (twelve) hours. for 7 days 14 tablet 4/2/2023 4/9/2023 Mitchel Tucker DO          Follow-up Information       Follow up With Specialties Details Why Contact Info    Payal Moreland NP Family Medicine Schedule an appointment as soon as possible for a visit in 5 days  [address] [facility] [address]  [phone]               Mitchel Tucker DO  04/02/23 0011

## 2023-04-03 ENCOUNTER — LAB VISIT (OUTPATIENT)
Dept: LAB | Facility: HOSPITAL | Age: 68
End: 2023-04-03
Attending: NURSE PRACTITIONER
Payer: MEDICARE

## 2023-04-03 ENCOUNTER — PATIENT MESSAGE (OUTPATIENT)
Dept: ADMINISTRATIVE | Facility: HOSPITAL | Age: 68
End: 2023-04-03
Payer: MEDICARE

## 2023-04-03 ENCOUNTER — TELEPHONE (OUTPATIENT)
Dept: HEMATOLOGY/ONCOLOGY | Facility: CLINIC | Age: 68
End: 2023-04-03
Payer: MEDICARE

## 2023-04-03 DIAGNOSIS — C16.9 STOMACH CANCER: Primary | ICD-10-CM

## 2023-04-03 DIAGNOSIS — C17.0 DUODENAL ADENOCARCINOMA: Primary | ICD-10-CM

## 2023-04-03 LAB
ALBUMIN SERPL BCP-MCNC: 2.8 G/DL (ref 3.5–5.2)
ALP SERPL-CCNC: 88 U/L (ref 55–135)
ALT SERPL W/O P-5'-P-CCNC: 24 U/L (ref 10–44)
ANION GAP SERPL CALC-SCNC: 11 MMOL/L (ref 8–16)
AST SERPL-CCNC: 28 U/L (ref 10–40)
BACTERIA UR CULT: NORMAL
BACTERIA UR CULT: NORMAL
BILIRUB SERPL-MCNC: 0.3 MG/DL (ref 0.1–1)
BUN SERPL-MCNC: 9 MG/DL (ref 8–23)
CALCIUM SERPL-MCNC: 9.5 MG/DL (ref 8.7–10.5)
CHLORIDE SERPL-SCNC: 102 MMOL/L (ref 95–110)
CO2 SERPL-SCNC: 27 MMOL/L (ref 23–29)
CREAT SERPL-MCNC: 0.7 MG/DL (ref 0.5–1.4)
ERYTHROCYTE [DISTWIDTH] IN BLOOD BY AUTOMATED COUNT: 18.5 % (ref 11.5–14.5)
EST. GFR  (NO RACE VARIABLE): >60 ML/MIN/1.73 M^2
GLUCOSE SERPL-MCNC: 93 MG/DL (ref 70–110)
HCT VFR BLD AUTO: 30.3 % (ref 37–48.5)
HGB BLD-MCNC: 9.5 G/DL (ref 12–16)
MAGNESIUM SERPL-MCNC: 1.9 MG/DL (ref 1.6–2.6)
MCH RBC QN AUTO: 22.8 PG (ref 27–31)
MCHC RBC AUTO-ENTMCNC: 31.4 G/DL (ref 32–36)
MCV RBC AUTO: 73 FL (ref 82–98)
PHOSPHATE SERPL-MCNC: 3.4 MG/DL (ref 2.7–4.5)
PLATELET # BLD AUTO: 581 K/UL (ref 150–450)
PMV BLD AUTO: 9 FL (ref 9.2–12.9)
POCT GLUCOSE: 131 MG/DL (ref 70–110)
POTASSIUM SERPL-SCNC: 4 MMOL/L (ref 3.5–5.1)
PROT SERPL-MCNC: 7.1 G/DL (ref 6–8.4)
RBC # BLD AUTO: 4.17 M/UL (ref 4–5.4)
SODIUM SERPL-SCNC: 140 MMOL/L (ref 136–145)
WBC # BLD AUTO: 6.98 K/UL (ref 3.9–12.7)

## 2023-04-03 PROCEDURE — 84100 ASSAY OF PHOSPHORUS: CPT

## 2023-04-03 PROCEDURE — 80053 COMPREHEN METABOLIC PANEL: CPT

## 2023-04-03 PROCEDURE — 85027 COMPLETE CBC AUTOMATED: CPT

## 2023-04-03 PROCEDURE — 83735 ASSAY OF MAGNESIUM: CPT

## 2023-04-03 RX ORDER — TRAMADOL HYDROCHLORIDE 50 MG/1
50 TABLET ORAL EVERY 12 HOURS PRN
Qty: 14 TABLET | Refills: 0 | Status: ON HOLD | OUTPATIENT
Start: 2023-04-03 | End: 2023-04-12

## 2023-04-03 NOTE — TELEPHONE ENCOUNTER
----- Message from Hina Madrid sent at 4/3/2023 11:10 AM CDT -----  Contact: pt  Nan Simms  MRN: [id number]  : 1955  PCP: Payal Moreland  Home Phone      [phone]  Work Phone      Not on file.  Mobile          [phone]      MESSAGE: pt states she needs the following prescription refilled. She states she took her last pill last night    traMADoL (ULTRAM) 50 mg tablet    [facility] [address] [place] [address]  Phone: [phone] Fax: [phone]  Hours: Mon-Fri, 8a-5:30p    [phone]

## 2023-04-04 ENCOUNTER — OFFICE VISIT (OUTPATIENT)
Dept: INTERNAL MEDICINE | Facility: CLINIC | Age: 68
End: 2023-04-04
Payer: MEDICARE

## 2023-04-04 VITALS
BODY MASS INDEX: 24.73 KG/M2 | SYSTOLIC BLOOD PRESSURE: 132 MMHG | DIASTOLIC BLOOD PRESSURE: 54 MMHG | WEIGHT: 153.88 LBS | HEART RATE: 49 BPM | RESPIRATION RATE: 18 BRPM | HEIGHT: 66 IN

## 2023-04-04 DIAGNOSIS — Z90.49 H/O WHIPPLE PROCEDURE: ICD-10-CM

## 2023-04-04 DIAGNOSIS — N39.0 URINARY TRACT INFECTION WITHOUT HEMATURIA, SITE UNSPECIFIED: ICD-10-CM

## 2023-04-04 DIAGNOSIS — C16.8 MALIGNANT NEOPLASM OF OVERLAPPING SITES OF STOMACH: Primary | ICD-10-CM

## 2023-04-04 DIAGNOSIS — Z90.410 H/O WHIPPLE PROCEDURE: ICD-10-CM

## 2023-04-04 PROCEDURE — 99214 PR OFFICE/OUTPT VISIT, EST, LEVL IV, 30-39 MIN: ICD-10-PCS | Mod: S$GLB,,, | Performed by: NURSE PRACTITIONER

## 2023-04-04 PROCEDURE — 3008F BODY MASS INDEX DOCD: CPT | Mod: CPTII,S$GLB,, | Performed by: NURSE PRACTITIONER

## 2023-04-04 PROCEDURE — 1160F RVW MEDS BY RX/DR IN RCRD: CPT | Mod: CPTII,S$GLB,, | Performed by: NURSE PRACTITIONER

## 2023-04-04 PROCEDURE — 3008F PR BODY MASS INDEX (BMI) DOCUMENTED: ICD-10-PCS | Mod: CPTII,S$GLB,, | Performed by: NURSE PRACTITIONER

## 2023-04-04 PROCEDURE — 3078F PR MOST RECENT DIASTOLIC BLOOD PRESSURE < 80 MM HG: ICD-10-PCS | Mod: CPTII,S$GLB,, | Performed by: NURSE PRACTITIONER

## 2023-04-04 PROCEDURE — 3075F PR MOST RECENT SYSTOLIC BLOOD PRESS GE 130-139MM HG: ICD-10-PCS | Mod: CPTII,S$GLB,, | Performed by: NURSE PRACTITIONER

## 2023-04-04 PROCEDURE — 3078F DIAST BP <80 MM HG: CPT | Mod: CPTII,S$GLB,, | Performed by: NURSE PRACTITIONER

## 2023-04-04 PROCEDURE — 3044F PR MOST RECENT HEMOGLOBIN A1C LEVEL <7.0%: ICD-10-PCS | Mod: CPTII,S$GLB,, | Performed by: NURSE PRACTITIONER

## 2023-04-04 PROCEDURE — 1159F MED LIST DOCD IN RCRD: CPT | Mod: CPTII,S$GLB,, | Performed by: NURSE PRACTITIONER

## 2023-04-04 PROCEDURE — 1101F PT FALLS ASSESS-DOCD LE1/YR: CPT | Mod: CPTII,S$GLB,, | Performed by: NURSE PRACTITIONER

## 2023-04-04 PROCEDURE — 99214 OFFICE O/P EST MOD 30 MIN: CPT | Mod: S$GLB,,, | Performed by: NURSE PRACTITIONER

## 2023-04-04 PROCEDURE — 1160F PR REVIEW ALL MEDS BY PRESCRIBER/CLIN PHARMACIST DOCUMENTED: ICD-10-PCS | Mod: CPTII,S$GLB,, | Performed by: NURSE PRACTITIONER

## 2023-04-04 PROCEDURE — 99999 PR PBB SHADOW E&M-EST. PATIENT-LVL III: ICD-10-PCS | Mod: PBBFAC,,, | Performed by: NURSE PRACTITIONER

## 2023-04-04 PROCEDURE — 3075F SYST BP GE 130 - 139MM HG: CPT | Mod: CPTII,S$GLB,, | Performed by: NURSE PRACTITIONER

## 2023-04-04 PROCEDURE — 1159F PR MEDICATION LIST DOCUMENTED IN MEDICAL RECORD: ICD-10-PCS | Mod: CPTII,S$GLB,, | Performed by: NURSE PRACTITIONER

## 2023-04-04 PROCEDURE — 99999 PR PBB SHADOW E&M-EST. PATIENT-LVL III: CPT | Mod: PBBFAC,,, | Performed by: NURSE PRACTITIONER

## 2023-04-04 PROCEDURE — 1111F DSCHRG MED/CURRENT MED MERGE: CPT | Mod: CPTII,S$GLB,, | Performed by: NURSE PRACTITIONER

## 2023-04-04 PROCEDURE — 1126F AMNT PAIN NOTED NONE PRSNT: CPT | Mod: CPTII,S$GLB,, | Performed by: NURSE PRACTITIONER

## 2023-04-04 PROCEDURE — 1101F PR PT FALLS ASSESS DOC 0-1 FALLS W/OUT INJ PAST YR: ICD-10-PCS | Mod: CPTII,S$GLB,, | Performed by: NURSE PRACTITIONER

## 2023-04-04 PROCEDURE — 1111F PR DISCHARGE MEDS RECONCILED W/ CURRENT OUTPATIENT MED LIST: ICD-10-PCS | Mod: CPTII,S$GLB,, | Performed by: NURSE PRACTITIONER

## 2023-04-04 PROCEDURE — 1126F PR PAIN SEVERITY QUANTIFIED, NO PAIN PRESENT: ICD-10-PCS | Mod: CPTII,S$GLB,, | Performed by: NURSE PRACTITIONER

## 2023-04-04 PROCEDURE — 3044F HG A1C LEVEL LT 7.0%: CPT | Mod: CPTII,S$GLB,, | Performed by: NURSE PRACTITIONER

## 2023-04-04 PROCEDURE — 3288F FALL RISK ASSESSMENT DOCD: CPT | Mod: CPTII,S$GLB,, | Performed by: NURSE PRACTITIONER

## 2023-04-04 PROCEDURE — 3288F PR FALLS RISK ASSESSMENT DOCUMENTED: ICD-10-PCS | Mod: CPTII,S$GLB,, | Performed by: NURSE PRACTITIONER

## 2023-04-04 NOTE — PROGRESS NOTES
Subjective:       Patient ID: Nan Simms is a 67 y.o. female.    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 Patient states her symptoms started last night. No sick contacts. She hasn't been having an appetite and hasn't been eating or drinking much - she drank 2 bottles of water today and had half a donut. Denies nausea, vomiting, diarrhea, abdominal  pain, shortness of breath, or chest pain. Dx with UTI and culture grew multiple organisms. She report sthat she has been taking cipro/macrobid and flagyl    She is waiting for Med Onc as per Dr Hdez direction 4/17 is her f/u and she reports that they will just follow her with imaging, NO longer needs any further treatment.     She is drinking boost 1-3 days a day. Also eating very little. No appetite. Just eating to eat. Not taking tramadol unless she hurts but that is not often      Review of Systems   Constitutional:  Negative for chills, fatigue, fever and unexpected weight change.   HENT:  Negative for congestion, ear pain, sore throat and trouble swallowing.    Eyes:  Negative for pain and visual disturbance.   Respiratory:  Negative for cough, chest tightness and shortness of breath.    Cardiovascular:  Negative for chest pain, palpitations and leg swelling.   Gastrointestinal:  Negative for abdominal distention, abdominal pain, constipation, diarrhea and vomiting.   Genitourinary:  Negative for difficulty urinating, dysuria, flank pain, frequency and hematuria.   Musculoskeletal:  Negative for back pain, gait problem, joint swelling, neck pain and neck stiffness.   Skin:  Negative for rash and wound.   Neurological:  Negative for dizziness, seizures, speech difficulty, light-headedness and headaches.     Objective:       Physical Exam  Vitals and nursing note reviewed.   Constitutional:       Appearance: Normal appearance. She is well-developed.   HENT:      Head: Normocephalic and atraumatic.      Right Ear: External ear normal.      Left Ear: External ear normal.      Nose: Nose normal.   Eyes:      Conjunctiva/sclera: Conjunctivae normal.      Pupils: Pupils are equal, round, and reactive to light.   Cardiovascular:      Rate and Rhythm: Normal rate and regular rhythm.      Heart sounds: Normal heart sounds. No murmur heard.  Pulmonary:      Effort: Pulmonary effort is normal. No respiratory distress.      Breath sounds: Normal breath sounds. No wheezing.   Abdominal:      General: Bowel sounds are normal. There is no distension.      Palpations: Abdomen is soft. There is no mass.      Tenderness: There is no abdominal tenderness.   Musculoskeletal:         General: Normal range of motion.      Cervical back: Normal range of motion and neck supple.   Skin:     General: Skin is warm and dry.   Neurological:      Mental Status: She is alert and oriented to person, place, and time.   Psychiatric:         Behavior: Behavior normal.         Thought Content: Thought content normal.         Judgment: Judgment normal.       Assessment:       1. Malignant neoplasm of overlapping sites of stomach    2. H/O Whipple procedure    3. Urinary tract infection without hematuria, site unspecified        Plan:     Problem List Items Addressed This Visit       RESOLVED: Gastric cancer - Primary     Other Visit Diagnoses       H/O Whipple procedure        Urinary tract infection without hematuria, site unspecified        Relevant Orders    Urinalysis, Reflex to Urine Culture Urine, Clean Catch          Unsure why flagyl was rx. Having stomach upset so stop that. Took 3 days worth cipro so will stop that as well asn can cont macrobid till completion. Repeat urine later this week to be sure fully resolved. Keep f/u with oncology for recent dx 4/17/23  next appt.

## 2023-04-06 ENCOUNTER — LAB VISIT (OUTPATIENT)
Dept: LAB | Facility: HOSPITAL | Age: 68
End: 2023-04-06
Attending: NURSE PRACTITIONER
Payer: MEDICARE

## 2023-04-06 DIAGNOSIS — N39.0 URINARY TRACT INFECTION WITHOUT HEMATURIA, SITE UNSPECIFIED: ICD-10-CM

## 2023-04-06 LAB
BILIRUB UR QL STRIP: NEGATIVE
CLARITY UR: CLEAR
COLOR UR: YELLOW
GLUCOSE UR QL STRIP: NEGATIVE
HGB UR QL STRIP: NEGATIVE
KETONES UR QL STRIP: NEGATIVE
LEUKOCYTE ESTERASE UR QL STRIP: NEGATIVE
NITRITE UR QL STRIP: NEGATIVE
PH UR STRIP: 7 [PH] (ref 5–8)
PROT UR QL STRIP: NEGATIVE
SP GR UR STRIP: 1.01 (ref 1–1.03)
URN SPEC COLLECT METH UR: NORMAL
UROBILINOGEN UR STRIP-ACNC: NEGATIVE EU/DL

## 2023-04-06 PROCEDURE — 81003 URINALYSIS AUTO W/O SCOPE: CPT | Performed by: NURSE PRACTITIONER

## 2023-04-10 ENCOUNTER — TELEPHONE (OUTPATIENT)
Dept: HEMATOLOGY/ONCOLOGY | Facility: CLINIC | Age: 68
End: 2023-04-10
Payer: MEDICARE

## 2023-04-10 ENCOUNTER — HOSPITAL ENCOUNTER (EMERGENCY)
Facility: HOSPITAL | Age: 68
Discharge: HOME | End: 2023-04-10
Attending: SURGERY
Payer: MEDICARE

## 2023-04-10 VITALS
OXYGEN SATURATION: 98 % | RESPIRATION RATE: 16 BRPM | SYSTOLIC BLOOD PRESSURE: 164 MMHG | HEART RATE: 104 BPM | TEMPERATURE: 98 F | DIASTOLIC BLOOD PRESSURE: 81 MMHG | BODY MASS INDEX: 23.95 KG/M2 | HEIGHT: 66 IN | WEIGHT: 149.06 LBS

## 2023-04-10 DIAGNOSIS — R53.83 FATIGUE, UNSPECIFIED TYPE: ICD-10-CM

## 2023-04-10 DIAGNOSIS — R11.0 NAUSEA: ICD-10-CM

## 2023-04-10 DIAGNOSIS — E86.0 DEHYDRATION: Primary | ICD-10-CM

## 2023-04-10 LAB
ALBUMIN SERPL BCP-MCNC: 2.9 G/DL (ref 3.5–5.2)
ALP SERPL-CCNC: 77 U/L (ref 55–135)
ALT SERPL W/O P-5'-P-CCNC: 30 U/L (ref 10–44)
ANION GAP SERPL CALC-SCNC: 9 MMOL/L (ref 8–16)
AST SERPL-CCNC: 55 U/L (ref 10–40)
BACTERIA #/AREA URNS HPF: NORMAL /HPF
BASOPHILS # BLD AUTO: 0.01 K/UL (ref 0–0.2)
BASOPHILS NFR BLD: 0.2 % (ref 0–1.9)
BILIRUB SERPL-MCNC: 0.3 MG/DL (ref 0.1–1)
BILIRUB UR QL STRIP: NEGATIVE
BNP SERPL-MCNC: 64 PG/ML (ref 0–99)
BUN SERPL-MCNC: 11 MG/DL (ref 8–23)
CALCIUM SERPL-MCNC: 9.2 MG/DL (ref 8.7–10.5)
CHLORIDE SERPL-SCNC: 103 MMOL/L (ref 95–110)
CK SERPL-CCNC: 33 U/L (ref 20–180)
CLARITY UR: CLEAR
CO2 SERPL-SCNC: 30 MMOL/L (ref 23–29)
COLOR UR: YELLOW
CREAT SERPL-MCNC: 0.6 MG/DL (ref 0.5–1.4)
D DIMER PPP IA.FEU-MCNC: 3.16 MG/L FEU
DIFFERENTIAL METHOD: ABNORMAL
EOSINOPHIL # BLD AUTO: 0.1 K/UL (ref 0–0.5)
EOSINOPHIL NFR BLD: 2 % (ref 0–8)
ERYTHROCYTE [DISTWIDTH] IN BLOOD BY AUTOMATED COUNT: 19.2 % (ref 11.5–14.5)
EST. GFR  (NO RACE VARIABLE): >60 ML/MIN/1.73 M^2
GLUCOSE SERPL-MCNC: 108 MG/DL (ref 70–110)
GLUCOSE UR QL STRIP: NEGATIVE
HCT VFR BLD AUTO: 34.7 % (ref 37–48.5)
HGB BLD-MCNC: 10.4 G/DL (ref 12–16)
HGB UR QL STRIP: ABNORMAL
HYALINE CASTS #/AREA URNS LPF: 0 /LPF
IMM GRANULOCYTES # BLD AUTO: 0.02 K/UL (ref 0–0.04)
IMM GRANULOCYTES NFR BLD AUTO: 0.4 % (ref 0–0.5)
INFLUENZA A, MOLECULAR: NEGATIVE
INFLUENZA B, MOLECULAR: NEGATIVE
KETONES UR QL STRIP: NEGATIVE
LACTATE SERPL-SCNC: 2.5 MMOL/L (ref 0.5–2.2)
LEUKOCYTE ESTERASE UR QL STRIP: NEGATIVE
LIPASE SERPL-CCNC: 102 U/L (ref 4–60)
LYMPHOCYTES # BLD AUTO: 1.4 K/UL (ref 1–4.8)
LYMPHOCYTES NFR BLD: 28.2 % (ref 18–48)
MAGNESIUM SERPL-MCNC: 1.6 MG/DL (ref 1.6–2.6)
MCH RBC QN AUTO: 22.3 PG (ref 27–31)
MCHC RBC AUTO-ENTMCNC: 30 G/DL (ref 32–36)
MCV RBC AUTO: 75 FL (ref 82–98)
MICROSCOPIC COMMENT: NORMAL
MONOCYTES # BLD AUTO: 0.4 K/UL (ref 0.3–1)
MONOCYTES NFR BLD: 8.1 % (ref 4–15)
NEUTROPHILS # BLD AUTO: 3 K/UL (ref 1.8–7.7)
NEUTROPHILS NFR BLD: 61.1 % (ref 38–73)
NITRITE UR QL STRIP: NEGATIVE
NRBC BLD-RTO: 0 /100 WBC
PH UR STRIP: 7 [PH] (ref 5–8)
PHOSPHATE SERPL-MCNC: 3.1 MG/DL (ref 2.7–4.5)
PLATELET # BLD AUTO: 449 K/UL (ref 150–450)
PMV BLD AUTO: 9.4 FL (ref 9.2–12.9)
POCT GLUCOSE: 113 MG/DL (ref 70–110)
POTASSIUM SERPL-SCNC: 3.4 MMOL/L (ref 3.5–5.1)
PROCALCITONIN SERPL IA-MCNC: <0.02 NG/ML
PROT SERPL-MCNC: 6.6 G/DL (ref 6–8.4)
PROT UR QL STRIP: ABNORMAL
RBC # BLD AUTO: 4.66 M/UL (ref 4–5.4)
RBC #/AREA URNS HPF: 1 /HPF (ref 0–4)
SARS-COV-2 RDRP RESP QL NAA+PROBE: NEGATIVE
SODIUM SERPL-SCNC: 142 MMOL/L (ref 136–145)
SP GR UR STRIP: 1.02 (ref 1–1.03)
SPECIMEN SOURCE: NORMAL
TROPONIN I SERPL DL<=0.01 NG/ML-MCNC: 0.02 NG/ML (ref 0–0.03)
TROPONIN I SERPL DL<=0.01 NG/ML-MCNC: <0.006 NG/ML (ref 0–0.03)
URN SPEC COLLECT METH UR: ABNORMAL
UROBILINOGEN UR STRIP-ACNC: 1 EU/DL
WBC # BLD AUTO: 4.93 K/UL (ref 3.9–12.7)
WBC #/AREA URNS HPF: 2 /HPF (ref 0–5)

## 2023-04-10 PROCEDURE — 93010 EKG 12-LEAD: ICD-10-PCS | Mod: ,,, | Performed by: INTERNAL MEDICINE

## 2023-04-10 PROCEDURE — 83690 ASSAY OF LIPASE: CPT | Performed by: SURGERY

## 2023-04-10 PROCEDURE — 82550 ASSAY OF CK (CPK): CPT | Performed by: SURGERY

## 2023-04-10 PROCEDURE — 25000003 PHARM REV CODE 250: Performed by: SURGERY

## 2023-04-10 PROCEDURE — 80053 COMPREHEN METABOLIC PANEL: CPT | Performed by: SURGERY

## 2023-04-10 PROCEDURE — 93010 ELECTROCARDIOGRAM REPORT: CPT | Mod: ,,, | Performed by: INTERNAL MEDICINE

## 2023-04-10 PROCEDURE — 87040 BLOOD CULTURE FOR BACTERIA: CPT | Performed by: SURGERY

## 2023-04-10 PROCEDURE — 93005 ELECTROCARDIOGRAM TRACING: CPT

## 2023-04-10 PROCEDURE — 84100 ASSAY OF PHOSPHORUS: CPT | Performed by: SURGERY

## 2023-04-10 PROCEDURE — 83605 ASSAY OF LACTIC ACID: CPT | Performed by: SURGERY

## 2023-04-10 PROCEDURE — 36415 COLL VENOUS BLD VENIPUNCTURE: CPT | Performed by: SURGERY

## 2023-04-10 PROCEDURE — 85025 COMPLETE CBC W/AUTO DIFF WBC: CPT | Performed by: SURGERY

## 2023-04-10 PROCEDURE — 84484 ASSAY OF TROPONIN QUANT: CPT | Mod: 91 | Performed by: SURGERY

## 2023-04-10 PROCEDURE — 84145 PROCALCITONIN (PCT): CPT | Performed by: SURGERY

## 2023-04-10 PROCEDURE — 87154 CUL TYP ID BLD PTHGN 6+ TRGT: CPT | Mod: 59 | Performed by: SURGERY

## 2023-04-10 PROCEDURE — 96360 HYDRATION IV INFUSION INIT: CPT

## 2023-04-10 PROCEDURE — 83880 ASSAY OF NATRIURETIC PEPTIDE: CPT | Performed by: SURGERY

## 2023-04-10 PROCEDURE — 87502 INFLUENZA DNA AMP PROBE: CPT | Performed by: SURGERY

## 2023-04-10 PROCEDURE — 99285 EMERGENCY DEPT VISIT HI MDM: CPT | Mod: 25

## 2023-04-10 PROCEDURE — 83735 ASSAY OF MAGNESIUM: CPT | Performed by: SURGERY

## 2023-04-10 PROCEDURE — U0002 COVID-19 LAB TEST NON-CDC: HCPCS | Performed by: SURGERY

## 2023-04-10 PROCEDURE — 82962 GLUCOSE BLOOD TEST: CPT

## 2023-04-10 PROCEDURE — 87077 CULTURE AEROBIC IDENTIFY: CPT | Mod: 59 | Performed by: SURGERY

## 2023-04-10 PROCEDURE — 85379 FIBRIN DEGRADATION QUANT: CPT | Performed by: SURGERY

## 2023-04-10 PROCEDURE — 87186 SC STD MICRODIL/AGAR DIL: CPT | Performed by: SURGERY

## 2023-04-10 PROCEDURE — 96361 HYDRATE IV INFUSION ADD-ON: CPT

## 2023-04-10 PROCEDURE — 81000 URINALYSIS NONAUTO W/SCOPE: CPT | Performed by: SURGERY

## 2023-04-10 PROCEDURE — 25500020 PHARM REV CODE 255: Performed by: SURGERY

## 2023-04-10 RX ADMIN — SODIUM CHLORIDE 1000 ML: 9 INJECTION, SOLUTION INTRAVENOUS at 02:04

## 2023-04-10 RX ADMIN — SODIUM CHLORIDE 1000 ML: 0.9 INJECTION, SOLUTION INTRAVENOUS at 04:04

## 2023-04-10 RX ADMIN — IOHEXOL 75 ML: 350 INJECTION, SOLUTION INTRAVENOUS at 04:04

## 2023-04-10 NOTE — TELEPHONE ENCOUNTER
"----- Message from Candice Vora sent at 4/10/2023 10:00 AM CDT -----  Consult/Advisory:       Name Of Caller: [facility]       Contact Preference?: [phone]       Provider Name:  Armando       Does patient feel the need to be seen today? No       What is the nature of the call?: Calling to speak w/ nurse in regards to home health drawing pt's labs for 04/13. Home Health scheduled to see pt tomorrow          Additional Notes:  "Thank you for all that you do for our patients"                                           "

## 2023-04-10 NOTE — ED NOTES
Assumed care of patient. Patient awake, alert, and oriented. Respirations even and unlabored. NAD noted. NS infusing, IV to right AC patent without redness or swelling noted. Sinus tach noted per cardiac monitor. Call bell in reach. Spouse at bedside. Instructed to call for needs and voiced understanding.

## 2023-04-10 NOTE — ED TRIAGE NOTES
C/o weakness that began prior to arrival. Patient weak, clammy, and diaphoretic upon arrival. Patient reports had a Whipple procedure done 3/15/2023. Dermabond noted to abdominal incision. No redness, swelling, or drainage noted.

## 2023-04-10 NOTE — TELEPHONE ENCOUNTER
Spoke with patient's HH nurse.  Reviewed labs needed for tomorrow, cbc/cmp/pgx. Pgx must be drawn in clinic, as HH nurse doesn't have kits.  HH nurse will check with patient to see if she wants labs drawn via  or just wait until clinic appointment.

## 2023-04-10 NOTE — ED PROVIDER NOTES
"Encounter Date: 4/10/2023       History     Chief Complaint   Patient presents with    Weakness     Nan Simms is a 67 y.o. female that presents with near syncopal episode today  Patient felt weak & clammy earlier today, denies any chest pain or SOB today  Pt recently had a Whipple procedure last month with good postoperative healing  Denies any abdominal pain but does have a poor appetite but normal BMs daily  Family thinks that she is dehydrated, normal sinus rhythm on EKG on evaluation  Patient feels better after IV fluids in the ER, currently asymptomatic on interview      Review of patient's allergies indicates:   Allergen Reactions    Aspirin Other (See Comments)     Pt states it is "hard on her stomach" She can tolerate a low dose aspirin    Pcn [penicillins]      Childhood      Past Medical History:   Diagnosis Date    Abnormal Pap smear of cervix     Arthritis     Hypertension     Hyperthyroidism      Past Surgical History:   Procedure Laterality Date    CATARACT EXTRACTION W/  INTRAOCULAR LENS IMPLANT Right 8/8/2019    Procedure: EXTRACTION, CATARACT, WITH IOL INSERTION;  Surgeon: Spenser Lee MD;  Location: [facility];  Service: Ophthalmology;  Laterality: Right;    ESOPHAGOGASTRODUODENOSCOPY N/A 3/10/2023    Procedure: EGD (ESOPHAGOGASTRODUODENOSCOPY);  Surgeon: Shashi Tapia MD;  Location: [address]);  Service: Endoscopy;  Laterality: N/A;    EYE SURGERY      HYSTERECTOMY      OOPHORECTOMY      PHACOEMULSIFICATION OF CATARACT Right 8/8/2019    Procedure: PHACOEMULSIFICATION, CATARACT;  Surgeon: Spenser Lee MD;  Location: [facility];  Service: Ophthalmology;  Laterality: Right;    WHIPPLE PROCEDURE N/A 3/15/2023    Procedure: WHIPPLE PROCEDURE;  Surgeon: Nick Paez MD;  Location: [address];  Service: General;  Laterality: N/A;     Family History   Problem Relation Age of Onset    Cancer Mother     Breast cancer Neg Hx     Colon cancer Neg Hx     Ovarian cancer " Neg Hx      Social History     Tobacco Use    Smoking status: Some Days     Packs/day: 0.50     Years: 30.00     Pack years: 15.00     Types: Cigarettes    Smokeless tobacco: Current   Substance Use Topics    Alcohol use: Yes     Alcohol/week: 1.0 standard drink     Types: 1 Cans of beer per week    Drug use: No     Review of Systems   Constitutional:  Positive for fatigue.   HENT: Negative.     Eyes: Negative.    Respiratory: Negative.     Cardiovascular: Negative.    Gastrointestinal: Negative.    Genitourinary:  Negative for dysuria, urgency and vaginal discharge.   Skin: Negative.    Neurological:  Positive for weakness.   Psychiatric/Behavioral: Negative.     All other systems reviewed and are negative.    Physical Exam     Initial Vitals [04/10/23 1406]   BP Pulse Resp Temp SpO2   138/60 77 (!) 28 97.9 °F (36.6 °C) 99 %      MAP       --         Physical Exam    Nursing note and vitals reviewed.  Constitutional: Vital signs are normal. She appears well-developed and well-nourished. She is cooperative.   HENT:   Head: Normocephalic and atraumatic.   Right Ear: External ear normal.   Left Ear: External ear normal.   Nose: Nose normal.   Mouth/Throat: Oropharynx is clear and moist.   Eyes: Conjunctivae, EOM and lids are normal. Pupils are equal, round, and reactive to light.   Neck: Trachea normal and phonation normal. Neck supple. No JVD present.   Normal range of motion.   Full passive range of motion without pain.     Cardiovascular:  Normal rate, regular rhythm, S1 normal, S2 normal, normal heart sounds, intact distal pulses and normal pulses.           Pulmonary/Chest: Effort normal and breath sounds normal.   Abdominal: Abdomen is soft and flat. Bowel sounds are normal.   Musculoskeletal:         General: Normal range of motion.      Cervical back: Full passive range of motion without pain, normal range of motion and neck supple.     Neurological: She is alert and oriented to person, place, and time. She  has normal strength.   Skin: Skin is warm, dry and intact. Capillary refill takes less than 2 seconds.       ED Course   Procedures  Labs Reviewed   CBC W/ AUTO DIFFERENTIAL - Abnormal; Notable for the following components:       Result Value    Hemoglobin 10.4 (*)     Hematocrit 34.7 (*)     MCV 75 (*)     MCH 22.3 (*)     MCHC 30.0 (*)     RDW 19.2 (*)     All other components within normal limits   COMPREHENSIVE METABOLIC PANEL - Abnormal; Notable for the following components:    Potassium 3.4 (*)     CO2 30 (*)     Albumin 2.9 (*)     AST 55 (*)     All other components within normal limits   LACTIC ACID, PLASMA - Abnormal; Notable for the following components:    Lactate (Lactic Acid) 2.5 (*)     All other components within normal limits   D DIMER, QUANTITATIVE - Abnormal; Notable for the following components:    D-Dimer 3.16 (*)     All other components within normal limits   URINALYSIS, REFLEX TO URINE CULTURE - Abnormal; Notable for the following components:    Protein, UA 1+ (*)     Occult Blood UA Trace (*)     All other components within normal limits    Narrative:     Specimen Source->Urine   LIPASE - Abnormal; Notable for the following components:    Lipase 102 (*)     All other components within normal limits   POCT GLUCOSE - Abnormal; Notable for the following components:    POCT Glucose 113 (*)     All other components within normal limits   INFLUENZA A & B BY MOLECULAR   CULTURE, BLOOD   CULTURE, BLOOD   SARS-COV-2 RNA AMPLIFICATION, QUAL   TROPONIN I   CK   B-TYPE NATRIURETIC PEPTIDE   PROCALCITONIN   PHOSPHORUS   MAGNESIUM   URINALYSIS MICROSCOPIC    Narrative:     Specimen Source->Urine   TROPONIN I     EKG Readings: (Independently Interpreted)   No STEMI  Normal sinus rhythm  No ectopy  Normal conduction  Normal ST segments  Normal T-wave  Normal axis  Heart rate in the 100s     Imaging Results              CTA Chest Non-Coronary (PE Studies) (Final result)  Result time 04/10/23 16:57:18       Final result by Stephanie Tian MD (04/10/23 16:57:18)                   Impression:      There are no pulmonary arterial filling defects to suggest pulmonary embolus.  The pulmonary parenchyma is well aerated.      Electronically signed by: Stephanie Tian MD  Date:    04/10/2023  Time:    16:57               Narrative:    EXAMINATION:  CTA CHEST NON CORONARY (PE STUDIES)    CLINICAL HISTORY:  Pulmonary embolism (PE) suspected, high prob;    TECHNIQUE:  Low dose axial images, sagittal and coronal reformations were obtained from the thoracic inlet to the lung bases following the IV administration of 100 mL of Omnipaque 350.  Contrast timing was optimized to evaluate the pulmonary arteries.  MIP images were performed.    COMPARISON:  None    FINDINGS:  There are no pulmonary arterial filling defects.  There is no pneumothorax, pleural effusion or focal consolidation.  The the osseous structures are unremarkable.    There is pneumobilia unchanged from what was seen on CT scan of 03/22/2023.                                       X-Ray Chest 1 View (Final result)  Result time 04/10/23 15:03:10      Final result by Arslan Webster MD (04/10/23 15:03:10)                   Impression:      As above.      Electronically signed by: Arslan Webster MD  Date:    04/10/2023  Time:    15:03               Narrative:    EXAMINATION:  XR CHEST 1 VIEW    CLINICAL HISTORY:  fatigue;    TECHNIQUE:  Single frontal view of the chest was performed.    FINDINGS:  The lungs are clear.  There is no pneumothorax or pleural fluid.  The cardiac silhouette is not enlarged.  There is calcification of the aorta.  The osseous structures demonstrate degenerative change.                                       Medications   sodium chloride 0.9% bolus 1,000 mL 1,000 mL (0 mLs Intravenous Stopped 4/10/23 1600)   sodium chloride 0.9% bolus 1,000 mL 1,000 mL (1,000 mLs Intravenous New Bag 4/10/23 1600)   iohexoL (OMNIPAQUE 350) injection 75 mL (75 mLs  Intravenous Given 4/10/23 3207)     Medical Decision Makin-year-old female with weakness & fatigue, near syncopal episode at home today  Family states that she is not eating or drinking since the Whipple surgery last month  Differential includes dehydration, syncope, near-syncope, cardiac event, postop issues    EKG, chest x-ray, urinalysis & lab work were ordered & reviewed by the ER physician  EKG was normal, chest x-ray clear, urinalysis (-) with stable lab work in the ER today  Second troponin was also normal, pt had a mildly elevated D-dimer, PE study ordered  CT PE study showed no pulmonary embolisms, patient was given 2 liters IVF today    Patient is feeling much better, completely asymptomatic, stable vitals noted now  Encourage oral intake especially water with PCP follow-up in next 48 hours outpatient  Also recommend follow-up with the surgeon as well as other specialist/cardiology  Return to ER with any concerning signs symptoms after discharge this evening                        Clinical Impression:   Final diagnoses:  [R11.0] Nausea  [E86.0] Dehydration (Primary)  [R53.83] Fatigue, unspecified type        ED Disposition Condition    Discharge Stable          ED Prescriptions    None       Follow-up Information       Follow up With Specialties Details Why Contact Info    Payal Moreland NP Family Medicine Schedule an appointment as soon as possible for a visit in 2 days  [address] [facility] [address]  [phone]               Red Kelly MD  04/10/23 0468

## 2023-04-11 ENCOUNTER — HOSPITAL ENCOUNTER (OUTPATIENT)
Facility: HOSPITAL | Age: 68
Discharge: ANOTHER HOSPITAL | End: 2023-04-13
Attending: STUDENT IN AN ORGANIZED HEALTH CARE EDUCATION/TRAINING PROGRAM | Admitting: FAMILY MEDICINE
Payer: MEDICARE

## 2023-04-11 DIAGNOSIS — Z90.410 HISTORY OF WHIPPLE PROCEDURE: ICD-10-CM

## 2023-04-11 DIAGNOSIS — A41.9 SEPSIS: ICD-10-CM

## 2023-04-11 DIAGNOSIS — Z90.49 HISTORY OF WHIPPLE PROCEDURE: ICD-10-CM

## 2023-04-11 DIAGNOSIS — R78.81 GRAM-NEGATIVE BACTEREMIA: Primary | ICD-10-CM

## 2023-04-11 LAB
ACINETOBACTER CALCOACETICUS/BAUMANNII COMPLEX: NOT DETECTED
ALBUMIN SERPL BCP-MCNC: 2.9 G/DL (ref 3.5–5.2)
ALP SERPL-CCNC: 78 U/L (ref 55–135)
ALT SERPL W/O P-5'-P-CCNC: 27 U/L (ref 10–44)
ANION GAP SERPL CALC-SCNC: 9 MMOL/L (ref 8–16)
AST SERPL-CCNC: 26 U/L (ref 10–40)
BACTEROIDES FRAGILIS: NOT DETECTED
BASOPHILS # BLD AUTO: 0.02 K/UL (ref 0–0.2)
BASOPHILS NFR BLD: 0.3 % (ref 0–1.9)
BILIRUB SERPL-MCNC: 0.3 MG/DL (ref 0.1–1)
BILIRUB UR QL STRIP: NEGATIVE
BUN SERPL-MCNC: 6 MG/DL (ref 8–23)
CALCIUM SERPL-MCNC: 9.5 MG/DL (ref 8.7–10.5)
CANDIDA ALBICANS: NOT DETECTED
CANDIDA AURIS: NOT DETECTED
CANDIDA GLABRATA: NOT DETECTED
CANDIDA KRUSEI: NOT DETECTED
CANDIDA PARAPSILOSIS: NOT DETECTED
CANDIDA TROPICALIS: NOT DETECTED
CHLORIDE SERPL-SCNC: 98 MMOL/L (ref 95–110)
CLARITY UR: CLEAR
CO2 SERPL-SCNC: 32 MMOL/L (ref 23–29)
COLOR UR: YELLOW
CREAT SERPL-MCNC: 0.6 MG/DL (ref 0.5–1.4)
CRYPTOCOCCUS NEOFORMANS/GATTII: NOT DETECTED
CTX-M GENE: NOT DETECTED
DIFFERENTIAL METHOD: ABNORMAL
ENTEROBACTER CLOACAE COMPLEX: NOT DETECTED
ENTEROBACTERALES: ABNORMAL
ENTEROCOCCUS FAECALIS: NOT DETECTED
ENTEROCOCCUS FAECIUM: DETECTED
EOSINOPHIL # BLD AUTO: 0.1 K/UL (ref 0–0.5)
EOSINOPHIL NFR BLD: 1.8 % (ref 0–8)
ERYTHROCYTE [DISTWIDTH] IN BLOOD BY AUTOMATED COUNT: 19.3 % (ref 11.5–14.5)
ESCHERICHIA COLI: NOT DETECTED
EST. GFR  (NO RACE VARIABLE): >60 ML/MIN/1.73 M^2
GLUCOSE SERPL-MCNC: 99 MG/DL (ref 70–110)
GLUCOSE UR QL STRIP: NEGATIVE
HAEMOPHILUS INFLUENZAE: NOT DETECTED
HCT VFR BLD AUTO: 29.7 % (ref 37–48.5)
HGB BLD-MCNC: 9.1 G/DL (ref 12–16)
HGB UR QL STRIP: ABNORMAL
IMM GRANULOCYTES # BLD AUTO: 0.03 K/UL (ref 0–0.04)
IMM GRANULOCYTES NFR BLD AUTO: 0.4 % (ref 0–0.5)
IMP GENE: NOT DETECTED
KETONES UR QL STRIP: NEGATIVE
KLEBSIELLA AEROGENES: NOT DETECTED
KLEBSIELLA OXYTOCA: NOT DETECTED
KLEBSIELLA PNEUMONIAE GROUP: DETECTED
KPC: NOT DETECTED
LACTATE SERPL-SCNC: 1.3 MMOL/L (ref 0.5–2.2)
LEUKOCYTE ESTERASE UR QL STRIP: NEGATIVE
LISTERIA MONOCYTOGENES: NOT DETECTED
LYMPHOCYTES # BLD AUTO: 0.8 K/UL (ref 1–4.8)
LYMPHOCYTES NFR BLD: 10.3 % (ref 18–48)
MCH RBC QN AUTO: 22.1 PG (ref 27–31)
MCHC RBC AUTO-ENTMCNC: 30.6 G/DL (ref 32–36)
MCR-1: NOT DETECTED
MCV RBC AUTO: 72 FL (ref 82–98)
MEC A/C AND MREJ (MRSA): ABNORMAL
MEC A/C: ABNORMAL
MONOCYTES # BLD AUTO: 0.5 K/UL (ref 0.3–1)
MONOCYTES NFR BLD: 7.3 % (ref 4–15)
NDM GENE: NOT DETECTED
NEISSERIA MENINGITIDIS: NOT DETECTED
NEUTROPHILS # BLD AUTO: 5.9 K/UL (ref 1.8–7.7)
NEUTROPHILS NFR BLD: 79.9 % (ref 38–73)
NITRITE UR QL STRIP: NEGATIVE
NRBC BLD-RTO: 0 /100 WBC
OXA-48-LIKE: NOT DETECTED
PH UR STRIP: 7 [PH] (ref 5–8)
PLATELET # BLD AUTO: 344 K/UL (ref 150–450)
PMV BLD AUTO: 9.4 FL (ref 9.2–12.9)
POCT GLUCOSE: 103 MG/DL (ref 70–110)
POCT GLUCOSE: 184 MG/DL (ref 70–110)
POTASSIUM SERPL-SCNC: 3.1 MMOL/L (ref 3.5–5.1)
PROT SERPL-MCNC: 6.7 G/DL (ref 6–8.4)
PROT UR QL STRIP: NEGATIVE
PROTEUS SPECIES: NOT DETECTED
PSEUDOMONAS AERUGINOSA: NOT DETECTED
RBC # BLD AUTO: 4.11 M/UL (ref 4–5.4)
SALMONELLA SP: NOT DETECTED
SERRATIA MARCESCENS: NOT DETECTED
SODIUM SERPL-SCNC: 139 MMOL/L (ref 136–145)
SP GR UR STRIP: 1.01 (ref 1–1.03)
STAPHYLOCOCCUS AUREUS: NOT DETECTED
STAPHYLOCOCCUS EPIDERMIDIS: NOT DETECTED
STAPHYLOCOCCUS LUGDUNESIS: NOT DETECTED
STAPHYLOCOCCUS SPECIES: NOT DETECTED
STENOTROPHOMONAS MALTOPHILIA: NOT DETECTED
STREPTOCOCCUS AGALACTIAE: NOT DETECTED
STREPTOCOCCUS PNEUMONIAE: NOT DETECTED
STREPTOCOCCUS PYOGENES: NOT DETECTED
STREPTOCOCCUS SPECIES: NOT DETECTED
URN SPEC COLLECT METH UR: ABNORMAL
UROBILINOGEN UR STRIP-ACNC: NEGATIVE EU/DL
VAN A/B: NOT DETECTED
VIM GENE: NOT DETECTED
WBC # BLD AUTO: 7.35 K/UL (ref 3.9–12.7)

## 2023-04-11 PROCEDURE — 93010 ELECTROCARDIOGRAM REPORT: CPT | Mod: ,,, | Performed by: INTERNAL MEDICINE

## 2023-04-11 PROCEDURE — S0073 INJECTION, AZTREONAM, 500 MG: HCPCS | Performed by: STUDENT IN AN ORGANIZED HEALTH CARE EDUCATION/TRAINING PROGRAM

## 2023-04-11 PROCEDURE — 93010 EKG 12-LEAD: ICD-10-PCS | Mod: ,,, | Performed by: INTERNAL MEDICINE

## 2023-04-11 PROCEDURE — 25000003 PHARM REV CODE 250: Performed by: STUDENT IN AN ORGANIZED HEALTH CARE EDUCATION/TRAINING PROGRAM

## 2023-04-11 PROCEDURE — 96366 THER/PROPH/DIAG IV INF ADDON: CPT

## 2023-04-11 PROCEDURE — 83605 ASSAY OF LACTIC ACID: CPT | Performed by: NURSE PRACTITIONER

## 2023-04-11 PROCEDURE — 81003 URINALYSIS AUTO W/O SCOPE: CPT | Performed by: NURSE PRACTITIONER

## 2023-04-11 PROCEDURE — 99285 EMERGENCY DEPT VISIT HI MDM: CPT | Mod: 25

## 2023-04-11 PROCEDURE — S0030 INJECTION, METRONIDAZOLE: HCPCS | Performed by: STUDENT IN AN ORGANIZED HEALTH CARE EDUCATION/TRAINING PROGRAM

## 2023-04-11 PROCEDURE — 96367 TX/PROPH/DG ADDL SEQ IV INF: CPT

## 2023-04-11 PROCEDURE — 87040 BLOOD CULTURE FOR BACTERIA: CPT | Performed by: STUDENT IN AN ORGANIZED HEALTH CARE EDUCATION/TRAINING PROGRAM

## 2023-04-11 PROCEDURE — 80053 COMPREHEN METABOLIC PANEL: CPT | Performed by: NURSE PRACTITIONER

## 2023-04-11 PROCEDURE — 93005 ELECTROCARDIOGRAM TRACING: CPT

## 2023-04-11 PROCEDURE — 85025 COMPLETE CBC W/AUTO DIFF WBC: CPT | Performed by: NURSE PRACTITIONER

## 2023-04-11 PROCEDURE — 96365 THER/PROPH/DIAG IV INF INIT: CPT | Mod: 59

## 2023-04-11 PROCEDURE — 25500020 PHARM REV CODE 255: Performed by: SURGERY

## 2023-04-11 RX ORDER — VANCOMYCIN 2 GRAM/500 ML IN 0.9 % SODIUM CHLORIDE INTRAVENOUS
2000 ONCE
Status: COMPLETED | OUTPATIENT
Start: 2023-04-11 | End: 2023-04-11

## 2023-04-11 RX ORDER — METRONIDAZOLE 500 MG/100ML
500 INJECTION, SOLUTION INTRAVENOUS
Status: COMPLETED | OUTPATIENT
Start: 2023-04-11 | End: 2023-04-12

## 2023-04-11 RX ADMIN — VANCOMYCIN 2 GRAM/500 ML IN 0.9 % SODIUM CHLORIDE INTRAVENOUS 2000 MG: at 08:04

## 2023-04-11 RX ADMIN — IOHEXOL 75 ML: 350 INJECTION, SOLUTION INTRAVENOUS at 06:04

## 2023-04-11 RX ADMIN — METRONIDAZOLE 500 MG: 500 INJECTION, SOLUTION INTRAVENOUS at 05:04

## 2023-04-11 RX ADMIN — AZTREONAM 2000 MG: 2 INJECTION, POWDER, LYOPHILIZED, FOR SOLUTION INTRAMUSCULAR; INTRAVENOUS at 06:04

## 2023-04-11 NOTE — ED NOTES
Pt provided a urine specimen cup, castile soap towelette wipe, and instructions for [facility] by previous RN. Pt reminded of need for urine sample for [facility]. Reinforced directions for [facility]. Pt verbalizes understanding of a clean catch collection. Will continue to monitor.

## 2023-04-11 NOTE — ED PROVIDER NOTES
"Encounter Date: 4/11/2023       History     Chief Complaint   Patient presents with    Abnormal Labs     Patient to ER states she was told to return to the ER from blood work she had done yesterday      Chief complaint:  Abnormal labs  59-year-old female with a history of arthritis hypertension hypothyroid status post Whipple procedure on March 15th presents to be evaluated after she was told that she had abnormal lab work.  Patient's blood cultures were positive for Gram-negative and Gram-Positive rods. Patient initially presented in the ED yesterday for generalized fatigue and near syncope.  Patient states that she is feeling much better today and has no complaints time.    Review of patient's allergies indicates:   Allergen Reactions    Aspirin Other (See Comments)     Pt states it is "hard on her stomach" She can tolerate a low dose aspirin    Pcn [penicillins]      Childhood - Tolerated ceftriaxone and cefazolin with no documented issues in the past      Past Medical History:   Diagnosis Date    Abnormal Pap smear of cervix     Arthritis     Duodenal adenocarcinoma     Hypertension     Hyperthyroidism      Past Surgical History:   Procedure Laterality Date    CATARACT EXTRACTION W/  INTRAOCULAR LENS IMPLANT Right 8/8/2019    Procedure: EXTRACTION, CATARACT, WITH IOL INSERTION;  Surgeon: Spenser Lee MD;  Location: [facility];  Service: Ophthalmology;  Laterality: Right;    ESOPHAGOGASTRODUODENOSCOPY N/A 3/10/2023    Procedure: EGD (ESOPHAGOGASTRODUODENOSCOPY);  Surgeon: Shashi Tapia MD;  Location: [address]);  Service: Endoscopy;  Laterality: N/A;    EYE SURGERY      HYSTERECTOMY      OOPHORECTOMY      PHACOEMULSIFICATION OF CATARACT Right 8/8/2019    Procedure: PHACOEMULSIFICATION, CATARACT;  Surgeon: Spenser Lee MD;  Location: [facility];  Service: Ophthalmology;  Laterality: Right;    WHIPPLE PROCEDURE N/A 3/15/2023    Procedure: WHIPPLE PROCEDURE;  Surgeon: Nick Paez MD;  " Location: [facility] OR [address] [place] [address];  Service: General;  Laterality: N/A;     Family History   Problem Relation Age of Onset    Cancer Mother     Breast cancer Neg Hx     Colon cancer Neg Hx     Ovarian cancer Neg Hx      Social History     Tobacco Use    Smoking status: Every Day     Packs/day: 0.25     Years: 30.00     Pack years: 7.50     Types: Cigarettes    Smokeless tobacco: Current   Substance Use Topics    Alcohol use: Yes     Alcohol/week: 1.0 standard drink     Types: 1 Cans of beer per week    Drug use: No     Review of Systems   Constitutional:  Negative for fatigue and fever.   Respiratory:  Negative for shortness of breath.    Cardiovascular:  Negative for chest pain.   Gastrointestinal:  Negative for abdominal distention, abdominal pain, diarrhea, nausea and vomiting.   Neurological:  Negative for weakness.     Physical Exam     Initial Vitals [04/11/23 1621]   BP Pulse Resp Temp SpO2   130/61 78 20 99.5 °F (37.5 °C) 99 %      MAP       --         Physical Exam    Nursing note and vitals reviewed.  Constitutional: She appears well-developed and well-nourished.   HENT:   Head: Normocephalic and atraumatic.   Neck: Neck supple.   Normal range of motion.  Cardiovascular:  Normal rate.     Exam reveals no gallop and no friction rub.       No murmur heard.  Pulmonary/Chest: Breath sounds normal. She has no wheezes. She has no rhonchi. She has no rales.   Abdominal: Abdomen is soft. Bowel sounds are normal. She exhibits no distension. There is no abdominal tenderness. There is no rebound.   Musculoskeletal:      Cervical back: Normal range of motion and neck supple.     Neurological: She is alert and oriented to person, place, and time.   Skin: Skin is warm and dry.   Psychiatric: She has a normal mood and affect. Thought content normal.       ED Course   Critical Care    Date/Time: 4/25/2023 12:58 PM  Performed by: Pretty Storm NP  Authorized by: Sadiq Davis MD   Direct patient critical care time:  30 minutes  Additional history critical care time: 10 minutes  Ordering / reviewing critical care time: 5 minutes  Documentation critical care time: 5 minutes  Consulting other physicians critical care time: 20 minutes  Consult with family critical care time: 10 minutes  Total critical care time (exclusive of procedural time) : 80 minutes      Labs Reviewed   CBC W/ AUTO DIFFERENTIAL - Abnormal; Notable for the following components:       Result Value    Hemoglobin 9.1 (*)     Hematocrit 29.7 (*)     MCV 72 (*)     MCH 22.1 (*)     MCHC 30.6 (*)     RDW 19.3 (*)     Lymph # 0.8 (*)     Gran % 79.9 (*)     Lymph % 10.3 (*)     All other components within normal limits   COMPREHENSIVE METABOLIC PANEL - Abnormal; Notable for the following components:    Potassium 3.1 (*)     CO2 32 (*)     BUN 6 (*)     Albumin 2.9 (*)     All other components within normal limits   URINALYSIS, REFLEX TO URINE CULTURE - Abnormal; Notable for the following components:    Occult Blood UA Trace (*)     All other components within normal limits    Narrative:     Specimen Source->Urine   LACTIC ACID, PLASMA   POCT GLUCOSE     EKG Readings: (Independently Interpreted)   Initial Reading: No STEMI. Previous EKG: Compared with most recent EKG Rhythm: Normal Sinus Rhythm. Heart Rate: 70. Ectopy: No Ectopy. Other Impression: T-wave abnormality consider lateral ischemia   ECG Results              EKG 12-lead (Final result)  Result time 04/13/23 07:30:05      Final result by Interface, Lab In [facility] (04/13/23 07:30:05)                   Narrative:    Test Reason : SEPSIS    Vent. Rate : 070 BPM     Atrial Rate : 070 BPM     P-R Int : 122 ms          QRS Dur : 090 ms      QT Int : 426 ms       P-R-T Axes : 077 013 -01 degrees     QTc Int : 460 ms    Normal sinus rhythm  Moderate voltage criteria for LVH, may be normal variant  T wave abnormality, consider lateral ischemia  Abnormal ECG  When compared with ECG of 10-APR-2023 14:08,  Inverted T waves  have replaced nonspecific T wave abnormality in Anterior  leads  Confirmed by Feliz Milian MD ([id number]) on 4/13/2023 7:29:55 AM    Referred By: [email or web address]   SELF           Confirmed By:Feliz Milian MD                                     EKG 12-LEAD (Final result)  Result time 04/19/23 11:59:21      Final result by Unknown User (04/19/23 11:59:21)                                      Imaging Results              CT Abdomen Pelvis With Contrast (Final result)  Result time 04/11/23 19:56:00      Final result by Charlene Hung MD (04/11/23 19:56:00)                   Impression:      Postsurgical changes of Whipple are redemonstrated with small volume free fluid in the oeperative bed, decreased since prior.      Electronically signed by: Charlene Hung  Date:    04/11/2023  Time:    19:56               Narrative:    EXAMINATION:  CT ABDOMEN PELVIS WITH CONTRAST    CLINICAL HISTORY:  Sepsis;    TECHNIQUE:  Low dose axial images, sagittal and coronal reformations were obtained from the lung bases to the pubic symphysis following the IV administration of 75 mL of Omnipaque 350 .  Oral contrast was not administered.    COMPARISON:  03/22/2023    FINDINGS:  Abdomen:    - Lower thorax: Unremarkable.    - Lung bases: Bibasilar dependent atelectasis.    - Liver: No focal mass.    - Gallbladder: Absent.    - Bile Ducts: Pneumobilia.    - Spleen: Negative.    - Kidneys: Right renal cyst and bilateral hypodense lesions too small to characterize.    - Adrenals: Unremarkable.    - Pancreas: Post surgical changes of Whipple are redemonstrated with small volume free fluid in the oeperative bed, decreased since prior.    - Retroperitoneum:  No significant adenopathy.    - Vascular: No abdominal aortic aneurysm.    - Abdominal wall:  Postsurgical and post interventional changes of the abdominal wall.  Fat stranding in the upper to mid abdominal wall subcutaneous soft tissues without fluid collection.    Pelvis:    No pelvic  mass, adenopathy, or free fluid.    Bowel/Mesentery:    Post surgical changes of Whipple are redemonstrated.  No evidence of bowel obstruction or inflammation. Colonic diverticulosis.  Small appendicolith.    Bones:  No acute osseous abnormality and no suspicious lytic or blastic lesion.                                       X-Ray Chest AP Portable (Final result)  Result time 04/11/23 16:41:09      Final result by Ervin Cano Jr., MD (04/11/23 16:41:09)                   Impression:      Atherosclerosis otherwise negative chest.      Electronically signed by: Ervin Cano MD  Date:    04/11/2023  Time:    16:41               Narrative:    EXAMINATION:  XR CHEST AP PORTABLE    CLINICAL HISTORY:  Sepsis, unspecified organism    TECHNIQUE:  Single frontal view of the chest was performed.    COMPARISON:  None    FINDINGS:  Calcification is noted of the aortic knob.  The cardiac size and contours within normal limits.  No intrapulmonary mass or infiltrate is seen.  No pneumothorax or pleural effusion is noted.                                       Medications   aztreonam (AZACTAM) 2,000 mg in dextrose 5 % in water (D5W) 5 % 100 mL IVPB (MB+) (0 mg Intravenous Stopped 4/12/23 1327)   metronidazole IVPB 500 mg (0 mg Intravenous Stopped 4/12/23 1034)   vancomycin 2 g in 0.9% sodium chloride 500 mL IVPB (0 mg Intravenous Stopped 4/11/23 2216)   iohexoL (OMNIPAQUE 350) injection 75 mL (75 mLs Intravenous Given 4/11/23 1850)   potassium chloride SA CR tablet 40 mEq (40 mEq Oral Given 4/12/23 1822)     Medical Decision Making:   Differential Diagnosis:   Bacteremia, sepsis, sepsis of unknown origin, postop infection  Clinical Tests:   Lab Tests: Reviewed  The following lab test(s) were unremarkable: CBC, CMP and Urinalysis  Radiological Study: Reviewed  ED Management:  Patient with Gram-positive and Gram-negative blood cultures after status post a Whipple procedure approximately 1 month ago  Patient appears well  today if signs of toxicity   Complaints today   No leukocytosis noted in normal lactate  CT done of the abdomen to evaluate for postop infection shows mild free fluid as well as postoperative changes from previous level   Mild hypokalemia noted  Patient will be transferred for gram-negative bacteremia  Placed on Cipro Flagyl and ED today                        Clinical Impression:   Final diagnoses:  [A41.9] Sepsis  [R78.81] Gram-negative bacteremia (Primary)  [Z90.410, Z90.49] History of Whipple procedure        ED Disposition Condition    Observation Stable                Pretty Storm NP  04/11/23 2017       Pretty Storm NP  04/25/23 0369

## 2023-04-12 PROBLEM — Z90.410 HISTORY OF WHIPPLE PROCEDURE: Status: ACTIVE | Noted: 2023-04-12

## 2023-04-12 PROBLEM — R78.81 GRAM-NEGATIVE BACTEREMIA: Status: ACTIVE | Noted: 2023-04-12

## 2023-04-12 PROBLEM — Z90.49 HISTORY OF WHIPPLE PROCEDURE: Status: ACTIVE | Noted: 2023-04-12

## 2023-04-12 LAB
FINAL PATHOLOGIC DIAGNOSIS: ABNORMAL
GROSS: ABNORMAL
Lab: ABNORMAL
SUPPLEMENTAL DIAGNOSIS: ABNORMAL

## 2023-04-12 PROCEDURE — 96366 THER/PROPH/DIAG IV INF ADDON: CPT

## 2023-04-12 PROCEDURE — S0030 INJECTION, METRONIDAZOLE: HCPCS | Performed by: SURGERY

## 2023-04-12 PROCEDURE — S0073 INJECTION, AZTREONAM, 500 MG: HCPCS | Performed by: STUDENT IN AN ORGANIZED HEALTH CARE EDUCATION/TRAINING PROGRAM

## 2023-04-12 PROCEDURE — G0378 HOSPITAL OBSERVATION PER HR: HCPCS

## 2023-04-12 PROCEDURE — 87086 URINE CULTURE/COLONY COUNT: CPT | Performed by: FAMILY MEDICINE

## 2023-04-12 PROCEDURE — 25000003 PHARM REV CODE 250: Performed by: FAMILY MEDICINE

## 2023-04-12 PROCEDURE — 25000003 PHARM REV CODE 250: Performed by: SURGERY

## 2023-04-12 PROCEDURE — 96372 THER/PROPH/DIAG INJ SC/IM: CPT | Mod: 59 | Performed by: SURGERY

## 2023-04-12 PROCEDURE — 96375 TX/PRO/DX INJ NEW DRUG ADDON: CPT

## 2023-04-12 PROCEDURE — S0030 INJECTION, METRONIDAZOLE: HCPCS | Performed by: STUDENT IN AN ORGANIZED HEALTH CARE EDUCATION/TRAINING PROGRAM

## 2023-04-12 PROCEDURE — 99223 PR INITIAL HOSPITAL CARE,LEVL III: ICD-10-PCS | Mod: AI,,, | Performed by: FAMILY MEDICINE

## 2023-04-12 PROCEDURE — 94760 N-INVAS EAR/PLS OXIMETRY 1: CPT

## 2023-04-12 PROCEDURE — 63600175 PHARM REV CODE 636 W HCPCS: Performed by: SURGERY

## 2023-04-12 PROCEDURE — 99223 1ST HOSP IP/OBS HIGH 75: CPT | Mod: AI,,, | Performed by: FAMILY MEDICINE

## 2023-04-12 PROCEDURE — 25000003 PHARM REV CODE 250: Performed by: STUDENT IN AN ORGANIZED HEALTH CARE EDUCATION/TRAINING PROGRAM

## 2023-04-12 RX ORDER — HYDROCODONE BITARTRATE AND ACETAMINOPHEN 5; 325 MG/1; MG/1
1 TABLET ORAL EVERY 4 HOURS PRN
Status: DISCONTINUED | OUTPATIENT
Start: 2023-04-12 | End: 2023-04-13 | Stop reason: HOSPADM

## 2023-04-12 RX ORDER — SODIUM CHLORIDE 9 MG/ML
INJECTION, SOLUTION INTRAVENOUS CONTINUOUS
Status: DISCONTINUED | OUTPATIENT
Start: 2023-04-12 | End: 2023-04-12

## 2023-04-12 RX ORDER — SODIUM CHLORIDE 0.9 % (FLUSH) 0.9 %
10 SYRINGE (ML) INJECTION
Status: DISCONTINUED | OUTPATIENT
Start: 2023-04-12 | End: 2023-04-13 | Stop reason: HOSPADM

## 2023-04-12 RX ORDER — POTASSIUM CHLORIDE 20 MEQ/1
40 TABLET, EXTENDED RELEASE ORAL ONCE
Status: COMPLETED | OUTPATIENT
Start: 2023-04-12 | End: 2023-04-12

## 2023-04-12 RX ORDER — ONDANSETRON 2 MG/ML
4 INJECTION INTRAMUSCULAR; INTRAVENOUS EVERY 8 HOURS PRN
Status: DISCONTINUED | OUTPATIENT
Start: 2023-04-12 | End: 2023-04-13 | Stop reason: HOSPADM

## 2023-04-12 RX ORDER — TALC
6 POWDER (GRAM) TOPICAL NIGHTLY PRN
Status: DISCONTINUED | OUTPATIENT
Start: 2023-04-12 | End: 2023-04-13 | Stop reason: HOSPADM

## 2023-04-12 RX ORDER — ASPIRIN 81 MG/1
81 TABLET ORAL DAILY
Status: DISCONTINUED | OUTPATIENT
Start: 2023-04-13 | End: 2023-04-13 | Stop reason: HOSPADM

## 2023-04-12 RX ORDER — ACETAMINOPHEN 325 MG/1
650 TABLET ORAL EVERY 8 HOURS PRN
Status: DISCONTINUED | OUTPATIENT
Start: 2023-04-12 | End: 2023-04-13 | Stop reason: HOSPADM

## 2023-04-12 RX ORDER — METRONIDAZOLE 500 MG/100ML
500 INJECTION, SOLUTION INTRAVENOUS
Status: DISCONTINUED | OUTPATIENT
Start: 2023-04-12 | End: 2023-04-13 | Stop reason: HOSPADM

## 2023-04-12 RX ORDER — ENOXAPARIN SODIUM 100 MG/ML
40 INJECTION SUBCUTANEOUS
Status: DISCONTINUED | OUTPATIENT
Start: 2023-04-12 | End: 2023-04-13 | Stop reason: HOSPADM

## 2023-04-12 RX ADMIN — METRONIDAZOLE 500 MG: 500 INJECTION, SOLUTION INTRAVENOUS at 09:04

## 2023-04-12 RX ADMIN — METRONIDAZOLE 500 MG: 500 INJECTION, SOLUTION INTRAVENOUS at 06:04

## 2023-04-12 RX ADMIN — METRONIDAZOLE 500 MG: 500 INJECTION, SOLUTION INTRAVENOUS at 01:04

## 2023-04-12 RX ADMIN — CEFTRIAXONE SODIUM 1 G: 1 INJECTION, POWDER, FOR SOLUTION INTRAMUSCULAR; INTRAVENOUS at 04:04

## 2023-04-12 RX ADMIN — VANCOMYCIN HYDROCHLORIDE 1000 MG: 1 INJECTION, POWDER, LYOPHILIZED, FOR SOLUTION INTRAVENOUS at 10:04

## 2023-04-12 RX ADMIN — AZTREONAM 2000 MG: 2 INJECTION, POWDER, LYOPHILIZED, FOR SOLUTION INTRAMUSCULAR; INTRAVENOUS at 02:04

## 2023-04-12 RX ADMIN — AZTREONAM 2000 MG: 2 INJECTION, POWDER, LYOPHILIZED, FOR SOLUTION INTRAMUSCULAR; INTRAVENOUS at 12:04

## 2023-04-12 RX ADMIN — POTASSIUM CHLORIDE 40 MEQ: 1500 TABLET, EXTENDED RELEASE ORAL at 06:04

## 2023-04-12 RX ADMIN — ENOXAPARIN SODIUM 40 MG: 40 INJECTION SUBCUTANEOUS at 06:04

## 2023-04-12 NOTE — ED NOTES
Pt reports she has an oncology apt at [facility] at 8:20 AM. Transfer center notified.

## 2023-04-12 NOTE — HPI
Juan has SIRS a 67-year-old black female status post Whipple procedure for pancreatic cancer 3 and half weeks ago who developed chills, son weakness, near-syncope 2 days ago.  Went to the emergency room for a checkup.  After being evaluated she was sent home; however, her blood cultures turn positive the following day so she was brought back to the emergency room.  She has been started on Flagyl, Rocephin, vancomycin.  The blood cultures are now growing Klebsiella.  She was accepted for transfer to [facility] for surgical evaluation however there is no beds.  She is being admitted to [facility] while she awaits transfer.  Patient states she is feeling great and is not having any problems.

## 2023-04-12 NOTE — PROVIDER PROGRESS NOTES - EMERGENCY DEPT.
Emergency Room Physician       This patient is 1 month post Whipple with a Gram-(-) bacteremia on blood culture  Patient is currently awaiting a bed at [facility] for exploration of bacteremia  I spoke yesterday with general surgeon Dr. Ybarra recommended IV antibiotics  He thought that the bacteremia could be a cholangitis as a result of Whipple    Physical Exam  -- Nursing note and vitals reviewed  -- Constitutional: Appears well-developed and well-nourished  -- Head: Atraumatic. Normocephalic. No obvious abnormality  -- Eyes: Pupils are equal and reactive to light. Normal conjunctiva and lids  -- Cardiac: Normal rate, regular rhythm and normal heart sounds  -- Respiratory: Normal respiratory effort, breath sounds clear to auscultation  -- Gastrointestinal: Soft, no tenderness. Normal bowel sounds. Normal liver edge  -- Musculoskeletal: Normal range of motion, no effusions. Joints stable   -- Neurological: No focal deficits. Showed good interaction with staff  -- Vascular: Posterior tibial, dorsalis pedis and radial pulses 2+ bilaterally      Patient has been stable in the emergency room, additional culture showed no growth  Patient is afebrile, getting vancomycin & Flagyl, previously getting as Azactam also  Pharmacy reviewed, 1st cultures are growing out Klebsiella, recommend Rocephin    Discussed with Dr. Sadiq Davis, [facility] currently has no beds today  Will admit & a holding pattern at our hospital on IV antibiotics going forward  IV fluids, IV antibiotics, continue to monitor blood cultures going forward on abdomen    Critical Care ED Physician Time (minutes):  -- Performed by: Red Kelly M.D.  -- Date/Time: 4:57 PM 4/12/2023   -- Direct Patient Care (Face Time): 5  -- Additional History from Records or Taking Additional History: 5  -- Ordering, Reviewing, and Interpreting Diagnostic Studies: 5  -- Total Time in Documentation: 11  -- Consultation with Other Physicians: 5  -- Consultation with  Family Related to Condition: 0  -- Total Critical Care Time: 31  -- Critical care was necessary to treat bacteremia with Gram-(-) rods  -- Critical care was time spent personally by me on the following activities:   -- examination of patient, ordering and performing treatments   -- review of radiographic studies, re-evaluation of pt's condition  -- review of labs and evaluation of response to treatment        Red Kelly M.D. 4:58 PM 4/12/2023

## 2023-04-12 NOTE — H&P
[facility] ([address] [place] [address] Medicine  History & Physical    Patient Name: Nan Simms  MRN: [id number]  Patient Class: OP- Observation  Admission Date: 4/11/2023  Attending Physician: Sadiq Davis MD   Primary Care Provider: Payal Moreland NP         Patient information was obtained from patient, past medical records and ER records.     Subjective:     Principal Problem:Gram-negative bacteremia    Chief Complaint:   Chief Complaint   Patient presents with    Abnormal Labs     Patient to ER states she was told to return to the ER from blood work she had done yesterday         HPI: Juan has SIRS a 67-year-old black female status post Whipple procedure for pancreatic cancer 3 and half weeks ago who developed chills, son weakness, near-syncope 2 days ago.  Went to the emergency room for a checkup.  After being evaluated she was sent home; however, her blood cultures turn positive the following day so she was brought back to the emergency room.  She has been started on Flagyl, Rocephin, vancomycin.  The blood cultures are now growing Klebsiella.  She was accepted for transfer to [facility] for surgical evaluation however there is no beds.  She is being admitted to [facility] while she awaits transfer.  Patient states she is feeling great and is not having any problems.        Past Medical History:   Diagnosis Date    Abnormal Pap smear of cervix     Arthritis     Duodenal adenocarcinoma     Hypertension     Hyperthyroidism        Past Surgical History:   Procedure Laterality Date    CATARACT EXTRACTION W/  INTRAOCULAR LENS IMPLANT Right 8/8/2019    Procedure: EXTRACTION, CATARACT, WITH IOL INSERTION;  Surgeon: Spenser Lee MD;  Location: [facility];  Service: Ophthalmology;  Laterality: Right;    ESOPHAGOGASTRODUODENOSCOPY N/A 3/10/2023    Procedure: EGD (ESOPHAGOGASTRODUODENOSCOPY);  Surgeon: Shashi Tapia MD;  Location: [address]);  Service: Endoscopy;  Laterality: N/A;    EYE  "SURGERY      HYSTERECTOMY      OOPHORECTOMY      PHACOEMULSIFICATION OF CATARACT Right 8/8/2019    Procedure: PHACOEMULSIFICATION, CATARACT;  Surgeon: Spenser Lee MD;  Location: [facility];  Service: Ophthalmology;  Laterality: Right;    WHIPPLE PROCEDURE N/A 3/15/2023    Procedure: WHIPPLE PROCEDURE;  Surgeon: Nick Paez MD;  Location: [address];  Service: General;  Laterality: N/A;       Review of patient's allergies indicates:   Allergen Reactions    Aspirin Other (See Comments)     Pt states it is "hard on her stomach" She can tolerate a low dose aspirin    Pcn [penicillins]      Childhood - Tolerated ceftriaxone and cefazolin with no documented issues in the past        No current facility-administered medications on file prior to encounter.     Current Outpatient Medications on File Prior to Encounter   Medication Sig    acetaminophen (TYLENOL) 650 MG TbSR Take 1 tablet (650 mg total) by mouth every 8 (eight) hours.    aluminum-magnesium hydroxide-simethicone (MAALOX ADVANCED) 200-200-20 mg/5 mL Susp Take 30 mLs by mouth 4 (four) times daily before meals and nightly.    aspirin (ECOTRIN) 81 MG EC tablet Take 1 tablet (81 mg total) by mouth once daily.    atorvastatin (LIPITOR) 20 MG tablet Take 1 tablet (20 mg total) by mouth once daily.    docusate sodium (COLACE) 100 MG capsule Take 1 capsule (100 mg total) by mouth once daily.    enoxaparin (LOVENOX) 40 mg/0.4 mL Syrg Inject 0.4 mLs (40 mg total) into the skin Daily.    lisinopriL-hydrochlorothiazide (PRINZIDE,ZESTORETIC) 20-12.5 mg per tablet Take 1 tablet by mouth once daily.    ondansetron (ZOFRAN-ODT) 4 MG TbDL Take 1 tablet (4 mg total) by mouth every 6 (six) hours as needed (nausea/vomiting).    pantoprazole (PROTONIX) 40 MG tablet Take 1 tablet (40 mg total) by mouth before breakfast.    polyethylene glycol (GLYCOLAX) 17 gram PwPk Take 17 g by mouth once daily.    polyethylene glycol (GLYCOLAX) 17 gram/dose powder Use cap to " measure 17 g, then mix in liquid and drink by mouth once daily.    traMADoL (ULTRAM) 50 mg tablet Take 1 tablet (50 mg total) by mouth every 12 (twelve) hours as needed for Pain.    meloxicam (MOBIC) 15 MG tablet Take 1 tablet (15 mg total) by mouth once daily. As needed    metoclopramide HCl (REGLAN) 10 MG tablet Take 1 tablet (10 mg total) by mouth 3 (three) times daily before meals.     Family History       Problem Relation (Age of Onset)    Cancer Mother          Tobacco Use    Smoking status: Some Days     Packs/day: 0.50     Years: 30.00     Pack years: 15.00     Types: Cigarettes    Smokeless tobacco: Current   Substance and Sexual Activity    Alcohol use: Yes     Alcohol/week: 1.0 standard drink     Types: 1 Cans of beer per week    Drug use: No    Sexual activity: Yes     Partners: Male     Review of Systems   Constitutional:  Negative for activity change, chills, fatigue, fever and unexpected weight change.   HENT:  Negative for sore throat and trouble swallowing.    Respiratory:  Negative for cough, chest tightness and shortness of breath.    Cardiovascular:  Negative for chest pain and leg swelling.   Gastrointestinal:  Negative for abdominal pain.   Endocrine: Negative for cold intolerance and heat intolerance.   Genitourinary:  Negative for difficulty urinating.   Musculoskeletal:  Negative for back pain and joint swelling.   Skin:  Negative for rash.   Neurological:  Negative for numbness.   Hematological:  Negative for adenopathy.   Psychiatric/Behavioral:  Negative for decreased concentration.    Objective:     Vital Signs (Most Recent):  Temp: 98.4 °F (36.9 °C) (04/12/23 1731)  Pulse: 80 (04/12/23 1731)  Resp: 12 (04/12/23 1731)  BP: (!) 159/68 (04/12/23 1731)  SpO2: 96 % (04/12/23 1731)   Vital Signs (24h Range):  Temp:  [98.4 °F (36.9 °C)-99.3 °F (37.4 °C)] 98.4 °F (36.9 °C)  Pulse:  [72-86] 80  Resp:  [12-20] 12  SpO2:  [95 %-99 %] 96 %  BP: (125-159)/(60-68) 159/68     Weight: 68.9 kg (152  lb)  Body mass index is 24.53 kg/m².    Physical Exam  Constitutional:       General: She is not in acute distress.     Appearance: Normal appearance. She is well-developed. She is not diaphoretic.   HENT:      Head: Normocephalic and atraumatic.      Right Ear: Tympanic membrane, ear canal and external ear normal.      Left Ear: Tympanic membrane, ear canal and external ear normal.      Nose: Nose normal.      Mouth/Throat:      Pharynx: No oropharyngeal exudate.   Eyes:      General: No scleral icterus.        Right eye: No discharge.         Left eye: No discharge.      Conjunctiva/sclera: Conjunctivae normal.      Pupils: Pupils are equal, round, and reactive to light.   Neck:      Thyroid: No thyromegaly.      Vascular: No JVD.      Trachea: No tracheal deviation.   Cardiovascular:      Rate and Rhythm: Normal rate and regular rhythm.      Heart sounds: Normal heart sounds. No murmur heard.    No friction rub. No gallop.   Pulmonary:      Effort: Pulmonary effort is normal. No respiratory distress.      Breath sounds: Normal breath sounds. No wheezing or rales.   Chest:      Chest wall: No tenderness.   Abdominal:      General: Bowel sounds are normal. There is no distension.      Palpations: Abdomen is soft. There is no mass.      Tenderness: There is no abdominal tenderness. There is no guarding or rebound.      Comments: Surgical wounds look great.  Healing well.   Musculoskeletal:         General: No tenderness. Normal range of motion.      Cervical back: Normal range of motion and neck supple.      Right lower leg: No edema.      Left lower leg: No edema.   Lymphadenopathy:      Cervical: No cervical adenopathy.   Skin:     General: Skin is warm and dry.   Neurological:      General: No focal deficit present.      Mental Status: She is alert and oriented to person, place, and time.      Cranial Nerves: No cranial nerve deficit.      Motor: No abnormal muscle tone.      Coordination: Coordination normal.       Deep Tendon Reflexes: Reflexes are normal and symmetric. Reflexes normal.   Psychiatric:         Mood and Affect: Mood normal.         Behavior: Behavior normal.         Thought Content: Thought content normal.         Judgment: Judgment normal.         CRANIAL NERVES     CN III, IV, VI   Pupils are equal, round, and reactive to light.     Significant Labs: All pertinent labs within the past 24 hours have been reviewed.  A1C:   Recent Labs   Lab 11/11/22  0820 03/09/23  2153   HGBA1C 5.6 5.6     Blood Culture:   Recent Labs   Lab 04/11/23  1642   LABBLOO No Growth to date  No Growth to date     CBC:   Recent Labs   Lab 04/11/23  1642   WBC 7.35   HGB 9.1*   HCT 29.7*        CMP:   Recent Labs   Lab 04/11/23  1642      K 3.1*   CL 98   CO2 32*   GLU 99   BUN 6*   CREATININE 0.6   CALCIUM 9.5   PROT 6.7   ALBUMIN 2.9*   BILITOT 0.3   ALKPHOS 78   AST 26   ALT 27   ANIONGAP 9     Cardiac Markers: No results for input(s): CKMB, MYOGLOBIN, BNP, TROPISTAT in the last 48 hours.  Lactic Acid:   Recent Labs   Lab 04/11/23  1642   LACTATE 1.3     POCT Glucose:   Recent Labs   Lab 04/11/23  1631   POCTGLUCOSE 103     Troponin: No results for input(s): TROPONINI, TROPONINIHS in the last 48 hours.  TSH:   Recent Labs   Lab 03/12/23  1652   TSH 0.621     Urine Culture: No results for input(s): LABURIN in the last 48 hours.  Urine Studies:   Recent Labs   Lab 04/11/23 2006   COLORU Yellow   APPEARANCEUA Clear   PHUR 7.0   SPECGRAV 1.015   PROTEINUA Negative   GLUCUA Negative   KETONESU Negative   BILIRUBINUA Negative   OCCULTUA Trace*   NITRITE Negative   UROBILINOGEN Negative   LEUKOCYTESUR Negative       Significant Imaging: I have reviewed all pertinent imaging results/findings within the past 24 hours.  I have reviewed and interpreted all pertinent imaging results/findings within the past 24 hours.    CT of the chest negative for blood clots  CT of the abdomen shows normal healing from Whipple procedure.   Small amount of fluid in the surgical bed but less than previous CT scan.    Assessment/Plan:     * Gram-negative bacteremia  Continue vancomycin, Rocephin, Flagyl  When sensitivities return, choose a more appropriate antibiotic.  Rocephin is probably all she needs.      History of Whipple procedure  The bacteremia could have come from mild cholangitis from the surgery.  Continue Lovenox      Diabetes mellitus type 2, diet-controlled  Patient's glucose levels normal.  Not on any diabetic meds.    Last A1c reviewed-   Lab Results   Component Value Date    HGBA1C 5.6 03/09/2023   No sliding scale necessary    Hyperlipidemia  Patient takes Lipitor at home.  I will hold these medications for now.      Essential hypertension  Monitor blood pressure.    Restart lisinopril HCT if blood pressure starts to escalate.        VTE Risk Mitigation (From admission, onward)           Ordered     enoxaparin injection 40 mg  Every 24 hours (non-standard times)         04/12/23 1718     IP VTE HIGH RISK PATIENT  Once         04/12/23 1728     Place sequential compression device  Until discontinued         04/12/23 1728                       On 04/12/2023, patient should be placed in hospital observation services under my care.        Sadiq Davis MD  Department of Hospital Medicine  [facility] Surg (3rd Fl)

## 2023-04-12 NOTE — ED NOTES
Pt updated with plan of care; verbalized understanding. Pt aware of accepting facility currently on diversion.   NPO status discontinued with breakfast tray ordered for pt.   Patient resting comfortably in inpatient stretcher. Bed locked in lowest position with side rails x 2 elevated. AAOx4. Respirations even and unlabored at this time. Call bell within reach. Patient instructed to call with any needs or concerns, verbalized understanding. Will continue to monitor.

## 2023-04-12 NOTE — ASSESSMENT & PLAN NOTE
Continue vancomycin, Rocephin, Flagyl  When sensitivities return, choose a more appropriate antibiotic.  Rocephin is probably all she needs.

## 2023-04-12 NOTE — PROGRESS NOTES
"Pharmacokinetic Initial Assessment: IV Vancomycin    Assessment/Plan:    Initiate intravenous vancomycin with loading dose of 2000 mg once followed by a maintenance dose of vancomycin 1000mg IV every 12 hours  Desired empiric serum trough concentration is 15 to 20 mcg/mL  Draw vancomycin trough level 60 min prior to fourth dose on 4/13 at approximately 0900  Pharmacy will continue to follow and monitor vancomycin.      Please contact pharmacy at extension [phone] with any questions regarding this assessment.     Thank you for the consult,   Tatinaa Dudley       Patient brief summary:  Nan Simms is a 67 y.o. female initiated on antimicrobial therapy with IV Vancomycin for treatment of suspected bacteremia    Drug Allergies:   Review of patient's allergies indicates:   Allergen Reactions    Aspirin Other (See Comments)     Pt states it is "hard on her stomach" She can tolerate a low dose aspirin    Pcn [penicillins]      Childhood        Actual Body Weight:   69.3 kg    Renal Function:   Estimated Creatinine Clearance: 84 mL/min (based on SCr of 0.6 mg/dL).,     Dialysis Method (if applicable):  N/A    CBC (last 72 hours):  Recent Labs   Lab Result Units 04/10/23  1428 04/11/23  1642   WBC K/uL 4.93 7.35   Hemoglobin g/dL 10.4* 9.1*   Hematocrit % 34.7* 29.7*   Platelets K/uL 449 344   Gran % % 61.1 79.9*   Lymph % % 28.2 10.3*   Mono % % 8.1 7.3   Eosinophil % % 2.0 1.8   Basophil % % 0.2 0.3   Differential Method  Automated Automated       Metabolic Panel (last 72 hours):  Recent Labs   Lab Result Units 04/10/23  1413 04/10/23  1428 04/11/23  1642 04/11/23 2006   Sodium mmol/L  --  142 139  --    Potassium mmol/L  --  3.4* 3.1*  --    Chloride mmol/L  --  103 98  --    CO2 mmol/L  --  30* 32*  --    Glucose mg/dL  --  108 99  --    Glucose, UA  Negative  --   --  Negative   BUN mg/dL  --  11 6*  --    Creatinine mg/dL  --  0.6 0.6  --    Albumin g/dL  --  2.9* 2.9*  --    Total Bilirubin mg/dL  --  0.3 0.3  --  "   Alkaline Phosphatase U/L  --  77 78  --    AST U/L  --  55* 26  --    ALT U/L  --  30 27  --    Magnesium mg/dL  --  1.6  --   --    Phosphorus mg/dL  --  3.1  --   --        Drug levels (last 3 results):  No results for input(s): VANCOMYCINRA, VANCORANDOM, VANCOMYCINPE, VANCOPEAK, VANCOMYCINTR, VANCOTROUGH in the last 72 hours.    Microbiologic Results:  Microbiology Results (last 7 days)       Procedure Component Value Units Date/Time    Blood Culture #2 **CANNOT BE ORDERED STAT** [[id number]] Collected: 04/11/23 1642    Order Status: Sent Specimen: Blood Updated: 04/11/23 2034    Blood Culture #1 **CANNOT BE ORDERED STAT** [[id number]] Collected: 04/11/23 1642    Order Status: Sent Specimen: Blood Updated: 04/11/23 2034

## 2023-04-12 NOTE — SUBJECTIVE & OBJECTIVE
"Past Medical History:   Diagnosis Date    Abnormal Pap smear of cervix     Arthritis     Duodenal adenocarcinoma     Hypertension     Hyperthyroidism        Past Surgical History:   Procedure Laterality Date    CATARACT EXTRACTION W/  INTRAOCULAR LENS IMPLANT Right 8/8/2019    Procedure: EXTRACTION, CATARACT, WITH IOL INSERTION;  Surgeon: Spenser Lee MD;  Location: [facility];  Service: Ophthalmology;  Laterality: Right;    ESOPHAGOGASTRODUODENOSCOPY N/A 3/10/2023    Procedure: EGD (ESOPHAGOGASTRODUODENOSCOPY);  Surgeon: Shashi Tapia MD;  Location: [address]);  Service: Endoscopy;  Laterality: N/A;    EYE SURGERY      HYSTERECTOMY      OOPHORECTOMY      PHACOEMULSIFICATION OF CATARACT Right 8/8/2019    Procedure: PHACOEMULSIFICATION, CATARACT;  Surgeon: Spenser Lee MD;  Location: [facility];  Service: Ophthalmology;  Laterality: Right;    WHIPPLE PROCEDURE N/A 3/15/2023    Procedure: WHIPPLE PROCEDURE;  Surgeon: Nick Paez MD;  Location: [address];  Service: General;  Laterality: N/A;       Review of patient's allergies indicates:   Allergen Reactions    Aspirin Other (See Comments)     Pt states it is "hard on her stomach" She can tolerate a low dose aspirin    Pcn [penicillins]      Childhood - Tolerated ceftriaxone and cefazolin with no documented issues in the past        No current facility-administered medications on file prior to encounter.     Current Outpatient Medications on File Prior to Encounter   Medication Sig    acetaminophen (TYLENOL) 650 MG TbSR Take 1 tablet (650 mg total) by mouth every 8 (eight) hours.    aluminum-magnesium hydroxide-simethicone (MAALOX ADVANCED) 200-200-20 mg/5 mL Susp Take 30 mLs by mouth 4 (four) times daily before meals and nightly.    aspirin (ECOTRIN) 81 MG EC tablet Take 1 tablet (81 mg total) by mouth once daily.    atorvastatin (LIPITOR) 20 MG tablet Take 1 tablet (20 mg total) by mouth once daily.    docusate sodium (COLACE) 100 " MG capsule Take 1 capsule (100 mg total) by mouth once daily.    enoxaparin (LOVENOX) 40 mg/0.4 mL Syrg Inject 0.4 mLs (40 mg total) into the skin Daily.    lisinopriL-hydrochlorothiazide (PRINZIDE,ZESTORETIC) 20-12.5 mg per tablet Take 1 tablet by mouth once daily.    ondansetron (ZOFRAN-ODT) 4 MG TbDL Take 1 tablet (4 mg total) by mouth every 6 (six) hours as needed (nausea/vomiting).    pantoprazole (PROTONIX) 40 MG tablet Take 1 tablet (40 mg total) by mouth before breakfast.    polyethylene glycol (GLYCOLAX) 17 gram PwPk Take 17 g by mouth once daily.    polyethylene glycol (GLYCOLAX) 17 gram/dose powder Use cap to measure 17 g, then mix in liquid and drink by mouth once daily.    traMADoL (ULTRAM) 50 mg tablet Take 1 tablet (50 mg total) by mouth every 12 (twelve) hours as needed for Pain.    meloxicam (MOBIC) 15 MG tablet Take 1 tablet (15 mg total) by mouth once daily. As needed    metoclopramide HCl (REGLAN) 10 MG tablet Take 1 tablet (10 mg total) by mouth 3 (three) times daily before meals.     Family History       Problem Relation (Age of Onset)    Cancer Mother          Tobacco Use    Smoking status: Some Days     Packs/day: 0.50     Years: 30.00     Pack years: 15.00     Types: Cigarettes    Smokeless tobacco: Current   Substance and Sexual Activity    Alcohol use: Yes     Alcohol/week: 1.0 standard drink     Types: 1 Cans of beer per week    Drug use: No    Sexual activity: Yes     Partners: Male     Review of Systems   Constitutional:  Negative for activity change, chills, fatigue, fever and unexpected weight change.   HENT:  Negative for sore throat and trouble swallowing.    Respiratory:  Negative for cough, chest tightness and shortness of breath.    Cardiovascular:  Negative for chest pain and leg swelling.   Gastrointestinal:  Negative for abdominal pain.   Endocrine: Negative for cold intolerance and heat intolerance.   Genitourinary:  Negative for difficulty urinating.   Musculoskeletal:   Negative for back pain and joint swelling.   Skin:  Negative for rash.   Neurological:  Negative for numbness.   Hematological:  Negative for adenopathy.   Psychiatric/Behavioral:  Negative for decreased concentration.    Objective:     Vital Signs (Most Recent):  Temp: 98.4 °F (36.9 °C) (04/12/23 1731)  Pulse: 80 (04/12/23 1731)  Resp: 12 (04/12/23 1731)  BP: (!) 159/68 (04/12/23 1731)  SpO2: 96 % (04/12/23 1731)   Vital Signs (24h Range):  Temp:  [98.4 °F (36.9 °C)-99.3 °F (37.4 °C)] 98.4 °F (36.9 °C)  Pulse:  [72-86] 80  Resp:  [12-20] 12  SpO2:  [95 %-99 %] 96 %  BP: (125-159)/(60-68) 159/68     Weight: 68.9 kg (152 lb)  Body mass index is 24.53 kg/m².    Physical Exam  Constitutional:       General: She is not in acute distress.     Appearance: Normal appearance. She is well-developed. She is not diaphoretic.   HENT:      Head: Normocephalic and atraumatic.      Right Ear: Tympanic membrane, ear canal and external ear normal.      Left Ear: Tympanic membrane, ear canal and external ear normal.      Nose: Nose normal.      Mouth/Throat:      Pharynx: No oropharyngeal exudate.   Eyes:      General: No scleral icterus.        Right eye: No discharge.         Left eye: No discharge.      Conjunctiva/sclera: Conjunctivae normal.      Pupils: Pupils are equal, round, and reactive to light.   Neck:      Thyroid: No thyromegaly.      Vascular: No JVD.      Trachea: No tracheal deviation.   Cardiovascular:      Rate and Rhythm: Normal rate and regular rhythm.      Heart sounds: Normal heart sounds. No murmur heard.    No friction rub. No gallop.   Pulmonary:      Effort: Pulmonary effort is normal. No respiratory distress.      Breath sounds: Normal breath sounds. No wheezing or rales.   Chest:      Chest wall: No tenderness.   Abdominal:      General: Bowel sounds are normal. There is no distension.      Palpations: Abdomen is soft. There is no mass.      Tenderness: There is no abdominal tenderness. There is no  guarding or rebound.      Comments: Surgical wounds look great.  Healing well.   Musculoskeletal:         General: No tenderness. Normal range of motion.      Cervical back: Normal range of motion and neck supple.      Right lower leg: No edema.      Left lower leg: No edema.   Lymphadenopathy:      Cervical: No cervical adenopathy.   Skin:     General: Skin is warm and dry.   Neurological:      General: No focal deficit present.      Mental Status: She is alert and oriented to person, place, and time.      Cranial Nerves: No cranial nerve deficit.      Motor: No abnormal muscle tone.      Coordination: Coordination normal.      Deep Tendon Reflexes: Reflexes are normal and symmetric. Reflexes normal.   Psychiatric:         Mood and Affect: Mood normal.         Behavior: Behavior normal.         Thought Content: Thought content normal.         Judgment: Judgment normal.         CRANIAL NERVES     CN III, IV, VI   Pupils are equal, round, and reactive to light.     Significant Labs: All pertinent labs within the past 24 hours have been reviewed.  A1C:   Recent Labs   Lab 11/11/22  0820 03/09/23  2153   HGBA1C 5.6 5.6     Blood Culture:   Recent Labs   Lab 04/11/23  1642   LABBLOO No Growth to date  No Growth to date     CBC:   Recent Labs   Lab 04/11/23  1642   WBC 7.35   HGB 9.1*   HCT 29.7*        CMP:   Recent Labs   Lab 04/11/23  1642      K 3.1*   CL 98   CO2 32*   GLU 99   BUN 6*   CREATININE 0.6   CALCIUM 9.5   PROT 6.7   ALBUMIN 2.9*   BILITOT 0.3   ALKPHOS 78   AST 26   ALT 27   ANIONGAP 9     Cardiac Markers: No results for input(s): CKMB, MYOGLOBIN, BNP, TROPISTAT in the last 48 hours.  Lactic Acid:   Recent Labs   Lab 04/11/23  1642   LACTATE 1.3     POCT Glucose:   Recent Labs   Lab 04/11/23  1631   POCTGLUCOSE 103     Troponin: No results for input(s): TROPONINI, TROPONINIHS in the last 48 hours.  TSH:   Recent Labs   Lab 03/12/23  1652   TSH 0.621     Urine Culture: No results for  input(s): LABURIN in the last 48 hours.  Urine Studies:   Recent Labs   Lab 04/11/23 2006   COLORU Yellow   APPEARANCEUA Clear   PHUR 7.0   SPECGRAV 1.015   PROTEINUA Negative   GLUCUA Negative   KETONESU Negative   BILIRUBINUA Negative   OCCULTUA Trace*   NITRITE Negative   UROBILINOGEN Negative   LEUKOCYTESUR Negative       Significant Imaging: I have reviewed all pertinent imaging results/findings within the past 24 hours.  I have reviewed and interpreted all pertinent imaging results/findings within the past 24 hours.    CT of the chest negative for blood clots  CT of the abdomen shows normal healing from Whipple procedure.  Small amount of fluid in the surgical bed but less than previous CT scan.

## 2023-04-12 NOTE — ASSESSMENT & PLAN NOTE
Patient's FSGs are controlled on current medication regimen.  Last A1c reviewed-   Lab Results   Component Value Date    HGBA1C 5.6 03/09/2023     Most recent fingerstick glucose reviewed- No results for input(s): POCTGLUCOSE in the last 24 hours.  Current correctional scale  Low  Maintain anti-hyperglycemic dose as follows-   Antihyperglycemics (From admission, onward)    None        Patient does not take any medication for diabetes.

## 2023-04-13 ENCOUNTER — HOSPITAL ENCOUNTER (INPATIENT)
Facility: HOSPITAL | Age: 68
LOS: 2 days | Discharge: HOME HEALTH | DRG: 872 | End: 2023-04-15
Attending: SURGERY | Admitting: SURGERY
Payer: MEDICARE

## 2023-04-13 VITALS
SYSTOLIC BLOOD PRESSURE: 131 MMHG | OXYGEN SATURATION: 97 % | BODY MASS INDEX: 24.43 KG/M2 | RESPIRATION RATE: 18 BRPM | DIASTOLIC BLOOD PRESSURE: 60 MMHG | TEMPERATURE: 99 F | HEIGHT: 66 IN | HEART RATE: 49 BPM | WEIGHT: 152 LBS

## 2023-04-13 DIAGNOSIS — R78.81 BACTEREMIA: Primary | ICD-10-CM

## 2023-04-13 DIAGNOSIS — Z90.410 HISTORY OF WHIPPLE PROCEDURE: ICD-10-CM

## 2023-04-13 DIAGNOSIS — Z90.49 HISTORY OF WHIPPLE PROCEDURE: ICD-10-CM

## 2023-04-13 LAB
ALBUMIN SERPL BCP-MCNC: 2.7 G/DL (ref 3.5–5.2)
ALP SERPL-CCNC: 72 U/L (ref 55–135)
ALT SERPL W/O P-5'-P-CCNC: 19 U/L (ref 10–44)
ANION GAP SERPL CALC-SCNC: 11 MMOL/L (ref 8–16)
AST SERPL-CCNC: 18 U/L (ref 10–40)
BACTERIA BLD CULT: ABNORMAL
BASOPHILS # BLD AUTO: 0.03 K/UL (ref 0–0.2)
BASOPHILS NFR BLD: 0.6 % (ref 0–1.9)
BILIRUB SERPL-MCNC: 0.2 MG/DL (ref 0.1–1)
BUN SERPL-MCNC: 8 MG/DL (ref 8–23)
CALCIUM SERPL-MCNC: 9.1 MG/DL (ref 8.7–10.5)
CHLORIDE SERPL-SCNC: 105 MMOL/L (ref 95–110)
CO2 SERPL-SCNC: 26 MMOL/L (ref 23–29)
CREAT SERPL-MCNC: 0.6 MG/DL (ref 0.5–1.4)
DIFFERENTIAL METHOD: ABNORMAL
EOSINOPHIL # BLD AUTO: 0.3 K/UL (ref 0–0.5)
EOSINOPHIL NFR BLD: 6.1 % (ref 0–8)
ERYTHROCYTE [DISTWIDTH] IN BLOOD BY AUTOMATED COUNT: 19.2 % (ref 11.5–14.5)
EST. GFR  (NO RACE VARIABLE): >60 ML/MIN/1.73 M^2
GLUCOSE SERPL-MCNC: 93 MG/DL (ref 70–110)
HCT VFR BLD AUTO: 28.3 % (ref 37–48.5)
HGB BLD-MCNC: 8.6 G/DL (ref 12–16)
IMM GRANULOCYTES # BLD AUTO: 0.01 K/UL (ref 0–0.04)
IMM GRANULOCYTES NFR BLD AUTO: 0.2 % (ref 0–0.5)
LYMPHOCYTES # BLD AUTO: 1 K/UL (ref 1–4.8)
LYMPHOCYTES NFR BLD: 20.6 % (ref 18–48)
MAGNESIUM SERPL-MCNC: 1.9 MG/DL (ref 1.6–2.6)
MCH RBC QN AUTO: 22.3 PG (ref 27–31)
MCHC RBC AUTO-ENTMCNC: 30.4 G/DL (ref 32–36)
MCV RBC AUTO: 73 FL (ref 82–98)
MONOCYTES # BLD AUTO: 0.7 K/UL (ref 0.3–1)
MONOCYTES NFR BLD: 14 % (ref 4–15)
NEUTROPHILS # BLD AUTO: 3 K/UL (ref 1.8–7.7)
NEUTROPHILS NFR BLD: 58.5 % (ref 38–73)
NRBC BLD-RTO: 0 /100 WBC
PHOSPHATE SERPL-MCNC: 3.1 MG/DL (ref 2.7–4.5)
PLATELET # BLD AUTO: 300 K/UL (ref 150–450)
PMV BLD AUTO: 9.9 FL (ref 9.2–12.9)
POTASSIUM SERPL-SCNC: 3.5 MMOL/L (ref 3.5–5.1)
PROT SERPL-MCNC: 6.2 G/DL (ref 6–8.4)
RBC # BLD AUTO: 3.86 M/UL (ref 4–5.4)
SODIUM SERPL-SCNC: 142 MMOL/L (ref 136–145)
WBC # BLD AUTO: 5.06 K/UL (ref 3.9–12.7)

## 2023-04-13 PROCEDURE — 25000003 PHARM REV CODE 250: Performed by: INTERNAL MEDICINE

## 2023-04-13 PROCEDURE — G0378 HOSPITAL OBSERVATION PER HR: HCPCS

## 2023-04-13 PROCEDURE — 94761 N-INVAS EAR/PLS OXIMETRY MLT: CPT

## 2023-04-13 PROCEDURE — 25000003 PHARM REV CODE 250: Performed by: STUDENT IN AN ORGANIZED HEALTH CARE EDUCATION/TRAINING PROGRAM

## 2023-04-13 PROCEDURE — 63600175 PHARM REV CODE 636 W HCPCS: Performed by: STUDENT IN AN ORGANIZED HEALTH CARE EDUCATION/TRAINING PROGRAM

## 2023-04-13 PROCEDURE — 87040 BLOOD CULTURE FOR BACTERIA: CPT | Performed by: STUDENT IN AN ORGANIZED HEALTH CARE EDUCATION/TRAINING PROGRAM

## 2023-04-13 PROCEDURE — S0030 INJECTION, METRONIDAZOLE: HCPCS

## 2023-04-13 PROCEDURE — 63600175 PHARM REV CODE 636 W HCPCS

## 2023-04-13 PROCEDURE — 25000003 PHARM REV CODE 250

## 2023-04-13 PROCEDURE — 36415 COLL VENOUS BLD VENIPUNCTURE: CPT | Performed by: STUDENT IN AN ORGANIZED HEALTH CARE EDUCATION/TRAINING PROGRAM

## 2023-04-13 PROCEDURE — 80053 COMPREHEN METABOLIC PANEL: CPT

## 2023-04-13 PROCEDURE — 25000003 PHARM REV CODE 250: Performed by: SURGERY

## 2023-04-13 PROCEDURE — 36415 COLL VENOUS BLD VENIPUNCTURE: CPT

## 2023-04-13 PROCEDURE — S0030 INJECTION, METRONIDAZOLE: HCPCS | Performed by: SURGERY

## 2023-04-13 PROCEDURE — 99223 PR INITIAL HOSPITAL CARE,LEVL III: ICD-10-PCS | Mod: GC,,, | Performed by: INTERNAL MEDICINE

## 2023-04-13 PROCEDURE — 97165 OT EVAL LOW COMPLEX 30 MIN: CPT

## 2023-04-13 PROCEDURE — 63600175 PHARM REV CODE 636 W HCPCS: Performed by: INTERNAL MEDICINE

## 2023-04-13 PROCEDURE — 85025 COMPLETE CBC W/AUTO DIFF WBC: CPT

## 2023-04-13 PROCEDURE — 20600001 HC STEP DOWN PRIVATE ROOM

## 2023-04-13 PROCEDURE — 99223 1ST HOSP IP/OBS HIGH 75: CPT | Mod: GC,,, | Performed by: INTERNAL MEDICINE

## 2023-04-13 PROCEDURE — 83735 ASSAY OF MAGNESIUM: CPT

## 2023-04-13 PROCEDURE — 96366 THER/PROPH/DIAG IV INF ADDON: CPT

## 2023-04-13 PROCEDURE — 25000003 PHARM REV CODE 250: Performed by: NURSE PRACTITIONER

## 2023-04-13 PROCEDURE — 84100 ASSAY OF PHOSPHORUS: CPT

## 2023-04-13 RX ORDER — ACETAMINOPHEN 325 MG/1
650 TABLET ORAL EVERY 6 HOURS PRN
Status: DISCONTINUED | OUTPATIENT
Start: 2023-04-13 | End: 2023-04-15 | Stop reason: HOSPADM

## 2023-04-13 RX ORDER — LANOLIN ALCOHOL/MO/W.PET/CERES
800 CREAM (GRAM) TOPICAL ONCE
Status: COMPLETED | OUTPATIENT
Start: 2023-04-13 | End: 2023-04-13

## 2023-04-13 RX ORDER — DRONABINOL 2.5 MG/1
2.5 CAPSULE ORAL 2 TIMES DAILY
Status: DISCONTINUED | OUTPATIENT
Start: 2023-04-13 | End: 2023-04-15 | Stop reason: HOSPADM

## 2023-04-13 RX ORDER — SODIUM CHLORIDE, SODIUM LACTATE, POTASSIUM CHLORIDE, CALCIUM CHLORIDE 600; 310; 30; 20 MG/100ML; MG/100ML; MG/100ML; MG/100ML
INJECTION, SOLUTION INTRAVENOUS CONTINUOUS
Status: DISCONTINUED | OUTPATIENT
Start: 2023-04-13 | End: 2023-04-14

## 2023-04-13 RX ORDER — ENOXAPARIN SODIUM 100 MG/ML
40 INJECTION SUBCUTANEOUS EVERY 24 HOURS
Status: DISCONTINUED | OUTPATIENT
Start: 2023-04-13 | End: 2023-04-15 | Stop reason: HOSPADM

## 2023-04-13 RX ORDER — SODIUM CHLORIDE, SODIUM LACTATE, POTASSIUM CHLORIDE, CALCIUM CHLORIDE 600; 310; 30; 20 MG/100ML; MG/100ML; MG/100ML; MG/100ML
INJECTION, SOLUTION INTRAVENOUS CONTINUOUS
Status: DISCONTINUED | OUTPATIENT
Start: 2023-04-13 | End: 2023-04-13

## 2023-04-13 RX ORDER — LIDOCAINE HYDROCHLORIDE 10 MG/ML
1 INJECTION, SOLUTION EPIDURAL; INFILTRATION; INTRACAUDAL; PERINEURAL ONCE AS NEEDED
Status: DISCONTINUED | OUTPATIENT
Start: 2023-04-13 | End: 2023-04-15 | Stop reason: HOSPADM

## 2023-04-13 RX ORDER — PANTOPRAZOLE SODIUM 40 MG/1
40 TABLET, DELAYED RELEASE ORAL DAILY
Status: DISCONTINUED | OUTPATIENT
Start: 2023-04-13 | End: 2023-04-15 | Stop reason: HOSPADM

## 2023-04-13 RX ORDER — SODIUM CHLORIDE 0.9 % (FLUSH) 0.9 %
10 SYRINGE (ML) INJECTION
Status: DISCONTINUED | OUTPATIENT
Start: 2023-04-13 | End: 2023-04-15 | Stop reason: HOSPADM

## 2023-04-13 RX ORDER — HYDROCHLOROTHIAZIDE 12.5 MG/1
12.5 TABLET ORAL DAILY
Status: DISCONTINUED | OUTPATIENT
Start: 2023-04-13 | End: 2023-04-13

## 2023-04-13 RX ORDER — ATORVASTATIN CALCIUM 20 MG/1
20 TABLET, FILM COATED ORAL DAILY
COMMUNITY
End: 2023-08-14

## 2023-04-13 RX ORDER — ONDANSETRON 8 MG/1
8 TABLET, ORALLY DISINTEGRATING ORAL EVERY 8 HOURS PRN
Status: DISCONTINUED | OUTPATIENT
Start: 2023-04-13 | End: 2023-04-15 | Stop reason: HOSPADM

## 2023-04-13 RX ORDER — PANTOPRAZOLE SODIUM 40 MG/1
40 TABLET, DELAYED RELEASE ORAL DAILY
COMMUNITY
End: 2023-05-16

## 2023-04-13 RX ORDER — METRONIDAZOLE 500 MG/100ML
500 INJECTION, SOLUTION INTRAVENOUS
Status: DISCONTINUED | OUTPATIENT
Start: 2023-04-13 | End: 2023-04-13

## 2023-04-13 RX ORDER — ASPIRIN 81 MG/1
81 TABLET ORAL DAILY
Status: DISCONTINUED | OUTPATIENT
Start: 2023-04-13 | End: 2023-04-15 | Stop reason: HOSPADM

## 2023-04-13 RX ORDER — METOCLOPRAMIDE 5 MG/1
10 TABLET ORAL
Status: DISCONTINUED | OUTPATIENT
Start: 2023-04-13 | End: 2023-04-15 | Stop reason: HOSPADM

## 2023-04-13 RX ORDER — ATORVASTATIN CALCIUM 40 MG/1
40 TABLET, FILM COATED ORAL NIGHTLY
Status: DISCONTINUED | OUTPATIENT
Start: 2023-04-13 | End: 2023-04-15 | Stop reason: HOSPADM

## 2023-04-13 RX ORDER — HYDROCHLOROTHIAZIDE 12.5 MG/1
12.5 TABLET ORAL DAILY
Status: DISCONTINUED | OUTPATIENT
Start: 2023-04-14 | End: 2023-04-15 | Stop reason: HOSPADM

## 2023-04-13 RX ORDER — LISINOPRIL AND HYDROCHLOROTHIAZIDE 12.5; 2 MG/1; MG/1
1 TABLET ORAL DAILY
COMMUNITY
End: 2023-05-16

## 2023-04-13 RX ADMIN — ATORVASTATIN CALCIUM 40 MG: 40 TABLET, FILM COATED ORAL at 09:04

## 2023-04-13 RX ADMIN — METOCLOPRAMIDE 10 MG: 5 TABLET ORAL at 11:04

## 2023-04-13 RX ADMIN — DRONABINOL 2.5 MG: 2.5 CAPSULE ORAL at 09:04

## 2023-04-13 RX ADMIN — Medication 800 MG: at 03:04

## 2023-04-13 RX ADMIN — ENOXAPARIN SODIUM 40 MG: 40 INJECTION SUBCUTANEOUS at 05:04

## 2023-04-13 RX ADMIN — POTASSIUM BICARBONATE 40 MEQ: 391 TABLET, EFFERVESCENT ORAL at 03:04

## 2023-04-13 RX ADMIN — ASPIRIN 81 MG: 81 TABLET, COATED ORAL at 09:04

## 2023-04-13 RX ADMIN — CEFTRIAXONE 2 G: 2 INJECTION, POWDER, FOR SOLUTION INTRAMUSCULAR; INTRAVENOUS at 10:04

## 2023-04-13 RX ADMIN — SODIUM CHLORIDE, POTASSIUM CHLORIDE, SODIUM LACTATE AND CALCIUM CHLORIDE: 600; 310; 30; 20 INJECTION, SOLUTION INTRAVENOUS at 04:04

## 2023-04-13 RX ADMIN — METRONIDAZOLE 500 MG: 500 INJECTION, SOLUTION INTRAVENOUS at 09:04

## 2023-04-13 RX ADMIN — PANTOPRAZOLE SODIUM 40 MG: 40 TABLET, DELAYED RELEASE ORAL at 09:04

## 2023-04-13 RX ADMIN — METRONIDAZOLE 500 MG: 500 INJECTION, SOLUTION INTRAVENOUS at 01:04

## 2023-04-13 RX ADMIN — DAPTOMYCIN 690 MG: 350 INJECTION, POWDER, LYOPHILIZED, FOR SOLUTION INTRAVENOUS at 03:04

## 2023-04-13 RX ADMIN — METOCLOPRAMIDE 10 MG: 5 TABLET ORAL at 05:04

## 2023-04-13 RX ADMIN — SODIUM CHLORIDE, POTASSIUM CHLORIDE, SODIUM LACTATE AND CALCIUM CHLORIDE: 600; 310; 30; 20 INJECTION, SOLUTION INTRAVENOUS at 09:04

## 2023-04-13 NOTE — CONSULTS
RD consulted for calorie count. Notified RN via phone. All paperwork taped to front of pt's door. Please collect/save all meal tickets and record % PO intake of all items provided on each tray. RD to collect and analyze results of calorie count on Monday (4/17). Please contact RD if any questions arise during this time. RD to continue to monitor and follow.     Today pt endorses poor appetite since surgery. Ate grilled cheese and 1 boost for lunch today. Denies N/V/D/C     Thanks!    Jamia Day, Registration Eligible, Provisional LDN

## 2023-04-13 NOTE — NURSING
Patient was transferred to [facility] via EMS. Report was given to JERARDO Myrick. ESTELITA/QUINN ravi.

## 2023-04-13 NOTE — PLAN OF CARE
NP Snehal informed of asymptomatic HR 46-50 on dynamap machine, apical pulse 50. /58. Plan to start HCTZ tomorrow instead of today. This RN will keep team updated on symptoms or changes.

## 2023-04-13 NOTE — ASSESSMENT & PLAN NOTE
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 Currently asymptomatic and feeling well.    - Labs: Daily CMP, CBC, Mag, Phos  - Diet: CLD  - Pain: prn tylenol  - Protonix  - Continue flagyl + rocephin until cultures return sensitivities; have dc'ed vanc since no gram pos resulted in final cultures  - Fluids: Maintenance fluids at 50  - DVT prophylaxis: subq lovenox

## 2023-04-13 NOTE — PT/OT/SLP PROGRESS
Physical Therapy Screen    Patient Name:  Nan Simms   MRN:  [id number]  Admitting Diagnosis:  <principal problem not specified>   Recent Surgery: * No surgery found *    Admit Date: 4/13/2023  Length of Stay: 0 days    Physical Therapy orders received, acknowledged, and discharged. Patient is mobilizing independently in hallway and with OT. Pt declined any PT needs at this time. Pt instructed to have team re-consult physical therapy should pt's needs change. PT orders Dc'd..    Rebeca Vargas, PT, DPT  4/13/2023

## 2023-04-13 NOTE — HPI
67 year old female with status post Whipple procedure for duodenal adenocarcinoma on 3/15/2023, presented as a transfer from OSH with fevers and rigors, reported syncope prior to admission 2 days ago. Last admission, she had some post op leukocytosis, but no organized fluid collection on post op CT. She denies fever, chills, nausea, vomiting, diarrhea, abdominal pain, rigors, recent trauma, prosthetic devices or prosthetic material, dental pain or odontogenic procedures. She reports being home after recent discharge and not going outdoors. Denies pain at surgical incision site, had no drains placed and reports scar is healing well. CTAP with stable post op changes of whipple and small free fluid in operative bed, decreased from prior. Bcx 4/10 with klebsiella pneumoniae x 2 sets, BCID with E faecium but oddly resistant genes to lipoglycopeptide negative. She remains afebrile, no leukocytosis, started on vancomycin, pip/tazo, metronidazole, repeat Bcx on 4/11 with NGTD, Ucx sent 4/12, no pyruia on UA. hemodynamically stable, hypertensive. ID was consulted for Pt with bacteremia - antibiotic recommendations

## 2023-04-13 NOTE — SUBJECTIVE & OBJECTIVE
Patient Education     Diet: Diabetes  Food is an important tool that you can use to control diabetes and stay healthy. Eating well-balanced meals in the correct amounts will help you control your blood glucose levels and prevent low blood sugar reactions. It will also help you reduce the health risks of diabetes. There is no one specific diet that is right for everyone with diabetes. But there are general guidelines to follow. A registered dietitian (RD) will create a tailored diet approach that’s just right for you. He or she will also help you plan healthy meals and snacks. If you have any questions, call your dietitian for advice.  Guidelines for success  Talk with your healthcare provider before starting a diabetes diet or weight loss program. If you haven't talked with a dietitian yet, ask your provider for a referral. The following guidelines can help you succeed:  · Select foods from the 6 food groups below. Your dietitian will help you find food choices within each group. He or she will also show you serving sizes and how many servings you can have at each meal.  ? Grains, beans, and starchy vegetables  ? Vegetables  ? Fruit  ? Milk or yogurt  ? Meat, poultry, fish, or tofu  ? Healthy fats  · Check your blood sugar levels as directed by your provider. Take any medicine as prescribed by your provider.  · Learn to read food labels and pick the right portion sizes.  · Limit carbohydrates at each meal to help manage your diabetes. The carbohydrates you eat become glucose in the blood. Talk with your healthcare provider about how many grams of carbohydrates are recommended for you at each meal. Eat 3 meals a day, at consistent times. Don't skip meals. If you are hungry between meals, eat a small, low-carbohydrate snack.  · Talk with your healthcare provider if you drink alcohol. Alcohol can have unpredictable effects on blood glucose. It's also high in empty calories and can raise a type of blood fat called  "Current Facility-Administered Medications on File Prior to Encounter   Medication    [COMPLETED] aztreonam (AZACTAM) 2,000 mg in dextrose 5 % in water (D5W) 5 % 100 mL IVPB (MB+)    [COMPLETED] metronidazole IVPB 500 mg    [COMPLETED] potassium chloride SA CR tablet 40 mEq    [DISCONTINUED] 0.9%  NaCl infusion    [DISCONTINUED] acetaminophen tablet 650 mg    [DISCONTINUED] aspirin EC tablet 81 mg    [DISCONTINUED] cefTRIAXone (ROCEPHIN) 1 g in dextrose 5 % in water (D5W) 5 % 50 mL IVPB (MB+)    [DISCONTINUED] enoxaparin injection 40 mg    [DISCONTINUED] HYDROcodone-acetaminophen 5-325 mg per tablet 1 tablet    [DISCONTINUED] melatonin tablet 6 mg    [DISCONTINUED] metronidazole IVPB 500 mg    [DISCONTINUED] ondansetron injection 4 mg    [DISCONTINUED] sodium chloride 0.9% flush 10 mL    [DISCONTINUED] vancomycin (VANCOCIN) 1,000 mg in dextrose 5 % (D5W) 250 mL IVPB (Vial-Mate)    [DISCONTINUED] vancomycin - pharmacy to dose     No current outpatient medications on file prior to encounter.       Review of patient's allergies indicates:   Allergen Reactions    Aspirin Other (See Comments)     Pt states it is "hard on her stomach" She can tolerate a low dose aspirin    Pcn [penicillins]      Childhood - Tolerated ceftriaxone and cefazolin with no documented issues in the past        Past Medical History:   Diagnosis Date    Abnormal Pap smear of cervix     Arthritis     Duodenal adenocarcinoma     Hypertension     Hyperthyroidism      Past Surgical History:   Procedure Laterality Date    CATARACT EXTRACTION W/  INTRAOCULAR LENS IMPLANT Right 8/8/2019    Procedure: EXTRACTION, CATARACT, WITH IOL INSERTION;  Surgeon: Spenser Lee MD;  Location: [facility];  Service: Ophthalmology;  Laterality: Right;    ESOPHAGOGASTRODUODENOSCOPY N/A 3/10/2023    Procedure: EGD (ESOPHAGOGASTRODUODENOSCOPY);  Surgeon: Shashi Tapia MD;  Location: [address]);  Service: Endoscopy;  Laterality: N/A;    EYE SURGERY      " HYSTERECTOMY      OOPHORECTOMY      PHACOEMULSIFICATION OF CATARACT Right 8/8/2019    Procedure: PHACOEMULSIFICATION, CATARACT;  Surgeon: Spenser Lee MD;  Location: [facility];  Service: Ophthalmology;  Laterality: Right;    WHIPPLE PROCEDURE N/A 3/15/2023    Procedure: WHIPPLE PROCEDURE;  Surgeon: Nick Paez MD;  Location: [address];  Service: General;  Laterality: N/A;     Family History       Problem Relation (Age of Onset)    Cancer Mother          Tobacco Use    Smoking status: Some Days     Packs/day: 0.50     Years: 30.00     Pack years: 15.00     Types: Cigarettes    Smokeless tobacco: Current   Substance and Sexual Activity    Alcohol use: Yes     Alcohol/week: 1.0 standard drink     Types: 1 Cans of beer per week    Drug use: No    Sexual activity: Yes     Partners: Male     Review of Systems   Constitutional:  Positive for appetite change and fatigue. Negative for activity change and fever.   Respiratory:  Negative for shortness of breath and wheezing.    Cardiovascular:  Negative for chest pain and palpitations.   Gastrointestinal:  Negative for abdominal distention, abdominal pain, constipation, diarrhea, nausea and vomiting.   Objective:     Vital Signs (Most Recent):  Temp: 98.1 °F (36.7 °C) (04/13/23 0327)  Pulse: 63 (04/13/23 0327)  Resp: 16 (04/13/23 0327)  BP: (!) 158/71 (04/13/23 0327)  SpO2: 96 % (04/13/23 0327)   Vital Signs (24h Range):  Temp:  [98.1 °F (36.7 °C)-99.2 °F (37.3 °C)] 98.1 °F (36.7 °C)  Pulse:  [49-82] 63  Resp:  [12-20] 16  SpO2:  [96 %-99 %] 96 %  BP: (131-164)/(60-71) 158/71     Weight: 68.9 kg (152 lb)  Body mass index is 24.53 kg/m².    Physical Exam  Vitals and nursing note reviewed.   Constitutional:       General: She is not in acute distress.     Appearance: Normal appearance. She is not ill-appearing.   HENT:      Head: Normocephalic.      Mouth/Throat:      Mouth: Mucous membranes are moist.      Pharynx: Oropharynx is clear.   Eyes:      General: No  triglycerides. Drink water or calorie-free diet drinks instead.  · Eat less fat to help lower your risk of heart disease. Use nonfat or low-fat dairy products and lean meats. Avoid fried foods. Use cooking oils that are unsaturated, such as olive, canola, or peanut oil.  · Don't eat foods with added salt. Salt can contribute to high blood pressure, which can cause heart disease. People with diabetes already have a risk for high blood pressure and heart disease.  · Stay at a healthy weight. If you need to lose weight, cut down on your portion sizes. But don’t skip meals. Exercise is an important part of any weight management program. Talk with your provider about an exercise program that’s right for you.  · For more information about the best diet plan for you, talk with an RD. To find an RD in your area, contact:  ? Academy of Nutrition and Dietetics www.eatright.org  ? American Diabetes Association [phone] www.diabetes.org  Angela last reviewed this educational content on 7/1/2019 © 2000-2021 [employer]. All rights reserved. This information is not intended as a substitute for professional medical care. Always follow your healthcare professional's instructions.            scleral icterus.     Extraocular Movements: Extraocular movements intact.      Conjunctiva/sclera: Conjunctivae normal.   Cardiovascular:      Rate and Rhythm: Normal rate.      Pulses: Normal pulses.   Pulmonary:      Effort: Pulmonary effort is normal. No respiratory distress.      Breath sounds: No wheezing.   Abdominal:      General: Abdomen is flat. There is no distension.      Palpations: Abdomen is soft.      Tenderness: There is no guarding.   Musculoskeletal:         General: No swelling or tenderness. Normal range of motion.      Cervical back: Normal range of motion.   Skin:     General: Skin is warm and dry.      Coloration: Skin is not jaundiced or pale.      Comments: Midline surgical incision CDI underneath peeling dermabond   Neurological:      General: No focal deficit present.      Mental Status: She is alert and oriented to person, place, and time.   Psychiatric:         Mood and Affect: Mood normal.       Significant Labs:  I have reviewed all pertinent lab results within the past 24 hours.  CBC:   Recent Labs   Lab 04/11/23  1642   WBC 7.35   RBC 4.11   HGB 9.1*   HCT 29.7*      MCV 72*   MCH 22.1*   MCHC 30.6*     CMP:   Recent Labs   Lab 04/11/23  1642   GLU 99   CALCIUM 9.5   ALBUMIN 2.9*   PROT 6.7      K 3.1*   CO2 32*   CL 98   BUN 6*   CREATININE 0.6   ALKPHOS 78   ALT 27   AST 26   BILITOT 0.3       Significant Diagnostics:  I have reviewed all pertinent imaging results/findings within the past 24 hours.

## 2023-04-13 NOTE — PLAN OF CARE
[address] [facility]  Initial Discharge Assessment       Primary Care Provider: Payal Moreland NP    Admission Diagnosis: Bacteremia [R78.81]    Admission Date: 4/13/2023  Expected Discharge Date: 4/17/2023    Discharge Barriers Identified: None    Payor: [employer]ARE / Plan: [employer] / Product Type: Medicare Advantage /     Extended Emergency Contact Information  Primary Emergency Contact: Conrad Simms   [facility]  Home Phone: [phone]  Relation: Spouse    Discharge Plan A: Home  Discharge Plan B: [facility]      [facility] [address] [place] [address]  Phone: [phone] Fax: [phone]    [facility] SAMANTHA GRAHAM  [address]  [facility]  [place] [address]  Phone: [phone] Fax: [phone]      Initial Assessment (most recent)       Adult Discharge Assessment - 04/13/23 1459          Discharge Assessment    Assessment Type Discharge Planning Assessment     Confirmed/corrected address, phone number and insurance Yes     Confirmed Demographics Correct on Facesheet     Source of Information patient     Communicated LOREN with patient/caregiver Yes     People in Home spouse;grandchild(kaley)     Do you expect to return to your current living situation? Yes     Do you have help at home or someone to help you manage your care at home? Yes     Prior to hospitilization cognitive status: Alert/Oriented     Current cognitive status: Alert/Oriented     Equipment Currently Used at Home none     Readmission within 30 days? Yes     Do you currently have service(s) that help you manage your care at home? No     Do you take prescription medications? Yes     Do you have prescription coverage? Yes     Do you have any problems affording any of your prescribed medications? No     Is the patient taking medications as prescribed? yes     Who is going to help you get home at discharge?      Are you on dialysis? No     Do you  take coumadin? No     Discharge Plan A Home     Discharge Plan B Home Health     DME Needed Upon Discharge  none     Discharge Plan discussed with: Patient     Discharge Barriers Identified None        Physical Activity    On average, how many days per week do you engage in moderate to strenuous exercise (like a brisk walk)? 7 days   Patient stated she walks every day       Financial Resource Strain    How hard is it for you to pay for the very basics like food, housing, medical care, and heating? Not hard at all        Housing Stability    In the last 12 months, was there a time when you were not able to pay the mortgage or rent on time? No     In the last 12 months, was there a time when you did not have a steady place to sleep or slept in a shelter (including now)? No        Food Insecurity    Within the past 12 months, you worried that your food would run out before you got the money to buy more. Never true                      Aura Gallagher RN, CM   Ext: [phone]

## 2023-04-13 NOTE — ASSESSMENT & PLAN NOTE
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 Currently asymptomatic and feeling well.    - Labs: Daily CMP, CBC, Mag, Phos  - Diet: reg diet + boost supplement  - Scheduled Marinol   - Pain: prn tylenol  - Protonix  - Continue flagyl + rocephin until cultures return sensitivities   - ID consult for Abs recs/duration   - Repeat blood cultures  - Fluids: Maintenance fluids at 50  - DVT prophylaxis: subq lovenox  - Home meds

## 2023-04-13 NOTE — PT/OT/SLP EVAL
"Occupational Therapy   Evaluation and Discharge Note    Name: Nan Simms  MRN: [id number]  Admitting Diagnosis: <principal problem not specified>  Recent Surgery: * No surgery found *      Recommendations:     Discharge Recommendations: home  Discharge Equipment Recommendations: none  Barriers to discharge:  None    Assessment:     Nan Simms is a 67 y.o. female with a medical diagnosis of <principal problem not specified>. At this time, patient is functioning at their prior level of function and does not require further acute OT services.     Plan:     During this hospitalization, patient does not require further acute OT services.  Please re-consult if situation changes.    Plan of Care Reviewed with: patient, spouse    Subjective     Chief Complaint: no complaint  Patient/Family Comments/goals: "i'm feeling just fine"     Occupational Profile:  Living Environment: [facility] with [facility]  Previous level of function: IND  Roles and Routines: enjoys driving, shopping, spendign time with family and friends  Equipment Used at home: none  Assistance upon Discharge: spouse, family     Pain/Comfort:  Pain Rating 1: 0/10  Pain Rating Post-Intervention 1: 0/10    Patients cultural, spiritual, Congregation conflicts given the current situation: no    Objective:     Communicated with: RN prior to session.  Patient found ambulatory in room/brooks with peripheral IV, telemetry upon OT entry to room.    General Precautions: Standard, other (see comments) (none)  Orthopedic Precautions: N/A  Braces: N/A  Respiratory Status: Room air     Occupational Performance:    Bed Mobility:    Patient completed Sit to Supine with independence    Functional Mobility/Transfers:  Patient completed Sit <> Stand Transfer with independence  with  no assistive device   Patient completed Bed <> Chair Transfer using Step Transfer technique with independence with no assistive device  Functional Mobility: ambulating " 300+ ft with no AD in brooks with independence, simulating household mobility    Activities of Daily Living:  Feeding:  independence    Grooming: independence      Cognitive/Visual Perceptual:  AxOx4, vision and cognitive function WFL, patient cooperative and appropriate.   Pt with no glasses worn/present during eval.       Physical Exam:  BUE WFL for strength, sensation, GM/FM for use during functional tasks.  Pt is R handed.      AMPAC 6 Click ADL:  AMPAC Total Score: 24    Treatment & Education:  -OT POC, safety during ADLs and mobility - pt informed of dc from OT, verbalized agreement  -Education on energy conservation and task modification to maximize safety and independence  -Questions answered within OT scope of practice.      Patient left HOB elevated with all lines intact, call button in reach, RN notified, and spouse present    GOALS:   Multidisciplinary Problems       Occupational Therapy Goals       Not on file              Multidisciplinary Problems (Resolved)          Problem: Occupational Therapy    Goal Priority Disciplines Outcome Interventions   Occupational Therapy Goal   (Resolved)     OT, PT/OT Met                        History:     Past Medical History:   Diagnosis Date    Abnormal Pap smear of cervix     Arthritis     Duodenal adenocarcinoma     Hypertension     Hyperthyroidism          Past Surgical History:   Procedure Laterality Date    CATARACT EXTRACTION W/  INTRAOCULAR LENS IMPLANT Right 8/8/2019    Procedure: EXTRACTION, CATARACT, WITH IOL INSERTION;  Surgeon: Spenser Lee MD;  Location: [facility];  Service: Ophthalmology;  Laterality: Right;    ESOPHAGOGASTRODUODENOSCOPY N/A 3/10/2023    Procedure: EGD (ESOPHAGOGASTRODUODENOSCOPY);  Surgeon: Shashi Tapia MD;  Location: [address]);  Service: Endoscopy;  Laterality: N/A;    EYE SURGERY      HYSTERECTOMY      OOPHORECTOMY      PHACOEMULSIFICATION OF CATARACT Right 8/8/2019    Procedure: PHACOEMULSIFICATION, CATARACT;   Surgeon: Spenser Lee MD;  Location[facility];  Service: Ophthalmology;  Laterality: Right;    WHIPPLE PROCEDURE N/A 3/15/2023    Procedure: WHIPPLE PROCEDURE;  Surgeon: Nick Paez MD;  Location: [address];  Service: General;  Laterality: N/A;       Time Tracking:     OT Date of Treatment: 04/13/23  OT Start Time: 1515  OT Stop Time: 1519  OT Total Time (min): 4 min    Billable Minutes:Evaluation 4    4/13/2023

## 2023-04-13 NOTE — HPI
Ms Simms is a 67-year-old female status post Whipple procedure 3/15/23 for pT3N0 duodenal adenocarcinoma  who developed chills, weakness, near-syncope on Monday Apr 10.  Went to the emergency room where after being evaluated and given fluids, she was sent home feeling much better. Unfortunately, her blood cultures turned positive the following day 4/11 so she was brought back to the emergency room.  She has been started on Flagyl, Rocephin, vancomycin. The blood cultures are now growing Klebsiella. Blood cultures were redrawn 4/11; these have NGTD. Urine cultures sent. She was then admitted to [facility] until a [facility] was available [facility].     On arrival, pt is HDS and states she feels well. Abdomen is soft and nontender; incision CDI. No leukocytosis. CT obtained at [facility] unremarkable.

## 2023-04-13 NOTE — PLAN OF CARE
"                                         [facility]       HOME  HEALTH ORDERS                                    FACE TO FACE ENCOUNTER      Patient Name: Nan Simms  YOB: 1955    PCP: Payal Moreland NP   PCP Address: [address] [place] [address]  PCP Phone Number: [phone]  PCP Fax: [phone]    Encounter Date: 04/13/2023    Admit to Home Health    Diagnoses:  Active Hospital Problems    Diagnosis  POA    Gram-negative bacteremia [R78.81]  Yes      Resolved Hospital Problems   No resolved problems to display.       Future Appointments   Date Time Provider Department Center   4/17/2023  2:00 PM Eber Roberts MD [id number]   4/18/2023 10:30 AM Payal Moreland NP [facility]   5/30/2023  8:30 AM LAB, [facility]   6/6/2023  2:00 PM [facility]       I have seen and examined this patient face to face today. My clinical findings that support the need for the home health skilled services and home bound status are the following:  Weakness/numbness causing balance and gait disturbance due to Surgery making it taxing to leave home.    Allergies:  Review of patient's allergies indicates:   Allergen Reactions    Aspirin Other (See Comments)     Pt states it is "hard on her stomach" She can tolerate a low dose aspirin    Pcn [penicillins]      Childhood - Tolerated ceftriaxone and cefazolin with no documented issues in the past        Diet: regular diet    Activities: activity as tolerated    Nursing:   SN to complete comprehensive assessment including routine vital signs. Instruct on disease process and s/s of complications to report to MD. Review/verify medication list sent home with the patient at time of discharge  and instruct patient/caregiver as needed. Frequency may be adjusted depending on start of care date.    Notify MD if SBP > 160 or < 90; DBP > 90 or < 50; HR > 120 or < 50; Temp > " 101    CONSULTS:    Physical Therapy to evaluate and treat. Evaluate for home safety and equipment needs; Establish/upgrade home exercise program. Perform / instruct on therapeutic exercises, gait training, transfer training, and Range of Motion.    WOUND CARE ORDERS  yes:  Surgical Wound:  Location: midline abdomen open to air. May shower.     Disciplines requested: Nursing Services to provide:   Other: S/P Whipple 3/15/2023     Monitor abdomen for redness, oozing from staple site, abdominal distension, or pain not controlled by pain medication.      Vitals signs daily. Call MD with Temperature > 101, SBP > 180, HR < 50.      Physician to follow patient's care (the person listed here will be responsible for signing ongoing orders): Other: Dr. Alfonzo Moore, Dr. WEI Sanchez, Dr. Shakeel Moore [phone], Dr. Nick Paez [phone] or [phone] office [phone] fax Requested Start of Care Date: 3/15/2023    Medications: Review discharge medications with patient and family and provide education.        I certify that this patient is confined to her home and needs intermittent skilled nursing care and physical therapy.        Electronically Signed  _________________________________  Snehal Toribio NP  04/13/2023

## 2023-04-13 NOTE — HPI
67-year-old black female status post Whipple procedure for pancreatic cancer on Mar 15 23. Patient was doing well postoperatively until she developed chills, weakness, and near-syncope 2 days ago.  Went to the emergency room for a checkup.  After being evaluated she was sent home; however, her blood cultures turn positive the following day so she was brought back to the emergency room.  She has been started on Flagyl, Rocephin, vancomycin.  Blood cultures 4/10 growing Klebsiella. Blood cultures on 4/11 have shown no growth to date.

## 2023-04-13 NOTE — HOSPITAL COURSE
Patient doing well.  I discussed the case with Dr Prajapati, surgical oncologist.  He recommends to proceed with transfer.  Orders placed.

## 2023-04-13 NOTE — ASSESSMENT & PLAN NOTE
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 Now presenting with asymptomatic bacteremia

## 2023-04-13 NOTE — PLAN OF CARE
Problem: Adult Inpatient Plan of Care  Goal: Plan of Care Review  4/13/2023 1855 by Antonia Martinez RN  Outcome: Ongoing, Progressing  4/13/2023 1855 by Antonia Martinez RN  Outcome: Ongoing, Progressing  Goal: Patient-Specific Goal (Individualized)  4/13/2023 1855 by Antonia Martinez RN  Outcome: Ongoing, Progressing  4/13/2023 1855 by Antonia Martinez RN  Outcome: Ongoing, Progressing  Goal: Absence of Hospital-Acquired Illness or Injury  4/13/2023 1855 by Antonia Martinez RN  Outcome: Ongoing, Progressing  4/13/2023 1855 by Antonia Martinez RN  Outcome: Ongoing, Progressing  Goal: Optimal Comfort and Wellbeing  4/13/2023 1855 by Antonia Martinez RN  Outcome: Ongoing, Progressing  4/13/2023 1855 by Antonia Martinez RN  Outcome: Ongoing, Progressing  Goal: Readiness for Transition of Care  4/13/2023 1855 by Antonia Martinez RN  Outcome: Ongoing, Progressing  4/13/2023 1855 by Antonia Martinez RN  Outcome: Ongoing, Progressing     Problem: Diabetes Comorbidity  Goal: Blood Glucose Level Within Targeted Range  4/13/2023 1855 by Antonia Martinez RN  Outcome: Ongoing, Progressing  4/13/2023 1855 by Antonia Martinez RN  Outcome: Ongoing, Progressing     Problem: Fall Injury Risk  Goal: Absence of Fall and Fall-Related Injury  4/13/2023 1855 by Antonia Martinez RN  Outcome: Ongoing, Progressing  4/13/2023 1855 by Antonia Martinez RN  Outcome: Ongoing, Progressing     Problem: Infection  Goal: Absence of Infection Signs and Symptoms  Outcome: Ongoing, Progressing

## 2023-04-13 NOTE — SUBJECTIVE & OBJECTIVE
"Past Medical History:   Diagnosis Date    Abnormal Pap smear of cervix     Arthritis     Duodenal adenocarcinoma     Hypertension     Hyperthyroidism        Past Surgical History:   Procedure Laterality Date    CATARACT EXTRACTION W/  INTRAOCULAR LENS IMPLANT Right 8/8/2019    Procedure: EXTRACTION, CATARACT, WITH IOL INSERTION;  Surgeon: Spenser Lee MD;  Location: [facility];  Service: Ophthalmology;  Laterality: Right;    ESOPHAGOGASTRODUODENOSCOPY N/A 3/10/2023    Procedure: EGD (ESOPHAGOGASTRODUODENOSCOPY);  Surgeon: Shashi Tapia MD;  Location: [address]);  Service: Endoscopy;  Laterality: N/A;    EYE SURGERY      HYSTERECTOMY      OOPHORECTOMY      PHACOEMULSIFICATION OF CATARACT Right 8/8/2019    Procedure: PHACOEMULSIFICATION, CATARACT;  Surgeon: Spenser Lee MD;  Location: [facility];  Service: Ophthalmology;  Laterality: Right;    WHIPPLE PROCEDURE N/A 3/15/2023    Procedure: WHIPPLE PROCEDURE;  Surgeon: Nick Paez MD;  Location: [address];  Service: General;  Laterality: N/A;       Review of patient's allergies indicates:   Allergen Reactions    Aspirin Other (See Comments)     Pt states it is "hard on her stomach" She can tolerate a low dose aspirin    Pcn [penicillins]      Childhood - Tolerated ceftriaxone and cefazolin with no documented issues in the past        Medications:  No medications prior to admission.     Antibiotics (From admission, onward)      Start     Stop Route Frequency Ordered    04/13/23 0900  metronidazole IVPB 500 mg         -- IV Every 8 hours (non-standard times) 04/13/23 0553    04/13/23 0900  cefTRIAXone (ROCEPHIN) 2 g in dextrose 5 % in water (D5W) 5 % 50 mL IVPB (MB+)         -- IV Every 24 hours (non-standard times) 04/13/23 0854          Antifungals (From admission, onward)      None          Antivirals (From admission, onward)      None             Immunization History   Administered Date(s) Administered    COVID-19, MRNA, LN-S, PF " (MODERNA FULL 0.5 ML DOSE) 02/11/2021, 03/12/2021    Tdap 10/04/2018       Family History       Problem Relation (Age of Onset)    Cancer Mother          Social History     Socioeconomic History    Marital status:    Tobacco Use    Smoking status: Some Days     Packs/day: 0.50     Years: 30.00     Pack years: 15.00     Types: Cigarettes    Smokeless tobacco: Current   Substance and Sexual Activity    Alcohol use: Yes     Alcohol/week: 1.0 standard drink     Types: 1 Cans of beer per week    Drug use: No    Sexual activity: Yes     Partners: Male     Review of Systems   Constitutional:  Positive for fatigue. Negative for activity change and appetite change.   HENT:  Negative for congestion, drooling, rhinorrhea and sore throat.    Respiratory:  Negative for cough, chest tightness and shortness of breath.    Cardiovascular:  Negative for chest pain and palpitations.   Gastrointestinal:  Negative for blood in stool, diarrhea, nausea and vomiting.   Genitourinary:  Negative for dysuria, hematuria and urgency.   Musculoskeletal:  Negative for back pain.   Neurological:  Negative for seizures and syncope.   Psychiatric/Behavioral:  Negative for decreased concentration.    Objective:     Vital Signs (Most Recent):  Temp: 97.4 °F (36.3 °C) (04/13/23 0717)  Pulse: 69 (04/13/23 0717)  Resp: 18 (04/13/23 0717)  BP: (!) 177/79 (04/13/23 0717)  SpO2: 98 % (04/13/23 0717)   Vital Signs (24h Range):  Temp:  [97.4 °F (36.3 °C)-99.2 °F (37.3 °C)] 97.4 °F (36.3 °C)  Pulse:  [49-82] 69  Resp:  [12-20] 18  SpO2:  [96 %-99 %] 98 %  BP: (131-177)/(60-79) 177/79     Weight: 68.9 kg (152 lb)  Body mass index is 24.53 kg/m².    Estimated Creatinine Clearance: 84 mL/min (based on SCr of 0.6 mg/dL).    Physical Exam  Constitutional:       Appearance: Normal appearance. She is ill-appearing. She is not toxic-appearing.   HENT:      Head: Normocephalic and atraumatic.      Nose: Nose normal.      Mouth/Throat:      Mouth: Mucous  membranes are moist.      Pharynx: Oropharynx is clear.   Eyes:      Conjunctiva/sclera: Conjunctivae normal.   Cardiovascular:      Rate and Rhythm: Normal rate and regular rhythm.      Pulses: Normal pulses.      Heart sounds: Normal heart sounds.   Pulmonary:      Effort: Pulmonary effort is normal.      Breath sounds: Normal breath sounds.   Abdominal:      General: Bowel sounds are normal.      Palpations: Abdomen is soft.      Comments: Well healed abdominal scar, dry, no tenderness or erythema   Musculoskeletal:         General: No swelling or deformity.      Cervical back: Neck supple.   Skin:     General: Skin is warm and dry.   Neurological:      Mental Status: She is alert and oriented to person, place, and time. Mental status is at baseline.   Psychiatric:         Behavior: Behavior normal.         Thought Content: Thought content normal.       Significant Labs: All pertinent labs within the past 24 hours have been reviewed.  Recent Lab Results         04/13/23  0415        Albumin 2.7       Alkaline Phosphatase 72       ALT 19       Anion Gap 11       AST 18       Baso # 0.03       Basophil % 0.6       BILIRUBIN TOTAL 0.2  Comment: For infants and newborns, interpretation of results should be based  on gestational age, weight and in agreement with clinical  observations.    Premature Infant recommended reference ranges:  Up to 24 hours.............<8.0 mg/dL  Up to 48 hours............<12.0 mg/dL  3-5 days..................<15.0 mg/dL  6-29 days.................<15.0 mg/dL         BUN 8       Calcium 9.1       Chloride 105       CO2 26       Creatinine 0.6       Differential Method Automated       eGFR >60.0       Eos # 0.3       Eosinophil % 6.1       Glucose 93       Gran # (ANC) 3.0       Gran % 58.5       Hematocrit 28.3       Hemoglobin 8.6       Immature Grans (Abs) 0.01  Comment: Mild elevation in immature granulocytes is non specific and   can be seen in a variety of conditions including stress  response,   acute inflammation, trauma and pregnancy. Correlation with other   laboratory and clinical findings is essential.         Immature Granulocytes 0.2       Lymph # 1.0       Lymph % 20.6       Magnesium 1.9       MCH 22.3       MCHC 30.4       MCV 73       Mono # 0.7       Mono % 14.0       MPV 9.9       nRBC 0       Phosphorus 3.1       Platelets 300       Potassium 3.5       PROTEIN TOTAL 6.2       RBC 3.86       RDW 19.2       Sodium 142       WBC 5.06               Significant Imaging: I have reviewed all pertinent imaging results/findings within the past 24 hours.

## 2023-04-13 NOTE — NURSING
Patient admitted to room. Transported via Ambulance. Skin check with Elen Tirado RN. Skin intact. IV 20G R AC with fluids attached. AAOX4. Settled into the bed. Patient orientated to room and call light.   Bed locked and in lowest position. Call light and bedside table within reach

## 2023-04-13 NOTE — SUBJECTIVE & OBJECTIVE
"Interval History: Admitted yesterday due to bacteremia on blood cultures. Patient feeling well this morning. Has a regular diet but doesn't have much of an appetite. AF/HDS.    Medications:  Continuous Infusions:  Scheduled Meds:   aspirin  81 mg Oral Daily    atorvastatin  40 mg Oral QHS    cefTRIAXone (ROCEPHIN) IVPB  1 g Intravenous Q24H    droNABinol  2.5 mg Oral BID    enoxaparin  40 mg Subcutaneous Daily    metoclopramide HCl  10 mg Oral TID AC    metronidazole  500 mg Intravenous Q8H    pantoprazole  40 mg Oral Daily     PRN Meds:acetaminophen, LIDOcaine (PF) 10 mg/ml (1%), ondansetron, sodium chloride 0.9%     Review of patient's allergies indicates:   Allergen Reactions    Aspirin Other (See Comments)     Pt states it is "hard on her stomach" She can tolerate a low dose aspirin    Pcn [penicillins]      Childhood - Tolerated ceftriaxone and cefazolin with no documented issues in the past      Objective:     Vital Signs (Most Recent):  Temp: 97.4 °F (36.3 °C) (04/13/23 0717)  Pulse: 69 (04/13/23 0717)  Resp: 18 (04/13/23 0717)  BP: (!) 177/79 (04/13/23 0717)  SpO2: 98 % (04/13/23 0717)   Vital Signs (24h Range):  Temp:  [97.4 °F (36.3 °C)-99.2 °F (37.3 °C)] 97.4 °F (36.3 °C)  Pulse:  [49-82] 69  Resp:  [12-20] 18  SpO2:  [96 %-99 %] 98 %  BP: (131-177)/(60-79) 177/79     Weight: 68.9 kg (152 lb)  Body mass index is 24.53 kg/m².    Intake/Output - Last 3 Shifts         04/11 0700 04/12 0659 04/12 0700 04/13 0659 04/13 0700 04/14 0659           Stool Occurrence  0 x             Physical Exam  Vitals and nursing note reviewed.   Constitutional:       General: She is not in acute distress.     Appearance: Normal appearance. She is not ill-appearing.   HENT:      Head: Normocephalic.      Mouth/Throat:      Mouth: Mucous membranes are moist.      Pharynx: Oropharynx is clear.   Eyes:      General: No scleral icterus.     Extraocular Movements: Extraocular movements intact.      Conjunctiva/sclera: Conjunctivae " normal.   Cardiovascular:      Rate and Rhythm: Normal rate.      Pulses: Normal pulses.   Pulmonary:      Effort: Pulmonary effort is normal. No respiratory distress.      Breath sounds: No wheezing.   Abdominal:      General: Abdomen is flat. There is no distension.      Palpations: Abdomen is soft.      Tenderness: There is no guarding.   Musculoskeletal:         General: No swelling or tenderness. Normal range of motion.      Cervical back: Normal range of motion.   Skin:     General: Skin is warm and dry.      Coloration: Skin is not jaundiced or pale.      Comments: Midline surgical incision CDI underneath peeling dermabond   Neurological:      General: No focal deficit present.      Mental Status: She is alert and oriented to person, place, and time.   Psychiatric:         Mood and Affect: Mood normal.       Significant Labs:  I have reviewed all pertinent lab results within the past 24 hours.  CBC:   Recent Labs   Lab 04/13/23 0415   WBC 5.06   RBC 3.86*   HGB 8.6*   HCT 28.3*      MCV 73*   MCH 22.3*   MCHC 30.4*     CMP:   Recent Labs   Lab 04/13/23 0415   GLU 93   CALCIUM 9.1   ALBUMIN 2.7*   PROT 6.2      K 3.5   CO2 26      BUN 8   CREATININE 0.6   ALKPHOS 72   ALT 19   AST 18   BILITOT 0.2       Significant Diagnostics:  I have reviewed all pertinent imaging results/findings within the past 24 hours.

## 2023-04-13 NOTE — DISCHARGE SUMMARY
[facility] ([facility])  [facility] Medicine  Discharge Summary      Patient Name: Nan Simms  MRN: [id number]  LUTHER: [id number]  Patient Class: OP- Observation  Admission Date: 4/11/2023  Hospital Length of Stay: 0 days  Discharge Date and Time:  04/12/2023 8:57 PM  Attending Physician: Sadiq Davis MD   Discharging Provider: Sadiq Davis MD  Primary Care Provider: Payal Moreland NP    Primary Care Team: Networked reference to record PCT     HPI:   Juan has SIRS a 67-year-old black female status post Whipple procedure for pancreatic cancer 3 and half weeks ago who developed chills, son weakness, near-syncope 2 days ago.  Went to the emergency room for a checkup.  After being evaluated she was sent home; however, her blood cultures turn positive the following day so she was brought back to the emergency room.  She has been started on Flagyl, Rocephin, vancomycin.  The blood cultures are now growing Klebsiella.  She was accepted for transfer to [facility] for surgical evaluation however there is no beds.  She is being admitted to [facility] while she awaits transfer.  Patient states she is feeling great and is not having any problems.        * No surgery found *      Hospital Course:   Patient doing well.  I discussed the case with Dr Prajapati, surgical oncologist.  He recommends to proceed with transfer.  Orders placed.       Goals of Care Treatment Preferences:  Code Status: Full Code      Consults:   Consults (From admission, onward)        Status Ordering Provider     Pharmacy to dose Vancomycin consult  Once        Provider:  (Not yet assigned)   See [facility] for full Linked Orders Report.    Acknowledged OUSMANE GALLEGOS          Cardiac/Vascular  Hyperlipidemia  Patient takes Lipitor at home.  I will hold these medications for now.      Essential hypertension  Monitor blood pressure.    Restart lisinopril HCT in the morning      ID  * Gram-negative bacteremia  Continue vancomycin,  Rocephin, Flagyl  When sensitivities return, choose a more appropriate antibiotic.  Rocephin is probably all she needs.  Transfer to [facility] in care of Surgical Oncologist Dr Prajapati      Endocrine  Diabetes mellitus type 2, diet-controlled  BS are normal.  No need for insulin or sliding scale  Patient does not take any medication for diabetes.  Lab Results   Component Value Date    HGBA1C 5.6 03/09/2023         GI  History of Whipple procedure  The bacteremia could have come from mild cholangitis from the surgery but difficult to tell.          Final Active Diagnoses:    Diagnosis Date Noted POA    PRINCIPAL PROBLEM:  Gram-negative bacteremia [R78.81] 04/12/2023 Yes    History of Whipple procedure [Z90.410, Z90.49] 04/12/2023 Not Applicable    Diabetes mellitus type 2, diet-controlled [E11.9] 05/25/2022 Yes    Essential hypertension [I10] 08/02/2016 Yes    Hyperlipidemia [E78.5] 08/02/2016 Yes      Problems Resolved During this Admission:       Discharged Condition: good    Disposition: Short Term Hospital    Follow Up:   Follow-up Information     Payal Moreland NP Follow up in 1 week(s).    Specialty: Family Medicine  Contact information:  [address] [place] [id number]  [phone]                       Patient Instructions:   No discharge procedures on file.    Significant Diagnostic Studies: Microbiology:   Blood Culture   Lab Results   Component Value Date    LABBLOO No Growth to date 04/11/2023    LABBLOO No Growth to date 04/11/2023    and Urine Culture    Lab Results   Component Value Date    LABURIN  04/01/2023     Multiple organisms isolated. None in predominance.  Repeat if    LABURIN clinically necessary. 04/01/2023       Pending Diagnostic Studies:     None         Medications:  Reconciled Home Medications:      Medication List      STOP taking these medications    acetaminophen 650 MG Tbsr  Commonly known as: TYLENOL     aluminum-magnesium hydroxide-simethicone 200-200-20 mg/5 mL  Susp  Commonly known as: MAALOX ADVANCED     aspirin 81 MG EC tablet  Commonly known as: ECOTRIN     atorvastatin 20 MG tablet  Commonly known as: LIPITOR     docusate sodium 100 MG capsule  Commonly known as: COLACE     enoxaparin 40 mg/0.4 mL Syrg  Commonly known as: LOVENOX     lisinopriL-hydrochlorothiazide 20-12.5 mg per tablet  Commonly known as: PRINZIDE,ZESTORETIC     meloxicam 15 MG tablet  Commonly known as: MOBIC     metoclopramide HCl 10 MG tablet  Commonly known as: REGLAN     ondansetron 4 MG Tbdl  Commonly known as: ZOFRAN-ODT     pantoprazole 40 MG tablet  Commonly known as: PROTONIX     polyethylene glycol 17 gram Pwpk  Commonly known as: GLYCOLAX     polyethylene glycol 17 gram/dose powder  Commonly known as: GLYCOLAX     traMADoL 50 mg tablet  Commonly known as: ULTRAM            Indwelling Lines/Drains at time of discharge:   Lines/Drains/Airways     None                 Time spent on the discharge of patient: 15 minutes         Sadiq Davis MD  Department of Hospital Medicine  [place] [facility] Surg (3rd Fl)

## 2023-04-13 NOTE — H&P
[facility]  General Surgery  History & Physical    Patient Name: Nan Simms  MRN: [id number]  Admission Date: 4/13/2023  Attending Physician: Nick Paez MD   Primary Care Provider: Payal Moreland NP    Patient information was obtained from patient, past medical records and ER records.     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  Went to the emergency room where after being evaluated and given fluids, she was sent home feeling much better. Unfortunately, her blood cultures turned positive the following day 4/11 so she was brought back to the emergency room.  She has been started on Flagyl, Rocephin, vancomycin. The blood cultures are now growing Klebsiella. Blood cultures were redrawn 4/11; these have NGTD. Urine cultures sent. She was then admitted to [facility] until [place] [facility]     On arrival, pt is HDS and states she feels well, although she doesn't have much of an appetite which is unchanged from her baseline. No n/v/d or incisional drainage. No issues with moving bowels; last BM yesterday. Abdomen is soft and nontender; incision CDI. No leukocytosis. CT obtained at [facility] unremarkable.       Current Facility-Administered Medications on File Prior to Encounter   Medication    [COMPLETED] aztreonam (AZACTAM) 2,000 mg in dextrose 5 % in water (D5W) 5 % 100 mL IVPB (MB+)    [COMPLETED] metronidazole IVPB 500 mg    [COMPLETED] potassium chloride SA CR tablet 40 mEq    [DISCONTINUED] 0.9%  NaCl infusion    [DISCONTINUED] acetaminophen tablet 650 mg    [DISCONTINUED] aspirin EC tablet 81 mg    [DISCONTINUED] cefTRIAXone (ROCEPHIN) 1 g in dextrose 5 % in water (D5W) 5 % 50 mL IVPB (MB+)    [DISCONTINUED] enoxaparin injection 40 mg    [DISCONTINUED]  "HYDROcodone-acetaminophen 5-325 mg per tablet 1 tablet    [DISCONTINUED] melatonin tablet 6 mg    [DISCONTINUED] metronidazole IVPB 500 mg    [DISCONTINUED] ondansetron injection 4 mg    [DISCONTINUED] sodium chloride 0.9% flush 10 mL    [DISCONTINUED] vancomycin (VANCOCIN) 1,000 mg in dextrose 5 % (D5W) 250 mL IVPB (Vial-Mate)    [DISCONTINUED] vancomycin - pharmacy to dose     No current outpatient medications on file prior to encounter.       Review of patient's allergies indicates:   Allergen Reactions    Aspirin Other (See Comments)     Pt states it is "hard on her stomach" She can tolerate a low dose aspirin    Pcn [penicillins]      Childhood - Tolerated ceftriaxone and cefazolin with no documented issues in the past        Past Medical History:   Diagnosis Date    Abnormal Pap smear of cervix     Arthritis     Duodenal adenocarcinoma     Hypertension     Hyperthyroidism      Past Surgical History:   Procedure Laterality Date    CATARACT EXTRACTION W/  INTRAOCULAR LENS IMPLANT Right 8/8/2019    Procedure: EXTRACTION, CATARACT, WITH IOL INSERTION;  Surgeon: Spenser Lee MD;  Location: [facility];  Service: Ophthalmology;  Laterality: Right;    ESOPHAGOGASTRODUODENOSCOPY N/A 3/10/2023    Procedure: EGD (ESOPHAGOGASTRODUODENOSCOPY);  Surgeon: Shashi Tapia MD;  Location: [address]);  Service: Endoscopy;  Laterality: N/A;    EYE SURGERY      HYSTERECTOMY      OOPHORECTOMY      PHACOEMULSIFICATION OF CATARACT Right 8/8/2019    Procedure: PHACOEMULSIFICATION, CATARACT;  Surgeon: Spenser Lee MD;  Location: [facility];  Service: Ophthalmology;  Laterality: Right;    WHIPPLE PROCEDURE N/A 3/15/2023    Procedure: WHIPPLE PROCEDURE;  Surgeon: Nick Paez MD;  Location: [address];  Service: General;  Laterality: N/A;     Family History       Problem Relation (Age of Onset)    Cancer Mother          Tobacco Use    Smoking status: Some Days     Packs/day: 0.50     Years: 30.00     Pack " years: 15.00     Types: Cigarettes    Smokeless tobacco: Current   Substance and Sexual Activity    Alcohol use: Yes     Alcohol/week: 1.0 standard drink     Types: 1 Cans of beer per week    Drug use: No    Sexual activity: Yes     Partners: Male     Review of Systems   Constitutional:  Positive for appetite change and fatigue. Negative for activity change and fever.   Respiratory:  Negative for shortness of breath and wheezing.    Cardiovascular:  Negative for chest pain and palpitations.   Gastrointestinal:  Negative for abdominal distention, abdominal pain, constipation, diarrhea, nausea and vomiting.   Objective:     Vital Signs (Most Recent):  Temp: 98.1 °F (36.7 °C) (04/13/23 0327)  Pulse: 63 (04/13/23 0327)  Resp: 16 (04/13/23 0327)  BP: (!) 158/71 (04/13/23 0327)  SpO2: 96 % (04/13/23 0327)   Vital Signs (24h Range):  Temp:  [98.1 °F (36.7 °C)-99.2 °F (37.3 °C)] 98.1 °F (36.7 °C)  Pulse:  [49-82] 63  Resp:  [12-20] 16  SpO2:  [96 %-99 %] 96 %  BP: (131-164)/(60-71) 158/71     Weight: 68.9 kg (152 lb)  Body mass index is 24.53 kg/m².    Physical Exam  Vitals and nursing note reviewed.   Constitutional:       General: She is not in acute distress.     Appearance: Normal appearance. She is not ill-appearing.   HENT:      Head: Normocephalic.      Mouth/Throat:      Mouth: Mucous membranes are moist.      Pharynx: Oropharynx is clear.   Eyes:      General: No scleral icterus.     Extraocular Movements: Extraocular movements intact.      Conjunctiva/sclera: Conjunctivae normal.   Cardiovascular:      Rate and Rhythm: Normal rate.      Pulses: Normal pulses.   Pulmonary:      Effort: Pulmonary effort is normal. No respiratory distress.      Breath sounds: No wheezing.   Abdominal:      General: Abdomen is flat. There is no distension.      Palpations: Abdomen is soft.      Tenderness: There is no guarding.   Musculoskeletal:         General: No swelling or tenderness. Normal range of motion.      Cervical back:  Normal range of motion.   Skin:     General: Skin is warm and dry.      Coloration: Skin is not jaundiced or pale.      Comments: Midline surgical incision CDI underneath peeling dermabond   Neurological:      General: No focal deficit present.      Mental Status: She is alert and oriented to person, place, and time.   Psychiatric:         Mood and Affect: Mood normal.       Significant Labs:  I have reviewed all pertinent lab results within the past 24 hours.  CBC:   Recent Labs   Lab 04/11/23  1642   WBC 7.35   RBC 4.11   HGB 9.1*   HCT 29.7*      MCV 72*   MCH 22.1*   MCHC 30.6*     CMP:   Recent Labs   Lab 04/11/23  1642   GLU 99   CALCIUM 9.5   ALBUMIN 2.9*   PROT 6.7      K 3.1*   CO2 32*   CL 98   BUN 6*   CREATININE 0.6   ALKPHOS 78   ALT 27   AST 26   BILITOT 0.3       Significant Diagnostics:  I have reviewed all pertinent imaging results/findings within the past 24 hours.      Assessment/Plan:     Gram-negative bacteremia  Ms Simms is a 66 yo woman with p T3 N0 duodenal adenocarcinoma sp whipple on 3/15/23 who presents as a transfer for gram negative bacteremia. Currently asymptomatic and feeling well.    - Labs: Daily CMP, CBC, Mag, Phos  - Pain: prn tylenol  - Protonix  - Continue flagyl + rocephin until cultures return sensitivities; have dc'ed vanc since no gram pos resulted in final cultures  - Fluids: Maintenance fluids at 50  - Diet: CLD. Will likely advance this am and dc fluids  - DVT prophylaxis: subq lovenox        VTE Risk Mitigation (From admission, onward)           Ordered     enoxaparin injection 40 mg  Daily         04/13/23 0406     Place sequential compression device  Until discontinued         04/13/23 0410     IP VTE HIGH RISK PATIENT  Once         04/13/23 0319     Place sequential compression device  Until discontinued         04/13/23 0319                    Pretty Banuelos MD  General Surgery  [facility]

## 2023-04-13 NOTE — ASSESSMENT & PLAN NOTE
67 year old female status post Whipple procedure for duodenal adenocarcinoma on 3/15/2023, admitted for bacteremia on 4/10  -klebsiella pneumoniae and enterococcus faecium. Her presentation is remarkably not severe, afebrile, without leukocytosis, CT without any abscess or concerning infectious findings, suspect translocation from bowel post surgery, surgical site appears to be healing well, no presence of prosthetic material. BCID did not indicate resistance to vancomycin, but would still await final susceptibilities, Repeat cultures from 4/11 with NGTD x 2 sets and Bcx 4/13 pending.       Plan    Continue Ceftriaxone IV 2 g q24 hours. Add daptomycin IV until susceptibilities result for E. faecium.     Anticipate two weeks of IV antibiotic therapy with OPAT,awaiting susceptibilities before finalizing OPAT regimen. She is amenable to OPAT after discussion. Ok to place a line tomorrow.          room air

## 2023-04-13 NOTE — ASSESSMENT & PLAN NOTE
BS are normal.  No need for insulin or sliding scale  Patient does not take any medication for diabetes.  Lab Results   Component Value Date    HGBA1C 5.6 03/09/2023

## 2023-04-13 NOTE — PLAN OF CARE
Pt tx from [place] with bacteremia post whipple 3w ago. Despite this pt feels fine and has no complaints. Surg intern rounded on pt. Lr going at 50cc. Call light within reach, aureliano in lowest position, wheels locked. Fall risk information given.    Problem: Adult Inpatient Plan of Care  Goal: Plan of Care Review  Outcome: Ongoing, Progressing  Goal: Patient-Specific Goal (Individualized)  Outcome: Ongoing, Progressing  Goal: Absence of Hospital-Acquired Illness or Injury  Outcome: Ongoing, Progressing  Goal: Optimal Comfort and Wellbeing  Outcome: Ongoing, Progressing  Goal: Readiness for Transition of Care  Outcome: Ongoing, Progressing     Problem: Diabetes Comorbidity  Goal: Blood Glucose Level Within Targeted Range  Outcome: Ongoing, Progressing

## 2023-04-13 NOTE — PROGRESS NOTES
"[facility]  General Surgery  Progress Note    Subjective:     History of Present Illness:  Ms Simms is a 67-year-old female status post Whipple procedure 3/15/23 for pT3N0 duodenal adenocarcinoma  who developed chills, weakness, near-syncope on Monday Apr 10.  Went to the emergency room where after being evaluated and given fluids, she was sent home feeling much better. Unfortunately, her blood cultures turned positive the following day 4/11 so she was brought back to the emergency room.  She has been started on Flagyl, Rocephin, vancomycin. The blood cultures are now growing Klebsiella. Blood cultures were redrawn 4/11; these have NGTD. Urine cultures sent. She was then admitted to [facility] until a room was available at [facility].     On arrival, pt is HDS and states she feels well. Abdomen is soft and nontender; incision CDI. No leukocytosis. CT obtained at [facility] unremarkable.       Post-Op Info:  * No surgery found *         Interval History: Admitted yesterday due to bacteremia on blood cultures. Patient feeling well this morning. Has a regular diet but doesn't have much of an appetite. AF/HDS.    Medications:  Continuous Infusions:  Scheduled Meds:   aspirin  81 mg Oral Daily    atorvastatin  40 mg Oral QHS    cefTRIAXone (ROCEPHIN) IVPB  1 g Intravenous Q24H    droNABinol  2.5 mg Oral BID    enoxaparin  40 mg Subcutaneous Daily    metoclopramide HCl  10 mg Oral TID AC    metronidazole  500 mg Intravenous Q8H    pantoprazole  40 mg Oral Daily     PRN Meds:acetaminophen, LIDOcaine (PF) 10 mg/ml (1%), ondansetron, sodium chloride 0.9%     Review of patient's allergies indicates:   Allergen Reactions    Aspirin Other (See Comments)     Pt states it is "hard on her stomach" She can tolerate a low dose aspirin    Pcn [penicillins]      Childhood - Tolerated ceftriaxone and cefazolin with no documented issues in the past      Objective:     Vital Signs (Most Recent):  Temp: 97.4 °F (36.3 °C) " (04/13/23 0717)  Pulse: 69 (04/13/23 0717)  Resp: 18 (04/13/23 0717)  BP: (!) 177/79 (04/13/23 0717)  SpO2: 98 % (04/13/23 0717)   Vital Signs (24h Range):  Temp:  [97.4 °F (36.3 °C)-99.2 °F (37.3 °C)] 97.4 °F (36.3 °C)  Pulse:  [49-82] 69  Resp:  [12-20] 18  SpO2:  [96 %-99 %] 98 %  BP: (131-177)/(60-79) 177/79     Weight: 68.9 kg (152 lb)  Body mass index is 24.53 kg/m².    Intake/Output - Last 3 Shifts         04/11 0700  04/12 0659 04/12 0700 04/13 0659 04/13 0700 04/14 0659           Stool Occurrence  0 x             Physical Exam  Vitals and nursing note reviewed.   Constitutional:       General: She is not in acute distress.     Appearance: Normal appearance. She is not ill-appearing.   HENT:      Head: Normocephalic.      Mouth/Throat:      Mouth: Mucous membranes are moist.      Pharynx: Oropharynx is clear.   Eyes:      General: No scleral icterus.     Extraocular Movements: Extraocular movements intact.      Conjunctiva/sclera: Conjunctivae normal.   Cardiovascular:      Rate and Rhythm: Normal rate.      Pulses: Normal pulses.   Pulmonary:      Effort: Pulmonary effort is normal. No respiratory distress.      Breath sounds: No wheezing.   Abdominal:      General: Abdomen is flat. There is no distension.      Palpations: Abdomen is soft.      Tenderness: There is no guarding.   Musculoskeletal:         General: No swelling or tenderness. Normal range of motion.      Cervical back: Normal range of motion.   Skin:     General: Skin is warm and dry.      Coloration: Skin is not jaundiced or pale.      Comments: Midline surgical incision CDI underneath peeling dermabond   Neurological:      General: No focal deficit present.      Mental Status: She is alert and oriented to person, place, and time.   Psychiatric:         Mood and Affect: Mood normal.       Significant Labs:  I have reviewed all pertinent lab results within the past 24 hours.  CBC:   Recent Labs   Lab 04/13/23  0415   WBC 5.06   RBC 3.86*    HGB 8.6*   HCT 28.3*      MCV 73*   MCH 22.3*   MCHC 30.4*     CMP:   Recent Labs   Lab 04/13/23  0415   GLU 93   CALCIUM 9.1   ALBUMIN 2.7*   PROT 6.2      K 3.5   CO2 26      BUN 8   CREATININE 0.6   ALKPHOS 72   ALT 19   AST 18   BILITOT 0.2       Significant Diagnostics:  I have reviewed all pertinent imaging results/findings within the past 24 hours.    Assessment/Plan:     Gram-negative bacteremia  Ms Simms is a 68 yo woman with p T3 N0 duodenal adenocarcinoma sp whipple on 3/15/23 who presents as a transfer for gram negative bacteremia. Currently asymptomatic and feeling well.    - Labs: Daily CMP, CBC, Mag, Phos  - Diet: reg diet + boost supplement  - Scheduled Marinol   - Pain: prn tylenol  - Protonix  - Continue flagyl + rocephin until cultures return sensitivities   - ID consult for Abs recs/duration   - Repeat blood cultures  - Fluids: Maintenance fluids at 50  - DVT prophylaxis: subq lovenox  - Home meds          Clau Balderas MD  General Surgery  [facility]

## 2023-04-13 NOTE — ASSESSMENT & PLAN NOTE
Continue vancomycin, Rocephin, Flagyl  When sensitivities return, choose a more appropriate antibiotic.  Rocephin is probably all she needs.  Transfer to [facility] in care of Surgical Oncologist Dr Prajapati

## 2023-04-13 NOTE — CONSULTS
[facility]  Infectious Disease  Consult Note    Patient Name: Nan Simms  MRN: [id number]  Admission Date: 4/13/2023  Hospital Length of Stay: 0 days  Attending Physician: Nick Paez MD  Primary Care Provider: Payal Moreland NP     Isolation Status: No active isolations    Patient information was obtained from patient and ER records.      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 Her presentation is remarkably not severe, afebrile, without leukocytosis, CT without any abscess or concerning infectious findings, suspect translocation from bowel post surgery, surgical site appears to be healing well, no presence of prosthetic material. BCID did not indicate resistance to vancomycin, but would still await final susceptibilities, Repeat cultures from 4/11 with NGTD x 2 sets and Bcx 4/13 pending.       Plan    Continue Ceftriaxone IV 2 g q24 hours. Add daptomycin IV until susceptibilities result for E. faecium.     Anticipate two weeks of IV antibiotic therapy with OPAT,awaiting susceptibilities before finalizing OPAT regimen. She is amenable to OPAT after discussion. Ok to place a line tomorrow.               Thank you for your consult. I will follow-up with patient. Please contact us if you have any additional questions.    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 Last admission, she had some post op  leukocytosis, but no organized fluid collection on post op CT. She denies fever, chills, nausea, vomiting, diarrhea, abdominal pain, rigors, recent trauma, prosthetic devices or prosthetic material, dental pain or odontogenic procedures. She reports being home after recent discharge and not going outdoors. Denies pain at surgical incision site, had no drains placed and reports scar is healing well. CTAP with stable post op changes of whipple and small free fluid in operative bed, decreased from prior. Bcx 4/10 with klebsiella pneumoniae x 2 sets, BCID with E faecium but oddly resistant genes to lipoglycopeptide negative. She remains afebrile, no leukocytosis, started on vancomycin, pip/tazo, metronidazole, repeat Bcx on 4/11 with NGTD, Ucx sent 4/12, no pyruia on UA. hemodynamically stable, hypertensive. ID was consulted for Pt with bacteremia - antibiotic recommendations              Past Medical History:   Diagnosis Date    Abnormal Pap smear of cervix     Arthritis     Duodenal adenocarcinoma     Hypertension     Hyperthyroidism        Past Surgical History:   Procedure Laterality Date    CATARACT EXTRACTION W/  INTRAOCULAR LENS IMPLANT Right 8/8/2019    Procedure: EXTRACTION, CATARACT, WITH IOL INSERTION;  Surgeon: Spenser Lee MD;  Location: [facility];  Service: Ophthalmology;  Laterality: Right;    ESOPHAGOGASTRODUODENOSCOPY N/A 3/10/2023    Procedure: EGD (ESOPHAGOGASTRODUODENOSCOPY);  Surgeon: Shashi Tapia MD;  Location: [address]);  Service: Endoscopy;  Laterality: N/A;    EYE SURGERY      HYSTERECTOMY      OOPHORECTOMY      PHACOEMULSIFICATION OF CATARACT Right 8/8/2019    Procedure: PHACOEMULSIFICATION, CATARACT;  Surgeon: Spenser Lee MD;  Location: [facility];  Service: Ophthalmology;  Laterality: Right;    WHIPPLE PROCEDURE N/A 3/15/2023    Procedure: WHIPPLE PROCEDURE;  Surgeon: Nick Paez MD;  Location: [address];  Service: General;  Laterality:  "N/A;       Review of patient's allergies indicates:   Allergen Reactions    Aspirin Other (See Comments)     Pt states it is "hard on her stomach" She can tolerate a low dose aspirin    Pcn [penicillins]      Childhood - Tolerated ceftriaxone and cefazolin with no documented issues in the past        Medications:  No medications prior to admission.     Antibiotics (From admission, onward)      Start     Stop Route Frequency Ordered    04/13/23 0900  metronidazole IVPB 500 mg         -- IV Every 8 hours (non-standard times) 04/13/23 0553    04/13/23 0900  cefTRIAXone (ROCEPHIN) 2 g in dextrose 5 % in water (D5W) 5 % 50 mL IVPB (MB+)         -- IV Every 24 hours (non-standard times) 04/13/23 0854          Antifungals (From admission, onward)      None          Antivirals (From admission, onward)      None             Immunization History   Administered Date(s) Administered    COVID-19, MRNA, LN-S, PF (MODERNA FULL 0.5 ML DOSE) 02/11/2021, 03/12/2021    Tdap 10/04/2018       Family History       Problem Relation (Age of Onset)    Cancer Mother          Social History     Socioeconomic History    Marital status:    Tobacco Use    Smoking status: Some Days     Packs/day: 0.50     Years: 30.00     Pack years: 15.00     Types: Cigarettes    Smokeless tobacco: Current   Substance and Sexual Activity    Alcohol use: Yes     Alcohol/week: 1.0 standard drink     Types: 1 Cans of beer per week    Drug use: No    Sexual activity: Yes     Partners: Male     Review of Systems   Constitutional:  Positive for fatigue. Negative for activity change and appetite change.   HENT:  Negative for congestion, drooling, rhinorrhea and sore throat.    Respiratory:  Negative for cough, chest tightness and shortness of breath.    Cardiovascular:  Negative for chest pain and palpitations.   Gastrointestinal:  Negative for blood in stool, diarrhea, nausea and vomiting.   Genitourinary:  Negative for dysuria, hematuria and urgency. "   Musculoskeletal:  Negative for back pain.   Neurological:  Negative for seizures and syncope.   Psychiatric/Behavioral:  Negative for decreased concentration.    Objective:     Vital Signs (Most Recent):  Temp: 97.4 °F (36.3 °C) (04/13/23 0717)  Pulse: 69 (04/13/23 0717)  Resp: 18 (04/13/23 0717)  BP: (!) 177/79 (04/13/23 0717)  SpO2: 98 % (04/13/23 0717)   Vital Signs (24h Range):  Temp:  [97.4 °F (36.3 °C)-99.2 °F (37.3 °C)] 97.4 °F (36.3 °C)  Pulse:  [49-82] 69  Resp:  [12-20] 18  SpO2:  [96 %-99 %] 98 %  BP: (131-177)/(60-79) 177/79     Weight: 68.9 kg (152 lb)  Body mass index is 24.53 kg/m².    Estimated Creatinine Clearance: 84 mL/min (based on SCr of 0.6 mg/dL).    Physical Exam  Constitutional:       Appearance: Normal appearance. She is ill-appearing. She is not toxic-appearing.   HENT:      Head: Normocephalic and atraumatic.      Nose: Nose normal.      Mouth/Throat:      Mouth: Mucous membranes are moist.      Pharynx: Oropharynx is clear.   Eyes:      Conjunctiva/sclera: Conjunctivae normal.   Cardiovascular:      Rate and Rhythm: Normal rate and regular rhythm.      Pulses: Normal pulses.      Heart sounds: Normal heart sounds.   Pulmonary:      Effort: Pulmonary effort is normal.      Breath sounds: Normal breath sounds.   Abdominal:      General: Bowel sounds are normal.      Palpations: Abdomen is soft.      Comments: Well healed abdominal scar, dry, no tenderness or erythema   Musculoskeletal:         General: No swelling or deformity.      Cervical back: Neck supple.   Skin:     General: Skin is warm and dry.   Neurological:      Mental Status: She is alert and oriented to person, place, and time. Mental status is at baseline.   Psychiatric:         Behavior: Behavior normal.         Thought Content: Thought content normal.       Significant Labs: All pertinent labs within the past 24 hours have been reviewed.  Recent Lab Results         04/13/23  0415        Albumin 2.7       Alkaline  Phosphatase 72       ALT 19       Anion Gap 11       AST 18       Baso # 0.03       Basophil % 0.6       BILIRUBIN TOTAL 0.2  Comment: For infants and newborns, interpretation of results should be based  on gestational age, weight and in agreement with clinical  observations.    Premature Infant recommended reference ranges:  Up to 24 hours.............<8.0 mg/dL  Up to 48 hours............<12.0 mg/dL  3-5 days..................<15.0 mg/dL  6-29 days.................<15.0 mg/dL         BUN 8       Calcium 9.1       Chloride 105       CO2 26       Creatinine 0.6       Differential Method Automated       eGFR >60.0       Eos # 0.3       Eosinophil % 6.1       Glucose 93       Gran # (ANC) 3.0       Gran % 58.5       Hematocrit 28.3       Hemoglobin 8.6       Immature Grans (Abs) 0.01  Comment: Mild elevation in immature granulocytes is non specific and   can be seen in a variety of conditions including stress response,   acute inflammation, trauma and pregnancy. Correlation with other   laboratory and clinical findings is essential.         Immature Granulocytes 0.2       Lymph # 1.0       Lymph % 20.6       Magnesium 1.9       MCH 22.3       MCHC 30.4       MCV 73       Mono # 0.7       Mono % 14.0       MPV 9.9       nRBC 0       Phosphorus 3.1       Platelets 300       Potassium 3.5       PROTEIN TOTAL 6.2       RBC 3.86       RDW 19.2       Sodium 142       WBC 5.06               Significant Imaging: I have reviewed all pertinent imaging results/findings within the past 24 hours.

## 2023-04-14 LAB
ALBUMIN SERPL BCP-MCNC: 2.6 G/DL (ref 3.5–5.2)
ALP SERPL-CCNC: 70 U/L (ref 55–135)
ALT SERPL W/O P-5'-P-CCNC: 16 U/L (ref 10–44)
ANION GAP SERPL CALC-SCNC: 10 MMOL/L (ref 8–16)
AST SERPL-CCNC: 18 U/L (ref 10–40)
BACTERIA UR CULT: NORMAL
BASOPHILS # BLD AUTO: 0.04 K/UL (ref 0–0.2)
BASOPHILS NFR BLD: 0.7 % (ref 0–1.9)
BILIRUB SERPL-MCNC: 0.2 MG/DL (ref 0.1–1)
BUN SERPL-MCNC: 11 MG/DL (ref 8–23)
CALCIUM SERPL-MCNC: 9.1 MG/DL (ref 8.7–10.5)
CHLORIDE SERPL-SCNC: 104 MMOL/L (ref 95–110)
CO2 SERPL-SCNC: 26 MMOL/L (ref 23–29)
CREAT SERPL-MCNC: 0.6 MG/DL (ref 0.5–1.4)
DIFFERENTIAL METHOD: ABNORMAL
EOSINOPHIL # BLD AUTO: 0.3 K/UL (ref 0–0.5)
EOSINOPHIL NFR BLD: 4.5 % (ref 0–8)
ERYTHROCYTE [DISTWIDTH] IN BLOOD BY AUTOMATED COUNT: 19.4 % (ref 11.5–14.5)
EST. GFR  (NO RACE VARIABLE): >60 ML/MIN/1.73 M^2
GLUCOSE SERPL-MCNC: 103 MG/DL (ref 70–110)
HCT VFR BLD AUTO: 29 % (ref 37–48.5)
HGB BLD-MCNC: 8.8 G/DL (ref 12–16)
IMM GRANULOCYTES # BLD AUTO: 0.01 K/UL (ref 0–0.04)
IMM GRANULOCYTES NFR BLD AUTO: 0.2 % (ref 0–0.5)
LYMPHOCYTES # BLD AUTO: 1.5 K/UL (ref 1–4.8)
LYMPHOCYTES NFR BLD: 26.4 % (ref 18–48)
MAGNESIUM SERPL-MCNC: 2 MG/DL (ref 1.6–2.6)
MCH RBC QN AUTO: 22.3 PG (ref 27–31)
MCHC RBC AUTO-ENTMCNC: 30.3 G/DL (ref 32–36)
MCV RBC AUTO: 74 FL (ref 82–98)
MONOCYTES # BLD AUTO: 0.6 K/UL (ref 0.3–1)
MONOCYTES NFR BLD: 10 % (ref 4–15)
NEUTROPHILS # BLD AUTO: 3.4 K/UL (ref 1.8–7.7)
NEUTROPHILS NFR BLD: 58.2 % (ref 38–73)
NRBC BLD-RTO: 0 /100 WBC
PHOSPHATE SERPL-MCNC: 3.3 MG/DL (ref 2.7–4.5)
PLATELET # BLD AUTO: 298 K/UL (ref 150–450)
PMV BLD AUTO: 10.4 FL (ref 9.2–12.9)
POTASSIUM SERPL-SCNC: 3.6 MMOL/L (ref 3.5–5.1)
PREALB SERPL-MCNC: 18 MG/DL (ref 20–43)
PROT SERPL-MCNC: 5.9 G/DL (ref 6–8.4)
RBC # BLD AUTO: 3.94 M/UL (ref 4–5.4)
SODIUM SERPL-SCNC: 140 MMOL/L (ref 136–145)
WBC # BLD AUTO: 5.79 K/UL (ref 3.9–12.7)

## 2023-04-14 PROCEDURE — 63600175 PHARM REV CODE 636 W HCPCS: Performed by: STUDENT IN AN ORGANIZED HEALTH CARE EDUCATION/TRAINING PROGRAM

## 2023-04-14 PROCEDURE — 84134 ASSAY OF PREALBUMIN: CPT | Performed by: STUDENT IN AN ORGANIZED HEALTH CARE EDUCATION/TRAINING PROGRAM

## 2023-04-14 PROCEDURE — 94761 N-INVAS EAR/PLS OXIMETRY MLT: CPT

## 2023-04-14 PROCEDURE — 83735 ASSAY OF MAGNESIUM: CPT

## 2023-04-14 PROCEDURE — C1751 CATH, INF, PER/CENT/MIDLINE: HCPCS

## 2023-04-14 PROCEDURE — 25000003 PHARM REV CODE 250: Performed by: INTERNAL MEDICINE

## 2023-04-14 PROCEDURE — 76937 US GUIDE VASCULAR ACCESS: CPT

## 2023-04-14 PROCEDURE — 36573 INSJ PICC RS&I 5 YR+: CPT

## 2023-04-14 PROCEDURE — 36415 COLL VENOUS BLD VENIPUNCTURE: CPT | Performed by: STUDENT IN AN ORGANIZED HEALTH CARE EDUCATION/TRAINING PROGRAM

## 2023-04-14 PROCEDURE — 63600175 PHARM REV CODE 636 W HCPCS: Performed by: INTERNAL MEDICINE

## 2023-04-14 PROCEDURE — 63600175 PHARM REV CODE 636 W HCPCS

## 2023-04-14 PROCEDURE — 99233 PR SUBSEQUENT HOSPITAL CARE,LEVL III: ICD-10-PCS | Mod: GC,,, | Performed by: INTERNAL MEDICINE

## 2023-04-14 PROCEDURE — 25000003 PHARM REV CODE 250: Performed by: STUDENT IN AN ORGANIZED HEALTH CARE EDUCATION/TRAINING PROGRAM

## 2023-04-14 PROCEDURE — 80053 COMPREHEN METABOLIC PANEL: CPT

## 2023-04-14 PROCEDURE — 25000003 PHARM REV CODE 250: Performed by: NURSE PRACTITIONER

## 2023-04-14 PROCEDURE — 99233 SBSQ HOSP IP/OBS HIGH 50: CPT | Mod: GC,,, | Performed by: INTERNAL MEDICINE

## 2023-04-14 PROCEDURE — 84100 ASSAY OF PHOSPHORUS: CPT

## 2023-04-14 PROCEDURE — 25000003 PHARM REV CODE 250

## 2023-04-14 PROCEDURE — 25000003 PHARM REV CODE 250: Performed by: SURGERY

## 2023-04-14 PROCEDURE — 20600001 HC STEP DOWN PRIVATE ROOM

## 2023-04-14 PROCEDURE — 36415 COLL VENOUS BLD VENIPUNCTURE: CPT

## 2023-04-14 PROCEDURE — 85025 COMPLETE CBC W/AUTO DIFF WBC: CPT

## 2023-04-14 PROCEDURE — A4216 STERILE WATER/SALINE, 10 ML: HCPCS | Performed by: SURGERY

## 2023-04-14 RX ORDER — SODIUM CHLORIDE 0.9 % (FLUSH) 0.9 %
10 SYRINGE (ML) INJECTION
Status: DISCONTINUED | OUTPATIENT
Start: 2023-04-14 | End: 2023-04-15 | Stop reason: HOSPADM

## 2023-04-14 RX ORDER — SODIUM CHLORIDE 0.9 % (FLUSH) 0.9 %
10 SYRINGE (ML) INJECTION EVERY 6 HOURS
Status: DISCONTINUED | OUTPATIENT
Start: 2023-04-14 | End: 2023-04-15 | Stop reason: HOSPADM

## 2023-04-14 RX ADMIN — DAPTOMYCIN 690 MG: 350 INJECTION, POWDER, LYOPHILIZED, FOR SOLUTION INTRAVENOUS at 03:04

## 2023-04-14 RX ADMIN — HYDROCHLOROTHIAZIDE 12.5 MG: 12.5 TABLET ORAL at 09:04

## 2023-04-14 RX ADMIN — Medication 10 ML: at 11:04

## 2023-04-14 RX ADMIN — METOCLOPRAMIDE 10 MG: 5 TABLET ORAL at 11:04

## 2023-04-14 RX ADMIN — DRONABINOL 2.5 MG: 2.5 CAPSULE ORAL at 09:04

## 2023-04-14 RX ADMIN — ASPIRIN 81 MG: 81 TABLET, COATED ORAL at 09:04

## 2023-04-14 RX ADMIN — METOCLOPRAMIDE 10 MG: 5 TABLET ORAL at 05:04

## 2023-04-14 RX ADMIN — ENOXAPARIN SODIUM 40 MG: 40 INJECTION SUBCUTANEOUS at 04:04

## 2023-04-14 RX ADMIN — ATORVASTATIN CALCIUM 40 MG: 40 TABLET, FILM COATED ORAL at 08:04

## 2023-04-14 RX ADMIN — METOCLOPRAMIDE 10 MG: 5 TABLET ORAL at 04:04

## 2023-04-14 RX ADMIN — Medication 10 ML: at 06:04

## 2023-04-14 RX ADMIN — SODIUM CHLORIDE, POTASSIUM CHLORIDE, SODIUM LACTATE AND CALCIUM CHLORIDE: 600; 310; 30; 20 INJECTION, SOLUTION INTRAVENOUS at 05:04

## 2023-04-14 RX ADMIN — PANTOPRAZOLE SODIUM 40 MG: 40 TABLET, DELAYED RELEASE ORAL at 09:04

## 2023-04-14 RX ADMIN — DRONABINOL 2.5 MG: 2.5 CAPSULE ORAL at 08:04

## 2023-04-14 RX ADMIN — CEFTRIAXONE 2 G: 2 INJECTION, POWDER, FOR SOLUTION INTRAMUSCULAR; INTRAVENOUS at 09:04

## 2023-04-14 NOTE — SUBJECTIVE & OBJECTIVE
Interval History: NAEON    Review of Systems  Constitutional:  Positive for fatigue. Negative for activity change and appetite change.   HENT:  Negative for congestion, drooling, rhinorrhea and sore throat.    Respiratory:  Negative for cough, chest tightness and shortness of breath.    Cardiovascular:  Negative for chest pain and palpitations.   Gastrointestinal:  Negative for blood in stool, diarrhea, nausea and vomiting.   Genitourinary:  Negative for dysuria, hematuria and urgency.   Musculoskeletal:  Negative for back pain.   Neurological:  Negative for seizures and syncope.   Psychiatric/Behavioral:  Negative for decreased concentration.    Objective:     Vital Signs (Most Recent):  Temp: 98.9 °F (37.2 °C) (04/14/23 1908)  Pulse: 72 (04/14/23 1908)  Resp: 16 (04/14/23 1908)  BP: (!) 165/97 (04/14/23 1908)  SpO2: 97 % (04/14/23 1908)   Vital Signs (24h Range):  Temp:  [98.3 °F (36.8 °C)-99 °F (37.2 °C)] 98.9 °F (37.2 °C)  Pulse:  [52-77] 72  Resp:  [16-19] 16  SpO2:  [97 %-100 %] 97 %  BP: (121-168)/(58-97) 165/97     Weight: 68.9 kg (152 lb)  Body mass index is 24.53 kg/m².    Estimated Creatinine Clearance: 84 mL/min (based on SCr of 0.6 mg/dL).    Physical Exam  Constitutional:       Appearance: Normal appearance. She is ill-appearing. She is not toxic-appearing.   HENT:      Head: Normocephalic and atraumatic.      Nose: Nose normal.      Mouth/Throat:      Mouth: Mucous membranes are moist.      Pharynx: Oropharynx is clear.   Eyes:      Conjunctiva/sclera: Conjunctivae normal.   Cardiovascular:      Rate and Rhythm: Normal rate and regular rhythm.      Pulses: Normal pulses.      Heart sounds: Normal heart sounds.   Pulmonary:      Effort: Pulmonary effort is normal.      Breath sounds: Normal breath sounds.   Abdominal:      General: Bowel sounds are normal.      Palpations: Abdomen is soft.      Comments: Well healed abdominal scar, dry, no tenderness or erythema   Musculoskeletal:         General: No  swelling or deformity.      Cervical back: Neck supple.   Skin:     General: Skin is warm and dry.   Neurological:      Mental Status: She is alert and oriented to person, place, and time. Mental status is at baseline.   Psychiatric:         Behavior: Behavior normal.         Thought Content: Thought content normal.   Significant Labs:   Microbiology Results (last 7 days)       Procedure Component Value Units Date/Time    Blood culture [[id number]] Collected: 04/13/23 0916    Order Status: Completed Specimen: Blood Updated: 04/14/23 1212     Blood Culture, Routine No Growth to date      No Growth to date    Blood culture [[id number]] Collected: 04/13/23 0916    Order Status: Completed Specimen: Blood Updated: 04/14/23 1212     Blood Culture, Routine No Growth to date      No Growth to date          All pertinent labs within the past 24 hours have been reviewed.  Recent Lab Results         04/14/23  0724 04/14/23  0313        Albumin   2.6       Alkaline Phosphatase   70       ALT   16       Anion Gap   10       AST   18       Baso #   0.04       Basophil %   0.7       BILIRUBIN TOTAL   0.2  Comment: For infants and newborns, interpretation of results should be based  on gestational age, weight and in agreement with clinical  observations.    Premature Infant recommended reference ranges:  Up to 24 hours.............<8.0 mg/dL  Up to 48 hours............<12.0 mg/dL  3-5 days..................<15.0 mg/dL  6-29 days.................<15.0 mg/dL         BUN   11       Calcium   9.1       Chloride   104       CO2   26       Creatinine   0.6       Differential Method   Automated       eGFR   >60.0       Eos #   0.3       Eosinophil %   4.5       Glucose   103       Gran # (ANC)   3.4       Gran %   58.2       Hematocrit   29.0       Hemoglobin   8.8       Immature Grans (Abs)   0.01  Comment: Mild elevation in immature granulocytes is non specific and   can be seen in a variety of conditions including stress response,    acute inflammation, trauma and pregnancy. Correlation with other   laboratory and clinical findings is essential.         Immature Granulocytes   0.2       Lymph #   1.5       Lymph %   26.4       Magnesium   2.0       MCH   22.3       MCHC   30.3       MCV   74       Mono #   0.6       Mono %   10.0       MPV   10.4       nRBC   0       Phosphorus   3.3       Platelets   298       Potassium   3.6       Prealbumin 18         PROTEIN TOTAL   5.9       RBC   3.94       RDW   19.4       Sodium   140       WBC   5.79               Significant Imaging: I have reviewed all pertinent imaging results/findings within the past 24 hours.

## 2023-04-14 NOTE — PLAN OF CARE
CM sent referral to N for HH and IV ABX      04/14/23 1203   Post-Acute Status   Post-Acute Authorization Home Health;IV Infusion   Home Health Status Referrals Sent   IV Infusion Status Referral(s) sent     Aura Gallagher RN, CM   Ext: [phone]

## 2023-04-14 NOTE — PROGRESS NOTES
"[facility] [place]  General Surgery  Progress Note    Subjective:     History of Present Illness:  Ms Simms is a 67-year-old female status post Whipple procedure 3/15/23 for pT3N0 duodenal adenocarcinoma  who developed chills, weakness, near-syncope on Monday Apr 10.  Went to the emergency room where after being evaluated and given fluids, she was sent home feeling much better. Unfortunately, her blood cultures turned positive the following day 4/11 so she was brought back to the emergency room.  She has been started on Flagyl, Rocephin, vancomycin. The blood cultures are now growing Klebsiella. Blood cultures were redrawn 4/11; these have NGTD. Urine cultures sent. She was then admitted to [facility] until a room was available at [facility].     On arrival, pt is HDS and states she feels well. Abdomen is soft and nontender; incision CDI. No leukocytosis. CT obtained at [facility] unremarkable.       Post-Op Info:  * No surgery found *         Interval History: Pt continues to feel well; calorie count revealed boost intake every meal with decent caloric intake of food. PICC placement today in preparation for at home abx; awaiting final sensitivities.    Medications:  Continuous Infusions:   lactated ringers 50 mL/hr at 04/14/23 0546     Scheduled Meds:   aspirin  81 mg Oral Daily    atorvastatin  40 mg Oral QHS    cefTRIAXone (ROCEPHIN) IVPB  2 g Intravenous Q24H    DAPTOmycin (CUBICIN) IV (PEDS and ADULTS)  10 mg/kg Intravenous Q24H    droNABinol  2.5 mg Oral BID    enoxaparin  40 mg Subcutaneous Daily    hydroCHLOROthiazide  12.5 mg Oral Daily    metoclopramide HCl  10 mg Oral TID AC    pantoprazole  40 mg Oral Daily     PRN Meds:acetaminophen, LIDOcaine (PF) 10 mg/ml (1%), ondansetron, sodium chloride 0.9%     Review of patient's allergies indicates:   Allergen Reactions    Aspirin Other (See Comments)     Pt states it is "hard on her stomach" She can tolerate a low dose aspirin    Pcn [penicillins]      " Childhood - Tolerated ceftriaxone and cefazolin with no documented issues in the past      Objective:     Vital Signs (Most Recent):  Temp: 99 °F (37.2 °C) (04/14/23 0342)  Pulse: 77 (04/14/23 0342)  Resp: 19 (04/14/23 0342)  BP: (!) 168/78 (04/14/23 0342)  SpO2: 97 % (04/14/23 0342)   Vital Signs (24h Range):  Temp:  [97.4 °F (36.3 °C)-99 °F (37.2 °C)] 99 °F (37.2 °C)  Pulse:  [47-79] 77  Resp:  [16-19] 19  SpO2:  [96 %-99 %] 97 %  BP: (121-177)/(58-79) 168/78     Weight: 68.9 kg (152 lb)  Body mass index is 24.53 kg/m².    Intake/Output - Last 3 Shifts         04/12 0700  04/13 0659 04/13 0700  04/14 0659    P.O.  474    Total Intake(mL/kg)  474 (6.9)    Net  +474          Urine Occurrence  1 x    Stool Occurrence 0 x 1 x            Physical Exam  Vitals and nursing note reviewed.   Constitutional:       General: She is not in acute distress.     Appearance: Normal appearance. She is not ill-appearing.   HENT:      Head: Normocephalic.      Mouth/Throat:      Mouth: Mucous membranes are moist.      Pharynx: Oropharynx is clear.   Eyes:      General: No scleral icterus.     Extraocular Movements: Extraocular movements intact.      Conjunctiva/sclera: Conjunctivae normal.   Cardiovascular:      Rate and Rhythm: Normal rate.      Pulses: Normal pulses.   Pulmonary:      Effort: Pulmonary effort is normal. No respiratory distress.      Breath sounds: No wheezing.   Abdominal:      General: Abdomen is flat. There is no distension.      Palpations: Abdomen is soft.      Tenderness: There is no guarding.   Musculoskeletal:         General: No swelling or tenderness. Normal range of motion.      Cervical back: Normal range of motion.   Skin:     General: Skin is warm and dry.      Coloration: Skin is not jaundiced or pale.      Comments: Midline surgical incision CDI underneath peeling dermabond   Neurological:      General: No focal deficit present.      Mental Status: She is alert and oriented to person, place, and  time.   Psychiatric:         Mood and Affect: Mood normal.       Significant Labs:  I have reviewed all pertinent lab results within the past 24 hours.  CBC:   Recent Labs   Lab 04/14/23  0313   WBC 5.79   RBC 3.94*   HGB 8.8*   HCT 29.0*      MCV 74*   MCH 22.3*   MCHC 30.3*       CMP:   Recent Labs   Lab 04/14/23  0313      CALCIUM 9.1   ALBUMIN 2.6*   PROT 5.9*      K 3.6   CO2 26      BUN 11   CREATININE 0.6   ALKPHOS 70   ALT 16   AST 18   BILITOT 0.2         Significant Diagnostics:  I have reviewed all pertinent imaging results/findings within the past 24 hours.    Assessment/Plan:     Gram-negative bacteremia  Ms Simms is a 68 yo woman with p T3 N0 duodenal adenocarcinoma sp whipple on 3/15/23 who presents as a transfer for gram negative bacteremia. Currently asymptomatic and feeling well.    - Labs: Daily CMP, CBC, Mag, Phos  - Diet: reg diet + boost supplement  - Scheduled Marinol   - Pain: prn tylenol  - Protonix  - Continue rocephin, daptomycin; appreciate ID recs   - awaiting susceptibility for E Faec   - Repeat blood cultures sent 4/13, NGTD  - Fluids: dc mIVF  - DVT prophylaxis: subq lovenox  - Home meds  - PICC line placement today    Dispo: dc with IV abx pending final abx recs           Pretty Banuelos MD  General Surgery  [facility]

## 2023-04-14 NOTE — ASSESSMENT & PLAN NOTE
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 Currently asymptomatic and feeling well.    - Labs: Daily CMP, CBC, Mag, Phos  - Diet: reg diet + boost supplement  - Scheduled Marinol   - Pain: prn tylenol  - Protonix  - Continue rocephin, daptomycin; appreciate ID recs   - awaiting susceptibility for E Faec   - Repeat blood cultures sent 4/13, NGTD  - Fluids: dc mIVF  - DVT prophylaxis: subq lovenox  - Home meds  - PICC line placement today    Dispo: dc with IV abx pending final abx recs

## 2023-04-14 NOTE — PROGRESS NOTES
[facility]    Referral received for home infusion at the the time of discharge for IV antibiotics. I met with the patient and her  and did a brief education on home infusion. Her PICC line was getting placed when I finished and I left a SASH mat at the bedside for them to review.    [facility]  Agnieszka Becerra RN, Clinical Educator  Cell [phone]  Office [phone]  Fax [phone]

## 2023-04-14 NOTE — PROCEDURES
"Nan Simms is a 67 y.o. female patient.    Temp: 98.3 °F (36.8 °C) (04/14/23 1147)  Pulse: 61 (04/14/23 1147)  Resp: 18 (04/14/23 1147)  BP: (!) 131/58 (04/14/23 1147)  SpO2: 100 % (04/14/23 1147)  Weight: 68.9 kg (152 lb) (04/13/23 0328)  Height: 5' 6" (167.6 cm) (04/13/23 0328)    PICC  Date/Time: 4/14/2023 3:07 PM  Performed by: Katie Lucas RN  Assisting provider: Chay Damian RN  Consent Done: Yes  Time out: Immediately prior to procedure a time out was called to verify the correct patient, procedure, equipment, support staff and site/side marked as required  Indications: med administration and vascular access  Anesthesia: local infiltration  Local anesthetic: lidocaine 1% without epinephrine  Anesthetic Total (mL): 3  Description of findings: PICC  Preparation: skin prepped with ChloraPrep  Skin prep agent dried: skin prep agent completely dried prior to procedure  Sterile barriers: all five maximum sterile barriers used - cap, mask, sterile gown, sterile gloves, and large sterile sheet  Hand hygiene: hand hygiene performed prior to central venous catheter insertion  Location details: right brachial  Catheter type: double lumen  Catheter size: 5 Fr  Catheter Length: 36cm    Ultrasound guidance: yes  Vessel Caliber: medium and patent, compressibility normal  Vascular Doppler: not done  Needle advanced into vessel with real time Ultrasound guidance.  Guidewire confirmed in vessel.  Image recorded and saved.  Sterile sheath used.  Number of attempts: 1  Post-procedure: blood return through all ports, chlorhexidine patch and sterile dressing applied  Technical procedures used: 3CG  Specimens: No  Implants: No  Assessment: placement verified by x-ray  Complications: none        Name   4/14/2023    "

## 2023-04-14 NOTE — PLAN OF CARE
POC reviewed with patient, verbalized understanding. AAOx4, VSS on RA.    -ML incision, c/d/I  -COLEEN PICC saline locked  -Regular diet, calorie count monitored  -Up to toilet independently, adequate UOP, no BM this shift  -No c/o pain or nausea this shift    Bed in low and locked position, call light in reach  Problem: Adult Inpatient Plan of Care  Goal: Plan of Care Review  Outcome: Ongoing, Progressing  Goal: Patient-Specific Goal (Individualized)  Outcome: Ongoing, Progressing  Goal: Absence of Hospital-Acquired Illness or Injury  Outcome: Ongoing, Progressing  Goal: Optimal Comfort and Wellbeing  Outcome: Ongoing, Progressing  Goal: Readiness for Transition of Care  Outcome: Ongoing, Progressing     Problem: Diabetes Comorbidity  Goal: Blood Glucose Level Within Targeted Range  Outcome: Ongoing, Progressing     Problem: Fall Injury Risk  Goal: Absence of Fall and Fall-Related Injury  Outcome: Ongoing, Progressing     Problem: Infection  Goal: Absence of Infection Signs and Symptoms  Outcome: Ongoing, Progressing      214.1

## 2023-04-14 NOTE — CONSULTS
Double lumen PICC to right brachial vein.  36 cm in length, 32 cm arm circumference and 0 cm exposed.   Lot # [id number].

## 2023-04-14 NOTE — PLAN OF CARE
Outpatient Antibiotic Therapy Plan:    Please send referral to [facility].    1) Infection: Bacteremia with Klebsiella and enterococcus    2) Discharge Antibiotics:    Intravenous antibiotics:  Daptomycin 700 mg IV q 24 hours   Ceftriaxone 2 g IV q 24 hours       3) Therapy Duration:  two weeks    Estimated end date of IV antibiotics: 04/24/2023    4) Outpatient Weekly Labs:    Order the following labs to be drawn on Mondays:   CBC  CMP   CRP  CPK       5) Fax Lab Results to Infectious Diseases Provider: Dr. Martinez    [facility] Fax Number: [phone]    6) Outpatient Infectious Diseases Follow-up    Follow-up appointment will be arranged by the ID clinic and will be found in the patient's appointments tab.    Prior to discharge, please ensure the patient's follow-up has been scheduled.    If there is still no follow-up scheduled prior to discharge, please send an EPIC message to Angélica Parikh in Infectious Diseases.

## 2023-04-14 NOTE — PLAN OF CARE
"                                         [facility]       HOME  HEALTH ORDERS                                    FACE TO FACE ENCOUNTER      Patient Name: Nan Simms  YOB: 1955    PCP: Payal Moreland NP   PCP Address: [address] [place] [id number]  PCP Phone Number: [phone]  PCP Fax: [phone]    Encounter Date: 04/14/2023    Admit to Home Health    Diagnoses:  Active Hospital Problems    Diagnosis  POA    Gram-negative bacteremia [R78.81]  Yes      Resolved Hospital Problems   No resolved problems to display.       Future Appointments   Date Time Provider Department Center   4/17/2023  2:00 PM Eber Roberts MD [facility] [id number] [place]   4/18/2023 10:30 AM Payal Moreland NP [facility]   5/30/2023  8:30 AM LAB, [facility]   6/6/2023  2:00 PM [facility]       I have seen and examined this patient face to face today. My clinical findings that support the need for the home health skilled services and home bound status are the following:  Weakness/numbness causing balance and gait disturbance due to Surgery making it taxing to leave home.    Allergies:  Review of patient's allergies indicates:   Allergen Reactions    Aspirin Other (See Comments)     Pt states it is "hard on her stomach" She can tolerate a low dose aspirin    Pcn [penicillins]      Childhood - Tolerated ceftriaxone and cefazolin with no documented issues in the past        Diet: regular diet    Activities: activity as tolerated    Nursing:   SN to complete comprehensive assessment including routine vital signs. Instruct on disease process and s/s of complications to report to MD. Review/verify medication list sent home with the patient at time of discharge  and instruct patient/caregiver as needed. Frequency may be adjusted depending on start of care date.    Notify MD if SBP > 160 or < 90; DBP > 90 or < 50; HR > 120 or < 50; Temp > " 101    CONSULTS:    Physical Therapy to evaluate and treat. Evaluate for home safety and equipment needs; Establish/upgrade home exercise program. Perform / instruct on therapeutic exercises, gait training, transfer training, and Range of Motion.    Labs: CBC, CMP, CRP, pre-albumin Q Monday x 2 weeks and fax results to Dr. Paez   Physician to follow patient's care (the person listed here will be responsible for signing ongoing orders):  Dr. Nick Paez at [phone], [phone] or [phone] office; [phone] fax     MISCELLANEOUS CARE:  Home Infusion Therapy:   SN to perform Infusion Therapy/Central Line Care.  Review Central Line Care & Central Line Flush with patient.    Administer (drug and dose): IVAB via PICC line      Scrub the Hub: Prior to accessing the line, always perform a 30 second alcohol scrub  Each lumen of the central line is to be flushed at least daily with 10 mL Normal Saline and 3 mL Heparin flush (10 units/mL)  Skilled Nurse (SN) may draw blood from IV access  Blood Draw Procedure:   - Aspirate at least 5 mL of blood   - Discard   - Obtain specimen   - Change injection cap   - Flush with 20 mL Normal Saline followed by a                 3-5 mL Heparin flush (10 units/mL)  Central :   - Sterile dressing changes are done weekly and as needed.   - Use chlor-hexadine scrub to cleanse site, apply Biopatch to insertion site,       apply securement device dressing   - Injection caps are changed weekly and after EVERY lab draw.   - If sterile gauze is under dressing to control oozing,                 dressing change must be performed every 24 hours until gauze is not needed.    IVAB via PICC line:     cefTRIAXone (ROCEPHIN) 2 g in dextrose 5 % in water (D5W) 5 % 50 mL IVPB      Ordered Dose: 2 g; Route: Intravenous; Frequency: Every 24 hours                         (non-standard times) @ 100 mL/hr over 30 Minutes.   End date: 4/28/2023    Two weeks Dapto 700 mg daily. EOT  04/24/23      WOUND CARE ORDERS  yes:  Surgical Wound:  Location: midline abdomen with derma bond open to air. May shower.     Medications: Review discharge medications with patient and family and provide education.      Current Discharge Medication List        START taking these medications    Details   dextrose 5 % in water (D5W) 5 % PgBk 50 mL with cefTRIAXone 2 gram SolR 2 g Inject 2 g into the vein Daily. for 14 days  Qty: 1 each, Refills: 0           CONTINUE these medications which have NOT CHANGED    Details   atorvastatin (LIPITOR) 20 MG tablet Take 20 mg by mouth once daily.      lisinopriL-hydrochlorothiazide (PRINZIDE,ZESTORETIC) 20-12.5 mg per tablet Take 1 tablet by mouth once daily.      pantoprazole (PROTONIX) 40 MG tablet Take 40 mg by mouth once daily.             I certify that this patient is confined to her home and needs intermittent skilled nursing care and physical therapy.          Electronically Signed  _________________________________  Snehal Toribio NP  04/14/2023

## 2023-04-14 NOTE — SUBJECTIVE & OBJECTIVE
"Interval History: Pt continues to feel well; calorie count revealed boost intake every meal with decent caloric intake of food. PICC placement today in preparation for at home abx; awaiting final sensitivities.    Medications:  Continuous Infusions:   lactated ringers 50 mL/hr at 04/14/23 0546     Scheduled Meds:   aspirin  81 mg Oral Daily    atorvastatin  40 mg Oral QHS    cefTRIAXone (ROCEPHIN) IVPB  2 g Intravenous Q24H    DAPTOmycin (CUBICIN) IV (PEDS and ADULTS)  10 mg/kg Intravenous Q24H    droNABinol  2.5 mg Oral BID    enoxaparin  40 mg Subcutaneous Daily    hydroCHLOROthiazide  12.5 mg Oral Daily    metoclopramide HCl  10 mg Oral TID AC    pantoprazole  40 mg Oral Daily     PRN Meds:acetaminophen, LIDOcaine (PF) 10 mg/ml (1%), ondansetron, sodium chloride 0.9%     Review of patient's allergies indicates:   Allergen Reactions    Aspirin Other (See Comments)     Pt states it is "hard on her stomach" She can tolerate a low dose aspirin    Pcn [penicillins]      Childhood - Tolerated ceftriaxone and cefazolin with no documented issues in the past      Objective:     Vital Signs (Most Recent):  Temp: 99 °F (37.2 °C) (04/14/23 0342)  Pulse: 77 (04/14/23 0342)  Resp: 19 (04/14/23 0342)  BP: (!) 168/78 (04/14/23 0342)  SpO2: 97 % (04/14/23 0342)   Vital Signs (24h Range):  Temp:  [97.4 °F (36.3 °C)-99 °F (37.2 °C)] 99 °F (37.2 °C)  Pulse:  [47-79] 77  Resp:  [16-19] 19  SpO2:  [96 %-99 %] 97 %  BP: (121-177)/(58-79) 168/78     Weight: 68.9 kg (152 lb)  Body mass index is 24.53 kg/m².    Intake/Output - Last 3 Shifts         04/12 0700  04/13 0659 04/13 0700  04/14 0659    P.O.  474    Total Intake(mL/kg)  474 (6.9)    Net  +474          Urine Occurrence  1 x    Stool Occurrence 0 x 1 x            Physical Exam  Vitals and nursing note reviewed.   Constitutional:       General: She is not in acute distress.     Appearance: Normal appearance. She is not ill-appearing.   HENT:      Head: Normocephalic.      " Mouth/Throat:      Mouth: Mucous membranes are moist.      Pharynx: Oropharynx is clear.   Eyes:      General: No scleral icterus.     Extraocular Movements: Extraocular movements intact.      Conjunctiva/sclera: Conjunctivae normal.   Cardiovascular:      Rate and Rhythm: Normal rate.      Pulses: Normal pulses.   Pulmonary:      Effort: Pulmonary effort is normal. No respiratory distress.      Breath sounds: No wheezing.   Abdominal:      General: Abdomen is flat. There is no distension.      Palpations: Abdomen is soft.      Tenderness: There is no guarding.   Musculoskeletal:         General: No swelling or tenderness. Normal range of motion.      Cervical back: Normal range of motion.   Skin:     General: Skin is warm and dry.      Coloration: Skin is not jaundiced or pale.      Comments: Midline surgical incision CDI underneath peeling dermabond   Neurological:      General: No focal deficit present.      Mental Status: She is alert and oriented to person, place, and time.   Psychiatric:         Mood and Affect: Mood normal.       Significant Labs:  I have reviewed all pertinent lab results within the past 24 hours.  CBC:   Recent Labs   Lab 04/14/23  0313   WBC 5.79   RBC 3.94*   HGB 8.8*   HCT 29.0*      MCV 74*   MCH 22.3*   MCHC 30.3*       CMP:   Recent Labs   Lab 04/14/23  0313      CALCIUM 9.1   ALBUMIN 2.6*   PROT 5.9*      K 3.6   CO2 26      BUN 11   CREATININE 0.6   ALKPHOS 70   ALT 16   AST 18   BILITOT 0.2         Significant Diagnostics:  I have reviewed all pertinent imaging results/findings within the past 24 hours.

## 2023-04-15 VITALS
DIASTOLIC BLOOD PRESSURE: 63 MMHG | WEIGHT: 152 LBS | BODY MASS INDEX: 24.43 KG/M2 | RESPIRATION RATE: 18 BRPM | HEIGHT: 66 IN | OXYGEN SATURATION: 98 % | SYSTOLIC BLOOD PRESSURE: 138 MMHG | TEMPERATURE: 99 F | HEART RATE: 50 BPM

## 2023-04-15 LAB
ALBUMIN SERPL BCP-MCNC: 2.7 G/DL (ref 3.5–5.2)
ALP SERPL-CCNC: 68 U/L (ref 55–135)
ALT SERPL W/O P-5'-P-CCNC: 17 U/L (ref 10–44)
ANION GAP SERPL CALC-SCNC: 8 MMOL/L (ref 8–16)
AST SERPL-CCNC: 25 U/L (ref 10–40)
BASOPHILS # BLD AUTO: 0.02 K/UL (ref 0–0.2)
BASOPHILS NFR BLD: 0.4 % (ref 0–1.9)
BILIRUB SERPL-MCNC: 0.2 MG/DL (ref 0.1–1)
BUN SERPL-MCNC: 8 MG/DL (ref 8–23)
CALCIUM SERPL-MCNC: 9.2 MG/DL (ref 8.7–10.5)
CHLORIDE SERPL-SCNC: 102 MMOL/L (ref 95–110)
CO2 SERPL-SCNC: 30 MMOL/L (ref 23–29)
CREAT SERPL-MCNC: 0.6 MG/DL (ref 0.5–1.4)
DIFFERENTIAL METHOD: ABNORMAL
EOSINOPHIL # BLD AUTO: 0.2 K/UL (ref 0–0.5)
EOSINOPHIL NFR BLD: 3.6 % (ref 0–8)
ERYTHROCYTE [DISTWIDTH] IN BLOOD BY AUTOMATED COUNT: 19.1 % (ref 11.5–14.5)
EST. GFR  (NO RACE VARIABLE): >60 ML/MIN/1.73 M^2
GLUCOSE SERPL-MCNC: 121 MG/DL (ref 70–110)
HCT VFR BLD AUTO: 26.7 % (ref 37–48.5)
HGB BLD-MCNC: 8.3 G/DL (ref 12–16)
IMM GRANULOCYTES # BLD AUTO: 0.01 K/UL (ref 0–0.04)
IMM GRANULOCYTES NFR BLD AUTO: 0.2 % (ref 0–0.5)
LYMPHOCYTES # BLD AUTO: 1.1 K/UL (ref 1–4.8)
LYMPHOCYTES NFR BLD: 21.9 % (ref 18–48)
MAGNESIUM SERPL-MCNC: 2 MG/DL (ref 1.6–2.6)
MCH RBC QN AUTO: 22.6 PG (ref 27–31)
MCHC RBC AUTO-ENTMCNC: 31.1 G/DL (ref 32–36)
MCV RBC AUTO: 73 FL (ref 82–98)
MONOCYTES # BLD AUTO: 0.6 K/UL (ref 0.3–1)
MONOCYTES NFR BLD: 11.1 % (ref 4–15)
NEUTROPHILS # BLD AUTO: 3.3 K/UL (ref 1.8–7.7)
NEUTROPHILS NFR BLD: 62.8 % (ref 38–73)
NRBC BLD-RTO: 0 /100 WBC
PHOSPHATE SERPL-MCNC: 4.1 MG/DL (ref 2.7–4.5)
PLATELET # BLD AUTO: 287 K/UL (ref 150–450)
PMV BLD AUTO: 10.3 FL (ref 9.2–12.9)
POTASSIUM SERPL-SCNC: 3.5 MMOL/L (ref 3.5–5.1)
PROT SERPL-MCNC: 6 G/DL (ref 6–8.4)
RBC # BLD AUTO: 3.67 M/UL (ref 4–5.4)
SODIUM SERPL-SCNC: 140 MMOL/L (ref 136–145)
WBC # BLD AUTO: 5.21 K/UL (ref 3.9–12.7)

## 2023-04-15 PROCEDURE — 25000003 PHARM REV CODE 250: Performed by: INTERNAL MEDICINE

## 2023-04-15 PROCEDURE — 63600175 PHARM REV CODE 636 W HCPCS: Performed by: INTERNAL MEDICINE

## 2023-04-15 PROCEDURE — A4216 STERILE WATER/SALINE, 10 ML: HCPCS | Performed by: SURGERY

## 2023-04-15 PROCEDURE — 85025 COMPLETE CBC W/AUTO DIFF WBC: CPT

## 2023-04-15 PROCEDURE — 84100 ASSAY OF PHOSPHORUS: CPT

## 2023-04-15 PROCEDURE — 80053 COMPREHEN METABOLIC PANEL: CPT

## 2023-04-15 PROCEDURE — 63600175 PHARM REV CODE 636 W HCPCS: Performed by: STUDENT IN AN ORGANIZED HEALTH CARE EDUCATION/TRAINING PROGRAM

## 2023-04-15 PROCEDURE — 63600175 PHARM REV CODE 636 W HCPCS

## 2023-04-15 PROCEDURE — 25000003 PHARM REV CODE 250: Performed by: NURSE PRACTITIONER

## 2023-04-15 PROCEDURE — 83735 ASSAY OF MAGNESIUM: CPT

## 2023-04-15 PROCEDURE — 25000003 PHARM REV CODE 250

## 2023-04-15 PROCEDURE — 25000003 PHARM REV CODE 250: Performed by: SURGERY

## 2023-04-15 RX ORDER — DRONABINOL 2.5 MG/1
2.5 CAPSULE ORAL 2 TIMES DAILY
Qty: 60 CAPSULE | Refills: 2 | Status: SHIPPED | OUTPATIENT
Start: 2023-04-15 | End: 2023-04-15 | Stop reason: SDUPTHER

## 2023-04-15 RX ORDER — DRONABINOL 2.5 MG/1
2.5 CAPSULE ORAL 2 TIMES DAILY
Qty: 60 CAPSULE | Refills: 2 | Status: SHIPPED | OUTPATIENT
Start: 2023-04-15 | End: 2023-04-17 | Stop reason: SDUPTHER

## 2023-04-15 RX ADMIN — Medication 10 ML: at 12:04

## 2023-04-15 RX ADMIN — DAPTOMYCIN 690 MG: 350 INJECTION, POWDER, LYOPHILIZED, FOR SOLUTION INTRAVENOUS at 12:04

## 2023-04-15 RX ADMIN — DRONABINOL 2.5 MG: 2.5 CAPSULE ORAL at 08:04

## 2023-04-15 RX ADMIN — Medication 10 ML: at 05:04

## 2023-04-15 RX ADMIN — CEFTRIAXONE 2 G: 2 INJECTION, POWDER, FOR SOLUTION INTRAMUSCULAR; INTRAVENOUS at 08:04

## 2023-04-15 RX ADMIN — ASPIRIN 81 MG: 81 TABLET, COATED ORAL at 08:04

## 2023-04-15 RX ADMIN — HYDROCHLOROTHIAZIDE 12.5 MG: 12.5 TABLET ORAL at 08:04

## 2023-04-15 RX ADMIN — METOCLOPRAMIDE 10 MG: 5 TABLET ORAL at 11:04

## 2023-04-15 RX ADMIN — PANTOPRAZOLE SODIUM 40 MG: 40 TABLET, DELAYED RELEASE ORAL at 08:04

## 2023-04-15 RX ADMIN — METOCLOPRAMIDE 10 MG: 5 TABLET ORAL at 05:04

## 2023-04-15 NOTE — NURSING
Per pharmacist [place], pt ok to get the Daptomycin early. Discharge instructions given to the pt and . Per bedside deliver, they cannot deliver the Marinol because the medicine cannot get authorization.  notified, MD said she sent the med to pt local [facility] pharmacy. Pt informed. [facility] came and did the education, ATXs will be delivered to pt house later today. No questions at this time

## 2023-04-15 NOTE — DISCHARGE SUMMARY
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  Went to the emergency room where after being evaluated and given fluids, she was sent home feeling much better. Unfortunately, her blood cultures turned positive the following day 4/11 so she was brought back to the emergency room.  She has been started on Flagyl, Rocephin, vancomycin. The blood cultures are now growing Klebsiella. Blood cultures were redrawn 4/11; these have NGTD. Urine cultures sent. She was then admitted to [facility] until [place] was [place] [facility]     On arrival, pt is HDS and states she feels well. Abdomen is soft and nontender; incision CDI. No leukocytosis. CT obtained at [facility] unremarkable.       * No surgery found *      Indwelling Lines/Drains at time of discharge:   Lines/Drains/Airways     Peripherally Inserted Central Catheter Line  Duration           PICC Double Lumen 04/14/23 1506 right brachial <1 day              Hospital Course: Nan Simms [place] [facility] [place] [facility] in early morning of 4/13/23 for gram negative bacteremia. Pt not in any pain, appetite improved and PO intake increased throughout hospital stay. Pt was seen by ID who recommended two weeks of ceftriaxone and daptomycin. PICC line placed 4/14. She is now ambulating without assistance, tolerating a regular diet, pain is well controlled with PO pain medication, and she is voiding appropriately. Her and her  were educated on home infusions. She now meets all criteria for  discharge with home health for infusions of her antibiotics. She will follow up in clinic with Dr. Paez in 2 weeks.      Goals of Care Treatment Preferences:  Code Status: Full Code      Consults:   Consults (From admission, onward)        Status Ordering Provider     Inpatient consult to Social Work  Once        Provider:  (Not yet assigned)    Acknowledged STACIE OSPINA     Inpatient consult to PICC team (ANGELINA)  Once        Provider:  (Not yet assigned)    Completed STACIE OSPINA     Inpatient consult to Infectious Diseases  Once        Provider:  (Not yet assigned)    Completed STACIE OSPINA     Inpatient consult to Registered Dietitian/Nutritionist  Once        Provider:  (Not yet assigned)    Completed STACIE OSPINA.        Physical Exam  Vitals and nursing note reviewed.   Constitutional:       Appearance: Normal appearance. She is not ill-appearing.   HENT:      Head: Normocephalic.      Mouth/Throat:      Mouth: Mucous membranes are moist.      Pharynx: Oropharynx is clear.   Eyes:      General: No scleral icterus.     Extraocular Movements: Extraocular movements intact.      Conjunctiva/sclera: Conjunctivae normal.   Cardiovascular:      Rate and Rhythm: Normal rate.      Pulses: Normal pulses.   Pulmonary:      Effort: Pulmonary effort is normal. No respiratory distress.      Breath sounds: No wheezing.   Abdominal:      General: Abdomen is flat. There is no distension.      Palpations: Abdomen is soft.   Musculoskeletal:         General: No swelling or tenderness. Normal range of motion.      Cervical back: Normal range of motion.   Skin:     General: Skin is warm and dry.      Coloration: Skin is not jaundiced or pale.      Comments: Incision well healing with dermabond flaking off   Neurological:      General: No focal deficit present.      Mental Status: She is alert and oriented to person, place, and time.   Psychiatric:         Mood and Affect: Mood normal.         Significant Diagnostic  Studies: Labs:   BMP:   Recent Labs   Lab 04/14/23  0313 04/15/23  0509    121*    140   K 3.6 3.5    102   CO2 26 30*   BUN 11 8   CREATININE 0.6 0.6   CALCIUM 9.1 9.2   MG 2.0 2.0    and CBC   Recent Labs   Lab 04/14/23  0313 04/15/23  0509   WBC 5.79 5.21   HGB 8.8* 8.3*   HCT 29.0* 26.7*    287       Pending Diagnostic Studies:     Procedure Component Value Units Date/Time    CBC Auto Differential [[id number]] Collected: 04/13/23 0415    Order Status: Sent Lab Status: In process Updated: 04/13/23 0415    Specimen: Blood     Comprehensive metabolic panel [[id number]] Collected: 04/13/23 0415    Order Status: Sent Lab Status: In process Updated: 04/13/23 0415    Specimen: Blood     Magnesium [[id number]] Collected: 04/13/23 0415    Order Status: Sent Lab Status: In process Updated: 04/13/23 0415    Specimen: Blood     Phosphorus [[id number]] Collected: 04/13/23 0415    Order Status: Sent Lab Status: In process Updated: 04/13/23 0415    Specimen: Blood         Final Active Diagnoses:    Diagnosis Date Noted POA    PRINCIPAL PROBLEM:  Gram-negative bacteremia [R78.81] 04/12/2023 Yes      Problems Resolved During this Admission:      Discharged Condition: fair    Disposition: Home or Self Care    Follow Up: 2 weeks in clinic    Patient Instructions:      Diet Adult Regular     Notify your health care provider if you experience any of the following:  temperature >100.4     Notify your health care provider if you experience any of the following:  persistent nausea and vomiting or diarrhea     Notify your health care provider if you experience any of the following:  severe uncontrolled pain     Notify your health care provider if you experience any of the following:  redness, tenderness, or signs of infection (pain, swelling, redness, odor or green/yellow discharge around incision site)     Activity as tolerated     Medications:  Reconciled Home Medications:      Medication List      START taking  these medications    dextrose 5 % in water (D5W) 5 % PgBk 50 mL with cefTRIAXone 2 gram SolR 2 g  Inject 2 g into the vein Daily. for 14 days     sodium chloride 0.9% SolP 50 mL with DAPTOmycin 500 mg SolR 689 mg  Inject 689 mg into the vein once daily. for 10 days        CONTINUE taking these medications    atorvastatin 20 MG tablet  Commonly known as: LIPITOR  Take 20 mg by mouth once daily.     lisinopriL-hydrochlorothiazide 20-12.5 mg per tablet  Commonly known as: PRINZIDE,ZESTORETIC  Take 1 tablet by mouth once daily.     pantoprazole 40 MG tablet  Commonly known as: PROTONIX  Take 40 mg by mouth once daily.          Time spent on the discharge of patient: 20 minutes    Pretty Banuelos MD  General Surgery  [facility]

## 2023-04-15 NOTE — PLAN OF CARE
[facility]    [facility] educator met with the patient's spouse and discussed the discharge plan for home IVABX. Nan Simms will discharge home with family support. Patient will infuse medication via Elastomeric Pump. Patient's spouse educated on the S.A.S.H procedure. S.A.S.H mat provided. Patient education checklist reviewed and acknowledged by above person  and he is agreeable to discharge with home infusion plan of care. IV administration process using aspetic technique was reviewed with successful return demonstration. Patient feels comfortable with infusion. Patient will discharge home with Ceftriaxone 2 gm every 24 hours and Daptomycin 700 mg every 24 hours for estimated end of therapy date of 4/24/23. Dosing schedule times are 1700.  [facility] will follow patient for weekly dressing changes and lab draws and will see the patient tomorrow. Time allotted for questions. Patients nurse and case management team notified teaching has been completed.     Medication delivery will be made to home    Patient accepted to care by [facility] and report called to Ashley Brown   Phone number [phone]    [facility]  Agnieszka Becerra RN, Clinical Educator  Cell [phone]  Office [phone]  Fax [phone]

## 2023-04-15 NOTE — ASSESSMENT & PLAN NOTE
67 year old female status post Whipple procedure for duodenal adenocarcinoma on 3/15/2023, admitted for bacteremia on 4/10  -klebsiella pneumoniae and enterococcus faecium. Her presentation is remarkably not severe, afebrile, without leukocytosis, CT without any abscess or concerning infectious findings, suspect translocation from bowel post surgery, surgical site appears to be healing well, no presence of prosthetic material. Repeat cultures from 4/11 with NGTD x 2 sets and Bcx 4/13 also with NG. Remains clinically stable.     Plan    Daptomycin 700 mg IV q 24 hours + Ceftriaxone 2 g IV q 24 hours for two weeks with EOT 04/24/2023  This will cover both E faecium and Klebsiella pneumoniae.   Discussed plan with patient, her partner and primary team  ID will arrange follow up in clinic.

## 2023-04-15 NOTE — PROGRESS NOTES
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 Her presentation is remarkably not severe, afebrile, without leukocytosis, CT without any abscess or concerning infectious findings, suspect translocation from bowel post surgery, surgical site appears to be healing well, no presence of prosthetic material. Repeat cultures from 4/11 with NGTD x 2 sets and Bcx 4/13 also with NG. Remains clinically stable.     Plan    Daptomycin 700 mg IV q 24 hours + Ceftriaxone 2 g IV q 24 hours for two weeks with EOT 04/24/2023  This will cover both E faecium and Klebsiella pneumoniae.   Discussed plan with patient, her partner and primary team  ID will arrange follow up in clinic.             Anticipated Disposition: TBD    Thank you for your consult. I will sign off. Please contact us if you have any additional questions.    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 Last admission, she had some post op leukocytosis, but no organized fluid collection on post op CT. She denies fever, chills, nausea, vomiting, diarrhea, abdominal pain, rigors, recent trauma, prosthetic devices or prosthetic material, dental pain or odontogenic procedures. She reports being home after recent discharge and not going  outdoors. Denies pain at surgical incision site, had no drains placed and reports scar is healing well. CTAP with stable post op changes of whipple and small free fluid in operative bed, decreased from prior. Bcx 4/10 with klebsiella pneumoniae x 2 sets, BCID with E faecium but oddly resistant genes to lipoglycopeptide negative. She remains afebrile, no leukocytosis, started on vancomycin, pip/tazo, metronidazole, repeat Bcx on 4/11 with NGTD, Ucx sent 4/12, no pyruia on UA. hemodynamically stable, hypertensive. ID was consulted for Pt with bacteremia - antibiotic recommendations            Interval History: NAEON    Review of Systems  Constitutional:  Positive for fatigue. Negative for activity change and appetite change.   HENT:  Negative for congestion, drooling, rhinorrhea and sore throat.    Respiratory:  Negative for cough, chest tightness and shortness of breath.    Cardiovascular:  Negative for chest pain and palpitations.   Gastrointestinal:  Negative for blood in stool, diarrhea, nausea and vomiting.   Genitourinary:  Negative for dysuria, hematuria and urgency.   Musculoskeletal:  Negative for back pain.   Neurological:  Negative for seizures and syncope.   Psychiatric/Behavioral:  Negative for decreased concentration.    Objective:     Vital Signs (Most Recent):  Temp: 98.9 °F (37.2 °C) (04/14/23 1908)  Pulse: 72 (04/14/23 1908)  Resp: 16 (04/14/23 1908)  BP: (!) 165/97 (04/14/23 1908)  SpO2: 97 % (04/14/23 1908)   Vital Signs (24h Range):  Temp:  [98.3 °F (36.8 °C)-99 °F (37.2 °C)] 98.9 °F (37.2 °C)  Pulse:  [52-77] 72  Resp:  [16-19] 16  SpO2:  [97 %-100 %] 97 %  BP: (121-168)/(58-97) 165/97     Weight: 68.9 kg (152 lb)  Body mass index is 24.53 kg/m².    Estimated Creatinine Clearance: 84 mL/min (based on SCr of 0.6 mg/dL).    Physical Exam  Constitutional:       Appearance: Normal appearance. She is ill-appearing. She is not toxic-appearing.   HENT:      Head: Normocephalic and atraumatic.       Nose: Nose normal.      Mouth/Throat:      Mouth: Mucous membranes are moist.      Pharynx: Oropharynx is clear.   Eyes:      Conjunctiva/sclera: Conjunctivae normal.   Cardiovascular:      Rate and Rhythm: Normal rate and regular rhythm.      Pulses: Normal pulses.      Heart sounds: Normal heart sounds.   Pulmonary:      Effort: Pulmonary effort is normal.      Breath sounds: Normal breath sounds.   Abdominal:      General: Bowel sounds are normal.      Palpations: Abdomen is soft.      Comments: Well healed abdominal scar, dry, no tenderness or erythema   Musculoskeletal:         General: No swelling or deformity.      Cervical back: Neck supple.   Skin:     General: Skin is warm and dry.   Neurological:      Mental Status: She is alert and oriented to person, place, and time. Mental status is at baseline.   Psychiatric:         Behavior: Behavior normal.         Thought Content: Thought content normal.   Significant Labs:   Microbiology Results (last 7 days)       Procedure Component Value Units Date/Time    Blood culture [[id number]] Collected: 04/13/23 0916    Order Status: Completed Specimen: Blood Updated: 04/14/23 1212     Blood Culture, Routine No Growth to date      No Growth to date    Blood culture [[id number]] Collected: 04/13/23 0916    Order Status: Completed Specimen: Blood Updated: 04/14/23 1212     Blood Culture, Routine No Growth to date      No Growth to date          All pertinent labs within the past 24 hours have been reviewed.  Recent Lab Results         04/14/23  0724   04/14/23  0313        Albumin   2.6       Alkaline Phosphatase   70       ALT   16       Anion Gap   10       AST   18       Baso #   0.04       Basophil %   0.7       BILIRUBIN TOTAL   0.2  Comment: For infants and newborns, interpretation of results should be based  on gestational age, weight and in agreement with clinical  observations.    Premature Infant recommended reference ranges:  Up to 24 hours.............<8.0  mg/dL  Up to 48 hours............<12.0 mg/dL  3-5 days..................<15.0 mg/dL  6-29 days.................<15.0 mg/dL         BUN   11       Calcium   9.1       Chloride   104       CO2   26       Creatinine   0.6       Differential Method   Automated       eGFR   >60.0       Eos #   0.3       Eosinophil %   4.5       Glucose   103       Gran # (ANC)   3.4       Gran %   58.2       Hematocrit   29.0       Hemoglobin   8.8       Immature Grans (Abs)   0.01  Comment: Mild elevation in immature granulocytes is non specific and   can be seen in a variety of conditions including stress response,   acute inflammation, trauma and pregnancy. Correlation with other   laboratory and clinical findings is essential.         Immature Granulocytes   0.2       Lymph #   1.5       Lymph %   26.4       Magnesium   2.0       MCH   22.3       MCHC   30.3       MCV   74       Mono #   0.6       Mono %   10.0       MPV   10.4       nRBC   0       Phosphorus   3.3       Platelets   298       Potassium   3.6       Prealbumin 18         PROTEIN TOTAL   5.9       RBC   3.94       RDW   19.4       Sodium   140       WBC   5.79               Significant Imaging: I have reviewed all pertinent imaging results/findings within the past 24 hours.

## 2023-04-15 NOTE — HOSPITAL COURSE
[place] [facility] [place] [facility] in early morning of 4/13/23 for gram negative bacteremia. Pt not in any pain, appetite improved and PO intake increased throughout hospital stay. Pt was seen by ID who recommended two weeks of ceftriaxone and daptomycin. PICC line placed 4/14. She is now ambulating without assistance, tolerating a regular diet, pain is well controlled with PO pain medication, and she is voiding appropriately. Her and her  were educated on home infusions. She now meets all criteria for discharge with home health for infusions of her antibiotics. She will follow up in clinic with Dr. Paez in 2 weeks.

## 2023-04-15 NOTE — PLAN OF CARE
On call CM in communication with Dr. Banuelos who advises patient will discharge today.  Referral sent to [facility] and patient is current with [facility] per Agnieszka with [facility].   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  Nurse to admin doses of ivab today and meds will be delivered to home.

## 2023-04-15 NOTE — NURSING
Discharge orders in. Per , pending HH and ATX to be set up. Agnieszka KURTZ from [facility] said she will be by to do some more teaching to pt and family, and pt must get her Ceftriaxone 9AM dose and Daptomycin 3PM dose before getting discharge. Pt informed. LARISA

## 2023-04-16 LAB — BACTERIA BLD CULT: NORMAL

## 2023-04-17 ENCOUNTER — OFFICE VISIT (OUTPATIENT)
Dept: HEMATOLOGY/ONCOLOGY | Facility: CLINIC | Age: 68
End: 2023-04-17
Payer: MEDICARE

## 2023-04-17 ENCOUNTER — LAB VISIT (OUTPATIENT)
Dept: LAB | Facility: HOSPITAL | Age: 68
End: 2023-04-17
Attending: STUDENT IN AN ORGANIZED HEALTH CARE EDUCATION/TRAINING PROGRAM
Payer: MEDICARE

## 2023-04-17 VITALS
DIASTOLIC BLOOD PRESSURE: 57 MMHG | OXYGEN SATURATION: 98 % | HEART RATE: 58 BPM | SYSTOLIC BLOOD PRESSURE: 114 MMHG | WEIGHT: 153.75 LBS | BODY MASS INDEX: 24.71 KG/M2 | RESPIRATION RATE: 18 BRPM | TEMPERATURE: 99 F | HEIGHT: 66 IN

## 2023-04-17 DIAGNOSIS — I70.0 AORTIC ATHEROSCLEROSIS: ICD-10-CM

## 2023-04-17 DIAGNOSIS — R63.0 DECREASED APPETITE: ICD-10-CM

## 2023-04-17 DIAGNOSIS — D64.9 ANEMIA, UNSPECIFIED TYPE: ICD-10-CM

## 2023-04-17 DIAGNOSIS — I10 ESSENTIAL HYPERTENSION: ICD-10-CM

## 2023-04-17 DIAGNOSIS — Z90.49 HISTORY OF WHIPPLE PROCEDURE: ICD-10-CM

## 2023-04-17 DIAGNOSIS — E78.5 HYPERLIPIDEMIA, UNSPECIFIED HYPERLIPIDEMIA TYPE: ICD-10-CM

## 2023-04-17 DIAGNOSIS — Z90.410 HISTORY OF WHIPPLE PROCEDURE: ICD-10-CM

## 2023-04-17 DIAGNOSIS — E11.9 DIABETES MELLITUS TYPE 2, DIET-CONTROLLED: ICD-10-CM

## 2023-04-17 DIAGNOSIS — C17.0 DUODENAL ADENOCARCINOMA: Primary | ICD-10-CM

## 2023-04-17 DIAGNOSIS — R78.81 GRAM-NEGATIVE BACTEREMIA: ICD-10-CM

## 2023-04-17 DIAGNOSIS — E44.0 MODERATE MALNUTRITION: ICD-10-CM

## 2023-04-17 DIAGNOSIS — R78.81 BACTEREMIA: Primary | ICD-10-CM

## 2023-04-17 LAB
ALBUMIN SERPL BCP-MCNC: 3 G/DL (ref 3.5–5.2)
ALP SERPL-CCNC: 60 U/L (ref 55–135)
ALT SERPL W/O P-5'-P-CCNC: 19 U/L (ref 10–44)
ANION GAP SERPL CALC-SCNC: 10 MMOL/L (ref 8–16)
AST SERPL-CCNC: 19 U/L (ref 10–40)
BACTERIA BLD CULT: NORMAL
BASOPHILS # BLD AUTO: 0.04 K/UL (ref 0–0.2)
BASOPHILS NFR BLD: 0.6 % (ref 0–1.9)
BILIRUB SERPL-MCNC: 0.2 MG/DL (ref 0.1–1)
BUN SERPL-MCNC: 11 MG/DL (ref 8–23)
CALCIUM SERPL-MCNC: 9.6 MG/DL (ref 8.7–10.5)
CHLORIDE SERPL-SCNC: 103 MMOL/L (ref 95–110)
CK SERPL-CCNC: 53 U/L (ref 20–180)
CO2 SERPL-SCNC: 29 MMOL/L (ref 23–29)
CREAT SERPL-MCNC: 0.6 MG/DL (ref 0.5–1.4)
CRP SERPL-MCNC: 2.8 MG/L (ref 0–8.2)
DIFFERENTIAL METHOD: ABNORMAL
EOSINOPHIL # BLD AUTO: 0.2 K/UL (ref 0–0.5)
EOSINOPHIL NFR BLD: 2.3 % (ref 0–8)
ERYTHROCYTE [DISTWIDTH] IN BLOOD BY AUTOMATED COUNT: 19.4 % (ref 11.5–14.5)
EST. GFR  (NO RACE VARIABLE): >60 ML/MIN/1.73 M^2
GLUCOSE SERPL-MCNC: 102 MG/DL (ref 70–110)
HCT VFR BLD AUTO: 30.3 % (ref 37–48.5)
HGB BLD-MCNC: 9.1 G/DL (ref 12–16)
IMM GRANULOCYTES # BLD AUTO: 0.02 K/UL (ref 0–0.04)
IMM GRANULOCYTES NFR BLD AUTO: 0.3 % (ref 0–0.5)
LYMPHOCYTES # BLD AUTO: 1.6 K/UL (ref 1–4.8)
LYMPHOCYTES NFR BLD: 24.2 % (ref 18–48)
MCH RBC QN AUTO: 22 PG (ref 27–31)
MCHC RBC AUTO-ENTMCNC: 30 G/DL (ref 32–36)
MCV RBC AUTO: 73 FL (ref 82–98)
MONOCYTES # BLD AUTO: 0.5 K/UL (ref 0.3–1)
MONOCYTES NFR BLD: 7.1 % (ref 4–15)
NEUTROPHILS # BLD AUTO: 4.3 K/UL (ref 1.8–7.7)
NEUTROPHILS NFR BLD: 65.5 % (ref 38–73)
NRBC BLD-RTO: 0 /100 WBC
PLATELET # BLD AUTO: 328 K/UL (ref 150–450)
PMV BLD AUTO: 10.6 FL (ref 9.2–12.9)
POTASSIUM SERPL-SCNC: 3.9 MMOL/L (ref 3.5–5.1)
PREALB SERPL-MCNC: 17 MG/DL (ref 20–43)
PROT SERPL-MCNC: 6.7 G/DL (ref 6–8.4)
RBC # BLD AUTO: 4.14 M/UL (ref 4–5.4)
SODIUM SERPL-SCNC: 142 MMOL/L (ref 136–145)
WBC # BLD AUTO: 6.61 K/UL (ref 3.9–12.7)

## 2023-04-17 PROCEDURE — 3078F PR MOST RECENT DIASTOLIC BLOOD PRESSURE < 80 MM HG: ICD-10-PCS | Mod: CPTII,S$GLB,, | Performed by: INTERNAL MEDICINE

## 2023-04-17 PROCEDURE — 3074F SYST BP LT 130 MM HG: CPT | Mod: CPTII,S$GLB,, | Performed by: INTERNAL MEDICINE

## 2023-04-17 PROCEDURE — 3074F PR MOST RECENT SYSTOLIC BLOOD PRESSURE < 130 MM HG: ICD-10-PCS | Mod: CPTII,S$GLB,, | Performed by: INTERNAL MEDICINE

## 2023-04-17 PROCEDURE — 82550 ASSAY OF CK (CPK): CPT

## 2023-04-17 PROCEDURE — 1101F PT FALLS ASSESS-DOCD LE1/YR: CPT | Mod: CPTII,S$GLB,, | Performed by: INTERNAL MEDICINE

## 2023-04-17 PROCEDURE — 1126F PR PAIN SEVERITY QUANTIFIED, NO PAIN PRESENT: ICD-10-PCS | Mod: CPTII,S$GLB,, | Performed by: INTERNAL MEDICINE

## 2023-04-17 PROCEDURE — 4010F ACE/ARB THERAPY RXD/TAKEN: CPT | Mod: CPTII,S$GLB,, | Performed by: INTERNAL MEDICINE

## 2023-04-17 PROCEDURE — 1111F PR DISCHARGE MEDS RECONCILED W/ CURRENT OUTPATIENT MED LIST: ICD-10-PCS | Mod: CPTII,S$GLB,, | Performed by: INTERNAL MEDICINE

## 2023-04-17 PROCEDURE — 99204 OFFICE O/P NEW MOD 45 MIN: CPT | Mod: S$GLB,,, | Performed by: INTERNAL MEDICINE

## 2023-04-17 PROCEDURE — 99204 PR OFFICE/OUTPT VISIT, NEW, LEVL IV, 45-59 MIN: ICD-10-PCS | Mod: S$GLB,,, | Performed by: INTERNAL MEDICINE

## 2023-04-17 PROCEDURE — 99999 PR PBB SHADOW E&M-EST. PATIENT-LVL IV: ICD-10-PCS | Mod: PBBFAC,,, | Performed by: INTERNAL MEDICINE

## 2023-04-17 PROCEDURE — 85025 COMPLETE CBC W/AUTO DIFF WBC: CPT

## 2023-04-17 PROCEDURE — 3078F DIAST BP <80 MM HG: CPT | Mod: CPTII,S$GLB,, | Performed by: INTERNAL MEDICINE

## 2023-04-17 PROCEDURE — 1126F AMNT PAIN NOTED NONE PRSNT: CPT | Mod: CPTII,S$GLB,, | Performed by: INTERNAL MEDICINE

## 2023-04-17 PROCEDURE — 3288F PR FALLS RISK ASSESSMENT DOCUMENTED: ICD-10-PCS | Mod: CPTII,S$GLB,, | Performed by: INTERNAL MEDICINE

## 2023-04-17 PROCEDURE — 80053 COMPREHEN METABOLIC PANEL: CPT

## 2023-04-17 PROCEDURE — 3044F PR MOST RECENT HEMOGLOBIN A1C LEVEL <7.0%: ICD-10-PCS | Mod: CPTII,S$GLB,, | Performed by: INTERNAL MEDICINE

## 2023-04-17 PROCEDURE — 3044F HG A1C LEVEL LT 7.0%: CPT | Mod: CPTII,S$GLB,, | Performed by: INTERNAL MEDICINE

## 2023-04-17 PROCEDURE — 99999 PR PBB SHADOW E&M-EST. PATIENT-LVL IV: CPT | Mod: PBBFAC,,, | Performed by: INTERNAL MEDICINE

## 2023-04-17 PROCEDURE — 4010F PR ACE/ARB THEARPY RXD/TAKEN: ICD-10-PCS | Mod: CPTII,S$GLB,, | Performed by: INTERNAL MEDICINE

## 2023-04-17 PROCEDURE — 86140 C-REACTIVE PROTEIN: CPT

## 2023-04-17 PROCEDURE — 3008F PR BODY MASS INDEX (BMI) DOCUMENTED: ICD-10-PCS | Mod: CPTII,S$GLB,, | Performed by: INTERNAL MEDICINE

## 2023-04-17 PROCEDURE — 3008F BODY MASS INDEX DOCD: CPT | Mod: CPTII,S$GLB,, | Performed by: INTERNAL MEDICINE

## 2023-04-17 PROCEDURE — 1101F PR PT FALLS ASSESS DOC 0-1 FALLS W/OUT INJ PAST YR: ICD-10-PCS | Mod: CPTII,S$GLB,, | Performed by: INTERNAL MEDICINE

## 2023-04-17 PROCEDURE — 3288F FALL RISK ASSESSMENT DOCD: CPT | Mod: CPTII,S$GLB,, | Performed by: INTERNAL MEDICINE

## 2023-04-17 PROCEDURE — 1111F DSCHRG MED/CURRENT MED MERGE: CPT | Mod: CPTII,S$GLB,, | Performed by: INTERNAL MEDICINE

## 2023-04-17 PROCEDURE — 84134 ASSAY OF PREALBUMIN: CPT

## 2023-04-17 RX ORDER — DRONABINOL 2.5 MG/1
2.5 CAPSULE ORAL 2 TIMES DAILY
Qty: 60 CAPSULE | Refills: 2 | Status: SHIPPED | OUTPATIENT
Start: 2023-04-17 | End: 2023-05-16

## 2023-04-17 NOTE — PROGRESS NOTES
MEDICAL ONCOLOGY - NEW PATIENT VISIT    Reason for visit: duodenal adenocarcinoma     Best Contact Phone Number(s): [phone] (home)      Cancer/Stage/TNM:   Cancer Staging   No matching staging information was found for the patient.     Oncology History    No history exists.        HPI:   67 y.o. female with HTN, HLD, anemia, DM, thyroid nodules who presents for initial consult for pT3N0 duodenal adenocarcinoma. She initially presented to [facility] for constipation x 3 weeks that she self treated with enemas, abdominal pain, and ~13 lb weight loss. Imaging findings were concerning for GOO and malignancy. She underwent Whipple procedure on 3/15/2023. She was discharged with home health. Her recovery was complicated by bacteremia requiring use of IV antibiotics. She still has another week of antibiotics left.      She is doing well today. Still having trouble with eating and has not been able to get her marinol. Incision healing well, no signs of infection. Denies fever/chills, SOB, CP, palpitations, N/V, C/D, blood in urine/stool, paresthesias.     FH:  Brother - stomach cancer, diagnosed in mid-70s, now 81.   Mother - pancreatic cancer    Presents to clinic with her . ECOG 1.     History has been obtained by chart review and discussion with the patient.    ROS:   Review of Systems   Constitutional:  Positive for malaise/fatigue. Negative for chills and fever.   HENT:  Negative for nosebleeds and sore throat.    Respiratory:  Negative for cough and shortness of breath.    Cardiovascular:  Negative for chest pain, palpitations and leg swelling.   Gastrointestinal:  Negative for blood in stool, constipation, diarrhea, heartburn, nausea and vomiting.   Genitourinary:  Negative for dysuria, frequency, hematuria and urgency.   Musculoskeletal:  Negative for falls and myalgias.   Skin:  Negative for itching and rash.   Neurological:  Negative for dizziness, tingling, weakness and headaches.  "  Psychiatric/Behavioral:  The patient is not nervous/anxious and does not have insomnia.      Past Medical History:   Past Medical History:   Diagnosis Date    Abnormal Pap smear of cervix     Arthritis     Duodenal adenocarcinoma     Hypertension     Hyperthyroidism         Past Surgical History:   Past Surgical History:   Procedure Laterality Date    CATARACT EXTRACTION W/  INTRAOCULAR LENS IMPLANT Right 8/8/2019    Procedure: EXTRACTION, CATARACT, WITH IOL INSERTION;  Surgeon: Spenser Lee MD;  Location: [facility];  Service: Ophthalmology;  Laterality: Right;    ESOPHAGOGASTRODUODENOSCOPY N/A 3/10/2023    Procedure: EGD (ESOPHAGOGASTRODUODENOSCOPY);  Surgeon: Shashi Tapia MD;  Location: [address]);  Service: Endoscopy;  Laterality: N/A;    EYE SURGERY      HYSTERECTOMY      OOPHORECTOMY      PHACOEMULSIFICATION OF CATARACT Right 8/8/2019    Procedure: PHACOEMULSIFICATION, CATARACT;  Surgeon: Spenser Lee MD;  Location: [facility];  Service: Ophthalmology;  Laterality: Right;    WHIPPLE PROCEDURE N/A 3/15/2023    Procedure: WHIPPLE PROCEDURE;  Surgeon: Nick Paez MD;  Location: [address];  Service: General;  Laterality: N/A;        Family History:   Family History   Problem Relation Age of Onset    Cancer Mother     Breast cancer Neg Hx     Colon cancer Neg Hx     Ovarian cancer Neg Hx         Social History:   Social History     Tobacco Use    Smoking status: Some Days     Packs/day: 0.50     Years: 30.00     Pack years: 15.00     Types: Cigarettes    Smokeless tobacco: Current   Substance Use Topics    Alcohol use: Yes     Alcohol/week: 1.0 standard drink     Types: 1 Cans of beer per week        I have reviewed and updated the patient's past medical, surgical, family and social histories.    Allergies:   Review of patient's allergies indicates:   Allergen Reactions    Aspirin Other (See Comments)     Pt states it is "hard on her stomach" She can tolerate a low dose " "aspirin    Pcn [penicillins]      Childhood - Tolerated ceftriaxone and cefazolin with no documented issues in the past         Medications:   Current Outpatient Medications   Medication Sig Dispense Refill    atorvastatin (LIPITOR) 20 MG tablet Take 20 mg by mouth once daily.      dextrose 5 % in water (D5W) 5 % PgBk 50 mL with cefTRIAXone 2 gram SolR 2 g Inject 2 g into the vein Daily. for 14 days 1 each 0    droNABinol (MARINOL) 2.5 MG capsule Take 1 capsule (2.5 mg total) by mouth 2 (two) times daily. 60 capsule 2    lisinopriL-hydrochlorothiazide (PRINZIDE,ZESTORETIC) 20-12.5 mg per tablet Take 1 tablet by mouth once daily.      pantoprazole (PROTONIX) 40 MG tablet Take 40 mg by mouth once daily.      sodium chloride 0.9% SolP 50 mL with DAPTOmycin 500 mg SolR 689 mg Inject 689 mg into the vein once daily. for 10 days 700 mg 0     No current facility-administered medications for this visit.        Physical Exam:   BP (!) 114/57 (BP Location: Left arm, Patient Position: Sitting, BP Method: Medium (Automatic))   Pulse (!) 58   Temp 98.7 °F (37.1 °C) (Oral)   Resp 18   Ht 5' 6" (1.676 m)   Wt 69.7 kg (153 lb 12.3 oz)   SpO2 98%   BMI 24.82 kg/m²      ECOG Performance status: 1       Physical Exam  Vitals reviewed.   Constitutional:       General: She is not in acute distress.     Appearance: Normal appearance. She is normal weight. She is not ill-appearing, toxic-appearing or diaphoretic.   HENT:      Head: Normocephalic and atraumatic.      Right Ear: External ear normal.      Left Ear: External ear normal.      Nose: Nose normal.      Mouth/Throat:      Pharynx: Oropharynx is clear.   Eyes:      General: No scleral icterus.     Conjunctiva/sclera: Conjunctivae normal.      Pupils: Pupils are equal, round, and reactive to light.   Cardiovascular:      Rate and Rhythm: Normal rate.   Pulmonary:      Effort: Pulmonary effort is normal. No respiratory distress.      Breath sounds: No wheezing.   Abdominal:    "   General: There is no distension.      Tenderness: There is no abdominal tenderness.      Comments: Midline abdominal incision, healing well, no signs of infection noted   Musculoskeletal:      Cervical back: Normal range of motion.      Right lower leg: No edema.      Left lower leg: No edema.   Skin:     General: Skin is warm and dry.      Coloration: Skin is not jaundiced or pale.      Findings: No bruising, erythema or rash.   Neurological:      General: No focal deficit present.      Mental Status: She is alert and oriented to person, place, and time. Mental status is at baseline.      Cranial Nerves: No cranial nerve deficit.      Sensory: No sensory deficit.      Motor: No weakness.      Gait: Gait normal.   Psychiatric:         Mood and Affect: Mood normal.         Behavior: Behavior normal.         Thought Content: Thought content normal.         Judgment: Judgment normal.         Labs:   Recent Results (from the past 48 hour(s))   COMPREHENSIVE METABOLIC PANEL    Collection Time: 04/17/23 12:46 PM   Result Value Ref Range    Sodium 142 136 - 145 mmol/L    Potassium 3.9 3.5 - 5.1 mmol/L    Chloride 103 95 - 110 mmol/L    CO2 29 23 - 29 mmol/L    Glucose 102 70 - 110 mg/dL    BUN 11 8 - 23 mg/dL    Creatinine 0.6 0.5 - 1.4 mg/dL    Calcium 9.6 8.7 - 10.5 mg/dL    Total Protein 6.7 6.0 - 8.4 g/dL    Albumin 3.0 (L) 3.5 - 5.2 g/dL    Total Bilirubin 0.2 0.1 - 1.0 mg/dL    Alkaline Phosphatase 60 55 - 135 U/L    AST 19 10 - 40 U/L    ALT 19 10 - 44 U/L    Anion Gap 10 8 - 16 mmol/L    eGFR >60 >60 mL/min/1.73 m^2   CBC W/ AUTO DIFFERENTIAL    Collection Time: 04/17/23 12:46 PM   Result Value Ref Range    WBC 6.61 3.90 - 12.70 K/uL    RBC 4.14 4.00 - 5.40 M/uL    Hemoglobin 9.1 (L) 12.0 - 16.0 g/dL    Hematocrit 30.3 (L) 37.0 - 48.5 %    MCV 73 (L) 82 - 98 fL    MCH 22.0 (L) 27.0 - 31.0 pg    MCHC 30.0 (L) 32.0 - 36.0 g/dL    RDW 19.4 (H) 11.5 - 14.5 %    Platelets 328 150 - 450 K/uL    MPV 10.6 9.2 - 12.9 fL     Immature Granulocytes 0.3 0.0 - 0.5 %    Gran # (ANC) 4.3 1.8 - 7.7 K/uL    Immature Grans (Abs) 0.02 0.00 - 0.04 K/uL    Lymph # 1.6 1.0 - 4.8 K/uL    Mono # 0.5 0.3 - 1.0 K/uL    Eos # 0.2 0.0 - 0.5 K/uL    Baso # 0.04 0.00 - 0.20 K/uL    nRBC 0 0 /100 WBC    Gran % 65.5 38.0 - 73.0 %    Lymph % 24.2 18.0 - 48.0 %    Mono % 7.1 4.0 - 15.0 %    Eosinophil % 2.3 0.0 - 8.0 %    Basophil % 0.6 0.0 - 1.9 %    Differential Method Automated    CK    Collection Time: 23 12:46 PM   Result Value Ref Range    CPK 53 20 - 180 U/L        I have reviewed the pertinent labs.     Imagin/11/23 - CT A/P with contrast  Impression:     Postsurgical changes of Whipple are redemonstrated with small volume free fluid in the oeperative bed, decreased since prior.    Path:   3/15/23 - Whipple  Final Pathologic Diagnosis      Abnormal   1. Lymph node, hepatic cautery, resection:   - One lymph node negative for metastatic carcinoma (0/1).   - See synoptic report.   2. Gallbladder, cholecystectomy:   - Benign gallbladder with no significant histologic abnormality.   3. Pancreas, duodenum, common bile duct, and partial gastrectomy,   pancreaticoduodenectomy (Whipple specimen):   - Poorly differentiated carcinoma with medullary features.   - See synoptic report.   - See comment.   Synoptic Report   Procedure: Pancreaticoduodenectomy with partial gastrectomy (Whipple   specimen)   Tumor site:  Pylorus and proximal duodenum   Histologic type: Poorly differentiated carcinoma with medullary features   Histologic grade: G3, poorly differentiated   Tumor size:  6.5 cm in greatest dimension grossly   Tumor extent:   Invades through muscularis propria into subserosa, or extends   into nonperitonealized perimuscular tissue (mesentery or retroperitoneum)   without serosal penetration   Macroscopic tumor perforation:  Not identified   Lymphovascular inv asion:  Not identified, see comment   Margin status for invasive carcinoma:  All  margins negative for invasive   carcinoma   Margin status for dysplasia:  All margins negative for carcinoma in Situ   (high-grade dysplasia)/adenoma   Regional lymph node status: Regional lymph nodes present        All Regional lymph nodes negative for tumor        Number of lymph nodes examined:  15 (including specimens 1 and 3)   PATHOLOGIC STAGE CLASSIFICATION (pTNM, AJCC 8th Edition):  p T3 N0   Additional findings:  Stomach with intestinal metaplasia (immunostain for   H.pylori negative), 2 lymph nodes with fibrosis and calcifications (GMS stain   for fungal organisms and AFB stain negative)   Ancillary studies:  Immunohistochemistry (IHC) Testing for Mismatch Repair   (MMR) Proteins   MLH1- Loss of nuclear expression   PMS2 - Loss of nuclear expression   MSH2 - Intact nuclear expression   MSH6 - Intact nuclear expression   Background nonneoplastic tissue/internal control with intact nuclear   expression   IHC Interpret ation:  Loss of nuclear expression of MLH1 and PMS2: testing for   methylation of the MLH1 promoter and/or mutation of BRAF is indicated (the   presence of a BRAF V600E mutation and/or MLH1 methylation suggests that the   tumor is sporadic and germline evaluation is probably not indicated; absence   of both MLH1 methylation and of BRAF V600E mutation suggests the possibility   of Seaman syndrome, and sequencing and/or large deletion/duplication testing   of germline MLH1 may be indicated)   Testing for methylation of the MLH1 promoter and mutation of BRAF is in   process and results will be supplemented.   There are exceptions to the above IHC interpretations. These results should   not be considered in isolation, and clinical correlation with genetic   counseling is recommended to assess the need for germline testing.   COMMENT:  The stomach and duodenum (specimen 3) shows a 6.5 cm mass involving   the pylorus with the majority of the tumor appearing to be in the duodenum.   The tumor shows  "poorly d ifferentiated sheets of blue cells with necrosis.   There is a significant amount of lymphocytic inflammatory infiltrate around   the edges of the tumor.  Immunostains show the tumor cells to be positive for   CK7 with patchy weak CDX2 and negative for synaptophysin and chromogranin.   There is also loss of MLH1 and PMS2 on MMR stains.  These features are   suggestive of medullary carcinoma.  Select slides reviewed by Dr. ITZEL Sánchez   who agrees with the diagnosis of poorly differentiated carcinoma with   medullary features.  Immunostains for CD 34 performed on blocks 3E and 3H   show no definitive lymphovascular invasion even though some areas suspicious   cells on routine H&E sections.  Appropriate positive controls   VC      Comment: Interp By Meredith Pappas MD, Signed on 04/12/2023 at 16:03   Supplemental Diagnosis      Abnormal   MLH1 Hypermethylation/BRAF Mutation   Result Summary   MLH1 HYPERMETHYLATION PRESENT/NO BRAF MUTATION IDENTIFIED   Result   Provided diagnosis: pylorus and proximal duodenum poorly differentiated   carcinoma with medullary features   Methylation status: Positive for MLH1 promoter hypermethylation   BRAF status: Wild-type (SEE INTERPRETATION)   Alexis Sotelo M.D.   Report attached   Performing location:   [place] [address]   "Disclaimer:  This case diagnosis was rendered completely by the outside   consultation pathologist and the case is electronically signed by an [facility]   pathologist listed below solely to release the report into the medical   record."          Assessment:       1. Duodenal adenocarcinoma    2. History of Whipple procedure    3. Gram-negative bacteremia    4. Moderate malnutrition    5. Decreased appetite    6. Anemia, unspecified type    7. Essential hypertension    8. Hyperlipidemia, unspecified hyperlipidemia type    9. Diabetes mellitus type 2, diet-controlled    10. " Aortic atherosclerosis       Plan:     # Duodenal adenocarcinoma   Mrs. Simms is a pleasant 67 y.o. female who presents for initial consultation and management of stage II duodenal adenocarcinoma s/p Whipple procedure on 3/15/23. Pathology did reveal some high grade features, including poorly differentiated carcinoma, 6.5 cm size, and invasion of tissue. However, her tumor is also MSI-high.     We had an in-depth conversation about her diagnosis and treatment options at this time. Being that the risk of cancer recurrence is slightly lower in patients with MSI-high features, we will proceed with surveillance at this time. We encouraged her to complete her round of IV antibiotics and focus on surgical recovery at this time. She is in agreement with this plan. Should her cancer recur in the future, we would initiate treatment with immunotherapy.     Will have her RTC in 3 months with labs to see Dr. Roberts.   Will plan for repeat CT CAP around 6 months after surgery (September 2023).     Repeat CT q6 months x 2 years   Can move with yearly scans if no evidence of disease after 2-3 years.     Referred to genetics as well.     # Decreased appetite, malnutrition  Resent marinol to pharmacy today.   Monitor weight closely.     # Anemia  Microcytic anemia.   No signs of bleeding, platelets normal.   Asymptomatic. Monitor.     # HTN, HLD, DM, aortic atherosclerosis   BP controlled in clinic.   Glucose controlled on last labs.   Following with PCP.   Continue medical management.     Follow up: 3 months with labs      Patient is in agreement with the proposed treatment plan. All questions were answered to the patient's satisfaction. Pt knows to call clinic if anything is needed before the next clinic visit.    Patient discussed with and also seen by collaborating physician, Dr. Roberts.    At least 45 minutes were spent today on this encounter including face to face time with the patient, data gathering/interpretation and  documentation.       Radha Padilla, MSN, APRN, [facility]-  Hematology and Medical Oncology  Clinical Nurse Specialist to Dr. Roberts, Dr. Schilling & Dr. Rosales    Route Chart for Scheduling    Med Onc Chart Routing      Follow up with physician 3 months. RTC in 3 months with labs (CBC,CMP) to see Dr. Roberts in clinic.   Follow up with YANDEL    Infusion scheduling note    Injection scheduling note    Labs CBC and CMP   Scheduling:  Preferred lab:  Lab interval: every 12 weeks     Imaging    Pharmacy appointment    Other referrals  Additional referrals needed       Referred to genetics

## 2023-04-18 ENCOUNTER — OFFICE VISIT (OUTPATIENT)
Dept: INTERNAL MEDICINE | Facility: CLINIC | Age: 68
End: 2023-04-18
Payer: MEDICARE

## 2023-04-18 ENCOUNTER — TELEPHONE (OUTPATIENT)
Dept: PHARMACY | Facility: CLINIC | Age: 68
End: 2023-04-18
Payer: MEDICARE

## 2023-04-18 ENCOUNTER — PATIENT OUTREACH (OUTPATIENT)
Dept: ADMINISTRATIVE | Facility: HOSPITAL | Age: 68
End: 2023-04-18
Payer: MEDICARE

## 2023-04-18 VITALS
HEART RATE: 61 BPM | SYSTOLIC BLOOD PRESSURE: 110 MMHG | WEIGHT: 155.19 LBS | BODY MASS INDEX: 24.94 KG/M2 | HEIGHT: 66 IN | RESPIRATION RATE: 18 BRPM | DIASTOLIC BLOOD PRESSURE: 68 MMHG

## 2023-04-18 DIAGNOSIS — Z90.410 HISTORY OF WHIPPLE PROCEDURE: Primary | ICD-10-CM

## 2023-04-18 DIAGNOSIS — C17.0 DUODENAL ADENOCARCINOMA: ICD-10-CM

## 2023-04-18 DIAGNOSIS — R78.81 GRAM-NEGATIVE BACTEREMIA: ICD-10-CM

## 2023-04-18 DIAGNOSIS — Z90.49 HISTORY OF WHIPPLE PROCEDURE: Primary | ICD-10-CM

## 2023-04-18 LAB
BACTERIA BLD CULT: NORMAL
BACTERIA BLD CULT: NORMAL

## 2023-04-18 PROCEDURE — 3044F HG A1C LEVEL LT 7.0%: CPT | Mod: CPTII,S$GLB,, | Performed by: NURSE PRACTITIONER

## 2023-04-18 PROCEDURE — 99999 PR PBB SHADOW E&M-EST. PATIENT-LVL III: ICD-10-PCS | Mod: PBBFAC,,, | Performed by: NURSE PRACTITIONER

## 2023-04-18 PROCEDURE — 99214 OFFICE O/P EST MOD 30 MIN: CPT | Mod: S$GLB,,, | Performed by: NURSE PRACTITIONER

## 2023-04-18 PROCEDURE — 1126F PR PAIN SEVERITY QUANTIFIED, NO PAIN PRESENT: ICD-10-PCS | Mod: CPTII,S$GLB,, | Performed by: NURSE PRACTITIONER

## 2023-04-18 PROCEDURE — 1101F PT FALLS ASSESS-DOCD LE1/YR: CPT | Mod: CPTII,S$GLB,, | Performed by: NURSE PRACTITIONER

## 2023-04-18 PROCEDURE — 1159F PR MEDICATION LIST DOCUMENTED IN MEDICAL RECORD: ICD-10-PCS | Mod: CPTII,S$GLB,, | Performed by: NURSE PRACTITIONER

## 2023-04-18 PROCEDURE — 3074F SYST BP LT 130 MM HG: CPT | Mod: CPTII,S$GLB,, | Performed by: NURSE PRACTITIONER

## 2023-04-18 PROCEDURE — 1159F MED LIST DOCD IN RCRD: CPT | Mod: CPTII,S$GLB,, | Performed by: NURSE PRACTITIONER

## 2023-04-18 PROCEDURE — 4010F ACE/ARB THERAPY RXD/TAKEN: CPT | Mod: CPTII,S$GLB,, | Performed by: NURSE PRACTITIONER

## 2023-04-18 PROCEDURE — 3044F PR MOST RECENT HEMOGLOBIN A1C LEVEL <7.0%: ICD-10-PCS | Mod: CPTII,S$GLB,, | Performed by: NURSE PRACTITIONER

## 2023-04-18 PROCEDURE — 3288F FALL RISK ASSESSMENT DOCD: CPT | Mod: CPTII,S$GLB,, | Performed by: NURSE PRACTITIONER

## 2023-04-18 PROCEDURE — 3288F PR FALLS RISK ASSESSMENT DOCUMENTED: ICD-10-PCS | Mod: CPTII,S$GLB,, | Performed by: NURSE PRACTITIONER

## 2023-04-18 PROCEDURE — 1126F AMNT PAIN NOTED NONE PRSNT: CPT | Mod: CPTII,S$GLB,, | Performed by: NURSE PRACTITIONER

## 2023-04-18 PROCEDURE — 3074F PR MOST RECENT SYSTOLIC BLOOD PRESSURE < 130 MM HG: ICD-10-PCS | Mod: CPTII,S$GLB,, | Performed by: NURSE PRACTITIONER

## 2023-04-18 PROCEDURE — 1101F PR PT FALLS ASSESS DOC 0-1 FALLS W/OUT INJ PAST YR: ICD-10-PCS | Mod: CPTII,S$GLB,, | Performed by: NURSE PRACTITIONER

## 2023-04-18 PROCEDURE — 3008F PR BODY MASS INDEX (BMI) DOCUMENTED: ICD-10-PCS | Mod: CPTII,S$GLB,, | Performed by: NURSE PRACTITIONER

## 2023-04-18 PROCEDURE — 3078F DIAST BP <80 MM HG: CPT | Mod: CPTII,S$GLB,, | Performed by: NURSE PRACTITIONER

## 2023-04-18 PROCEDURE — 3078F PR MOST RECENT DIASTOLIC BLOOD PRESSURE < 80 MM HG: ICD-10-PCS | Mod: CPTII,S$GLB,, | Performed by: NURSE PRACTITIONER

## 2023-04-18 PROCEDURE — 99999 PR PBB SHADOW E&M-EST. PATIENT-LVL III: CPT | Mod: PBBFAC,,, | Performed by: NURSE PRACTITIONER

## 2023-04-18 PROCEDURE — 3008F BODY MASS INDEX DOCD: CPT | Mod: CPTII,S$GLB,, | Performed by: NURSE PRACTITIONER

## 2023-04-18 PROCEDURE — 99214 PR OFFICE/OUTPT VISIT, EST, LEVL IV, 30-39 MIN: ICD-10-PCS | Mod: S$GLB,,, | Performed by: NURSE PRACTITIONER

## 2023-04-18 PROCEDURE — 4010F PR ACE/ARB THEARPY RXD/TAKEN: ICD-10-PCS | Mod: CPTII,S$GLB,, | Performed by: NURSE PRACTITIONER

## 2023-04-18 NOTE — PROGRESS NOTES
Chart reviewed, immunization record updated.  No new results noted on [facility] or [facility] web site.  Care Everywhere updated.   Patient care coordination note updated.   Upcoming PCP visit updated.  Next PCP visit 08/01/2023.  LOV with PCP 04/18/2023.  Contacted patient to discuss Smoking cessation.  States she is not ready to quit just yet.  Lab appointment scheduled for 05/30/2023.

## 2023-04-18 NOTE — PROGRESS NOTES
"Subjective:       Patient ID: Nan Simms is a 67 y.o. female.    Chief Complaint: Hospital Follow Up, Dehydration, and Fatigue    HPI: Pt presents to clinic today for hospital follow up. Pt was seen in our clinic 4/4 for routine follow up. Since then she was seen in the ER on:    - 4/10 for dehydration. She had a near syncopal episode. She was given IV fluids and was discharged after feeling better and being asymptomatic.     - 4/11 she was called to return to the ER due to positive blood culture results from the day before. Cultures grew back Klebsiella pneumoniae. She was admitted to [facility] then was transferred to [facility] the next day. ID recommended a PICC line be placed and 2 weeks of Daptomycin q24 hours and Ceftriaxone q24 hours. The patient and her  were educated on these home infusions and were discharged on 4/15.    Today, pt reports she is feeling much better. PICC noted to her right upper arm - asymptomatic. She states that she is having no issues with the infusions at home. She has follow up with ID on 4/26 for re-eval and to remove PICC.     Had appt with oncologist yesterday:    "Stage II duodenal adenocarcinoma   High grade features, but MSI-high   Being that the risk of cancer recurrence is slightly lower in these patients, we will proceed with surveillance at this time. Should her cancer recur in the future, we would initiate treatment with immunotherapy.      Will have her RTC in 3 months with labs to see Dr. Roberts.   Will plan for repeat CT CAP around 6 months after surgery (September 2023).      Repeat CT q6 months x 2 years   Can move with yearly scans if no evidence of disease after 2-3 years"    She is still complaining of loss of appetite. Is having issue getting Marinol because of her insurance.     Review of Systems   Constitutional:  Positive for appetite change. Negative for chills, fatigue, fever and unexpected weight change.   HENT:  Negative for congestion, " ear pain, sore throat and trouble swallowing.    Eyes:  Negative for pain and visual disturbance.   Respiratory:  Negative for cough, chest tightness and shortness of breath.    Cardiovascular:  Negative for chest pain, palpitations and leg swelling.   Gastrointestinal:  Negative for abdominal distention, abdominal pain, constipation, diarrhea and vomiting.   Genitourinary:  Negative for difficulty urinating, dysuria, flank pain, frequency and hematuria.   Musculoskeletal:  Negative for back pain, gait problem, joint swelling, neck pain and neck stiffness.   Skin:  Negative for rash and wound.   Neurological:  Negative for dizziness, seizures, speech difficulty, light-headedness and headaches.     Objective:      Physical Exam  Vitals and nursing note reviewed.   Constitutional:       Appearance: Normal appearance. She is well-developed.   HENT:      Head: Normocephalic and atraumatic.      Right Ear: External ear normal.      Left Ear: External ear normal.      Nose: Nose normal.   Eyes:      Conjunctiva/sclera: Conjunctivae normal.      Pupils: Pupils are equal, round, and reactive to light.   Cardiovascular:      Rate and Rhythm: Normal rate and regular rhythm.      Heart sounds: Normal heart sounds. No murmur heard.  Pulmonary:      Effort: Pulmonary effort is normal. No respiratory distress.      Breath sounds: Normal breath sounds. No wheezing.   Abdominal:      General: Bowel sounds are normal. There is no distension.      Palpations: Abdomen is soft. There is no mass.      Tenderness: There is no abdominal tenderness.   Musculoskeletal:         General: Normal range of motion.      Cervical back: Normal range of motion and neck supple.      Right lower leg: No edema.      Left lower leg: No edema.   Skin:     General: Skin is warm and dry.      Capillary Refill: Capillary refill takes less than 2 seconds.      Comments: PICC noted to right upper arm. No redness or swelling noted to site.    Neurological:       Mental Status: She is alert and oriented to person, place, and time.   Psychiatric:         Behavior: Behavior normal.         Thought Content: Thought content normal.         Judgment: Judgment normal.       Assessment:       1. History of Whipple procedure    2. Duodenal adenocarcinoma    3. Gram-negative bacteremia        Plan:     Problem List Items Addressed This Visit       Gram-negative bacteremia    History of Whipple procedure - Primary    Duodenal adenocarcinoma     Lab Results   Component Value Date    WBC 6.61 04/17/2023    HGB 9.1 (L) 04/17/2023    HCT 30.3 (L) 04/17/2023    MCV 73 (L) 04/17/2023     04/17/2023       BMP  Lab Results   Component Value Date     04/17/2023    K 3.9 04/17/2023     04/17/2023    CO2 29 04/17/2023    BUN 11 04/17/2023    CREATININE 0.6 04/17/2023    CALCIUM 9.6 04/17/2023    ANIONGAP 10 04/17/2023    EGFRNORACEVR >60 04/17/2023     Continues to improve blood counts. Bp stable as well as renal function   Consider periactin or megace for appetite stimulant if marinol not going to be covered. She is 155# today and was 153 at last visit so  no longer losing which is great. Keep close f.u I/d 4/26 and every 3 months with labs heme onc scan every 6 months

## 2023-04-19 ENCOUNTER — TELEPHONE (OUTPATIENT)
Dept: INTERNAL MEDICINE | Facility: CLINIC | Age: 68
End: 2023-04-19
Payer: MEDICARE

## 2023-04-19 DIAGNOSIS — I10 HYPERTENSION, UNSPECIFIED TYPE: ICD-10-CM

## 2023-04-19 RX ORDER — LISINOPRIL AND HYDROCHLOROTHIAZIDE 12.5; 2 MG/1; MG/1
1 TABLET ORAL DAILY
Qty: 90 TABLET | Refills: 3 | Status: SHIPPED | OUTPATIENT
Start: 2023-04-19 | End: 2023-11-01 | Stop reason: SDUPTHER

## 2023-04-19 NOTE — TELEPHONE ENCOUNTER
----- Message from Danita Faulkner sent at 2023  9:51 AM CDT -----  Contact: pt  Nan Simms  MRN: [id number]  : 1955  PCP: Payal Moreland  Home Phone      [phone]  Work Phone      Not on file.  Mobile          [phone]      MESSAGE:     Pt states insurance denied droNABinol (MARINOL) 2.5 MG capsule and was told to call back to let payal know         Please advise  [phone]

## 2023-04-21 NOTE — PHYSICIAN QUERY
PT Name: Nan Simms  MR #: [id number]    DOCUMENTATION CLARIFICATION     CDS: Brandy Capley, RN  Email: [email or web address]      This form is a permanent document in the medical record.     Query Date: April 21, 2023    Dear Provider,  By submitting this query, we are merely seeking further clarification of documentation. Please utilize your independent clinical judgment when addressing the question(s) below.    The medical record contains the following:  Supporting Clinical Findings Location in Medical Record     Ms. Simms is now 4 weeks s/p [place]. She went to an outside ED 3 days ago with chills, weakness, and near syncope. Her lactate was slightly elevated at 2.5. Labs were otherwise largely unremarkable. She felt much better and was discharged after receiving 2 L IVF. However, 2 days ago her blood cultures returned positive, and she was asked to return to the OSH for admission. The blood cultures are now growing Klebsiella.     I reviewed her CT, and it looks quite good with no evident source for her bacteremia. There is very minimal free fluid, and there are no organized collections. WBC and LFTs normal. There is also no obvious source in her urine. I suspect this was an episode of transient cholangitis from reflux. We will get repeat blood and urine cultures, follow up sensitivities from the OSH, and consult ID for recommendations on the choice and duration of antibiotics for suspected cholangitis associated bacteremia.    Continue flagyl + rocephin until cultures return sensitivities; have dc'ed vanc since no gram pos resulted in final cultures    Polymicrobial bacteremia from GI translocation.K pneumoniae and now E faecium on culture. No fluid collections to suspect intraabdominal abscess.     metronidazole IVPB 500 mg     IV Every 8 hours   cefTRIAXone (ROCEPHIN) 2 g in dextrose 5 % in water (D5W) 5 % 50 mL IVPB (MB+)    IV Every 24 hours     PICC in place, home abx arranged.     H&P 4/13 (current  encounter)                      ID consult 4/13 (current encounter)                Discharge summary 4/15 (current encounter)   KLEBSIELLA PNEUMONIAE   ENTEROCOCCUS FAECIUM     KLEBSIELLA PNEUMONIAE Abnormal      No growth after 5 days.    No significant growth    Blood cultures 4/10 1427 (Prior to encounter)      Blood cultures 4/10 1427 (Prior to encounter)    Blood cultures 4/13  (Prior to encounter)    Urine culture 4/12       Please clarify if __bacteremia__ (as it relates to __whipple__) is:      [  ] Complication of the procedure   [ X ] Present, but not a complication of the procedure     [  ] Other (please specify): __________________     [  ] Clinically Undetermined         Please document in your progress notes daily for the duration of treatment until resolved and include in your discharge summary.

## 2023-04-24 ENCOUNTER — LAB VISIT (OUTPATIENT)
Dept: LAB | Facility: HOSPITAL | Age: 68
End: 2023-04-24
Attending: STUDENT IN AN ORGANIZED HEALTH CARE EDUCATION/TRAINING PROGRAM
Payer: MEDICARE

## 2023-04-24 DIAGNOSIS — R78.81 BACTEREMIA: Primary | ICD-10-CM

## 2023-04-24 LAB
ALBUMIN SERPL BCP-MCNC: 3.5 G/DL (ref 3.5–5.2)
ALP SERPL-CCNC: 69 U/L (ref 55–135)
ALT SERPL W/O P-5'-P-CCNC: 36 U/L (ref 10–44)
ANION GAP SERPL CALC-SCNC: 11 MMOL/L (ref 8–16)
AST SERPL-CCNC: 54 U/L (ref 10–40)
BASOPHILS # BLD AUTO: 0.02 K/UL (ref 0–0.2)
BASOPHILS NFR BLD: 0.3 % (ref 0–1.9)
BILIRUB SERPL-MCNC: 0.3 MG/DL (ref 0.1–1)
BUN SERPL-MCNC: 14 MG/DL (ref 8–23)
CALCIUM SERPL-MCNC: 10.2 MG/DL (ref 8.7–10.5)
CHLORIDE SERPL-SCNC: 98 MMOL/L (ref 95–110)
CK SERPL-CCNC: 1478 U/L (ref 20–180)
CO2 SERPL-SCNC: 30 MMOL/L (ref 23–29)
CREAT SERPL-MCNC: 0.6 MG/DL (ref 0.5–1.4)
CRP SERPL-MCNC: 1.5 MG/L (ref 0–8.2)
DIFFERENTIAL METHOD: ABNORMAL
EOSINOPHIL # BLD AUTO: 0.2 K/UL (ref 0–0.5)
EOSINOPHIL NFR BLD: 3.2 % (ref 0–8)
ERYTHROCYTE [DISTWIDTH] IN BLOOD BY AUTOMATED COUNT: 19 % (ref 11.5–14.5)
EST. GFR  (NO RACE VARIABLE): >60 ML/MIN/1.73 M^2
GLUCOSE SERPL-MCNC: 82 MG/DL (ref 70–110)
HCT VFR BLD AUTO: 31.8 % (ref 37–48.5)
HGB BLD-MCNC: 9.8 G/DL (ref 12–16)
IMM GRANULOCYTES # BLD AUTO: 0.01 K/UL (ref 0–0.04)
IMM GRANULOCYTES NFR BLD AUTO: 0.2 % (ref 0–0.5)
LYMPHOCYTES # BLD AUTO: 1.6 K/UL (ref 1–4.8)
LYMPHOCYTES NFR BLD: 26.9 % (ref 18–48)
MCH RBC QN AUTO: 22.2 PG (ref 27–31)
MCHC RBC AUTO-ENTMCNC: 30.8 G/DL (ref 32–36)
MCV RBC AUTO: 72 FL (ref 82–98)
MONOCYTES # BLD AUTO: 0.5 K/UL (ref 0.3–1)
MONOCYTES NFR BLD: 8.3 % (ref 4–15)
NEUTROPHILS # BLD AUTO: 3.7 K/UL (ref 1.8–7.7)
NEUTROPHILS NFR BLD: 61.1 % (ref 38–73)
NRBC BLD-RTO: 0 /100 WBC
PLATELET # BLD AUTO: 410 K/UL (ref 150–450)
PMV BLD AUTO: 10.5 FL (ref 9.2–12.9)
POTASSIUM SERPL-SCNC: 3.6 MMOL/L (ref 3.5–5.1)
PREALB SERPL-MCNC: 27 MG/DL (ref 20–43)
PROT SERPL-MCNC: 7.5 G/DL (ref 6–8.4)
RBC # BLD AUTO: 4.42 M/UL (ref 4–5.4)
SODIUM SERPL-SCNC: 139 MMOL/L (ref 136–145)
WBC # BLD AUTO: 6.02 K/UL (ref 3.9–12.7)

## 2023-04-24 PROCEDURE — 82550 ASSAY OF CK (CPK): CPT

## 2023-04-24 PROCEDURE — 80053 COMPREHEN METABOLIC PANEL: CPT

## 2023-04-24 PROCEDURE — 86140 C-REACTIVE PROTEIN: CPT

## 2023-04-24 PROCEDURE — 85025 COMPLETE CBC W/AUTO DIFF WBC: CPT

## 2023-04-24 PROCEDURE — 84134 ASSAY OF PREALBUMIN: CPT

## 2023-04-26 ENCOUNTER — INFUSION (OUTPATIENT)
Dept: INFECTIOUS DISEASES | Facility: HOSPITAL | Age: 68
End: 2023-04-26
Payer: MEDICARE

## 2023-04-26 ENCOUNTER — OFFICE VISIT (OUTPATIENT)
Dept: INFECTIOUS DISEASES | Facility: CLINIC | Age: 68
End: 2023-04-26
Payer: MEDICARE

## 2023-04-26 VITALS
HEIGHT: 66 IN | DIASTOLIC BLOOD PRESSURE: 61 MMHG | HEART RATE: 80 BPM | WEIGHT: 148.56 LBS | SYSTOLIC BLOOD PRESSURE: 115 MMHG | BODY MASS INDEX: 23.88 KG/M2 | TEMPERATURE: 99 F

## 2023-04-26 DIAGNOSIS — Z79.2 RECEIVING INTRAVENOUS ANTIBIOTIC TREATMENT AS OUTPATIENT: ICD-10-CM

## 2023-04-26 DIAGNOSIS — R78.81 BACTEREMIA DUE TO ENTEROCOCCUS: Primary | ICD-10-CM

## 2023-04-26 DIAGNOSIS — B95.2 BACTEREMIA DUE TO ENTEROCOCCUS: Primary | ICD-10-CM

## 2023-04-26 LAB
BACTERIA BLD CULT: ABNORMAL

## 2023-04-26 PROCEDURE — 3008F PR BODY MASS INDEX (BMI) DOCUMENTED: ICD-10-PCS | Mod: CPTII,S$GLB,, | Performed by: STUDENT IN AN ORGANIZED HEALTH CARE EDUCATION/TRAINING PROGRAM

## 2023-04-26 PROCEDURE — 1160F RVW MEDS BY RX/DR IN RCRD: CPT | Mod: CPTII,S$GLB,, | Performed by: STUDENT IN AN ORGANIZED HEALTH CARE EDUCATION/TRAINING PROGRAM

## 2023-04-26 PROCEDURE — 3074F SYST BP LT 130 MM HG: CPT | Mod: CPTII,S$GLB,, | Performed by: STUDENT IN AN ORGANIZED HEALTH CARE EDUCATION/TRAINING PROGRAM

## 2023-04-26 PROCEDURE — 3044F HG A1C LEVEL LT 7.0%: CPT | Mod: CPTII,S$GLB,, | Performed by: STUDENT IN AN ORGANIZED HEALTH CARE EDUCATION/TRAINING PROGRAM

## 2023-04-26 PROCEDURE — 3078F PR MOST RECENT DIASTOLIC BLOOD PRESSURE < 80 MM HG: ICD-10-PCS | Mod: CPTII,S$GLB,, | Performed by: STUDENT IN AN ORGANIZED HEALTH CARE EDUCATION/TRAINING PROGRAM

## 2023-04-26 PROCEDURE — 99215 PR OFFICE/OUTPT VISIT, EST, LEVL V, 40-54 MIN: ICD-10-PCS | Mod: S$GLB,,, | Performed by: STUDENT IN AN ORGANIZED HEALTH CARE EDUCATION/TRAINING PROGRAM

## 2023-04-26 PROCEDURE — 3008F BODY MASS INDEX DOCD: CPT | Mod: CPTII,S$GLB,, | Performed by: STUDENT IN AN ORGANIZED HEALTH CARE EDUCATION/TRAINING PROGRAM

## 2023-04-26 PROCEDURE — 99999 PR PBB SHADOW E&M-EST. PATIENT-LVL III: CPT | Mod: PBBFAC,,, | Performed by: STUDENT IN AN ORGANIZED HEALTH CARE EDUCATION/TRAINING PROGRAM

## 2023-04-26 PROCEDURE — 3044F PR MOST RECENT HEMOGLOBIN A1C LEVEL <7.0%: ICD-10-PCS | Mod: CPTII,S$GLB,, | Performed by: STUDENT IN AN ORGANIZED HEALTH CARE EDUCATION/TRAINING PROGRAM

## 2023-04-26 PROCEDURE — 3288F PR FALLS RISK ASSESSMENT DOCUMENTED: ICD-10-PCS | Mod: CPTII,S$GLB,, | Performed by: STUDENT IN AN ORGANIZED HEALTH CARE EDUCATION/TRAINING PROGRAM

## 2023-04-26 PROCEDURE — 1111F PR DISCHARGE MEDS RECONCILED W/ CURRENT OUTPATIENT MED LIST: ICD-10-PCS | Mod: CPTII,S$GLB,, | Performed by: STUDENT IN AN ORGANIZED HEALTH CARE EDUCATION/TRAINING PROGRAM

## 2023-04-26 PROCEDURE — 1101F PR PT FALLS ASSESS DOC 0-1 FALLS W/OUT INJ PAST YR: ICD-10-PCS | Mod: CPTII,S$GLB,, | Performed by: STUDENT IN AN ORGANIZED HEALTH CARE EDUCATION/TRAINING PROGRAM

## 2023-04-26 PROCEDURE — 1159F PR MEDICATION LIST DOCUMENTED IN MEDICAL RECORD: ICD-10-PCS | Mod: CPTII,S$GLB,, | Performed by: STUDENT IN AN ORGANIZED HEALTH CARE EDUCATION/TRAINING PROGRAM

## 2023-04-26 PROCEDURE — 99215 OFFICE O/P EST HI 40 MIN: CPT | Mod: S$GLB,,, | Performed by: STUDENT IN AN ORGANIZED HEALTH CARE EDUCATION/TRAINING PROGRAM

## 2023-04-26 PROCEDURE — 3078F DIAST BP <80 MM HG: CPT | Mod: CPTII,S$GLB,, | Performed by: STUDENT IN AN ORGANIZED HEALTH CARE EDUCATION/TRAINING PROGRAM

## 2023-04-26 PROCEDURE — 4010F PR ACE/ARB THEARPY RXD/TAKEN: ICD-10-PCS | Mod: CPTII,S$GLB,, | Performed by: STUDENT IN AN ORGANIZED HEALTH CARE EDUCATION/TRAINING PROGRAM

## 2023-04-26 PROCEDURE — 4010F ACE/ARB THERAPY RXD/TAKEN: CPT | Mod: CPTII,S$GLB,, | Performed by: STUDENT IN AN ORGANIZED HEALTH CARE EDUCATION/TRAINING PROGRAM

## 2023-04-26 PROCEDURE — 1159F MED LIST DOCD IN RCRD: CPT | Mod: CPTII,S$GLB,, | Performed by: STUDENT IN AN ORGANIZED HEALTH CARE EDUCATION/TRAINING PROGRAM

## 2023-04-26 PROCEDURE — 1126F PR PAIN SEVERITY QUANTIFIED, NO PAIN PRESENT: ICD-10-PCS | Mod: CPTII,S$GLB,, | Performed by: STUDENT IN AN ORGANIZED HEALTH CARE EDUCATION/TRAINING PROGRAM

## 2023-04-26 PROCEDURE — 1101F PT FALLS ASSESS-DOCD LE1/YR: CPT | Mod: CPTII,S$GLB,, | Performed by: STUDENT IN AN ORGANIZED HEALTH CARE EDUCATION/TRAINING PROGRAM

## 2023-04-26 PROCEDURE — 99999 PR PBB SHADOW E&M-EST. PATIENT-LVL III: ICD-10-PCS | Mod: PBBFAC,,, | Performed by: STUDENT IN AN ORGANIZED HEALTH CARE EDUCATION/TRAINING PROGRAM

## 2023-04-26 PROCEDURE — 1160F PR REVIEW ALL MEDS BY PRESCRIBER/CLIN PHARMACIST DOCUMENTED: ICD-10-PCS | Mod: CPTII,S$GLB,, | Performed by: STUDENT IN AN ORGANIZED HEALTH CARE EDUCATION/TRAINING PROGRAM

## 2023-04-26 PROCEDURE — 1126F AMNT PAIN NOTED NONE PRSNT: CPT | Mod: CPTII,S$GLB,, | Performed by: STUDENT IN AN ORGANIZED HEALTH CARE EDUCATION/TRAINING PROGRAM

## 2023-04-26 PROCEDURE — 3074F PR MOST RECENT SYSTOLIC BLOOD PRESSURE < 130 MM HG: ICD-10-PCS | Mod: CPTII,S$GLB,, | Performed by: STUDENT IN AN ORGANIZED HEALTH CARE EDUCATION/TRAINING PROGRAM

## 2023-04-26 PROCEDURE — 3288F FALL RISK ASSESSMENT DOCD: CPT | Mod: CPTII,S$GLB,, | Performed by: STUDENT IN AN ORGANIZED HEALTH CARE EDUCATION/TRAINING PROGRAM

## 2023-04-26 PROCEDURE — 1111F DSCHRG MED/CURRENT MED MERGE: CPT | Mod: CPTII,S$GLB,, | Performed by: STUDENT IN AN ORGANIZED HEALTH CARE EDUCATION/TRAINING PROGRAM

## 2023-04-26 RX ORDER — LINEZOLID 600 MG/1
600 TABLET, FILM COATED ORAL 2 TIMES DAILY
Qty: 28 TABLET | Refills: 0 | Status: SHIPPED | OUTPATIENT
Start: 2023-04-26 | End: 2023-05-11

## 2023-04-26 NOTE — PROGRESS NOTES
Pt arrives to clinic for PICC removal; Orders checked and are signed by MD; pt educated on site care with pressure dressing;  COLEEN PICC d/c'd by RN and wrapped with a pressure dressing of Steril Gauze, Vaseline Gauze and coban; pt monitored for 30 mins post removal of PICC, and has no s/s of distress adverse effects; pt tolerated well and d/c'd from clinic;

## 2023-04-26 NOTE — PROGRESS NOTES
Post-Op Follow-up Visit:   4/27/2023  Patient ID: Nan Simms is a 67 y.o. female, born 1955    Chief Complaint   Patient presents with    Post-op Evaluation     3/9 - 3/24[place]2023[place] [facility] [place], [place]; CT scan showed mass at pylorus and duodenum with GOO  3/15/2023: open Whipple per Dr. 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 She was admitted to [facility] with + blood cultures then transferred to [facility] on 4/13/23 for 2 days. She was discharged on regular diet with PICC in place for 2 weeks of IV abx per ID. IV abx and PICC were discontinued yesterday and she is starting oral medicine, Zyvox. Remains afebrile.  She was evaluated by WEI Padilla, CNS and Dr. Roberst who is planning for active surveillance. No adj chemo recommended as her tumor was MSI-high.   Overall she reports feeling well, denies any pain. Appetite remains poor but she denies N/V/D. Having regular BMs. She usually only eats 2 meals daily, does not snack. Drinks Boost 1 QOD. Rx for Marinol sent to pharmacy on 4/17/23 but she has not picked it up yet. Weight down ~ 20#.   Baseline weight in 12/2022= 170#  In 3/2023 = 160s#  In 4/2023 = down 10# weight in 150s  Yesterday's weight in ID clinic 148#.       Chemistry        Component Value Date/Time     04/27/2023 0928    K 3.8 04/27/2023 0928    CL 97 04/27/2023 0928    CO2 31 (H) 04/27/2023 0928    BUN 12 04/27/2023 0928    CREATININE 0.6 04/27/2023 0928     04/27/2023 0928        Component Value Date/Time    CALCIUM 10.3 04/27/2023 0928    ALKPHOS 69 04/27/2023 0928    AST 57 (H) 04/27/2023 0928    ALT 43 04/27/2023 0928    BILITOT 0.3 04/27/2023 0928    ESTGFRAFRICA >60 05/27/2022 0815    EGFRNONAA >60 05/27/2022 0815        Lab Results   Component Value Date    WBC 6.06 04/27/2023    HGB 10.3 (L) 04/27/2023    HCT 34.5 (L) 04/27/2023     MCV 73 (L) 04/27/2023     04/27/2023       Lab Results   Component Value Date    PREALBUMIN 27 04/24/2023    PREALBUMIN 17 (L) 04/17/2023    PREALBUMIN 18 (L) 04/14/2023 4/11/2023: CT A/P:  Postsurgical changes of Whipple are redemonstrated with small volume free fluid in the oeperative bed, decreased since prior.    Physical Exam:  General:  Non-toxic, ambulatory  Abd:  Soft, non-tender  Incision:  midline abd incision healing    Pathology:  Procedure: Pancreaticoduodenectomy with partial gastrectomy (Whipple specimen) Tumor site: Pylorus and proximal duodenum Histologic type: Poorly differentiated carcinoma with medullary features Histologic grade: G3, poorly differentiated Tumor size: 6.5 cm in greatest dimension grossly Tumor extent: Invades through muscularis propria into subserosa, or extends into nonperitonealized perimuscular tissue (mesentery or retroperitoneum) without serosal penetration Macroscopic tumor perforation: Not identified Lymphovascular invasion: Not identified, see comment Margin status for invasive carcinoma: All margins negative for invasive carcinoma Margin status for dysplasia: All margins negative for carcinoma in Situ (high-grade dysplasia)/adenoma  Regional lymph node status: Regional lymph nodes present All Regional lymph nodes negative for tumor Number of lymph nodes examined: 15 (including specimens 1 and 3) PATHOLOGIC STAGE CLASSIFICATION (pTNM, AJCC 8th Edition): p T3 N0 Additional findings: Stomach with intestinal metaplasia (immunostain for H.pylori negative), 2 lymph nodes with fibrosis and calcifications (GMS stain for fungal organisms and AFB stain negative) Ancillary studies: Immunohistochemistry (IHC) Testing for Mismatch Repair (MMR) Proteins MLH1- Loss of nuclear expression PMS2 - Loss of nuclear expression MSH2 - Intact nuclear expression MSH6 - Intact nuclear expression Background nonneoplastic tissue/internal control with intact nuclear expression       ICD-10-CM ICD-9-CM    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 History of Whipple procedure  Z90.410 V88.11     Z90.49 V45.72      V45.79       3. Moderate malnutrition  E44.0 263.0       Plan   Reviewed pathology again and she is aware of plan for surveillance per med onc:  Will have her RTC in 3 months with labs to see Dr. Roberts.   Will plan for repeat CT CAP around 6 months after surgery (September 2023).   Instructed to  Rx for marinol and Zyvox from local pharmacy  Continue regular diet, encouraged to eat small frequent meals throughout the day, drink protein supplement daily, rather than QOD. Offered referral to dietitian today but she refused at this time.   Increase physical activity as tolerated.   Questions were asked and answered to patient's satisfaction.      RTC in 3 months for surveillance with Dr. Roberts, coordinate appt same day as she is coming from out of town.     Pt seen in conjunction with Dr. Paez today.         Jennifer Carias NP  Upper GI / Hepatobiliary Surgical Oncology  [facility] [place], [place]  Office: [phone]  Fax: [phone]

## 2023-04-26 NOTE — PROGRESS NOTES
INFECTIOUS DISEASE CLINIC  04/26/2023     Subjective:      Chief Complaint:   Chief Complaint   Patient presents with    Hospital Follow Up       History of Present Illness:    This is a 67 y.o. 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  Accompanied by  today. Patient is new to me. Patient known to ID, please see prior notes for full details.     Pt stated that prior to her admission, she weakness, chills and sweats. No abdo pain or dysuria. Completed last dose of both antibiotics today. Denies problem with PICC. Reports feeling back to normal self, though reports continued decreased appetite.           Review of Systems   Constitutional: Negative for chills and fever.   Musculoskeletal:  Negative for muscle cramps, muscle weakness and myalgias.   Gastrointestinal:  Negative for abdominal pain and diarrhea.   Genitourinary:  Negative for dysuria.       Past Medical History:   Diagnosis Date    Abnormal Pap smear of cervix     Arthritis     Duodenal adenocarcinoma     Hypertension     Hyperthyroidism      Past Surgical History:   Procedure Laterality Date    CATARACT EXTRACTION W/  INTRAOCULAR LENS IMPLANT Right 8/8/2019    Procedure: EXTRACTION, CATARACT, WITH IOL INSERTION;  Surgeon: Spenser Lee MD;  Location: [facility];  Service: Ophthalmology;  Laterality: Right;    ESOPHAGOGASTRODUODENOSCOPY N/A 3/10/2023    Procedure: EGD (ESOPHAGOGASTRODUODENOSCOPY);  Surgeon: Shashi Tapia MD;  Location: [address];  Service: Endoscopy;  Laterality: N/A;    EYE SURGERY      HYSTERECTOMY      OOPHORECTOMY      PHACOEMULSIFICATION OF CATARACT Right 8/8/2019    Procedure: PHACOEMULSIFICATION, CATARACT;  Surgeon: Spenser Lee MD;  Location: [facility];  Service: Ophthalmology;  Laterality: Right;    WHIPPLE PROCEDURE N/A 3/15/2023    Procedure:  "WHIPPLE PROCEDURE;  Surgeon: Nick Paez MD;  Location: [facility] OR [address] [place] [address];  Service: General;  Laterality: N/A;     Family History   Problem Relation Age of Onset    Cancer Mother     Breast cancer Neg Hx     Colon cancer Neg Hx     Ovarian cancer Neg Hx      Social History     Tobacco Use    Smoking status: Every Day     Packs/day: 0.25     Years: 30.00     Pack years: 7.50     Types: Cigarettes    Smokeless tobacco: Current   Substance Use Topics    Alcohol use: Yes     Alcohol/week: 1.0 standard drink     Types: 1 Cans of beer per week    Drug use: No       Review of patient's allergies indicates:   Allergen Reactions    Aspirin Other (See Comments)     Pt states it is "hard on her stomach" She can tolerate a low dose aspirin    Pcn [penicillins]      Childhood - Tolerated ceftriaxone and cefazolin with no documented issues in the past          Objective:   VS (24h):   Vitals:    04/26/23 1424   BP: 115/61   Pulse: 80   Temp: 98.7 °F (37.1 °C)         Physical Exam  Constitutional:       General: She is not in acute distress.     Appearance: She is not ill-appearing or toxic-appearing.   HENT:      Mouth/Throat:      Mouth: Mucous membranes are moist.      Pharynx: No oropharyngeal exudate or posterior oropharyngeal erythema.   Eyes:      General: No scleral icterus.        Right eye: No discharge.         Left eye: No discharge.   Cardiovascular:      Rate and Rhythm: Normal rate and regular rhythm.      Heart sounds: No murmur heard.  Pulmonary:      Effort: Pulmonary effort is normal. No respiratory distress.   Abdominal:      General: There is no distension.      Palpations: Abdomen is soft.      Tenderness: There is no abdominal tenderness. There is no guarding.      Comments: Surgical site well healed   Musculoskeletal:      Right lower leg: No edema.      Left lower leg: No edema.   Skin:     General: Skin is warm and dry.      Comments: R picc   Neurological:      Mental Status: She is alert and " oriented to person, place, and time. Mental status is at baseline.      Motor: No weakness.   Psychiatric:         Mood and Affect: Mood normal.         Behavior: Behavior normal.           Labs:  CPK 1400    Micro:   4/10 blcx Kleb pneumo (pansusceptible) and enterococcus faecium (vanc susceptible, dapto >4)    Radiology:   4/11 CT abdo     Impression:     Postsurgical changes of Whipple are redemonstrated with small volume free fluid in the oeperative bed, decreased since prior.       Immunization History   Administered Date(s) Administered    COVID-19, MRNA, LN-S, PF (MODERNA FULL 0.5 ML DOSE) 02/11/2021, 03/12/2021    Tdap 10/04/2018         Assessment:     1. Bacteremia due to Enterococcus  - Blood culture; Future  - linezolid (ZYVOX) 600 mg Tab; Take 1 tablet (600 mg total) by mouth 2 (two) times daily. for 14 days  Dispense: 28 tablet; Refill: 0  - Blood culture; Future  - Nursing communication    2. Receiving intravenous antibiotic treatment as outpatient       67 y.o. 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 Prior hospital course notable for polymicrobial bacteremia (KP/Efaecium) on 4/10 with repeat ngtd. Last ECHO 3/2023 that was negative for veg. CT without any abscess, suspected source to be gut translocation from recent intraabdominal surgery. Pt reports improvement in her symptoms and denies ongoing s/s of infection. Prior surgical site healing well. Discussed recent blood work with patient - pt denies adverse reactions to IV abx and denies problems with PICC. Called micro, Efaecium susceptible to linezolid.     Plan:     -stop IV antibiotic therapy, informed infusion company  -pull PICC today  -repeat 2 sets of blood cultures  -linezolid x14d given elevated dapto CAMILLE; discussed potential adverse reactions with pt, pt voiced  understanding        Follow up PRN    Management of bacteremia was discussed with patient. Patient was given ample time for questions, all questions answered. Strict return precautions given to patient.       66 minutes of total time spent on the encounter, which includes face to face time and non-face to face time preparing to see the patient (eg, review of tests), Obtaining and/or reviewing separately obtained history, documenting clinical information in the electronic or other health record, independently interpreting results (not separately reported) and communicating results to the patient/family/caregiver, or care coordination (not separately reported).           Alysha Aaron MD  Infectious Disease

## 2023-04-27 ENCOUNTER — LAB VISIT (OUTPATIENT)
Dept: LAB | Facility: HOSPITAL | Age: 68
End: 2023-04-27
Attending: SURGERY
Payer: MEDICARE

## 2023-04-27 ENCOUNTER — OFFICE VISIT (OUTPATIENT)
Dept: SURGERY | Facility: CLINIC | Age: 68
End: 2023-04-27
Payer: MEDICARE

## 2023-04-27 ENCOUNTER — TELEPHONE (OUTPATIENT)
Dept: INTERNAL MEDICINE | Facility: CLINIC | Age: 68
End: 2023-04-27
Payer: MEDICARE

## 2023-04-27 DIAGNOSIS — Z90.410 HISTORY OF WHIPPLE PROCEDURE: ICD-10-CM

## 2023-04-27 DIAGNOSIS — E44.0 MODERATE MALNUTRITION: ICD-10-CM

## 2023-04-27 DIAGNOSIS — C17.0 DUODENAL ADENOCARCINOMA: Primary | ICD-10-CM

## 2023-04-27 DIAGNOSIS — C17.0 DUODENAL ADENOCARCINOMA: ICD-10-CM

## 2023-04-27 DIAGNOSIS — Z90.49 HISTORY OF WHIPPLE PROCEDURE: ICD-10-CM

## 2023-04-27 PROBLEM — T81.89XA NON-HEALING SURGICAL WOUND: Status: RESOLVED | Noted: 2018-01-12 | Resolved: 2023-04-27

## 2023-04-27 PROBLEM — J41.0 SIMPLE CHRONIC BRONCHITIS: Status: RESOLVED | Noted: 2022-05-25 | Resolved: 2023-04-27

## 2023-04-27 LAB
ALBUMIN SERPL BCP-MCNC: 3.5 G/DL (ref 3.5–5.2)
ALP SERPL-CCNC: 69 U/L (ref 55–135)
ALT SERPL W/O P-5'-P-CCNC: 43 U/L (ref 10–44)
ANION GAP SERPL CALC-SCNC: 10 MMOL/L (ref 8–16)
AST SERPL-CCNC: 57 U/L (ref 10–40)
BASOPHILS # BLD AUTO: 0.04 K/UL (ref 0–0.2)
BASOPHILS NFR BLD: 0.7 % (ref 0–1.9)
BILIRUB SERPL-MCNC: 0.3 MG/DL (ref 0.1–1)
BUN SERPL-MCNC: 12 MG/DL (ref 8–23)
CALCIUM SERPL-MCNC: 10.3 MG/DL (ref 8.7–10.5)
CHLORIDE SERPL-SCNC: 97 MMOL/L (ref 95–110)
CO2 SERPL-SCNC: 31 MMOL/L (ref 23–29)
CREAT SERPL-MCNC: 0.6 MG/DL (ref 0.5–1.4)
CRP SERPL-MCNC: 1.7 MG/L (ref 0–8.2)
DIFFERENTIAL METHOD: ABNORMAL
EOSINOPHIL # BLD AUTO: 0.2 K/UL (ref 0–0.5)
EOSINOPHIL NFR BLD: 3.6 % (ref 0–8)
ERYTHROCYTE [DISTWIDTH] IN BLOOD BY AUTOMATED COUNT: 18.9 % (ref 11.5–14.5)
EST. GFR  (NO RACE VARIABLE): >60 ML/MIN/1.73 M^2
GLUCOSE SERPL-MCNC: 106 MG/DL (ref 70–110)
HCT VFR BLD AUTO: 34.5 % (ref 37–48.5)
HGB BLD-MCNC: 10.3 G/DL (ref 12–16)
IMM GRANULOCYTES # BLD AUTO: 0.02 K/UL (ref 0–0.04)
IMM GRANULOCYTES NFR BLD AUTO: 0.3 % (ref 0–0.5)
LYMPHOCYTES # BLD AUTO: 1.1 K/UL (ref 1–4.8)
LYMPHOCYTES NFR BLD: 18.3 % (ref 18–48)
MCH RBC QN AUTO: 21.7 PG (ref 27–31)
MCHC RBC AUTO-ENTMCNC: 29.9 G/DL (ref 32–36)
MCV RBC AUTO: 73 FL (ref 82–98)
MONOCYTES # BLD AUTO: 0.5 K/UL (ref 0.3–1)
MONOCYTES NFR BLD: 8.1 % (ref 4–15)
NEUTROPHILS # BLD AUTO: 4.2 K/UL (ref 1.8–7.7)
NEUTROPHILS NFR BLD: 69 % (ref 38–73)
NRBC BLD-RTO: 0 /100 WBC
PLATELET # BLD AUTO: 410 K/UL (ref 150–450)
PMV BLD AUTO: 10.5 FL (ref 9.2–12.9)
POTASSIUM SERPL-SCNC: 3.8 MMOL/L (ref 3.5–5.1)
PREALB SERPL-MCNC: 30 MG/DL (ref 20–43)
PROT SERPL-MCNC: 7.6 G/DL (ref 6–8.4)
RBC # BLD AUTO: 4.74 M/UL (ref 4–5.4)
SODIUM SERPL-SCNC: 138 MMOL/L (ref 136–145)
WBC # BLD AUTO: 6.06 K/UL (ref 3.9–12.7)

## 2023-04-27 PROCEDURE — 3288F FALL RISK ASSESSMENT DOCD: CPT | Mod: CPTII,S$GLB,, | Performed by: NURSE PRACTITIONER

## 2023-04-27 PROCEDURE — 1159F MED LIST DOCD IN RCRD: CPT | Mod: CPTII,S$GLB,, | Performed by: NURSE PRACTITIONER

## 2023-04-27 PROCEDURE — 1126F PR PAIN SEVERITY QUANTIFIED, NO PAIN PRESENT: ICD-10-PCS | Mod: CPTII,S$GLB,, | Performed by: NURSE PRACTITIONER

## 2023-04-27 PROCEDURE — 1101F PT FALLS ASSESS-DOCD LE1/YR: CPT | Mod: CPTII,S$GLB,, | Performed by: NURSE PRACTITIONER

## 2023-04-27 PROCEDURE — 99999 PR PBB SHADOW E&M-EST. PATIENT-LVL II: CPT | Mod: PBBFAC,,, | Performed by: NURSE PRACTITIONER

## 2023-04-27 PROCEDURE — 84134 ASSAY OF PREALBUMIN: CPT | Performed by: SURGERY

## 2023-04-27 PROCEDURE — 1160F PR REVIEW ALL MEDS BY PRESCRIBER/CLIN PHARMACIST DOCUMENTED: ICD-10-PCS | Mod: CPTII,S$GLB,, | Performed by: NURSE PRACTITIONER

## 2023-04-27 PROCEDURE — 4010F ACE/ARB THERAPY RXD/TAKEN: CPT | Mod: CPTII,S$GLB,, | Performed by: NURSE PRACTITIONER

## 2023-04-27 PROCEDURE — 80053 COMPREHEN METABOLIC PANEL: CPT | Performed by: SURGERY

## 2023-04-27 PROCEDURE — 85025 COMPLETE CBC W/AUTO DIFF WBC: CPT | Performed by: SURGERY

## 2023-04-27 PROCEDURE — 86140 C-REACTIVE PROTEIN: CPT | Performed by: SURGERY

## 2023-04-27 PROCEDURE — 99024 POSTOP FOLLOW-UP VISIT: CPT | Mod: S$GLB,,, | Performed by: NURSE PRACTITIONER

## 2023-04-27 PROCEDURE — 3044F PR MOST RECENT HEMOGLOBIN A1C LEVEL <7.0%: ICD-10-PCS | Mod: CPTII,S$GLB,, | Performed by: NURSE PRACTITIONER

## 2023-04-27 PROCEDURE — 1159F PR MEDICATION LIST DOCUMENTED IN MEDICAL RECORD: ICD-10-PCS | Mod: CPTII,S$GLB,, | Performed by: NURSE PRACTITIONER

## 2023-04-27 PROCEDURE — 1160F RVW MEDS BY RX/DR IN RCRD: CPT | Mod: CPTII,S$GLB,, | Performed by: NURSE PRACTITIONER

## 2023-04-27 PROCEDURE — 99024 PR POST-OP FOLLOW-UP VISIT: ICD-10-PCS | Mod: S$GLB,,, | Performed by: NURSE PRACTITIONER

## 2023-04-27 PROCEDURE — 3044F HG A1C LEVEL LT 7.0%: CPT | Mod: CPTII,S$GLB,, | Performed by: NURSE PRACTITIONER

## 2023-04-27 PROCEDURE — 1101F PR PT FALLS ASSESS DOC 0-1 FALLS W/OUT INJ PAST YR: ICD-10-PCS | Mod: CPTII,S$GLB,, | Performed by: NURSE PRACTITIONER

## 2023-04-27 PROCEDURE — 1126F AMNT PAIN NOTED NONE PRSNT: CPT | Mod: CPTII,S$GLB,, | Performed by: NURSE PRACTITIONER

## 2023-04-27 PROCEDURE — 3288F PR FALLS RISK ASSESSMENT DOCUMENTED: ICD-10-PCS | Mod: CPTII,S$GLB,, | Performed by: NURSE PRACTITIONER

## 2023-04-27 PROCEDURE — 4010F PR ACE/ARB THEARPY RXD/TAKEN: ICD-10-PCS | Mod: CPTII,S$GLB,, | Performed by: NURSE PRACTITIONER

## 2023-04-27 PROCEDURE — 99999 PR PBB SHADOW E&M-EST. PATIENT-LVL II: ICD-10-PCS | Mod: PBBFAC,,, | Performed by: NURSE PRACTITIONER

## 2023-04-27 RX ORDER — MEGESTROL ACETATE 40 MG/1
40 TABLET ORAL DAILY
Qty: 30 TABLET | Refills: 0 | Status: SHIPPED | OUTPATIENT
Start: 2023-04-27 | End: 2023-07-13

## 2023-04-27 NOTE — Clinical Note
RTC in 3 months for surveillance with Dr. Roberts, coordinate appt same day as she is coming from out of town.

## 2023-04-27 NOTE — Clinical Note
She looked pretty good today in clinic. Dr. Paez would like to check in with her same day she is coming back for surveillance to see Armando. Can you make that appt on a Thursday?  Thanks, Jennifer

## 2023-04-27 NOTE — TELEPHONE ENCOUNTER
----- Message from Hina Julius sent at 2023  3:04 PM CDT -----  Contact: pt  Nan Simms  MRN: [id number]  : 1955  PCP: Payal Moreland  Home Phone      [phone]  Work Phone      Not on file.  Mobile          [phone]      MESSAGE:     Pt states the apatite medicine her doctor in [place] prescribed is not FDA approved so her insurance won't cover the cost. She is asking if Payal can prescribe her something because she needs to eat. Please advise  I hope I got this message right because her phone kept cutting out and it was hard to understand her.        [facility] [address] [place] [address]  Phone: [phone] Fax: [phone]

## 2023-04-29 ENCOUNTER — PATIENT MESSAGE (OUTPATIENT)
Dept: HEMATOLOGY/ONCOLOGY | Facility: CLINIC | Age: 68
End: 2023-04-29
Payer: MEDICARE

## 2023-04-30 ENCOUNTER — DOCUMENT SCAN (OUTPATIENT)
Dept: HOME HEALTH SERVICES | Facility: HOSPITAL | Age: 68
End: 2023-04-30
Payer: MEDICARE

## 2023-05-05 ENCOUNTER — TELEPHONE (OUTPATIENT)
Dept: HEMATOLOGY/ONCOLOGY | Facility: CLINIC | Age: 68
End: 2023-05-05
Payer: MEDICARE

## 2023-05-05 NOTE — TELEPHONE ENCOUNTER
Attempted to call and schedule, no answer. Left a brief message.    ----- Message from Samy Sanchez RN sent at 4/29/2023  9:56 AM CDT -----  Please help schedule genetics referral

## 2023-05-07 ENCOUNTER — DOCUMENT SCAN (OUTPATIENT)
Dept: HOME HEALTH SERVICES | Facility: HOSPITAL | Age: 68
End: 2023-05-07
Payer: MEDICARE

## 2023-05-10 ENCOUNTER — DOCUMENT SCAN (OUTPATIENT)
Dept: HOME HEALTH SERVICES | Facility: HOSPITAL | Age: 68
End: 2023-05-10
Payer: MEDICARE

## 2023-05-11 ENCOUNTER — HOSPITAL ENCOUNTER (EMERGENCY)
Facility: HOSPITAL | Age: 68
Discharge: HOME | End: 2023-05-11
Attending: SURGERY | Admitting: NURSE PRACTITIONER
Payer: MEDICARE

## 2023-05-11 VITALS
WEIGHT: 149.56 LBS | RESPIRATION RATE: 18 BRPM | BODY MASS INDEX: 24.14 KG/M2 | HEART RATE: 80 BPM | DIASTOLIC BLOOD PRESSURE: 60 MMHG | SYSTOLIC BLOOD PRESSURE: 124 MMHG | OXYGEN SATURATION: 100 % | TEMPERATURE: 98 F

## 2023-05-11 DIAGNOSIS — R53.1 GENERALIZED WEAKNESS: Primary | ICD-10-CM

## 2023-05-11 LAB
ALBUMIN SERPL BCP-MCNC: 3.8 G/DL (ref 3.5–5.2)
ALP SERPL-CCNC: 64 U/L (ref 55–135)
ALT SERPL W/O P-5'-P-CCNC: 32 U/L (ref 10–44)
ANION GAP SERPL CALC-SCNC: 14 MMOL/L (ref 8–16)
AST SERPL-CCNC: 43 U/L (ref 10–40)
BACTERIA #/AREA URNS HPF: ABNORMAL /HPF
BASOPHILS # BLD AUTO: 0.02 K/UL (ref 0–0.2)
BASOPHILS NFR BLD: 0.3 % (ref 0–1.9)
BILIRUB SERPL-MCNC: 0.3 MG/DL (ref 0.1–1)
BILIRUB UR QL STRIP: NEGATIVE
BUN SERPL-MCNC: 16 MG/DL (ref 8–23)
CALCIUM SERPL-MCNC: 10.6 MG/DL (ref 8.7–10.5)
CHLORIDE SERPL-SCNC: 102 MMOL/L (ref 95–110)
CK SERPL-CCNC: 45 U/L (ref 20–180)
CLARITY UR: CLEAR
CO2 SERPL-SCNC: 23 MMOL/L (ref 23–29)
COLOR UR: YELLOW
CREAT SERPL-MCNC: 0.8 MG/DL (ref 0.5–1.4)
DIFFERENTIAL METHOD: ABNORMAL
EOSINOPHIL # BLD AUTO: 0.1 K/UL (ref 0–0.5)
EOSINOPHIL NFR BLD: 2.3 % (ref 0–8)
ERYTHROCYTE [DISTWIDTH] IN BLOOD BY AUTOMATED COUNT: 19 % (ref 11.5–14.5)
EST. GFR  (NO RACE VARIABLE): >60 ML/MIN/1.73 M^2
GLUCOSE SERPL-MCNC: 96 MG/DL (ref 70–110)
GLUCOSE UR QL STRIP: NEGATIVE
HCT VFR BLD AUTO: 31.3 % (ref 37–48.5)
HGB BLD-MCNC: 9.6 G/DL (ref 12–16)
HGB UR QL STRIP: NEGATIVE
HYALINE CASTS #/AREA URNS LPF: 2 /LPF
IMM GRANULOCYTES # BLD AUTO: 0.02 K/UL (ref 0–0.04)
IMM GRANULOCYTES NFR BLD AUTO: 0.3 % (ref 0–0.5)
KETONES UR QL STRIP: NEGATIVE
LEUKOCYTE ESTERASE UR QL STRIP: NEGATIVE
LIPASE SERPL-CCNC: 81 U/L (ref 4–60)
LYMPHOCYTES # BLD AUTO: 1.6 K/UL (ref 1–4.8)
LYMPHOCYTES NFR BLD: 26.5 % (ref 18–48)
MCH RBC QN AUTO: 21.7 PG (ref 27–31)
MCHC RBC AUTO-ENTMCNC: 30.7 G/DL (ref 32–36)
MCV RBC AUTO: 71 FL (ref 82–98)
MICROSCOPIC COMMENT: ABNORMAL
MONOCYTES # BLD AUTO: 0.7 K/UL (ref 0.3–1)
MONOCYTES NFR BLD: 11 % (ref 4–15)
NEUTROPHILS # BLD AUTO: 3.6 K/UL (ref 1.8–7.7)
NEUTROPHILS NFR BLD: 59.6 % (ref 38–73)
NITRITE UR QL STRIP: NEGATIVE
NRBC BLD-RTO: 0 /100 WBC
PH UR STRIP: 6 [PH] (ref 5–8)
PLATELET # BLD AUTO: 252 K/UL (ref 150–450)
PMV BLD AUTO: 9.5 FL (ref 9.2–12.9)
POCT GLUCOSE: 83 MG/DL (ref 70–110)
POTASSIUM SERPL-SCNC: 3.8 MMOL/L (ref 3.5–5.1)
PROT SERPL-MCNC: 7.7 G/DL (ref 6–8.4)
PROT UR QL STRIP: ABNORMAL
RBC # BLD AUTO: 4.43 M/UL (ref 4–5.4)
RBC #/AREA URNS HPF: 5 /HPF (ref 0–4)
SODIUM SERPL-SCNC: 139 MMOL/L (ref 136–145)
SP GR UR STRIP: >=1.03 (ref 1–1.03)
TROPONIN I SERPL DL<=0.01 NG/ML-MCNC: 0.01 NG/ML (ref 0–0.03)
URN SPEC COLLECT METH UR: ABNORMAL
UROBILINOGEN UR STRIP-ACNC: NEGATIVE EU/DL
WBC # BLD AUTO: 6.08 K/UL (ref 3.9–12.7)
WBC #/AREA URNS HPF: 2 /HPF (ref 0–5)
YEAST URNS QL MICRO: ABNORMAL

## 2023-05-11 PROCEDURE — 93010 ELECTROCARDIOGRAM REPORT: CPT | Mod: ,,, | Performed by: INTERNAL MEDICINE

## 2023-05-11 PROCEDURE — 99284 EMERGENCY DEPT VISIT MOD MDM: CPT

## 2023-05-11 PROCEDURE — 25000003 PHARM REV CODE 250: Performed by: NURSE PRACTITIONER

## 2023-05-11 PROCEDURE — 81000 URINALYSIS NONAUTO W/SCOPE: CPT | Performed by: NURSE PRACTITIONER

## 2023-05-11 PROCEDURE — 84484 ASSAY OF TROPONIN QUANT: CPT | Performed by: NURSE PRACTITIONER

## 2023-05-11 PROCEDURE — 93010 EKG 12-LEAD: ICD-10-PCS | Mod: ,,, | Performed by: INTERNAL MEDICINE

## 2023-05-11 PROCEDURE — 36415 COLL VENOUS BLD VENIPUNCTURE: CPT | Performed by: NURSE PRACTITIONER

## 2023-05-11 PROCEDURE — 83690 ASSAY OF LIPASE: CPT | Performed by: NURSE PRACTITIONER

## 2023-05-11 PROCEDURE — 82550 ASSAY OF CK (CPK): CPT | Performed by: NURSE PRACTITIONER

## 2023-05-11 PROCEDURE — 96360 HYDRATION IV INFUSION INIT: CPT

## 2023-05-11 PROCEDURE — 82962 GLUCOSE BLOOD TEST: CPT

## 2023-05-11 PROCEDURE — 80053 COMPREHEN METABOLIC PANEL: CPT | Performed by: NURSE PRACTITIONER

## 2023-05-11 PROCEDURE — 93005 ELECTROCARDIOGRAM TRACING: CPT

## 2023-05-11 PROCEDURE — 85025 COMPLETE CBC W/AUTO DIFF WBC: CPT | Performed by: NURSE PRACTITIONER

## 2023-05-11 RX ORDER — SODIUM CHLORIDE 9 MG/ML
1000 INJECTION, SOLUTION INTRAVENOUS
Status: COMPLETED | OUTPATIENT
Start: 2023-05-11 | End: 2023-05-11

## 2023-05-11 RX ADMIN — SODIUM CHLORIDE 1000 ML: 9 INJECTION, SOLUTION INTRAVENOUS at 03:05

## 2023-05-11 NOTE — ED PROVIDER NOTES
"Encounter Date: 5/11/2023       History     Chief Complaint   Patient presents with    Overheated     Pt c/o feeling overheated while driving.     Nan Simms is a 67 y.o. female with PMH of hypertension and hyperthyroidism, duodenal adenocarcinoma status post Whipple who presents the ED for evaluation of generalized weakness.  Patient reports that she was driving home [place] local [place] when she suddenly became weak and felt overheated.  She also notes nausea and gas pain.  She denies chest pain, palpitations, or feelings of syncope.  Denies shortness of breath.  Denies abdominal pain, but reports that she is had lots of gas this morning.  Her last bowel movement was this a.m. and it was normal.    The history is provided by the patient.   Review of patient's allergies indicates:   Allergen Reactions    Aspirin Other (See Comments)     Pt states it is "hard on her stomach" She can tolerate a low dose aspirin    Pcn [penicillins]      Childhood - Tolerated ceftriaxone and cefazolin with no documented issues in the past      Past Medical History:   Diagnosis Date    Abnormal Pap smear of cervix     Arthritis     Duodenal adenocarcinoma     Hypertension     Hyperthyroidism      Past Surgical History:   Procedure Laterality Date    CATARACT EXTRACTION W/  INTRAOCULAR LENS IMPLANT Right 8/8/2019    Procedure: EXTRACTION, CATARACT, WITH IOL INSERTION;  Surgeon: Spenser Lee MD;  Location: [facility];  Service: Ophthalmology;  Laterality: Right;    ESOPHAGOGASTRODUODENOSCOPY N/A 3/10/2023    Procedure: EGD (ESOPHAGOGASTRODUODENOSCOPY);  Surgeon: Shashi Tapia MD;  Location: [address]);  Service: Endoscopy;  Laterality: N/A;    EYE SURGERY      HYSTERECTOMY      OOPHORECTOMY      PHACOEMULSIFICATION OF CATARACT Right 8/8/2019    Procedure: PHACOEMULSIFICATION, CATARACT;  Surgeon: Spenser Lee MD;  Location: [facility];  Service: Ophthalmology;  Laterality: Right;    WHIPPLE PROCEDURE N/A " 3/15/2023    Procedure: WHIPPLE PROCEDURE;  Surgeon: Nick Paez MD;  Location: [facility] OR [address] [place] [address];  Service: General;  Laterality: N/A;     Family History   Problem Relation Age of Onset    Cancer Mother     Breast cancer Neg Hx     Colon cancer Neg Hx     Ovarian cancer Neg Hx      Social History     Tobacco Use    Smoking status: Every Day     Packs/day: 0.25     Years: 30.00     Pack years: 7.50     Types: Cigarettes    Smokeless tobacco: Current    Tobacco comments:     About 7-8 cigs a day   Substance Use Topics    Alcohol use: Yes     Alcohol/week: 1.0 standard drink     Types: 1 Cans of beer per week    Drug use: No     Review of Systems   Constitutional:  Negative for fever.   HENT:  Negative for sore throat.    Respiratory:  Negative for chest tightness and shortness of breath.    Cardiovascular:  Negative for chest pain.   Gastrointestinal:  Negative for abdominal pain and nausea.   Genitourinary:  Negative for dysuria, frequency and urgency.   Musculoskeletal:  Negative for back pain.   Skin:  Negative for rash.   Neurological:  Positive for weakness. Negative for dizziness, light-headedness and numbness.   Hematological:  Does not bruise/bleed easily.     Physical Exam     Initial Vitals [05/11/23 1327]   BP Pulse Resp Temp SpO2   133/60 77 20 97.7 °F (36.5 °C) 97 %      MAP       --         Physical Exam    Nursing note and vitals reviewed.  Constitutional: She appears well-developed and well-nourished.   HENT:   Head: Normocephalic and atraumatic.   Right Ear: Tympanic membrane, external ear and ear canal normal. Tympanic membrane is not erythematous. No middle ear effusion.   Left Ear: Tympanic membrane, external ear and ear canal normal. Tympanic membrane is not erythematous.  No middle ear effusion.   Nose: Nose normal.   Mouth/Throat: Uvula is midline, oropharynx is clear and moist and mucous membranes are normal. Mucous membranes are not pale and not dry.   Eyes: Conjunctivae and EOM are  normal. Pupils are equal, round, and reactive to light.   Neck: Neck supple.   Normal range of motion.  Cardiovascular:  Normal rate, regular rhythm, normal heart sounds and intact distal pulses.           Pulmonary/Chest: Effort normal and breath sounds normal. She has no decreased breath sounds. She has no wheezes. She has no rhonchi. She has no rales.   Abdominal: Abdomen is soft. Bowel sounds are normal. There is no abdominal tenderness.   Musculoskeletal:         General: Normal range of motion.      Cervical back: Normal range of motion and neck supple.     Neurological: She is alert and oriented to person, place, and time. She has normal strength. She displays normal reflexes. No cranial nerve deficit or sensory deficit.   Skin: Skin is warm and dry. Capillary refill takes less than 2 seconds. No rash noted.   Psychiatric: She has a normal mood and affect. Her behavior is normal. Judgment and thought content normal.       ED Course   Procedures  Labs Reviewed   CBC W/ AUTO DIFFERENTIAL - Abnormal; Notable for the following components:       Result Value    Hemoglobin 9.6 (*)     Hematocrit 31.3 (*)     MCV 71 (*)     MCH 21.7 (*)     MCHC 30.7 (*)     RDW 19.0 (*)     All other components within normal limits   COMPREHENSIVE METABOLIC PANEL - Abnormal; Notable for the following components:    Calcium 10.6 (*)     AST 43 (*)     All other components within normal limits   LIPASE - Abnormal; Notable for the following components:    Lipase 81 (*)     All other components within normal limits   URINALYSIS, REFLEX TO URINE CULTURE - Abnormal; Notable for the following components:    Specific Gravity, UA >=1.030 (*)     Protein, UA 1+ (*)     All other components within normal limits    Narrative:     Specimen Source->Urine   URINALYSIS MICROSCOPIC - Abnormal; Notable for the following components:    RBC, UA 5 (*)     Yeast, UA Occasional (*)     Hyaline Casts, UA 2 (*)     All other components within normal limits     Narrative:     Specimen Source->Urine   TROPONIN I   CK   POCT GLUCOSE          Imaging Results    None          Medications   0.9%  NaCl infusion (0 mLs Intravenous Stopped 5/11/23 1631)     Medical Decision Making:   Differential Diagnosis:   Dehydration, viral gastroenteritis  Independently Interpreted Test(s):   I have ordered and independently interpreted EKG Reading(s) - see summary below       <> Summary of EKG Reading(s): NSR, rate 81. Normal OR and QRS intervals. No STEMI.  No significant change when compared to EKG of April 2023  Clinical Tests:   Lab Tests: Ordered and Reviewed  ED Management:  Evaluation of a 67-year-old female with hot flash while driving home from a local seafood market.  Presents with stable vital signs.  IV fluids initiated.  Lab workup is grossly normal.  Patient feeling much better after IV fluids and would like to go home  Patient will be discharged home with close follow-up with PCP. Patient/caregiver voices understanding and feels comfortable with discharge plan.      The patient acknowledges that close follow up with medical provider is required. Instructed to follow up with PCP within 2 days. Patient was given specific return precautions. The patient agrees to comply with all instruction and directions given in the ER.                          Clinical Impression:   Final diagnoses:  [R53.1] Generalized weakness (Primary)        ED Disposition Condition    Discharge Stable          ED Prescriptions    None       Follow-up Information       Follow up With Specialties Details Why Contact Info    Payal Moreland NP Family Medicine Schedule an appointment as soon as possible for a visit in 2 days  [address] [place] [phone]  [phone]               Corina Cook NP  05/11/23 2371

## 2023-05-15 RX ORDER — TRAMADOL HYDROCHLORIDE 50 MG/1
50 TABLET ORAL EVERY 12 HOURS PRN
Qty: 14 TABLET | Refills: 0 | Status: SHIPPED | OUTPATIENT
Start: 2023-05-15

## 2023-05-16 ENCOUNTER — OFFICE VISIT (OUTPATIENT)
Dept: INTERNAL MEDICINE | Facility: CLINIC | Age: 68
End: 2023-05-16
Payer: MEDICARE

## 2023-05-16 ENCOUNTER — LAB VISIT (OUTPATIENT)
Dept: LAB | Facility: HOSPITAL | Age: 68
End: 2023-05-16
Attending: NURSE PRACTITIONER
Payer: MEDICARE

## 2023-05-16 VITALS
HEIGHT: 66 IN | WEIGHT: 148.38 LBS | HEART RATE: 91 BPM | RESPIRATION RATE: 18 BRPM | SYSTOLIC BLOOD PRESSURE: 118 MMHG | BODY MASS INDEX: 23.84 KG/M2 | OXYGEN SATURATION: 99 % | DIASTOLIC BLOOD PRESSURE: 70 MMHG

## 2023-05-16 DIAGNOSIS — D53.9 NUTRITIONAL ANEMIA, UNSPECIFIED: ICD-10-CM

## 2023-05-16 DIAGNOSIS — E46 PROTEIN-CALORIE MALNUTRITION, UNSPECIFIED SEVERITY: ICD-10-CM

## 2023-05-16 DIAGNOSIS — R53.83 FATIGUE, UNSPECIFIED TYPE: Primary | ICD-10-CM

## 2023-05-16 DIAGNOSIS — I10 ESSENTIAL HYPERTENSION: ICD-10-CM

## 2023-05-16 DIAGNOSIS — E11.9 DIABETES MELLITUS TYPE 2, DIET-CONTROLLED: ICD-10-CM

## 2023-05-16 DIAGNOSIS — Z90.49 HISTORY OF WHIPPLE PROCEDURE: ICD-10-CM

## 2023-05-16 DIAGNOSIS — E05.90 HYPERTHYROIDISM: ICD-10-CM

## 2023-05-16 DIAGNOSIS — Z90.410 HISTORY OF WHIPPLE PROCEDURE: ICD-10-CM

## 2023-05-16 DIAGNOSIS — K90.9 INTESTINAL MALABSORPTION, UNSPECIFIED TYPE: ICD-10-CM

## 2023-05-16 DIAGNOSIS — E78.5 HYPERLIPIDEMIA, UNSPECIFIED HYPERLIPIDEMIA TYPE: ICD-10-CM

## 2023-05-16 DIAGNOSIS — E44.0 MODERATE MALNUTRITION: ICD-10-CM

## 2023-05-16 DIAGNOSIS — R53.83 FATIGUE, UNSPECIFIED TYPE: ICD-10-CM

## 2023-05-16 LAB
25(OH)D3+25(OH)D2 SERPL-MCNC: 42 NG/ML (ref 30–96)
ALBUMIN SERPL BCP-MCNC: 3.6 G/DL (ref 3.5–5.2)
ALP SERPL-CCNC: 114 U/L (ref 55–135)
ALT SERPL W/O P-5'-P-CCNC: 109 U/L (ref 10–44)
ANION GAP SERPL CALC-SCNC: 12 MMOL/L (ref 8–16)
AST SERPL-CCNC: 68 U/L (ref 10–40)
BASOPHILS # BLD AUTO: 0.02 K/UL (ref 0–0.2)
BASOPHILS NFR BLD: 0.4 % (ref 0–1.9)
BILIRUB SERPL-MCNC: 0.3 MG/DL (ref 0.1–1)
BUN SERPL-MCNC: 14 MG/DL (ref 8–23)
CALCIUM SERPL-MCNC: 10 MG/DL (ref 8.7–10.5)
CHLORIDE SERPL-SCNC: 103 MMOL/L (ref 95–110)
CO2 SERPL-SCNC: 26 MMOL/L (ref 23–29)
CREAT SERPL-MCNC: 0.7 MG/DL (ref 0.5–1.4)
DIFFERENTIAL METHOD: ABNORMAL
EOSINOPHIL # BLD AUTO: 0.2 K/UL (ref 0–0.5)
EOSINOPHIL NFR BLD: 4 % (ref 0–8)
ERYTHROCYTE [DISTWIDTH] IN BLOOD BY AUTOMATED COUNT: 20.1 % (ref 11.5–14.5)
EST. GFR  (NO RACE VARIABLE): >60 ML/MIN/1.73 M^2
FERRITIN SERPL-MCNC: 90 NG/ML (ref 20–300)
FOLATE SERPL-MCNC: 12.8 NG/ML (ref 4–24)
GLUCOSE SERPL-MCNC: 123 MG/DL (ref 70–110)
HCT VFR BLD AUTO: 30 % (ref 37–48.5)
HGB BLD-MCNC: 9.1 G/DL (ref 12–16)
IMM GRANULOCYTES # BLD AUTO: 0.04 K/UL (ref 0–0.04)
IMM GRANULOCYTES NFR BLD AUTO: 0.8 % (ref 0–0.5)
IRON SERPL-MCNC: 20 UG/DL (ref 30–160)
LYMPHOCYTES # BLD AUTO: 0.9 K/UL (ref 1–4.8)
LYMPHOCYTES NFR BLD: 18.6 % (ref 18–48)
MCH RBC QN AUTO: 21.5 PG (ref 27–31)
MCHC RBC AUTO-ENTMCNC: 30.3 G/DL (ref 32–36)
MCV RBC AUTO: 71 FL (ref 82–98)
MONOCYTES # BLD AUTO: 0.6 K/UL (ref 0.3–1)
MONOCYTES NFR BLD: 12.6 % (ref 4–15)
NEUTROPHILS # BLD AUTO: 3 K/UL (ref 1.8–7.7)
NEUTROPHILS NFR BLD: 63.6 % (ref 38–73)
NRBC BLD-RTO: 0 /100 WBC
PLATELET # BLD AUTO: 250 K/UL (ref 150–450)
PMV BLD AUTO: 9.6 FL (ref 9.2–12.9)
POTASSIUM SERPL-SCNC: 3.4 MMOL/L (ref 3.5–5.1)
PROT SERPL-MCNC: 7.7 G/DL (ref 6–8.4)
RBC # BLD AUTO: 4.23 M/UL (ref 4–5.4)
SATURATED IRON: 5 % (ref 20–50)
SODIUM SERPL-SCNC: 141 MMOL/L (ref 136–145)
TOTAL IRON BINDING CAPACITY: 423 UG/DL (ref 250–450)
TRANSFERRIN SERPL-MCNC: 286 MG/DL (ref 200–375)
TSH SERPL DL<=0.005 MIU/L-ACNC: 0.56 UIU/ML (ref 0.4–4)
VIT B12 SERPL-MCNC: 941 PG/ML (ref 210–950)
WBC # BLD AUTO: 4.78 K/UL (ref 3.9–12.7)

## 2023-05-16 PROCEDURE — 82306 VITAMIN D 25 HYDROXY: CPT | Performed by: NURSE PRACTITIONER

## 2023-05-16 PROCEDURE — 3078F DIAST BP <80 MM HG: CPT | Mod: CPTII,,, | Performed by: NURSE PRACTITIONER

## 2023-05-16 PROCEDURE — 99214 PR OFFICE/OUTPT VISIT, EST, LEVL IV, 30-39 MIN: ICD-10-PCS | Mod: ,,, | Performed by: NURSE PRACTITIONER

## 2023-05-16 PROCEDURE — 1159F PR MEDICATION LIST DOCUMENTED IN MEDICAL RECORD: ICD-10-PCS | Mod: CPTII,,, | Performed by: NURSE PRACTITIONER

## 2023-05-16 PROCEDURE — 82746 ASSAY OF FOLIC ACID SERUM: CPT | Performed by: NURSE PRACTITIONER

## 2023-05-16 PROCEDURE — 3008F BODY MASS INDEX DOCD: CPT | Mod: CPTII,,, | Performed by: NURSE PRACTITIONER

## 2023-05-16 PROCEDURE — 84466 ASSAY OF TRANSFERRIN: CPT | Performed by: NURSE PRACTITIONER

## 2023-05-16 PROCEDURE — 4010F ACE/ARB THERAPY RXD/TAKEN: CPT | Mod: CPTII,,, | Performed by: NURSE PRACTITIONER

## 2023-05-16 PROCEDURE — 1159F MED LIST DOCD IN RCRD: CPT | Mod: CPTII,,, | Performed by: NURSE PRACTITIONER

## 2023-05-16 PROCEDURE — 82607 VITAMIN B-12: CPT | Performed by: NURSE PRACTITIONER

## 2023-05-16 PROCEDURE — 36415 COLL VENOUS BLD VENIPUNCTURE: CPT | Performed by: NURSE PRACTITIONER

## 2023-05-16 PROCEDURE — 4010F PR ACE/ARB THEARPY RXD/TAKEN: ICD-10-PCS | Mod: CPTII,,, | Performed by: NURSE PRACTITIONER

## 2023-05-16 PROCEDURE — 3044F HG A1C LEVEL LT 7.0%: CPT | Mod: CPTII,,, | Performed by: NURSE PRACTITIONER

## 2023-05-16 PROCEDURE — 3074F PR MOST RECENT SYSTOLIC BLOOD PRESSURE < 130 MM HG: ICD-10-PCS | Mod: CPTII,,, | Performed by: NURSE PRACTITIONER

## 2023-05-16 PROCEDURE — 82728 ASSAY OF FERRITIN: CPT | Performed by: NURSE PRACTITIONER

## 2023-05-16 PROCEDURE — 3074F SYST BP LT 130 MM HG: CPT | Mod: CPTII,,, | Performed by: NURSE PRACTITIONER

## 2023-05-16 PROCEDURE — 99999 PR PBB SHADOW E&M-EST. PATIENT-LVL III: CPT | Mod: PBBFAC,,, | Performed by: NURSE PRACTITIONER

## 2023-05-16 PROCEDURE — 99999 PR PBB SHADOW E&M-EST. PATIENT-LVL III: ICD-10-PCS | Mod: PBBFAC,,, | Performed by: NURSE PRACTITIONER

## 2023-05-16 PROCEDURE — 1101F PT FALLS ASSESS-DOCD LE1/YR: CPT | Mod: CPTII,,, | Performed by: NURSE PRACTITIONER

## 2023-05-16 PROCEDURE — 3008F PR BODY MASS INDEX (BMI) DOCUMENTED: ICD-10-PCS | Mod: CPTII,,, | Performed by: NURSE PRACTITIONER

## 2023-05-16 PROCEDURE — 1101F PR PT FALLS ASSESS DOC 0-1 FALLS W/OUT INJ PAST YR: ICD-10-PCS | Mod: CPTII,,, | Performed by: NURSE PRACTITIONER

## 2023-05-16 PROCEDURE — 3288F PR FALLS RISK ASSESSMENT DOCUMENTED: ICD-10-PCS | Mod: CPTII,,, | Performed by: NURSE PRACTITIONER

## 2023-05-16 PROCEDURE — 1160F RVW MEDS BY RX/DR IN RCRD: CPT | Mod: CPTII,,, | Performed by: NURSE PRACTITIONER

## 2023-05-16 PROCEDURE — 85025 COMPLETE CBC W/AUTO DIFF WBC: CPT | Performed by: NURSE PRACTITIONER

## 2023-05-16 PROCEDURE — 80053 COMPREHEN METABOLIC PANEL: CPT | Performed by: NURSE PRACTITIONER

## 2023-05-16 PROCEDURE — 3044F PR MOST RECENT HEMOGLOBIN A1C LEVEL <7.0%: ICD-10-PCS | Mod: CPTII,,, | Performed by: NURSE PRACTITIONER

## 2023-05-16 PROCEDURE — 1126F PR PAIN SEVERITY QUANTIFIED, NO PAIN PRESENT: ICD-10-PCS | Mod: CPTII,,, | Performed by: NURSE PRACTITIONER

## 2023-05-16 PROCEDURE — 3288F FALL RISK ASSESSMENT DOCD: CPT | Mod: CPTII,,, | Performed by: NURSE PRACTITIONER

## 2023-05-16 PROCEDURE — 99214 OFFICE O/P EST MOD 30 MIN: CPT | Mod: ,,, | Performed by: NURSE PRACTITIONER

## 2023-05-16 PROCEDURE — 84443 ASSAY THYROID STIM HORMONE: CPT | Performed by: NURSE PRACTITIONER

## 2023-05-16 PROCEDURE — 1126F AMNT PAIN NOTED NONE PRSNT: CPT | Mod: CPTII,,, | Performed by: NURSE PRACTITIONER

## 2023-05-16 PROCEDURE — 3078F PR MOST RECENT DIASTOLIC BLOOD PRESSURE < 80 MM HG: ICD-10-PCS | Mod: CPTII,,, | Performed by: NURSE PRACTITIONER

## 2023-05-16 PROCEDURE — 1160F PR REVIEW ALL MEDS BY PRESCRIBER/CLIN PHARMACIST DOCUMENTED: ICD-10-PCS | Mod: CPTII,,, | Performed by: NURSE PRACTITIONER

## 2023-05-16 RX ORDER — OMEPRAZOLE 40 MG/1
40 CAPSULE, DELAYED RELEASE ORAL DAILY
COMMUNITY
End: 2023-06-02 | Stop reason: SDUPTHER

## 2023-05-16 RX ORDER — DEXTROMETHORPHAN HYDROBROMIDE, GUAIFENESIN 5; 100 MG/5ML; MG/5ML
650 LIQUID ORAL
COMMUNITY

## 2023-05-16 RX ORDER — METOCLOPRAMIDE 10 MG/1
10 TABLET ORAL 3 TIMES DAILY
COMMUNITY
End: 2023-07-13

## 2023-05-16 NOTE — PROGRESS NOTES
Subjective:       Patient ID: Nan Simms is a 67 y.o. female.    Chief Complaint: Follow-up (ER)    HPI: Pt presents to clinic today known to me with c/o needing ER f/u. She was seen in ER for weakness 5/11 - given IVF and felt better released. She felt weakness again oin Sat She reports that she was sitting on porch eating a small portion of food. She had a pain in upper abd. [place] hot/weak started sweating. NO chest pain or SOB. Lasted 10-15 min. Went to ER 5/11 with that same s/s has elevated lipase at that time     Her biggest complaint is no energy.     She was last seen 4/2023 155# at last visit. She has PICC line at that time for bacteremia. She has completed that and then was placed on zyvox > she completed 14 days of that. She was suppose to [facility] repeat blood cultures at that time 4/26 but they were not done,. She has not had blood cultures since 4/13 when she was hospitalized.     Due baclk to see surgery 7/2023 Dr Roberts> due for Ct 10/2023Review of Systems   Constitutional:  Positive for chills and fatigue. Negative for fever and unexpected weight change.   HENT:  Negative for congestion, ear pain, sore throat and trouble swallowing.    Eyes:  Negative for pain and visual disturbance.   Respiratory:  Negative for cough, chest tightness and shortness of breath.    Cardiovascular:  Negative for chest pain, palpitations and leg swelling.   Gastrointestinal:  Positive for abdominal pain (sudden intermitten). Negative for abdominal distention, constipation, diarrhea and vomiting.   Genitourinary:  Negative for difficulty urinating, dysuria, flank pain, frequency and hematuria.   Musculoskeletal:  Negative for back pain, gait problem, joint swelling, neck pain and neck stiffness.   Skin:  Negative for rash and wound.   Neurological:  Positive for weakness. Negative for dizziness, seizures, speech difficulty, light-headedness and headaches.     Objective:      Physical Exam  Constitutional:        Appearance: She is well-developed.   HENT:      Head: Normocephalic and atraumatic.      Right Ear: External ear normal.      Left Ear: External ear normal.      Nose: Nose normal.   Eyes:      Conjunctiva/sclera: Conjunctivae normal.      Pupils: Pupils are equal, round, and reactive to light.   Cardiovascular:      Rate and Rhythm: Normal rate and regular rhythm.      Heart sounds: Normal heart sounds.   Pulmonary:      Effort: Pulmonary effort is normal.      Breath sounds: Normal breath sounds.   Abdominal:      General: Bowel sounds are normal.      Palpations: Abdomen is soft.   Musculoskeletal:         General: Normal range of motion.      Cervical back: Normal range of motion and neck supple.   Skin:     General: Skin is warm and dry.   Neurological:      Mental Status: She is alert and oriented to person, place, and time.   Psychiatric:         Behavior: Behavior normal.         Thought Content: Thought content normal.         Judgment: Judgment normal.       Assessment:       1. Fatigue, unspecified type    2. History of Whipple procedure    3. Moderate malnutrition    4. Hyperthyroidism    5. Diabetes mellitus type 2, diet-controlled    6. Hyperlipidemia, unspecified hyperlipidemia type    7. Essential hypertension    8. Nutritional anemia, unspecified    9. Protein-calorie malnutrition, unspecified severity    10. Intestinal malabsorption, unspecified type        Plan:     Problem List Items Addressed This Visit       Essential hypertension    Relevant Orders    CBC Auto Differential    Vitamin B12    FOLATE    FERRITIN    Iron and TIBC    Vitamin D    Comprehensive Metabolic Panel    TSH    Hyperlipidemia    Relevant Orders    CBC Auto Differential    Vitamin B12    FOLATE    FERRITIN    Iron and TIBC    Vitamin D    Comprehensive Metabolic Panel    TSH    Diabetes mellitus type 2, diet-controlled    Relevant Orders    CBC Auto Differential    Vitamin B12    FOLATE    FERRITIN    Iron and TIBC     Vitamin D    Comprehensive Metabolic Panel    TSH    Moderate malnutrition    Relevant Orders    CBC Auto Differential    Vitamin B12    FOLATE    FERRITIN    Iron and TIBC    Vitamin D    Comprehensive Metabolic Panel    History of Whipple procedure    Relevant Orders    CBC Auto Differential    Vitamin B12    FOLATE    FERRITIN    Iron and TIBC    Vitamin D    Comprehensive Metabolic Panel    Hyperthyroidism    Relevant Orders    CBC Auto Differential    Vitamin B12    FOLATE    FERRITIN    Iron and TIBC    Vitamin D    Comprehensive Metabolic Panel    TSH     Other Visit Diagnoses       Fatigue, unspecified type    -  Primary    Relevant Orders    CBC Auto Differential    Vitamin B12    FOLATE    FERRITIN    Iron and TIBC    Vitamin D    Comprehensive Metabolic Panel    Nutritional anemia, unspecified        Relevant Orders    Vitamin B12    Protein-calorie malnutrition, unspecified severity        Relevant Orders    FOLATE    Intestinal malabsorption, unspecified type        Relevant Orders    Vitamin D          Fatigue/chills  Repeat blood cultures. Check B12 and cbc folate, vit d, iron  She is not fasting so no lipid today (has trig 1 month ago 186

## 2023-05-17 ENCOUNTER — DOCUMENT SCAN (OUTPATIENT)
Dept: HOME HEALTH SERVICES | Facility: HOSPITAL | Age: 68
End: 2023-05-17
Payer: MEDICARE

## 2023-05-17 DIAGNOSIS — R79.89 ELEVATED LFTS: Primary | ICD-10-CM

## 2023-05-18 ENCOUNTER — EXTERNAL HOME HEALTH (OUTPATIENT)
Dept: HOME HEALTH SERVICES | Facility: HOSPITAL | Age: 68
End: 2023-05-18
Payer: MEDICARE

## 2023-05-24 ENCOUNTER — DOCUMENT SCAN (OUTPATIENT)
Dept: HOME HEALTH SERVICES | Facility: HOSPITAL | Age: 68
End: 2023-05-24
Payer: MEDICARE

## 2023-05-25 ENCOUNTER — LAB VISIT (OUTPATIENT)
Dept: LAB | Facility: HOSPITAL | Age: 68
End: 2023-05-25
Attending: NURSE PRACTITIONER
Payer: MEDICARE

## 2023-05-25 ENCOUNTER — TELEPHONE (OUTPATIENT)
Dept: INTERNAL MEDICINE | Facility: CLINIC | Age: 68
End: 2023-05-25
Payer: MEDICARE

## 2023-05-25 DIAGNOSIS — K59.00 CONSTIPATION, UNSPECIFIED CONSTIPATION TYPE: Primary | ICD-10-CM

## 2023-05-25 DIAGNOSIS — R79.89 ELEVATED LFTS: ICD-10-CM

## 2023-05-25 LAB
ALBUMIN SERPL BCP-MCNC: 3.7 G/DL (ref 3.5–5.2)
ALP SERPL-CCNC: 85 U/L (ref 55–135)
ALT SERPL W/O P-5'-P-CCNC: 23 U/L (ref 10–44)
ANION GAP SERPL CALC-SCNC: 12 MMOL/L (ref 8–16)
AST SERPL-CCNC: 20 U/L (ref 10–40)
BILIRUB DIRECT SERPL-MCNC: 0.2 MG/DL (ref 0.1–0.3)
BILIRUB SERPL-MCNC: 0.4 MG/DL (ref 0.1–1)
BUN SERPL-MCNC: 10 MG/DL (ref 8–23)
CALCIUM SERPL-MCNC: 10.1 MG/DL (ref 8.7–10.5)
CHLORIDE SERPL-SCNC: 105 MMOL/L (ref 95–110)
CO2 SERPL-SCNC: 24 MMOL/L (ref 23–29)
CREAT SERPL-MCNC: 0.7 MG/DL (ref 0.5–1.4)
EST. GFR  (NO RACE VARIABLE): >60 ML/MIN/1.73 M^2
GLUCOSE SERPL-MCNC: 138 MG/DL (ref 70–110)
POTASSIUM SERPL-SCNC: 3.3 MMOL/L (ref 3.5–5.1)
PROT SERPL-MCNC: 7.5 G/DL (ref 6–8.4)
SODIUM SERPL-SCNC: 141 MMOL/L (ref 136–145)

## 2023-05-25 PROCEDURE — 36415 COLL VENOUS BLD VENIPUNCTURE: CPT | Performed by: NURSE PRACTITIONER

## 2023-05-25 PROCEDURE — 80048 BASIC METABOLIC PNL TOTAL CA: CPT | Performed by: NURSE PRACTITIONER

## 2023-05-25 PROCEDURE — 80076 HEPATIC FUNCTION PANEL: CPT | Performed by: NURSE PRACTITIONER

## 2023-05-25 RX ORDER — LACTULOSE 10 G/15ML
20 SOLUTION ORAL; RECTAL 2 TIMES DAILY PRN
Qty: 420 ML | Refills: 0 | Status: SHIPPED | OUTPATIENT
Start: 2023-05-25 | End: 2023-07-13

## 2023-05-25 NOTE — TELEPHONE ENCOUNTER
Pt has been using miralax and prune juice. She is having trouble moving her bowels. Can you advise in Payal's absence.

## 2023-05-25 NOTE — TELEPHONE ENCOUNTER
Continue using miralax daily   Would add OTC stool softener like colace( docusate sodium) 100mg daily or twice daily   Increase water intake and fruits and vegetables   Will send lactulose to try for short term; should f/u with Payal if not better in 1 week

## 2023-06-02 RX ORDER — OMEPRAZOLE 40 MG/1
40 CAPSULE, DELAYED RELEASE ORAL DAILY
Qty: 30 CAPSULE | Refills: 0 | Status: SHIPPED | OUTPATIENT
Start: 2023-06-02 | End: 2023-07-11 | Stop reason: SDUPTHER

## 2023-06-02 NOTE — TELEPHONE ENCOUNTER
----- Message from Hina Madrid sent at 2023  8:17 AM CDT -----  Contact: pt  Nan Simms  MRN: [id number]  : 1955  PCP: Payal Moreland  Home Phone      [phone]  Work Phone      Not on file.  Mobile          [phone]      MESSAGE: Pt states she needs her omeprazole (PRILOSEC) 40 MG capsule refilled. She only has one pill let.         [facility] [address] [place] [address]  Phone: [phone] Fax: [phone]  Hours: Mon-Fri, 8a-5:30p    [phone]

## 2023-06-19 ENCOUNTER — TELEPHONE (OUTPATIENT)
Dept: INTERNAL MEDICINE | Facility: CLINIC | Age: 68
End: 2023-06-19
Payer: MEDICARE

## 2023-06-19 NOTE — LETTER
[facility]  [address] 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 She is subsequently now left with malnourishment due to no appetite with symptoms of dehydration/fatigue. She is unfit for jury duty at this time.    If you have any questions or concerns, please feel free to call my office.    Sincerely,        Payal Moreland NP

## 2023-06-19 NOTE — TELEPHONE ENCOUNTER
----- Message from Hina Julius sent at 2023 11:57 AM CDT -----  Contact: pt  Nan Simms  MRN: [id number]  : 1955  PCP: Payal Moreland  Home Phone      [phone]  Work Phone      Not on file.  Mobile          [phone]      MESSAGE: pt has jury duty next month and she is requesting a letter excusing her from it stating she can't sit that long due to a recent surgery. Please advise      [phone]

## 2023-06-20 ENCOUNTER — DOCUMENT SCAN (OUTPATIENT)
Dept: HOME HEALTH SERVICES | Facility: HOSPITAL | Age: 68
End: 2023-06-20
Payer: MEDICARE

## 2023-06-22 NOTE — TELEPHONE ENCOUNTER
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 She is subsequently now left with malnourishment due to no appetite with symptoms of dehydration/fatigue. She is unfit for jury duty at this time.

## 2023-06-26 ENCOUNTER — TELEPHONE (OUTPATIENT)
Dept: INTERNAL MEDICINE | Facility: CLINIC | Age: 68
End: 2023-06-26
Payer: MEDICARE

## 2023-06-28 ENCOUNTER — DOCUMENT SCAN (OUTPATIENT)
Dept: HOME HEALTH SERVICES | Facility: HOSPITAL | Age: 68
End: 2023-06-28
Payer: MEDICARE

## 2023-06-30 ENCOUNTER — PATIENT OUTREACH (OUTPATIENT)
Dept: ADMINISTRATIVE | Facility: HOSPITAL | Age: 68
End: 2023-06-30
Payer: MEDICARE

## 2023-06-30 NOTE — LETTER
AUTHORIZATION FOR RELEASE OF   CONFIDENTIAL INFORMATION    [facility],    We are seeing Nan Simms, date of birth 1955, in the clinic at [facility]. Payal Moreland NP is the patient's PCP. Nan Simms has an outstanding lab/procedure at the time we reviewed her chart. In order to help keep her health information updated, she has authorized us to request the following medical record(s):                              ( X )  EYE EXAM                   Please fax records to [facility], Linsey L Daigle, NP, [phone].    If you have any questions, please contact...  Juju Cardoza LPN  Clinical Care Coordinator  [facility]  Phone: [phone]  Fax: [phone]           Patient Name: Nan Simms  : 1955  Patient Phone #: [phone]

## 2023-07-05 ENCOUNTER — PATIENT OUTREACH (OUTPATIENT)
Dept: ADMINISTRATIVE | Facility: HOSPITAL | Age: 68
End: 2023-07-05
Payer: MEDICARE

## 2023-07-05 DIAGNOSIS — E11.9 TYPE 2 DIABETES MELLITUS WITHOUT COMPLICATION, WITHOUT LONG-TERM CURRENT USE OF INSULIN: Primary | ICD-10-CM

## 2023-07-05 DIAGNOSIS — E11.9 DIABETES MELLITUS TYPE 2, DIET-CONTROLLED: Primary | ICD-10-CM

## 2023-07-05 DIAGNOSIS — Z78.0 MENOPAUSE: ICD-10-CM

## 2023-07-05 NOTE — PROGRESS NOTES
Chart reviewed, immunization record updated.  No new results noted on [facility] or [facility] web site.  Care Everywhere updated.   Patient care coordination note updated.   Upcoming PCP visit updated.  Next PCP visit 08/01/2023.  LOV with PCP 05/16/2023.  Received external Diabetic Eye Exam collected/ completed on 05/20/2022, updated to .    Attempted to contact patient to discuss over due Diabetic Eye exam and Osteoporosis screening.   No answer, unable to leave voicemail.

## 2023-07-05 NOTE — PROGRESS NOTES
Patient returned call to clinic.  Will schedule Diabetic Eye exam soon.  Scheduled DEXA scan for 07/25/2023.

## 2023-07-10 ENCOUNTER — PATIENT MESSAGE (OUTPATIENT)
Dept: ADMINISTRATIVE | Facility: HOSPITAL | Age: 68
End: 2023-07-10
Payer: MEDICARE

## 2023-07-11 ENCOUNTER — HOSPITAL ENCOUNTER (EMERGENCY)
Facility: HOSPITAL | Age: 68
Discharge: HOME | End: 2023-07-11
Attending: STUDENT IN AN ORGANIZED HEALTH CARE EDUCATION/TRAINING PROGRAM
Payer: MEDICARE

## 2023-07-11 VITALS
BODY MASS INDEX: 23.54 KG/M2 | SYSTOLIC BLOOD PRESSURE: 143 MMHG | TEMPERATURE: 98 F | HEART RATE: 51 BPM | RESPIRATION RATE: 20 BRPM | DIASTOLIC BLOOD PRESSURE: 63 MMHG | WEIGHT: 145.81 LBS | OXYGEN SATURATION: 98 %

## 2023-07-11 DIAGNOSIS — E87.6 HYPOKALEMIA: ICD-10-CM

## 2023-07-11 DIAGNOSIS — R10.13 EPIGASTRIC ABDOMINAL PAIN: Primary | ICD-10-CM

## 2023-07-11 DIAGNOSIS — D64.9 CHRONIC ANEMIA: ICD-10-CM

## 2023-07-11 LAB
ALBUMIN SERPL BCP-MCNC: 3.1 G/DL (ref 3.5–5.2)
ALP SERPL-CCNC: 79 U/L (ref 55–135)
ALT SERPL W/O P-5'-P-CCNC: 12 U/L (ref 10–44)
ANION GAP SERPL CALC-SCNC: 11 MMOL/L (ref 8–16)
AST SERPL-CCNC: 14 U/L (ref 10–40)
BACTERIA #/AREA URNS HPF: ABNORMAL /HPF
BASOPHILS # BLD AUTO: 0.03 K/UL (ref 0–0.2)
BASOPHILS NFR BLD: 0.5 % (ref 0–1.9)
BILIRUB SERPL-MCNC: 0.3 MG/DL (ref 0.1–1)
BILIRUB UR QL STRIP: NEGATIVE
BUN SERPL-MCNC: 11 MG/DL (ref 8–23)
CALCIUM SERPL-MCNC: 9.7 MG/DL (ref 8.7–10.5)
CHLORIDE SERPL-SCNC: 102 MMOL/L (ref 95–110)
CLARITY UR: CLEAR
CO2 SERPL-SCNC: 30 MMOL/L (ref 23–29)
COLOR UR: YELLOW
CREAT SERPL-MCNC: 0.7 MG/DL (ref 0.5–1.4)
DIFFERENTIAL METHOD: ABNORMAL
EOSINOPHIL # BLD AUTO: 0.2 K/UL (ref 0–0.5)
EOSINOPHIL NFR BLD: 3.1 % (ref 0–8)
ERYTHROCYTE [DISTWIDTH] IN BLOOD BY AUTOMATED COUNT: 20.1 % (ref 11.5–14.5)
EST. GFR  (NO RACE VARIABLE): >60 ML/MIN/1.73 M^2
GLUCOSE SERPL-MCNC: 95 MG/DL (ref 70–110)
GLUCOSE UR QL STRIP: NEGATIVE
HCT VFR BLD AUTO: 30.8 % (ref 37–48.5)
HGB BLD-MCNC: 9.5 G/DL (ref 12–16)
HGB UR QL STRIP: ABNORMAL
IMM GRANULOCYTES # BLD AUTO: 0.02 K/UL (ref 0–0.04)
IMM GRANULOCYTES NFR BLD AUTO: 0.4 % (ref 0–0.5)
KETONES UR QL STRIP: NEGATIVE
LEUKOCYTE ESTERASE UR QL STRIP: ABNORMAL
LIPASE SERPL-CCNC: 12 U/L (ref 4–60)
LYMPHOCYTES # BLD AUTO: 1.6 K/UL (ref 1–4.8)
LYMPHOCYTES NFR BLD: 28 % (ref 18–48)
MCH RBC QN AUTO: 21.1 PG (ref 27–31)
MCHC RBC AUTO-ENTMCNC: 30.8 G/DL (ref 32–36)
MCV RBC AUTO: 68 FL (ref 82–98)
MICROSCOPIC COMMENT: ABNORMAL
MONOCYTES # BLD AUTO: 0.5 K/UL (ref 0.3–1)
MONOCYTES NFR BLD: 9.6 % (ref 4–15)
NEUTROPHILS # BLD AUTO: 3.2 K/UL (ref 1.8–7.7)
NEUTROPHILS NFR BLD: 58.4 % (ref 38–73)
NITRITE UR QL STRIP: NEGATIVE
NON-SQ EPI CELLS #/AREA URNS HPF: 1 /HPF
NRBC BLD-RTO: 0 /100 WBC
PH UR STRIP: 7 [PH] (ref 5–8)
PLATELET # BLD AUTO: 259 K/UL (ref 150–450)
PMV BLD AUTO: 10 FL (ref 9.2–12.9)
POTASSIUM SERPL-SCNC: 3.2 MMOL/L (ref 3.5–5.1)
PROT SERPL-MCNC: 6.7 G/DL (ref 6–8.4)
PROT UR QL STRIP: NEGATIVE
RBC # BLD AUTO: 4.51 M/UL (ref 4–5.4)
RBC #/AREA URNS HPF: 1 /HPF (ref 0–4)
SODIUM SERPL-SCNC: 143 MMOL/L (ref 136–145)
SP GR UR STRIP: 1.02 (ref 1–1.03)
SQUAMOUS #/AREA URNS HPF: 15 /HPF
TROPONIN I SERPL DL<=0.01 NG/ML-MCNC: <0.006 NG/ML (ref 0–0.03)
URN SPEC COLLECT METH UR: ABNORMAL
UROBILINOGEN UR STRIP-ACNC: NEGATIVE EU/DL
WBC # BLD AUTO: 5.54 K/UL (ref 3.9–12.7)
WBC #/AREA URNS HPF: 5 /HPF (ref 0–5)

## 2023-07-11 PROCEDURE — 81000 URINALYSIS NONAUTO W/SCOPE: CPT | Performed by: STUDENT IN AN ORGANIZED HEALTH CARE EDUCATION/TRAINING PROGRAM

## 2023-07-11 PROCEDURE — 99900035 HC TECH TIME PER 15 MIN (STAT)

## 2023-07-11 PROCEDURE — 84484 ASSAY OF TROPONIN QUANT: CPT | Performed by: STUDENT IN AN ORGANIZED HEALTH CARE EDUCATION/TRAINING PROGRAM

## 2023-07-11 PROCEDURE — 85025 COMPLETE CBC W/AUTO DIFF WBC: CPT | Performed by: STUDENT IN AN ORGANIZED HEALTH CARE EDUCATION/TRAINING PROGRAM

## 2023-07-11 PROCEDURE — 25000003 PHARM REV CODE 250: Performed by: STUDENT IN AN ORGANIZED HEALTH CARE EDUCATION/TRAINING PROGRAM

## 2023-07-11 PROCEDURE — 36415 COLL VENOUS BLD VENIPUNCTURE: CPT | Performed by: STUDENT IN AN ORGANIZED HEALTH CARE EDUCATION/TRAINING PROGRAM

## 2023-07-11 PROCEDURE — 93010 EKG 12-LEAD: ICD-10-PCS | Mod: ,,, | Performed by: INTERNAL MEDICINE

## 2023-07-11 PROCEDURE — 93010 ELECTROCARDIOGRAM REPORT: CPT | Mod: ,,, | Performed by: INTERNAL MEDICINE

## 2023-07-11 PROCEDURE — 83690 ASSAY OF LIPASE: CPT | Performed by: STUDENT IN AN ORGANIZED HEALTH CARE EDUCATION/TRAINING PROGRAM

## 2023-07-11 PROCEDURE — 93005 ELECTROCARDIOGRAM TRACING: CPT

## 2023-07-11 PROCEDURE — 80053 COMPREHEN METABOLIC PANEL: CPT | Performed by: STUDENT IN AN ORGANIZED HEALTH CARE EDUCATION/TRAINING PROGRAM

## 2023-07-11 PROCEDURE — 99284 EMERGENCY DEPT VISIT MOD MDM: CPT

## 2023-07-11 RX ORDER — POTASSIUM CHLORIDE 20 MEQ/1
40 TABLET, EXTENDED RELEASE ORAL
Status: COMPLETED | OUTPATIENT
Start: 2023-07-11 | End: 2023-07-11

## 2023-07-11 RX ORDER — MAG HYDROX/ALUMINUM HYD/SIMETH 200-200-20
15 SUSPENSION, ORAL (FINAL DOSE FORM) ORAL
Status: COMPLETED | OUTPATIENT
Start: 2023-07-11 | End: 2023-07-11

## 2023-07-11 RX ORDER — OMEPRAZOLE 40 MG/1
40 CAPSULE, DELAYED RELEASE ORAL DAILY
Qty: 30 CAPSULE | Refills: 0 | Status: SHIPPED | OUTPATIENT
Start: 2023-07-11 | End: 2023-08-15 | Stop reason: SDUPTHER

## 2023-07-11 RX ADMIN — POTASSIUM CHLORIDE 40 MEQ: 1500 TABLET, EXTENDED RELEASE ORAL at 10:07

## 2023-07-11 RX ADMIN — ALUMINUM HYDROXIDE, MAGNESIUM HYDROXIDE, AND DIMETHICONE 15 ML: 200; 20; 200 SUSPENSION ORAL at 09:07

## 2023-07-11 NOTE — ED PROVIDER NOTES
"Encounter Date: 7/11/2023       History     Chief Complaint   Patient presents with    Abdominal Pain     Patient to ER CC of upper abd pain for a few days, states it feels like theres food that needs to come up     68-year-old female with a history of hypertension, hyperthyroidism presents to the emergency department with epigastric abdominal pain for the past few days.  She states that every morning when she wakes up, she feels the urge to belch as if there was food and needs to come up.  She denies nausea, vomiting.  She states that she has a sour taste in her mouth in the mornings. She eats and drinks without difficulty. She does take omeprazole daily.  She absolutely denies abdominal pain, nausea, vomiting, fevers, chest pain, diarrhea.      Review of patient's allergies indicates:   Allergen Reactions    Aspirin Other (See Comments)     Pt states it is "hard on her stomach" She can tolerate a low dose aspirin    Pcn [penicillins]      Childhood - Tolerated ceftriaxone and cefazolin with no documented issues in the past      Past Medical History:   Diagnosis Date    Abnormal Pap smear of cervix     Arthritis     Duodenal adenocarcinoma     Hypertension     Hyperthyroidism      Past Surgical History:   Procedure Laterality Date    CATARACT EXTRACTION W/  INTRAOCULAR LENS IMPLANT Right 8/8/2019    Procedure: EXTRACTION, CATARACT, WITH IOL INSERTION;  Surgeon: Spenser Lee MD;  Location: [facility];  Service: Ophthalmology;  Laterality: Right;    ESOPHAGOGASTRODUODENOSCOPY N/A 3/10/2023    Procedure: EGD (ESOPHAGOGASTRODUODENOSCOPY);  Surgeon: Shashi Tapia MD;  Location: [address]);  Service: Endoscopy;  Laterality: N/A;    EYE SURGERY      HYSTERECTOMY      OOPHORECTOMY      PHACOEMULSIFICATION OF CATARACT Right 8/8/2019    Procedure: PHACOEMULSIFICATION, CATARACT;  Surgeon: Spenser Lee MD;  Location: [facility];  Service: Ophthalmology;  Laterality: Right;    WHIPPLE PROCEDURE N/A " 3/15/2023    Procedure: WHIPPLE PROCEDURE;  Surgeon: Nick Paez MD;  Location: [facility] OR [address] [place] [address];  Service: General;  Laterality: N/A;     Family History   Problem Relation Age of Onset    Cancer Mother     Breast cancer Neg Hx     Colon cancer Neg Hx     Ovarian cancer Neg Hx      Social History     Tobacco Use    Smoking status: Every Day     Packs/day: 0.25     Years: 30.00     Pack years: 7.50     Types: Cigarettes    Smokeless tobacco: Current    Tobacco comments:     About 7-8 cigs a day   Substance Use Topics    Alcohol use: Yes     Alcohol/week: 1.0 standard drink     Types: 1 Cans of beer per week    Drug use: No     Review of Systems   Constitutional:  Negative for chills and fever.   HENT:  Negative for congestion, rhinorrhea and sneezing.    Eyes:  Negative for discharge and redness.   Respiratory:  Negative for cough and shortness of breath.    Cardiovascular:  Negative for chest pain and palpitations.   Gastrointestinal:  Negative for abdominal pain, diarrhea, nausea and vomiting.   Genitourinary:  Negative for dysuria, frequency, vaginal bleeding and vaginal discharge.   Musculoskeletal:  Negative for back pain and neck pain.   Skin:  Negative for rash and wound.   Neurological:  Negative for weakness, numbness and headaches.     Physical Exam     Initial Vitals [07/11/23 0827]   BP Pulse Resp Temp SpO2   (!) 143/63 (!) 51 20 97.9 °F (36.6 °C) 98 %      MAP       --         Physical Exam    Nursing note and vitals reviewed.  Constitutional: She appears well-developed. She is not diaphoretic. No distress.   HENT:   Head: Normocephalic and atraumatic.   Right Ear: External ear normal.   Left Ear: External ear normal.   Eyes: Right eye exhibits no discharge. Left eye exhibits no discharge. No scleral icterus.   Neck: Neck supple.   Cardiovascular:  Regular rhythm.   Bradycardia present.         Pulmonary/Chest: Breath sounds normal. No stridor. No respiratory distress. She has no wheezes. She has no  rhonchi. She has no rales.   Abdominal: Abdomen is soft. There is no abdominal tenderness.   Large vertical healed abdominal surgical scar noted There is no guarding.   Musculoskeletal:         General: No edema.      Cervical back: Neck supple.     Neurological: She is alert and oriented to person, place, and time.   Skin: Skin is warm and dry. Capillary refill takes less than 2 seconds.   Psychiatric: She has a normal mood and affect.       ED Course   Procedures  Labs Reviewed   CBC W/ AUTO DIFFERENTIAL - Abnormal; Notable for the following components:       Result Value    Hemoglobin 9.5 (*)     Hematocrit 30.8 (*)     MCV 68 (*)     MCH 21.1 (*)     MCHC 30.8 (*)     RDW 20.1 (*)     All other components within normal limits   COMPREHENSIVE METABOLIC PANEL - Abnormal; Notable for the following components:    Potassium 3.2 (*)     CO2 30 (*)     Albumin 3.1 (*)     All other components within normal limits   URINALYSIS, REFLEX TO URINE CULTURE - Abnormal; Notable for the following components:    Occult Blood UA Trace (*)     Leukocytes, UA Trace (*)     All other components within normal limits    Narrative:     Specimen Source->Urine   URINALYSIS MICROSCOPIC - Abnormal; Notable for the following components:    Bacteria Moderate (*)     Non-Squam Epith 1 (*)     All other components within normal limits    Narrative:     Specimen Source->Urine   LIPASE   TROPONIN I     EKG Readings: (Independently Interpreted)   Previous EKG: Compared with most recent EKG Previous EKG Date: 5/11/2023.   Sinus bradycardia at 58 bpm with sinus arrhythmia. Normal axis. Normal intervals. Nonspecific T wave abnormalities. No STEMI.     Imaging Results    None          Medications   potassium chloride SA CR tablet 40 mEq (has no administration in time range)   aluminum-magnesium hydroxide-simethicone 200-200-20 mg/5 mL suspension 15 mL (15 mLs Oral Given 7/11/23 0900)     Medical Decision Making:   Differential Diagnosis:   Ddx: GERD,  obstruction, food impaction, atypical ACS, peritonitis, gastritis  ED Management:  Based on the patient's evaluation - patient appears well for discharge home. Stable anemia noted. HypoK noted as well. Will give PO K in the ED. Suspect GERD - patient is on prilosec and states she has only 2 tablets left. Refill ordered. Patient also states she thinks it's her GERD. [place] better with PO Maalox. Will discharge home with PCP f/u PRN. Patient is in agreement.                        Clinical Impression:   Final diagnoses:  [R10.13] Epigastric abdominal pain (Primary)  [D64.9] Chronic anemia  [E87.6] Hypokalemia        ED Disposition Condition    Discharge Stable          ED Prescriptions       Medication Sig Dispense Start Date End Date Auth. Provider    omeprazole (PRILOSEC) 40 MG capsule Take 1 capsule (40 mg total) by mouth once daily. 30 capsule 7/11/2023 -- Mitchel Tucker DO          Follow-up Information       Follow up With Specialties Details Why Contact Info    Payal Moreland NP Family Medicine Schedule an appointment as soon as possible for a visit in 1 week As needed [address] [facility] [address]  [phone]               Mitchel Tucker DO  07/11/23 6740

## 2023-07-13 ENCOUNTER — OFFICE VISIT (OUTPATIENT)
Dept: INTERNAL MEDICINE | Facility: CLINIC | Age: 68
End: 2023-07-13
Payer: MEDICARE

## 2023-07-13 VITALS
OXYGEN SATURATION: 98 % | BODY MASS INDEX: 23.13 KG/M2 | HEART RATE: 86 BPM | DIASTOLIC BLOOD PRESSURE: 68 MMHG | WEIGHT: 143.94 LBS | HEIGHT: 66 IN | SYSTOLIC BLOOD PRESSURE: 110 MMHG | RESPIRATION RATE: 18 BRPM

## 2023-07-13 DIAGNOSIS — K29.70 GASTRITIS, PRESENCE OF BLEEDING UNSPECIFIED, UNSPECIFIED CHRONICITY, UNSPECIFIED GASTRITIS TYPE: Primary | ICD-10-CM

## 2023-07-13 PROCEDURE — 3078F PR MOST RECENT DIASTOLIC BLOOD PRESSURE < 80 MM HG: ICD-10-PCS | Mod: CPTII,S$GLB,, | Performed by: NURSE PRACTITIONER

## 2023-07-13 PROCEDURE — 1101F PR PT FALLS ASSESS DOC 0-1 FALLS W/OUT INJ PAST YR: ICD-10-PCS | Mod: CPTII,S$GLB,, | Performed by: NURSE PRACTITIONER

## 2023-07-13 PROCEDURE — 99999 PR PBB SHADOW E&M-EST. PATIENT-LVL III: ICD-10-PCS | Mod: PBBFAC,,, | Performed by: NURSE PRACTITIONER

## 2023-07-13 PROCEDURE — 3288F FALL RISK ASSESSMENT DOCD: CPT | Mod: CPTII,S$GLB,, | Performed by: NURSE PRACTITIONER

## 2023-07-13 PROCEDURE — 3008F PR BODY MASS INDEX (BMI) DOCUMENTED: ICD-10-PCS | Mod: CPTII,S$GLB,, | Performed by: NURSE PRACTITIONER

## 2023-07-13 PROCEDURE — 1126F PR PAIN SEVERITY QUANTIFIED, NO PAIN PRESENT: ICD-10-PCS | Mod: CPTII,S$GLB,, | Performed by: NURSE PRACTITIONER

## 2023-07-13 PROCEDURE — 3074F PR MOST RECENT SYSTOLIC BLOOD PRESSURE < 130 MM HG: ICD-10-PCS | Mod: CPTII,S$GLB,, | Performed by: NURSE PRACTITIONER

## 2023-07-13 PROCEDURE — 1159F MED LIST DOCD IN RCRD: CPT | Mod: CPTII,S$GLB,, | Performed by: NURSE PRACTITIONER

## 2023-07-13 PROCEDURE — 3078F DIAST BP <80 MM HG: CPT | Mod: CPTII,S$GLB,, | Performed by: NURSE PRACTITIONER

## 2023-07-13 PROCEDURE — 99214 PR OFFICE/OUTPT VISIT, EST, LEVL IV, 30-39 MIN: ICD-10-PCS | Mod: S$GLB,,, | Performed by: NURSE PRACTITIONER

## 2023-07-13 PROCEDURE — 4010F PR ACE/ARB THEARPY RXD/TAKEN: ICD-10-PCS | Mod: CPTII,S$GLB,, | Performed by: NURSE PRACTITIONER

## 2023-07-13 PROCEDURE — 3008F BODY MASS INDEX DOCD: CPT | Mod: CPTII,S$GLB,, | Performed by: NURSE PRACTITIONER

## 2023-07-13 PROCEDURE — 3044F HG A1C LEVEL LT 7.0%: CPT | Mod: CPTII,S$GLB,, | Performed by: NURSE PRACTITIONER

## 2023-07-13 PROCEDURE — 4010F ACE/ARB THERAPY RXD/TAKEN: CPT | Mod: CPTII,S$GLB,, | Performed by: NURSE PRACTITIONER

## 2023-07-13 PROCEDURE — 3044F PR MOST RECENT HEMOGLOBIN A1C LEVEL <7.0%: ICD-10-PCS | Mod: CPTII,S$GLB,, | Performed by: NURSE PRACTITIONER

## 2023-07-13 PROCEDURE — 1101F PT FALLS ASSESS-DOCD LE1/YR: CPT | Mod: CPTII,S$GLB,, | Performed by: NURSE PRACTITIONER

## 2023-07-13 PROCEDURE — 1126F AMNT PAIN NOTED NONE PRSNT: CPT | Mod: CPTII,S$GLB,, | Performed by: NURSE PRACTITIONER

## 2023-07-13 PROCEDURE — 99214 OFFICE O/P EST MOD 30 MIN: CPT | Mod: S$GLB,,, | Performed by: NURSE PRACTITIONER

## 2023-07-13 PROCEDURE — 3074F SYST BP LT 130 MM HG: CPT | Mod: CPTII,S$GLB,, | Performed by: NURSE PRACTITIONER

## 2023-07-13 PROCEDURE — 3288F PR FALLS RISK ASSESSMENT DOCUMENTED: ICD-10-PCS | Mod: CPTII,S$GLB,, | Performed by: NURSE PRACTITIONER

## 2023-07-13 PROCEDURE — 99999 PR PBB SHADOW E&M-EST. PATIENT-LVL III: CPT | Mod: PBBFAC,,, | Performed by: NURSE PRACTITIONER

## 2023-07-13 PROCEDURE — 1159F PR MEDICATION LIST DOCUMENTED IN MEDICAL RECORD: ICD-10-PCS | Mod: CPTII,S$GLB,, | Performed by: NURSE PRACTITIONER

## 2023-07-13 RX ORDER — MEGESTROL ACETATE 40 MG/1
40 TABLET ORAL DAILY PRN
Start: 2023-07-13 | End: 2023-07-27

## 2023-07-13 RX ORDER — FAMOTIDINE 20 MG/1
20 TABLET, FILM COATED ORAL 2 TIMES DAILY
Qty: 60 TABLET | Refills: 11 | Status: SHIPPED | OUTPATIENT
Start: 2023-07-13 | End: 2023-08-14 | Stop reason: SDUPTHER

## 2023-07-13 NOTE — PROGRESS NOTES
Subjective:       Patient ID: Nan Simms is a 68 y.o. female.    Chief Complaint: Follow-up (ER)    HPI: Pt presents to clinic today known to me for f/u from ER 7/11 for epigastric pain   >> She states that every morning when she wakes up, she feels the urge to belch as if there was food and needs to come up.  She denies nausea, vomiting.  She states that she has a sour taste in her mouth in the mornings. She eats and drinks without difficulty. She does take omeprazole daily.  She absolutely denies abdominal pain, nausea, vomiting, fevers, chest pain, diarrhea.    Urine showed trace blood and leuko, K+ was low- given potassium 40meq po in ER   Lipase and trop were normal. CBC shows anemia but stable     Has a hx of duodenal adenocarcinoma s/p whipple procedure > last saw post op Jennifer Carias NP  Upper GI / Hepatobiliary Surgical Oncology  [facility]  4/37- was suppose to f/u this month with labs 7/27 scheduled and f.u imaging in September     She was treated with maalox and felt better. Here for f/u. Refilled her prilosec as well,     She report that she feels like she eats although she does not have a good appetite. She reports that when she eats at night she feels the food just sits in the stomach- points to epigastric area. Belches sour. Rarely vomits but did once and it was all mucus. [place] better. She rert that it is in the middle of the night. She wakes up 2-3 am and has the nausea feeling. She feels like she eats something and the feeling goes away. Feels fine when going to sleep. Has been taking prilosec in evening   Review of Systems   Constitutional:  Negative for chills, fatigue, fever and unexpected weight change.   HENT:  Negative for congestion, ear pain, sore throat and trouble swallowing.    Eyes:  Negative for pain and visual disturbance.   Respiratory:  Negative for cough, chest tightness and shortness of breath.    Cardiovascular:  Negative for chest pain, palpitations and leg  swelling.   Gastrointestinal:  Negative for abdominal distention, abdominal pain, constipation, diarrhea and vomiting.        No pain just nausea in middle of the night    Genitourinary:  Negative for difficulty urinating, dysuria, flank pain, frequency and hematuria.   Musculoskeletal:  Negative for back pain, gait problem, joint swelling, neck pain and neck stiffness.   Skin:  Negative for rash and wound.   Neurological:  Negative for dizziness, seizures, speech difficulty, light-headedness and headaches.     Objective:      Physical Exam  Vitals and nursing note reviewed.   Constitutional:       Appearance: Normal appearance. She is well-developed.   HENT:      Head: Normocephalic and atraumatic.      Right Ear: External ear normal.      Left Ear: External ear normal.      Nose: Nose normal.   Eyes:      Conjunctiva/sclera: Conjunctivae normal.      Pupils: Pupils are equal, round, and reactive to light.   Cardiovascular:      Rate and Rhythm: Normal rate and regular rhythm.      Heart sounds: Normal heart sounds. No murmur heard.  Pulmonary:      Effort: Pulmonary effort is normal. No respiratory distress.      Breath sounds: Normal breath sounds. No wheezing.   Abdominal:      General: Bowel sounds are normal.      Palpations: Abdomen is soft.   Musculoskeletal:         General: No swelling. Normal range of motion.      Cervical back: Normal range of motion and neck supple.   Skin:     General: Skin is warm and dry.      Capillary Refill: Capillary refill takes less than 2 seconds.   Neurological:      Mental Status: She is alert and oriented to person, place, and time.   Psychiatric:         Behavior: Behavior normal.         Thought Content: Thought content normal.         Judgment: Judgment normal.       Assessment:       1. Gastritis, presence of bleeding unspecified, unspecified chronicity, unspecified gastritis type        Plan:     Problem List Items Addressed This Visit    None  Visit Diagnoses        Gastritis, presence of bleeding unspecified, unspecified chronicity, unspecified gastritis type    -  Primary    Relevant Medications    famotidine (PEPCID) 20 MG tablet          Cont prilosec in evening. Add pepcid BID to neutralize the acid. I really thinjk that once her stomach is empty the acid is causing her nausea. She does report that the one time she vomited it was all mucus/acid> no food and felt better. D/c reglan she was not taking anyway. Keep f/u with surgery and heme onc 7/27.

## 2023-07-20 NOTE — HPI
Reason for Consult: Management of Type 2 diabetes (in remission), Hyperglycemia     Surgical Procedure and Date: s/p Whipple on 3/15/2023    Patient has a hx. Of type 2 diabetes but it is currently in remission do to lifestyle modification and does not currently take any oral/injectable antidiabetic/hypoglycemic medications.       HPI: 67 year old female with PMHx of HTN and hypothyroidism recently went to [facility] for a three week history of constipation (self treated with enemas) and abdominal pain. She was transferred to [facility] for higher level of care. She has been tolerating a solid and liquid diet. Reports 13 pound weight loss. Of note, she also notes a loss of appetite and increased fatigue over last several weeks. Upon admission 3/09, she was HDS and labs were consistent with iron deficiency anemia. CT A/P obtained 3/9 showed findings concerning for malignancy involving the pylorus and duodenum with component of gastric outlet obstruction. NGT was placed 3/9 and patient has had resolution of her abdominal pain, nausea, and vomiting since placement. Biopsy showed poorly differentiated adenocarcinoma. She has done well in the hospital since NG tube decompression. 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 Pt is now sp Whipple procedure 3/15/23. Endocrine consulted to manage hyperglycemia. Of note, patient on TPN resulting in BG excursions.     Hemoglobin A1C   Date Value Ref Range Status   03/09/2023 5.6 4.0 - 5.6 % Final     Comment:     ADA Screening Guidelines:  5.7-6.4%  Consistent with prediabetes  >or=6.5%  Consistent with diabetes    High levels of fetal hemoglobin interfere with the HbA1C  assay. Heterozygous hemoglobin variants (HbS, HgC, etc)do  not significantly interfere with this assay.   However, presence of multiple variants may affect accuracy.     11/11/2022 5.6 4.0 - 5.6 % Final     Comment:     ADA Screening Guidelines:  5.7-6.4%  Consistent with  [0] : 2) Feeling down, depressed, or hopeless: Not at all (0) [PHQ-2 Negative - No further assessment needed] : PHQ-2 Negative - No further assessment needed prediabetes  >or=6.5%  Consistent with diabetes    High levels of fetal hemoglobin interfere with the HbA1C  assay. Heterozygous hemoglobin variants (HbS, HgC, etc)do  not significantly interfere with this assay.   However, presence of multiple variants may affect accuracy.     01/10/2022 5.8 (H) 4.0 - 5.6 % Final     Comment:     ADA Screening Guidelines:  5.7-6.4%  Consistent with prediabetes  >or=6.5%  Consistent with diabetes    High levels of fetal hemoglobin interfere with the HbA1C  assay. Heterozygous hemoglobin variants (HbS, HgC, etc)do  not significantly interfere with this assay.   However, presence of multiple variants may affect accuracy.          [Never] : Never [[id number]] : 0

## 2023-07-25 ENCOUNTER — HOSPITAL ENCOUNTER (OUTPATIENT)
Dept: RADIOLOGY | Facility: HOSPITAL | Age: 68
Discharge: HOME | End: 2023-07-25
Attending: NURSE PRACTITIONER
Payer: MEDICARE

## 2023-07-25 DIAGNOSIS — C17.0 DUODENAL ADENOCARCINOMA: Primary | ICD-10-CM

## 2023-07-25 DIAGNOSIS — Z78.0 MENOPAUSE: ICD-10-CM

## 2023-07-25 PROCEDURE — 77080 DXA BONE DENSITY AXIAL: CPT | Mod: TC

## 2023-07-25 PROCEDURE — 77080 DXA BONE DENSITY AXIAL: CPT | Mod: 26,,, | Performed by: RADIOLOGY

## 2023-07-25 PROCEDURE — 77080 DXA BONE DENSITY AXIAL SKELETON 1 OR MORE SITES: ICD-10-PCS | Mod: 26,,, | Performed by: RADIOLOGY

## 2023-07-26 ENCOUNTER — HOSPITAL ENCOUNTER (OUTPATIENT)
Dept: RADIOLOGY | Facility: HOSPITAL | Age: 68
Discharge: HOME | End: 2023-07-26
Attending: SURGERY
Payer: MEDICARE

## 2023-07-26 DIAGNOSIS — C17.0 DUODENAL ADENOCARCINOMA: ICD-10-CM

## 2023-07-26 PROCEDURE — 71260 CT THORAX DX C+: CPT | Mod: TC

## 2023-07-26 PROCEDURE — 25500020 PHARM REV CODE 255: Performed by: SURGERY

## 2023-07-26 RX ADMIN — IOHEXOL 75 ML: 350 INJECTION, SOLUTION INTRAVENOUS at 11:07

## 2023-07-27 ENCOUNTER — OFFICE VISIT (OUTPATIENT)
Dept: HEMATOLOGY/ONCOLOGY | Facility: CLINIC | Age: 68
End: 2023-07-27
Payer: MEDICARE

## 2023-07-27 ENCOUNTER — LAB VISIT (OUTPATIENT)
Dept: LAB | Facility: HOSPITAL | Age: 68
End: 2023-07-27
Payer: MEDICARE

## 2023-07-27 ENCOUNTER — OFFICE VISIT (OUTPATIENT)
Dept: SURGERY | Facility: CLINIC | Age: 68
End: 2023-07-27
Payer: MEDICARE

## 2023-07-27 ENCOUNTER — TELEPHONE (OUTPATIENT)
Dept: PHARMACY | Facility: CLINIC | Age: 68
End: 2023-07-27
Payer: MEDICARE

## 2023-07-27 VITALS
HEART RATE: 43 BPM | DIASTOLIC BLOOD PRESSURE: 58 MMHG | SYSTOLIC BLOOD PRESSURE: 132 MMHG | RESPIRATION RATE: 18 BRPM | TEMPERATURE: 98 F | HEIGHT: 66 IN | BODY MASS INDEX: 22.89 KG/M2 | OXYGEN SATURATION: 97 % | WEIGHT: 142.44 LBS

## 2023-07-27 DIAGNOSIS — E44.0 MODERATE MALNUTRITION: ICD-10-CM

## 2023-07-27 DIAGNOSIS — D64.9 ANEMIA, UNSPECIFIED TYPE: ICD-10-CM

## 2023-07-27 DIAGNOSIS — C17.0 DUODENAL ADENOCARCINOMA: ICD-10-CM

## 2023-07-27 DIAGNOSIS — C17.0 DUODENAL ADENOCARCINOMA: Primary | ICD-10-CM

## 2023-07-27 DIAGNOSIS — I10 ESSENTIAL HYPERTENSION: ICD-10-CM

## 2023-07-27 DIAGNOSIS — R63.0 DECREASED APPETITE: ICD-10-CM

## 2023-07-27 DIAGNOSIS — C78.7 SECONDARY MALIGNANT NEOPLASM OF LIVER: Primary | ICD-10-CM

## 2023-07-27 DIAGNOSIS — E11.9 TYPE 2 DIABETES MELLITUS WITHOUT COMPLICATION: ICD-10-CM

## 2023-07-27 LAB
ALBUMIN SERPL BCP-MCNC: 3.3 G/DL (ref 3.5–5.2)
ALP SERPL-CCNC: 80 U/L (ref 55–135)
ALT SERPL W/O P-5'-P-CCNC: 15 U/L (ref 10–44)
ANION GAP SERPL CALC-SCNC: 10 MMOL/L (ref 8–16)
AST SERPL-CCNC: 20 U/L (ref 10–40)
BILIRUB SERPL-MCNC: 0.2 MG/DL (ref 0.1–1)
BUN SERPL-MCNC: 14 MG/DL (ref 8–23)
CALCIUM SERPL-MCNC: 10.3 MG/DL (ref 8.7–10.5)
CEA SERPL-MCNC: 5.6 NG/ML (ref 0–5)
CHLORIDE SERPL-SCNC: 103 MMOL/L (ref 95–110)
CO2 SERPL-SCNC: 26 MMOL/L (ref 23–29)
CREAT SERPL-MCNC: 0.7 MG/DL (ref 0.5–1.4)
ERYTHROCYTE [DISTWIDTH] IN BLOOD BY AUTOMATED COUNT: 21 % (ref 11.5–14.5)
EST. GFR  (NO RACE VARIABLE): >60 ML/MIN/1.73 M^2
GLUCOSE SERPL-MCNC: 94 MG/DL (ref 70–110)
HCT VFR BLD AUTO: 33.2 % (ref 37–48.5)
HGB BLD-MCNC: 10.1 G/DL (ref 12–16)
IMM GRANULOCYTES # BLD AUTO: 0.02 K/UL (ref 0–0.04)
MCH RBC QN AUTO: 21.4 PG (ref 27–31)
MCHC RBC AUTO-ENTMCNC: 30.4 G/DL (ref 32–36)
MCV RBC AUTO: 71 FL (ref 82–98)
NEUTROPHILS # BLD AUTO: 2.6 K/UL (ref 1.8–7.7)
PLATELET # BLD AUTO: 375 K/UL (ref 150–450)
PMV BLD AUTO: 10.1 FL (ref 9.2–12.9)
POTASSIUM SERPL-SCNC: 3.7 MMOL/L (ref 3.5–5.1)
PROT SERPL-MCNC: 7.1 G/DL (ref 6–8.4)
RBC # BLD AUTO: 4.71 M/UL (ref 4–5.4)
SODIUM SERPL-SCNC: 139 MMOL/L (ref 136–145)
WBC # BLD AUTO: 4.49 K/UL (ref 3.9–12.7)

## 2023-07-27 PROCEDURE — 99213 OFFICE O/P EST LOW 20 MIN: CPT | Mod: S$GLB,,, | Performed by: SURGERY

## 2023-07-27 PROCEDURE — 85027 COMPLETE CBC AUTOMATED: CPT | Performed by: REGISTERED NURSE

## 2023-07-27 PROCEDURE — 4010F PR ACE/ARB THEARPY RXD/TAKEN: ICD-10-PCS | Mod: CPTII,S$GLB,, | Performed by: SURGERY

## 2023-07-27 PROCEDURE — 3078F DIAST BP <80 MM HG: CPT | Mod: CPTII,S$GLB,, | Performed by: INTERNAL MEDICINE

## 2023-07-27 PROCEDURE — 36415 COLL VENOUS BLD VENIPUNCTURE: CPT | Performed by: REGISTERED NURSE

## 2023-07-27 PROCEDURE — 1126F AMNT PAIN NOTED NONE PRSNT: CPT | Mod: CPTII,S$GLB,, | Performed by: INTERNAL MEDICINE

## 2023-07-27 PROCEDURE — 4010F ACE/ARB THERAPY RXD/TAKEN: CPT | Mod: CPTII,S$GLB,, | Performed by: SURGERY

## 2023-07-27 PROCEDURE — 1159F PR MEDICATION LIST DOCUMENTED IN MEDICAL RECORD: ICD-10-PCS | Mod: CPTII,S$GLB,, | Performed by: INTERNAL MEDICINE

## 2023-07-27 PROCEDURE — 3288F PR FALLS RISK ASSESSMENT DOCUMENTED: ICD-10-PCS | Mod: CPTII,S$GLB,, | Performed by: INTERNAL MEDICINE

## 2023-07-27 PROCEDURE — 99215 OFFICE O/P EST HI 40 MIN: CPT | Mod: S$GLB,,, | Performed by: INTERNAL MEDICINE

## 2023-07-27 PROCEDURE — 3078F PR MOST RECENT DIASTOLIC BLOOD PRESSURE < 80 MM HG: ICD-10-PCS | Mod: CPTII,S$GLB,, | Performed by: INTERNAL MEDICINE

## 2023-07-27 PROCEDURE — 99213 PR OFFICE/OUTPT VISIT, EST, LEVL III, 20-29 MIN: ICD-10-PCS | Mod: S$GLB,,, | Performed by: SURGERY

## 2023-07-27 PROCEDURE — 82378 CARCINOEMBRYONIC ANTIGEN: CPT | Performed by: REGISTERED NURSE

## 2023-07-27 PROCEDURE — 99499 UNLISTED E&M SERVICE: CPT | Mod: S$GLB,,, | Performed by: STUDENT IN AN ORGANIZED HEALTH CARE EDUCATION/TRAINING PROGRAM

## 2023-07-27 PROCEDURE — 1101F PR PT FALLS ASSESS DOC 0-1 FALLS W/OUT INJ PAST YR: ICD-10-PCS | Mod: CPTII,S$GLB,, | Performed by: INTERNAL MEDICINE

## 2023-07-27 PROCEDURE — 3288F FALL RISK ASSESSMENT DOCD: CPT | Mod: CPTII,S$GLB,, | Performed by: INTERNAL MEDICINE

## 2023-07-27 PROCEDURE — 3044F HG A1C LEVEL LT 7.0%: CPT | Mod: CPTII,S$GLB,, | Performed by: SURGERY

## 2023-07-27 PROCEDURE — 4010F PR ACE/ARB THEARPY RXD/TAKEN: ICD-10-PCS | Mod: CPTII,S$GLB,, | Performed by: INTERNAL MEDICINE

## 2023-07-27 PROCEDURE — 1159F MED LIST DOCD IN RCRD: CPT | Mod: CPTII,S$GLB,, | Performed by: INTERNAL MEDICINE

## 2023-07-27 PROCEDURE — 3008F BODY MASS INDEX DOCD: CPT | Mod: CPTII,S$GLB,, | Performed by: INTERNAL MEDICINE

## 2023-07-27 PROCEDURE — 4010F ACE/ARB THERAPY RXD/TAKEN: CPT | Mod: CPTII,S$GLB,, | Performed by: INTERNAL MEDICINE

## 2023-07-27 PROCEDURE — 99999 PR PBB SHADOW E&M-EST. PATIENT-LVL III: CPT | Mod: PBBFAC,,, | Performed by: INTERNAL MEDICINE

## 2023-07-27 PROCEDURE — 1101F PT FALLS ASSESS-DOCD LE1/YR: CPT | Mod: CPTII,S$GLB,, | Performed by: INTERNAL MEDICINE

## 2023-07-27 PROCEDURE — 3075F PR MOST RECENT SYSTOLIC BLOOD PRESS GE 130-139MM HG: ICD-10-PCS | Mod: CPTII,S$GLB,, | Performed by: INTERNAL MEDICINE

## 2023-07-27 PROCEDURE — 3044F PR MOST RECENT HEMOGLOBIN A1C LEVEL <7.0%: ICD-10-PCS | Mod: CPTII,S$GLB,, | Performed by: INTERNAL MEDICINE

## 2023-07-27 PROCEDURE — 99215 PR OFFICE/OUTPT VISIT, EST, LEVL V, 40-54 MIN: ICD-10-PCS | Mod: S$GLB,,, | Performed by: INTERNAL MEDICINE

## 2023-07-27 PROCEDURE — 80053 COMPREHEN METABOLIC PANEL: CPT | Performed by: REGISTERED NURSE

## 2023-07-27 PROCEDURE — 3044F HG A1C LEVEL LT 7.0%: CPT | Mod: CPTII,S$GLB,, | Performed by: INTERNAL MEDICINE

## 2023-07-27 PROCEDURE — 99999 PR PBB SHADOW E&M-EST. PATIENT-LVL III: ICD-10-PCS | Mod: PBBFAC,,, | Performed by: INTERNAL MEDICINE

## 2023-07-27 PROCEDURE — 3075F SYST BP GE 130 - 139MM HG: CPT | Mod: CPTII,S$GLB,, | Performed by: INTERNAL MEDICINE

## 2023-07-27 PROCEDURE — 3008F PR BODY MASS INDEX (BMI) DOCUMENTED: ICD-10-PCS | Mod: CPTII,S$GLB,, | Performed by: INTERNAL MEDICINE

## 2023-07-27 PROCEDURE — 1126F PR PAIN SEVERITY QUANTIFIED, NO PAIN PRESENT: ICD-10-PCS | Mod: CPTII,S$GLB,, | Performed by: INTERNAL MEDICINE

## 2023-07-27 PROCEDURE — 3044F PR MOST RECENT HEMOGLOBIN A1C LEVEL <7.0%: ICD-10-PCS | Mod: CPTII,S$GLB,, | Performed by: SURGERY

## 2023-07-27 RX ORDER — DRONABINOL 5 MG/1
5 CAPSULE ORAL
Qty: 60 CAPSULE | Refills: 2 | Status: SHIPPED | OUTPATIENT
Start: 2023-07-27 | End: 2023-10-05 | Stop reason: SDUPTHER

## 2023-07-27 NOTE — PROGRESS NOTES
Surgical Oncology Surveillance Note    Encounter Date:  2023    Patient ID: Nan Simms  Age:  68 y.o. :  1955    Chief Complaint: Surveillance of Resected Duodenal Carcinoma      History:    Ms. Simms is a 68 y.o. 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 Her weight has been relatively stable since her last visit, but is down compared to what it was pre-surgery. She eats one real meal per day and junk food the rest of the day. She experiences heartburn and reflux when she wakes up in the morning but feels better the rest of the day.     Past Medical History:   Diagnosis Date    Abnormal Pap smear of cervix     Arthritis     Duodenal adenocarcinoma     Hypertension     Hyperthyroidism      Past Surgical History:   Procedure Laterality Date    CATARACT EXTRACTION W/  INTRAOCULAR LENS IMPLANT Right 2019    Procedure: EXTRACTION, CATARACT, WITH IOL INSERTION;  Surgeon: Spenser Lee MD;  Location: [facility];  Service: Ophthalmology;  Laterality: Right;    ESOPHAGOGASTRODUODENOSCOPY N/A 3/10/2023    Procedure: EGD (ESOPHAGOGASTRODUODENOSCOPY);  Surgeon: Shashi Tapia MD;  Location: [address]);  Service: Endoscopy;  Laterality: N/A;    EYE SURGERY      HYSTERECTOMY      OOPHORECTOMY      PHACOEMULSIFICATION OF CATARACT Right 2019    Procedure: PHACOEMULSIFICATION, CATARACT;  Surgeon: Spenser Lee MD;  Location: [facility];  Service: Ophthalmology;  Laterality: Right;    WHIPPLE PROCEDURE N/A 3/15/2023    Procedure: WHIPPLE PROCEDURE;  Surgeon: Nick Paez MD;  Location: [address];  Service: General;  Laterality: N/A;     Current Outpatient Medications on File Prior to Visit   Medication Sig Dispense Refill    acetaminophen (TYLENOL) 650 MG TbSR Take 650 mg by mouth as needed.      atorvastatin (LIPITOR) 20 MG tablet Take 20 mg by mouth once daily.      droNABinol (MARINOL) 5 MG capsule  "Take 1 capsule (5 mg total) by mouth 2 (two) times daily before meals. 60 capsule 2    famotidine (PEPCID) 20 MG tablet Take 1 tablet (20 mg total) by mouth 2 (two) times daily. 60 tablet 11    lisinopriL-hydrochlorothiazide (PRINZIDE,ZESTORETIC) 20-12.5 mg per tablet Take 1 tablet by mouth once daily. 90 tablet 3    omeprazole (PRILOSEC) 40 MG capsule Take 1 capsule (40 mg total) by mouth once daily. 30 capsule 0    traMADoL (ULTRAM) 50 mg tablet Take 1 tablet (50 mg total) by mouth every 12 (twelve) hours as needed for Pain. 14 tablet 0    [DISCONTINUED] megestroL (MEGACE) 40 MG Tab Take 1 tablet (40 mg total) by mouth daily as needed.       No current facility-administered medications on file prior to visit.     Review of patient's allergies indicates:   Allergen Reactions    Aspirin Other (See Comments)     Pt states it is "hard on her stomach" She can tolerate a low dose aspirin    Pcn [penicillins]      Childhood - Tolerated ceftriaxone and cefazolin with no documented issues in the past        Family History:  Her family history includes Cancer in her mother.     Social History:  She reports that she has been smoking cigarettes. She has a 7.50 pack-year smoking history. She has never used smokeless tobacco. She reports current alcohol use of about 1.0 standard drink per week. She reports that she does not use drugs.     ROS:     Review of Systems  Pertinent positive/negatives detailed in HPI, all other systems negative.     Physical Exam:  There were no vitals taken for this visit.    Physical Exam    Constitutional:  Non-toxic, no acute distress.  Performance status: ECOG 1  Eyes:  Sclerae anicteric, gaze symmetrical  Neck:  Trachea midline, thyroid, non enlarged without palpable nodules,  FROM  Resp:  Easy work of breathing, no wheezes  CV:  Regular pulse, no JVD  Abd:  Soft, non-tender, no masses, no hepatosplenomegaly, no ascites, no superficial varices. Well healed midline incision.  Lymphatics:  No " cervical, supraclavicular, axillary, or inguinal lymphadenopathy  Musculoskeletal:  Ambulatory, normal gait, no muscle wasting  Neuro:  No gross deficits  Psych:  Awake, alert, oriented.  Answers and asks questions appropriately    Data:     Radiology:  I personally reviewed these images: I reviewed the CT of the Chest, Abdomen, and Pelvis. There is a 9 mm lesion in liver segment 6 that is suspicious for metastasis.     Labs:  Albumin 3.3, hgb 10.1, labs otherwise WNL    Pathology:    Final Pathologic Diagnosis      Abnormal   1. Lymph node, hepatic cautery, resection:   - One lymph node negative for metastatic carcinoma (0/1).   - See synoptic report.   2. Gallbladder, cholecystectomy:   - Benign gallbladder with no significant histologic abnormality.   3. Pancreas, duodenum, common bile duct, and partial gastrectomy,   pancreaticoduodenectomy (Whipple specimen):   - Poorly differentiated carcinoma with medullary features.   - See synoptic report.   - See comment.   Synoptic Report   Procedure: Pancreaticoduodenectomy with partial gastrectomy (Whipple   specimen)   Tumor site:  Pylorus and proximal duodenum   Histologic type: Poorly differentiated carcinoma with medullary features   Histologic grade: G3, poorly differentiated   Tumor size:  6.5 cm in greatest dimension grossly   Tumor extent:   Invades through muscularis propria into subserosa, or extends   into nonperitonealized perimuscular tissue (mesentery or retroperitoneum)   without serosal penetration   Macroscopic tumor perforation:  Not identified   Lymphovascular inv asion:  Not identified, see comment   Margin status for invasive carcinoma:  All margins negative for invasive   carcinoma   Margin status for dysplasia:  All margins negative for carcinoma in Situ   (high-grade dysplasia)/adenoma   Regional lymph node status: Regional lymph nodes present        All Regional lymph nodes negative for tumor        Number of lymph nodes examined:  15 (including  specimens 1 and 3)   PATHOLOGIC STAGE CLASSIFICATION (pTNM, AJCC 8th Edition):  p T3 N0   Additional findings:  Stomach with intestinal metaplasia (immunostain for   H.pylori negative), 2 lymph nodes with fibrosis and calcifications (GMS stain   for fungal organisms and AFB stain negative)   Ancillary studies:  Immunohistochemistry (IHC) Testing for Mismatch Repair   (MMR) Proteins   MLH1- Loss of nuclear expression   PMS2 - Loss of nuclear expression   MSH2 - Intact nuclear expression   MSH6 - Intact nuclear expression   Background nonneoplastic tissue/internal control with intact nuclear   expression   IHC Interpret ation:  Loss of nuclear expression of MLH1 and PMS2: testing for   methylation of the MLH1 promoter and/or mutation of BRAF is indicated (the   presence of a BRAF V600E mutation and/or MLH1 methylation suggests that the   tumor is sporadic and germline evaluation is probably not indicated; absence   of both MLH1 methylation and of BRAF V600E mutation suggests the possibility   of Seaman syndrome, and sequencing and/or large deletion/duplication testing   of germline MLH1 may be indicated)   Testing for methylation of the MLH1 promoter and mutation of BRAF is in   process and results will be supplemented.   There are exceptions to the above IHC interpretations. These results should   not be considered in isolation, and clinical correlation with genetic   counseling is recommended to assess the need for germline testing.   COMMENT:  The stomach and duodenum (specimen 3) shows a 6.5 cm mass involving   the pylorus with the majority of the tumor appearing to be in the duodenum.   The tumor shows poorly d ifferentiated sheets of blue cells with necrosis.   There is a significant amount of lymphocytic inflammatory infiltrate around   the edges of the tumor.  Immunostains show the tumor cells to be positive for   CK7 with patchy weak CDX2 and negative for synaptophysin and chromogranin.   There is also loss of  MLH1 and PMS2 on MMR stains.  These features are   suggestive of medullary carcinoma.  Select slides reviewed by Dr. ITZEL Sánchez   who agrees with the diagnosis of poorly differentiated carcinoma with   medullary features.  Immunostains for CD 34 performed on blocks 3E and 3H   show no definitive lymphovascular invasion even though some areas suspicious   cells on routine H&E sections.  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 Ct shows a new liver lesions suspicious for metastasis.     Plan:  - Systemic therapy for suspected liver metastasis per Dr. Roberts.  - I encouraged her to eat more frequent meals. I suggested that she drink milk or a protein shake first thing in the morning to coat her stomach. I think the reflux she experiences is related to bile reflux.   - I will see her back as needed.     Nick Paez MD  Surgical Oncology  [facility] [place], [place]         Nan Simms 1955

## 2023-07-27 NOTE — PROGRESS NOTES
MEDICAL ONCOLOGY - NEW PATIENT VISIT    Reason for visit: duodenal adenocarcinoma     Best Contact Phone Number(s): [phone] (home)      Cancer/Stage/TNM:    Cancer Staging   No matching staging information was found for the patient.     Oncology History    No history exists.        HPI:   68 y.o. female with HTN, HLD, anemia, DM, thyroid nodules who presents for initial consult for pT3N0 duodenal adenocarcinoma. She initially presented to [place] for constipation x 3 weeks that she self treated with enemas, abdominal pain, and ~13 lb weight loss. Imaging findings were concerning for GOO and malignancy. She underwent Whipple procedure on 3/15/2023. She was discharged with home health. Her recovery was complicated by bacteremia requiring use of IV antibiotics. She still has another week of antibiotics left.      Interval History: Patient returns to clinic for continued surveillance of her duodenal adenocarcinoma. She was seen at OSH ED two weeks ago for nausea, abdominal pain, and chest pain consistent with reflux. PPI was adjusted and discharged with PCP follow-up. Since last clinic visit, she has continued to report decreased oral intake, weight loss, and reflux. Her weight at this visit has dropped to 142 lbs. Surveillance CT C/A/P is concerning for new right hepatic metastatic disease. We discussed the need for confirmation with PET Scan and updated tumor markers with her being agreeable and understanding. If metastatic disease confirmed,we discussed the plan for initiation of immunotherapy with Keytruda based on her MSI-High status.     FH:  Brother - stomach cancer, diagnosed in mid-70s, now 81.   Mother - pancreatic cancer    Presents to clinic with her . ECOG 1.     History has been obtained by chart review and discussion with the patient.    ROS:   Review of Systems   Constitutional:  Positive for malaise/fatigue. Negative for chills and fever.   HENT:  Negative for nosebleeds and sore throat.     Respiratory:  Negative for cough and shortness of breath.    Cardiovascular:  Negative for chest pain, palpitations and leg swelling.   Gastrointestinal:  Negative for blood in stool, constipation, diarrhea, heartburn, nausea and vomiting.   Genitourinary:  Negative for dysuria, frequency, hematuria and urgency.   Musculoskeletal:  Negative for falls and myalgias.   Skin:  Negative for itching and rash.   Neurological:  Negative for dizziness, tingling, weakness and headaches.   Psychiatric/Behavioral:  The patient is not nervous/anxious and does not have insomnia.      Past Medical History:   Past Medical History:   Diagnosis Date    Abnormal Pap smear of cervix     Arthritis     Duodenal adenocarcinoma     Hypertension     Hyperthyroidism         Past Surgical History:   Past Surgical History:   Procedure Laterality Date    CATARACT EXTRACTION W/  INTRAOCULAR LENS IMPLANT Right 8/8/2019    Procedure: EXTRACTION, CATARACT, WITH IOL INSERTION;  Surgeon: Spenser Lee MD;  Location: [facility];  Service: Ophthalmology;  Laterality: Right;    ESOPHAGOGASTRODUODENOSCOPY N/A 3/10/2023    Procedure: EGD (ESOPHAGOGASTRODUODENOSCOPY);  Surgeon: Shashi Tapia MD;  Location: [address]);  Service: Endoscopy;  Laterality: N/A;    EYE SURGERY      HYSTERECTOMY      OOPHORECTOMY      PHACOEMULSIFICATION OF CATARACT Right 8/8/2019    Procedure: PHACOEMULSIFICATION, CATARACT;  Surgeon: Spenser Lee MD;  Location: [facility];  Service: Ophthalmology;  Laterality: Right;    WHIPPLE PROCEDURE N/A 3/15/2023    Procedure: WHIPPLE PROCEDURE;  Surgeon: Nick Paez MD;  Location: [address];  Service: General;  Laterality: N/A;        Family History:   Family History   Problem Relation Age of Onset    Cancer Mother     Breast cancer Neg Hx     Colon cancer Neg Hx     Ovarian cancer Neg Hx         Social History:   Social History     Tobacco Use    Smoking status: Every Day     Packs/day: 0.25     Years:  "30.00     Pack years: 7.50     Types: Cigarettes    Smokeless tobacco: Never    Tobacco comments:     About 7-8 cigs a day   Substance Use Topics    Alcohol use: Yes     Alcohol/week: 1.0 standard drink     Types: 1 Cans of beer per week        I have reviewed and updated the patient's past medical, surgical, family and social histories.    Allergies:   Review of patient's allergies indicates:   Allergen Reactions    Aspirin Other (See Comments)     Pt states it is "hard on her stomach" She can tolerate a low dose aspirin    Pcn [penicillins]      Childhood - Tolerated ceftriaxone and cefazolin with no documented issues in the past         Medications:   Current Outpatient Medications   Medication Sig Dispense Refill    acetaminophen (TYLENOL) 650 MG TbSR Take 650 mg by mouth as needed.      atorvastatin (LIPITOR) 20 MG tablet Take 20 mg by mouth once daily.      famotidine (PEPCID) 20 MG tablet Take 1 tablet (20 mg total) by mouth 2 (two) times daily. 60 tablet 11    lisinopriL-hydrochlorothiazide (PRINZIDE,ZESTORETIC) 20-12.5 mg per tablet Take 1 tablet by mouth once daily. 90 tablet 3    megestroL (MEGACE) 40 MG Tab Take 1 tablet (40 mg total) by mouth daily as needed.      omeprazole (PRILOSEC) 40 MG capsule Take 1 capsule (40 mg total) by mouth once daily. 30 capsule 0    traMADoL (ULTRAM) 50 mg tablet Take 1 tablet (50 mg total) by mouth every 12 (twelve) hours as needed for Pain. 14 tablet 0     No current facility-administered medications for this visit.        Physical Exam:   BP (!) 132/58 (BP Location: Left arm, Patient Position: Sitting, BP Method: Medium (Automatic))   Pulse (!) 43   Temp 98.3 °F (36.8 °C) (Oral)   Resp 18   Ht 5' 6" (1.676 m)   Wt 64.6 kg (142 lb 6.7 oz)   SpO2 97%   BMI 22.99 kg/m²      ECOG Performance status: 1       Physical Exam  Vitals reviewed.   Constitutional:       General: She is not in acute distress.     Appearance: Normal appearance. She is normal weight. She is " not ill-appearing, toxic-appearing or diaphoretic.   HENT:      Head: Normocephalic and atraumatic.      Right Ear: External ear normal.      Left Ear: External ear normal.      Nose: Nose normal.      Mouth/Throat:      Pharynx: Oropharynx is clear.   Eyes:      General: No scleral icterus.     Conjunctiva/sclera: Conjunctivae normal.      Pupils: Pupils are equal, round, and reactive to light.   Cardiovascular:      Rate and Rhythm: Normal rate.   Pulmonary:      Effort: Pulmonary effort is normal. No respiratory distress.      Breath sounds: No wheezing.   Abdominal:      General: There is no distension.      Tenderness: There is no abdominal tenderness.      Comments: Midline abdominal incision, healing well, no signs of infection noted   Musculoskeletal:      Cervical back: Normal range of motion.      Right lower leg: No edema.      Left lower leg: No edema.   Skin:     General: Skin is warm and dry.      Coloration: Skin is not jaundiced or pale.      Findings: No bruising, erythema or rash.   Neurological:      General: No focal deficit present.      Mental Status: She is alert and oriented to person, place, and time. Mental status is at baseline.      Cranial Nerves: No cranial nerve deficit.      Sensory: No sensory deficit.      Motor: No weakness.      Gait: Gait normal.   Psychiatric:         Mood and Affect: Mood normal.         Behavior: Behavior normal.         Thought Content: Thought content normal.         Judgment: Judgment normal.         Labs:   Recent Results (from the past 48 hour(s))   Comprehensive Metabolic Panel    Collection Time: 07/27/23  8:45 AM   Result Value Ref Range    Sodium 139 136 - 145 mmol/L    Potassium 3.7 3.5 - 5.1 mmol/L    Chloride 103 95 - 110 mmol/L    CO2 26 23 - 29 mmol/L    Glucose 94 70 - 110 mg/dL    BUN 14 8 - 23 mg/dL    Creatinine 0.7 0.5 - 1.4 mg/dL    Calcium 10.3 8.7 - 10.5 mg/dL    Total Protein 7.1 6.0 - 8.4 g/dL    Albumin 3.3 (L) 3.5 - 5.2 g/dL    Total  Bilirubin 0.2 0.1 - 1.0 mg/dL    Alkaline Phosphatase 80 55 - 135 U/L    AST 20 10 - 40 U/L    ALT 15 10 - 44 U/L    eGFR >60.0 >60 mL/min/1.73 m^2    Anion Gap 10 8 - 16 mmol/L   CBC Oncology    Collection Time: 23  8:45 AM   Result Value Ref Range    WBC 4.49 3.90 - 12.70 K/uL    RBC 4.71 4.00 - 5.40 M/uL    Hemoglobin 10.1 (L) 12.0 - 16.0 g/dL    Hematocrit 33.2 (L) 37.0 - 48.5 %    MCV 71 (L) 82 - 98 fL    MCH 21.4 (L) 27.0 - 31.0 pg    MCHC 30.4 (L) 32.0 - 36.0 g/dL    RDW 21.0 (H) 11.5 - 14.5 %    Platelets 375 150 - 450 K/uL    MPV 10.1 9.2 - 12.9 fL    Gran # (ANC) 2.6 1.8 - 7.7 K/uL    Immature Grans (Abs) 0.02 0.00 - 0.04 K/uL        I have reviewed the pertinent labs.     Imagin/11/23 - CT A/P with contrast  Impression:     Postsurgical changes of Whipple are redemonstrated with small volume free fluid in the oeperative bed, decreased since prior.    23- CT C/A/P With Contrast    Impression:     1. Interval development of an ill-defined hypodense lesion within the inferior aspect of the right hepatic lobe suspect for a developed hepatic metastasis.  2. Developed patchy ground-glass and acinar attenuations within the right lung and clustered micro nodules within the left upper lobe suggesting bronchiolitis/pneumonitis.  Recommend short-term follow-up CT of the chest in 4-6 weeks to reassess.  3. Chronic urothelial thickening of the left renal pelvis similar to prior exam    Path:   3/15/23 - Whipple  Final Pathologic Diagnosis      Abnormal   1. Lymph node, hepatic cautery, resection:   - One lymph node negative for metastatic carcinoma (0/1).   - See synoptic report.   2. Gallbladder, cholecystectomy:   - Benign gallbladder with no significant histologic abnormality.   3. Pancreas, duodenum, common bile duct, and partial gastrectomy,   pancreaticoduodenectomy (Whipple specimen):   - Poorly differentiated carcinoma with medullary features.   - See synoptic report.   - See comment.    Synoptic Report   Procedure: Pancreaticoduodenectomy with partial gastrectomy (Whipple   specimen)   Tumor site:  Pylorus and proximal duodenum   Histologic type: Poorly differentiated carcinoma with medullary features   Histologic grade: G3, poorly differentiated   Tumor size:  6.5 cm in greatest dimension grossly   Tumor extent:   Invades through muscularis propria into subserosa, or extends   into nonperitonealized perimuscular tissue (mesentery or retroperitoneum)   without serosal penetration   Macroscopic tumor perforation:  Not identified   Lymphovascular inv asion:  Not identified, see comment   Margin status for invasive carcinoma:  All margins negative for invasive   carcinoma   Margin status for dysplasia:  All margins negative for carcinoma in Situ   (high-grade dysplasia)/adenoma   Regional lymph node status: Regional lymph nodes present        All Regional lymph nodes negative for tumor        Number of lymph nodes examined:  15 (including specimens 1 and 3)   PATHOLOGIC STAGE CLASSIFICATION (pTNM, AJCC 8th Edition):  p T3 N0   Additional findings:  Stomach with intestinal metaplasia (immunostain for   H.pylori negative), 2 lymph nodes with fibrosis and calcifications (GMS stain   for fungal organisms and AFB stain negative)   Ancillary studies:  Immunohistochemistry (IHC) Testing for Mismatch Repair   (MMR) Proteins   MLH1- Loss of nuclear expression   PMS2 - Loss of nuclear expression   MSH2 - Intact nuclear expression   MSH6 - Intact nuclear expression   Background nonneoplastic tissue/internal control with intact nuclear   expression   IHC Interpret ation:  Loss of nuclear expression of MLH1 and PMS2: testing for   methylation of the MLH1 promoter and/or mutation of BRAF is indicated (the   presence of a BRAF V600E mutation and/or MLH1 methylation suggests that the   tumor is sporadic and germline evaluation is probably not indicated; absence   of both MLH1 methylation and of BRAF V600E mutation  suggests the possibility   of Seaman syndrome, and sequencing and/or large deletion/duplication testing   of germline MLH1 may be indicated)   Testing for methylation of the MLH1 promoter and mutation of BRAF is in   process and results will be supplemented.   There are exceptions to the above IHC interpretations. These results should   not be considered in isolation, and clinical correlation with genetic   counseling is recommended to assess the need for germline testing.   COMMENT:  The stomach and duodenum (specimen 3) shows a 6.5 cm mass involving   the pylorus with the majority of the tumor appearing to be in the duodenum.   The tumor shows poorly d ifferentiated sheets of blue cells with necrosis.   There is a significant amount of lymphocytic inflammatory infiltrate around   the edges of the tumor.  Immunostains show the tumor cells to be positive for   CK7 with patchy weak CDX2 and negative for synaptophysin and chromogranin.   There is also loss of MLH1 and PMS2 on MMR stains.  These features are   suggestive of medullary carcinoma.  Select slides reviewed by Dr. ITZEL Sánchez   who agrees with the diagnosis of poorly differentiated carcinoma with   medullary features.  Immunostains for CD 34 performed on blocks 3E and 3H   show no definitive lymphovascular invasion even though some areas suspicious   cells on routine H&E sections.  Appropriate positive controls   VC      Comment: Interp By Meredith Pappas MD, Signed on 04/12/2023 at 16:03   Supplemental Diagnosis      Abnormal   MLH1 Hypermethylation/BRAF Mutation   Result Summary   MLH1 HYPERMETHYLATION PRESENT/NO BRAF MUTATION IDENTIFIED   Result   Provided diagnosis: pylorus and proximal duodenum poorly differentiated   carcinoma with medullary features   Methylation status: Positive for MLH1 promoter hypermethylation   BRAF status: Wild-type (SEE INTERPRETATION)   Alexis Sotelo M.D.   Report attached   Performing location:   [facility]  "[address] [address]   "Disclaimer:  This case diagnosis was rendered completely by the outside   consultation pathologist and the case is electronically signed by an [facility]   pathologist listed below solely to release the report into the medical   record."          Assessment:       1. Duodenal adenocarcinoma    2. Moderate malnutrition    3. Decreased appetite    4. Anemia, unspecified type    5. Essential hypertension       Plan:     # Duodenal adenocarcinoma   Mrs. Simms is a pleasant 68 y.o. female who presents for initial consultation and management of stage II duodenal adenocarcinoma s/p Whipple procedure on 3/15/23. Pathology did reveal some high grade features, including poorly differentiated carcinoma, 6.5 cm size, and invasion of tissue. However, her tumor is also MSI-high.     Concern for recurrence noted at this visit in setting of weight loss,CT findings and consistenly decreased oral intake. The need for PET-CT and CEA to evaluate further for recurrence was discussed with her being agreeable and understanding. Based on these results we will proceed with Keytruda for recurrence vs. Pursue biopsy.  -Follow-up PET-CT   -Follow-up CEA  -Return in two weeks with likely need to consent for Keytruda in setting of metastatic Duodenal Adenocarcinoma  -Follow-up Surg Onc Recs      # Decreased appetite, malnutrition  -Resent marinol to pharmacy today.   -Monitor weight closely.   -Seen by Nutrition at this visit    # Anemia  -Microcytic anemia.   -No signs of bleeding, platelets normal.   -Asymptomatic. Monitor.     # HTN, HLD, DM, aortic atherosclerosis   -BP controlled in clinic.   -Glucose controlled on last labs.   -Following with PCP.   -Continue medical management.       Tremaine Chairez D.O.  Hematology/Oncology Fellow, PGY-IV       Route Chart for Scheduling    Med Onc Chart Routing      Follow up with physician 2 weeks. 2 weeks, Dr. Roberts after " PET-CT   Follow up with YANDEL    Infusion scheduling note    Injection scheduling note    Labs CMP, CBC and CEA   Scheduling:  Preferred lab:  Lab interval:     Imaging PET scan   Please have PET prior to visit in 2 weeks   Pharmacy appointment    Other referrals

## 2023-08-01 ENCOUNTER — OFFICE VISIT (OUTPATIENT)
Dept: INTERNAL MEDICINE | Facility: CLINIC | Age: 68
End: 2023-08-01
Payer: MEDICARE

## 2023-08-01 VITALS
SYSTOLIC BLOOD PRESSURE: 112 MMHG | WEIGHT: 141.13 LBS | DIASTOLIC BLOOD PRESSURE: 76 MMHG | RESPIRATION RATE: 18 BRPM | HEART RATE: 73 BPM | BODY MASS INDEX: 22.68 KG/M2 | HEIGHT: 66 IN

## 2023-08-01 DIAGNOSIS — C17.0 DUODENAL ADENOCARCINOMA: ICD-10-CM

## 2023-08-01 DIAGNOSIS — D64.9 ANEMIA, UNSPECIFIED TYPE: ICD-10-CM

## 2023-08-01 DIAGNOSIS — E78.5 HYPERLIPIDEMIA, UNSPECIFIED HYPERLIPIDEMIA TYPE: Primary | ICD-10-CM

## 2023-08-01 DIAGNOSIS — K20.90 ESOPHAGITIS: ICD-10-CM

## 2023-08-01 DIAGNOSIS — E11.9 DIABETES MELLITUS TYPE 2, DIET-CONTROLLED: ICD-10-CM

## 2023-08-01 DIAGNOSIS — Z90.410 HISTORY OF WHIPPLE PROCEDURE: ICD-10-CM

## 2023-08-01 DIAGNOSIS — I10 ESSENTIAL HYPERTENSION: ICD-10-CM

## 2023-08-01 DIAGNOSIS — Z90.49 HISTORY OF WHIPPLE PROCEDURE: ICD-10-CM

## 2023-08-01 PROCEDURE — 3288F FALL RISK ASSESSMENT DOCD: CPT | Mod: CPTII,S$GLB,, | Performed by: NURSE PRACTITIONER

## 2023-08-01 PROCEDURE — 3074F PR MOST RECENT SYSTOLIC BLOOD PRESSURE < 130 MM HG: ICD-10-PCS | Mod: CPTII,S$GLB,, | Performed by: NURSE PRACTITIONER

## 2023-08-01 PROCEDURE — 1101F PT FALLS ASSESS-DOCD LE1/YR: CPT | Mod: CPTII,S$GLB,, | Performed by: NURSE PRACTITIONER

## 2023-08-01 PROCEDURE — 1159F PR MEDICATION LIST DOCUMENTED IN MEDICAL RECORD: ICD-10-PCS | Mod: CPTII,S$GLB,, | Performed by: NURSE PRACTITIONER

## 2023-08-01 PROCEDURE — 3008F PR BODY MASS INDEX (BMI) DOCUMENTED: ICD-10-PCS | Mod: CPTII,S$GLB,, | Performed by: NURSE PRACTITIONER

## 2023-08-01 PROCEDURE — 3074F SYST BP LT 130 MM HG: CPT | Mod: CPTII,S$GLB,, | Performed by: NURSE PRACTITIONER

## 2023-08-01 PROCEDURE — 99214 PR OFFICE/OUTPT VISIT, EST, LEVL IV, 30-39 MIN: ICD-10-PCS | Mod: S$GLB,,, | Performed by: NURSE PRACTITIONER

## 2023-08-01 PROCEDURE — 1159F MED LIST DOCD IN RCRD: CPT | Mod: CPTII,S$GLB,, | Performed by: NURSE PRACTITIONER

## 2023-08-01 PROCEDURE — 3288F PR FALLS RISK ASSESSMENT DOCUMENTED: ICD-10-PCS | Mod: CPTII,S$GLB,, | Performed by: NURSE PRACTITIONER

## 2023-08-01 PROCEDURE — 3044F HG A1C LEVEL LT 7.0%: CPT | Mod: CPTII,S$GLB,, | Performed by: NURSE PRACTITIONER

## 2023-08-01 PROCEDURE — 4010F ACE/ARB THERAPY RXD/TAKEN: CPT | Mod: CPTII,S$GLB,, | Performed by: NURSE PRACTITIONER

## 2023-08-01 PROCEDURE — 99999 PR PBB SHADOW E&M-EST. PATIENT-LVL III: CPT | Mod: PBBFAC,,, | Performed by: NURSE PRACTITIONER

## 2023-08-01 PROCEDURE — 4010F PR ACE/ARB THEARPY RXD/TAKEN: ICD-10-PCS | Mod: CPTII,S$GLB,, | Performed by: NURSE PRACTITIONER

## 2023-08-01 PROCEDURE — 1126F PR PAIN SEVERITY QUANTIFIED, NO PAIN PRESENT: ICD-10-PCS | Mod: CPTII,S$GLB,, | Performed by: NURSE PRACTITIONER

## 2023-08-01 PROCEDURE — 3008F BODY MASS INDEX DOCD: CPT | Mod: CPTII,S$GLB,, | Performed by: NURSE PRACTITIONER

## 2023-08-01 PROCEDURE — 1126F AMNT PAIN NOTED NONE PRSNT: CPT | Mod: CPTII,S$GLB,, | Performed by: NURSE PRACTITIONER

## 2023-08-01 PROCEDURE — 99999 PR PBB SHADOW E&M-EST. PATIENT-LVL III: ICD-10-PCS | Mod: PBBFAC,,, | Performed by: NURSE PRACTITIONER

## 2023-08-01 PROCEDURE — 3078F PR MOST RECENT DIASTOLIC BLOOD PRESSURE < 80 MM HG: ICD-10-PCS | Mod: CPTII,S$GLB,, | Performed by: NURSE PRACTITIONER

## 2023-08-01 PROCEDURE — 99214 OFFICE O/P EST MOD 30 MIN: CPT | Mod: S$GLB,,, | Performed by: NURSE PRACTITIONER

## 2023-08-01 PROCEDURE — 3044F PR MOST RECENT HEMOGLOBIN A1C LEVEL <7.0%: ICD-10-PCS | Mod: CPTII,S$GLB,, | Performed by: NURSE PRACTITIONER

## 2023-08-01 PROCEDURE — 3078F DIAST BP <80 MM HG: CPT | Mod: CPTII,S$GLB,, | Performed by: NURSE PRACTITIONER

## 2023-08-01 PROCEDURE — 1101F PR PT FALLS ASSESS DOC 0-1 FALLS W/OUT INJ PAST YR: ICD-10-PCS | Mod: CPTII,S$GLB,, | Performed by: NURSE PRACTITIONER

## 2023-08-01 NOTE — PROGRESS NOTES
Subjective:       Patient ID: Nan Simms is a 68 y.o. female.    Chief Complaint: Follow-up (3 months)    HPI: Pt presents to clinic today known to me. She Is here for routine f/u. She complains that she is still losing weight. She report that she is down to 141> she started marinol per Dr Roberts. No longer needing pain meds tramadol. No more pain. She reoprt that shje almost went to ER because she felt loaded after 1st dose. She report that that sensation has improved now that she is taking routinely     BMP  Lab Results   Component Value Date     07/27/2023    K 3.7 07/27/2023     07/27/2023    CO2 26 07/27/2023    BUN 14 07/27/2023    CREATININE 0.7 07/27/2023    CALCIUM 10.3 07/27/2023    ANIONGAP 10 07/27/2023    EGFRNORACEVR >60.0 07/27/2023     Lab Results   Component Value Date    ALT 15 07/27/2023    AST 20 07/27/2023    ALKPHOS 80 07/27/2023    BILITOT 0.2 07/27/2023     Lab Results   Component Value Date    WBC 4.49 07/27/2023    HGB 10.1 (L) 07/27/2023    HCT 33.2 (L) 07/27/2023    MCV 71 (L) 07/27/2023     07/27/2023     Blood counts are improving   Has f/u with heme onc on Friday for PET scan to look at liver     CT chest abd and pelvis 7/26 >>  1. Interval development of an ill-defined hypodense lesion within the inferior aspect of the right hepatic lobe suspect for a developed hepatic metastasis.  2. Developed patchy ground-glass and acinar attenuations within the right lung and clustered micro nodules within the left upper lobe suggesting bronchiolitis/pneumonitis.  Recommend short-term follow-up CT of the chest in 4-6 weeks to reassess.  3. Chronic urothelial thickening of the left renal pelvis similar to prior exam  Review of Systems   Constitutional:  Negative for chills, fatigue, fever and unexpected weight change.   HENT:  Negative for congestion, ear pain, sore throat and trouble swallowing.    Eyes:  Negative for pain and visual disturbance.   Respiratory:  Negative  for cough, chest tightness and shortness of breath.    Cardiovascular:  Negative for chest pain, palpitations and leg swelling.   Gastrointestinal:  Negative for abdominal distention, abdominal pain, constipation, diarrhea and vomiting.   Genitourinary:  Negative for difficulty urinating, dysuria, flank pain, frequency and hematuria.   Musculoskeletal:  Negative for back pain, gait problem, joint swelling, neck pain and neck stiffness.   Skin:  Negative for rash and wound.   Neurological:  Negative for dizziness, seizures, speech difficulty, light-headedness and headaches.       Objective:      Physical Exam  Vitals and nursing note reviewed.   Constitutional:       Appearance: Normal appearance. She is well-developed.   HENT:      Head: Normocephalic and atraumatic.      Right Ear: External ear normal.      Left Ear: External ear normal.      Nose: Nose normal.   Eyes:      Conjunctiva/sclera: Conjunctivae normal.      Pupils: Pupils are equal, round, and reactive to light.   Cardiovascular:      Rate and Rhythm: Normal rate and regular rhythm.      Heart sounds: Normal heart sounds. No murmur heard.  Pulmonary:      Effort: Pulmonary effort is normal. No respiratory distress.      Breath sounds: Normal breath sounds.   Abdominal:      General: Bowel sounds are normal.      Palpations: Abdomen is soft.   Musculoskeletal:         General: No swelling. Normal range of motion.      Cervical back: Normal range of motion and neck supple.   Skin:     General: Skin is warm and dry.      Capillary Refill: Capillary refill takes less than 2 seconds.   Neurological:      Mental Status: She is alert and oriented to person, place, and time.   Psychiatric:         Behavior: Behavior normal.         Thought Content: Thought content normal.         Judgment: Judgment normal.         Assessment:       1. Hyperlipidemia, unspecified hyperlipidemia type    2. Duodenal adenocarcinoma    3. History of Whipple procedure    4.  Esophagitis    5. Anemia, unspecified type    6. Essential hypertension    7. Diabetes mellitus type 2, diet-controlled        Plan:     Problem List Items Addressed This Visit       Essential hypertension well controlled cont lisinopril hctz     Hyperlipidemia - Primary- cont statin and check cholesterol    Relevant Orders    Lipid Panel    Diabetes mellitus type 2, diet-controlled- add a1c to labs 8/9    Anemia- cont MVI with fe    Esophagitis- cont pepcid and PPI    History of Whipple procedure    Duodenal adenocarcinoma  F/u pet scan Friday for new lesion on liver        Over due for lipid panel- 8/9 scheduled for heme onc- will add to those   Mammo 1/2023

## 2023-08-04 ENCOUNTER — HOSPITAL ENCOUNTER (OUTPATIENT)
Dept: RADIOLOGY | Facility: HOSPITAL | Age: 68
Discharge: HOME | End: 2023-08-04
Attending: STUDENT IN AN ORGANIZED HEALTH CARE EDUCATION/TRAINING PROGRAM
Payer: MEDICARE

## 2023-08-04 DIAGNOSIS — C17.0 DUODENAL ADENOCARCINOMA: ICD-10-CM

## 2023-08-04 LAB — POCT GLUCOSE: 98 MG/DL (ref 70–110)

## 2023-08-04 PROCEDURE — 78815 PET IMAGE W/CT SKULL-THIGH: CPT | Mod: TC

## 2023-08-04 PROCEDURE — 78815 NM PET CT ROUTINE: ICD-10-PCS | Mod: 26,PI,, | Performed by: STUDENT IN AN ORGANIZED HEALTH CARE EDUCATION/TRAINING PROGRAM

## 2023-08-04 PROCEDURE — A9552 F18 FDG: HCPCS

## 2023-08-04 PROCEDURE — 78815 PET IMAGE W/CT SKULL-THIGH: CPT | Mod: 26,PI,, | Performed by: STUDENT IN AN ORGANIZED HEALTH CARE EDUCATION/TRAINING PROGRAM

## 2023-08-09 ENCOUNTER — OFFICE VISIT (OUTPATIENT)
Dept: HEMATOLOGY/ONCOLOGY | Facility: CLINIC | Age: 68
End: 2023-08-09
Payer: MEDICARE

## 2023-08-09 ENCOUNTER — LAB VISIT (OUTPATIENT)
Dept: LAB | Facility: HOSPITAL | Age: 68
End: 2023-08-09
Payer: MEDICARE

## 2023-08-09 VITALS
BODY MASS INDEX: 23.05 KG/M2 | OXYGEN SATURATION: 98 % | RESPIRATION RATE: 18 BRPM | HEART RATE: 71 BPM | DIASTOLIC BLOOD PRESSURE: 61 MMHG | WEIGHT: 143.44 LBS | HEIGHT: 66 IN | SYSTOLIC BLOOD PRESSURE: 131 MMHG | TEMPERATURE: 98 F

## 2023-08-09 DIAGNOSIS — I70.0 AORTIC ATHEROSCLEROSIS: ICD-10-CM

## 2023-08-09 DIAGNOSIS — C17.0 DUODENAL ADENOCARCINOMA: Primary | ICD-10-CM

## 2023-08-09 DIAGNOSIS — R63.0 DECREASED APPETITE: ICD-10-CM

## 2023-08-09 DIAGNOSIS — E78.5 HYPERLIPIDEMIA, UNSPECIFIED HYPERLIPIDEMIA TYPE: ICD-10-CM

## 2023-08-09 DIAGNOSIS — D64.9 ANEMIA, UNSPECIFIED TYPE: ICD-10-CM

## 2023-08-09 DIAGNOSIS — E11.9 DIABETES MELLITUS TYPE 2, DIET-CONTROLLED: ICD-10-CM

## 2023-08-09 DIAGNOSIS — R53.0 NEOPLASTIC (MALIGNANT) RELATED FATIGUE: ICD-10-CM

## 2023-08-09 DIAGNOSIS — E44.0 MODERATE MALNUTRITION: ICD-10-CM

## 2023-08-09 DIAGNOSIS — C17.0 DUODENAL ADENOCARCINOMA: ICD-10-CM

## 2023-08-09 DIAGNOSIS — I10 ESSENTIAL HYPERTENSION: ICD-10-CM

## 2023-08-09 LAB
ALBUMIN SERPL BCP-MCNC: 3.3 G/DL (ref 3.5–5.2)
ALP SERPL-CCNC: 75 U/L (ref 55–135)
ALT SERPL W/O P-5'-P-CCNC: 21 U/L (ref 10–44)
ANION GAP SERPL CALC-SCNC: 10 MMOL/L (ref 8–16)
AST SERPL-CCNC: 21 U/L (ref 10–40)
BILIRUB SERPL-MCNC: 0.2 MG/DL (ref 0.1–1)
BUN SERPL-MCNC: 14 MG/DL (ref 8–23)
CALCIUM SERPL-MCNC: 9.4 MG/DL (ref 8.7–10.5)
CHLORIDE SERPL-SCNC: 103 MMOL/L (ref 95–110)
CHOLEST SERPL-MCNC: 198 MG/DL (ref 120–199)
CHOLEST/HDLC SERPL: 3.5 {RATIO} (ref 2–5)
CO2 SERPL-SCNC: 27 MMOL/L (ref 23–29)
CREAT SERPL-MCNC: 0.7 MG/DL (ref 0.5–1.4)
ERYTHROCYTE [DISTWIDTH] IN BLOOD BY AUTOMATED COUNT: 21.2 % (ref 11.5–14.5)
EST. GFR  (NO RACE VARIABLE): >60 ML/MIN/1.73 M^2
ESTIMATED AVG GLUCOSE: 114 MG/DL (ref 68–131)
GLUCOSE SERPL-MCNC: 103 MG/DL (ref 70–110)
HBA1C MFR BLD: 5.6 % (ref 4–5.6)
HCT VFR BLD AUTO: 32.4 % (ref 37–48.5)
HDLC SERPL-MCNC: 57 MG/DL (ref 40–75)
HDLC SERPL: 28.8 % (ref 20–50)
HGB BLD-MCNC: 9.9 G/DL (ref 12–16)
IMM GRANULOCYTES # BLD AUTO: 0.01 K/UL (ref 0–0.04)
LDLC SERPL CALC-MCNC: 102.4 MG/DL (ref 63–159)
MCH RBC QN AUTO: 21.4 PG (ref 27–31)
MCHC RBC AUTO-ENTMCNC: 30.6 G/DL (ref 32–36)
MCV RBC AUTO: 70 FL (ref 82–98)
NEUTROPHILS # BLD AUTO: 2.5 K/UL (ref 1.8–7.7)
NONHDLC SERPL-MCNC: 141 MG/DL
PLATELET # BLD AUTO: 271 K/UL (ref 150–450)
PMV BLD AUTO: 10 FL (ref 9.2–12.9)
POTASSIUM SERPL-SCNC: 3.5 MMOL/L (ref 3.5–5.1)
PROT SERPL-MCNC: 6.8 G/DL (ref 6–8.4)
RBC # BLD AUTO: 4.62 M/UL (ref 4–5.4)
SODIUM SERPL-SCNC: 140 MMOL/L (ref 136–145)
TRIGL SERPL-MCNC: 193 MG/DL (ref 30–150)
WBC # BLD AUTO: 4.55 K/UL (ref 3.9–12.7)

## 2023-08-09 PROCEDURE — 3078F DIAST BP <80 MM HG: CPT | Mod: CPTII,S$GLB,, | Performed by: INTERNAL MEDICINE

## 2023-08-09 PROCEDURE — 3078F PR MOST RECENT DIASTOLIC BLOOD PRESSURE < 80 MM HG: ICD-10-PCS | Mod: CPTII,S$GLB,, | Performed by: INTERNAL MEDICINE

## 2023-08-09 PROCEDURE — 4010F PR ACE/ARB THEARPY RXD/TAKEN: ICD-10-PCS | Mod: CPTII,S$GLB,, | Performed by: INTERNAL MEDICINE

## 2023-08-09 PROCEDURE — 3044F HG A1C LEVEL LT 7.0%: CPT | Mod: CPTII,S$GLB,, | Performed by: INTERNAL MEDICINE

## 2023-08-09 PROCEDURE — 4010F ACE/ARB THERAPY RXD/TAKEN: CPT | Mod: CPTII,S$GLB,, | Performed by: INTERNAL MEDICINE

## 2023-08-09 PROCEDURE — 3044F PR MOST RECENT HEMOGLOBIN A1C LEVEL <7.0%: ICD-10-PCS | Mod: CPTII,S$GLB,, | Performed by: INTERNAL MEDICINE

## 2023-08-09 PROCEDURE — 3288F FALL RISK ASSESSMENT DOCD: CPT | Mod: CPTII,S$GLB,, | Performed by: INTERNAL MEDICINE

## 2023-08-09 PROCEDURE — 1159F PR MEDICATION LIST DOCUMENTED IN MEDICAL RECORD: ICD-10-PCS | Mod: CPTII,S$GLB,, | Performed by: INTERNAL MEDICINE

## 2023-08-09 PROCEDURE — 3008F PR BODY MASS INDEX (BMI) DOCUMENTED: ICD-10-PCS | Mod: CPTII,S$GLB,, | Performed by: INTERNAL MEDICINE

## 2023-08-09 PROCEDURE — 99499 UNLISTED E&M SERVICE: CPT | Mod: S$GLB,,, | Performed by: INTERNAL MEDICINE

## 2023-08-09 PROCEDURE — 3075F PR MOST RECENT SYSTOLIC BLOOD PRESS GE 130-139MM HG: ICD-10-PCS | Mod: CPTII,S$GLB,, | Performed by: INTERNAL MEDICINE

## 2023-08-09 PROCEDURE — 1126F PR PAIN SEVERITY QUANTIFIED, NO PAIN PRESENT: ICD-10-PCS | Mod: CPTII,S$GLB,, | Performed by: INTERNAL MEDICINE

## 2023-08-09 PROCEDURE — 1159F MED LIST DOCD IN RCRD: CPT | Mod: CPTII,S$GLB,, | Performed by: INTERNAL MEDICINE

## 2023-08-09 PROCEDURE — 83036 HEMOGLOBIN GLYCOSYLATED A1C: CPT | Performed by: NURSE PRACTITIONER

## 2023-08-09 PROCEDURE — 80053 COMPREHEN METABOLIC PANEL: CPT | Performed by: REGISTERED NURSE

## 2023-08-09 PROCEDURE — 85027 COMPLETE CBC AUTOMATED: CPT | Performed by: REGISTERED NURSE

## 2023-08-09 PROCEDURE — 80061 LIPID PANEL: CPT | Performed by: NURSE PRACTITIONER

## 2023-08-09 PROCEDURE — 3075F SYST BP GE 130 - 139MM HG: CPT | Mod: CPTII,S$GLB,, | Performed by: INTERNAL MEDICINE

## 2023-08-09 PROCEDURE — 1126F AMNT PAIN NOTED NONE PRSNT: CPT | Mod: CPTII,S$GLB,, | Performed by: INTERNAL MEDICINE

## 2023-08-09 PROCEDURE — 3008F BODY MASS INDEX DOCD: CPT | Mod: CPTII,S$GLB,, | Performed by: INTERNAL MEDICINE

## 2023-08-09 PROCEDURE — 1101F PR PT FALLS ASSESS DOC 0-1 FALLS W/OUT INJ PAST YR: ICD-10-PCS | Mod: CPTII,S$GLB,, | Performed by: INTERNAL MEDICINE

## 2023-08-09 PROCEDURE — 3288F PR FALLS RISK ASSESSMENT DOCUMENTED: ICD-10-PCS | Mod: CPTII,S$GLB,, | Performed by: INTERNAL MEDICINE

## 2023-08-09 PROCEDURE — 99999 PR PBB SHADOW E&M-EST. PATIENT-LVL III: ICD-10-PCS | Mod: PBBFAC,,, | Performed by: INTERNAL MEDICINE

## 2023-08-09 PROCEDURE — 99999 PR PBB SHADOW E&M-EST. PATIENT-LVL III: CPT | Mod: PBBFAC,,, | Performed by: INTERNAL MEDICINE

## 2023-08-09 PROCEDURE — 99215 OFFICE O/P EST HI 40 MIN: CPT | Mod: S$GLB,,, | Performed by: INTERNAL MEDICINE

## 2023-08-09 PROCEDURE — 1101F PT FALLS ASSESS-DOCD LE1/YR: CPT | Mod: CPTII,S$GLB,, | Performed by: INTERNAL MEDICINE

## 2023-08-09 PROCEDURE — 36415 COLL VENOUS BLD VENIPUNCTURE: CPT | Performed by: REGISTERED NURSE

## 2023-08-09 PROCEDURE — 99215 PR OFFICE/OUTPT VISIT, EST, LEVL V, 40-54 MIN: ICD-10-PCS | Mod: S$GLB,,, | Performed by: INTERNAL MEDICINE

## 2023-08-09 RX ORDER — SODIUM CHLORIDE 0.9 % (FLUSH) 0.9 %
10 SYRINGE (ML) INJECTION
Status: CANCELLED | OUTPATIENT
Start: 2023-08-21

## 2023-08-09 RX ORDER — EPINEPHRINE 0.3 MG/.3ML
0.3 INJECTION SUBCUTANEOUS ONCE AS NEEDED
Status: CANCELLED | OUTPATIENT
Start: 2023-08-21

## 2023-08-09 RX ORDER — HEPARIN 100 UNIT/ML
500 SYRINGE INTRAVENOUS
Status: CANCELLED | OUTPATIENT
Start: 2023-08-21

## 2023-08-09 RX ORDER — DIPHENHYDRAMINE HYDROCHLORIDE 50 MG/ML
50 INJECTION INTRAMUSCULAR; INTRAVENOUS ONCE AS NEEDED
Status: CANCELLED | OUTPATIENT
Start: 2023-08-21

## 2023-08-09 NOTE — PROGRESS NOTES
"MEDICAL ONCOLOGY - ESTABLISHED PATIENT VISIT    Reason for visit: duodenal adenocarcinoma     Best Contact Phone Number(s): [phone] (home)      Cancer/Stage/TNM:    Cancer Staging   No matching staging information was found for the patient.     Oncology History   Duodenal adenocarcinoma   4/18/2023 Initial Diagnosis    Duodenal adenocarcinoma     8/21/2023 -  Chemotherapy    Treatment Summary   Plan Name: OP PEMBROLIZUMAB 400MG Q6W  Treatment Goal: Palliative  Status: Active  Start Date: 8/21/2023 (Planned)  End Date: 6/23/2025 (Planned)  Provider: Eber Roberts MD  Chemotherapy: [No matching medication found in this treatment plan]          Interval History: 68 y.o. female with recurrent metastatic duodenal adenocarcinoma who presents for follow-up.  She felt a little "high" the first time she took Marinol but has been not experienced that again since. She does feel like it has helped with her appetite.  She is up two pounds.  No nausea.  No pain.    Presents to clinic with her . ECOG 1.     ROS:   Review of Systems   Constitutional:  Negative for chills, fever and malaise/fatigue.   HENT:  Negative for nosebleeds and sore throat.    Respiratory:  Negative for cough and shortness of breath.    Cardiovascular:  Negative for chest pain, palpitations and leg swelling.   Gastrointestinal:  Negative for blood in stool, constipation, diarrhea, heartburn, nausea and vomiting.   Genitourinary:  Negative for dysuria, frequency, hematuria and urgency.   Musculoskeletal:  Negative for falls and myalgias.   Skin:  Negative for itching and rash.   Neurological:  Negative for dizziness, tingling, weakness and headaches.   Psychiatric/Behavioral:  The patient is not nervous/anxious and does not have insomnia.        Past Medical History:   Past Medical History:   Diagnosis Date    Abnormal Pap smear of cervix     Arthritis     Duodenal adenocarcinoma     Hypertension     Hyperthyroidism         Past Surgical " "History:   Past Surgical History:   Procedure Laterality Date    CATARACT EXTRACTION W/  INTRAOCULAR LENS IMPLANT Right 8/8/2019    Procedure: EXTRACTION, CATARACT, WITH IOL INSERTION;  Surgeon: Spenser Lee MD;  Location: [facility];  Service: Ophthalmology;  Laterality: Right;    ESOPHAGOGASTRODUODENOSCOPY N/A 3/10/2023    Procedure: EGD (ESOPHAGOGASTRODUODENOSCOPY);  Surgeon: Shashi Tapia MD;  Location: [address]);  Service: Endoscopy;  Laterality: N/A;    EYE SURGERY      HYSTERECTOMY      OOPHORECTOMY      PHACOEMULSIFICATION OF CATARACT Right 8/8/2019    Procedure: PHACOEMULSIFICATION, CATARACT;  Surgeon: Spenser Lee MD;  Location: [facility];  Service: Ophthalmology;  Laterality: Right;    WHIPPLE PROCEDURE N/A 3/15/2023    Procedure: WHIPPLE PROCEDURE;  Surgeon: Nick Paez MD;  Location: [address];  Service: General;  Laterality: N/A;        Family History:   Family History   Problem Relation Age of Onset    Cancer Mother     Breast cancer Neg Hx     Colon cancer Neg Hx     Ovarian cancer Neg Hx         Social History:   Social History     Tobacco Use    Smoking status: Every Day     Current packs/day: 0.25     Average packs/day: 0.3 packs/day for 30.0 years (7.5 ttl pk-yrs)     Types: Cigarettes    Smokeless tobacco: Never    Tobacco comments:     About 7-8 cigs a day   Substance Use Topics    Alcohol use: Yes     Alcohol/week: 1.0 standard drink of alcohol     Types: 1 Cans of beer per week        I have reviewed and updated the patient's past medical, surgical, family and social histories.    Allergies:   Review of patient's allergies indicates:   Allergen Reactions    Aspirin Other (See Comments)     Pt states it is "hard on her stomach" She can tolerate a low dose aspirin    Pcn [penicillins]      Childhood - Tolerated ceftriaxone and cefazolin with no documented issues in the past         Medications:   Current Outpatient Medications   Medication Sig Dispense " "Refill    acetaminophen (TYLENOL) 650 MG TbSR Take 650 mg by mouth as needed.      atorvastatin (LIPITOR) 20 MG tablet Take 20 mg by mouth once daily.      droNABinol (MARINOL) 5 MG capsule Take 1 capsule (5 mg total) by mouth 2 (two) times daily before meals. 60 capsule 2    famotidine (PEPCID) 20 MG tablet Take 1 tablet (20 mg total) by mouth 2 (two) times daily. 60 tablet 11    lisinopriL-hydrochlorothiazide (PRINZIDE,ZESTORETIC) 20-12.5 mg per tablet Take 1 tablet by mouth once daily. 90 tablet 3    omeprazole (PRILOSEC) 40 MG capsule Take 1 capsule (40 mg total) by mouth once daily. 30 capsule 0    traMADoL (ULTRAM) 50 mg tablet Take 1 tablet (50 mg total) by mouth every 12 (twelve) hours as needed for Pain. 14 tablet 0     No current facility-administered medications for this visit.        Physical Exam:   /61 (BP Location: Left arm, Patient Position: Sitting, BP Method: Medium (Automatic))   Pulse 71   Temp 98.3 °F (36.8 °C) (Oral)   Resp 18   Ht 5' 6" (1.676 m)   Wt 65.1 kg (143 lb 6.6 oz)   SpO2 98%   BMI 23.15 kg/m²      ECOG Performance status: 1       Physical Exam  Vitals reviewed.   Constitutional:       General: She is not in acute distress.     Appearance: Normal appearance. She is normal weight. She is not ill-appearing, toxic-appearing or diaphoretic.   HENT:      Head: Normocephalic and atraumatic.      Right Ear: External ear normal.      Left Ear: External ear normal.      Nose: Nose normal.      Mouth/Throat:      Pharynx: Oropharynx is clear.   Eyes:      General: No scleral icterus.     Conjunctiva/sclera: Conjunctivae normal.      Pupils: Pupils are equal, round, and reactive to light.   Cardiovascular:      Rate and Rhythm: Normal rate.   Pulmonary:      Effort: Pulmonary effort is normal. No respiratory distress.      Breath sounds: No wheezing.   Abdominal:      General: There is no distension.      Tenderness: There is no abdominal tenderness.      Comments: Midline " abdominal incision well healed   Musculoskeletal:      Cervical back: Normal range of motion.      Right lower leg: No edema.      Left lower leg: No edema.   Skin:     General: Skin is warm and dry.      Coloration: Skin is not jaundiced or pale.      Findings: No bruising, erythema or rash.   Neurological:      General: No focal deficit present.      Mental Status: She is alert and oriented to person, place, and time. Mental status is at baseline.      Cranial Nerves: No cranial nerve deficit.      Sensory: No sensory deficit.      Motor: No weakness.      Gait: Gait normal.   Psychiatric:         Mood and Affect: Mood normal.         Behavior: Behavior normal.         Thought Content: Thought content normal.         Judgment: Judgment normal.           Labs:   Recent Results (from the past 48 hour(s))   Comprehensive Metabolic Panel    Collection Time: 08/09/23  1:40 PM   Result Value Ref Range    Sodium 140 136 - 145 mmol/L    Potassium 3.5 3.5 - 5.1 mmol/L    Chloride 103 95 - 110 mmol/L    CO2 27 23 - 29 mmol/L    Glucose 103 70 - 110 mg/dL    BUN 14 8 - 23 mg/dL    Creatinine 0.7 0.5 - 1.4 mg/dL    Calcium 9.4 8.7 - 10.5 mg/dL    Total Protein 6.8 6.0 - 8.4 g/dL    Albumin 3.3 (L) 3.5 - 5.2 g/dL    Total Bilirubin 0.2 0.1 - 1.0 mg/dL    Alkaline Phosphatase 75 55 - 135 U/L    AST 21 10 - 40 U/L    ALT 21 10 - 44 U/L    eGFR >60.0 >60 mL/min/1.73 m^2    Anion Gap 10 8 - 16 mmol/L   CBC Oncology    Collection Time: 08/09/23  1:40 PM   Result Value Ref Range    WBC 4.55 3.90 - 12.70 K/uL    RBC 4.62 4.00 - 5.40 M/uL    Hemoglobin 9.9 (L) 12.0 - 16.0 g/dL    Hematocrit 32.4 (L) 37.0 - 48.5 %    MCV 70 (L) 82 - 98 fL    MCH 21.4 (L) 27.0 - 31.0 pg    MCHC 30.6 (L) 32.0 - 36.0 g/dL    RDW 21.2 (H) 11.5 - 14.5 %    Platelets 271 150 - 450 K/uL    MPV 10.0 9.2 - 12.9 fL    Gran # (ANC) 2.5 1.8 - 7.7 K/uL    Immature Grans (Abs) 0.01 0.00 - 0.04 K/uL   Lipid Panel    Collection Time: 08/09/23  1:40 PM   Result Value  Ref Range    Cholesterol 198 120 - 199 mg/dL    Triglycerides 193 (H) 30 - 150 mg/dL    HDL 57 40 - 75 mg/dL    LDL Cholesterol 102.4 63.0 - 159.0 mg/dL    HDL/Cholesterol Ratio 28.8 20.0 - 50.0 %    Total Cholesterol/HDL Ratio 3.5 2.0 - 5.0    Non-HDL Cholesterol 141 mg/dL   Hemoglobin A1C    Collection Time: 23  1:40 PM   Result Value Ref Range    Hemoglobin A1C 5.6 4.0 - 5.6 %    Estimated Avg Glucose 114 68 - 131 mg/dL        I have reviewed the pertinent labs. Stable microcytic anemia.  CEA from  5.6.    Imagin/4/23 - PET/CT  Impression:     Hypermetabolic right inferior hepatic lobe lesion concerning for metastatic disease.     No other suspicious hypermetabolic foci identified.     Mild radiotracer uptake identified diffusely throughout the esophagus.  Findings can be seen with esophagitis.  Recommend clinical correlation.    Path:   3/15/23 - Whipple  Final Pathologic Diagnosis      Abnormal   1. Lymph node, hepatic cautery, resection:   - One lymph node negative for metastatic carcinoma (0/1).   - See synoptic report.   2. Gallbladder, cholecystectomy:   - Benign gallbladder with no significant histologic abnormality.   3. Pancreas, duodenum, common bile duct, and partial gastrectomy,   pancreaticoduodenectomy (Whipple specimen):   - Poorly differentiated carcinoma with medullary features.   - See synoptic report.   - See comment.   Synoptic Report   Procedure: Pancreaticoduodenectomy with partial gastrectomy (Whipple   specimen)   Tumor site:  Pylorus and proximal duodenum   Histologic type: Poorly differentiated carcinoma with medullary features   Histologic grade: G3, poorly differentiated   Tumor size:  6.5 cm in greatest dimension grossly   Tumor extent:   Invades through muscularis propria into subserosa, or extends   into nonperitonealized perimuscular tissue (mesentery or retroperitoneum)   without serosal penetration   Macroscopic tumor perforation:  Not identified   Lymphovascular  inv asion:  Not identified, see comment   Margin status for invasive carcinoma:  All margins negative for invasive   carcinoma   Margin status for dysplasia:  All margins negative for carcinoma in Situ   (high-grade dysplasia)/adenoma   Regional lymph node status: Regional lymph nodes present        All Regional lymph nodes negative for tumor        Number of lymph nodes examined:  15 (including specimens 1 and 3)   PATHOLOGIC STAGE CLASSIFICATION (pTNM, AJCC 8th Edition):  p T3 N0   Additional findings:  Stomach with intestinal metaplasia (immunostain for   H.pylori negative), 2 lymph nodes with fibrosis and calcifications (GMS stain   for fungal organisms and AFB stain negative)   Ancillary studies:  Immunohistochemistry (IHC) Testing for Mismatch Repair   (MMR) Proteins   MLH1- Loss of nuclear expression   PMS2 - Loss of nuclear expression   MSH2 - Intact nuclear expression   MSH6 - Intact nuclear expression   Background nonneoplastic tissue/internal control with intact nuclear   expression   IHC Interpret ation:  Loss of nuclear expression of MLH1 and PMS2: testing for   methylation of the MLH1 promoter and/or mutation of BRAF is indicated (the   presence of a BRAF V600E mutation and/or MLH1 methylation suggests that the   tumor is sporadic and germline evaluation is probably not indicated; absence   of both MLH1 methylation and of BRAF V600E mutation suggests the possibility   of Seaman syndrome, and sequencing and/or large deletion/duplication testing   of germline MLH1 may be indicated)   Testing for methylation of the MLH1 promoter and mutation of BRAF is in   process and results will be supplemented.   There are exceptions to the above IHC interpretations. These results should   not be considered in isolation, and clinical correlation with genetic   counseling is recommended to assess the need for germline testing.   COMMENT:  The stomach and duodenum (specimen 3) shows a 6.5 cm mass involving   the pylorus  "with the majority of the tumor appearing to be in the duodenum.   The tumor shows poorly d ifferentiated sheets of blue cells with necrosis.   There is a significant amount of lymphocytic inflammatory infiltrate around   the edges of the tumor.  Immunostains show the tumor cells to be positive for   CK7 with patchy weak CDX2 and negative for synaptophysin and chromogranin.   There is also loss of MLH1 and PMS2 on MMR stains.  These features are   suggestive of medullary carcinoma.  Select slides reviewed by Dr. ITZEL Sánchez   who agrees with the diagnosis of poorly differentiated carcinoma with   medullary features.  Immunostains for CD 34 performed on blocks 3E and 3H   show no definitive lymphovascular invasion even though some areas suspicious   cells on routine H&E sections.  Appropriate positive controls   VC      Comment: Interp By Meredith Pappas MD, Signed on 04/12/2023 at 16:03   Supplemental Diagnosis      Abnormal   MLH1 Hypermethylation/BRAF Mutation   Result Summary   MLH1 HYPERMETHYLATION PRESENT/NO BRAF MUTATION IDENTIFIED   Result   Provided diagnosis: pylorus and proximal duodenum poorly differentiated   carcinoma with medullary features   Methylation status: Positive for MLH1 promoter hypermethylation   BRAF status: Wild-type (SEE INTERPRETATION)   Alexis Sotelo M.D.   Report attached   Performing location:   [place] [address]   "Disclaimer:  This case diagnosis was rendered completely by the outside   consultation pathologist and the case is electronically signed by an [facility]   pathologist listed below solely to release the report into the medical   record."          Assessment:       1. Duodenal adenocarcinoma    2. Moderate malnutrition    3. Decreased appetite    4. Anemia, unspecified type    5. Essential hypertension    6. Hyperlipidemia, unspecified hyperlipidemia type    7. Diabetes mellitus type 2, " diet-controlled    8. Aortic atherosclerosis    9. Neoplastic (malignant) related fatigue         Plan:     # Duodenal adenocarcinoma   Mrs. Simms is a pleasant 68 y.o. 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 Pathology did reveal some high grade features, including poorly differentiated carcinoma, 6.5 cm size.  We discussed at her initial visit he limited role of adjuvant chemotherapy in the stage II setting for small bowel adenocarcinoma.      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     CT CAP on 7/26/23 demonstrated concern for recurrence in liver.  PET/CT on 8/4/23 showed hypermetabolic inferior R hepatic lobe lesion consistent with metastatic recurrence.  CEA has risen as well.    Given her dMMR/MSI-H status, will plan to start on single agent pembrolizumab 400 mg q6 weeks.  Patient was consented for immunotherapy today.  An extensive discussion was had which included a thorough discussion of the risk and benefits of treatment and alternatives.  Risks, including but not limited to, immune-mediated toxicities involving multiple body organs including lungs, kidneys, GI tract, liver, heart, central nervous system, skin, thyroid, pituitary gland, and others were all explained to the patient. The patient agrees with the plan, and all questions have been answered to their satisfaction.  Consent was signed the patient, provider, and a third party witness.     Will check [employer] NGS.  Repeat imaging prior to cycle 3 of pembrolizumab.    # Decreased appetite, malnutrition  -Appetite improved with Marinol.  -Monitor weight closely. Nutrition following.    # Anemia  -Microcytic anemia. Suspect thalassemia given longstanding nature.  -No signs of bleeding, platelets normal.   -Asymptomatic. Monitor.     # HTN, HLD, DM, aortic atherosclerosis   -BP controlled in clinic.   -Glucose controlled on  last labs.   -Following with PCP.   -Continue medical management.     Eber Roberts MD  Hematology/Oncology  [facility]       Route Chart for Scheduling    Med Onc Chart Routing  Urgent    Follow up with physician 6 weeks. for cycle 2 of pembro   Follow up with YANDEL    Infusion scheduling note   please schedule Keytruda next available infusion; if within 7-10 days doesn't need another appt with us and labs beforehand   Injection scheduling note    Labs CBC, CMP, TSH and CEA   Scheduling:  Preferred lab:  Lab interval:     Imaging    Pharmacy appointment    Other referrals              Treatment Plan Information   OP PEMBROLIZUMAB 400MG Q6W   Eber Roberts MD   Upcoming Treatment Dates - OP PEMBROLIZUMAB 400MG Q6W    8/21/2023       Chemotherapy       pembrolizumab (KEYTRUDA) 400 mg in sodium chloride 0.9% SolP 116 mL infusion  10/2/2023       Chemotherapy       pembrolizumab (KEYTRUDA) 400 mg in sodium chloride 0.9% SolP 116 mL infusion  11/13/2023       Chemotherapy       pembrolizumab (KEYTRUDA) 400 mg in sodium chloride 0.9% SolP 116 mL infusion  12/25/2023       Chemotherapy       pembrolizumab (KEYTRUDA) 400 mg in sodium chloride 0.9% SolP 116 mL infusion

## 2023-08-11 ENCOUNTER — PATIENT MESSAGE (OUTPATIENT)
Dept: RESEARCH | Facility: HOSPITAL | Age: 68
End: 2023-08-11
Payer: MEDICARE

## 2023-08-14 DIAGNOSIS — K29.70 GASTRITIS, PRESENCE OF BLEEDING UNSPECIFIED, UNSPECIFIED CHRONICITY, UNSPECIFIED GASTRITIS TYPE: ICD-10-CM

## 2023-08-14 RX ORDER — ATORVASTATIN CALCIUM 40 MG/1
40 TABLET, FILM COATED ORAL DAILY
Qty: 90 TABLET | Refills: 1 | Status: SHIPPED | OUTPATIENT
Start: 2023-08-14 | End: 2023-11-01 | Stop reason: SDUPTHER

## 2023-08-14 RX ORDER — FAMOTIDINE 20 MG/1
20 TABLET, FILM COATED ORAL 2 TIMES DAILY
Qty: 60 TABLET | Refills: 11 | Status: SHIPPED | OUTPATIENT
Start: 2023-08-14 | End: 2023-11-01 | Stop reason: SDUPTHER

## 2023-08-15 LAB
LEFT EYE DM RETINOPATHY: POSITIVE
RIGHT EYE DM RETINOPATHY: POSITIVE

## 2023-08-15 RX ORDER — OMEPRAZOLE 40 MG/1
40 CAPSULE, DELAYED RELEASE ORAL DAILY
Qty: 30 CAPSULE | Refills: 5 | Status: SHIPPED | OUTPATIENT
Start: 2023-08-15 | End: 2023-11-01 | Stop reason: SDUPTHER

## 2023-08-15 NOTE — TELEPHONE ENCOUNTER
----- Message from Hina Madrid sent at 8/15/2023  1:23 PM CDT -----  Contact: pt  Nan Simms  MRN: [id number]  : 1955  PCP: Payal Moreland  Home Phone      [phone]  Work Phone      Not on file.  Mobile          [phone]      MESSAGE: pt states she needs the following prescription refilled    omeprazole (PRILOSEC) 40 MG capsule    [facility] [address] [place] [address]  Phone: [phone] Fax: [phone]  Hours: Mon-Fri, 8a-5:30p    [phone]

## 2023-08-15 NOTE — TELEPHONE ENCOUNTER
LOV 8/1/2023  Scheduled visit 11/1/2023    Requested Prescriptions     Pending Prescriptions Disp Refills    omeprazole (PRILOSEC) 40 MG capsule 30 capsule 5     Sig: Take 1 capsule (40 mg total) by mouth once daily.

## 2023-08-17 ENCOUNTER — PATIENT OUTREACH (OUTPATIENT)
Dept: ADMINISTRATIVE | Facility: HOSPITAL | Age: 68
End: 2023-08-17
Payer: MEDICARE

## 2023-08-17 DIAGNOSIS — E11.9 DIABETES MELLITUS TYPE 2, DIET-CONTROLLED: Primary | ICD-10-CM

## 2023-08-17 NOTE — LETTER
AUTHORIZATION FOR RELEASE OF   CONFIDENTIAL INFORMATION    Dear [facility],    We are seeing Nan Simms, date of birth 1955, in the clinic at [facility] INTERNAL MEDICINE II. Payal Moreland NP is the patient's PCP. Nan Simms has an outstanding lab/procedure at the time we reviewed her chart. In order to help keep her health information updated, she has authorized us to request the following medical record(s):                                              ( X )  EYE EXAM                 Please fax records to [facility], Daigle, Linsey L., NP, [phone].    If you have any questions, please contact...  Juju Cardoza LPN  Clinical Care Coordinator  [facility]  Phone: [phone]  Fax: [phone]       Patient Name: Nan Simms  : 1955  Patient Phone #: [phone]

## 2023-08-17 NOTE — LETTER
AUTHORIZATION FOR RELEASE OF   CONFIDENTIAL INFORMATION    Dear [facility],    We are seeing Nan Simms, date of birth 1955, in the clinic at [facility] INTERNAL MEDICINE II. Payal Moreland NP is the patient's PCP. Nan Simms has an outstanding lab/procedure at the time we reviewed her chart. In order to help keep her health information updated, she has authorized us to request the following medical record(s):                                                  ( X )  OUTSIDE LAB RESULTS            Please fax records to [facility], Daigle, Linsey L., NP, [phone].    If you have any questions, please contact...  Juju Cardoza LPN  Clinical Care Coordinator  [facility]  Phone: [phone]  Fax: [phone]           Patient Name: Nan Simms  : 1955  Patient Phone #: [phone]

## 2023-08-17 NOTE — PROGRESS NOTES
Patient returned call to clinic.  [facility] she is waiting to hear from [employer] for a date for her home visit. Appointment had to be rescheduled.  [facility] Eye exam is up to date, completed at [facility].   E-fax sent to [facility] for last eye exam.   Diabetes urine screening order placed, linked to lab appointment on 08/24/2023.

## 2023-08-20 LAB
DNA RANGE(S) EXAMINED NAR: NORMAL
GENE DIS ANL INTERP-IMP: POSITIVE
GENE DIS ASSESSED: NORMAL
GENE MUT TESTED BLD/T: 76.3 M/MB
Lab: NORMAL
MLH1+MSH2+MSH6+PMS2 GN DEL+DUP+FUL M: NORMAL
MMR ENDO PMS2 CA SPEC QL IMSTN: NORMAL
MMR PROT MLH1 CA SPEC QL IMSTN: NORMAL
MMR PROT MSH2 CA SPEC QL IMSTN: PRESENT
MMR PROT MSH6 CA SPEC QL IMSTN: PRESENT
MSI CA SPEC-IMP: NORMAL
REASON FOR STUDY: NORMAL
TEMPUS FUSIONADDENDUM: NORMAL
TEMPUS LCA: NORMAL
TEMPUS PERTINENTNEGATIVES: NORMAL
TEMPUS PORTAL: NORMAL
TEMPUS THERAPY10: NORMAL
TEMPUS THERAPY11: NORMAL
TEMPUS THERAPY12: NORMAL
TEMPUS THERAPY1: NORMAL
TEMPUS THERAPY2: NORMAL
TEMPUS THERAPY3: NORMAL
TEMPUS THERAPY4: NORMAL
TEMPUS THERAPY5: NORMAL
TEMPUS THERAPY6: NORMAL
TEMPUS THERAPY7: NORMAL
TEMPUS THERAPY8: NORMAL
TEMPUS THERAPY9: NORMAL
TEMPUS THERAPYCOUNT: 12
TEMPUS TRIAL1: NORMAL
TEMPUS TRIAL2: NORMAL
TEMPUS TRIAL3: NORMAL
TEMPUS TRIAL4: NORMAL
TEMPUS TRIALCOUNT: 5

## 2023-08-24 ENCOUNTER — RESEARCH ENCOUNTER (OUTPATIENT)
Dept: RESEARCH | Facility: HOSPITAL | Age: 68
End: 2023-08-24
Payer: MEDICARE

## 2023-08-24 ENCOUNTER — INFUSION (OUTPATIENT)
Dept: INFUSION THERAPY | Facility: HOSPITAL | Age: 68
End: 2023-08-24
Payer: MEDICARE

## 2023-08-24 VITALS
RESPIRATION RATE: 18 BRPM | SYSTOLIC BLOOD PRESSURE: 138 MMHG | BODY MASS INDEX: 23.06 KG/M2 | HEIGHT: 66 IN | WEIGHT: 143.5 LBS | DIASTOLIC BLOOD PRESSURE: 63 MMHG | TEMPERATURE: 99 F | HEART RATE: 50 BPM

## 2023-08-24 DIAGNOSIS — C17.0 DUODENAL ADENOCARCINOMA: Primary | ICD-10-CM

## 2023-08-24 DIAGNOSIS — Z00.6 EXAMINATION OF PARTICIPANT IN CLINICAL TRIAL: ICD-10-CM

## 2023-08-24 PROCEDURE — 63600175 PHARM REV CODE 636 W HCPCS: Mod: JZ,JG | Performed by: INTERNAL MEDICINE

## 2023-08-24 PROCEDURE — 96413 CHEMO IV INFUSION 1 HR: CPT

## 2023-08-24 PROCEDURE — 25000003 PHARM REV CODE 250: Performed by: INTERNAL MEDICINE

## 2023-08-24 RX ORDER — EPINEPHRINE 0.3 MG/.3ML
0.3 INJECTION SUBCUTANEOUS ONCE AS NEEDED
Status: DISCONTINUED | OUTPATIENT
Start: 2023-08-24 | End: 2023-08-24 | Stop reason: HOSPADM

## 2023-08-24 RX ORDER — DIPHENHYDRAMINE HYDROCHLORIDE 50 MG/ML
50 INJECTION INTRAMUSCULAR; INTRAVENOUS ONCE AS NEEDED
Status: DISCONTINUED | OUTPATIENT
Start: 2023-08-24 | End: 2023-08-24 | Stop reason: HOSPADM

## 2023-08-24 RX ORDER — SODIUM CHLORIDE 0.9 % (FLUSH) 0.9 %
10 SYRINGE (ML) INJECTION
Status: DISCONTINUED | OUTPATIENT
Start: 2023-08-24 | End: 2023-08-24 | Stop reason: HOSPADM

## 2023-08-24 RX ORDER — HEPARIN 100 UNIT/ML
500 SYRINGE INTRAVENOUS
Status: DISCONTINUED | OUTPATIENT
Start: 2023-08-24 | End: 2023-08-24 | Stop reason: HOSPADM

## 2023-08-24 RX ADMIN — SODIUM CHLORIDE 400 MG: 9 INJECTION, SOLUTION INTRAVENOUS at 09:08

## 2023-08-24 NOTE — PROGRESS NOTES
August 24, 2023    Protocol: [id number], Disparities in Results of Immune Checkpoint Inhibitor Treatment (DIRECT): A Prospective Cohort Study of Cancer Survivors Treated with anti-PD-L1 Immunotherapy in a Community Oncology Setting  IRB# 2022.126  Version Date: 1/11/2022  Study # [id number]       Informed Consent Note:    Isabela Church, research coordinator, met with the patient to discuss the above-mentioned protocol. She is alert and oriented x 3. Mood and affect appropriate to the situation.  Patient states that she is consenting to collection of blood, salvia, and tissue samples along with questionnaires.  Purpose of the study is to understand the treatment experiences and outcomes of both Black and White patients on Immune Checkpoint Inhibitors. Reviewed with the patient. Patient states understanding of this information.   Length of study and number of participants reviewed with the patient.  She also verbalized understanding that [facility] will be enrolling about 80 subjects.    Study procedures discussed in detail with the patient to include baseline and follow-up specimen collection and questionnaires collection as well as all information that we be taken from her chart.  Patient verbalized understanding of this information.  Patient responsibilities discussed over in detail with patient. Patient also informed about what will happened to her test samples.  Pt voiced understanding.   Risks discussed over with patient to include blood draws, psychological and economic in nature.  Pt voices understanding.  Benefits, costs and/or payment reimbursement and source of funding all reviewed with the patient. Patient states she understands that her insurance will cover cost of all standards of care medications and procedures.  The patient verbalized understanding of this information.   Alternative treatment methods discussed with patient; she states understanding of this information.   Study related questions/compensation for  injury, and whom to contact regarding rights as a research subject all reviewed with the patient; she verbalizes understanding of this information.   Explained to the patient that researchers will be studying her DNA Sequencing in her blood and tissue samples. She was also informed that during the testing process there may be unexpected findings but since they are not of the same quality as she would receive during regular health care assessments she will not be informed of these findings.  Pt voiced understanding.     Voluntary participation and withdrawal from research stressed to patient; she states she understands that she may withdraw consent at any time without compromise to her care.   Confidentiality and HIPAA discussed with patient; process of protection and security of database explained to patient; she verbalizes understanding of this information. Informed the patient that detailed information on this trial can be found at www.clinicaltrials.gov.        Throughout the consenting process, the patient was given several opportunities to ask questions; all questions addressed and answered to patient's satisfaction per Dr. Roberts  Patient states her willingness to participate in the study; patient signed and dated the consent form; copy of the consent form given to patient along with my contact information.  Instructed patient to notify Dr. Roberts for any questions and/or concerns. Patient verbalized understanding.

## 2023-08-24 NOTE — PROGRESS NOTES
August 24, 2023    Protocol: [id number], Disparities in Results of Immune Checkpoint Inhibitor Treatment (DIRECT): A Prospective Cohort Study of Cancer Survivors Treated with anti-PD-L1 Immunotherapy in a Community Oncology Setting  IRB# 2022.126  Version Date: 1/11/2022  Treating Physician: Dr. Eber Roberts  Study # 940       Eligibility/Registration Note:     All pre-study testing/procedures required prior to OPEN registration were completed per protocol. [facility] [address] reviewed all protocol eligibility criteria for the above-mentioned study, and patient was deemed eligible to participate.   Patient was registered in OPEN to [facility] [id number]: Observational study.  Patient was informed she had been registered in the trial and was given ample opportunity to ask questions and her questions were answered to her satisfaction. Patient performed all baseline (A1) requirements: collected blood and saliva; completed PROs. Patient was encouraged to contact either me or Dr. Roberts's team with any questions or concerns. Patient verbalized her understanding and is scheduled for Cycle 1 Day 1 on 08/23/2024 (Pembrolizumab).

## 2023-08-30 ENCOUNTER — PATIENT OUTREACH (OUTPATIENT)
Dept: ADMINISTRATIVE | Facility: HOSPITAL | Age: 68
End: 2023-08-30
Payer: MEDICARE

## 2023-08-30 DIAGNOSIS — E11.65 TYPE 2 DIABETES MELLITUS WITH HYPERGLYCEMIA, UNSPECIFIED WHETHER LONG TERM INSULIN USE: Primary | ICD-10-CM

## 2023-08-31 ENCOUNTER — LAB VISIT (OUTPATIENT)
Dept: LAB | Facility: HOSPITAL | Age: 68
End: 2023-08-31
Attending: NURSE PRACTITIONER
Payer: MEDICARE

## 2023-08-31 DIAGNOSIS — E11.65 TYPE 2 DIABETES MELLITUS WITH HYPERGLYCEMIA, UNSPECIFIED WHETHER LONG TERM INSULIN USE: ICD-10-CM

## 2023-08-31 LAB
ALBUMIN/CREAT UR: 14.3 UG/MG (ref 0–30)
CREAT UR-MCNC: 118.5 MG/DL (ref 15–325)
MICROALBUMIN UR DL<=1MG/L-MCNC: 17 UG/ML

## 2023-08-31 PROCEDURE — 82570 ASSAY OF URINE CREATININE: CPT | Performed by: NURSE PRACTITIONER

## 2023-09-06 ENCOUNTER — HOSPITAL ENCOUNTER (EMERGENCY)
Facility: HOSPITAL | Age: 68
Discharge: HOME | End: 2023-09-06
Attending: SURGERY
Payer: MEDICARE

## 2023-09-06 VITALS
HEIGHT: 66 IN | TEMPERATURE: 99 F | RESPIRATION RATE: 16 BRPM | DIASTOLIC BLOOD PRESSURE: 60 MMHG | OXYGEN SATURATION: 100 % | SYSTOLIC BLOOD PRESSURE: 129 MMHG | BODY MASS INDEX: 22.6 KG/M2 | WEIGHT: 140.63 LBS | HEART RATE: 60 BPM

## 2023-09-06 DIAGNOSIS — K29.70 GASTRITIS, PRESENCE OF BLEEDING UNSPECIFIED, UNSPECIFIED CHRONICITY, UNSPECIFIED GASTRITIS TYPE: ICD-10-CM

## 2023-09-06 DIAGNOSIS — C78.7 METASTATIC CANCER TO LIVER: ICD-10-CM

## 2023-09-06 DIAGNOSIS — K91.1 POSTSURGICAL DUMPING SYNDROME: Primary | ICD-10-CM

## 2023-09-06 DIAGNOSIS — K86.1 CHRONIC PANCREATITIS, UNSPECIFIED PANCREATITIS TYPE: ICD-10-CM

## 2023-09-06 LAB
ALBUMIN SERPL BCP-MCNC: 3.7 G/DL (ref 3.5–5.2)
ALP SERPL-CCNC: 77 U/L (ref 55–135)
ALT SERPL W/O P-5'-P-CCNC: 18 U/L (ref 10–44)
ANION GAP SERPL CALC-SCNC: 9 MMOL/L (ref 8–16)
AST SERPL-CCNC: 24 U/L (ref 10–40)
BASOPHILS # BLD AUTO: 0.01 K/UL (ref 0–0.2)
BASOPHILS NFR BLD: 0.2 % (ref 0–1.9)
BILIRUB SERPL-MCNC: 0.8 MG/DL (ref 0.1–1)
BILIRUB UR QL STRIP: NEGATIVE
BUN SERPL-MCNC: 11 MG/DL (ref 8–23)
CALCIUM SERPL-MCNC: 9.3 MG/DL (ref 8.7–10.5)
CHLORIDE SERPL-SCNC: 100 MMOL/L (ref 95–110)
CLARITY UR: CLEAR
CO2 SERPL-SCNC: 30 MMOL/L (ref 23–29)
COLOR UR: YELLOW
CREAT SERPL-MCNC: 0.7 MG/DL (ref 0.5–1.4)
DIFFERENTIAL METHOD: ABNORMAL
EOSINOPHIL # BLD AUTO: 0.1 K/UL (ref 0–0.5)
EOSINOPHIL NFR BLD: 1.1 % (ref 0–8)
ERYTHROCYTE [DISTWIDTH] IN BLOOD BY AUTOMATED COUNT: 21.2 % (ref 11.5–14.5)
EST. GFR  (NO RACE VARIABLE): >60 ML/MIN/1.73 M^2
GLUCOSE SERPL-MCNC: 102 MG/DL (ref 70–110)
GLUCOSE UR QL STRIP: NEGATIVE
HCT VFR BLD AUTO: 32.7 % (ref 37–48.5)
HGB BLD-MCNC: 10.3 G/DL (ref 12–16)
HGB UR QL STRIP: ABNORMAL
IMM GRANULOCYTES # BLD AUTO: 0.03 K/UL (ref 0–0.04)
IMM GRANULOCYTES NFR BLD AUTO: 0.5 % (ref 0–0.5)
KETONES UR QL STRIP: NEGATIVE
LEUKOCYTE ESTERASE UR QL STRIP: NEGATIVE
LIPASE SERPL-CCNC: 148 U/L (ref 4–60)
LYMPHOCYTES # BLD AUTO: 0.8 K/UL (ref 1–4.8)
LYMPHOCYTES NFR BLD: 12.3 % (ref 18–48)
MAGNESIUM SERPL-MCNC: 2.1 MG/DL (ref 1.6–2.6)
MCH RBC QN AUTO: 22.3 PG (ref 27–31)
MCHC RBC AUTO-ENTMCNC: 31.5 G/DL (ref 32–36)
MCV RBC AUTO: 71 FL (ref 82–98)
MONOCYTES # BLD AUTO: 0.6 K/UL (ref 0.3–1)
MONOCYTES NFR BLD: 10.3 % (ref 4–15)
NEUTROPHILS # BLD AUTO: 4.6 K/UL (ref 1.8–7.7)
NEUTROPHILS NFR BLD: 75.6 % (ref 38–73)
NITRITE UR QL STRIP: NEGATIVE
NRBC BLD-RTO: 0 /100 WBC
PH UR STRIP: 7 [PH] (ref 5–8)
PLATELET # BLD AUTO: 229 K/UL (ref 150–450)
PMV BLD AUTO: 10.1 FL (ref 9.2–12.9)
POTASSIUM SERPL-SCNC: 3.8 MMOL/L (ref 3.5–5.1)
PROT SERPL-MCNC: 7.1 G/DL (ref 6–8.4)
PROT UR QL STRIP: NEGATIVE
RBC # BLD AUTO: 4.62 M/UL (ref 4–5.4)
SODIUM SERPL-SCNC: 139 MMOL/L (ref 136–145)
SP GR UR STRIP: 1.01 (ref 1–1.03)
URN SPEC COLLECT METH UR: ABNORMAL
UROBILINOGEN UR STRIP-ACNC: NEGATIVE EU/DL
WBC # BLD AUTO: 6.1 K/UL (ref 3.9–12.7)

## 2023-09-06 PROCEDURE — 80053 COMPREHEN METABOLIC PANEL: CPT | Performed by: SURGERY

## 2023-09-06 PROCEDURE — 81003 URINALYSIS AUTO W/O SCOPE: CPT | Performed by: SURGERY

## 2023-09-06 PROCEDURE — 99285 EMERGENCY DEPT VISIT HI MDM: CPT | Mod: 25

## 2023-09-06 PROCEDURE — 96360 HYDRATION IV INFUSION INIT: CPT

## 2023-09-06 PROCEDURE — 83735 ASSAY OF MAGNESIUM: CPT | Performed by: SURGERY

## 2023-09-06 PROCEDURE — 85025 COMPLETE CBC W/AUTO DIFF WBC: CPT | Performed by: SURGERY

## 2023-09-06 PROCEDURE — 25000003 PHARM REV CODE 250: Performed by: SURGERY

## 2023-09-06 PROCEDURE — 83690 ASSAY OF LIPASE: CPT | Performed by: SURGERY

## 2023-09-06 PROCEDURE — 25500020 PHARM REV CODE 255: Performed by: SURGERY

## 2023-09-06 RX ORDER — DICYCLOMINE HYDROCHLORIDE 20 MG/1
20 TABLET ORAL 2 TIMES DAILY PRN
Qty: 20 TABLET | Refills: 0 | Status: SHIPPED | OUTPATIENT
Start: 2023-09-06 | End: 2023-09-16

## 2023-09-06 RX ADMIN — IOHEXOL 75 ML: 350 INJECTION, SOLUTION INTRAVENOUS at 10:09

## 2023-09-06 RX ADMIN — SODIUM CHLORIDE 1000 ML: 9 INJECTION, SOLUTION INTRAVENOUS at 10:09

## 2023-09-06 NOTE — ED PROVIDER NOTES
"Encounter Date: 9/6/2023       History     Chief Complaint   Patient presents with    Abdominal Pain     Patient to ER CC of abd pain and bilateral leg pain for a few days      Patient complains cramping abdominal pain for last 2-3 days.  She states anytime she eats a big meal she cramps and has lower abdominal pain she had a recent duodenal resection and a Whipple procedure for malignancy the duodenal this procedure was done in July her postop checkup all normal.  She also complains of bilateral leg cramping in her muscles in her thighs and calves she has not have any bone pain or she has no history of falls    The history is provided by the patient.   Abdominal Pain  The current episode started several days ago. The onset of the illness was gradual. The abdominal pain is located in the epigastric region and suprapubic region. The abdominal pain does not radiate. The severity of the abdominal pain is 4/10. The abdominal pain is relieved by nothing. The other symptoms of the illness include nausea. The other symptoms of the illness do not include fever, dysuria, hematemesis or hematochezia.     Review of patient's allergies indicates:   Allergen Reactions    Aspirin Other (See Comments)     Pt states it is "hard on her stomach" She can tolerate a low dose aspirin    Pcn [penicillins]      Childhood - Tolerated ceftriaxone and cefazolin with no documented issues in the past      Past Medical History:   Diagnosis Date    Abnormal Pap smear of cervix     Arthritis     Duodenal adenocarcinoma     Hypertension     Hyperthyroidism      Past Surgical History:   Procedure Laterality Date    CATARACT EXTRACTION W/  INTRAOCULAR LENS IMPLANT Right 8/8/2019    Procedure: EXTRACTION, CATARACT, WITH IOL INSERTION;  Surgeon: Spenser Lee MD;  Location: [facility];  Service: Ophthalmology;  Laterality: Right;    ESOPHAGOGASTRODUODENOSCOPY N/A 3/10/2023    Procedure: EGD (ESOPHAGOGASTRODUODENOSCOPY);  Surgeon: Shashi WINSLOW" MD Willie;  [facility]  Service: Endoscopy;  Laterality: N/A;    EYE SURGERY      HYSTERECTOMY      OOPHORECTOMY      PHACOEMULSIFICATION OF CATARACT Right 8/8/2019    Procedure: PHACOEMULSIFICATION, CATARACT;  Surgeon: Spenser Lee MD;  Location: [facility];  Service: Ophthalmology;  Laterality: Right;    WHIPPLE PROCEDURE N/A 3/15/2023    Procedure: WHIPPLE PROCEDURE;  Surgeon: Nick Paez MD;  Location: [address];  Service: General;  Laterality: N/A;     Family History   Problem Relation Age of Onset    Cancer Mother     Breast cancer Neg Hx     Colon cancer Neg Hx     Ovarian cancer Neg Hx      Social History     Tobacco Use    Smoking status: Every Day     Current packs/day: 0.25     Average packs/day: 0.3 packs/day for 30.0 years (7.5 ttl pk-yrs)     Types: Cigarettes    Smokeless tobacco: Never    Tobacco comments:     About 7-8 cigs a day   Substance Use Topics    Alcohol use: Yes     Alcohol/week: 1.0 standard drink of alcohol     Types: 1 Cans of beer per week    Drug use: No     Review of Systems   Constitutional: Negative.  Negative for fever.   HENT: Negative.     Eyes: Negative.    Respiratory: Negative.     Cardiovascular: Negative.    Gastrointestinal:  Positive for abdominal pain and nausea. Negative for hematemesis and hematochezia.   Endocrine: Negative.    Genitourinary:  Negative for dysuria.   Musculoskeletal:  Positive for myalgias.   Skin: Negative.    Allergic/Immunologic: Negative.    Neurological: Negative.    Hematological: Negative.    Psychiatric/Behavioral: Negative.         Physical Exam     Initial Vitals [09/06/23 0921]   BP Pulse Resp Temp SpO2   (!) 120/53 (!) 52 18 97.3 °F (36.3 °C) 99 %      MAP       --         Physical Exam    Nursing note and vitals reviewed.  Constitutional: She appears well-developed and well-nourished.   HENT:   Head: Normocephalic.   Eyes: Conjunctivae are normal.   Neck:   Normal range of motion.  Cardiovascular:   Normal rate.           Pulmonary/Chest: Breath sounds normal.   Abdominal:   Mild epigastric tenderness no guarding rebound   Musculoskeletal:      Cervical back: Normal range of motion.     Neurological: She is alert and oriented to person, place, and time. GCS score is 15. GCS eye subscore is 4. GCS verbal subscore is 5. GCS motor subscore is 6.   Skin: Skin is warm and dry.   Psychiatric: She has a normal mood and affect.         ED Course   Procedures  Labs Reviewed   CBC W/ AUTO DIFFERENTIAL - Abnormal; Notable for the following components:       Result Value    Hemoglobin 10.3 (*)     Hematocrit 32.7 (*)     MCV 71 (*)     MCH 22.3 (*)     MCHC 31.5 (*)     RDW 21.2 (*)     Lymph # 0.8 (*)     Gran % 75.6 (*)     Lymph % 12.3 (*)     All other components within normal limits   COMPREHENSIVE METABOLIC PANEL - Abnormal; Notable for the following components:    CO2 30 (*)     All other components within normal limits   URINALYSIS, REFLEX TO URINE CULTURE - Abnormal; Notable for the following components:    Occult Blood UA Trace (*)     All other components within normal limits    Narrative:     Specimen Source->Urine   LIPASE - Abnormal; Notable for the following components:    Lipase 148 (*)     All other components within normal limits   MAGNESIUM   LIPASE          Imaging Results              CT Chest Abdomen Pelvis With Contrast (xpd) (Final result)  Result time 09/06/23 11:41:02      Final result by Rhonda Doty MD (09/06/23 11:41:02)                   Impression:      The patient is status post Whipple's procedure for pancreatic malignancy.  The residual pancreas is relatively normal in appearance however there is some peripancreatic inflammation with some stranding of the fat adjacent to the duodenum.  Component of acute pancreatitis cannot be excluded.  Correlation with lipase level recommended.    Stable bilateral pulmonary nodules.  No new pulmonary nodule.  No consolidation or pleural  effusions.    Focal region of hypoattenuation in the inferior right hepatic lobe concerning for hepatic metastatic disease as seen on prior PET scan.  This nodule appears to be slightly increased in size as compared to the prior study.    Bilateral renal cysts.    Equivocal thickening of the walls of the urinary bladder which could suggest a cystitis.  Correlation with urinalysis is recommended.      Electronically signed by: Rhonda Doty MD  Date:    09/06/2023  Time:    11:41               Narrative:    EXAMINATION:  CT CHEST ABDOMEN PELVIS WITH CONTRAST (XPD)    CLINICAL HISTORY:  Pancreatic cancer, assess treatment response;Had abnormal CT scan of the chest 6 weeks ago with recommendation to repeat in 4-6 weeks;    TECHNIQUE:  Low dose axial images, sagittal and coronal reformations were obtained from the thoracic inlet to the pubic symphysis following the IV administration of 75 ML of Omnipaque 350 .  No oral contrast was administered.    COMPARISON:  07/26/2023, 08/04/2023    FINDINGS:  Thyroid nodules are present.  The main central airways are patent.  The esophagus appears normal.  The heart is mildly enlarged.  No pericardial effusion.  Calcified coronary artery disease.  Calcified atheromatous disease affects the aorta and its major branch vessels.  No mediastinal, hilar or axillary adenopathy is seen.    scattered solid pulmonary nodules are seen bilaterally, similar to the prior study of 07/26/2023. No new solid nodule is seen.  No consolidation or pleural effusions.    The patient is status post Whipple's procedure.  There is pneumobilia.  The gallbladder has been removed.  Focal region of hypoattenuation adjacent to the falciform ligament appears stable.  Hypoattenuation in the inferior right hepatic lobe measures 1.7 cm and is concerning for hepatic metastatic disease.  This appears increased in size from the previous examination where it measured 0.9 cm.  The spleen has a normal appearance.  The  residual pancreas has a relatively normal appearance however there does appear to be some hazy stranding of the fat adjacent to the pancreas.  The adrenal glands and kidneys have a normal appearance.  There are bilateral renal cysts.  No hydronephrosis or hydroureter.  There is some mild thickening of the walls of the urinary bladder which could suggest a cystitis.  The uterus has been removed.  Adnexal regions are unremarkable.    Postoperative changes of gastrojejunostomy are seen.  Constipation.  The appendix is normal.  There is some stranding of the fat adjacent to the duodenum.  No free air or obstruction.  Small amount of free fluid along the retroperitoneum.    Age-appropriate degenerative changes affect the skeleton.                        Final result by Rhonda Doty MD (09/06/23 11:41:02)                   Impression:      The patient is status post Whipple's procedure for pancreatic malignancy.  The residual pancreas is relatively normal in appearance however there is some peripancreatic inflammation with some stranding of the fat adjacent to the duodenum.  Component of acute pancreatitis cannot be excluded.  Correlation with lipase level recommended.    Stable bilateral pulmonary nodules.  No new pulmonary nodule.  No consolidation or pleural effusions.    Focal region of hypoattenuation in the inferior right hepatic lobe concerning for hepatic metastatic disease as seen on prior PET scan.  This nodule appears to be slightly increased in size as compared to the prior study.    Bilateral renal cysts.    Equivocal thickening of the walls of the urinary bladder which could suggest a cystitis.  Correlation with urinalysis is recommended.      Electronically signed by: Rhonda Doty MD  Date:    09/06/2023  Time:    11:41               Narrative:    EXAMINATION:  CT CHEST ABDOMEN PELVIS WITH CONTRAST (XPD)    CLINICAL HISTORY:  Pancreatic cancer, assess treatment response;Had abnormal CT scan of the  chest 6 weeks ago with recommendation to repeat in 4-6 weeks;    TECHNIQUE:  Low dose axial images, sagittal and coronal reformations were obtained from the thoracic inlet to the pubic symphysis following the IV administration of 75 ML of Omnipaque 350 .  No oral contrast was administered.    COMPARISON:  07/26/2023, 08/04/2023    FINDINGS:  Thyroid nodules are present.  The main central airways are patent.  The esophagus appears normal.  The heart is mildly enlarged.  No pericardial effusion.  Calcified coronary artery disease.  Calcified atheromatous disease affects the aorta and its major branch vessels.  No mediastinal, hilar or axillary adenopathy is seen.    scattered solid pulmonary nodules are seen bilaterally, similar to the prior study of 07/26/2023. No new solid nodule is seen.  No consolidation or pleural effusions.    The patient is status post Whipple's procedure.  There is pneumobilia.  The gallbladder has been removed.  Focal region of hypoattenuation adjacent to the falciform ligament appears stable.  Hypoattenuation in the inferior right hepatic lobe measures 1.7 cm and is concerning for hepatic metastatic disease.  This appears increased in size from the previous examination where it measured 0.9 cm.  The spleen has a normal appearance.  The residual pancreas has a relatively normal appearance however there does appear to be some hazy stranding of the fat adjacent to the pancreas.  The adrenal glands and kidneys have a normal appearance.  There are bilateral renal cysts.  No hydronephrosis or hydroureter.  There is some mild thickening of the walls of the urinary bladder which could suggest a cystitis.  The uterus has been removed.  Adnexal regions are unremarkable.    Postoperative changes of gastrojejunostomy are seen.  Constipation.  The appendix is normal.  There is some stranding of the fat adjacent to the duodenum.  No free air or obstruction.  Small amount of free fluid along the  retroperitoneum.    Age-appropriate degenerative changes affect the skeleton.                                    X-Rays:   Independently Interpreted Readings:   Abdomen: Abdominal CT does not show any acute changes some inflammatory changes around the pancreas could be consistent with postop     Medications   sodium chloride 0.9% bolus 1,000 mL 1,000 mL (0 mLs Intravenous Stopped 9/6/23 1101)   iohexoL (OMNIPAQUE 350) injection 75 mL (75 mLs Intravenous Given 9/6/23 1042)     Medical Decision Making  Patient had to Whipple procedure for primary malignancy of duodenum she has done well postop she has a lesion in her right liver which is being followed at her oncologist she complains of intermittent crampy abdominal pain.  He has had a gastric resection with gastrojejunostomy her laboratory workup shows a mild elevation in pancreatitis her CT scan confirms this but there is no acute changes as lesion in her liver has grown larger in since last visit she will be need to follow-up with her primary doctor and Oncology this week or next week    Amount and/or Complexity of Data Reviewed  Labs: ordered. Decision-making details documented in ED Course.  Radiology: ordered.    Risk  Prescription drug management.               ED Course as of 09/06/23 1331   Wed Sep 06, 2023   1212 Lipase [CC]   1240 Lipase [CC]   1241 Magnesium : 2.1 [CC]      ED Course User Index  [CC] MANOLO Guardado III, MD                      Clinical Impression:   Final diagnoses:  [K91.1] Postsurgical dumping syndrome (Primary)  [K29.70] Gastritis, presence of bleeding unspecified, unspecified chronicity, unspecified gastritis type  [K86.1] Chronic pancreatitis, unspecified pancreatitis type  [C78.7] Metastatic cancer to liver        ED Disposition Condition    Discharge Stable          ED Prescriptions       Medication Sig Dispense Start Date End Date Auth. Provider    dicyclomine (BENTYL) 20 mg tablet Take 1 tablet (20 mg total) by mouth 2 (two)  times daily as needed. 20 tablet 9/6/2023 9/16/2023 MANOLO Guardado III, MD          Follow-up Information    None          MANOLO Guardado III, MD  09/06/23 9920

## 2023-09-06 NOTE — DISCHARGE INSTRUCTIONS
Follow-up with surgeons if symptoms continue avoid fatty foods return if pain gets worse   no dermatitis, no environmental allergies, no food allergies, no immunosuppressive disorder, and no pruritus.

## 2023-09-07 ENCOUNTER — OFFICE VISIT (OUTPATIENT)
Dept: INTERNAL MEDICINE | Facility: CLINIC | Age: 68
End: 2023-09-07
Payer: MEDICARE

## 2023-09-07 VITALS
BODY MASS INDEX: 22.85 KG/M2 | DIASTOLIC BLOOD PRESSURE: 62 MMHG | WEIGHT: 142.19 LBS | OXYGEN SATURATION: 97 % | SYSTOLIC BLOOD PRESSURE: 118 MMHG | HEART RATE: 63 BPM | HEIGHT: 66 IN | RESPIRATION RATE: 18 BRPM

## 2023-09-07 DIAGNOSIS — C17.0 DUODENAL ADENOCARCINOMA: Primary | ICD-10-CM

## 2023-09-07 DIAGNOSIS — Z90.49 HISTORY OF WHIPPLE PROCEDURE: ICD-10-CM

## 2023-09-07 DIAGNOSIS — Z90.410 HISTORY OF WHIPPLE PROCEDURE: ICD-10-CM

## 2023-09-07 PROCEDURE — 3066F PR DOCUMENTATION OF TREATMENT FOR NEPHROPATHY: ICD-10-PCS | Mod: CPTII,S$GLB,, | Performed by: NURSE PRACTITIONER

## 2023-09-07 PROCEDURE — 1126F PR PAIN SEVERITY QUANTIFIED, NO PAIN PRESENT: ICD-10-PCS | Mod: CPTII,S$GLB,, | Performed by: NURSE PRACTITIONER

## 2023-09-07 PROCEDURE — 3044F PR MOST RECENT HEMOGLOBIN A1C LEVEL <7.0%: ICD-10-PCS | Mod: CPTII,S$GLB,, | Performed by: NURSE PRACTITIONER

## 2023-09-07 PROCEDURE — 3074F SYST BP LT 130 MM HG: CPT | Mod: CPTII,S$GLB,, | Performed by: NURSE PRACTITIONER

## 2023-09-07 PROCEDURE — 1159F PR MEDICATION LIST DOCUMENTED IN MEDICAL RECORD: ICD-10-PCS | Mod: CPTII,S$GLB,, | Performed by: NURSE PRACTITIONER

## 2023-09-07 PROCEDURE — 4010F PR ACE/ARB THEARPY RXD/TAKEN: ICD-10-PCS | Mod: CPTII,S$GLB,, | Performed by: NURSE PRACTITIONER

## 2023-09-07 PROCEDURE — 3008F BODY MASS INDEX DOCD: CPT | Mod: CPTII,S$GLB,, | Performed by: NURSE PRACTITIONER

## 2023-09-07 PROCEDURE — 3061F NEG MICROALBUMINURIA REV: CPT | Mod: CPTII,S$GLB,, | Performed by: NURSE PRACTITIONER

## 2023-09-07 PROCEDURE — 99214 PR OFFICE/OUTPT VISIT, EST, LEVL IV, 30-39 MIN: ICD-10-PCS | Mod: S$GLB,,, | Performed by: NURSE PRACTITIONER

## 2023-09-07 PROCEDURE — 1101F PT FALLS ASSESS-DOCD LE1/YR: CPT | Mod: CPTII,S$GLB,, | Performed by: NURSE PRACTITIONER

## 2023-09-07 PROCEDURE — 3288F PR FALLS RISK ASSESSMENT DOCUMENTED: ICD-10-PCS | Mod: CPTII,S$GLB,, | Performed by: NURSE PRACTITIONER

## 2023-09-07 PROCEDURE — 3074F PR MOST RECENT SYSTOLIC BLOOD PRESSURE < 130 MM HG: ICD-10-PCS | Mod: CPTII,S$GLB,, | Performed by: NURSE PRACTITIONER

## 2023-09-07 PROCEDURE — 99214 OFFICE O/P EST MOD 30 MIN: CPT | Mod: S$GLB,,, | Performed by: NURSE PRACTITIONER

## 2023-09-07 PROCEDURE — 99999 PR PBB SHADOW E&M-EST. PATIENT-LVL III: CPT | Mod: PBBFAC,,, | Performed by: NURSE PRACTITIONER

## 2023-09-07 PROCEDURE — 3078F DIAST BP <80 MM HG: CPT | Mod: CPTII,S$GLB,, | Performed by: NURSE PRACTITIONER

## 2023-09-07 PROCEDURE — 1126F AMNT PAIN NOTED NONE PRSNT: CPT | Mod: CPTII,S$GLB,, | Performed by: NURSE PRACTITIONER

## 2023-09-07 PROCEDURE — 3061F PR NEG MICROALBUMINURIA RESULT DOCUMENTED/REVIEW: ICD-10-PCS | Mod: CPTII,S$GLB,, | Performed by: NURSE PRACTITIONER

## 2023-09-07 PROCEDURE — 3066F NEPHROPATHY DOC TX: CPT | Mod: CPTII,S$GLB,, | Performed by: NURSE PRACTITIONER

## 2023-09-07 PROCEDURE — 3288F FALL RISK ASSESSMENT DOCD: CPT | Mod: CPTII,S$GLB,, | Performed by: NURSE PRACTITIONER

## 2023-09-07 PROCEDURE — 99999 PR PBB SHADOW E&M-EST. PATIENT-LVL III: ICD-10-PCS | Mod: PBBFAC,,, | Performed by: NURSE PRACTITIONER

## 2023-09-07 PROCEDURE — 3008F PR BODY MASS INDEX (BMI) DOCUMENTED: ICD-10-PCS | Mod: CPTII,S$GLB,, | Performed by: NURSE PRACTITIONER

## 2023-09-07 PROCEDURE — 4010F ACE/ARB THERAPY RXD/TAKEN: CPT | Mod: CPTII,S$GLB,, | Performed by: NURSE PRACTITIONER

## 2023-09-07 PROCEDURE — 1101F PR PT FALLS ASSESS DOC 0-1 FALLS W/OUT INJ PAST YR: ICD-10-PCS | Mod: CPTII,S$GLB,, | Performed by: NURSE PRACTITIONER

## 2023-09-07 PROCEDURE — 1159F MED LIST DOCD IN RCRD: CPT | Mod: CPTII,S$GLB,, | Performed by: NURSE PRACTITIONER

## 2023-09-07 PROCEDURE — 3078F PR MOST RECENT DIASTOLIC BLOOD PRESSURE < 80 MM HG: ICD-10-PCS | Mod: CPTII,S$GLB,, | Performed by: NURSE PRACTITIONER

## 2023-09-07 PROCEDURE — 3044F HG A1C LEVEL LT 7.0%: CPT | Mod: CPTII,S$GLB,, | Performed by: NURSE PRACTITIONER

## 2023-09-07 NOTE — PROGRESS NOTES
Subjective:       Patient ID: Nan Simms is a 68 y.o. female.    Chief Complaint: Follow-up (ER)    HPI: Pt presents to clinic today known to me with c/o needing ER f/u. She report that her stomach was hurting on Tuesday and it was resolved by the time she went to ER for abd pain it was resolved. She was still having some leg cramping. She was rx bentyl but has not started it because she has not been hurting. They did do a ct scan that was concerning for pancreatitis. Lipase was elevated ut she has no complaints of pain or N/V today. Given one liter fluids reviewed labs and imaging. NO cramping today     She also has that area of concern to liver     >>CT CAP on 7/26/23 demonstrated concern for recurrence in liver.  PET/CT on 8/4/23 showed hypermetabolic inferior R hepatic lobe lesion consistent with metastatic recurrence.  CEA has risen as well.     Given her dMMR/MSI-H status, will plan to start on single agent pembrolizumab 400 mg q6 weeks.  Patient was consented for immunotherapy today.  An extensive discussion was had which included a thorough discussion of the risk and benefits of treatment and alternatives.  Risks, including but not limited to, immune-mediated toxicities involving multiple body organs including lungs, kidneys, GI tract, liver, heart, central nervous system, skin, thyroid, pituitary gland, and others were all explained to the patient. The patient agrees with the plan, and all questions have been answered to their satisfaction.  Consent was signed the patient, provider, and a third party witness.      Will check [employer] [facility].  Repeat imaging prior to cycle 3 of pembrolizumab.      Review of Systems   Constitutional:  Negative for chills, fatigue, fever and unexpected weight change.   HENT:  Negative for congestion, ear pain, sore throat and trouble swallowing.    Eyes:  Negative for pain and visual disturbance.   Respiratory:  Negative for cough, chest tightness and shortness of breath.     Cardiovascular:  Negative for chest pain, palpitations and leg swelling.   Gastrointestinal:  Negative for abdominal distention, abdominal pain, constipation, diarrhea and vomiting.   Genitourinary:  Negative for difficulty urinating, dysuria, flank pain, frequency and hematuria.   Musculoskeletal:  Negative for back pain, gait problem, joint swelling, neck pain and neck stiffness.   Skin:  Negative for rash and wound.   Neurological:  Negative for dizziness, seizures, speech difficulty, light-headedness and headaches.       Objective:      Physical Exam  Vitals and nursing note reviewed.   Constitutional:       Appearance: Normal appearance.   HENT:      Head: Normocephalic and atraumatic.   Cardiovascular:      Rate and Rhythm: Normal rate and regular rhythm.   Pulmonary:      Effort: Pulmonary effort is normal.      Breath sounds: Normal breath sounds.   Abdominal:      Palpations: Abdomen is soft.   Musculoskeletal:         General: No swelling. Normal range of motion.   Skin:     General: Skin is warm and dry.      Capillary Refill: Capillary refill takes less than 2 seconds.   Neurological:      General: No focal deficit present.      Mental Status: She is alert and oriented to person, place, and time.         Assessment:       1. Duodenal adenocarcinoma    2. History of Whipple procedure        Plan:     Problem List Items Addressed This Visit       History of Whipple procedure    Duodenal adenocarcinoma - Primary     Ok to cont bentyl of needed for cramping. Marinol for appetite. Encouraged po hydration. F.u closely with oncology   Keytruda next dose 10/5

## 2023-10-02 ENCOUNTER — TELEPHONE (OUTPATIENT)
Dept: INTERNAL MEDICINE | Facility: CLINIC | Age: 68
End: 2023-10-02
Payer: MEDICARE

## 2023-10-02 NOTE — TELEPHONE ENCOUNTER
----- Message from Danita Faulkner sent at 10/2/2023  7:20 AM CDT -----  Contact: pt  Nan Simms  MRN: [id number]  : 1955  PCP: Payal Moreland  Home Phone      [phone]  Work Phone      Not on file.  Mobile          [phone]      MESSAGE:     Pt left a message stating she needs a refill of droNABinol (MARINOL) 5 MG capsule and omeprazole (PRILOSEC) 40 MG capsule.  @ 4:30       Nan   [phone]

## 2023-10-02 NOTE — TELEPHONE ENCOUNTER
Unable to reach patient or lm.   Marinol needs to come from cancer doc.   Has refills of prilosec at pharmacy

## 2023-10-03 NOTE — TELEPHONE ENCOUNTER
Pt notified of medication at the pharmacy and the other one she would need to get from cancer  Verbalized understanding.

## 2023-10-05 ENCOUNTER — OFFICE VISIT (OUTPATIENT)
Dept: HEMATOLOGY/ONCOLOGY | Facility: CLINIC | Age: 68
End: 2023-10-05
Payer: MEDICARE

## 2023-10-05 ENCOUNTER — INFUSION (OUTPATIENT)
Dept: INFUSION THERAPY | Facility: HOSPITAL | Age: 68
End: 2023-10-05
Payer: MEDICARE

## 2023-10-05 VITALS
WEIGHT: 144.5 LBS | DIASTOLIC BLOOD PRESSURE: 77 MMHG | OXYGEN SATURATION: 98 % | BODY MASS INDEX: 23.22 KG/M2 | TEMPERATURE: 98 F | SYSTOLIC BLOOD PRESSURE: 173 MMHG | HEART RATE: 70 BPM | RESPIRATION RATE: 18 BRPM | HEIGHT: 66 IN

## 2023-10-05 VITALS
OXYGEN SATURATION: 100 % | BODY MASS INDEX: 23.22 KG/M2 | HEIGHT: 66 IN | WEIGHT: 144.5 LBS | DIASTOLIC BLOOD PRESSURE: 69 MMHG | HEART RATE: 69 BPM | SYSTOLIC BLOOD PRESSURE: 141 MMHG | TEMPERATURE: 99 F | RESPIRATION RATE: 18 BRPM

## 2023-10-05 DIAGNOSIS — E11.9 DIABETES MELLITUS TYPE 2, DIET-CONTROLLED: ICD-10-CM

## 2023-10-05 DIAGNOSIS — Z00.6 EXAMINATION OF PARTICIPANT IN CLINICAL TRIAL: ICD-10-CM

## 2023-10-05 DIAGNOSIS — R63.0 DECREASED APPETITE: ICD-10-CM

## 2023-10-05 DIAGNOSIS — E78.5 HYPERLIPIDEMIA, UNSPECIFIED HYPERLIPIDEMIA TYPE: ICD-10-CM

## 2023-10-05 DIAGNOSIS — I10 ESSENTIAL HYPERTENSION: ICD-10-CM

## 2023-10-05 DIAGNOSIS — E44.0 MODERATE MALNUTRITION: ICD-10-CM

## 2023-10-05 DIAGNOSIS — D64.9 ANEMIA, UNSPECIFIED TYPE: ICD-10-CM

## 2023-10-05 DIAGNOSIS — C78.7 METASTASIS TO LIVER: ICD-10-CM

## 2023-10-05 DIAGNOSIS — I70.0 AORTIC ATHEROSCLEROSIS: ICD-10-CM

## 2023-10-05 DIAGNOSIS — C17.0 DUODENAL ADENOCARCINOMA: Primary | ICD-10-CM

## 2023-10-05 PROCEDURE — 3061F PR NEG MICROALBUMINURIA RESULT DOCUMENTED/REVIEW: ICD-10-PCS | Mod: CPTII,S$GLB,, | Performed by: INTERNAL MEDICINE

## 2023-10-05 PROCEDURE — 99215 PR OFFICE/OUTPT VISIT, EST, LEVL V, 40-54 MIN: ICD-10-PCS | Mod: S$GLB,,, | Performed by: INTERNAL MEDICINE

## 2023-10-05 PROCEDURE — 99999 PR PBB SHADOW E&M-EST. PATIENT-LVL III: CPT | Mod: PBBFAC,,, | Performed by: INTERNAL MEDICINE

## 2023-10-05 PROCEDURE — 1101F PR PT FALLS ASSESS DOC 0-1 FALLS W/OUT INJ PAST YR: ICD-10-PCS | Mod: CPTII,S$GLB,, | Performed by: INTERNAL MEDICINE

## 2023-10-05 PROCEDURE — 3044F PR MOST RECENT HEMOGLOBIN A1C LEVEL <7.0%: ICD-10-PCS | Mod: CPTII,S$GLB,, | Performed by: INTERNAL MEDICINE

## 2023-10-05 PROCEDURE — 1126F PR PAIN SEVERITY QUANTIFIED, NO PAIN PRESENT: ICD-10-PCS | Mod: CPTII,S$GLB,, | Performed by: INTERNAL MEDICINE

## 2023-10-05 PROCEDURE — 3288F PR FALLS RISK ASSESSMENT DOCUMENTED: ICD-10-PCS | Mod: CPTII,S$GLB,, | Performed by: INTERNAL MEDICINE

## 2023-10-05 PROCEDURE — 99999 PR PBB SHADOW E&M-EST. PATIENT-LVL III: ICD-10-PCS | Mod: PBBFAC,,, | Performed by: INTERNAL MEDICINE

## 2023-10-05 PROCEDURE — 4010F ACE/ARB THERAPY RXD/TAKEN: CPT | Mod: CPTII,S$GLB,, | Performed by: INTERNAL MEDICINE

## 2023-10-05 PROCEDURE — 3077F SYST BP >= 140 MM HG: CPT | Mod: CPTII,S$GLB,, | Performed by: INTERNAL MEDICINE

## 2023-10-05 PROCEDURE — 3008F BODY MASS INDEX DOCD: CPT | Mod: CPTII,S$GLB,, | Performed by: INTERNAL MEDICINE

## 2023-10-05 PROCEDURE — 1126F AMNT PAIN NOTED NONE PRSNT: CPT | Mod: CPTII,S$GLB,, | Performed by: INTERNAL MEDICINE

## 2023-10-05 PROCEDURE — 3061F NEG MICROALBUMINURIA REV: CPT | Mod: CPTII,S$GLB,, | Performed by: INTERNAL MEDICINE

## 2023-10-05 PROCEDURE — 3066F PR DOCUMENTATION OF TREATMENT FOR NEPHROPATHY: ICD-10-PCS | Mod: CPTII,S$GLB,, | Performed by: INTERNAL MEDICINE

## 2023-10-05 PROCEDURE — 3077F PR MOST RECENT SYSTOLIC BLOOD PRESSURE >= 140 MM HG: ICD-10-PCS | Mod: CPTII,S$GLB,, | Performed by: INTERNAL MEDICINE

## 2023-10-05 PROCEDURE — 96413 CHEMO IV INFUSION 1 HR: CPT

## 2023-10-05 PROCEDURE — 3288F FALL RISK ASSESSMENT DOCD: CPT | Mod: CPTII,S$GLB,, | Performed by: INTERNAL MEDICINE

## 2023-10-05 PROCEDURE — 3066F NEPHROPATHY DOC TX: CPT | Mod: CPTII,S$GLB,, | Performed by: INTERNAL MEDICINE

## 2023-10-05 PROCEDURE — 3008F PR BODY MASS INDEX (BMI) DOCUMENTED: ICD-10-PCS | Mod: CPTII,S$GLB,, | Performed by: INTERNAL MEDICINE

## 2023-10-05 PROCEDURE — 3078F PR MOST RECENT DIASTOLIC BLOOD PRESSURE < 80 MM HG: ICD-10-PCS | Mod: CPTII,S$GLB,, | Performed by: INTERNAL MEDICINE

## 2023-10-05 PROCEDURE — 99215 OFFICE O/P EST HI 40 MIN: CPT | Mod: S$GLB,,, | Performed by: INTERNAL MEDICINE

## 2023-10-05 PROCEDURE — 3078F DIAST BP <80 MM HG: CPT | Mod: CPTII,S$GLB,, | Performed by: INTERNAL MEDICINE

## 2023-10-05 PROCEDURE — 63600175 PHARM REV CODE 636 W HCPCS: Mod: JZ,JG | Performed by: INTERNAL MEDICINE

## 2023-10-05 PROCEDURE — 1101F PT FALLS ASSESS-DOCD LE1/YR: CPT | Mod: CPTII,S$GLB,, | Performed by: INTERNAL MEDICINE

## 2023-10-05 PROCEDURE — 3044F HG A1C LEVEL LT 7.0%: CPT | Mod: CPTII,S$GLB,, | Performed by: INTERNAL MEDICINE

## 2023-10-05 PROCEDURE — 25000003 PHARM REV CODE 250: Performed by: INTERNAL MEDICINE

## 2023-10-05 PROCEDURE — 4010F PR ACE/ARB THEARPY RXD/TAKEN: ICD-10-PCS | Mod: CPTII,S$GLB,, | Performed by: INTERNAL MEDICINE

## 2023-10-05 RX ORDER — HEPARIN 100 UNIT/ML
500 SYRINGE INTRAVENOUS
Status: DISCONTINUED | OUTPATIENT
Start: 2023-10-05 | End: 2023-10-05 | Stop reason: HOSPADM

## 2023-10-05 RX ORDER — DRONABINOL 5 MG/1
5 CAPSULE ORAL
Qty: 60 CAPSULE | Refills: 2 | Status: SHIPPED | OUTPATIENT
Start: 2023-10-05 | End: 2024-02-12 | Stop reason: SDUPTHER

## 2023-10-05 RX ORDER — SODIUM CHLORIDE 0.9 % (FLUSH) 0.9 %
10 SYRINGE (ML) INJECTION
Status: DISCONTINUED | OUTPATIENT
Start: 2023-10-05 | End: 2023-10-05 | Stop reason: HOSPADM

## 2023-10-05 RX ORDER — EPINEPHRINE 0.3 MG/.3ML
0.3 INJECTION SUBCUTANEOUS ONCE AS NEEDED
Status: DISCONTINUED | OUTPATIENT
Start: 2023-10-05 | End: 2023-10-05 | Stop reason: HOSPADM

## 2023-10-05 RX ORDER — EPINEPHRINE 0.3 MG/.3ML
0.3 INJECTION SUBCUTANEOUS ONCE AS NEEDED
Status: CANCELLED | OUTPATIENT
Start: 2023-10-05

## 2023-10-05 RX ORDER — SODIUM CHLORIDE 0.9 % (FLUSH) 0.9 %
10 SYRINGE (ML) INJECTION
Status: CANCELLED | OUTPATIENT
Start: 2023-10-05

## 2023-10-05 RX ORDER — HEPARIN 100 UNIT/ML
500 SYRINGE INTRAVENOUS
Status: CANCELLED | OUTPATIENT
Start: 2023-10-05

## 2023-10-05 RX ORDER — DIPHENHYDRAMINE HYDROCHLORIDE 50 MG/ML
50 INJECTION INTRAMUSCULAR; INTRAVENOUS ONCE AS NEEDED
Status: CANCELLED | OUTPATIENT
Start: 2023-10-05

## 2023-10-05 RX ORDER — DIPHENHYDRAMINE HYDROCHLORIDE 50 MG/ML
50 INJECTION INTRAMUSCULAR; INTRAVENOUS ONCE AS NEEDED
Status: DISCONTINUED | OUTPATIENT
Start: 2023-10-05 | End: 2023-10-05 | Stop reason: HOSPADM

## 2023-10-05 RX ADMIN — SODIUM CHLORIDE 400 MG: 9 INJECTION, SOLUTION INTRAVENOUS at 09:10

## 2023-10-05 NOTE — PROGRESS NOTES
"MEDICAL ONCOLOGY - ESTABLISHED PATIENT VISIT    Reason for visit: duodenal adenocarcinoma     Best Contact Phone Number(s): [phone] (home)      Cancer/Stage/TNM:    Cancer Staging   No matching staging information was found for the patient.     Oncology History   Duodenal adenocarcinoma   4/18/2023 Initial Diagnosis    Duodenal adenocarcinoma     8/24/2023 -  Chemotherapy    Treatment Summary   Plan Name: OP PEMBROLIZUMAB 400MG Q6W  Treatment Goal: Palliative  Status: Active  Start Date: 8/24/2023  End Date: 6/23/2025 (Planned)  Provider: Eber Roberts MD  Chemotherapy: [No matching medication found in this treatment plan]          Interval History: 68 y.o. female with recurrent MSI-H metastatic duodenal adenocarcinoma who presents for follow-up prior to cycle 2 of pembrolizumab.  She is feeling very well. She went to the ER on 9/6 with cramping leg and abdominal pain.  Told she was dehydrated.  No acute findings on her CT.  She has felt well since with good PO water and food intake.  Has gained two pounds.  Taking Marinol. She felt a little "high" the first time she took Marinol but has been not experienced that again since. She does feel like it has helped with her appetite.  She is up two pounds.  No nausea.  No pain.  No dyspnea, diarrhea, skin rashes or itching.    Presents to clinic with her . ECOG 0.     ROS:   Review of Systems   Constitutional:  Negative for chills, fever and malaise/fatigue.   HENT:  Negative for nosebleeds and sore throat.    Respiratory:  Negative for cough and shortness of breath.    Cardiovascular:  Negative for chest pain, palpitations and leg swelling.   Gastrointestinal:  Negative for blood in stool, constipation, diarrhea, heartburn, nausea and vomiting.   Genitourinary:  Negative for dysuria, frequency, hematuria and urgency.   Musculoskeletal:  Negative for falls and myalgias.   Skin:  Negative for itching and rash.   Neurological:  Negative for dizziness, " tingling, weakness and headaches.   Psychiatric/Behavioral:  The patient is not nervous/anxious and does not have insomnia.        Past Medical History:   Past Medical History:   Diagnosis Date    Abnormal Pap smear of cervix     Arthritis     Duodenal adenocarcinoma     Hypertension     Hyperthyroidism         Past Surgical History:   Past Surgical History:   Procedure Laterality Date    CATARACT EXTRACTION W/  INTRAOCULAR LENS IMPLANT Right 8/8/2019    Procedure: EXTRACTION, CATARACT, WITH IOL INSERTION;  Surgeon: Spenser Lee MD;  Location: [facility];  Service: Ophthalmology;  Laterality: Right;    ESOPHAGOGASTRODUODENOSCOPY N/A 3/10/2023    Procedure: EGD (ESOPHAGOGASTRODUODENOSCOPY);  Surgeon: Shashi Tapia MD;  Location: [facility] ([address] [place] [address]);  Service: Endoscopy;  Laterality: N/A;    EYE SURGERY      HYSTERECTOMY      OOPHORECTOMY      PHACOEMULSIFICATION OF CATARACT Right 8/8/2019    Procedure: PHACOEMULSIFICATION, CATARACT;  Surgeon: Spenser Lee MD;  Location: [facility];  Service: Ophthalmology;  Laterality: Right;    WHIPPLE PROCEDURE N/A 3/15/2023    Procedure: WHIPPLE PROCEDURE;  Surgeon: Nick Paez MD;  Location: [address];  Service: General;  Laterality: N/A;        Family History:   Family History   Problem Relation Age of Onset    Cancer Mother     Breast cancer Neg Hx     Colon cancer Neg Hx     Ovarian cancer Neg Hx         Social History:   Social History     Tobacco Use    Smoking status: Every Day     Current packs/day: 0.25     Average packs/day: 0.3 packs/day for 30.0 years (7.5 ttl pk-yrs)     Types: Cigarettes    Smokeless tobacco: Never    Tobacco comments:     About 7-8 cigs a day   Substance Use Topics    Alcohol use: Yes     Alcohol/week: 1.0 standard drink of alcohol     Types: 1 Cans of beer per week        I have reviewed and updated the patient's past medical, surgical, family and social histories.    Allergies:   Review of patient's allergies indicates:  "  Allergen Reactions    Aspirin Other (See Comments)     Pt states it is "hard on her stomach" She can tolerate a low dose aspirin    Pcn [penicillins]      Childhood - Tolerated ceftriaxone and cefazolin with no documented issues in the past         Medications:   Current Outpatient Medications   Medication Sig Dispense Refill    acetaminophen (TYLENOL) 650 MG TbSR Take 650 mg by mouth as needed.      atorvastatin (LIPITOR) 40 MG tablet Take 1 tablet (40 mg total) by mouth once daily. 90 tablet 1    droNABinol (MARINOL) 5 MG capsule Take 1 capsule (5 mg total) by mouth 2 (two) times daily before meals. 60 capsule 2    famotidine (PEPCID) 20 MG tablet Take 1 tablet (20 mg total) by mouth 2 (two) times daily. 60 tablet 11    lisinopriL-hydrochlorothiazide (PRINZIDE,ZESTORETIC) 20-12.5 mg per tablet Take 1 tablet by mouth once daily. 90 tablet 3    omeprazole (PRILOSEC) 40 MG capsule Take 1 capsule (40 mg total) by mouth once daily. 30 capsule 5    traMADoL (ULTRAM) 50 mg tablet Take 1 tablet (50 mg total) by mouth every 12 (twelve) hours as needed for Pain. 14 tablet 0     No current facility-administered medications for this visit.     Facility-Administered Medications Ordered in Other Visits   Medication Dose Route Frequency Provider Last Rate Last Admin    alteplase injection 2 mg  2 mg Intra-Catheter PRN Eber Roberts MD        diphenhydrAMINE injection 50 mg  50 mg Intravenous Once PRN Eber Roberts MD        EPINEPHrine (EPIPEN) 0.3 mg/0.3 mL pen injection 0.3 mg  0.3 mg Intramuscular Once PRN Eber Roberts MD        heparin, porcine (PF) 100 unit/mL injection flush 500 Units  500 Units Intravenous PRN Eber Roberts MD        hydrocortisone sodium succinate injection 100 mg  100 mg Intravenous Once PRN Eber Roberts MD        pembrolizumab (KEYTRUDA) 400 mg in sodium chloride 0.9% SolP 116 mL infusion  400 mg Intravenous 1 time in Clinic/HOD Eber Roberts MD     " "   sodium chloride 0.9% 100 mL flush bag   Intravenous 1 time in Clinic/HOD Eber Roberts MD        sodium chloride 0.9% flush 10 mL  10 mL Intravenous PRN Eber Roberts MD            Physical Exam:   BP (!) 173/77 (BP Location: Left arm, Patient Position: Sitting, BP Method: Medium (Automatic))   Pulse 70   Temp 98.1 °F (36.7 °C) (Oral)   Resp 18   Ht 5' 6" (1.676 m)   Wt 65.5 kg (144 lb 8.2 oz)   SpO2 98%   BMI 23.32 kg/m²        Physical Exam  Vitals reviewed.   Constitutional:       General: She is not in acute distress.     Appearance: Normal appearance. She is normal weight. She is not ill-appearing, toxic-appearing or diaphoretic.   HENT:      Head: Normocephalic and atraumatic.      Right Ear: External ear normal.      Left Ear: External ear normal.      Nose: Nose normal.      Mouth/Throat:      Pharynx: Oropharynx is clear.   Eyes:      General: No scleral icterus.     Conjunctiva/sclera: Conjunctivae normal.      Pupils: Pupils are equal, round, and reactive to light.   Cardiovascular:      Rate and Rhythm: Normal rate.   Pulmonary:      Effort: Pulmonary effort is normal. No respiratory distress.      Breath sounds: No wheezing.   Abdominal:      General: There is no distension.      Tenderness: There is no abdominal tenderness.      Comments: Midline abdominal incision well healed   Musculoskeletal:      Cervical back: Normal range of motion.      Right lower leg: No edema.      Left lower leg: No edema.   Skin:     General: Skin is warm and dry.      Coloration: Skin is not jaundiced or pale.      Findings: No bruising, erythema or rash.   Neurological:      General: No focal deficit present.      Mental Status: She is alert and oriented to person, place, and time. Mental status is at baseline.      Cranial Nerves: No cranial nerve deficit.      Sensory: No sensory deficit.      Motor: No weakness.      Gait: Gait normal.   Psychiatric:         Mood and Affect: Mood normal.       "   Behavior: Behavior normal.         Thought Content: Thought content normal.         Judgment: Judgment normal.           Labs:   Recent Results (from the past 48 hour(s))   CBC Oncology    Collection Time: 10/05/23  8:20 AM   Result Value Ref Range    WBC 4.29 3.90 - 12.70 K/uL    RBC 4.78 4.00 - 5.40 M/uL    Hemoglobin 10.9 (L) 12.0 - 16.0 g/dL    Hematocrit 35.3 (L) 37.0 - 48.5 %    MCV 74 (L) 82 - 98 fL    MCH 22.8 (L) 27.0 - 31.0 pg    MCHC 30.9 (L) 32.0 - 36.0 g/dL    RDW 19.4 (H) 11.5 - 14.5 %    Platelets 215 150 - 450 K/uL    MPV 10.4 9.2 - 12.9 fL    Gran # (ANC) 2.5 1.8 - 7.7 K/uL    Immature Grans (Abs) 0.01 0.00 - 0.04 K/uL   DRUG STUDY SENDOUT, [facility].    Collection Time: 10/05/23  8:20 AM   Result Value Ref Range    Drug Study Test Name Drug Study     Drug Study Specimen Type blood     Drug Study Test Result Result sent directly to physician         I have reviewed the pertinent labs. Stable microcytic anemia.  CMP and CEA pending.    Imagin/4/23 - PET/CT  Impression:     Hypermetabolic right inferior hepatic lobe lesion concerning for metastatic disease.     No other suspicious hypermetabolic foci identified.     Mild radiotracer uptake identified diffusely throughout the esophagus.  Findings can be seen with esophagitis.  Recommend clinical correlation.    Path:   3/15/23 - Whipple  Final Pathologic Diagnosis      Abnormal   1. Lymph node, hepatic cautery, resection:   - One lymph node negative for metastatic carcinoma (0/1).   - See synoptic report.   2. Gallbladder, cholecystectomy:   - Benign gallbladder with no significant histologic abnormality.   3. Pancreas, duodenum, common bile duct, and partial gastrectomy,   pancreaticoduodenectomy (Whipple specimen):   - Poorly differentiated carcinoma with medullary features.   - See synoptic report.   - See comment.   Synoptic Report   Procedure: Pancreaticoduodenectomy with partial gastrectomy (Whipple   specimen)   Tumor site:  Pylorus and  proximal duodenum   Histologic type: Poorly differentiated carcinoma with medullary features   Histologic grade: G3, poorly differentiated   Tumor size:  6.5 cm in greatest dimension grossly   Tumor extent:   Invades through muscularis propria into subserosa, or extends   into nonperitonealized perimuscular tissue (mesentery or retroperitoneum)   without serosal penetration   Macroscopic tumor perforation:  Not identified   Lymphovascular inv asion:  Not identified, see comment   Margin status for invasive carcinoma:  All margins negative for invasive   carcinoma   Margin status for dysplasia:  All margins negative for carcinoma in Situ   (high-grade dysplasia)/adenoma   Regional lymph node status: Regional lymph nodes present        All Regional lymph nodes negative for tumor        Number of lymph nodes examined:  15 (including specimens 1 and 3)   PATHOLOGIC STAGE CLASSIFICATION (pTNM, AJCC 8th Edition):  p T3 N0   Additional findings:  Stomach with intestinal metaplasia (immunostain for   H.pylori negative), 2 lymph nodes with fibrosis and calcifications (GMS stain   for fungal organisms and AFB stain negative)   Ancillary studies:  Immunohistochemistry (IHC) Testing for Mismatch Repair   (MMR) Proteins   MLH1- Loss of nuclear expression   PMS2 - Loss of nuclear expression   MSH2 - Intact nuclear expression   MSH6 - Intact nuclear expression   Background nonneoplastic tissue/internal control with intact nuclear   expression   IHC Interpret ation:  Loss of nuclear expression of MLH1 and PMS2: testing for   methylation of the MLH1 promoter and/or mutation of BRAF is indicated (the   presence of a BRAF V600E mutation and/or MLH1 methylation suggests that the   tumor is sporadic and germline evaluation is probably not indicated; absence   of both MLH1 methylation and of BRAF V600E mutation suggests the possibility   of Seaman syndrome, and sequencing and/or large deletion/duplication testing   of germline MLH1 may be  "indicated)   Testing for methylation of the MLH1 promoter and mutation of BRAF is in   process and results will be supplemented.   There are exceptions to the above IHC interpretations. These results should   not be considered in isolation, and clinical correlation with genetic   counseling is recommended to assess the need for germline testing.   COMMENT:  The stomach and duodenum (specimen 3) shows a 6.5 cm mass involving   the pylorus with the majority of the tumor appearing to be in the duodenum.   The tumor shows poorly d ifferentiated sheets of blue cells with necrosis.   There is a significant amount of lymphocytic inflammatory infiltrate around   the edges of the tumor.  Immunostains show the tumor cells to be positive for   CK7 with patchy weak CDX2 and negative for synaptophysin and chromogranin.   There is also loss of MLH1 and PMS2 on MMR stains.  These features are   suggestive of medullary carcinoma.  Select slides reviewed by Dr. ITZEL Sánchez   who agrees with the diagnosis of poorly differentiated carcinoma with   medullary features.  Immunostains for CD 34 performed on blocks 3E and 3H   show no definitive lymphovascular invasion even though some areas suspicious   cells on routine H&E sections.  Appropriate positive controls   VC      Comment: Interp By Meredith Pappas MD, Signed on 04/12/2023 at 16:03   Supplemental Diagnosis      Abnormal   MLH1 Hypermethylation/BRAF Mutation   Result Summary   MLH1 HYPERMETHYLATION PRESENT/NO BRAF MUTATION IDENTIFIED   Result   Provided diagnosis: pylorus and proximal duodenum poorly differentiated   carcinoma with medullary features   Methylation status: Positive for MLH1 promoter hypermethylation   BRAF status: Wild-type (SEE INTERPRETATION)   Alexis Sotelo M.D.   Report attached   Performing location:   [place] [address]   "Disclaimer:  This case diagnosis was rendered completely " "by the outside   consultation pathologist and the case is electronically signed by an [facility]   pathologist listed below solely to release the report into the medical   record."          Assessment:       1. Duodenal adenocarcinoma    2. Moderate malnutrition    3. Examination of participant in clinical trial    4. Decreased appetite    5. Anemia, unspecified type    6. Essential hypertension    7. Hyperlipidemia, unspecified hyperlipidemia type    8. Diabetes mellitus type 2, diet-controlled    9. Aortic atherosclerosis    10. Metastasis to liver           Plan:     # Duodenal adenocarcinoma   Mrs. Simms is a pleasant 68 y.o. 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 Pathology did reveal some high grade features, including poorly differentiated carcinoma, 6.5 cm size.  We discussed at her initial visit he limited role of adjuvant chemotherapy in the stage II setting for small bowel adenocarcinoma.      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     CT CAP on 7/26/23 demonstrated concern for recurrence in liver.  PET/CT on 8/4/23 showed hypermetabolic inferior R hepatic lobe lesion consistent with metastatic recurrence.  CEA has risen as well.    Given her dMMR/MSI-H status, we recommended to start on single agent pembrolizumab 400 mg q6 weeks.  Patient was consented for immunotherapy.  An extensive discussion was had which included a thorough discussion of the risk and benefits of treatment and alternatives.  Risks, including but not limited to, immune-mediated toxicities involving multiple body organs including lungs, kidneys, GI tract, liver, heart, central nervous system, skin, thyroid, pituitary gland, and others were all explained to the patient. The patient agrees with the plan, and all questions have been answered to their satisfaction.  Consent was signed the patient, provider, and " a third party witness.    She was also enrolled in our protocol: [id number], Disparities in Results of Immune Checkpoint Inhibitor Treatment (DIRECT): A Prospective Cohort Study of Cancer Survivors Treated with anti-PD-L1 Immunotherapy in a Community Oncology Setting.  CRC is Isabelagregg Mcneilst.    No toxicities noted from pembrolizumab cycle 1.  Will proceed with cycle 2 pembrolizumab today.    [facility] Tissue NGS: MSI-H, TMB 76.3 m/MB  Repeat imaging with PET prior to cycle 3 of pembrolizumab.    # Decreased appetite, malnutrition  -Appetite improved with Marinol.  Refilled.  -Monitor weight closely. Nutrition following.    # Anemia  -Microcytic anemia. Suspect thalassemia given longstanding nature.  -No signs of bleeding, platelets normal.   -Asymptomatic. Monitor.     # HTN, HLD, DM, aortic atherosclerosis   -BP elevated in clinic today.  Will discuss chemocare  at next visit.   -Following with PCP.   -Continue medical management.     Eber Roberts MD  Hematology/Oncology  [facility]       Route Chart for Scheduling    Med Onc Chart Routing      Follow up with physician 6 weeks. for keytruda   Follow up with YANDEL    Infusion scheduling note    Injection scheduling note    Labs CBC, CMP and CEA   Scheduling:  Preferred lab:  Lab interval:     Imaging PET scan   one day prior to appt   Pharmacy appointment    Other referrals                  Treatment Plan Information   OP PEMBROLIZUMAB 400MG Q6W   Eber Roberts MD   Upcoming Treatment Dates - OP PEMBROLIZUMAB 400MG Q6W    11/13/2023       Chemotherapy       pembrolizumab (KEYTRUDA) 400 mg in sodium chloride 0.9% SolP 116 mL infusion  12/25/2023       Chemotherapy       pembrolizumab (KEYTRUDA) 400 mg in sodium chloride 0.9% SolP 116 mL infusion  2/5/2024       Chemotherapy       pembrolizumab (KEYTRUDA) 400 mg in sodium chloride 0.9% SolP 116 mL infusion  3/18/2024       Chemotherapy       pembrolizumab (KEYTRUDA) 400 mg  in sodium chloride 0.9% SolP 116 mL infusion

## 2023-10-05 NOTE — PLAN OF CARE
Pt received Keytruda today and tolerated well, without complications. VSS. Educated patient about Keytruda (indications, side effects, possible reactions, chemotherapy precautions) and verbalized understanding. PIV positive for blood return, saline locked and removed prior to DC, catheter tip intact. Pt DC with no distress noted, ambulated off of unit, w/ fx, w/o event, pleased.

## 2023-10-20 ENCOUNTER — PATIENT OUTREACH (OUTPATIENT)
Dept: ADMINISTRATIVE | Facility: HOSPITAL | Age: 68
End: 2023-10-20
Payer: MEDICARE

## 2023-10-20 NOTE — PROGRESS NOTES
Chart reviewed, immunization record updated.  No new results noted on [facility] or [facility] web site.  Care Everywhere updated.   Patient care coordination note updated.   Upcoming PCP visit updated.  Next PCP visit 11/01/2023.  LOV with PCP 09/07/2023.   Received external PHN Enhanced Annual Wellness Visit collected/ completed on 10/16/2023, updated to Media.

## 2023-10-26 ENCOUNTER — HOSPITAL ENCOUNTER (EMERGENCY)
Facility: HOSPITAL | Age: 68
Discharge: HOME | End: 2023-10-26
Attending: SURGERY
Payer: MEDICARE

## 2023-10-26 VITALS
OXYGEN SATURATION: 100 % | DIASTOLIC BLOOD PRESSURE: 66 MMHG | HEART RATE: 66 BPM | HEIGHT: 66 IN | TEMPERATURE: 98 F | SYSTOLIC BLOOD PRESSURE: 150 MMHG | WEIGHT: 142.88 LBS | RESPIRATION RATE: 20 BRPM | BODY MASS INDEX: 22.96 KG/M2

## 2023-10-26 DIAGNOSIS — G62.9 NEUROPATHY: Primary | ICD-10-CM

## 2023-10-26 LAB
ALBUMIN SERPL BCP-MCNC: 3.5 G/DL (ref 3.5–5.2)
ALP SERPL-CCNC: 85 U/L (ref 55–135)
ALT SERPL W/O P-5'-P-CCNC: 19 U/L (ref 10–44)
ANION GAP SERPL CALC-SCNC: 12 MMOL/L (ref 8–16)
AST SERPL-CCNC: 19 U/L (ref 10–40)
BASOPHILS # BLD AUTO: 0.02 K/UL (ref 0–0.2)
BASOPHILS NFR BLD: 0.4 % (ref 0–1.9)
BILIRUB SERPL-MCNC: 0.4 MG/DL (ref 0.1–1)
BUN SERPL-MCNC: 13 MG/DL (ref 8–23)
CALCIUM SERPL-MCNC: 9.8 MG/DL (ref 8.7–10.5)
CHLORIDE SERPL-SCNC: 104 MMOL/L (ref 95–110)
CO2 SERPL-SCNC: 27 MMOL/L (ref 23–29)
CREAT SERPL-MCNC: 0.6 MG/DL (ref 0.5–1.4)
DIFFERENTIAL METHOD: ABNORMAL
EOSINOPHIL # BLD AUTO: 0.1 K/UL (ref 0–0.5)
EOSINOPHIL NFR BLD: 1.6 % (ref 0–8)
ERYTHROCYTE [DISTWIDTH] IN BLOOD BY AUTOMATED COUNT: 18.4 % (ref 11.5–14.5)
EST. GFR  (NO RACE VARIABLE): >60 ML/MIN/1.73 M^2
GLUCOSE SERPL-MCNC: 100 MG/DL (ref 70–110)
HCT VFR BLD AUTO: 34.8 % (ref 37–48.5)
HGB BLD-MCNC: 10.9 G/DL (ref 12–16)
IMM GRANULOCYTES # BLD AUTO: 0.01 K/UL (ref 0–0.04)
IMM GRANULOCYTES NFR BLD AUTO: 0.2 % (ref 0–0.5)
LYMPHOCYTES # BLD AUTO: 1.3 K/UL (ref 1–4.8)
LYMPHOCYTES NFR BLD: 22.7 % (ref 18–48)
MCH RBC QN AUTO: 22.6 PG (ref 27–31)
MCHC RBC AUTO-ENTMCNC: 31.3 G/DL (ref 32–36)
MCV RBC AUTO: 72 FL (ref 82–98)
MONOCYTES # BLD AUTO: 0.6 K/UL (ref 0.3–1)
MONOCYTES NFR BLD: 9.8 % (ref 4–15)
NEUTROPHILS # BLD AUTO: 3.7 K/UL (ref 1.8–7.7)
NEUTROPHILS NFR BLD: 65.3 % (ref 38–73)
NRBC BLD-RTO: 0 /100 WBC
PLATELET # BLD AUTO: 218 K/UL (ref 150–450)
PMV BLD AUTO: 10.4 FL (ref 9.2–12.9)
POTASSIUM SERPL-SCNC: 3.7 MMOL/L (ref 3.5–5.1)
PROT SERPL-MCNC: 7 G/DL (ref 6–8.4)
RBC # BLD AUTO: 4.82 M/UL (ref 4–5.4)
SODIUM SERPL-SCNC: 143 MMOL/L (ref 136–145)
WBC # BLD AUTO: 5.64 K/UL (ref 3.9–12.7)

## 2023-10-26 PROCEDURE — 85025 COMPLETE CBC W/AUTO DIFF WBC: CPT | Performed by: SURGERY

## 2023-10-26 PROCEDURE — 80053 COMPREHEN METABOLIC PANEL: CPT | Performed by: SURGERY

## 2023-10-26 PROCEDURE — 36415 COLL VENOUS BLD VENIPUNCTURE: CPT | Performed by: SURGERY

## 2023-10-26 PROCEDURE — 99283 EMERGENCY DEPT VISIT LOW MDM: CPT

## 2023-10-26 RX ORDER — GABAPENTIN 300 MG/1
300 CAPSULE ORAL 3 TIMES DAILY
Qty: 90 CAPSULE | Refills: 0 | Status: SHIPPED | OUTPATIENT
Start: 2023-10-26 | End: 2023-11-01 | Stop reason: SDUPTHER

## 2023-10-26 NOTE — ED PROVIDER NOTES
"Encounter Date: 10/26/2023       History     Chief Complaint   Patient presents with    Leg Pain    feet pain     Chief complaint:  Leg pain   [age] year female with a history of hypertension hypothyroidism presents to be evaluated for bilateral leg and feet pain.  She reports as a picking burning sensation was intermittent usually worse in the evening in the morning.  Reports she is been applying Vicks after her legs which partially improved symptoms.  Denies history of diabetes.  Denies any known injury.  Denies any swelling redness.  Denies any injury.      Review of patient's allergies indicates:   Allergen Reactions    Aspirin Other (See Comments)     Pt states it is "hard on her stomach" She can tolerate a low dose aspirin    Pcn [penicillins]      Childhood - Tolerated ceftriaxone and cefazolin with no documented issues in the past      Past Medical History:   Diagnosis Date    Abnormal Pap smear of cervix     Arthritis     Duodenal adenocarcinoma     Hypertension     Hyperthyroidism      Past Surgical History:   Procedure Laterality Date    CATARACT EXTRACTION W/  INTRAOCULAR LENS IMPLANT Right 8/8/2019    Procedure: EXTRACTION, CATARACT, WITH IOL INSERTION;  Surgeon: Spenser Lee MD;  Location: [facility];  Service: Ophthalmology;  Laterality: Right;    ESOPHAGOGASTRODUODENOSCOPY N/A 3/10/2023    Procedure: EGD (ESOPHAGOGASTRODUODENOSCOPY);  Surgeon: Shashi Tapia MD;  Location: [facility] ([address] [place] [address]);  Service: Endoscopy;  Laterality: N/A;    EYE SURGERY      HYSTERECTOMY      OOPHORECTOMY      PHACOEMULSIFICATION OF CATARACT Right 8/8/2019    Procedure: PHACOEMULSIFICATION, CATARACT;  Surgeon: Spenser Lee MD;  Location: [facility];  Service: Ophthalmology;  Laterality: Right;    WHIPPLE PROCEDURE N/A 3/15/2023    Procedure: WHIPPLE PROCEDURE;  Surgeon: Nick Paez MD;  Location: [address];  Service: General;  Laterality: N/A;     Family History   Problem Relation Age of Onset "    Cancer Mother     Breast cancer Neg Hx     Colon cancer Neg Hx     Ovarian cancer Neg Hx      Social History     Tobacco Use    Smoking status: Every Day     Current packs/day: 0.25     Average packs/day: 0.3 packs/day for 30.0 years (7.5 ttl pk-yrs)     Types: Cigarettes    Smokeless tobacco: Never    Tobacco comments:     About 7-8 cigs a day   Substance Use Topics    Alcohol use: Yes     Alcohol/week: 1.0 standard drink of alcohol     Types: 1 Cans of beer per week    Drug use: No     Review of Systems   Constitutional:  Negative for activity change, fatigue and fever.   Respiratory:  Negative for shortness of breath.    Cardiovascular:  Negative for leg swelling.   Musculoskeletal:  Positive for arthralgias and myalgias.   Skin:  Negative for color change and wound.   Neurological:  Negative for weakness.       Physical Exam     Initial Vitals [10/26/23 0902]   BP Pulse Resp Temp SpO2   (!) 138/59 (!) 52 18 98.1 °F (36.7 °C) 100 %      MAP       --         Physical Exam    Nursing note and vitals reviewed.  Constitutional: She appears well-developed and well-nourished.   Cardiovascular:  Normal rate and regular rhythm.           Pulmonary/Chest: Breath sounds normal. She has no wheezes. She has no rhonchi. She has no rales.   Musculoskeletal:         General: No tenderness or edema. Normal range of motion.     Neurological: She is alert and oriented to person, place, and time. GCS score is 15. GCS eye subscore is 4. GCS verbal subscore is 5. GCS motor subscore is 6.   Skin: Skin is warm and dry. No erythema.   Psychiatric: She has a normal mood and affect. Thought content normal.         ED Course   Procedures  Labs Reviewed   CBC W/ AUTO DIFFERENTIAL - Abnormal; Notable for the following components:       Result Value    Hemoglobin 10.9 (*)     Hematocrit 34.8 (*)     MCV 72 (*)     MCH 22.6 (*)     MCHC 31.3 (*)     RDW 18.4 (*)     All other components within normal limits   COMPREHENSIVE METABOLIC PANEL           Imaging Results    None          Medications - No data to display  Medical Decision Making  [age] year female with a picking and burning sensation to bilateral lower extremities with no recent injury.  No leg swelling no redness no shortness a breath or chest pain   Differential diagnosis includes neuropathy, arthritis, cellulitis, dvt     Amount and/or Complexity of Data Reviewed  Labs: ordered.    Risk  Prescription drug management.  Risk Details: Patient with reports burning and pinching sensation with no injury   Unremarkable physical exam no erythema no edema full range of motion intact pedal pulses  Labs unremarkable in ED  Will treat for neuropathic pain instructed to follow up with primary care given return precautions                               Clinical Impression:   Final diagnoses:  [G62.9] Neuropathy (Primary)        ED Disposition Condition    Discharge Stable          ED Prescriptions       Medication Sig Dispense Start Date End Date Auth. Provider    gabapentin (NEURONTIN) 300 MG capsule Take 1 capsule (300 mg total) by mouth 3 (three) times daily. 90 capsule 10/26/2023 11/25/2023 Pretty Storm NP          Follow-up Information    None          Pretty Storm NP  10/26/23 1015

## 2023-11-01 ENCOUNTER — OFFICE VISIT (OUTPATIENT)
Dept: INTERNAL MEDICINE | Facility: CLINIC | Age: 68
End: 2023-11-01
Payer: MEDICARE

## 2023-11-01 VITALS
HEART RATE: 51 BPM | SYSTOLIC BLOOD PRESSURE: 106 MMHG | RESPIRATION RATE: 18 BRPM | BODY MASS INDEX: 22.5 KG/M2 | HEIGHT: 66 IN | OXYGEN SATURATION: 98 % | WEIGHT: 140 LBS | DIASTOLIC BLOOD PRESSURE: 60 MMHG

## 2023-11-01 DIAGNOSIS — I10 HYPERTENSION, UNSPECIFIED TYPE: ICD-10-CM

## 2023-11-01 DIAGNOSIS — K29.70 GASTRITIS, PRESENCE OF BLEEDING UNSPECIFIED, UNSPECIFIED CHRONICITY, UNSPECIFIED GASTRITIS TYPE: ICD-10-CM

## 2023-11-01 DIAGNOSIS — Z90.410 HISTORY OF WHIPPLE PROCEDURE: ICD-10-CM

## 2023-11-01 DIAGNOSIS — I10 ESSENTIAL HYPERTENSION: ICD-10-CM

## 2023-11-01 DIAGNOSIS — E11.9 DIABETES MELLITUS TYPE 2, DIET-CONTROLLED: Primary | ICD-10-CM

## 2023-11-01 DIAGNOSIS — E78.5 HYPERLIPIDEMIA, UNSPECIFIED HYPERLIPIDEMIA TYPE: ICD-10-CM

## 2023-11-01 DIAGNOSIS — M79.10 MYALGIA: ICD-10-CM

## 2023-11-01 DIAGNOSIS — Z90.49 HISTORY OF WHIPPLE PROCEDURE: ICD-10-CM

## 2023-11-01 DIAGNOSIS — C17.0 DUODENAL ADENOCARCINOMA: ICD-10-CM

## 2023-11-01 PROCEDURE — 99214 OFFICE O/P EST MOD 30 MIN: CPT | Mod: S$GLB,,, | Performed by: NURSE PRACTITIONER

## 2023-11-01 PROCEDURE — 3044F HG A1C LEVEL LT 7.0%: CPT | Mod: CPTII,S$GLB,, | Performed by: NURSE PRACTITIONER

## 2023-11-01 PROCEDURE — 3074F SYST BP LT 130 MM HG: CPT | Mod: CPTII,S$GLB,, | Performed by: NURSE PRACTITIONER

## 2023-11-01 PROCEDURE — 99999 PR PBB SHADOW E&M-EST. PATIENT-LVL III: CPT | Mod: PBBFAC,,, | Performed by: NURSE PRACTITIONER

## 2023-11-01 PROCEDURE — 3008F BODY MASS INDEX DOCD: CPT | Mod: CPTII,S$GLB,, | Performed by: NURSE PRACTITIONER

## 2023-11-01 PROCEDURE — 99214 PR OFFICE/OUTPT VISIT, EST, LEVL IV, 30-39 MIN: ICD-10-PCS | Mod: S$GLB,,, | Performed by: NURSE PRACTITIONER

## 2023-11-01 PROCEDURE — 3044F PR MOST RECENT HEMOGLOBIN A1C LEVEL <7.0%: ICD-10-PCS | Mod: CPTII,S$GLB,, | Performed by: NURSE PRACTITIONER

## 2023-11-01 PROCEDURE — 4010F ACE/ARB THERAPY RXD/TAKEN: CPT | Mod: CPTII,S$GLB,, | Performed by: NURSE PRACTITIONER

## 2023-11-01 PROCEDURE — 1126F PR PAIN SEVERITY QUANTIFIED, NO PAIN PRESENT: ICD-10-PCS | Mod: CPTII,S$GLB,, | Performed by: NURSE PRACTITIONER

## 2023-11-01 PROCEDURE — 3074F PR MOST RECENT SYSTOLIC BLOOD PRESSURE < 130 MM HG: ICD-10-PCS | Mod: CPTII,S$GLB,, | Performed by: NURSE PRACTITIONER

## 2023-11-01 PROCEDURE — 1101F PT FALLS ASSESS-DOCD LE1/YR: CPT | Mod: CPTII,S$GLB,, | Performed by: NURSE PRACTITIONER

## 2023-11-01 PROCEDURE — 1101F PR PT FALLS ASSESS DOC 0-1 FALLS W/OUT INJ PAST YR: ICD-10-PCS | Mod: CPTII,S$GLB,, | Performed by: NURSE PRACTITIONER

## 2023-11-01 PROCEDURE — 3066F NEPHROPATHY DOC TX: CPT | Mod: CPTII,S$GLB,, | Performed by: NURSE PRACTITIONER

## 2023-11-01 PROCEDURE — 1126F AMNT PAIN NOTED NONE PRSNT: CPT | Mod: CPTII,S$GLB,, | Performed by: NURSE PRACTITIONER

## 2023-11-01 PROCEDURE — 3288F PR FALLS RISK ASSESSMENT DOCUMENTED: ICD-10-PCS | Mod: CPTII,S$GLB,, | Performed by: NURSE PRACTITIONER

## 2023-11-01 PROCEDURE — 3066F PR DOCUMENTATION OF TREATMENT FOR NEPHROPATHY: ICD-10-PCS | Mod: CPTII,S$GLB,, | Performed by: NURSE PRACTITIONER

## 2023-11-01 PROCEDURE — 4010F PR ACE/ARB THEARPY RXD/TAKEN: ICD-10-PCS | Mod: CPTII,S$GLB,, | Performed by: NURSE PRACTITIONER

## 2023-11-01 PROCEDURE — 3008F PR BODY MASS INDEX (BMI) DOCUMENTED: ICD-10-PCS | Mod: CPTII,S$GLB,, | Performed by: NURSE PRACTITIONER

## 2023-11-01 PROCEDURE — 1159F PR MEDICATION LIST DOCUMENTED IN MEDICAL RECORD: ICD-10-PCS | Mod: CPTII,S$GLB,, | Performed by: NURSE PRACTITIONER

## 2023-11-01 PROCEDURE — 3078F PR MOST RECENT DIASTOLIC BLOOD PRESSURE < 80 MM HG: ICD-10-PCS | Mod: CPTII,S$GLB,, | Performed by: NURSE PRACTITIONER

## 2023-11-01 PROCEDURE — 3061F NEG MICROALBUMINURIA REV: CPT | Mod: CPTII,S$GLB,, | Performed by: NURSE PRACTITIONER

## 2023-11-01 PROCEDURE — 99999 PR PBB SHADOW E&M-EST. PATIENT-LVL III: ICD-10-PCS | Mod: PBBFAC,,, | Performed by: NURSE PRACTITIONER

## 2023-11-01 PROCEDURE — 3061F PR NEG MICROALBUMINURIA RESULT DOCUMENTED/REVIEW: ICD-10-PCS | Mod: CPTII,S$GLB,, | Performed by: NURSE PRACTITIONER

## 2023-11-01 PROCEDURE — 3078F DIAST BP <80 MM HG: CPT | Mod: CPTII,S$GLB,, | Performed by: NURSE PRACTITIONER

## 2023-11-01 PROCEDURE — 3288F FALL RISK ASSESSMENT DOCD: CPT | Mod: CPTII,S$GLB,, | Performed by: NURSE PRACTITIONER

## 2023-11-01 PROCEDURE — 1159F MED LIST DOCD IN RCRD: CPT | Mod: CPTII,S$GLB,, | Performed by: NURSE PRACTITIONER

## 2023-11-01 RX ORDER — LISINOPRIL AND HYDROCHLOROTHIAZIDE 12.5; 2 MG/1; MG/1
1 TABLET ORAL DAILY
Qty: 90 TABLET | Refills: 3 | Status: SHIPPED | OUTPATIENT
Start: 2023-11-01

## 2023-11-01 RX ORDER — GABAPENTIN 300 MG/1
300 CAPSULE ORAL 3 TIMES DAILY
Qty: 90 CAPSULE | Refills: 5 | Status: SHIPPED | OUTPATIENT
Start: 2023-11-01 | End: 2024-03-25

## 2023-11-01 RX ORDER — ATORVASTATIN CALCIUM 40 MG/1
40 TABLET, FILM COATED ORAL DAILY
Qty: 90 TABLET | Refills: 1 | Status: SHIPPED | OUTPATIENT
Start: 2023-11-01

## 2023-11-01 RX ORDER — OMEPRAZOLE 40 MG/1
40 CAPSULE, DELAYED RELEASE ORAL DAILY
Qty: 30 CAPSULE | Refills: 5 | Status: SHIPPED | OUTPATIENT
Start: 2023-11-01

## 2023-11-01 RX ORDER — FAMOTIDINE 20 MG/1
20 TABLET, FILM COATED ORAL 2 TIMES DAILY
Qty: 60 TABLET | Refills: 11 | Status: SHIPPED | OUTPATIENT
Start: 2023-11-01 | End: 2024-10-31

## 2023-11-01 NOTE — PROGRESS NOTES
Subjective:       Patient ID: Nan Simms is a 68 y.o. female.    Chief Complaint: Follow-up (3 months/ER)    HPI: Pt presents to clinic today for routine visit and ER f/u. She went to ER c/o leg pain and was rx gabapentin. She does report that that is helping. Her concern today is continued weight loss though she feels her appetite is good. 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     Had labs after her last visit in 8/2023   Lab Results   Component Value Date    HGBA1C 5.6 08/09/2023     Diet controlled     Total chol 198, trig 193, HDL 57, ldl 102    Lab Results   Component Value Date    WBC 5.64 10/26/2023    HGB 10.9 (L) 10/26/2023    HCT 34.8 (L) 10/26/2023    MCV 72 (L) 10/26/2023     10/26/2023       BMP  Lab Results   Component Value Date     10/26/2023    K 3.7 10/26/2023     10/26/2023    CO2 27 10/26/2023    BUN 13 10/26/2023    CREATININE 0.6 10/26/2023    CALCIUM 9.8 10/26/2023    ANIONGAP 12 10/26/2023    EGFRNORACEVR >60 10/26/2023     Lab Results   Component Value Date    ALT 19 10/26/2023    AST 19 10/26/2023    ALKPHOS 85 10/26/2023    BILITOT 0.4 10/26/2023           # Duodenal adenocarcinoma   Mrs. Simms is a pleasant 68 y.o. 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 Pathology did reveal some high grade features, including poorly differentiated carcinoma, 6.5 cm size.  We discussed at her initial visit he limited role of adjuvant chemotherapy in the stage II setting for small bowel adenocarcinoma.       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      CT CAP on 7/26/23 demonstrated concern for recurrence in liver.  PET/CT on 8/4/23 showed hypermetabolic inferior R hepatic lobe lesion consistent with metastatic recurrence.  CEA has risen as well.     Given her dMMR/MSI-H status, we  recommended to start on single agent pembrolizumab 400 mg q6 weeks.  Patient was consented for immunotherapy.  An extensive discussion was had which included a thorough discussion of the risk and benefits of treatment and alternatives.  Risks, including but not limited to, immune-mediated toxicities involving multiple body organs including lungs, kidneys, GI tract, liver, heart, central nervous system, skin, thyroid, pituitary gland, and others were all explained to the patient. The patient agrees with the plan, and all questions have been answered to their satisfaction.  Consent was signed the patient, provider, and a third party witness.    She was also enrolled in our protocol: [id number], Disparities in Results of Immune Checkpoint Inhibitor Treatment (DIRECT): A Prospective Cohort Study of Cancer Survivors Treated with anti-PD-L1 Immunotherapy in a Community Oncology Setting.  CRC is [facility].     No toxicities noted from pembrolizumab cycle 1.  Will proceed with cycle 2 pembrolizumab today.     [employer] Tissue NGS: MSI-H, TMB 76.3 m/MB  Repeat imaging with PET prior to cycle 3 of pembrolizumab.     # Decreased appetite, malnutrition  -Appetite improved with Marinol.  Refilled.  -Monitor weight closely. Nutrition following.     # Anemia  -Microcytic anemia. Suspect thalassemia given longstanding nature.  -No signs of bleeding, platelets normal.   -Asymptomatic. Monitor.      # HTN, HLD, DM, aortic atherosclerosis   -BP elevated in clinic today.  Will discuss chemocare  at next visit.   -Following with PCP.   -Continue medical management.   Review of Systems   Constitutional:  Negative for chills, fatigue, fever and unexpected weight change.   HENT:  Negative for congestion, ear pain, sore throat and trouble swallowing.    Eyes:  Negative for pain and visual disturbance.   Respiratory:  Negative for cough, chest tightness and shortness of breath.    Cardiovascular:  Negative for chest pain, palpitations  and leg swelling.   Gastrointestinal:  Negative for abdominal distention, abdominal pain, constipation, diarrhea and vomiting.   Genitourinary:  Negative for difficulty urinating, dysuria, flank pain, frequency and hematuria.   Musculoskeletal:  Negative for back pain, gait problem, joint swelling, neck pain and neck stiffness.   Skin:  Negative for rash and wound.   Neurological:  Negative for dizziness, seizures, speech difficulty, light-headedness and headaches.       Objective:      Physical Exam  Vitals and nursing note reviewed.   Constitutional:       Appearance: Normal appearance. She is well-developed.   HENT:      Head: Normocephalic and atraumatic.      Right Ear: External ear normal.      Left Ear: External ear normal.      Nose: Nose normal.   Eyes:      Conjunctiva/sclera: Conjunctivae normal.      Pupils: Pupils are equal, round, and reactive to light.   Cardiovascular:      Rate and Rhythm: Normal rate and regular rhythm.      Heart sounds: Normal heart sounds. No murmur heard.  Pulmonary:      Effort: Pulmonary effort is normal. No respiratory distress.      Breath sounds: Normal breath sounds. No wheezing.   Abdominal:      General: Bowel sounds are normal.      Palpations: Abdomen is soft.   Musculoskeletal:         General: Normal range of motion.      Cervical back: Normal range of motion and neck supple.   Skin:     General: Skin is warm and dry.   Neurological:      Mental Status: She is alert and oriented to person, place, and time.   Psychiatric:         Behavior: Behavior normal.         Thought Content: Thought content normal.         Judgment: Judgment normal.         Assessment:       1. Diabetes mellitus type 2, diet-controlled    2. Essential hypertension    3. Hyperlipidemia, unspecified hyperlipidemia type    4. History of Whipple procedure    5. Duodenal adenocarcinoma    6. Hypertension, unspecified type    7. Gastritis, presence of bleeding unspecified, unspecified chronicity,  unspecified gastritis type    8. Myalgia        Plan:     Problem List Items Addressed This Visit       Essential hypertension> cont lisinopril hctz well controlled     Hyperlipidemia> cont lipitor     Diabetes mellitus type 2, diet-controlled - Primary> cont diet     History of Whipple procedure cont prilosec / pepcid     Duodenal adenocarcinoma cont heme onc f/u     Leaving for cruise to [place] next week. Scheduled for immunotherapy when she gets back  She report that she weighs 143 at home. She can not tolerate a lot of food at once but if she eats small portions frequently she does tolerate will cont small frequent meals     F/u routine every 6 months    Declines flu vaccine

## 2023-11-02 ENCOUNTER — PATIENT OUTREACH (OUTPATIENT)
Dept: ADMINISTRATIVE | Facility: HOSPITAL | Age: 68
End: 2023-11-02
Payer: MEDICARE

## 2023-11-02 NOTE — PROGRESS NOTES
Updates were requested from care everywhere.  Health Maintenance has been updated.  [facility] immunization registry triggered.  Immunizations were reconciled.  Per BHAVANI in basket message, pt declined flu vaccine 11/01/2023.

## 2023-11-15 ENCOUNTER — HOSPITAL ENCOUNTER (OUTPATIENT)
Dept: RADIOLOGY | Facility: HOSPITAL | Age: 68
Discharge: HOME | End: 2023-11-15
Attending: INTERNAL MEDICINE
Payer: MEDICARE

## 2023-11-15 DIAGNOSIS — C17.0 DUODENAL ADENOCARCINOMA: ICD-10-CM

## 2023-11-15 LAB — POCT GLUCOSE: 74 MG/DL (ref 70–110)

## 2023-11-15 PROCEDURE — 78815 NM PET CT ROUTINE: ICD-10-PCS | Mod: 26,PI,, | Performed by: STUDENT IN AN ORGANIZED HEALTH CARE EDUCATION/TRAINING PROGRAM

## 2023-11-15 PROCEDURE — 78815 PET IMAGE W/CT SKULL-THIGH: CPT | Mod: 26,PI,, | Performed by: STUDENT IN AN ORGANIZED HEALTH CARE EDUCATION/TRAINING PROGRAM

## 2023-11-15 PROCEDURE — A9552 F18 FDG: HCPCS

## 2023-11-16 ENCOUNTER — OFFICE VISIT (OUTPATIENT)
Dept: HEMATOLOGY/ONCOLOGY | Facility: CLINIC | Age: 68
End: 2023-11-16
Payer: MEDICARE

## 2023-11-16 ENCOUNTER — INFUSION (OUTPATIENT)
Dept: INFUSION THERAPY | Facility: HOSPITAL | Age: 68
End: 2023-11-16
Payer: MEDICARE

## 2023-11-16 VITALS
HEIGHT: 66 IN | SYSTOLIC BLOOD PRESSURE: 147 MMHG | TEMPERATURE: 98 F | DIASTOLIC BLOOD PRESSURE: 66 MMHG | BODY MASS INDEX: 22.43 KG/M2 | RESPIRATION RATE: 18 BRPM | WEIGHT: 139.56 LBS | HEART RATE: 57 BPM

## 2023-11-16 VITALS
RESPIRATION RATE: 18 BRPM | HEART RATE: 57 BPM | HEIGHT: 66 IN | WEIGHT: 139.56 LBS | TEMPERATURE: 98 F | BODY MASS INDEX: 22.43 KG/M2 | SYSTOLIC BLOOD PRESSURE: 147 MMHG | DIASTOLIC BLOOD PRESSURE: 66 MMHG | OXYGEN SATURATION: 99 %

## 2023-11-16 DIAGNOSIS — R63.0 DECREASED APPETITE: ICD-10-CM

## 2023-11-16 DIAGNOSIS — D64.9 ANEMIA, UNSPECIFIED TYPE: ICD-10-CM

## 2023-11-16 DIAGNOSIS — Z00.6 EXAMINATION OF PARTICIPANT IN CLINICAL TRIAL: ICD-10-CM

## 2023-11-16 DIAGNOSIS — C17.0 DUODENAL ADENOCARCINOMA: Primary | ICD-10-CM

## 2023-11-16 DIAGNOSIS — C78.7 METASTASIS TO LIVER: ICD-10-CM

## 2023-11-16 DIAGNOSIS — J98.4 ACUTE PNEUMONITIS: ICD-10-CM

## 2023-11-16 DIAGNOSIS — E78.5 HYPERLIPIDEMIA, UNSPECIFIED HYPERLIPIDEMIA TYPE: ICD-10-CM

## 2023-11-16 DIAGNOSIS — I10 ESSENTIAL HYPERTENSION: ICD-10-CM

## 2023-11-16 DIAGNOSIS — E11.9 DIABETES MELLITUS TYPE 2, DIET-CONTROLLED: ICD-10-CM

## 2023-11-16 PROCEDURE — 3077F PR MOST RECENT SYSTOLIC BLOOD PRESSURE >= 140 MM HG: ICD-10-PCS | Mod: CPTII,S$GLB,, | Performed by: INTERNAL MEDICINE

## 2023-11-16 PROCEDURE — 99215 PR OFFICE/OUTPT VISIT, EST, LEVL V, 40-54 MIN: ICD-10-PCS | Mod: S$GLB,,, | Performed by: INTERNAL MEDICINE

## 2023-11-16 PROCEDURE — 99215 OFFICE O/P EST HI 40 MIN: CPT | Mod: S$GLB,,, | Performed by: INTERNAL MEDICINE

## 2023-11-16 PROCEDURE — 4010F PR ACE/ARB THEARPY RXD/TAKEN: ICD-10-PCS | Mod: CPTII,S$GLB,, | Performed by: INTERNAL MEDICINE

## 2023-11-16 PROCEDURE — 1126F PR PAIN SEVERITY QUANTIFIED, NO PAIN PRESENT: ICD-10-PCS | Mod: CPTII,S$GLB,, | Performed by: INTERNAL MEDICINE

## 2023-11-16 PROCEDURE — 3288F FALL RISK ASSESSMENT DOCD: CPT | Mod: CPTII,S$GLB,, | Performed by: INTERNAL MEDICINE

## 2023-11-16 PROCEDURE — 3078F DIAST BP <80 MM HG: CPT | Mod: CPTII,S$GLB,, | Performed by: INTERNAL MEDICINE

## 2023-11-16 PROCEDURE — 3061F PR NEG MICROALBUMINURIA RESULT DOCUMENTED/REVIEW: ICD-10-PCS | Mod: CPTII,S$GLB,, | Performed by: INTERNAL MEDICINE

## 2023-11-16 PROCEDURE — 3044F PR MOST RECENT HEMOGLOBIN A1C LEVEL <7.0%: ICD-10-PCS | Mod: CPTII,S$GLB,, | Performed by: INTERNAL MEDICINE

## 2023-11-16 PROCEDURE — 3061F NEG MICROALBUMINURIA REV: CPT | Mod: CPTII,S$GLB,, | Performed by: INTERNAL MEDICINE

## 2023-11-16 PROCEDURE — 1126F AMNT PAIN NOTED NONE PRSNT: CPT | Mod: CPTII,S$GLB,, | Performed by: INTERNAL MEDICINE

## 2023-11-16 PROCEDURE — 3077F SYST BP >= 140 MM HG: CPT | Mod: CPTII,S$GLB,, | Performed by: INTERNAL MEDICINE

## 2023-11-16 PROCEDURE — 1101F PR PT FALLS ASSESS DOC 0-1 FALLS W/OUT INJ PAST YR: ICD-10-PCS | Mod: CPTII,S$GLB,, | Performed by: INTERNAL MEDICINE

## 2023-11-16 PROCEDURE — 3044F HG A1C LEVEL LT 7.0%: CPT | Mod: CPTII,S$GLB,, | Performed by: INTERNAL MEDICINE

## 2023-11-16 PROCEDURE — 3066F PR DOCUMENTATION OF TREATMENT FOR NEPHROPATHY: ICD-10-PCS | Mod: CPTII,S$GLB,, | Performed by: INTERNAL MEDICINE

## 2023-11-16 PROCEDURE — 96413 CHEMO IV INFUSION 1 HR: CPT

## 2023-11-16 PROCEDURE — 63600175 PHARM REV CODE 636 W HCPCS: Mod: JZ,JG | Performed by: INTERNAL MEDICINE

## 2023-11-16 PROCEDURE — 1101F PT FALLS ASSESS-DOCD LE1/YR: CPT | Mod: CPTII,S$GLB,, | Performed by: INTERNAL MEDICINE

## 2023-11-16 PROCEDURE — 4010F ACE/ARB THERAPY RXD/TAKEN: CPT | Mod: CPTII,S$GLB,, | Performed by: INTERNAL MEDICINE

## 2023-11-16 PROCEDURE — 3008F BODY MASS INDEX DOCD: CPT | Mod: CPTII,S$GLB,, | Performed by: INTERNAL MEDICINE

## 2023-11-16 PROCEDURE — 3288F PR FALLS RISK ASSESSMENT DOCUMENTED: ICD-10-PCS | Mod: CPTII,S$GLB,, | Performed by: INTERNAL MEDICINE

## 2023-11-16 PROCEDURE — 25000003 PHARM REV CODE 250: Performed by: INTERNAL MEDICINE

## 2023-11-16 PROCEDURE — 99999 PR PBB SHADOW E&M-EST. PATIENT-LVL III: CPT | Mod: PBBFAC,,, | Performed by: INTERNAL MEDICINE

## 2023-11-16 PROCEDURE — 3078F PR MOST RECENT DIASTOLIC BLOOD PRESSURE < 80 MM HG: ICD-10-PCS | Mod: CPTII,S$GLB,, | Performed by: INTERNAL MEDICINE

## 2023-11-16 PROCEDURE — 3066F NEPHROPATHY DOC TX: CPT | Mod: CPTII,S$GLB,, | Performed by: INTERNAL MEDICINE

## 2023-11-16 PROCEDURE — 99999 PR PBB SHADOW E&M-EST. PATIENT-LVL III: ICD-10-PCS | Mod: PBBFAC,,, | Performed by: INTERNAL MEDICINE

## 2023-11-16 PROCEDURE — 3008F PR BODY MASS INDEX (BMI) DOCUMENTED: ICD-10-PCS | Mod: CPTII,S$GLB,, | Performed by: INTERNAL MEDICINE

## 2023-11-16 RX ORDER — EPINEPHRINE 0.3 MG/.3ML
0.3 INJECTION SUBCUTANEOUS ONCE AS NEEDED
Status: DISCONTINUED | OUTPATIENT
Start: 2023-11-16 | End: 2023-11-16 | Stop reason: HOSPADM

## 2023-11-16 RX ORDER — EPINEPHRINE 0.3 MG/.3ML
0.3 INJECTION SUBCUTANEOUS ONCE AS NEEDED
Status: CANCELLED | OUTPATIENT
Start: 2023-11-16

## 2023-11-16 RX ORDER — DIPHENHYDRAMINE HYDROCHLORIDE 50 MG/ML
50 INJECTION INTRAMUSCULAR; INTRAVENOUS ONCE AS NEEDED
Status: CANCELLED | OUTPATIENT
Start: 2023-11-16

## 2023-11-16 RX ORDER — HEPARIN 100 UNIT/ML
500 SYRINGE INTRAVENOUS
Status: DISCONTINUED | OUTPATIENT
Start: 2023-11-16 | End: 2023-11-16 | Stop reason: HOSPADM

## 2023-11-16 RX ORDER — SODIUM CHLORIDE 0.9 % (FLUSH) 0.9 %
10 SYRINGE (ML) INJECTION
Status: DISCONTINUED | OUTPATIENT
Start: 2023-11-16 | End: 2023-11-16 | Stop reason: HOSPADM

## 2023-11-16 RX ORDER — SODIUM CHLORIDE 0.9 % (FLUSH) 0.9 %
10 SYRINGE (ML) INJECTION
Status: CANCELLED | OUTPATIENT
Start: 2023-11-16

## 2023-11-16 RX ORDER — HEPARIN 100 UNIT/ML
500 SYRINGE INTRAVENOUS
Status: CANCELLED | OUTPATIENT
Start: 2023-11-16

## 2023-11-16 RX ORDER — DIPHENHYDRAMINE HYDROCHLORIDE 50 MG/ML
50 INJECTION INTRAMUSCULAR; INTRAVENOUS ONCE AS NEEDED
Status: DISCONTINUED | OUTPATIENT
Start: 2023-11-16 | End: 2023-11-16 | Stop reason: HOSPADM

## 2023-11-16 RX ADMIN — SODIUM CHLORIDE: 9 INJECTION, SOLUTION INTRAVENOUS at 09:11

## 2023-11-16 RX ADMIN — SODIUM CHLORIDE 400 MG: 9 INJECTION, SOLUTION INTRAVENOUS at 09:11

## 2023-11-16 NOTE — PLAN OF CARE
0930 pt here for D1C3 Keytruda infusion, labs, hx, meds, allergies reviewed, pt with no new complaints at this time, reclined in chair, continue to monitor

## 2023-11-16 NOTE — PROGRESS NOTES
MEDICAL ONCOLOGY - ESTABLISHED PATIENT VISIT    Reason for visit: duodenal adenocarcinoma     Best Contact Phone Number(s): [phone] (home)      Cancer/Stage/TNM:    Cancer Staging   No matching staging information was found for the patient.     Oncology History   Duodenal adenocarcinoma   3/10/2023 Initial Diagnosis    Duodenal adenocarcinoma     3/15/2023 Surgery    Whipple  dMMR with loss of MLH1/PMS2; MLH1 promoter hypermethylation present suggesting a sporadic tumor.      8/24/2023 -  Chemotherapy    Treatment Summary   Plan Name: OP PEMBROLIZUMAB 400MG Q6W  Treatment Goal: Palliative  Status: Active  Start Date: 8/24/2023  End Date: 6/23/2025 (Planned)  Provider: Eber Roberts MD  Chemotherapy: [No matching medication found in this treatment plan]          Interval History: 68 y.o. female with recurrent MSI-H metastatic duodenal adenocarcinoma who presents for follow-up prior to cycle 3 of pembrolizumab.  She was in the ER on 10/26/23 with leg pain.  Her history was most consistent with neuropathy and was prescribed gabapentin.  She feels that this has mostly resolved.    She had an episode of cough with some mucous production about one week ago that has since resolved.  Denies any dyspnea and no further cough.  Her energy is good.  Eating well with taking Marinol just at nighttime.     Presents to clinic with her . ECOG 0.     ROS:   Review of Systems   Constitutional:  Negative for chills, fever and malaise/fatigue.   HENT:  Negative for nosebleeds and sore throat.    Respiratory:  Negative for cough and shortness of breath.    Cardiovascular:  Negative for chest pain, palpitations and leg swelling.   Gastrointestinal:  Negative for blood in stool, constipation, diarrhea, heartburn, nausea and vomiting.   Genitourinary:  Negative for dysuria, frequency, hematuria and urgency.   Musculoskeletal:  Negative for falls and myalgias.   Skin:  Negative for itching and rash.   Neurological:   Negative for dizziness, tingling, weakness and headaches.   Psychiatric/Behavioral:  The patient is not nervous/anxious and does not have insomnia.        Past Medical History:   Past Medical History:   Diagnosis Date    Abnormal Pap smear of cervix     Arthritis     Duodenal adenocarcinoma     Hypertension     Hyperthyroidism         Past Surgical History:   Past Surgical History:   Procedure Laterality Date    CATARACT EXTRACTION W/  INTRAOCULAR LENS IMPLANT Right 8/8/2019    Procedure: EXTRACTION, CATARACT, WITH IOL INSERTION;  Surgeon: Spenser Lee MD;  Location: [facility];  Service: Ophthalmology;  Laterality: Right;    ESOPHAGOGASTRODUODENOSCOPY N/A 3/10/2023    Procedure: EGD (ESOPHAGOGASTRODUODENOSCOPY);  Surgeon: Shashi Tapia MD;  Location: [facility] ([address] [place] [address]);  Service: Endoscopy;  Laterality: N/A;    EYE SURGERY      HYSTERECTOMY      OOPHORECTOMY      PHACOEMULSIFICATION OF CATARACT Right 8/8/2019    Procedure: PHACOEMULSIFICATION, CATARACT;  Surgeon: Spenser Lee MD;  Location: [facility];  Service: Ophthalmology;  Laterality: Right;    WHIPPLE PROCEDURE N/A 3/15/2023    Procedure: WHIPPLE PROCEDURE;  Surgeon: Nick Paez MD;  Location: [address];  Service: General;  Laterality: N/A;        Family History:   Family History   Problem Relation Age of Onset    Cancer Mother     Breast cancer Neg Hx     Colon cancer Neg Hx     Ovarian cancer Neg Hx         Social History:   Social History     Tobacco Use    Smoking status: Every Day     Current packs/day: 0.25     Average packs/day: 0.3 packs/day for 30.0 years (7.5 ttl pk-yrs)     Types: Cigarettes    Smokeless tobacco: Never    Tobacco comments:     About 7-8 cigs a day   Substance Use Topics    Alcohol use: Yes     Alcohol/week: 1.0 standard drink of alcohol     Types: 1 Cans of beer per week        I have reviewed and updated the patient's past medical, surgical, family and social histories.    Allergies:   Review of  "patient's allergies indicates:   Allergen Reactions    Aspirin Other (See Comments)     Pt states it is "hard on her stomach" She can tolerate a low dose aspirin    Pcn [penicillins]      Childhood - Tolerated ceftriaxone and cefazolin with no documented issues in the past         Medications:   Current Outpatient Medications   Medication Sig Dispense Refill    acetaminophen (TYLENOL) 650 MG TbSR Take 650 mg by mouth as needed.      atorvastatin (LIPITOR) 40 MG tablet Take 1 tablet (40 mg total) by mouth once daily. 90 tablet 1    droNABinol (MARINOL) 5 MG capsule Take 1 capsule (5 mg total) by mouth 2 (two) times daily before meals. 60 capsule 2    famotidine (PEPCID) 20 MG tablet Take 1 tablet (20 mg total) by mouth 2 (two) times daily. 60 tablet 11    gabapentin (NEURONTIN) 300 MG capsule Take 1 capsule (300 mg total) by mouth 3 (three) times daily. 90 capsule 5    lisinopriL-hydrochlorothiazide (PRINZIDE,ZESTORETIC) 20-12.5 mg per tablet Take 1 tablet by mouth once daily. 90 tablet 3    omeprazole (PRILOSEC) 40 MG capsule Take 1 capsule (40 mg total) by mouth once daily. 30 capsule 5    traMADoL (ULTRAM) 50 mg tablet Take 1 tablet (50 mg total) by mouth every 12 (twelve) hours as needed for Pain. 14 tablet 0     No current facility-administered medications for this visit.     Facility-Administered Medications Ordered in Other Visits   Medication Dose Route Frequency Provider Last Rate Last Admin    alteplase injection 2 mg  2 mg Intra-Catheter PRN Eber Roberts MD        diphenhydrAMINE injection 50 mg  50 mg Intravenous Once PRN Eber Roberts MD        EPINEPHrine (EPIPEN) 0.3 mg/0.3 mL pen injection 0.3 mg  0.3 mg Intramuscular Once PRN Eber Roberts MD        heparin, porcine (PF) 100 unit/mL injection flush 500 Units  500 Units Intravenous PRN Eber Roberts MD        hydrocortisone sodium succinate injection 100 mg  100 mg Intravenous Once PRN Eber Roberts MD        " "pembrolizumab (KEYTRUDA) 400 mg in sodium chloride 0.9% SolP 116 mL infusion  400 mg Intravenous 1 time in Clinic/HOD Eber Roberts MD        sodium chloride 0.9% 100 mL flush bag   Intravenous 1 time in Clinic/HOD Eber Roberts MD        sodium chloride 0.9% flush 10 mL  10 mL Intravenous PRN Eber Robrets MD            Physical Exam:   BP (!) 147/66 (BP Location: Left arm, Patient Position: Sitting, BP Method: Medium (Automatic))   Pulse (!) 57   Temp 98.2 °F (36.8 °C) (Oral)   Resp 18   Ht 5' 6" (1.676 m)   Wt 63.3 kg (139 lb 8.8 oz)   SpO2 99%   BMI 22.52 kg/m²        Physical Exam  Vitals reviewed.   Constitutional:       General: She is not in acute distress.     Appearance: Normal appearance. She is normal weight. She is not ill-appearing, toxic-appearing or diaphoretic.   HENT:      Head: Normocephalic and atraumatic.      Right Ear: External ear normal.      Left Ear: External ear normal.      Nose: Nose normal.      Mouth/Throat:      Pharynx: Oropharynx is clear.   Eyes:      General: No scleral icterus.     Conjunctiva/sclera: Conjunctivae normal.      Pupils: Pupils are equal, round, and reactive to light.   Cardiovascular:      Rate and Rhythm: Normal rate.   Pulmonary:      Effort: Pulmonary effort is normal. No respiratory distress.      Breath sounds: No wheezing.   Abdominal:      General: There is no distension.      Tenderness: There is no abdominal tenderness.      Comments: Midline abdominal incision well healed   Musculoskeletal:      Cervical back: Normal range of motion.      Right lower leg: No edema.      Left lower leg: No edema.   Skin:     General: Skin is warm and dry.      Coloration: Skin is not jaundiced or pale.      Findings: No bruising, erythema or rash.   Neurological:      General: No focal deficit present.      Mental Status: She is alert and oriented to person, place, and time. Mental status is at baseline.      Cranial Nerves: No cranial nerve " deficit.      Sensory: No sensory deficit.      Motor: No weakness.      Gait: Gait normal.   Psychiatric:         Mood and Affect: Mood normal.         Behavior: Behavior normal.         Thought Content: Thought content normal.         Judgment: Judgment normal.           Labs:   Recent Results (from the past 48 hour(s))   POCT glucose    Collection Time: 11/15/23  9:37 AM   Result Value Ref Range    POCT Glucose 74 70 - 110 mg/dL   CBC Oncology    Collection Time: 11/15/23 11:13 AM   Result Value Ref Range    WBC 8.50 3.90 - 12.70 K/uL    RBC 4.84 4.00 - 5.40 M/uL    Hemoglobin 11.2 (L) 12.0 - 16.0 g/dL    Hematocrit 34.8 (L) 37.0 - 48.5 %    MCV 72 (L) 82 - 98 fL    MCH 23.1 (L) 27.0 - 31.0 pg    MCHC 32.2 32.0 - 36.0 g/dL    RDW 17.2 (H) 11.5 - 14.5 %    Platelets 277 150 - 450 K/uL    MPV 10.5 9.2 - 12.9 fL    Gran # (ANC) 6.0 1.8 - 7.7 K/uL    Immature Grans (Abs) 0.03 0.00 - 0.04 K/uL   Comprehensive Metabolic Panel    Collection Time: 11/15/23 11:13 AM   Result Value Ref Range    Sodium 142 136 - 145 mmol/L    Potassium 4.1 3.5 - 5.1 mmol/L    Chloride 104 95 - 110 mmol/L    CO2 30 (H) 23 - 29 mmol/L    Glucose 88 70 - 110 mg/dL    BUN 14 8 - 23 mg/dL    Creatinine 0.6 0.5 - 1.4 mg/dL    Calcium 9.8 8.7 - 10.5 mg/dL    Total Protein 7.2 6.0 - 8.4 g/dL    Albumin 3.4 (L) 3.5 - 5.2 g/dL    Total Bilirubin 0.5 0.1 - 1.0 mg/dL    Alkaline Phosphatase 82 55 - 135 U/L    AST 17 10 - 40 U/L    ALT 17 10 - 44 U/L    eGFR >60.0 >60 mL/min/1.73 m^2    Anion Gap 8 8 - 16 mmol/L   CEA    Collection Time: 11/15/23 11:13 AM   Result Value Ref Range    CEA 5.5 (H) 0.0 - 5.0 ng/mL        I have reviewed the pertinent labs. Improving microcytic anemia.  CEA stable.    Imagin/4/23 - PET/CT  Impression:     Hypermetabolic right inferior hepatic lobe lesion concerning for metastatic disease.     No other suspicious hypermetabolic foci identified.     Mild radiotracer uptake identified diffusely throughout the esophagus.   Findings can be seen with esophagitis.  Recommend clinical correlation.    Path:   3/15/23 - Whipple  Final Pathologic Diagnosis      Abnormal   1. Lymph node, hepatic cautery, resection:   - One lymph node negative for metastatic carcinoma (0/1).   - See synoptic report.   2. Gallbladder, cholecystectomy:   - Benign gallbladder with no significant histologic abnormality.   3. Pancreas, duodenum, common bile duct, and partial gastrectomy,   pancreaticoduodenectomy (Whipple specimen):   - Poorly differentiated carcinoma with medullary features.   - See synoptic report.   - See comment.   Synoptic Report   Procedure: Pancreaticoduodenectomy with partial gastrectomy (Whipple   specimen)   Tumor site:  Pylorus and proximal duodenum   Histologic type: Poorly differentiated carcinoma with medullary features   Histologic grade: G3, poorly differentiated   Tumor size:  6.5 cm in greatest dimension grossly   Tumor extent:   Invades through muscularis propria into subserosa, or extends   into nonperitonealized perimuscular tissue (mesentery or retroperitoneum)   without serosal penetration   Macroscopic tumor perforation:  Not identified   Lymphovascular inv asion:  Not identified, see comment   Margin status for invasive carcinoma:  All margins negative for invasive   carcinoma   Margin status for dysplasia:  All margins negative for carcinoma in Situ   (high-grade dysplasia)/adenoma   Regional lymph node status: Regional lymph nodes present        All Regional lymph nodes negative for tumor        Number of lymph nodes examined:  15 (including specimens 1 and 3)   PATHOLOGIC STAGE CLASSIFICATION (pTNM, AJCC 8th Edition):  p T3 N0   Additional findings:  Stomach with intestinal metaplasia (immunostain for   H.pylori negative), 2 lymph nodes with fibrosis and calcifications (GMS stain   for fungal organisms and AFB stain negative)   Ancillary studies:  Immunohistochemistry (IHC) Testing for Mismatch Repair   (MMR) Proteins    MLH1- Loss of nuclear expression   PMS2 - Loss of nuclear expression   MSH2 - Intact nuclear expression   MSH6 - Intact nuclear expression   Background nonneoplastic tissue/internal control with intact nuclear   expression   IHC Interpret ation:  Loss of nuclear expression of MLH1 and PMS2: testing for   methylation of the MLH1 promoter and/or mutation of BRAF is indicated (the   presence of a BRAF V600E mutation and/or MLH1 methylation suggests that the   tumor is sporadic and germline evaluation is probably not indicated; absence   of both MLH1 methylation and of BRAF V600E mutation suggests the possibility   of Seaman syndrome, and sequencing and/or large deletion/duplication testing   of germline MLH1 may be indicated)   Testing for methylation of the MLH1 promoter and mutation of BRAF is in   process and results will be supplemented.   There are exceptions to the above IHC interpretations. These results should   not be considered in isolation, and clinical correlation with genetic   counseling is recommended to assess the need for germline testing.   COMMENT:  The stomach and duodenum (specimen 3) shows a 6.5 cm mass involving   the pylorus with the majority of the tumor appearing to be in the duodenum.   The tumor shows poorly d ifferentiated sheets of blue cells with necrosis.   There is a significant amount of lymphocytic inflammatory infiltrate around   the edges of the tumor.  Immunostains show the tumor cells to be positive for   CK7 with patchy weak CDX2 and negative for synaptophysin and chromogranin.   There is also loss of MLH1 and PMS2 on MMR stains.  These features are   suggestive of medullary carcinoma.  Select slides reviewed by Dr. ITZEL Sánchez   who agrees with the diagnosis of poorly differentiated carcinoma with   medullary features.  Immunostains for CD 34 performed on blocks 3E and 3H   show no definitive lymphovascular invasion even though some areas suspicious   cells on routine H&E  "sections.  Appropriate positive controls   VC      Comment: Interp By Meredith Pappas MD, Signed on 04/12/2023 at 16:03   Supplemental Diagnosis      Abnormal   MLH1 Hypermethylation/BRAF Mutation   Result Summary   MLH1 HYPERMETHYLATION PRESENT/NO BRAF MUTATION IDENTIFIED   Result   Provided diagnosis: pylorus and proximal duodenum poorly differentiated   carcinoma with medullary features   Methylation status: Positive for MLH1 promoter hypermethylation   BRAF status: Wild-type (SEE INTERPRETATION)   Alexis Sotelo M.D.   Report attached   Performing location:   [place] [address]   "Disclaimer:  This case diagnosis was rendered completely by the outside   consultation pathologist and the case is electronically signed by an [facility]   pathologist listed below solely to release the report into the medical   record."          Assessment:       1. Duodenal adenocarcinoma    2. Acute pneumonitis    3. Examination of participant in clinical trial    4. Decreased appetite    5. Anemia, unspecified type    6. Essential hypertension    7. Hyperlipidemia, unspecified hyperlipidemia type    8. Metastasis to liver    9. Diabetes mellitus type 2, diet-controlled             Plan:     # Duodenal adenocarcinoma   Mrs. Simms is a pleasant 68 y.o. 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 Pathology did reveal some high grade features, including poorly differentiated carcinoma, 6.5 cm size.  We discussed at her initial visit he limited role of adjuvant chemotherapy in the stage II setting for small bowel adenocarcinoma.      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     CT CAP on 7/26/23 demonstrated concern for recurrence in liver.  PET/CT on 8/4/23 showed hypermetabolic inferior R hepatic lobe lesion consistent with " metastatic recurrence.  CEA has risen as well.    Given her dMMR/MSI-H status, we recommended to start on single agent pembrolizumab 400 mg q6 weeks.  Patient was consented for immunotherapy.  An extensive discussion was had which included a thorough discussion of the risk and benefits of treatment and alternatives.  Risks, including but not limited to, immune-mediated toxicities involving multiple body organs including lungs, kidneys, GI tract, liver, heart, central nervous system, skin, thyroid, pituitary gland, and others were all explained to the patient. The patient agrees with the plan, and all questions have been answered to their satisfaction.  Consent was signed the patient, provider, and a third party witness.    She was also enrolled in our protocol: [id number], Disparities in Results of Immune Checkpoint Inhibitor Treatment (DIRECT): A Prospective Cohort Study of Cancer Survivors Treated with anti-PD-L1 Immunotherapy in a Community Oncology Setting.  CRC is Isabela Lynnette.    No toxicities noted from pembrolizumab cycle 1.  PET/CT after cycle 2 not read yet but shows resolution of hypermetabolic R liver lesion but some new GGOs in RLL.  Suspect this is immunotherapy-induced pneumonitis grade 1 (asymptomatic, < 25% of lung parenchyma.  Will proceed with cycle 3 of pembrolizumab today.  Repeat CT chest prior to cycle 4 to f/u on pneumonitis.    [facility] Tissue NGS: MSI-H, TMB 76.3 m/MB  Repeat imaging with PET prior to cycle 5 of pembrolizumab.    # Decreased appetite, malnutrition  -Appetite improved with Marinol.   -Monitor weight closely. Nutrition following.    # Anemia  -Microcytic anemia improving. Suspect thalassemia given longstanding nature.  -No signs of bleeding, platelets normal.   -Asymptomatic. Monitor.     # HTN, HLD, DM, aortic atherosclerosis   -BP elevated in clinic today.    -Following with PCP.   -Continue medical management.     Eber Roberts MD  Hematology/Oncology  [facility]  - [facility]       Route Chart for Scheduling    Med Onc Chart Routing      Follow up with physician 6 weeks. for keytruda with CT chest prior   Follow up with YANDEL    Infusion scheduling note    Injection scheduling note    Labs CBC, CMP, CEA and TSH   Scheduling:  Preferred lab:  Lab interval:     Imaging   CT chest in  [place] prior to f/u   Pharmacy appointment    Other referrals                  Treatment Plan Information   OP PEMBROLIZUMAB 400MG Q6W   Eber Roberts MD   Upcoming Treatment Dates - OP PEMBROLIZUMAB 400MG Q6W    12/25/2023       Chemotherapy       pembrolizumab (KEYTRUDA) 400 mg in sodium chloride 0.9% SolP 116 mL infusion  2/5/2024       Chemotherapy       pembrolizumab (KEYTRUDA) 400 mg in sodium chloride 0.9% SolP 116 mL infusion  3/18/2024       Chemotherapy       pembrolizumab (KEYTRUDA) 400 mg in sodium chloride 0.9% SolP 116 mL infusion  4/29/2024       Chemotherapy       pembrolizumab (KEYTRUDA) 400 mg in sodium chloride 0.9% SolP 116 mL infusion

## 2023-11-16 NOTE — PLAN OF CARE
1030 pt tolerated Keytruda infusion without issue, pt to rtc 12/2/23, no distress noted upon d/c to home

## 2023-12-15 ENCOUNTER — HOSPITAL ENCOUNTER (OUTPATIENT)
Dept: RADIOLOGY | Facility: HOSPITAL | Age: 68
Discharge: HOME | End: 2023-12-15
Attending: INTERNAL MEDICINE
Payer: MEDICARE

## 2023-12-15 DIAGNOSIS — J98.4 ACUTE PNEUMONITIS: ICD-10-CM

## 2023-12-15 PROCEDURE — 71250 CT THORAX DX C-: CPT | Mod: 26,,, | Performed by: RADIOLOGY

## 2023-12-15 PROCEDURE — 71250 CT CHEST WITHOUT CONTRAST: ICD-10-PCS | Mod: 26,,, | Performed by: RADIOLOGY

## 2023-12-15 PROCEDURE — 71250 CT THORAX DX C-: CPT | Mod: TC

## 2023-12-18 ENCOUNTER — OFFICE VISIT (OUTPATIENT)
Dept: HEMATOLOGY/ONCOLOGY | Facility: CLINIC | Age: 68
End: 2023-12-18
Payer: MEDICARE

## 2023-12-18 VITALS
TEMPERATURE: 98 F | SYSTOLIC BLOOD PRESSURE: 115 MMHG | OXYGEN SATURATION: 97 % | DIASTOLIC BLOOD PRESSURE: 54 MMHG | WEIGHT: 139.56 LBS | BODY MASS INDEX: 22.43 KG/M2 | HEIGHT: 66 IN | RESPIRATION RATE: 18 BRPM | HEART RATE: 56 BPM

## 2023-12-18 DIAGNOSIS — C17.0 DUODENAL ADENOCARCINOMA: Primary | ICD-10-CM

## 2023-12-18 DIAGNOSIS — E78.5 HYPERLIPIDEMIA, UNSPECIFIED HYPERLIPIDEMIA TYPE: ICD-10-CM

## 2023-12-18 DIAGNOSIS — I10 ESSENTIAL HYPERTENSION: ICD-10-CM

## 2023-12-18 DIAGNOSIS — R63.0 DECREASED APPETITE: ICD-10-CM

## 2023-12-18 DIAGNOSIS — E11.9 DIABETES MELLITUS TYPE 2, DIET-CONTROLLED: ICD-10-CM

## 2023-12-18 DIAGNOSIS — I70.0 AORTIC ATHEROSCLEROSIS: ICD-10-CM

## 2023-12-18 DIAGNOSIS — D64.9 ANEMIA, UNSPECIFIED TYPE: ICD-10-CM

## 2023-12-18 DIAGNOSIS — C78.7 METASTASIS TO LIVER: ICD-10-CM

## 2023-12-18 PROCEDURE — 3078F PR MOST RECENT DIASTOLIC BLOOD PRESSURE < 80 MM HG: ICD-10-PCS | Mod: CPTII,S$GLB,, | Performed by: INTERNAL MEDICINE

## 2023-12-18 PROCEDURE — 3044F HG A1C LEVEL LT 7.0%: CPT | Mod: CPTII,S$GLB,, | Performed by: INTERNAL MEDICINE

## 2023-12-18 PROCEDURE — 4010F PR ACE/ARB THEARPY RXD/TAKEN: ICD-10-PCS | Mod: CPTII,S$GLB,, | Performed by: INTERNAL MEDICINE

## 2023-12-18 PROCEDURE — 3061F NEG MICROALBUMINURIA REV: CPT | Mod: CPTII,S$GLB,, | Performed by: INTERNAL MEDICINE

## 2023-12-18 PROCEDURE — 99215 PR OFFICE/OUTPT VISIT, EST, LEVL V, 40-54 MIN: ICD-10-PCS | Mod: S$GLB,,, | Performed by: INTERNAL MEDICINE

## 2023-12-18 PROCEDURE — 4010F ACE/ARB THERAPY RXD/TAKEN: CPT | Mod: CPTII,S$GLB,, | Performed by: INTERNAL MEDICINE

## 2023-12-18 PROCEDURE — 3066F NEPHROPATHY DOC TX: CPT | Mod: CPTII,S$GLB,, | Performed by: INTERNAL MEDICINE

## 2023-12-18 PROCEDURE — 3008F PR BODY MASS INDEX (BMI) DOCUMENTED: ICD-10-PCS | Mod: CPTII,S$GLB,, | Performed by: INTERNAL MEDICINE

## 2023-12-18 PROCEDURE — 1126F AMNT PAIN NOTED NONE PRSNT: CPT | Mod: CPTII,S$GLB,, | Performed by: INTERNAL MEDICINE

## 2023-12-18 PROCEDURE — 3074F SYST BP LT 130 MM HG: CPT | Mod: CPTII,S$GLB,, | Performed by: INTERNAL MEDICINE

## 2023-12-18 PROCEDURE — 3078F DIAST BP <80 MM HG: CPT | Mod: CPTII,S$GLB,, | Performed by: INTERNAL MEDICINE

## 2023-12-18 PROCEDURE — 1101F PR PT FALLS ASSESS DOC 0-1 FALLS W/OUT INJ PAST YR: ICD-10-PCS | Mod: CPTII,S$GLB,, | Performed by: INTERNAL MEDICINE

## 2023-12-18 PROCEDURE — 1159F PR MEDICATION LIST DOCUMENTED IN MEDICAL RECORD: ICD-10-PCS | Mod: CPTII,S$GLB,, | Performed by: INTERNAL MEDICINE

## 2023-12-18 PROCEDURE — 3288F FALL RISK ASSESSMENT DOCD: CPT | Mod: CPTII,S$GLB,, | Performed by: INTERNAL MEDICINE

## 2023-12-18 PROCEDURE — 1101F PT FALLS ASSESS-DOCD LE1/YR: CPT | Mod: CPTII,S$GLB,, | Performed by: INTERNAL MEDICINE

## 2023-12-18 PROCEDURE — 99999 PR PBB SHADOW E&M-EST. PATIENT-LVL III: CPT | Mod: PBBFAC,,, | Performed by: INTERNAL MEDICINE

## 2023-12-18 PROCEDURE — 1126F PR PAIN SEVERITY QUANTIFIED, NO PAIN PRESENT: ICD-10-PCS | Mod: CPTII,S$GLB,, | Performed by: INTERNAL MEDICINE

## 2023-12-18 PROCEDURE — 3061F PR NEG MICROALBUMINURIA RESULT DOCUMENTED/REVIEW: ICD-10-PCS | Mod: CPTII,S$GLB,, | Performed by: INTERNAL MEDICINE

## 2023-12-18 PROCEDURE — 1159F MED LIST DOCD IN RCRD: CPT | Mod: CPTII,S$GLB,, | Performed by: INTERNAL MEDICINE

## 2023-12-18 PROCEDURE — 3288F PR FALLS RISK ASSESSMENT DOCUMENTED: ICD-10-PCS | Mod: CPTII,S$GLB,, | Performed by: INTERNAL MEDICINE

## 2023-12-18 PROCEDURE — 99999 PR PBB SHADOW E&M-EST. PATIENT-LVL III: ICD-10-PCS | Mod: PBBFAC,,, | Performed by: INTERNAL MEDICINE

## 2023-12-18 PROCEDURE — 3066F PR DOCUMENTATION OF TREATMENT FOR NEPHROPATHY: ICD-10-PCS | Mod: CPTII,S$GLB,, | Performed by: INTERNAL MEDICINE

## 2023-12-18 PROCEDURE — 3008F BODY MASS INDEX DOCD: CPT | Mod: CPTII,S$GLB,, | Performed by: INTERNAL MEDICINE

## 2023-12-18 PROCEDURE — 3044F PR MOST RECENT HEMOGLOBIN A1C LEVEL <7.0%: ICD-10-PCS | Mod: CPTII,S$GLB,, | Performed by: INTERNAL MEDICINE

## 2023-12-18 PROCEDURE — 3074F PR MOST RECENT SYSTOLIC BLOOD PRESSURE < 130 MM HG: ICD-10-PCS | Mod: CPTII,S$GLB,, | Performed by: INTERNAL MEDICINE

## 2023-12-18 PROCEDURE — 99215 OFFICE O/P EST HI 40 MIN: CPT | Mod: S$GLB,,, | Performed by: INTERNAL MEDICINE

## 2023-12-18 RX ORDER — DIPHENHYDRAMINE HYDROCHLORIDE 50 MG/ML
50 INJECTION INTRAMUSCULAR; INTRAVENOUS ONCE AS NEEDED
Status: CANCELLED | OUTPATIENT
Start: 2023-12-25

## 2023-12-18 RX ORDER — EPINEPHRINE 0.3 MG/.3ML
0.3 INJECTION SUBCUTANEOUS ONCE AS NEEDED
Status: CANCELLED | OUTPATIENT
Start: 2023-12-25

## 2023-12-18 RX ORDER — HEPARIN 100 UNIT/ML
500 SYRINGE INTRAVENOUS
Status: CANCELLED | OUTPATIENT
Start: 2023-12-25

## 2023-12-18 RX ORDER — SODIUM CHLORIDE 0.9 % (FLUSH) 0.9 %
10 SYRINGE (ML) INJECTION
Status: CANCELLED | OUTPATIENT
Start: 2023-12-25

## 2023-12-18 NOTE — PROGRESS NOTES
MEDICAL ONCOLOGY - ESTABLISHED PATIENT VISIT    Reason for visit: duodenal adenocarcinoma     Best Contact Phone Number(s): [phone] (home)      Cancer/Stage/TNM:    Cancer Staging   No matching staging information was found for the patient.     Oncology History   Duodenal adenocarcinoma   3/10/2023 Initial Diagnosis    Duodenal adenocarcinoma     3/15/2023 Surgery    Whipple  dMMR with loss of MLH1/PMS2; MLH1 promoter hypermethylation present suggesting a sporadic tumor.      8/24/2023 -  Chemotherapy    Treatment Summary   Plan Name: OP PEMBROLIZUMAB 400MG Q6W  Treatment Goal: Palliative  Status: Active  Start Date: 8/24/2023  End Date: 6/23/2025 (Planned)  Provider: Eber Roberts MD  Chemotherapy: [No matching medication found in this treatment plan]          Interval History: 68 y.o. female with recurrent MSI-H metastatic duodenal adenocarcinoma who presents for follow-up prior to cycle 4 of pembrolizumab.  She had some abdominal pain in the RLQ last week that improved with Mylanta.  Thinks she ate too much.  Denies any cough or dyspnea.  Eating well.  No diarrhea or change in bowel movements.  Still taking Marinol at nighttime.    Presents to clinic with her . ECOG 0.     ROS:   Review of Systems   Constitutional:  Negative for chills, fever and malaise/fatigue.   HENT:  Negative for nosebleeds and sore throat.    Respiratory:  Negative for cough and shortness of breath.    Cardiovascular:  Negative for chest pain, palpitations and leg swelling.   Gastrointestinal:  Negative for blood in stool, constipation, diarrhea, heartburn, nausea and vomiting.   Genitourinary:  Negative for dysuria, frequency, hematuria and urgency.   Musculoskeletal:  Negative for falls and myalgias.   Skin:  Negative for itching and rash.   Neurological:  Negative for dizziness, tingling, weakness and headaches.   Psychiatric/Behavioral:  The patient is not nervous/anxious and does not have insomnia.        Past  "Medical History:   Past Medical History:   Diagnosis Date    Abnormal Pap smear of cervix     Arthritis     Duodenal adenocarcinoma     Hypertension     Hyperthyroidism         Past Surgical History:   Past Surgical History:   Procedure Laterality Date    CATARACT EXTRACTION W/  INTRAOCULAR LENS IMPLANT Right 8/8/2019    Procedure: EXTRACTION, CATARACT, WITH IOL INSERTION;  Surgeon: Spenser Lee MD;  Location: [facility];  Service: Ophthalmology;  Laterality: Right;    ESOPHAGOGASTRODUODENOSCOPY N/A 3/10/2023    Procedure: EGD (ESOPHAGOGASTRODUODENOSCOPY);  Surgeon: Shashi Tapia MD;  Location: [address]);  Service: Endoscopy;  Laterality: N/A;    EYE SURGERY      HYSTERECTOMY      OOPHORECTOMY      PHACOEMULSIFICATION OF CATARACT Right 8/8/2019    Procedure: PHACOEMULSIFICATION, CATARACT;  Surgeon: Spenser Lee MD;  Location: [facility];  Service: Ophthalmology;  Laterality: Right;    WHIPPLE PROCEDURE N/A 3/15/2023    Procedure: WHIPPLE PROCEDURE;  Surgeon: Nick Paez MD;  Location: [address];  Service: General;  Laterality: N/A;        Family History:   Family History   Problem Relation Age of Onset    Cancer Mother     Breast cancer Neg Hx     Colon cancer Neg Hx     Ovarian cancer Neg Hx         Social History:   Social History     Tobacco Use    Smoking status: Every Day     Current packs/day: 0.25     Average packs/day: 0.3 packs/day for 30.0 years (7.5 ttl pk-yrs)     Types: Cigarettes    Smokeless tobacco: Never    Tobacco comments:     About 7-8 cigs a day   Substance Use Topics    Alcohol use: Yes     Alcohol/week: 1.0 standard drink of alcohol     Types: 1 Cans of beer per week        I have reviewed and updated the patient's past medical, surgical, family and social histories.    Allergies:   Review of patient's allergies indicates:   Allergen Reactions    Aspirin Other (See Comments)     Pt states it is "hard on her stomach" She can tolerate a low dose aspirin    " "Pcn [penicillins]      Childhood - Tolerated ceftriaxone and cefazolin with no documented issues in the past         Medications:   Current Outpatient Medications   Medication Sig Dispense Refill    acetaminophen (TYLENOL) 650 MG TbSR Take 650 mg by mouth as needed.      atorvastatin (LIPITOR) 40 MG tablet Take 1 tablet (40 mg total) by mouth once daily. 90 tablet 1    droNABinol (MARINOL) 5 MG capsule Take 1 capsule (5 mg total) by mouth 2 (two) times daily before meals. 60 capsule 2    famotidine (PEPCID) 20 MG tablet Take 1 tablet (20 mg total) by mouth 2 (two) times daily. 60 tablet 11    lisinopriL-hydrochlorothiazide (PRINZIDE,ZESTORETIC) 20-12.5 mg per tablet Take 1 tablet by mouth once daily. 90 tablet 3    omeprazole (PRILOSEC) 40 MG capsule Take 1 capsule (40 mg total) by mouth once daily. 30 capsule 5    traMADoL (ULTRAM) 50 mg tablet Take 1 tablet (50 mg total) by mouth every 12 (twelve) hours as needed for Pain. 14 tablet 0    gabapentin (NEURONTIN) 300 MG capsule Take 1 capsule (300 mg total) by mouth 3 (three) times daily. 90 capsule 5     No current facility-administered medications for this visit.        Physical Exam:   BP (!) 115/54   Pulse (!) 56   Temp 98.3 °F (36.8 °C) (Oral)   Resp 18   Ht 5' 6" (1.676 m)   Wt 63.3 kg (139 lb 8.8 oz)   SpO2 97%   BMI 22.52 kg/m²        Physical Exam  Vitals reviewed.   Constitutional:       General: She is not in acute distress.     Appearance: Normal appearance. She is normal weight. She is not ill-appearing, toxic-appearing or diaphoretic.   HENT:      Head: Normocephalic and atraumatic.      Right Ear: External ear normal.      Left Ear: External ear normal.      Nose: Nose normal.      Mouth/Throat:      Pharynx: Oropharynx is clear.   Eyes:      General: No scleral icterus.     Conjunctiva/sclera: Conjunctivae normal.      Pupils: Pupils are equal, round, and reactive to light.   Cardiovascular:      Rate and Rhythm: Normal rate.   Pulmonary:      " Effort: Pulmonary effort is normal. No respiratory distress.      Breath sounds: No wheezing.   Abdominal:      General: There is no distension.      Tenderness: There is no abdominal tenderness.      Comments: Midline abdominal incision well healed   Musculoskeletal:      Cervical back: Normal range of motion.      Right lower leg: No edema.      Left lower leg: No edema.   Skin:     General: Skin is warm and dry.      Coloration: Skin is not jaundiced or pale.      Findings: No bruising, erythema or rash.   Neurological:      General: No focal deficit present.      Mental Status: She is alert and oriented to person, place, and time. Mental status is at baseline.      Cranial Nerves: No cranial nerve deficit.      Sensory: No sensory deficit.      Motor: No weakness.      Gait: Gait normal.   Psychiatric:         Mood and Affect: Mood normal.         Behavior: Behavior normal.         Thought Content: Thought content normal.         Judgment: Judgment normal.           Labs:   No results found for this or any previous visit (from the past 48 hour(s)).       I have reviewed the pertinent labs. Improving microcytic anemia.  CEA stable.    Imagin/15/23 - PET/CT    Impression:     1. In this patient with history of duodenal adenocarcinoma, there is been interval resolution of hypermetabolic lesion in hepatic segment 6 consistent with response to treatment.  2. Interval development of subcentimeter nodules and ground-glass opacification in the right middle lobe with associated increased tracer uptake.  This is favored to be infectious/inflammatory in etiology although nonspecific.  Metastasis cannot be entirely excluded.  Recommend correlation with patient symptoms and attention on follow-up.  3. Additional findings as above.    Path:   3/15/23 - Whipple  Final Pathologic Diagnosis      Abnormal   1. Lymph node, hepatic cautery, resection:   - One lymph node negative for metastatic carcinoma (0/1).   - See  synoptic report.   2. Gallbladder, cholecystectomy:   - Benign gallbladder with no significant histologic abnormality.   3. Pancreas, duodenum, common bile duct, and partial gastrectomy,   pancreaticoduodenectomy (Whipple specimen):   - Poorly differentiated carcinoma with medullary features.   - See synoptic report.   - See comment.   Synoptic Report   Procedure: Pancreaticoduodenectomy with partial gastrectomy (Whipple   specimen)   Tumor site:  Pylorus and proximal duodenum   Histologic type: Poorly differentiated carcinoma with medullary features   Histologic grade: G3, poorly differentiated   Tumor size:  6.5 cm in greatest dimension grossly   Tumor extent:   Invades through muscularis propria into subserosa, or extends   into nonperitonealized perimuscular tissue (mesentery or retroperitoneum)   without serosal penetration   Macroscopic tumor perforation:  Not identified   Lymphovascular inv asion:  Not identified, see comment   Margin status for invasive carcinoma:  All margins negative for invasive   carcinoma   Margin status for dysplasia:  All margins negative for carcinoma in Situ   (high-grade dysplasia)/adenoma   Regional lymph node status: Regional lymph nodes present        All Regional lymph nodes negative for tumor        Number of lymph nodes examined:  15 (including specimens 1 and 3)   PATHOLOGIC STAGE CLASSIFICATION (pTNM, AJCC 8th Edition):  p T3 N0   Additional findings:  Stomach with intestinal metaplasia (immunostain for   H.pylori negative), 2 lymph nodes with fibrosis and calcifications (GMS stain   for fungal organisms and AFB stain negative)   Ancillary studies:  Immunohistochemistry (IHC) Testing for Mismatch Repair   (MMR) Proteins   MLH1- Loss of nuclear expression   PMS2 - Loss of nuclear expression   MSH2 - Intact nuclear expression   MSH6 - Intact nuclear expression   Background nonneoplastic tissue/internal control with intact nuclear   expression   IHC Interpret ation:  Loss of  nuclear expression of MLH1 and PMS2: testing for   methylation of the MLH1 promoter and/or mutation of BRAF is indicated (the   presence of a BRAF V600E mutation and/or MLH1 methylation suggests that the   tumor is sporadic and germline evaluation is probably not indicated; absence   of both MLH1 methylation and of BRAF V600E mutation suggests the possibility   of Seaman syndrome, and sequencing and/or large deletion/duplication testing   of germline MLH1 may be indicated)   Testing for methylation of the MLH1 promoter and mutation of BRAF is in   process and results will be supplemented.   There are exceptions to the above IHC interpretations. These results should   not be considered in isolation, and clinical correlation with genetic   counseling is recommended to assess the need for germline testing.   COMMENT:  The stomach and duodenum (specimen 3) shows a 6.5 cm mass involving   the pylorus with the majority of the tumor appearing to be in the duodenum.   The tumor shows poorly d ifferentiated sheets of blue cells with necrosis.   There is a significant amount of lymphocytic inflammatory infiltrate around   the edges of the tumor.  Immunostains show the tumor cells to be positive for   CK7 with patchy weak CDX2 and negative for synaptophysin and chromogranin.   There is also loss of MLH1 and PMS2 on MMR stains.  These features are   suggestive of medullary carcinoma.  Select slides reviewed by Dr. ITZEL Sánchez   who agrees with the diagnosis of poorly differentiated carcinoma with   medullary features.  Immunostains for CD 34 performed on blocks 3E and 3H   show no definitive lymphovascular invasion even though some areas suspicious   cells on routine H&E sections.  Appropriate positive controls   VC      Comment: Interp By Meredith Pappas MD, Signed on 04/12/2023 at 16:03   Supplemental Diagnosis      Abnormal   MLH1 Hypermethylation/BRAF Mutation   Result Summary   MLH1 HYPERMETHYLATION PRESENT/NO BRAF MUTATION  "IDENTIFIED   Result   Provided diagnosis: pylorus and proximal duodenum poorly differentiated   carcinoma with medullary features   Methylation status: Positive for MLH1 promoter hypermethylation   BRAF status: Wild-type (SEE INTERPRETATION)   Alexis Sotelo M.D.   Report attached   Performing location:   [place] [address] [place] [address]   "Disclaimer:  This case diagnosis was rendered completely by the outside   consultation pathologist and the case is electronically signed by an [facility]   pathologist listed below solely to release the report into the medical   record."          Assessment:       1. Duodenal adenocarcinoma    2. Metastasis to liver    3. Decreased appetite    4. Anemia, unspecified type    5. Essential hypertension    6. Hyperlipidemia, unspecified hyperlipidemia type    7. Diabetes mellitus type 2, diet-controlled    8. Aortic atherosclerosis               Plan:     # Duodenal adenocarcinoma   Mrs. Simms is a pleasant 68 y.o. 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 Pathology did reveal some high grade features, including poorly differentiated carcinoma, 6.5 cm size.  We discussed at her initial visit he limited role of adjuvant chemotherapy in the stage II setting for small bowel adenocarcinoma.      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     CT CAP on 7/26/23 demonstrated concern for recurrence in liver.  PET/CT on 8/4/23 showed hypermetabolic inferior R hepatic lobe lesion consistent with metastatic recurrence.  CEA has risen as well.    Given her dMMR/MSI-H status, we recommended to start on single agent pembrolizumab 400 mg q6 weeks.  Patient was consented for immunotherapy.  An extensive discussion was had which included a thorough discussion of the risk and benefits of treatment and alternatives.  " Risks, including but not limited to, immune-mediated toxicities involving multiple body organs including lungs, kidneys, GI tract, liver, heart, central nervous system, skin, thyroid, pituitary gland, and others were all explained to the patient. The patient agrees with the plan, and all questions have been answered to their satisfaction.  Consent was signed the patient, provider, and a third party witness.    She was also enrolled in our protocol: [id number], Disparities in Results of Immune Checkpoint Inhibitor Treatment (DIRECT): A Prospective Cohort Study of Cancer Survivors Treated with anti-PD-L1 Immunotherapy in a Community Oncology Setting.  CRC is [facility] [place].    No toxicities noted from pembrolizumab cycle 1.  PET/CT after cycle 2 shows resolution of liver metastasis.  Question of grade 1 pneumonitis with GGO on that scan but this has since resolved on CT chest done 12/15 which makes pneumonitis less likely diagnosis.  No other IO toxicities.  Will proceed with cycle 4 of pembrolizumab next week.    [facility] Tissue NGS: MSI-H, TMB 76.3 m/MB  Repeat imaging with PET prior to cycle 5 of pembrolizumab.    # Decreased appetite, malnutrition  -Appetite improved with Marinol.   -Monitor weight closely. Nutrition following.    # Anemia  -Microcytic anemia improving. Suspect thalassemia given longstanding nature.  -No signs of bleeding, platelets normal.   -Asymptomatic. Monitor.     # HTN, HLD, DM, aortic atherosclerosis   -BP normal in clinic today.    -Following with PCP.   -Continue medical management.     Eber Roberts MD  Hematology/Oncology  [facility]       Route Chart for Scheduling    Med Onc Chart Routing      Follow up with physician 2 months. in 8 weeks for Keytruda   Follow up with YANDEL    Infusion scheduling note    Injection scheduling note    Labs CBC, CMP, CEA and TSH   Scheduling:  Preferred lab:  Lab interval: every 6 weeks  keep labs & Keytruda next week but  can cancel alfredo debora Fuentes   Imaging PET scan   prior to f/u in 2 months   Pharmacy appointment    Other referrals                  Treatment Plan Information   OP PEMBROLIZUMAB 400MG Q6W   Eber Roberts MD   Upcoming Treatment Dates - OP PEMBROLIZUMAB 400MG Q6W    12/25/2023       Chemotherapy       pembrolizumab (KEYTRUDA) 400 mg in sodium chloride 0.9% SolP 116 mL infusion  2/5/2024       Chemotherapy       pembrolizumab (KEYTRUDA) 400 mg in sodium chloride 0.9% SolP 116 mL infusion  3/18/2024       Chemotherapy       pembrolizumab (KEYTRUDA) 400 mg in sodium chloride 0.9% SolP 116 mL infusion  4/29/2024       Chemotherapy       pembrolizumab (KEYTRUDA) 400 mg in sodium chloride 0.9% SolP 116 mL infusion

## 2023-12-29 ENCOUNTER — INFUSION (OUTPATIENT)
Dept: INFUSION THERAPY | Facility: HOSPITAL | Age: 68
End: 2023-12-29
Payer: MEDICARE

## 2023-12-29 VITALS
RESPIRATION RATE: 18 BRPM | HEIGHT: 66 IN | SYSTOLIC BLOOD PRESSURE: 163 MMHG | BODY MASS INDEX: 22.43 KG/M2 | WEIGHT: 139.56 LBS | TEMPERATURE: 99 F | DIASTOLIC BLOOD PRESSURE: 67 MMHG | HEART RATE: 58 BPM

## 2023-12-29 DIAGNOSIS — C17.0 DUODENAL ADENOCARCINOMA: Primary | ICD-10-CM

## 2023-12-29 PROCEDURE — 63600175 PHARM REV CODE 636 W HCPCS: Mod: JZ,JG | Performed by: INTERNAL MEDICINE

## 2023-12-29 PROCEDURE — 96413 CHEMO IV INFUSION 1 HR: CPT

## 2023-12-29 PROCEDURE — 25000003 PHARM REV CODE 250: Performed by: INTERNAL MEDICINE

## 2023-12-29 RX ORDER — DIPHENHYDRAMINE HYDROCHLORIDE 50 MG/ML
50 INJECTION INTRAMUSCULAR; INTRAVENOUS ONCE AS NEEDED
Status: DISCONTINUED | OUTPATIENT
Start: 2023-12-29 | End: 2023-12-29 | Stop reason: HOSPADM

## 2023-12-29 RX ORDER — SODIUM CHLORIDE 0.9 % (FLUSH) 0.9 %
10 SYRINGE (ML) INJECTION
Status: DISCONTINUED | OUTPATIENT
Start: 2023-12-29 | End: 2023-12-29 | Stop reason: HOSPADM

## 2023-12-29 RX ORDER — HEPARIN 100 UNIT/ML
500 SYRINGE INTRAVENOUS
Status: DISCONTINUED | OUTPATIENT
Start: 2023-12-29 | End: 2023-12-29 | Stop reason: HOSPADM

## 2023-12-29 RX ORDER — EPINEPHRINE 0.3 MG/.3ML
0.3 INJECTION SUBCUTANEOUS ONCE AS NEEDED
Status: DISCONTINUED | OUTPATIENT
Start: 2023-12-29 | End: 2023-12-29 | Stop reason: HOSPADM

## 2023-12-29 RX ADMIN — SODIUM CHLORIDE 400 MG: 9 INJECTION, SOLUTION INTRAVENOUS at 01:12

## 2023-12-29 NOTE — PLAN OF CARE
1130 pt here for tx. Pts chart reviewed allergies, meds, tx, hx. Pt reclined in chair. No distress noted.

## 2024-01-31 ENCOUNTER — TELEPHONE (OUTPATIENT)
Dept: HEMATOLOGY/ONCOLOGY | Facility: CLINIC | Age: 69
End: 2024-01-31
Payer: MEDICARE

## 2024-01-31 NOTE — TELEPHONE ENCOUNTER
Saige ([employer] ins) said no need for [id number].  Auth going to be approved and case has been closed.  No additional information needed.  Will be processing approval      ----- Message from Eber Roberts MD sent at 1/30/2024  3:13 PM CST -----  Regarding: RE: Peer to peer  Yes plz  ----- Message -----  From: Nat Zamora RN  Sent: 1/29/2024   5:34 PM CST  To: Bhavna Lema; Eber Roberts MD  Subject: Peer to peer                                     PET scan scheduled 2/8, doc.  Do you want me to set up P2P?    - Nat  ----- Message -----  From: Joanie Fofana  Sent: 1/29/2024   4:43 PM CST  To: Armando Velázquez Staff    Type: General Call Back     Name of Caller:Saige [facility]   Symptoms:peer to peer   Would the patient rather a call back or a response via [facility]? Call back   Best Call Back Number:[phone]  Additional Information: [employer] is requested some information from the providers office. [employer] is offering a peer to peer for the patient.  [employer] indicates this is in regards to a PET scan that the provider has ordered for the patient. Please call back with further assistance and more information.

## 2024-02-08 ENCOUNTER — HOSPITAL ENCOUNTER (OUTPATIENT)
Dept: RADIOLOGY | Facility: HOSPITAL | Age: 69
Discharge: HOME | End: 2024-02-08
Attending: INTERNAL MEDICINE
Payer: MEDICARE

## 2024-02-08 DIAGNOSIS — C17.0 DUODENAL ADENOCARCINOMA: ICD-10-CM

## 2024-02-08 LAB — POCT GLUCOSE: 81 MG/DL (ref 70–110)

## 2024-02-08 PROCEDURE — 78815 PET IMAGE W/CT SKULL-THIGH: CPT | Mod: TC,PS

## 2024-02-08 PROCEDURE — 78815 PET IMAGE W/CT SKULL-THIGH: CPT | Mod: 26,PS,, | Performed by: STUDENT IN AN ORGANIZED HEALTH CARE EDUCATION/TRAINING PROGRAM

## 2024-02-08 PROCEDURE — A9552 F18 FDG: HCPCS

## 2024-02-12 ENCOUNTER — INFUSION (OUTPATIENT)
Dept: INFUSION THERAPY | Facility: HOSPITAL | Age: 69
End: 2024-02-12
Payer: MEDICARE

## 2024-02-12 ENCOUNTER — OFFICE VISIT (OUTPATIENT)
Dept: HEMATOLOGY/ONCOLOGY | Facility: CLINIC | Age: 69
End: 2024-02-12
Payer: MEDICARE

## 2024-02-12 VITALS
RESPIRATION RATE: 18 BRPM | HEIGHT: 66 IN | HEART RATE: 50 BPM | SYSTOLIC BLOOD PRESSURE: 138 MMHG | WEIGHT: 132.5 LBS | BODY MASS INDEX: 21.29 KG/M2 | TEMPERATURE: 98 F | DIASTOLIC BLOOD PRESSURE: 63 MMHG

## 2024-02-12 VITALS
OXYGEN SATURATION: 99 % | HEART RATE: 85 BPM | RESPIRATION RATE: 18 BRPM | WEIGHT: 132.5 LBS | BODY MASS INDEX: 21.39 KG/M2 | DIASTOLIC BLOOD PRESSURE: 58 MMHG | SYSTOLIC BLOOD PRESSURE: 119 MMHG

## 2024-02-12 DIAGNOSIS — C17.0 DUODENAL ADENOCARCINOMA: Primary | ICD-10-CM

## 2024-02-12 DIAGNOSIS — E44.0 MODERATE MALNUTRITION: ICD-10-CM

## 2024-02-12 DIAGNOSIS — I70.0 AORTIC ATHEROSCLEROSIS: ICD-10-CM

## 2024-02-12 DIAGNOSIS — E11.9 DIABETES MELLITUS TYPE 2, DIET-CONTROLLED: ICD-10-CM

## 2024-02-12 DIAGNOSIS — R63.0 DECREASED APPETITE: ICD-10-CM

## 2024-02-12 DIAGNOSIS — E78.5 HYPERLIPIDEMIA, UNSPECIFIED HYPERLIPIDEMIA TYPE: ICD-10-CM

## 2024-02-12 DIAGNOSIS — C78.7 METASTASIS TO LIVER: ICD-10-CM

## 2024-02-12 DIAGNOSIS — D64.9 ANEMIA, UNSPECIFIED TYPE: ICD-10-CM

## 2024-02-12 DIAGNOSIS — I10 ESSENTIAL HYPERTENSION: ICD-10-CM

## 2024-02-12 PROCEDURE — 99215 OFFICE O/P EST HI 40 MIN: CPT | Mod: S$GLB,,, | Performed by: REGISTERED NURSE

## 2024-02-12 PROCEDURE — 96413 CHEMO IV INFUSION 1 HR: CPT

## 2024-02-12 PROCEDURE — 25000003 PHARM REV CODE 250: Performed by: REGISTERED NURSE

## 2024-02-12 PROCEDURE — 63600175 PHARM REV CODE 636 W HCPCS: Mod: JZ,JG | Performed by: REGISTERED NURSE

## 2024-02-12 PROCEDURE — 99999 PR PBB SHADOW E&M-EST. PATIENT-LVL IV: CPT | Mod: PBBFAC,,, | Performed by: REGISTERED NURSE

## 2024-02-12 RX ORDER — DIPHENHYDRAMINE HYDROCHLORIDE 50 MG/ML
50 INJECTION INTRAMUSCULAR; INTRAVENOUS ONCE AS NEEDED
Status: CANCELLED | OUTPATIENT
Start: 2024-02-12

## 2024-02-12 RX ORDER — HEPARIN 100 UNIT/ML
500 SYRINGE INTRAVENOUS
Status: CANCELLED | OUTPATIENT
Start: 2024-02-12

## 2024-02-12 RX ORDER — SODIUM CHLORIDE 0.9 % (FLUSH) 0.9 %
10 SYRINGE (ML) INJECTION
Status: CANCELLED | OUTPATIENT
Start: 2024-02-12

## 2024-02-12 RX ORDER — HEPARIN 100 UNIT/ML
500 SYRINGE INTRAVENOUS
Status: DISCONTINUED | OUTPATIENT
Start: 2024-02-12 | End: 2024-02-12 | Stop reason: HOSPADM

## 2024-02-12 RX ORDER — DIPHENHYDRAMINE HYDROCHLORIDE 50 MG/ML
50 INJECTION INTRAMUSCULAR; INTRAVENOUS ONCE AS NEEDED
Status: DISCONTINUED | OUTPATIENT
Start: 2024-02-12 | End: 2024-02-12 | Stop reason: HOSPADM

## 2024-02-12 RX ORDER — EPINEPHRINE 0.3 MG/.3ML
0.3 INJECTION SUBCUTANEOUS ONCE AS NEEDED
Status: DISCONTINUED | OUTPATIENT
Start: 2024-02-12 | End: 2024-02-12 | Stop reason: HOSPADM

## 2024-02-12 RX ORDER — SODIUM CHLORIDE 0.9 % (FLUSH) 0.9 %
10 SYRINGE (ML) INJECTION
Status: DISCONTINUED | OUTPATIENT
Start: 2024-02-12 | End: 2024-02-12 | Stop reason: HOSPADM

## 2024-02-12 RX ORDER — EPINEPHRINE 0.3 MG/.3ML
0.3 INJECTION SUBCUTANEOUS ONCE AS NEEDED
Status: CANCELLED | OUTPATIENT
Start: 2024-02-12

## 2024-02-12 RX ORDER — DRONABINOL 5 MG/1
5 CAPSULE ORAL
Qty: 60 CAPSULE | Refills: 2 | Status: SHIPPED | OUTPATIENT
Start: 2024-02-12 | End: 2024-05-07 | Stop reason: SDUPTHER

## 2024-02-12 RX ADMIN — SODIUM CHLORIDE 400 MG: 9 INJECTION, SOLUTION INTRAVENOUS at 12:02

## 2024-02-12 RX ADMIN — SODIUM CHLORIDE: 9 INJECTION, SOLUTION INTRAVENOUS at 12:02

## 2024-02-12 NOTE — PLAN OF CARE
1241-Pt tolerated Keytruda well today, no complaints or complications. VSS. Pt aware to call provider with any questions or concerns and is aware of upcoming appts. Pt ambulatory from clinic with steady gait, no distress noted.

## 2024-02-12 NOTE — PROGRESS NOTES
MEDICAL ONCOLOGY - ESTABLISHED PATIENT VISIT    Reason for visit: duodenal adenocarcinoma     Best Contact Phone Number(s): [phone] (home)      Cancer/Stage/TNM:    Cancer Staging   No matching staging information was found for the patient.     Oncology History   Duodenal adenocarcinoma   3/10/2023 Initial Diagnosis    Duodenal adenocarcinoma     3/15/2023 Surgery    Whipple  dMMR with loss of MLH1/PMS2; MLH1 promoter hypermethylation present suggesting a sporadic tumor.      8/24/2023 -  Chemotherapy    Treatment Summary   Plan Name: OP PEMBROLIZUMAB 400MG Q6W  Treatment Goal: Palliative  Status: Active  Start Date: 8/24/2023  End Date: 6/23/2025 (Planned)  Provider: Eber Roberts MD  Chemotherapy: [No matching medication found in this treatment plan]          Interval History: 68 y.o. female with recurrent MSI-H metastatic duodenal adenocarcinoma who presents for follow-up prior to cycle 5 of pembrolizumab. Doing well overall. Had a cold a few weeks ago, now feeling much better. No diarrhea or change in bowel movements. No other concerns. Taking marinol sporadically for appetite as was trying to stretch out the prescription.     Presents to clinic with her . ECOG 0.       ROS:   Review of Systems   Constitutional:  Negative for chills, fever and malaise/fatigue.   HENT:  Negative for nosebleeds and sore throat.    Respiratory:  Negative for cough and shortness of breath.    Cardiovascular:  Negative for chest pain, palpitations and leg swelling.   Gastrointestinal:  Negative for blood in stool, constipation, diarrhea, heartburn, nausea and vomiting.   Genitourinary:  Negative for dysuria, frequency, hematuria and urgency.   Musculoskeletal:  Negative for falls and myalgias.   Skin:  Negative for itching and rash.   Neurological:  Negative for dizziness, tingling, weakness and headaches.   Psychiatric/Behavioral:  The patient is not nervous/anxious and does not have insomnia.        Past Medical  "History:   Past Medical History:   Diagnosis Date    Abnormal Pap smear of cervix     Arthritis     Duodenal adenocarcinoma     Hypertension     Hyperthyroidism         Past Surgical History:   Past Surgical History:   Procedure Laterality Date    CATARACT EXTRACTION W/  INTRAOCULAR LENS IMPLANT Right 8/8/2019    Procedure: EXTRACTION, CATARACT, WITH IOL INSERTION;  Surgeon: Spenser Lee MD;  Location: [facility];  Service: Ophthalmology;  Laterality: Right;    ESOPHAGOGASTRODUODENOSCOPY N/A 3/10/2023    Procedure: EGD (ESOPHAGOGASTRODUODENOSCOPY);  Surgeon: Shashi Tapia MD;  Location: [address]);  Service: Endoscopy;  Laterality: N/A;    EYE SURGERY      HYSTERECTOMY      OOPHORECTOMY      PHACOEMULSIFICATION OF CATARACT Right 8/8/2019    Procedure: PHACOEMULSIFICATION, CATARACT;  Surgeon: Spenser Lee MD;  Location: [facility];  Service: Ophthalmology;  Laterality: Right;    WHIPPLE PROCEDURE N/A 3/15/2023    Procedure: WHIPPLE PROCEDURE;  Surgeon: Nick Paez MD;  Location: [address];  Service: General;  Laterality: N/A;        Family History:   Family History   Problem Relation Age of Onset    Cancer Mother     Breast cancer Neg Hx     Colon cancer Neg Hx     Ovarian cancer Neg Hx         Social History:   Social History     Tobacco Use    Smoking status: Every Day     Current packs/day: 0.25     Average packs/day: 0.3 packs/day for 30.0 years (7.5 ttl pk-yrs)     Types: Cigarettes    Smokeless tobacco: Never    Tobacco comments:     About 7-8 cigs a day   Substance Use Topics    Alcohol use: Yes     Alcohol/week: 1.0 standard drink of alcohol     Types: 1 Cans of beer per week        I have reviewed and updated the patient's past medical, surgical, family and social histories.    Allergies:   Review of patient's allergies indicates:   Allergen Reactions    Aspirin Other (See Comments)     Pt states it is "hard on her stomach" She can tolerate a low dose aspirin    Pcn " [penicillins]      Childhood - Tolerated ceftriaxone and cefazolin with no documented issues in the past         Medications:   Current Outpatient Medications   Medication Sig Dispense Refill    acetaminophen (TYLENOL) 650 MG TbSR Take 650 mg by mouth as needed.      atorvastatin (LIPITOR) 40 MG tablet Take 1 tablet (40 mg total) by mouth once daily. 90 tablet 1    droNABinol (MARINOL) 5 MG capsule Take 1 capsule (5 mg total) by mouth 2 (two) times daily before meals. 60 capsule 2    famotidine (PEPCID) 20 MG tablet Take 1 tablet (20 mg total) by mouth 2 (two) times daily. 60 tablet 11    gabapentin (NEURONTIN) 300 MG capsule Take 1 capsule (300 mg total) by mouth 3 (three) times daily. 90 capsule 5    lisinopriL-hydrochlorothiazide (PRINZIDE,ZESTORETIC) 20-12.5 mg per tablet Take 1 tablet by mouth once daily. 90 tablet 3    omeprazole (PRILOSEC) 40 MG capsule Take 1 capsule (40 mg total) by mouth once daily. 30 capsule 5    traMADoL (ULTRAM) 50 mg tablet Take 1 tablet (50 mg total) by mouth every 12 (twelve) hours as needed for Pain. 14 tablet 0     No current facility-administered medications for this visit.     Facility-Administered Medications Ordered in Other Visits   Medication Dose Route Frequency Provider Last Rate Last Admin    alteplase injection 2 mg  2 mg Intra-Catheter PRN Radha Padilla, CNS        diphenhydrAMINE injection 50 mg  50 mg Intravenous Once PRN Radha Padilla, CNS        EPINEPHrine (EPIPEN) 0.3 mg/0.3 mL pen injection 0.3 mg  0.3 mg Intramuscular Once PRN Radha Padilla, CNS        heparin, porcine (PF) 100 unit/mL injection flush 500 Units  500 Units Intravenous PRN Radha Padilla, CNS        hydrocortisone sodium succinate injection 100 mg  100 mg Intravenous Once PRN Radha Padilla, CNS        pembrolizumab (KEYTRUDA) 400 mg in sodium chloride 0.9% SolP 131 mL infusion  400 mg Intravenous 1 time in Clinic/HOD Radha Padilla, CNS        sodium chloride 0.9% 100 mL flush bag    Intravenous 1 time in Clinic/HOD Radha Padilla CNS        sodium chloride 0.9% flush 10 mL  10 mL Intravenous PRN Radha Padilla CNS          Physical Exam:   BP (!) 119/58   Pulse 85   Resp 18   Wt 60.1 kg (132 lb 7.9 oz)   SpO2 99%   BMI 21.39 kg/m²      Physical Exam  Vitals reviewed.   Constitutional:       General: She is not in acute distress.     Appearance: Normal appearance. She is normal weight. She is not ill-appearing, toxic-appearing or diaphoretic.   HENT:      Head: Normocephalic and atraumatic.      Right Ear: External ear normal.      Left Ear: External ear normal.      Nose: Nose normal.      Mouth/Throat:      Pharynx: Oropharynx is clear.   Eyes:      General: No scleral icterus.     Conjunctiva/sclera: Conjunctivae normal.      Pupils: Pupils are equal, round, and reactive to light.   Cardiovascular:      Rate and Rhythm: Normal rate.   Pulmonary:      Effort: Pulmonary effort is normal. No respiratory distress.      Breath sounds: No wheezing.   Abdominal:      General: There is no distension.      Tenderness: There is no abdominal tenderness.      Comments: Midline abdominal incision well healed   Musculoskeletal:      Cervical back: Normal range of motion.      Right lower leg: No edema.      Left lower leg: No edema.   Skin:     General: Skin is warm and dry.      Coloration: Skin is not jaundiced or pale.      Findings: No bruising, erythema or rash.   Neurological:      General: No focal deficit present.      Mental Status: She is alert and oriented to person, place, and time. Mental status is at baseline.      Cranial Nerves: No cranial nerve deficit.      Sensory: No sensory deficit.      Motor: No weakness.      Gait: Gait normal.   Psychiatric:         Mood and Affect: Mood normal.         Behavior: Behavior normal.         Thought Content: Thought content normal.         Judgment: Judgment normal.         Labs:   No results found for this or any previous visit (from the  past 48 hour(s)).     I have reviewed the pertinent labs.     Imagin/8/24 - PET/CT  Impression:     Patient with duodenal adenocarcinoma status post Whipple procedure.  No evidence of hypermetabolic tumor.     Resolution of hypermetabolic nodules and ground-glass in the right middle lobe, presumably resolved infection/inflammation.    Path:   3/15/23 - Whipple  Final Pathologic Diagnosis      Abnormal   1. Lymph node, hepatic cautery, resection:   - One lymph node negative for metastatic carcinoma (0/1).   - See synoptic report.   2. Gallbladder, cholecystectomy:   - Benign gallbladder with no significant histologic abnormality.   3. Pancreas, duodenum, common bile duct, and partial gastrectomy,   pancreaticoduodenectomy (Whipple specimen):   - Poorly differentiated carcinoma with medullary features.   - See synoptic report.   - See comment.   Synoptic Report   Procedure: Pancreaticoduodenectomy with partial gastrectomy (Whipple   specimen)   Tumor site:  Pylorus and proximal duodenum   Histologic type: Poorly differentiated carcinoma with medullary features   Histologic grade: G3, poorly differentiated   Tumor size:  6.5 cm in greatest dimension grossly   Tumor extent:   Invades through muscularis propria into subserosa, or extends   into nonperitonealized perimuscular tissue (mesentery or retroperitoneum)   without serosal penetration   Macroscopic tumor perforation:  Not identified   Lymphovascular inv asion:  Not identified, see comment   Margin status for invasive carcinoma:  All margins negative for invasive   carcinoma   Margin status for dysplasia:  All margins negative for carcinoma in Situ   (high-grade dysplasia)/adenoma   Regional lymph node status: Regional lymph nodes present        All Regional lymph nodes negative for tumor        Number of lymph nodes examined:  15 (including specimens 1 and 3)   PATHOLOGIC STAGE CLASSIFICATION (pTNM, AJCC 8th Edition):  p T3 N0   Additional findings:   Stomach with intestinal metaplasia (immunostain for   H.pylori negative), 2 lymph nodes with fibrosis and calcifications (GMS stain   for fungal organisms and AFB stain negative)   Ancillary studies:  Immunohistochemistry (IHC) Testing for Mismatch Repair   (MMR) Proteins   MLH1- Loss of nuclear expression   PMS2 - Loss of nuclear expression   MSH2 - Intact nuclear expression   MSH6 - Intact nuclear expression   Background nonneoplastic tissue/internal control with intact nuclear   expression   IHC Interpret ation:  Loss of nuclear expression of MLH1 and PMS2: testing for   methylation of the MLH1 promoter and/or mutation of BRAF is indicated (the   presence of a BRAF V600E mutation and/or MLH1 methylation suggests that the   tumor is sporadic and germline evaluation is probably not indicated; absence   of both MLH1 methylation and of BRAF V600E mutation suggests the possibility   of Seaman syndrome, and sequencing and/or large deletion/duplication testing   of germline MLH1 may be indicated)   Testing for methylation of the MLH1 promoter and mutation of BRAF is in   process and results will be supplemented.   There are exceptions to the above IHC interpretations. These results should   not be considered in isolation, and clinical correlation with genetic   counseling is recommended to assess the need for germline testing.   COMMENT:  The stomach and duodenum (specimen 3) shows a 6.5 cm mass involving   the pylorus with the majority of the tumor appearing to be in the duodenum.   The tumor shows poorly d ifferentiated sheets of blue cells with necrosis.   There is a significant amount of lymphocytic inflammatory infiltrate around   the edges of the tumor.  Immunostains show the tumor cells to be positive for   CK7 with patchy weak CDX2 and negative for synaptophysin and chromogranin.   There is also loss of MLH1 and PMS2 on MMR stains.  These features are   suggestive of medullary carcinoma.  Select slides reviewed  "by Dr. ITZEL Sánchez   who agrees with the diagnosis of poorly differentiated carcinoma with   medullary features.  Immunostains for CD 34 performed on blocks 3E and 3H   show no definitive lymphovascular invasion even though some areas suspicious   cells on routine H&E sections.  Appropriate positive controls   VC      Comment: Interp By Meredith Pappas MD, Signed on 04/12/2023 at 16:03   Supplemental Diagnosis      Abnormal   MLH1 Hypermethylation/BRAF Mutation   Result Summary   MLH1 HYPERMETHYLATION PRESENT/NO BRAF MUTATION IDENTIFIED   Result   Provided diagnosis: pylorus and proximal duodenum poorly differentiated   carcinoma with medullary features   Methylation status: Positive for MLH1 promoter hypermethylation   BRAF status: Wild-type (SEE INTERPRETATION)   Alexis Sotelo M.D.   Report attached   Performing location:   [place] [address]   "Disclaimer:  This case diagnosis was rendered completely by the outside   consultation pathologist and the case is electronically signed by an [facility]   pathologist listed below solely to release the report into the medical   record."          Assessment:       1. Duodenal adenocarcinoma    2. Metastasis to liver    3. Decreased appetite    4. Moderate malnutrition    5. Anemia, unspecified type    6. Essential hypertension    7. Hyperlipidemia, unspecified hyperlipidemia type    8. Diabetes mellitus type 2, diet-controlled    9. Aortic atherosclerosis       Plan:     # Duodenal adenocarcinoma   Mrs. Simms is a pleasant 68 y.o. 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 Pathology did reveal some high grade features, including poorly differentiated carcinoma, 6.5 cm size.  We discussed at her initial visit he limited role of adjuvant chemotherapy in the stage II setting for small bowel adenocarcinoma.      Of note she has MLH1 promoter " hypermethylation so her dMMR status is likely sporadic rather than germline.     CT CAP on 7/26/23 demonstrated concern for recurrence in liver.  PET/CT on 8/4/23 showed hypermetabolic inferior R hepatic lobe lesion consistent with metastatic recurrence.  CEA has risen as well.    Given her dMMR/MSI-H status, we recommended to start on single agent pembrolizumab 400 mg q6 weeks.  Patient was consented for immunotherapy. An extensive discussion was had which included a thorough discussion of the risk and benefits of treatment and alternatives. Risks, including but not limited to, immune-mediated toxicities involving multiple body organs including lungs, kidneys, GI tract, liver, heart, central nervous system, skin, thyroid, pituitary gland, and others were all explained to the patient. The patient agrees with the plan, and all questions have been answered to their satisfaction. Consent was signed the patient, provider, and a third party witness.    She was also enrolled in our protocol: [id number], Disparities in Results of Immune Checkpoint Inhibitor Treatment (DIRECT): A Prospective Cohort Study of Cancer Survivors Treated with anti-PD-L1 Immunotherapy in a Community Oncology Setting.  CRC is Isabela Lynnette.    No toxicities noted from pembrolizumab cycle 1.  PET/CT after cycle 2 shows resolution of liver metastasis.  Question of grade 1 pneumonitis with GGO on that scan but this has since resolved on CT chest done 12/15 which makes pneumonitis less likely diagnosis.  No other IO toxicities.  PET/CT after cycle 4 shows resolution of hypermetabolic nodules and ground-glass in right middle lobe of lung. No evidence of other hypermetabolic areas.     Labs reviewed and adequate for treatment.   Will proceed with cycle 5 of pembrolizumab today.    [facility] Tissue NGS: MSI-H, TMB 76.3 m/MB  Repeat imaging with PET prior to cycle 7 of pembrolizumab.    # Decreased appetite, malnutrition  -Appetite improved with Marinol. Refill  sent today. Reviewed admin instructions.   -Monitor weight closely. Nutrition following.    # Anemia  -Microcytic anemia improving. Suspect thalassemia given longstanding nature.  -No signs of bleeding, platelets normal.   -Asymptomatic. Monitor.     # HTN, HLD, DM, aortic atherosclerosis   -BP normal in clinic today.    -Following with PCP.   -Continue medical management.     Patient is in agreement with the proposed treatment plan. All questions were answered to the patient's satisfaction. Pt knows to call clinic if anything is needed before the next clinic visit.    Patient discussed with and seen in conjunction with collaborating physician, Dr. Roberts.    At least 40 minutes were spent today on this encounter including face to face time with the patient, data gathering/interpretation and documentation.       Radha Padilla, MSN, APRN, ACCNS-AG  Hematology and Medical Oncology  Clinical Nurse Specialist to Dr. Roberts, Dr. Schilling & Dr. Vazquez Chart for Scheduling    Med Onc Chart Routing      Follow up with physician 3 months. with labs and scans 1-2 days prior to see Dr. Roberts for cycle 7 infusion   Follow up with YANDEL 6 weeks. with labs for cycle 6 infusion   Infusion scheduling note   keytruda every 6 weeks   Injection scheduling note    Labs CBC, CMP, CEA and TSH   Scheduling:  Preferred lab:  Lab interval: every 6 weeks     Imaging CT chest abdomen pelvis   1-2 days prior to visit in 12 weeks.   Pharmacy appointment    Other referrals              Treatment Plan Information   OP PEMBROLIZUMAB 400MG Q6W   Eber Roberts MD   Upcoming Treatment Dates - OP PEMBROLIZUMAB 400MG Q6W    3/18/2024       Chemotherapy       pembrolizumab (KEYTRUDA) 400 mg in sodium chloride 0.9% SolP 116 mL infusion  4/29/2024       Chemotherapy       pembrolizumab (KEYTRUDA) 400 mg in sodium chloride 0.9% SolP 116 mL infusion  6/10/2024       Chemotherapy       pembrolizumab (KEYTRUDA) 400 mg in sodium chloride 0.9%  SolP 116 mL infusion  7/22/2024       Chemotherapy       pembrolizumab (KEYTRUDA) 400 mg in sodium chloride 0.9% SolP 116 mL infusion

## 2024-02-21 DIAGNOSIS — Z12.31 ENCOUNTER FOR SCREENING MAMMOGRAM FOR BREAST CANCER: Primary | ICD-10-CM

## 2024-03-05 ENCOUNTER — HOSPITAL ENCOUNTER (OUTPATIENT)
Dept: RADIOLOGY | Facility: HOSPITAL | Age: 69
Discharge: HOME | End: 2024-03-05
Attending: NURSE PRACTITIONER
Payer: MEDICARE

## 2024-03-05 VITALS — BODY MASS INDEX: 21.21 KG/M2 | WEIGHT: 132 LBS | HEIGHT: 66 IN

## 2024-03-05 DIAGNOSIS — Z12.31 ENCOUNTER FOR SCREENING MAMMOGRAM FOR BREAST CANCER: ICD-10-CM

## 2024-03-05 PROCEDURE — 77063 BREAST TOMOSYNTHESIS BI: CPT | Mod: 26,,, | Performed by: RADIOLOGY

## 2024-03-05 PROCEDURE — 77067 SCR MAMMO BI INCL CAD: CPT | Mod: TC

## 2024-03-05 PROCEDURE — 77067 SCR MAMMO BI INCL CAD: CPT | Mod: 26,,, | Performed by: RADIOLOGY

## 2024-03-25 ENCOUNTER — OFFICE VISIT (OUTPATIENT)
Dept: HEMATOLOGY/ONCOLOGY | Facility: CLINIC | Age: 69
End: 2024-03-25
Payer: MEDICARE

## 2024-03-25 ENCOUNTER — INFUSION (OUTPATIENT)
Dept: INFUSION THERAPY | Facility: HOSPITAL | Age: 69
End: 2024-03-25
Payer: MEDICARE

## 2024-03-25 VITALS
TEMPERATURE: 99 F | DIASTOLIC BLOOD PRESSURE: 63 MMHG | HEART RATE: 49 BPM | SYSTOLIC BLOOD PRESSURE: 140 MMHG | HEIGHT: 66 IN | WEIGHT: 142 LBS | RESPIRATION RATE: 18 BRPM | BODY MASS INDEX: 22.82 KG/M2

## 2024-03-25 VITALS
BODY MASS INDEX: 22.82 KG/M2 | SYSTOLIC BLOOD PRESSURE: 109 MMHG | OXYGEN SATURATION: 99 % | HEART RATE: 45 BPM | HEIGHT: 66 IN | DIASTOLIC BLOOD PRESSURE: 54 MMHG | WEIGHT: 142 LBS | TEMPERATURE: 99 F

## 2024-03-25 DIAGNOSIS — I70.0 AORTIC ATHEROSCLEROSIS: ICD-10-CM

## 2024-03-25 DIAGNOSIS — D64.9 ANEMIA, UNSPECIFIED TYPE: ICD-10-CM

## 2024-03-25 DIAGNOSIS — C78.7 METASTASIS TO LIVER: ICD-10-CM

## 2024-03-25 DIAGNOSIS — R63.0 DECREASED APPETITE: ICD-10-CM

## 2024-03-25 DIAGNOSIS — C17.0 DUODENAL ADENOCARCINOMA: Primary | ICD-10-CM

## 2024-03-25 DIAGNOSIS — E78.5 HYPERLIPIDEMIA, UNSPECIFIED HYPERLIPIDEMIA TYPE: ICD-10-CM

## 2024-03-25 DIAGNOSIS — I10 ESSENTIAL HYPERTENSION: ICD-10-CM

## 2024-03-25 DIAGNOSIS — E11.9 DIABETES MELLITUS TYPE 2, DIET-CONTROLLED: ICD-10-CM

## 2024-03-25 DIAGNOSIS — E44.0 MODERATE MALNUTRITION: ICD-10-CM

## 2024-03-25 PROCEDURE — 99214 OFFICE O/P EST MOD 30 MIN: CPT | Mod: S$GLB,,, | Performed by: INTERNAL MEDICINE

## 2024-03-25 PROCEDURE — 99999 PR PBB SHADOW E&M-EST. PATIENT-LVL III: CPT | Mod: PBBFAC,,, | Performed by: INTERNAL MEDICINE

## 2024-03-25 PROCEDURE — 96413 CHEMO IV INFUSION 1 HR: CPT

## 2024-03-25 PROCEDURE — 63600175 PHARM REV CODE 636 W HCPCS: Mod: JZ,JG | Performed by: REGISTERED NURSE

## 2024-03-25 PROCEDURE — 25000003 PHARM REV CODE 250: Performed by: REGISTERED NURSE

## 2024-03-25 RX ORDER — HEPARIN 100 UNIT/ML
500 SYRINGE INTRAVENOUS
Status: DISCONTINUED | OUTPATIENT
Start: 2024-03-25 | End: 2024-03-25 | Stop reason: HOSPADM

## 2024-03-25 RX ORDER — DIPHENHYDRAMINE HYDROCHLORIDE 50 MG/ML
50 INJECTION INTRAMUSCULAR; INTRAVENOUS ONCE AS NEEDED
Status: DISCONTINUED | OUTPATIENT
Start: 2024-03-25 | End: 2024-03-25 | Stop reason: HOSPADM

## 2024-03-25 RX ORDER — HEPARIN 100 UNIT/ML
500 SYRINGE INTRAVENOUS
Status: CANCELLED | OUTPATIENT
Start: 2024-03-25

## 2024-03-25 RX ORDER — SODIUM CHLORIDE 0.9 % (FLUSH) 0.9 %
10 SYRINGE (ML) INJECTION
Status: CANCELLED | OUTPATIENT
Start: 2024-03-25

## 2024-03-25 RX ORDER — EPINEPHRINE 0.3 MG/.3ML
0.3 INJECTION SUBCUTANEOUS ONCE AS NEEDED
Status: CANCELLED | OUTPATIENT
Start: 2024-03-25

## 2024-03-25 RX ORDER — SODIUM CHLORIDE 0.9 % (FLUSH) 0.9 %
10 SYRINGE (ML) INJECTION
Status: DISCONTINUED | OUTPATIENT
Start: 2024-03-25 | End: 2024-03-25 | Stop reason: HOSPADM

## 2024-03-25 RX ORDER — DIPHENHYDRAMINE HYDROCHLORIDE 50 MG/ML
50 INJECTION INTRAMUSCULAR; INTRAVENOUS ONCE AS NEEDED
Status: CANCELLED | OUTPATIENT
Start: 2024-03-25

## 2024-03-25 RX ORDER — EPINEPHRINE 0.3 MG/.3ML
0.3 INJECTION SUBCUTANEOUS ONCE AS NEEDED
Status: DISCONTINUED | OUTPATIENT
Start: 2024-03-25 | End: 2024-03-25 | Stop reason: HOSPADM

## 2024-03-25 RX ADMIN — SODIUM CHLORIDE: 9 INJECTION, SOLUTION INTRAVENOUS at 02:03

## 2024-03-25 RX ADMIN — SODIUM CHLORIDE 400 MG: 9 INJECTION, SOLUTION INTRAVENOUS at 02:03

## 2024-03-25 NOTE — PLAN OF CARE
1520-Pt tolerated Keytruda well today, no complaints or complications. VSS. Pt aware to call provider with any questions or concerns and is aware of upcoming appts. Pt ambulatory from clinic with steady gait, no distress noted.     Follow-up with your surgeon to see if Test result will allow procedure to be completed.

## 2024-03-25 NOTE — PROGRESS NOTES
MEDICAL ONCOLOGY - ESTABLISHED PATIENT VISIT    Reason for visit: duodenal adenocarcinoma     Best Contact Phone Number(s): [phone] (home)      Cancer/Stage/TNM:    Cancer Staging   No matching staging information was found for the patient.     Oncology History   Duodenal adenocarcinoma   3/10/2023 Initial Diagnosis    Duodenal adenocarcinoma     3/15/2023 Surgery    Whipple  dMMR with loss of MLH1/PMS2; MLH1 promoter hypermethylation present suggesting a sporadic tumor.      8/24/2023 -  Chemotherapy    Treatment Summary   Plan Name: OP PEMBROLIZUMAB 400MG Q6W  Treatment Goal: Palliative  Status: Active  Start Date: 8/24/2023  End Date: 6/23/2025 (Planned)  Provider: Eber Roberts MD  Chemotherapy: [No matching medication found in this treatment plan]          Interval History: 68 y.o. female with recurrent MSI-H metastatic duodenal adenocarcinoma who presents for follow-up prior to cycle 5 of pembrolizumab. Doing well overall. Remains active walking around the neighborhood. Appetite is good with Marinol (taking at night), weight increasing. Denies fever/chills, SOB, CP, palpitations, N/V, C/D, pain, blood in urine/stool, paresthesias.     Presents to clinic with her . ECOG 0.     ROS:   Review of Systems   Constitutional:  Negative for chills, fever and malaise/fatigue.   HENT:  Negative for nosebleeds and sore throat.    Respiratory:  Negative for cough and shortness of breath.    Cardiovascular:  Negative for chest pain, palpitations and leg swelling.   Gastrointestinal:  Negative for blood in stool, constipation, diarrhea, heartburn, nausea and vomiting.   Genitourinary:  Negative for dysuria, frequency, hematuria and urgency.   Musculoskeletal:  Negative for falls and myalgias.   Skin:  Negative for itching and rash.   Neurological:  Negative for dizziness, tingling, weakness and headaches.   Psychiatric/Behavioral:  The patient is not nervous/anxious and does not have insomnia.        Past  "Medical History:   Past Medical History:   Diagnosis Date    Abnormal Pap smear of cervix     Arthritis     Duodenal adenocarcinoma     Hypertension     Hyperthyroidism         Past Surgical History:   Past Surgical History:   Procedure Laterality Date    CATARACT EXTRACTION W/  INTRAOCULAR LENS IMPLANT Right 8/8/2019    Procedure: EXTRACTION, CATARACT, WITH IOL INSERTION;  Surgeon: Spenser Lee MD;  Location: [facility];  Service: Ophthalmology;  Laterality: Right;    ESOPHAGOGASTRODUODENOSCOPY N/A 3/10/2023    Procedure: EGD (ESOPHAGOGASTRODUODENOSCOPY);  Surgeon: Shashi Tapia MD;  Location: [address]);  Service: Endoscopy;  Laterality: N/A;    EYE SURGERY      HYSTERECTOMY      OOPHORECTOMY      PHACOEMULSIFICATION OF CATARACT Right 8/8/2019    Procedure: PHACOEMULSIFICATION, CATARACT;  Surgeon: Spenser Lee MD;  Location: [facility];  Service: Ophthalmology;  Laterality: Right;    WHIPPLE PROCEDURE N/A 3/15/2023    Procedure: WHIPPLE PROCEDURE;  Surgeon: Nick Paez MD;  Location: [address];  Service: General;  Laterality: N/A;        Family History:   Family History   Problem Relation Age of Onset    Cancer Mother     Breast cancer Neg Hx     Colon cancer Neg Hx     Ovarian cancer Neg Hx         Social History:   Social History     Tobacco Use    Smoking status: Every Day     Current packs/day: 0.25     Average packs/day: 0.3 packs/day for 30.0 years (7.5 ttl pk-yrs)     Types: Cigarettes    Smokeless tobacco: Never    Tobacco comments:     About 7-8 cigs a day   Substance Use Topics    Alcohol use: Yes     Alcohol/week: 1.0 standard drink of alcohol     Types: 1 Cans of beer per week        I have reviewed and updated the patient's past medical, surgical, family and social histories.    Allergies:   Review of patient's allergies indicates:   Allergen Reactions    Aspirin Other (See Comments)     Pt states it is "hard on her stomach" She can tolerate a low dose aspirin    " "Pcn [penicillins]      Childhood - Tolerated ceftriaxone and cefazolin with no documented issues in the past         Medications:   Current Outpatient Medications   Medication Sig Dispense Refill    acetaminophen (TYLENOL) 650 MG TbSR Take 650 mg by mouth as needed.      atorvastatin (LIPITOR) 40 MG tablet Take 1 tablet (40 mg total) by mouth once daily. 90 tablet 1    droNABinol (MARINOL) 5 MG capsule Take 1 capsule (5 mg total) by mouth 2 (two) times daily before meals. 60 capsule 2    famotidine (PEPCID) 20 MG tablet Take 1 tablet (20 mg total) by mouth 2 (two) times daily. 60 tablet 11    gabapentin (NEURONTIN) 300 MG capsule Take 1 capsule (300 mg total) by mouth 3 (three) times daily. 90 capsule 5    lisinopriL-hydrochlorothiazide (PRINZIDE,ZESTORETIC) 20-12.5 mg per tablet Take 1 tablet by mouth once daily. 90 tablet 3    omeprazole (PRILOSEC) 40 MG capsule Take 1 capsule (40 mg total) by mouth once daily. 30 capsule 5    traMADoL (ULTRAM) 50 mg tablet Take 1 tablet (50 mg total) by mouth every 12 (twelve) hours as needed for Pain. 14 tablet 0     No current facility-administered medications for this visit.      Physical Exam:   BP (!) 109/54 (BP Location: Left arm, Patient Position: Sitting, BP Method: Medium (Automatic))   Pulse (!) 45   Temp 98.6 °F (37 °C) (Oral)   Ht 5' 6" (1.676 m)   Wt 64.4 kg (141 lb 15.6 oz)   SpO2 99%   BMI 22.92 kg/m²      Physical Exam  Vitals reviewed.   Constitutional:       General: She is not in acute distress.     Appearance: Normal appearance. She is normal weight. She is not ill-appearing, toxic-appearing or diaphoretic.   HENT:      Head: Normocephalic and atraumatic.      Right Ear: External ear normal.      Left Ear: External ear normal.      Nose: Nose normal.      Mouth/Throat:      Pharynx: Oropharynx is clear.   Eyes:      General: No scleral icterus.     Conjunctiva/sclera: Conjunctivae normal.      Pupils: Pupils are equal, round, and reactive to light. "   Cardiovascular:      Rate and Rhythm: Normal rate.   Pulmonary:      Effort: Pulmonary effort is normal. No respiratory distress.      Breath sounds: No wheezing.   Abdominal:      General: There is no distension.      Tenderness: There is no abdominal tenderness.      Comments: Midline abdominal incision well healed   Musculoskeletal:      Cervical back: Normal range of motion.      Right lower leg: No edema.      Left lower leg: No edema.   Skin:     General: Skin is warm and dry.      Coloration: Skin is not jaundiced or pale.      Findings: No bruising, erythema or rash.   Neurological:      General: No focal deficit present.      Mental Status: She is alert and oriented to person, place, and time. Mental status is at baseline.      Cranial Nerves: No cranial nerve deficit.      Sensory: No sensory deficit.      Motor: No weakness.      Gait: Gait normal.   Psychiatric:         Mood and Affect: Mood normal.         Behavior: Behavior normal.         Thought Content: Thought content normal.         Judgment: Judgment normal.         Labs:   Recent Results (from the past 48 hour(s))   CBC Oncology    Collection Time: 03/25/24 12:00 PM   Result Value Ref Range    WBC 4.83 3.90 - 12.70 K/uL    RBC 4.79 4.00 - 5.40 M/uL    Hemoglobin 10.9 (L) 12.0 - 16.0 g/dL    Hematocrit 35.4 (L) 37.0 - 48.5 %    MCV 74 (L) 82 - 98 fL    MCH 22.8 (L) 27.0 - 31.0 pg    MCHC 30.8 (L) 32.0 - 36.0 g/dL    RDW 17.3 (H) 11.5 - 14.5 %    Platelets 232 150 - 450 K/uL    MPV 11.2 9.2 - 12.9 fL    Gran # (ANC) 2.6 1.8 - 7.7 K/uL    Immature Grans (Abs) 0.01 0.00 - 0.04 K/uL   Comprehensive Metabolic Panel    Collection Time: 03/25/24 12:00 PM   Result Value Ref Range    Sodium 142 136 - 145 mmol/L    Potassium 4.0 3.5 - 5.1 mmol/L    Chloride 104 95 - 110 mmol/L    Glucose 93 70 - 110 mg/dL    Calcium 9.9 8.7 - 10.5 mg/dL    Total Protein 6.9 6.0 - 8.4 g/dL    Albumin 3.6 3.5 - 5.2 g/dL      I have reviewed the pertinent labs.     Imaging:     2/8/24 - PET/CT  Impression:     Patient with duodenal adenocarcinoma status post Whipple procedure.  No evidence of hypermetabolic tumor.     Resolution of hypermetabolic nodules and ground-glass in the right middle lobe, presumably resolved infection/inflammation.    Path:   3/15/23 - Whipple  Final Pathologic Diagnosis      Abnormal   1. Lymph node, hepatic cautery, resection:   - One lymph node negative for metastatic carcinoma (0/1).   - See synoptic report.   2. Gallbladder, cholecystectomy:   - Benign gallbladder with no significant histologic abnormality.   3. Pancreas, duodenum, common bile duct, and partial gastrectomy,   pancreaticoduodenectomy (Whipple specimen):   - Poorly differentiated carcinoma with medullary features.   - See synoptic report.   - See comment.   Synoptic Report   Procedure: Pancreaticoduodenectomy with partial gastrectomy (Whipple   specimen)   Tumor site:  Pylorus and proximal duodenum   Histologic type: Poorly differentiated carcinoma with medullary features   Histologic grade: G3, poorly differentiated   Tumor size:  6.5 cm in greatest dimension grossly   Tumor extent:   Invades through muscularis propria into subserosa, or extends   into nonperitonealized perimuscular tissue (mesentery or retroperitoneum)   without serosal penetration   Macroscopic tumor perforation:  Not identified   Lymphovascular inv asion:  Not identified, see comment   Margin status for invasive carcinoma:  All margins negative for invasive   carcinoma   Margin status for dysplasia:  All margins negative for carcinoma in Situ   (high-grade dysplasia)/adenoma   Regional lymph node status: Regional lymph nodes present        All Regional lymph nodes negative for tumor        Number of lymph nodes examined:  15 (including specimens 1 and 3)   PATHOLOGIC STAGE CLASSIFICATION (pTNM, AJCC 8th Edition):  p T3 N0   Additional findings:  Stomach with intestinal metaplasia (immunostain for   H.pylori negative), 2  lymph nodes with fibrosis and calcifications (GMS stain   for fungal organisms and AFB stain negative)   Ancillary studies:  Immunohistochemistry (IHC) Testing for Mismatch Repair   (MMR) Proteins   MLH1- Loss of nuclear expression   PMS2 - Loss of nuclear expression   MSH2 - Intact nuclear expression   MSH6 - Intact nuclear expression   Background nonneoplastic tissue/internal control with intact nuclear   expression   IHC Interpret ation:  Loss of nuclear expression of MLH1 and PMS2: testing for   methylation of the MLH1 promoter and/or mutation of BRAF is indicated (the   presence of a BRAF V600E mutation and/or MLH1 methylation suggests that the   tumor is sporadic and germline evaluation is probably not indicated; absence   of both MLH1 methylation and of BRAF V600E mutation suggests the possibility   of Seaman syndrome, and sequencing and/or large deletion/duplication testing   of germline MLH1 may be indicated)   Testing for methylation of the MLH1 promoter and mutation of BRAF is in   process and results will be supplemented.   There are exceptions to the above IHC interpretations. These results should   not be considered in isolation, and clinical correlation with genetic   counseling is recommended to assess the need for germline testing.   COMMENT:  The stomach and duodenum (specimen 3) shows a 6.5 cm mass involving   the pylorus with the majority of the tumor appearing to be in the duodenum.   The tumor shows poorly d ifferentiated sheets of blue cells with necrosis.   There is a significant amount of lymphocytic inflammatory infiltrate around   the edges of the tumor.  Immunostains show the tumor cells to be positive for   CK7 with patchy weak CDX2 and negative for synaptophysin and chromogranin.   There is also loss of MLH1 and PMS2 on MMR stains.  These features are   suggestive of medullary carcinoma.  Select slides reviewed by Dr. ITZEL Sánchez   who agrees with the diagnosis of poorly differentiated  "carcinoma with   medullary features.  Immunostains for CD 34 performed on blocks 3E and 3H   show no definitive lymphovascular invasion even though some areas suspicious   cells on routine H&E sections.  Appropriate positive controls   VC      Comment: Interp By Meredith Pappas MD, Signed on 04/12/2023 at 16:03   Supplemental Diagnosis      Abnormal   MLH1 Hypermethylation/BRAF Mutation   Result Summary   MLH1 HYPERMETHYLATION PRESENT/NO BRAF MUTATION IDENTIFIED   Result   Provided diagnosis: pylorus and proximal duodenum poorly differentiated   carcinoma with medullary features   Methylation status: Positive for MLH1 promoter hypermethylation   BRAF status: Wild-type (SEE INTERPRETATION)   Alexis Sotelo M.D.   Report attached   Performing location:   [place] [address]   "Disclaimer:  This case diagnosis was rendered completely by the outside   consultation pathologist and the case is electronically signed by an [facility]   pathologist listed below solely to release the report into the medical   record."          Assessment:       1. Duodenal adenocarcinoma    2. Metastasis to liver    3. Decreased appetite    4. Moderate malnutrition    5. Anemia, unspecified type    6. Essential hypertension    7. Hyperlipidemia, unspecified hyperlipidemia type    8. Diabetes mellitus type 2, diet-controlled    9. Aortic atherosclerosis       Plan:     # Duodenal adenocarcinoma   Mrs. Simms is a pleasant 68 y.o. 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 Pathology did reveal some high grade features, including poorly differentiated carcinoma, 6.5 cm size.  We discussed at her initial visit he limited role of adjuvant chemotherapy in the stage II setting for small bowel adenocarcinoma.      Of note she has MLH1 promoter hypermethylation so her dMMR status is likely sporadic rather than " germline.     CT CAP on 7/26/23 demonstrated concern for recurrence in liver.  PET/CT on 8/4/23 showed hypermetabolic inferior R hepatic lobe lesion consistent with metastatic recurrence.  CEA has risen as well.    Given her dMMR/MSI-H status, we recommended to start on single agent pembrolizumab 400 mg q6 weeks.  Patient was consented for immunotherapy. An extensive discussion was had which included a thorough discussion of the risk and benefits of treatment and alternatives. Risks, including but not limited to, immune-mediated toxicities involving multiple body organs including lungs, kidneys, GI tract, liver, heart, central nervous system, skin, thyroid, pituitary gland, and others were all explained to the patient. The patient agrees with the plan, and all questions have been answered to their satisfaction. Consent was signed the patient, provider, and a third party witness.    She was also enrolled in our protocol: [id number], Disparities in Results of Immune Checkpoint Inhibitor Treatment (DIRECT): A Prospective Cohort Study of Cancer Survivors Treated with anti-PD-L1 Immunotherapy in a Community Oncology Setting.  CRC is Isabela Lynnette.    No toxicities noted from pembrolizumab cycle 1.  PET/CT after cycle 2 shows resolution of liver metastasis.  Question of grade 1 pneumonitis with GGO on that scan but this has since resolved on CT chest done 12/15 which makes pneumonitis less likely diagnosis.  No other IO toxicities.  PET/CT after cycle 4 shows resolution of hypermetabolic nodules and ground-glass in right middle lobe of lung. No evidence of other hypermetabolic areas.     Labs reviewed and adequate for treatment.   Will proceed with cycle 6 of pembrolizumab today.    [facility] Tissue NGS: MSI-H, TMB 76.3 m/MB  Repeat imaging with PET prior to cycle 7 of pembrolizumab.    # Decreased appetite, malnutrition  -Appetite improved with Marinol. Taking at night.   -Monitor weight closely, now increasing. Nutrition  following.    # Anemia  -Microcytic anemia improving. Suspect thalassemia given longstanding nature.  -No signs of bleeding, platelets normal.   -Asymptomatic. Monitor.     # HTN, HLD, DM, aortic atherosclerosis   -BP low normal in clinic today.    -Following with PCP.   -Continue medical management.     Patient is in agreement with the proposed treatment plan. All questions were answered to the patient's satisfaction. Pt knows to call clinic if anything is needed before the next clinic visit.    Patient discussed with collaborating physician, Dr. Roberts.    At least 30 minutes were spent today on this encounter including face to face time with the patient, data gathering/interpretation and documentation.       Radha Padilla, MSN, APRN, [facility]  Hematology and Medical Oncology  Clinical Nurse Specialist to Dr. Roberts, Dr. Schilling & Dr. Vazquez Chart for Scheduling    Med Onc Chart Routing      Follow up with physician 6 weeks. as scheduled with scans   Follow up with YANDEL    Infusion scheduling note    Injection scheduling note    Labs    Imaging    Pharmacy appointment    Other referrals              Treatment Plan Information   OP PEMBROLIZUMAB 400MG Q6W   Eber Roberts MD   Upcoming Treatment Dates - OP PEMBROLIZUMAB 400MG Q6W    3/18/2024       Chemotherapy       pembrolizumab (KEYTRUDA) 400 mg in sodium chloride 0.9% SolP 116 mL infusion  4/29/2024       Chemotherapy       pembrolizumab (KEYTRUDA) 400 mg in sodium chloride 0.9% SolP 116 mL infusion  6/10/2024       Chemotherapy       pembrolizumab (KEYTRUDA) 400 mg in sodium chloride 0.9% SolP 116 mL infusion  7/22/2024       Chemotherapy       pembrolizumab (KEYTRUDA) 400 mg in sodium chloride 0.9% SolP 116 mL infusion

## 2024-04-25 ENCOUNTER — LAB VISIT (OUTPATIENT)
Dept: LAB | Facility: HOSPITAL | Age: 69
End: 2024-04-25
Attending: NURSE PRACTITIONER
Payer: MEDICARE

## 2024-04-25 DIAGNOSIS — E11.9 DIABETES MELLITUS TYPE 2, DIET-CONTROLLED: ICD-10-CM

## 2024-04-25 DIAGNOSIS — I10 ESSENTIAL HYPERTENSION: ICD-10-CM

## 2024-04-25 DIAGNOSIS — E78.5 HYPERLIPIDEMIA, UNSPECIFIED HYPERLIPIDEMIA TYPE: ICD-10-CM

## 2024-04-25 LAB
ALBUMIN SERPL BCP-MCNC: 3.4 G/DL (ref 3.5–5.2)
ALP SERPL-CCNC: 95 U/L (ref 55–135)
ALT SERPL W/O P-5'-P-CCNC: 15 U/L (ref 10–44)
ANION GAP SERPL CALC-SCNC: 7 MMOL/L (ref 8–16)
AST SERPL-CCNC: 21 U/L (ref 10–40)
BASOPHILS # BLD AUTO: 0.02 K/UL (ref 0–0.2)
BASOPHILS NFR BLD: 0.5 % (ref 0–1.9)
BILIRUB SERPL-MCNC: 0.5 MG/DL (ref 0.1–1)
BUN SERPL-MCNC: 15 MG/DL (ref 8–23)
CALCIUM SERPL-MCNC: 9.4 MG/DL (ref 8.7–10.5)
CHLORIDE SERPL-SCNC: 105 MMOL/L (ref 95–110)
CHOLEST SERPL-MCNC: 214 MG/DL (ref 120–199)
CHOLEST/HDLC SERPL: 2.5 {RATIO} (ref 2–5)
CO2 SERPL-SCNC: 32 MMOL/L (ref 23–29)
CREAT SERPL-MCNC: 0.7 MG/DL (ref 0.5–1.4)
DIFFERENTIAL METHOD BLD: ABNORMAL
EOSINOPHIL # BLD AUTO: 0.1 K/UL (ref 0–0.5)
EOSINOPHIL NFR BLD: 2.8 % (ref 0–8)
ERYTHROCYTE [DISTWIDTH] IN BLOOD BY AUTOMATED COUNT: 18.3 % (ref 11.5–14.5)
EST. GFR  (NO RACE VARIABLE): >60 ML/MIN/1.73 M^2
ESTIMATED AVG GLUCOSE: 108 MG/DL (ref 68–131)
GLUCOSE SERPL-MCNC: 89 MG/DL (ref 70–110)
HBA1C MFR BLD: 5.4 % (ref 4–5.6)
HCT VFR BLD AUTO: 37.2 % (ref 37–48.5)
HDLC SERPL-MCNC: 84 MG/DL (ref 40–75)
HDLC SERPL: 39.3 % (ref 20–50)
HGB BLD-MCNC: 11.5 G/DL (ref 12–16)
IMM GRANULOCYTES # BLD AUTO: 0.01 K/UL (ref 0–0.04)
IMM GRANULOCYTES NFR BLD AUTO: 0.2 % (ref 0–0.5)
LDLC SERPL CALC-MCNC: 114 MG/DL (ref 63–159)
LYMPHOCYTES # BLD AUTO: 1.3 K/UL (ref 1–4.8)
LYMPHOCYTES NFR BLD: 31.5 % (ref 18–48)
MCH RBC QN AUTO: 22.5 PG (ref 27–31)
MCHC RBC AUTO-ENTMCNC: 30.9 G/DL (ref 32–36)
MCV RBC AUTO: 73 FL (ref 82–98)
MONOCYTES # BLD AUTO: 0.5 K/UL (ref 0.3–1)
MONOCYTES NFR BLD: 12.5 % (ref 4–15)
NEUTROPHILS # BLD AUTO: 2.2 K/UL (ref 1.8–7.7)
NEUTROPHILS NFR BLD: 52.5 % (ref 38–73)
NONHDLC SERPL-MCNC: 130 MG/DL
NRBC BLD-RTO: 0 /100 WBC
PLATELET # BLD AUTO: 189 K/UL (ref 150–450)
PMV BLD AUTO: 9.9 FL (ref 9.2–12.9)
POTASSIUM SERPL-SCNC: 3.8 MMOL/L (ref 3.5–5.1)
PROT SERPL-MCNC: 6.7 G/DL (ref 6–8.4)
RBC # BLD AUTO: 5.11 M/UL (ref 4–5.4)
SODIUM SERPL-SCNC: 144 MMOL/L (ref 136–145)
TRIGL SERPL-MCNC: 80 MG/DL (ref 30–150)
TSH SERPL DL<=0.005 MIU/L-ACNC: 0.9 UIU/ML (ref 0.4–4)
WBC # BLD AUTO: 4.25 K/UL (ref 3.9–12.7)

## 2024-04-25 PROCEDURE — 36415 COLL VENOUS BLD VENIPUNCTURE: CPT | Performed by: NURSE PRACTITIONER

## 2024-04-25 PROCEDURE — 83036 HEMOGLOBIN GLYCOSYLATED A1C: CPT | Performed by: NURSE PRACTITIONER

## 2024-04-25 PROCEDURE — 80053 COMPREHEN METABOLIC PANEL: CPT | Performed by: NURSE PRACTITIONER

## 2024-04-25 PROCEDURE — 84443 ASSAY THYROID STIM HORMONE: CPT | Performed by: NURSE PRACTITIONER

## 2024-04-25 PROCEDURE — 85025 COMPLETE CBC W/AUTO DIFF WBC: CPT | Performed by: NURSE PRACTITIONER

## 2024-04-25 PROCEDURE — 80061 LIPID PANEL: CPT | Performed by: NURSE PRACTITIONER

## 2024-05-01 ENCOUNTER — OFFICE VISIT (OUTPATIENT)
Dept: INTERNAL MEDICINE | Facility: CLINIC | Age: 69
End: 2024-05-01
Payer: MEDICARE

## 2024-05-01 VITALS
BODY MASS INDEX: 22.96 KG/M2 | SYSTOLIC BLOOD PRESSURE: 102 MMHG | RESPIRATION RATE: 18 BRPM | HEART RATE: 47 BPM | WEIGHT: 142.88 LBS | DIASTOLIC BLOOD PRESSURE: 46 MMHG | HEIGHT: 66 IN

## 2024-05-01 DIAGNOSIS — R00.1 BRADYCARDIA: ICD-10-CM

## 2024-05-01 DIAGNOSIS — R35.0 URINARY FREQUENCY: ICD-10-CM

## 2024-05-01 DIAGNOSIS — Z90.49 HISTORY OF WHIPPLE PROCEDURE: ICD-10-CM

## 2024-05-01 DIAGNOSIS — Z90.410 HISTORY OF WHIPPLE PROCEDURE: ICD-10-CM

## 2024-05-01 DIAGNOSIS — I70.0 AORTIC ATHEROSCLEROSIS: ICD-10-CM

## 2024-05-01 DIAGNOSIS — C17.0 DUODENAL ADENOCARCINOMA: ICD-10-CM

## 2024-05-01 DIAGNOSIS — N30.01 ACUTE CYSTITIS WITH HEMATURIA: ICD-10-CM

## 2024-05-01 DIAGNOSIS — E11.9 DIABETES MELLITUS TYPE 2, DIET-CONTROLLED: ICD-10-CM

## 2024-05-01 DIAGNOSIS — D64.9 ANEMIA, UNSPECIFIED TYPE: Primary | ICD-10-CM

## 2024-05-01 DIAGNOSIS — E78.5 HYPERLIPIDEMIA, UNSPECIFIED HYPERLIPIDEMIA TYPE: ICD-10-CM

## 2024-05-01 DIAGNOSIS — E05.90 HYPERTHYROIDISM: ICD-10-CM

## 2024-05-01 LAB
BILIRUB SERPL-MCNC: NORMAL MG/DL
BLOOD URINE, POC: NORMAL
CLARITY, POC UA: NORMAL
COLOR, POC UA: YELLOW
GLUCOSE UR QL STRIP: NORMAL
KETONES UR QL STRIP: NORMAL
LEUKOCYTE ESTERASE URINE, POC: NORMAL
NITRITE, POC UA: NORMAL
PH, POC UA: 5
PROTEIN, POC: NORMAL
SPECIFIC GRAVITY, POC UA: 1.02
UROBILINOGEN, POC UA: NORMAL

## 2024-05-01 PROCEDURE — 3288F FALL RISK ASSESSMENT DOCD: CPT | Mod: CPTII,S$GLB,, | Performed by: NURSE PRACTITIONER

## 2024-05-01 PROCEDURE — 1159F MED LIST DOCD IN RCRD: CPT | Mod: CPTII,S$GLB,, | Performed by: NURSE PRACTITIONER

## 2024-05-01 PROCEDURE — 3074F SYST BP LT 130 MM HG: CPT | Mod: CPTII,S$GLB,, | Performed by: NURSE PRACTITIONER

## 2024-05-01 PROCEDURE — 81002 URINALYSIS NONAUTO W/O SCOPE: CPT | Mod: S$GLB,,, | Performed by: NURSE PRACTITIONER

## 2024-05-01 PROCEDURE — 99999 PR PBB SHADOW E&M-EST. PATIENT-LVL III: CPT | Mod: PBBFAC,,, | Performed by: NURSE PRACTITIONER

## 2024-05-01 PROCEDURE — 1101F PT FALLS ASSESS-DOCD LE1/YR: CPT | Mod: CPTII,S$GLB,, | Performed by: NURSE PRACTITIONER

## 2024-05-01 PROCEDURE — 3008F BODY MASS INDEX DOCD: CPT | Mod: CPTII,S$GLB,, | Performed by: NURSE PRACTITIONER

## 2024-05-01 PROCEDURE — 99214 OFFICE O/P EST MOD 30 MIN: CPT | Mod: S$GLB,,, | Performed by: NURSE PRACTITIONER

## 2024-05-01 PROCEDURE — 3044F HG A1C LEVEL LT 7.0%: CPT | Mod: CPTII,S$GLB,, | Performed by: NURSE PRACTITIONER

## 2024-05-01 PROCEDURE — 3078F DIAST BP <80 MM HG: CPT | Mod: CPTII,S$GLB,, | Performed by: NURSE PRACTITIONER

## 2024-05-01 PROCEDURE — 4010F ACE/ARB THERAPY RXD/TAKEN: CPT | Mod: CPTII,S$GLB,, | Performed by: NURSE PRACTITIONER

## 2024-05-01 PROCEDURE — 1126F AMNT PAIN NOTED NONE PRSNT: CPT | Mod: CPTII,S$GLB,, | Performed by: NURSE PRACTITIONER

## 2024-05-01 RX ORDER — CIPROFLOXACIN 500 MG/1
500 TABLET ORAL EVERY 12 HOURS
Qty: 14 TABLET | Refills: 0 | Status: SHIPPED | OUTPATIENT
Start: 2024-05-01
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
## 2024-05-02 ENCOUNTER — HOSPITAL ENCOUNTER (OUTPATIENT)
Dept: RADIOLOGY | Facility: HOSPITAL | Age: 69
Discharge: HOME | End: 2024-05-02
Attending: REGISTERED NURSE
Payer: MEDICARE

## 2024-05-02 DIAGNOSIS — C78.7 METASTASIS TO LIVER: ICD-10-CM

## 2024-05-02 DIAGNOSIS — C17.0 DUODENAL ADENOCARCINOMA: ICD-10-CM

## 2024-05-02 DIAGNOSIS — N20.0 RENAL STONE: Primary | ICD-10-CM

## 2024-05-02 DIAGNOSIS — N13.30 HYDRONEPHROSIS, UNSPECIFIED HYDRONEPHROSIS TYPE: ICD-10-CM

## 2024-05-02 LAB — POCT GLUCOSE: 106 MG/DL (ref 70–110)

## 2024-05-02 PROCEDURE — A9552 F18 FDG: HCPCS | Performed by: REGISTERED NURSE

## 2024-05-02 PROCEDURE — 78815 PET IMAGE W/CT SKULL-THIGH: CPT | Mod: 26,PS,, | Performed by: RADIOLOGY

## 2024-05-02 PROCEDURE — 78815 PET IMAGE W/CT SKULL-THIGH: CPT | Mod: TC

## 2024-05-02 RX ORDER — FLUDEOXYGLUCOSE F18 500 MCI/ML
12 INJECTION INTRAVENOUS ONCE
Status: COMPLETED | OUTPATIENT
Start: 2024-05-02 | End: 2024-05-02

## 2024-05-02 RX ADMIN — FLUDEOXYGLUCOSE F-18 12.51 MILLICURIE: 500 INJECTION INTRAVENOUS at 09:05

## 2024-05-06 ENCOUNTER — OFFICE VISIT (OUTPATIENT)
Dept: HEMATOLOGY/ONCOLOGY | Facility: CLINIC | Age: 69
End: 2024-05-06
Payer: MEDICARE

## 2024-05-06 ENCOUNTER — INFUSION (OUTPATIENT)
Dept: INFUSION THERAPY | Facility: HOSPITAL | Age: 69
End: 2024-05-06
Payer: MEDICARE

## 2024-05-06 VITALS
BODY MASS INDEX: 22.53 KG/M2 | OXYGEN SATURATION: 98 % | WEIGHT: 140.19 LBS | SYSTOLIC BLOOD PRESSURE: 120 MMHG | HEIGHT: 66 IN | HEART RATE: 90 BPM | TEMPERATURE: 98 F | DIASTOLIC BLOOD PRESSURE: 81 MMHG

## 2024-05-06 VITALS
RESPIRATION RATE: 18 BRPM | DIASTOLIC BLOOD PRESSURE: 66 MMHG | SYSTOLIC BLOOD PRESSURE: 154 MMHG | HEART RATE: 61 BPM | TEMPERATURE: 98 F | OXYGEN SATURATION: 97 %

## 2024-05-06 DIAGNOSIS — C17.0 DUODENAL ADENOCARCINOMA: Primary | ICD-10-CM

## 2024-05-06 DIAGNOSIS — E11.9 DIABETES MELLITUS TYPE 2, DIET-CONTROLLED: ICD-10-CM

## 2024-05-06 DIAGNOSIS — E78.5 HYPERLIPIDEMIA, UNSPECIFIED HYPERLIPIDEMIA TYPE: ICD-10-CM

## 2024-05-06 DIAGNOSIS — I10 ESSENTIAL HYPERTENSION: ICD-10-CM

## 2024-05-06 DIAGNOSIS — D64.9 ANEMIA, UNSPECIFIED TYPE: ICD-10-CM

## 2024-05-06 DIAGNOSIS — C78.7 METASTASIS TO LIVER: ICD-10-CM

## 2024-05-06 DIAGNOSIS — R63.4 ABNORMAL WEIGHT LOSS: ICD-10-CM

## 2024-05-06 DIAGNOSIS — R63.0 DECREASED APPETITE: ICD-10-CM

## 2024-05-06 DIAGNOSIS — I70.0 AORTIC ATHEROSCLEROSIS: ICD-10-CM

## 2024-05-06 DIAGNOSIS — Z12.11 SCREEN FOR COLON CANCER: Primary | ICD-10-CM

## 2024-05-06 DIAGNOSIS — E44.0 MODERATE MALNUTRITION: ICD-10-CM

## 2024-05-06 PROCEDURE — 1159F MED LIST DOCD IN RCRD: CPT | Mod: CPTII,S$GLB,, | Performed by: INTERNAL MEDICINE

## 2024-05-06 PROCEDURE — 1126F AMNT PAIN NOTED NONE PRSNT: CPT | Mod: CPTII,S$GLB,, | Performed by: INTERNAL MEDICINE

## 2024-05-06 PROCEDURE — 4010F ACE/ARB THERAPY RXD/TAKEN: CPT | Mod: CPTII,S$GLB,, | Performed by: INTERNAL MEDICINE

## 2024-05-06 PROCEDURE — 3079F DIAST BP 80-89 MM HG: CPT | Mod: CPTII,S$GLB,, | Performed by: INTERNAL MEDICINE

## 2024-05-06 PROCEDURE — 63600175 PHARM REV CODE 636 W HCPCS: Mod: JZ,JG | Performed by: INTERNAL MEDICINE

## 2024-05-06 PROCEDURE — 1101F PT FALLS ASSESS-DOCD LE1/YR: CPT | Mod: CPTII,S$GLB,, | Performed by: INTERNAL MEDICINE

## 2024-05-06 PROCEDURE — 3044F HG A1C LEVEL LT 7.0%: CPT | Mod: CPTII,S$GLB,, | Performed by: INTERNAL MEDICINE

## 2024-05-06 PROCEDURE — 99999 PR PBB SHADOW E&M-EST. PATIENT-LVL III: CPT | Mod: PBBFAC,,, | Performed by: INTERNAL MEDICINE

## 2024-05-06 PROCEDURE — 3288F FALL RISK ASSESSMENT DOCD: CPT | Mod: CPTII,S$GLB,, | Performed by: INTERNAL MEDICINE

## 2024-05-06 PROCEDURE — 96413 CHEMO IV INFUSION 1 HR: CPT

## 2024-05-06 PROCEDURE — 99215 OFFICE O/P EST HI 40 MIN: CPT | Mod: S$GLB,,, | Performed by: INTERNAL MEDICINE

## 2024-05-06 PROCEDURE — 25000003 PHARM REV CODE 250: Performed by: INTERNAL MEDICINE

## 2024-05-06 PROCEDURE — 3074F SYST BP LT 130 MM HG: CPT | Mod: CPTII,S$GLB,, | Performed by: INTERNAL MEDICINE

## 2024-05-06 PROCEDURE — 3008F BODY MASS INDEX DOCD: CPT | Mod: CPTII,S$GLB,, | Performed by: INTERNAL MEDICINE

## 2024-05-06 RX ORDER — HEPARIN 100 UNIT/ML
500 SYRINGE INTRAVENOUS
Status: DISCONTINUED | OUTPATIENT
Start: 2024-05-06 | End: 2024-05-06 | Stop reason: HOSPADM

## 2024-05-06 RX ORDER — DIPHENHYDRAMINE HYDROCHLORIDE 50 MG/ML
50 INJECTION INTRAMUSCULAR; INTRAVENOUS ONCE AS NEEDED
Status: CANCELLED | OUTPATIENT
Start: 2024-05-06

## 2024-05-06 RX ORDER — SODIUM CHLORIDE 0.9 % (FLUSH) 0.9 %
10 SYRINGE (ML) INJECTION
Status: CANCELLED | OUTPATIENT
Start: 2024-05-06

## 2024-05-06 RX ORDER — DIPHENHYDRAMINE HYDROCHLORIDE 50 MG/ML
50 INJECTION INTRAMUSCULAR; INTRAVENOUS ONCE AS NEEDED
Status: DISCONTINUED | OUTPATIENT
Start: 2024-05-06 | End: 2024-05-06 | Stop reason: HOSPADM

## 2024-05-06 RX ORDER — EPINEPHRINE 0.3 MG/.3ML
0.3 INJECTION SUBCUTANEOUS ONCE AS NEEDED
Status: CANCELLED | OUTPATIENT
Start: 2024-05-06

## 2024-05-06 RX ORDER — EPINEPHRINE 0.3 MG/.3ML
0.3 INJECTION SUBCUTANEOUS ONCE AS NEEDED
Status: DISCONTINUED | OUTPATIENT
Start: 2024-05-06 | End: 2024-05-06 | Stop reason: HOSPADM

## 2024-05-06 RX ORDER — SODIUM CHLORIDE 0.9 % (FLUSH) 0.9 %
10 SYRINGE (ML) INJECTION
Status: DISCONTINUED | OUTPATIENT
Start: 2024-05-06 | End: 2024-05-06 | Stop reason: HOSPADM

## 2024-05-06 RX ORDER — HEPARIN 100 UNIT/ML
500 SYRINGE INTRAVENOUS
Status: CANCELLED | OUTPATIENT
Start: 2024-05-06

## 2024-05-06 RX ADMIN — SODIUM CHLORIDE: 9 INJECTION, SOLUTION INTRAVENOUS at 11:05

## 2024-05-06 RX ADMIN — SODIUM CHLORIDE 400 MG: 9 INJECTION, SOLUTION INTRAVENOUS at 11:05

## 2024-05-06 NOTE — PLAN OF CARE
1155-Pt tolerated keytruda infusion well, no complications or side effects, POC and meds discussed with pt, pt aware of upcoming appts, pt knows to call MD with any questions or concerns. Pt ambulated off unit, no distress noted.

## 2024-05-06 NOTE — Clinical Note
Homero Moe.  Saw her today. She's feeling better since being on abx.  No pain or further polyuria. She said you asked about colonoscopy.  I do think it would be appropriate for her. Thanks, Vinh

## 2024-05-06 NOTE — PROGRESS NOTES
MEDICAL ONCOLOGY - ESTABLISHED PATIENT VISIT    Reason for visit: duodenal adenocarcinoma     Best Contact Phone Number(s): [phone] (home)      Cancer/Stage/TNM:    Cancer Staging   No matching staging information was found for the patient.       Oncology History   Duodenal adenocarcinoma   3/10/2023 Initial Diagnosis    Duodenal adenocarcinoma     3/15/2023 Surgery    Whipple  dMMR with loss of MLH1/PMS2; MLH1 promoter hypermethylation present suggesting a sporadic tumor.      8/24/2023 -  Chemotherapy    Treatment Summary   Plan Name: OP PEMBROLIZUMAB 400MG Q6W  Treatment Goal: Palliative  Status: Active  Start Date: 8/24/2023  End Date: 6/23/2025 (Planned)  Provider: Eber Roberts MD  Chemotherapy: [No matching medication found in this treatment plan]          Interval History: 68 y.o. female with recurrent MSI-H metastatic duodenal adenocarcinoma who presents for follow-up prior to cycle 7 of pembrolizumab.  She feels very well.  She denies any new toxicities.  She denies any diarrhea, dyspnea or fevers or chills.  She remains active.  Her appetite continues to be good.  She takes dronabinol once nightly.      Presents to clinic with her . ECOG 0.     ROS:   Review of Systems   Constitutional:  Negative for chills, fever and malaise/fatigue.   HENT:  Negative for nosebleeds and sore throat.    Respiratory:  Negative for cough and shortness of breath.    Cardiovascular:  Negative for chest pain, palpitations and leg swelling.   Gastrointestinal:  Negative for blood in stool, constipation, diarrhea, heartburn, nausea and vomiting.   Genitourinary:  Negative for dysuria, frequency, hematuria and urgency.   Musculoskeletal:  Negative for falls and myalgias.   Skin:  Negative for itching and rash.   Neurological:  Negative for dizziness, tingling, weakness and headaches.   Psychiatric/Behavioral:  The patient is not nervous/anxious and does not have insomnia.        Past Medical History:   Past  "Medical History:   Diagnosis Date    Abnormal Pap smear of cervix     Arthritis     Duodenal adenocarcinoma     Hypertension     Hyperthyroidism         Past Surgical History:   Past Surgical History:   Procedure Laterality Date    CATARACT EXTRACTION W/  INTRAOCULAR LENS IMPLANT Right 8/8/2019    Procedure: EXTRACTION, CATARACT, WITH IOL INSERTION;  Surgeon: Spenser Lee MD;  Location: [facility];  Service: Ophthalmology;  Laterality: Right;    ESOPHAGOGASTRODUODENOSCOPY N/A 3/10/2023    Procedure: EGD (ESOPHAGOGASTRODUODENOSCOPY);  Surgeon: Shashi Tapia MD;  Location: [address]);  Service: Endoscopy;  Laterality: N/A;    EYE SURGERY      HYSTERECTOMY      OOPHORECTOMY      PHACOEMULSIFICATION OF CATARACT Right 8/8/2019    Procedure: PHACOEMULSIFICATION, CATARACT;  Surgeon: Spenser Lee MD;  Location: [facility];  Service: Ophthalmology;  Laterality: Right;    WHIPPLE PROCEDURE N/A 3/15/2023    Procedure: WHIPPLE PROCEDURE;  Surgeon: Nick Paez MD;  Location: [address];  Service: General;  Laterality: N/A;        Family History:   Family History   Problem Relation Name Age of Onset    Cancer Mother      Breast cancer Neg Hx      Colon cancer Neg Hx      Ovarian cancer Neg Hx          Social History:   Social History     Tobacco Use    Smoking status: Every Day     Current packs/day: 0.25     Average packs/day: 0.3 packs/day for 30.0 years (7.5 ttl pk-yrs)     Types: Cigarettes    Smokeless tobacco: Never    Tobacco comments:     About 7-8 cigs a day   Substance Use Topics    Alcohol use: Yes     Alcohol/week: 1.0 standard drink of alcohol     Types: 1 Cans of beer per week        I have reviewed and updated the patient's past medical, surgical, family and social histories.    Allergies:   Review of patient's allergies indicates:   Allergen Reactions    Aspirin Other (See Comments)     Pt states it is "hard on her stomach" She can tolerate a low dose aspirin    Pcn [penicillins] " "     Childhood - Tolerated ceftriaxone and cefazolin with no documented issues in the past         Medications:   Current Outpatient Medications   Medication Sig Dispense Refill    acetaminophen (TYLENOL) 650 MG TbSR Take 650 mg by mouth as needed.      atorvastatin (LIPITOR) 40 MG tablet Take 1 tablet (40 mg total) by mouth once daily. 90 tablet 1    ciprofloxacin HCl (CIPRO) 500 MG tablet Take 1 tablet (500 mg total) by mouth every 12 (twelve) hours. 14 tablet 0    droNABinol (MARINOL) 5 MG capsule Take 1 capsule (5 mg total) by mouth 2 (two) times daily before meals. 60 capsule 2    famotidine (PEPCID) 20 MG tablet Take 1 tablet (20 mg total) by mouth 2 (two) times daily. 60 tablet 11    lisinopriL-hydrochlorothiazide (PRINZIDE,ZESTORETIC) 20-12.5 mg per tablet Take 1 tablet by mouth once daily. 90 tablet 3    omeprazole (PRILOSEC) 40 MG capsule Take 1 capsule (40 mg total) by mouth once daily. 30 capsule 5    gabapentin (NEURONTIN) 300 MG capsule Take 1 capsule (300 mg total) by mouth 3 (three) times daily. 90 capsule 5     No current facility-administered medications for this visit.      Physical Exam:   /81 (BP Location: Left arm, Patient Position: Sitting, BP Method: Medium (Automatic))   Pulse 90   Temp 98 °F (36.7 °C) (Oral)   Ht 5' 6" (1.676 m)   Wt 63.6 kg (140 lb 3.4 oz)   SpO2 98%   BMI 22.63 kg/m²      Physical Exam  Vitals reviewed.   Constitutional:       General: She is not in acute distress.     Appearance: Normal appearance. She is normal weight. She is not ill-appearing, toxic-appearing or diaphoretic.   HENT:      Head: Normocephalic and atraumatic.      Right Ear: External ear normal.      Left Ear: External ear normal.      Nose: Nose normal.      Mouth/Throat:      Pharynx: Oropharynx is clear.   Eyes:      General: No scleral icterus.     Conjunctiva/sclera: Conjunctivae normal.      Pupils: Pupils are equal, round, and reactive to light.   Cardiovascular:      Rate and Rhythm: " Normal rate.   Pulmonary:      Effort: Pulmonary effort is normal. No respiratory distress.      Breath sounds: No wheezing.   Abdominal:      General: There is no distension.      Tenderness: There is no abdominal tenderness.      Comments: Midline abdominal incision well healed   Musculoskeletal:      Cervical back: Normal range of motion.      Right lower leg: No edema.      Left lower leg: No edema.   Skin:     General: Skin is warm and dry.      Coloration: Skin is not jaundiced or pale.      Findings: No bruising, erythema or rash.   Neurological:      General: No focal deficit present.      Mental Status: She is alert and oriented to person, place, and time. Mental status is at baseline.      Cranial Nerves: No cranial nerve deficit.      Sensory: No sensory deficit.      Motor: No weakness.      Gait: Gait normal.   Psychiatric:         Mood and Affect: Mood normal.         Behavior: Behavior normal.         Thought Content: Thought content normal.         Judgment: Judgment normal.         Labs:   No results found for this or any previous visit (from the past 48 hour(s)).     I have reviewed the pertinent labs.  Stable microcytic anemia.  Normal CEA.      Imagin2024 PET-CT:    I personally reviewed the images which shows no evidence of active disease.  Left ureteral stone is present.    Path:   3/15/23 - Whipple  Final Pathologic Diagnosis      Abnormal   1. Lymph node, hepatic cautery, resection:   - One lymph node negative for metastatic carcinoma (0/1).   - See synoptic report.   2. Gallbladder, cholecystectomy:   - Benign gallbladder with no significant histologic abnormality.   3. Pancreas, duodenum, common bile duct, and partial gastrectomy,   pancreaticoduodenectomy (Whipple specimen):   - Poorly differentiated carcinoma with medullary features.   - See synoptic report.   - See comment.   Synoptic Report   Procedure: Pancreaticoduodenectomy with partial gastrectomy (Whipple   specimen)    Tumor site:  Pylorus and proximal duodenum   Histologic type: Poorly differentiated carcinoma with medullary features   Histologic grade: G3, poorly differentiated   Tumor size:  6.5 cm in greatest dimension grossly   Tumor extent:   Invades through muscularis propria into subserosa, or extends   into nonperitonealized perimuscular tissue (mesentery or retroperitoneum)   without serosal penetration   Macroscopic tumor perforation:  Not identified   Lymphovascular inv asion:  Not identified, see comment   Margin status for invasive carcinoma:  All margins negative for invasive   carcinoma   Margin status for dysplasia:  All margins negative for carcinoma in Situ   (high-grade dysplasia)/adenoma   Regional lymph node status: Regional lymph nodes present        All Regional lymph nodes negative for tumor        Number of lymph nodes examined:  15 (including specimens 1 and 3)   PATHOLOGIC STAGE CLASSIFICATION (pTNM, AJCC 8th Edition):  p T3 N0   Additional findings:  Stomach with intestinal metaplasia (immunostain for   H.pylori negative), 2 lymph nodes with fibrosis and calcifications (GMS stain   for fungal organisms and AFB stain negative)   Ancillary studies:  Immunohistochemistry (IHC) Testing for Mismatch Repair   (MMR) Proteins   MLH1- Loss of nuclear expression   PMS2 - Loss of nuclear expression   MSH2 - Intact nuclear expression   MSH6 - Intact nuclear expression   Background nonneoplastic tissue/internal control with intact nuclear   expression   IHC Interpret ation:  Loss of nuclear expression of MLH1 and PMS2: testing for   methylation of the MLH1 promoter and/or mutation of BRAF is indicated (the   presence of a BRAF V600E mutation and/or MLH1 methylation suggests that the   tumor is sporadic and germline evaluation is probably not indicated; absence   of both MLH1 methylation and of BRAF V600E mutation suggests the possibility   of Seaman syndrome, and sequencing and/or large deletion/duplication  "testing   of germline MLH1 may be indicated)   Testing for methylation of the MLH1 promoter and mutation of BRAF is in   process and results will be supplemented.   There are exceptions to the above IHC interpretations. These results should   not be considered in isolation, and clinical correlation with genetic   counseling is recommended to assess the need for germline testing.   COMMENT:  The stomach and duodenum (specimen 3) shows a 6.5 cm mass involving   the pylorus with the majority of the tumor appearing to be in the duodenum.   The tumor shows poorly d ifferentiated sheets of blue cells with necrosis.   There is a significant amount of lymphocytic inflammatory infiltrate around   the edges of the tumor.  Immunostains show the tumor cells to be positive for   CK7 with patchy weak CDX2 and negative for synaptophysin and chromogranin.   There is also loss of MLH1 and PMS2 on MMR stains.  These features are   suggestive of medullary carcinoma.  Select slides reviewed by Dr. ITZEL Sánchez   who agrees with the diagnosis of poorly differentiated carcinoma with   medullary features.  Immunostains for CD 34 performed on blocks 3E and 3H   show no definitive lymphovascular invasion even though some areas suspicious   cells on routine H&E sections.  Appropriate positive controls   VC      Comment: Interp By Meredith Pappas MD, Signed on 04/12/2023 at 16:03   Supplemental Diagnosis      Abnormal   MLH1 Hypermethylation/BRAF Mutation   Result Summary   MLH1 HYPERMETHYLATION PRESENT/NO BRAF MUTATION IDENTIFIED   Result   Provided diagnosis: pylorus and proximal duodenum poorly differentiated   carcinoma with medullary features   Methylation status: Positive for MLH1 promoter hypermethylation   BRAF status: Wild-type (SEE INTERPRETATION)   Alxeis Sotelo M.D.   Report attached   Performing location:   [place] [address]   "Disclaimer:  This case " "diagnosis was rendered completely by the outside   consultation pathologist and the case is electronically signed by an [facility]   pathologist listed below solely to release the report into the medical   record."          Assessment:       1. Duodenal adenocarcinoma    2. Metastasis to liver    3. Decreased appetite    4. Moderate malnutrition    5. Anemia, unspecified type    6. Essential hypertension    7. Hyperlipidemia, unspecified hyperlipidemia type    8. Diabetes mellitus type 2, diet-controlled    9. Aortic atherosclerosis         Plan:     # Duodenal adenocarcinoma   Mrs. Simms is a pleasant 68 y.o. 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 Pathology did reveal some high grade features, including poorly differentiated carcinoma, 6.5 cm size.  We discussed at her initial visit he limited role of adjuvant chemotherapy in the stage II setting for small bowel adenocarcinoma.      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     CT CAP on 7/26/23 demonstrated concern for recurrence in liver.  PET/CT on 8/4/23 showed hypermetabolic inferior R hepatic lobe lesion consistent with metastatic recurrence.  CEA has risen as well.    Given her dMMR/MSI-H status, we recommended to start on single agent pembrolizumab 400 mg q6 weeks.  Patient was consented for immunotherapy. An extensive discussion was had which included a thorough discussion of the risk and benefits of treatment and alternatives. Risks, including but not limited to, immune-mediated toxicities involving multiple body organs including lungs, kidneys, GI tract, liver, heart, central nervous system, skin, thyroid, pituitary gland, and others were all explained to the patient. The patient agrees with the plan, and all questions have been answered to their satisfaction. Consent was signed the patient, provider, and a third party witness.  "   She was also enrolled in our protocol: [id number], Disparities in Results of Immune Checkpoint Inhibitor Treatment (DIRECT): A Prospective Cohort Study of Cancer Survivors Treated with anti-PD-L1 Immunotherapy in a Community Oncology Setting.  CRC is Isabela Church.    No toxicities noted from pembrolizumab cycle 1.  PET/CT after cycle 2 shows resolution of liver metastasis.  Question of grade 1 pneumonitis with GGO on that scan but this has since resolved on CT chest done 12/15 which makes pneumonitis less likely diagnosis.  No other IO toxicities.  PET/CT after cycle 4 shows resolution of hypermetabolic nodules and ground-glass in right middle lobe of lung. No evidence of other hypermetabolic areas.   PET/CT after cycle 6 shows no evidence of disease.    Labs reviewed and adequate for treatment.  No toxicities from IO.  Will proceed with cycle 7 of pembrolizumab today.    [facility] Tissue NGS: MSI-H, TMB 76.3 m/MB  Repeat imaging with PET prior to cycle 9 of pembrolizumab.    # Decreased appetite, malnutrition  -Appetite improved with Marinol. Taking at night.   -weight stable. Nutrition following.    # Anemia  -Microcytic anemia improving. Suspect thalassemia given longstanding nature.  -No signs of bleeding, platelets normal.   -Asymptomatic. Monitor.     # HTN, HLD, DM, aortic atherosclerosis   -BP normal in clinic today.    -Following with PCP.   -Continue medical management.     # L nephrolithiasis  No symptoms currently.  Was having polyuria which has improved with cipro, Rx from her PCP.  Urology c/s placed.      Patient is in agreement with the proposed treatment plan. All questions were answered to the patient's satisfaction. Pt knows to call clinic if anything is needed before the next clinic visit.    Eber Roberts MD  Hematology/Oncology  [facility]      Route Chart for Scheduling    Med Onc Chart Routing      Follow up with physician 3 months. for pembro   Follow up with YANDEL 6  weeks. for pembro   Infusion scheduling note    Injection scheduling note    Labs CBC, CMP, CEA and TSH   Scheduling:  Preferred lab:  Lab interval: every 6 weeks     Imaging    Pharmacy appointment    Other referrals              Treatment Plan Information   OP PEMBROLIZUMAB 400MG Q6W   Eber Roberts MD   Upcoming Treatment Dates - OP PEMBROLIZUMAB 400MG Q6W    4/29/2024       Chemotherapy       pembrolizumab (KEYTRUDA) 400 mg in sodium chloride 0.9% SolP 116 mL infusion  6/10/2024       Chemotherapy       pembrolizumab (KEYTRUDA) 400 mg in sodium chloride 0.9% SolP 116 mL infusion  7/22/2024       Chemotherapy       pembrolizumab (KEYTRUDA) 400 mg in sodium chloride 0.9% SolP 116 mL infusion  9/2/2024       Chemotherapy       pembrolizumab (KEYTRUDA) 400 mg in sodium chloride 0.9% SolP 116 mL infusion

## 2024-05-07 DIAGNOSIS — E44.0 MODERATE MALNUTRITION: ICD-10-CM

## 2024-05-07 RX ORDER — DRONABINOL 5 MG/1
5 CAPSULE ORAL
Qty: 60 CAPSULE | Refills: 2 | Status: SHIPPED | OUTPATIENT
Start: 2024-05-07 | End: 2024-06-17 | Stop reason: SDUPTHER

## 2024-05-07 NOTE — TELEPHONE ENCOUNTER
----- Message from Joey Jackson sent at 5/7/2024  9:46 AM CDT -----  Regarding: Consult/Advisory  Contact: Spenser Pharmacy Coordinator @ [facility]  Consult/Advisory    Name Of Caller: Spenser Pharmacy Coordinator @ [facility]      Contact Preference: [phone] direct line      [place]    or     Fax: [phone]    Nature of call:Spenser Pharmacy Coordinator @ [facility] calling regarding all of the current medications. Patient has enrolled here and they are needing a new Rx for all medications. Requesting a call back.           [facility] - [address] [facility] [address]  [address] [facility] [address]  [place] [address]  Phone: [phone] Fax: [phone]

## 2024-05-08 ENCOUNTER — OFFICE VISIT (OUTPATIENT)
Dept: UROLOGY | Facility: CLINIC | Age: 69
End: 2024-05-08
Attending: SPECIALIST
Payer: MEDICARE

## 2024-05-08 ENCOUNTER — TELEPHONE (OUTPATIENT)
Dept: UROLOGY | Facility: CLINIC | Age: 69
End: 2024-05-08
Payer: MEDICARE

## 2024-05-08 VITALS
HEART RATE: 62 BPM | OXYGEN SATURATION: 99 % | BODY MASS INDEX: 22.78 KG/M2 | DIASTOLIC BLOOD PRESSURE: 68 MMHG | SYSTOLIC BLOOD PRESSURE: 142 MMHG | WEIGHT: 141.75 LBS | HEIGHT: 66 IN

## 2024-05-08 DIAGNOSIS — N20.1 LEFT URETERAL STONE: ICD-10-CM

## 2024-05-08 DIAGNOSIS — N20.0 RENAL STONE: ICD-10-CM

## 2024-05-08 PROCEDURE — 4010F ACE/ARB THERAPY RXD/TAKEN: CPT | Mod: CPTII,S$GLB,, | Performed by: SPECIALIST

## 2024-05-08 PROCEDURE — 3288F FALL RISK ASSESSMENT DOCD: CPT | Mod: CPTII,S$GLB,, | Performed by: SPECIALIST

## 2024-05-08 PROCEDURE — 3077F SYST BP >= 140 MM HG: CPT | Mod: CPTII,S$GLB,, | Performed by: SPECIALIST

## 2024-05-08 PROCEDURE — 1101F PT FALLS ASSESS-DOCD LE1/YR: CPT | Mod: CPTII,S$GLB,, | Performed by: SPECIALIST

## 2024-05-08 PROCEDURE — 3008F BODY MASS INDEX DOCD: CPT | Mod: CPTII,S$GLB,, | Performed by: SPECIALIST

## 2024-05-08 PROCEDURE — 3044F HG A1C LEVEL LT 7.0%: CPT | Mod: CPTII,S$GLB,, | Performed by: SPECIALIST

## 2024-05-08 PROCEDURE — 1126F AMNT PAIN NOTED NONE PRSNT: CPT | Mod: CPTII,S$GLB,, | Performed by: SPECIALIST

## 2024-05-08 PROCEDURE — 1160F RVW MEDS BY RX/DR IN RCRD: CPT | Mod: CPTII,S$GLB,, | Performed by: SPECIALIST

## 2024-05-08 PROCEDURE — 99999 PR PBB SHADOW E&M-EST. PATIENT-LVL IV: CPT | Mod: PBBFAC,,, | Performed by: SPECIALIST

## 2024-05-08 PROCEDURE — 1159F MED LIST DOCD IN RCRD: CPT | Mod: CPTII,S$GLB,, | Performed by: SPECIALIST

## 2024-05-08 PROCEDURE — 99204 OFFICE O/P NEW MOD 45 MIN: CPT | Mod: S$GLB,,, | Performed by: SPECIALIST

## 2024-05-08 PROCEDURE — 3078F DIAST BP <80 MM HG: CPT | Mod: CPTII,S$GLB,, | Performed by: SPECIALIST

## 2024-05-08 NOTE — PROGRESS NOTES
[facility] - Urology   Clinic Note    SUBJECTIVE:     Chief Complaint   Patient presents with    Nephrolithiasis     Patient isn't sure if she's passed her stones or not, no noticeable symptoms. States was placed on antibiotics by Payal Moreland due to excessive urination and has been doing good.        Referral from: Payal Moreland NP.    History of Present Illness:  Nan Simms is a 68 y.o. female who presents to clinic for 7-8 m left distal ureteral stone.    Pleasant 68-year-old female with no prior history of nephrolithiasis, who recently underwent a PET scan and was found to have an incidental 7-8 mm left distal ureteral stone with mild-to-moderate hydronephrosis.  Patient is completely asymptomatic.  She did however report starting on an empiric course of antibiotics a week ago for a presumed UTI.  I do note that she had a negative urine culture last month.  Fortunately she is cancer free at the moment, denies flank pain nausea vomiting fever chills or hematuria.    Patient endorses no additional complaints at this time.    Past Medical History:   Diagnosis Date    Abnormal Pap smear of cervix     Arthritis     Duodenal adenocarcinoma     Hypertension     Hyperthyroidism        Past Surgical History:   Procedure Laterality Date    CATARACT EXTRACTION W/  INTRAOCULAR LENS IMPLANT Right 8/8/2019    Procedure: EXTRACTION, CATARACT, WITH IOL INSERTION;  Surgeon: Spenser Lee MD;  Location: [facility];  Service: Ophthalmology;  Laterality: Right;    ESOPHAGOGASTRODUODENOSCOPY N/A 3/10/2023    Procedure: EGD (ESOPHAGOGASTRODUODENOSCOPY);  Surgeon: Shashi Tapia MD;  Location: [address]);  Service: Endoscopy;  Laterality: N/A;    EYE SURGERY      HYSTERECTOMY      OOPHORECTOMY      PHACOEMULSIFICATION OF CATARACT Right 8/8/2019    Procedure: PHACOEMULSIFICATION, CATARACT;  Surgeon: Spenser Lee MD;  Location: [facility];  Service: Ophthalmology;  Laterality: Right;     "WHIPPLE PROCEDURE N/A 3/15/2023    Procedure: WHIPPLE PROCEDURE;  Surgeon: Nick Paez MD;  Location: [facility] OR [address] [place] [address];  Service: General;  Laterality: N/A;       Family History   Problem Relation Name Age of Onset    Cancer Mother      Breast cancer Neg Hx      Colon cancer Neg Hx      Ovarian cancer Neg Hx         Social History     Tobacco Use    Smoking status: Every Day     Current packs/day: 0.25     Average packs/day: 0.3 packs/day for 30.0 years (7.5 ttl pk-yrs)     Types: Cigarettes    Smokeless tobacco: Never    Tobacco comments:     About 7-8 cigs a day   Substance Use Topics    Alcohol use: Yes     Alcohol/week: 1.0 standard drink of alcohol     Types: 1 Cans of beer per week    Drug use: No       Current Outpatient Medications on File Prior to Visit   Medication Sig Dispense Refill    acetaminophen (TYLENOL) 650 MG TbSR Take 650 mg by mouth as needed.      atorvastatin (LIPITOR) 40 MG tablet Take 1 tablet (40 mg total) by mouth once daily. 90 tablet 1    ciprofloxacin HCl (CIPRO) 500 MG tablet Take 1 tablet (500 mg total) by mouth every 12 (twelve) hours. 14 tablet 0    droNABinol (MARINOL) 5 MG capsule Take 1 capsule (5 mg total) by mouth 2 (two) times daily before meals. 60 capsule 2    famotidine (PEPCID) 20 MG tablet Take 1 tablet (20 mg total) by mouth 2 (two) times daily. 60 tablet 11    lisinopriL-hydrochlorothiazide (PRINZIDE,ZESTORETIC) 20-12.5 mg per tablet Take 1 tablet by mouth once daily. 90 tablet 3    omeprazole (PRILOSEC) 40 MG capsule Take 1 capsule (40 mg total) by mouth once daily. 30 capsule 5    gabapentin (NEURONTIN) 300 MG capsule Take 1 capsule (300 mg total) by mouth 3 (three) times daily. 90 capsule 5     No current facility-administered medications on file prior to visit.       Review of patient's allergies indicates:   Allergen Reactions    Aspirin Other (See Comments)     Pt states it is "hard on her stomach" She can tolerate a low dose aspirin    Pcn [penicillins]      " "Childhood - Tolerated ceftriaxone and cefazolin with no documented issues in the past        ROS     Review of Systems:  A review of 10+ systems was conducted with pertinent positive and negative findings documented in HPI with all other systems reviewed and negative.    OBJECTIVE:     Estimated body mass index is 22.88 kg/m² as calculated from the following:    Height as of this encounter: 5' 6" (1.676 m).    Weight as of this encounter: 64.3 kg (141 lb 12.1 oz).    Vital Signs (Most Recent)  Vitals:    05/08/24 1351   BP: (!) 142/68   Pulse: 62       Physical Exam:    Physical Exam     GENERAL: patient sitting comfortably  HEENT: normocephalic  NECK: supple, no JVD  PULM: normal chest rise, no increased WOB  HEART: non-diaphoretic  EXT: no bruising or edema  NEURO: grossly normal with no focal deficits  PSYCH: appropriate mood and affect    Genitourinary Exam:  deferred      LABS:     Lab Results   Component Value Date    BUN 14 05/02/2024    CREATININE 0.7 05/02/2024    WBC 6.02 05/02/2024    HGB 10.6 (L) 05/02/2024    HCT 33.0 (L) 05/02/2024     05/02/2024    AST 17 05/02/2024    ALT 14 05/02/2024    ALKPHOS 95 05/02/2024    ALBUMIN 3.0 (L) 05/02/2024    HGBA1C 5.4 04/25/2024    INR 1.0 03/15/2023        Urinalysis:   No results found for: "UAREFLEX"       Imaging:  I have personally reviewed all relevant imaging studies.    No results found for this or any previous visit (from the past 2160 hour(s)).  No results found for this or any previous visit (from the past 2160 hour(s)).  NM PET CT FDG Skull Base to Mid Thigh  Narrative: EXAMINATION:  NM PET CT FDG SKULL BASE TO MID THIGH    CLINICAL HISTORY:  Small bowel cancer, assess treatment response; Malignant neoplasm of duodenum    TECHNIQUE:  12.51 mCi of F18-FDG was administered intravenously in the right antecubital fossa.  After an approximately 60 min distribution time, PET/CT images were acquired from the skull base to mid thigh.  Transmission images " were acquired to correct for attenuation using a whole body low-dose CT scan without IV contrast with the arms positioned above the head. Glycemia at the time of injection was 106 mg/dL.    COMPARISON:  FDG PET-CT 02/08/2024    FINDINGS:  Quality of the study: Adequate.    In the head and neck, there are no hypermetabolic lesions worrisome for malignancy. There are no hypermetabolic mucosal lesions, and there are no pathologically enlarged or hypermetabolic lymph nodes.    In the chest, there are no hypermetabolic lesions worrisome for malignancy.  Stable 0.5 cm solid nodule left upper lobe (series 3, image 70).  There are no concerning pulmonary nodules or masses, and there are no pathologically enlarged or hypermetabolic lymph nodes.    In the abdomen and pelvis, there is physiologic tracer distribution within the abdominal organs and excretion into the genitourinary system.  Postoperative change Whipple procedure.    In the bones, there are no hypermetabolic lesions worrisome for malignancy.    In the extremities, there are no hypermetabolic lesions worrisome for malignancy.    Miscellaneous: Subcentimeter hyperattenuating left thyroid nodule.  1.4 cm left thyroid hypodensity.  Calcific atherosclerosis including the coronary arteries.  Pneumobilia related to biliary enteric anastomosis.  New obstructing stone in the distal left ureter (series 3, image 208) measuring approximately 7-8 mm with associated mild left hydroureteronephrosis.  Hysterectomy.  Impression: Postoperative change of Whipple procedure for duodenal adenocarcinoma.  No evidence of hypermetabolic disease.    New obstructing stone in the distal left ureter with associated mild left hydroureteronephrosis.    This report was flagged in Epic as abnormal.    The information above was discovered on 5/2/2024 at 13:30 and relayed directly by Rahul Ferreira MD via [employer] Secure Chat to Payal Moreland NP who acknowledged receipt on 5/2/2024 at  14:01.    Electronically signed by resident: Rahul Ferreira  Date:    05/02/2024  Time:    13:12    Electronically signed by: Arslan Cornejo MD  Date:    05/02/2024  Time:    14:21         ASSESSMENT     1. Renal stone    2. Hydronephrosis, unspecified hydronephrosis type        PLAN:     Check urinalysis  Proceed with medical expulsive therapy.  Increase p.o. fluids, strain urine for stones, nonsteroidal anti-inflammatories p.r.n. pain.  Follow up one-week with a KUB    Jose Paul MD  Urology  [facility]     Disclaimer: This note has been generated using voice-recognition software. There may be typographical errors that have been missed during proof-reading.

## 2024-05-09 ENCOUNTER — TELEPHONE (OUTPATIENT)
Dept: INTERNAL MEDICINE | Facility: CLINIC | Age: 69
End: 2024-05-09
Payer: MEDICARE

## 2024-05-09 NOTE — TELEPHONE ENCOUNTER
----- Message from Payal Moreland NP sent at 5/6/2024 12:11 PM CDT -----  Iplaced referral for colonoscopy- she is due for her screening- can we set up?  ----- Message -----  From: Eber Roberts MD  Sent: 5/6/2024  10:33 AM CDT  To: GRANT Hood.  Saw her today. She's feeling better since being on abx.  No pain or further polyuria.  She said you asked about colonoscopy.  I do think it would be appropriate for her.  Thanks,  Vinh

## 2024-05-14 DIAGNOSIS — E44.0 MODERATE MALNUTRITION: ICD-10-CM

## 2024-05-14 DIAGNOSIS — Z90.410 HISTORY OF WHIPPLE PROCEDURE: ICD-10-CM

## 2024-05-14 DIAGNOSIS — E78.5 HYPERLIPIDEMIA, UNSPECIFIED HYPERLIPIDEMIA TYPE: ICD-10-CM

## 2024-05-14 DIAGNOSIS — N30.01 ACUTE CYSTITIS WITH HEMATURIA: ICD-10-CM

## 2024-05-14 DIAGNOSIS — K29.70 GASTRITIS, PRESENCE OF BLEEDING UNSPECIFIED, UNSPECIFIED CHRONICITY, UNSPECIFIED GASTRITIS TYPE: ICD-10-CM

## 2024-05-14 DIAGNOSIS — I10 HYPERTENSION, UNSPECIFIED TYPE: ICD-10-CM

## 2024-05-14 DIAGNOSIS — Z90.49 HISTORY OF WHIPPLE PROCEDURE: ICD-10-CM

## 2024-05-14 DIAGNOSIS — M79.10 MYALGIA: ICD-10-CM

## 2024-05-14 RX ORDER — DRONABINOL 5 MG/1
5 CAPSULE ORAL
Qty: 60 CAPSULE | Refills: 2 | Status: CANCELLED | OUTPATIENT
Start: 2024-05-14

## 2024-05-14 RX ORDER — LISINOPRIL AND HYDROCHLOROTHIAZIDE 12.5; 2 MG/1; MG/1
1 TABLET ORAL DAILY
Qty: 90 TABLET | Refills: 3 | Status: CANCELLED | OUTPATIENT
Start: 2024-05-14

## 2024-05-14 NOTE — TELEPHONE ENCOUNTER
----- Message from Danita Faulkner sent at 2024  3:53 PM CDT -----  Contact: [facility]  Nan Simms  MRN: [id number]  : 1955  PCP: Payal Moreland  Home Phone      [phone]  Work Phone      Not on file.  Mobile          [phone]      MESSAGE:     [facility] called asking for a f/u on the transfer of pts medication.       Please advise   [phone]

## 2024-05-15 RX ORDER — FAMOTIDINE 20 MG/1
20 TABLET, FILM COATED ORAL 2 TIMES DAILY
Qty: 60 TABLET | Refills: 11 | Status: SHIPPED | OUTPATIENT
Start: 2024-05-15 | End: 2025-05-15

## 2024-05-15 RX ORDER — OMEPRAZOLE 40 MG/1
40 CAPSULE, DELAYED RELEASE ORAL DAILY
Qty: 30 CAPSULE | Refills: 5 | Status: SHIPPED | OUTPATIENT
Start: 2024-05-15

## 2024-05-15 RX ORDER — ATORVASTATIN CALCIUM 40 MG/1
40 TABLET, FILM COATED ORAL DAILY
Qty: 90 TABLET | Refills: 1 | Status: SHIPPED | OUTPATIENT
Start: 2024-05-15

## 2024-05-15 RX ORDER — CIPROFLOXACIN 500 MG/1
500 TABLET ORAL EVERY 12 HOURS
Qty: 14 TABLET | Refills: 0 | OUTPATIENT
Start: 2024-05-15

## 2024-05-16 ENCOUNTER — OFFICE VISIT (OUTPATIENT)
Dept: UROLOGY | Facility: CLINIC | Age: 69
End: 2024-05-16
Attending: SPECIALIST
Payer: MEDICARE

## 2024-05-16 ENCOUNTER — HOSPITAL ENCOUNTER (OUTPATIENT)
Dept: RADIOLOGY | Facility: HOSPITAL | Age: 69
Discharge: HOME | End: 2024-05-16
Attending: SPECIALIST
Payer: MEDICARE

## 2024-05-16 VITALS
WEIGHT: 138.44 LBS | HEART RATE: 44 BPM | SYSTOLIC BLOOD PRESSURE: 132 MMHG | DIASTOLIC BLOOD PRESSURE: 60 MMHG | HEIGHT: 66 IN | BODY MASS INDEX: 22.25 KG/M2 | OXYGEN SATURATION: 98 %

## 2024-05-16 DIAGNOSIS — N20.1 LEFT URETERAL STONE: Primary | ICD-10-CM

## 2024-05-16 DIAGNOSIS — N20.1 LEFT URETERAL STONE: ICD-10-CM

## 2024-05-16 PROCEDURE — 3078F DIAST BP <80 MM HG: CPT | Mod: CPTII,S$GLB,, | Performed by: SPECIALIST

## 2024-05-16 PROCEDURE — 99213 OFFICE O/P EST LOW 20 MIN: CPT | Mod: S$GLB,,, | Performed by: SPECIALIST

## 2024-05-16 PROCEDURE — 74018 RADEX ABDOMEN 1 VIEW: CPT | Mod: 26,,, | Performed by: RADIOLOGY

## 2024-05-16 PROCEDURE — 74018 RADEX ABDOMEN 1 VIEW: CPT | Mod: TC

## 2024-05-16 PROCEDURE — 1160F RVW MEDS BY RX/DR IN RCRD: CPT | Mod: CPTII,S$GLB,, | Performed by: SPECIALIST

## 2024-05-16 PROCEDURE — 1159F MED LIST DOCD IN RCRD: CPT | Mod: CPTII,S$GLB,, | Performed by: SPECIALIST

## 2024-05-16 PROCEDURE — 3008F BODY MASS INDEX DOCD: CPT | Mod: CPTII,S$GLB,, | Performed by: SPECIALIST

## 2024-05-16 PROCEDURE — 1126F AMNT PAIN NOTED NONE PRSNT: CPT | Mod: CPTII,S$GLB,, | Performed by: SPECIALIST

## 2024-05-16 PROCEDURE — 4010F ACE/ARB THERAPY RXD/TAKEN: CPT | Mod: CPTII,S$GLB,, | Performed by: SPECIALIST

## 2024-05-16 PROCEDURE — 3075F SYST BP GE 130 - 139MM HG: CPT | Mod: CPTII,S$GLB,, | Performed by: SPECIALIST

## 2024-05-16 PROCEDURE — 3044F HG A1C LEVEL LT 7.0%: CPT | Mod: CPTII,S$GLB,, | Performed by: SPECIALIST

## 2024-05-16 PROCEDURE — 99999 PR PBB SHADOW E&M-EST. PATIENT-LVL II: CPT | Mod: PBBFAC,,, | Performed by: SPECIALIST

## 2024-05-16 NOTE — PROGRESS NOTES
"[facility] - Urology   Clinic Note    SUBJECTIVE:     No chief complaint on file.      Referral from: No ref. provider found.    History of Present Illness:  Nan Simms is a 68 y.o. female who presents to clinic for left distal ureteral stone (seen on PET scan).    She remains asymptomatic.  Pleased to be cancer free.  KUB reviewed, no obvious stone.  Past Medical History:   Diagnosis Date    Abnormal Pap smear of cervix     Arthritis     Duodenal adenocarcinoma     Hypertension     Hyperthyroidism        Current Outpatient Medications on File Prior to Visit   Medication Sig Dispense Refill    acetaminophen (TYLENOL) 650 MG TbSR Take 650 mg by mouth as needed.      atorvastatin (LIPITOR) 40 MG tablet Take 1 tablet (40 mg total) by mouth once daily. 90 tablet 1    ciprofloxacin HCl (CIPRO) 500 MG tablet Take 1 tablet (500 mg total) by mouth every 12 (twelve) hours. 14 tablet 0    droNABinol (MARINOL) 5 MG capsule Take 1 capsule (5 mg total) by mouth 2 (two) times daily before meals. 60 capsule 2    famotidine (PEPCID) 20 MG tablet Take 1 tablet (20 mg total) by mouth 2 (two) times daily. 60 tablet 11    gabapentin (NEURONTIN) 300 MG capsule Take 1 capsule (300 mg total) by mouth 3 (three) times daily. 90 capsule 5    lisinopriL-hydrochlorothiazide (PRINZIDE,ZESTORETIC) 20-12.5 mg per tablet Take 1 tablet by mouth once daily. 90 tablet 3    omeprazole (PRILOSEC) 40 MG capsule Take 1 capsule (40 mg total) by mouth once daily. 30 capsule 5     No current facility-administered medications on file prior to visit.         OBJECTIVE:     Estimated body mass index is 22.35 kg/m² as calculated from the following:    Height as of this encounter: 5' 6" (1.676 m).    Weight as of this encounter: 62.8 kg (138 lb 7.2 oz).    Vital Signs (Most Recent)  Vitals:    05/16/24 1034   BP: 132/60   Pulse: (!) 44       Physical Exam:    Physical Exam     GENERAL: patient sitting comfortably  HEENT: normocephalic  NECK: " "supple, no JVD  PULM: normal chest rise, no increased WOB  HEART: non-diaphoretic  SKIN: warm, dry, well perfused  EXT: no bruising or edema  NEURO: grossly normal with no focal deficits  PSYCH: appropriate mood and affect    Genitourinary Exam:  deferred      LABS:     Lab Results   Component Value Date    BUN 14 05/02/2024    CREATININE 0.7 05/02/2024    WBC 6.02 05/02/2024    HGB 10.6 (L) 05/02/2024    HCT 33.0 (L) 05/02/2024     05/02/2024    AST 17 05/02/2024    ALT 14 05/02/2024    ALKPHOS 95 05/02/2024    ALBUMIN 3.0 (L) 05/02/2024    HGBA1C 5.4 04/25/2024    INR 1.0 03/15/2023        Urinalysis:   No results found for: "UAREFLEX"       Imaging:  I have personally reviewed all relevant imaging studies.    No results found for this or any previous visit (from the past 2160 hour(s)).  No results found for this or any previous visit (from the past 2160 hour(s)).  X-Ray Abdomen AP 1 View  Narrative: EXAMINATION:  XR ABDOMEN AP 1 VIEW    CLINICAL HISTORY:  Nephrolithiasis    TECHNIQUE:  XR ABDOMEN AP 1 VIEW    COMPARISON:  03/23/2023, 05/02/2024    FINDINGS:  The bowel gas pattern is nonobstructive.No free air is seen.  Suture anastomosis seen in the left upper quadrant.  No abnormal masses or calcifications.    No airspace consolidation at the lung bases.No acute bony abnormality.  Impression: As above.    Electronically signed by: Rhonda Doty MD  Date:    05/16/2024  Time:    10:45         ASSESSMENT     1. Left ureteral stone        PLAN:     KUB unremarkable.  Consider BROOKS or CT stone survey if she develops symptoms.  RTC 3 mo.    Jose Paul MD  Urology  [facility]     Disclaimer: This note has been generated using voice-recognition software. There may be typographical errors that have been missed during proof-reading.     "

## 2024-05-28 ENCOUNTER — PATIENT OUTREACH (OUTPATIENT)
Dept: ADMINISTRATIVE | Facility: HOSPITAL | Age: 69
End: 2024-05-28
Payer: MEDICARE

## 2024-06-17 ENCOUNTER — LAB VISIT (OUTPATIENT)
Dept: LAB | Facility: HOSPITAL | Age: 69
End: 2024-06-17
Attending: INTERNAL MEDICINE
Payer: MEDICARE

## 2024-06-17 ENCOUNTER — INFUSION (OUTPATIENT)
Dept: INFUSION THERAPY | Facility: HOSPITAL | Age: 69
End: 2024-06-17
Payer: MEDICARE

## 2024-06-17 ENCOUNTER — OFFICE VISIT (OUTPATIENT)
Dept: HEMATOLOGY/ONCOLOGY | Facility: CLINIC | Age: 69
End: 2024-06-17
Payer: MEDICARE

## 2024-06-17 VITALS
WEIGHT: 137.88 LBS | RESPIRATION RATE: 18 BRPM | TEMPERATURE: 98 F | HEIGHT: 66 IN | HEART RATE: 61 BPM | BODY MASS INDEX: 22.16 KG/M2 | DIASTOLIC BLOOD PRESSURE: 62 MMHG | SYSTOLIC BLOOD PRESSURE: 144 MMHG

## 2024-06-17 VITALS
WEIGHT: 137.88 LBS | BODY MASS INDEX: 22.16 KG/M2 | SYSTOLIC BLOOD PRESSURE: 120 MMHG | HEART RATE: 53 BPM | HEIGHT: 66 IN | DIASTOLIC BLOOD PRESSURE: 64 MMHG

## 2024-06-17 DIAGNOSIS — I70.0 AORTIC ATHEROSCLEROSIS: ICD-10-CM

## 2024-06-17 DIAGNOSIS — E11.9 DIABETES MELLITUS TYPE 2, DIET-CONTROLLED: ICD-10-CM

## 2024-06-17 DIAGNOSIS — R53.0 NEOPLASTIC (MALIGNANT) RELATED FATIGUE: ICD-10-CM

## 2024-06-17 DIAGNOSIS — D64.9 ANEMIA, UNSPECIFIED TYPE: ICD-10-CM

## 2024-06-17 DIAGNOSIS — C17.0 DUODENAL ADENOCARCINOMA: ICD-10-CM

## 2024-06-17 DIAGNOSIS — E44.0 MODERATE MALNUTRITION: ICD-10-CM

## 2024-06-17 DIAGNOSIS — C17.0 DUODENAL ADENOCARCINOMA: Primary | ICD-10-CM

## 2024-06-17 DIAGNOSIS — R63.0 DECREASED APPETITE: ICD-10-CM

## 2024-06-17 DIAGNOSIS — C78.7 METASTASIS TO LIVER: ICD-10-CM

## 2024-06-17 DIAGNOSIS — E78.5 HYPERLIPIDEMIA, UNSPECIFIED HYPERLIPIDEMIA TYPE: ICD-10-CM

## 2024-06-17 DIAGNOSIS — I10 ESSENTIAL HYPERTENSION: ICD-10-CM

## 2024-06-17 LAB
ALBUMIN SERPL BCP-MCNC: 3.3 G/DL (ref 3.5–5.2)
ALP SERPL-CCNC: 94 U/L (ref 55–135)
ALT SERPL W/O P-5'-P-CCNC: 20 U/L (ref 10–44)
ANION GAP SERPL CALC-SCNC: 8 MMOL/L (ref 8–16)
AST SERPL-CCNC: 29 U/L (ref 10–40)
BILIRUB SERPL-MCNC: 0.5 MG/DL (ref 0.1–1)
BUN SERPL-MCNC: 13 MG/DL (ref 8–23)
CALCIUM SERPL-MCNC: 9.4 MG/DL (ref 8.7–10.5)
CEA SERPL-MCNC: 6.8 NG/ML (ref 0–5)
CHLORIDE SERPL-SCNC: 105 MMOL/L (ref 95–110)
CO2 SERPL-SCNC: 31 MMOL/L (ref 23–29)
CREAT SERPL-MCNC: 0.7 MG/DL (ref 0.5–1.4)
ERYTHROCYTE [DISTWIDTH] IN BLOOD BY AUTOMATED COUNT: 18.9 % (ref 11.5–14.5)
EST. GFR  (NO RACE VARIABLE): >60 ML/MIN/1.73 M^2
GLUCOSE SERPL-MCNC: 114 MG/DL (ref 70–110)
HCT VFR BLD AUTO: 38.5 % (ref 37–48.5)
HGB BLD-MCNC: 11.8 G/DL (ref 12–16)
IMM GRANULOCYTES # BLD AUTO: 0.01 K/UL (ref 0–0.04)
MCH RBC QN AUTO: 22.4 PG (ref 27–31)
MCHC RBC AUTO-ENTMCNC: 30.6 G/DL (ref 32–36)
MCV RBC AUTO: 73 FL (ref 82–98)
NEUTROPHILS # BLD AUTO: 3.2 K/UL (ref 1.8–7.7)
PLATELET # BLD AUTO: 223 K/UL (ref 150–450)
PMV BLD AUTO: 10.9 FL (ref 9.2–12.9)
POTASSIUM SERPL-SCNC: 3.8 MMOL/L (ref 3.5–5.1)
PROT SERPL-MCNC: 6.8 G/DL (ref 6–8.4)
RBC # BLD AUTO: 5.26 M/UL (ref 4–5.4)
SODIUM SERPL-SCNC: 144 MMOL/L (ref 136–145)
T4 FREE SERPL-MCNC: 0.85 NG/DL (ref 0.71–1.51)
TSH SERPL DL<=0.005 MIU/L-ACNC: 0.36 UIU/ML (ref 0.4–4)
WBC # BLD AUTO: 5.73 K/UL (ref 3.9–12.7)

## 2024-06-17 PROCEDURE — 3008F BODY MASS INDEX DOCD: CPT | Mod: CPTII,S$GLB,, | Performed by: PHYSICIAN ASSISTANT

## 2024-06-17 PROCEDURE — 1101F PT FALLS ASSESS-DOCD LE1/YR: CPT | Mod: CPTII,S$GLB,, | Performed by: PHYSICIAN ASSISTANT

## 2024-06-17 PROCEDURE — 80053 COMPREHEN METABOLIC PANEL: CPT | Performed by: INTERNAL MEDICINE

## 2024-06-17 PROCEDURE — 1159F MED LIST DOCD IN RCRD: CPT | Mod: CPTII,S$GLB,, | Performed by: PHYSICIAN ASSISTANT

## 2024-06-17 PROCEDURE — 82378 CARCINOEMBRYONIC ANTIGEN: CPT | Performed by: INTERNAL MEDICINE

## 2024-06-17 PROCEDURE — 84443 ASSAY THYROID STIM HORMONE: CPT | Performed by: INTERNAL MEDICINE

## 2024-06-17 PROCEDURE — 99999 PR PBB SHADOW E&M-EST. PATIENT-LVL III: CPT | Mod: PBBFAC,,, | Performed by: PHYSICIAN ASSISTANT

## 2024-06-17 PROCEDURE — 4010F ACE/ARB THERAPY RXD/TAKEN: CPT | Mod: CPTII,S$GLB,, | Performed by: PHYSICIAN ASSISTANT

## 2024-06-17 PROCEDURE — 63600175 PHARM REV CODE 636 W HCPCS: Mod: JZ,JG | Performed by: PHYSICIAN ASSISTANT

## 2024-06-17 PROCEDURE — 3078F DIAST BP <80 MM HG: CPT | Mod: CPTII,S$GLB,, | Performed by: PHYSICIAN ASSISTANT

## 2024-06-17 PROCEDURE — 96413 CHEMO IV INFUSION 1 HR: CPT

## 2024-06-17 PROCEDURE — 3044F HG A1C LEVEL LT 7.0%: CPT | Mod: CPTII,S$GLB,, | Performed by: PHYSICIAN ASSISTANT

## 2024-06-17 PROCEDURE — 85027 COMPLETE CBC AUTOMATED: CPT | Performed by: INTERNAL MEDICINE

## 2024-06-17 PROCEDURE — 25000003 PHARM REV CODE 250: Performed by: PHYSICIAN ASSISTANT

## 2024-06-17 PROCEDURE — 1126F AMNT PAIN NOTED NONE PRSNT: CPT | Mod: CPTII,S$GLB,, | Performed by: PHYSICIAN ASSISTANT

## 2024-06-17 PROCEDURE — 36415 COLL VENOUS BLD VENIPUNCTURE: CPT | Performed by: INTERNAL MEDICINE

## 2024-06-17 PROCEDURE — 3288F FALL RISK ASSESSMENT DOCD: CPT | Mod: CPTII,S$GLB,, | Performed by: PHYSICIAN ASSISTANT

## 2024-06-17 PROCEDURE — 99215 OFFICE O/P EST HI 40 MIN: CPT | Mod: S$GLB,,, | Performed by: PHYSICIAN ASSISTANT

## 2024-06-17 PROCEDURE — 84439 ASSAY OF FREE THYROXINE: CPT | Performed by: INTERNAL MEDICINE

## 2024-06-17 PROCEDURE — 3074F SYST BP LT 130 MM HG: CPT | Mod: CPTII,S$GLB,, | Performed by: PHYSICIAN ASSISTANT

## 2024-06-17 PROCEDURE — 1160F RVW MEDS BY RX/DR IN RCRD: CPT | Mod: CPTII,S$GLB,, | Performed by: PHYSICIAN ASSISTANT

## 2024-06-17 PROCEDURE — G2211 COMPLEX E/M VISIT ADD ON: HCPCS | Mod: S$GLB,,, | Performed by: PHYSICIAN ASSISTANT

## 2024-06-17 RX ORDER — SODIUM CHLORIDE 0.9 % (FLUSH) 0.9 %
10 SYRINGE (ML) INJECTION
Status: CANCELLED | OUTPATIENT
Start: 2024-06-17

## 2024-06-17 RX ORDER — EPINEPHRINE 0.3 MG/.3ML
0.3 INJECTION SUBCUTANEOUS ONCE AS NEEDED
Status: DISCONTINUED | OUTPATIENT
Start: 2024-06-17 | End: 2024-06-17 | Stop reason: HOSPADM

## 2024-06-17 RX ORDER — SODIUM CHLORIDE 0.9 % (FLUSH) 0.9 %
10 SYRINGE (ML) INJECTION
Status: DISCONTINUED | OUTPATIENT
Start: 2024-06-17 | End: 2024-06-17 | Stop reason: HOSPADM

## 2024-06-17 RX ORDER — HEPARIN 100 UNIT/ML
500 SYRINGE INTRAVENOUS
Status: CANCELLED | OUTPATIENT
Start: 2024-06-17

## 2024-06-17 RX ORDER — DIPHENHYDRAMINE HYDROCHLORIDE 50 MG/ML
50 INJECTION INTRAMUSCULAR; INTRAVENOUS ONCE AS NEEDED
Status: CANCELLED | OUTPATIENT
Start: 2024-06-17

## 2024-06-17 RX ORDER — HEPARIN 100 UNIT/ML
500 SYRINGE INTRAVENOUS
Status: DISCONTINUED | OUTPATIENT
Start: 2024-06-17 | End: 2024-06-17 | Stop reason: HOSPADM

## 2024-06-17 RX ORDER — DIPHENHYDRAMINE HYDROCHLORIDE 50 MG/ML
50 INJECTION INTRAMUSCULAR; INTRAVENOUS ONCE AS NEEDED
Status: DISCONTINUED | OUTPATIENT
Start: 2024-06-17 | End: 2024-06-17 | Stop reason: HOSPADM

## 2024-06-17 RX ORDER — DRONABINOL 5 MG/1
5 CAPSULE ORAL
Qty: 60 CAPSULE | Refills: 2 | Status: SHIPPED | OUTPATIENT
Start: 2024-06-17

## 2024-06-17 RX ORDER — EPINEPHRINE 0.3 MG/.3ML
0.3 INJECTION SUBCUTANEOUS ONCE AS NEEDED
Status: CANCELLED | OUTPATIENT
Start: 2024-06-17

## 2024-06-17 RX ADMIN — SODIUM CHLORIDE: 9 INJECTION, SOLUTION INTRAVENOUS at 12:06

## 2024-06-17 RX ADMIN — SODIUM CHLORIDE 400 MG: 9 INJECTION, SOLUTION INTRAVENOUS at 01:06

## 2024-06-17 NOTE — PLAN OF CARE
1307-Labs , hx, and medications reviewed, patient was seen in clinic prior to arrival. Assessment completed. Discussed plan of care with patient. Patient in agreement. Chair reclined and warm blanket and snack offered.

## 2024-06-17 NOTE — PLAN OF CARE
1338-Patient tolerated treatment well. PIV removed and site dressed. Discharged without complaints or S/S of adverse event.   Instructed to call provider for any questions or concerns.

## 2024-06-17 NOTE — PROGRESS NOTES
MEDICAL ONCOLOGY - ESTABLISHED PATIENT VISIT    Reason for visit: duodenal adenocarcinoma     Best Contact Phone Number(s): [phone] (home)      Cancer/Stage/TNM:    Cancer Staging   No matching staging information was found for the patient.       Oncology History   Duodenal adenocarcinoma   3/10/2023 Initial Diagnosis    Duodenal adenocarcinoma     3/15/2023 Surgery    Whipple  dMMR with loss of MLH1/PMS2; MLH1 promoter hypermethylation present suggesting a sporadic tumor.      8/24/2023 -  Chemotherapy    Treatment Summary   Plan Name: OP PEMBROLIZUMAB 400MG Q6W  Treatment Goal: Palliative  Status: Active  Start Date: 8/24/2023  End Date: 6/23/2025 (Planned)  Provider: Eber Roberts MD  Chemotherapy: [No matching medication found in this treatment plan]          Interval History: 69 y.o. female with recurrent MSI-H metastatic duodenal adenocarcinoma who presents for follow-up prior to cycle 8 of pembrolizumab.  She feels very well.  She denies any new toxicities.  She denies any diarrhea, dyspnea or fevers or chills.  She remains active.  Her appetite continues to be good.  She takes dronabinol once nightly. She also was able to go back to work.     Presents to clinic with her . ECOG 0.     ROS:   Review of Systems   Constitutional:  Negative for chills, fever and malaise/fatigue.   HENT:  Negative for nosebleeds and sore throat.    Respiratory:  Negative for cough and shortness of breath.    Cardiovascular:  Negative for chest pain, palpitations and leg swelling.   Gastrointestinal:  Negative for blood in stool, constipation, diarrhea, heartburn, nausea and vomiting.   Genitourinary:  Negative for dysuria, frequency, hematuria and urgency.   Musculoskeletal:  Negative for falls and myalgias.   Skin:  Negative for itching and rash.   Neurological:  Negative for dizziness, tingling, weakness and headaches.   Psychiatric/Behavioral:  The patient is not nervous/anxious and does not have insomnia.   "      Past Medical History:   Past Medical History:   Diagnosis Date    Abnormal Pap smear of cervix     Arthritis     Duodenal adenocarcinoma     Hypertension     Hyperthyroidism         Past Surgical History:   Past Surgical History:   Procedure Laterality Date    CATARACT EXTRACTION W/  INTRAOCULAR LENS IMPLANT Right 8/8/2019    Procedure: EXTRACTION, CATARACT, WITH IOL INSERTION;  Surgeon: Spenser Lee MD;  Location: [facility];  Service: Ophthalmology;  Laterality: Right;    ESOPHAGOGASTRODUODENOSCOPY N/A 3/10/2023    Procedure: EGD (ESOPHAGOGASTRODUODENOSCOPY);  Surgeon: Shashi Tapia MD;  Location: [address]);  Service: Endoscopy;  Laterality: N/A;    EYE SURGERY      HYSTERECTOMY      OOPHORECTOMY      PHACOEMULSIFICATION OF CATARACT Right 8/8/2019    Procedure: PHACOEMULSIFICATION, CATARACT;  Surgeon: Spenser Lee MD;  Location: [facility];  Service: Ophthalmology;  Laterality: Right;    WHIPPLE PROCEDURE N/A 3/15/2023    Procedure: WHIPPLE PROCEDURE;  Surgeon: Nick Paez MD;  Location: [address];  Service: General;  Laterality: N/A;        Family History:   Family History   Problem Relation Name Age of Onset    Cancer Mother      Breast cancer Neg Hx      Colon cancer Neg Hx      Ovarian cancer Neg Hx          Social History:   Social History     Tobacco Use    Smoking status: Every Day     Current packs/day: 0.25     Average packs/day: 0.3 packs/day for 30.0 years (7.5 ttl pk-yrs)     Types: Cigarettes    Smokeless tobacco: Never    Tobacco comments:     About 7-8 cigs a day   Substance Use Topics    Alcohol use: Yes     Alcohol/week: 1.0 standard drink of alcohol     Types: 1 Cans of beer per week        I have reviewed and updated the patient's past medical, surgical, family and social histories.    Allergies:   Review of patient's allergies indicates:   Allergen Reactions    Aspirin Other (See Comments)     Pt states it is "hard on her stomach" She can tolerate a " low dose aspirin    Pcn [penicillins]      Childhood - Tolerated ceftriaxone and cefazolin with no documented issues in the past         Medications:   Current Outpatient Medications   Medication Sig Dispense Refill    acetaminophen (TYLENOL) 650 MG TbSR Take 650 mg by mouth as needed.      atorvastatin (LIPITOR) 40 MG tablet Take 1 tablet (40 mg total) by mouth once daily. 90 tablet 1    ciprofloxacin HCl (CIPRO) 500 MG tablet Take 1 tablet (500 mg total) by mouth every 12 (twelve) hours. 14 tablet 0    famotidine (PEPCID) 20 MG tablet Take 1 tablet (20 mg total) by mouth 2 (two) times daily. 60 tablet 11    lisinopriL-hydrochlorothiazide (PRINZIDE,ZESTORETIC) 20-12.5 mg per tablet Take 1 tablet by mouth once daily. 90 tablet 3    omeprazole (PRILOSEC) 40 MG capsule Take 1 capsule (40 mg total) by mouth once daily. 30 capsule 5    droNABinol (MARINOL) 5 MG capsule Take 1 capsule (5 mg total) by mouth 2 (two) times daily before meals. 60 capsule 2    gabapentin (NEURONTIN) 300 MG capsule Take 1 capsule (300 mg total) by mouth 3 (three) times daily. 90 capsule 5     No current facility-administered medications for this visit.     Facility-Administered Medications Ordered in Other Visits   Medication Dose Route Frequency Provider Last Rate Last Admin    alteplase injection 2 mg  2 mg Intra-Catheter PRN Adela Lucio PA-C        diphenhydrAMINE injection 50 mg  50 mg Intravenous Once PRN Adela Lucio PA-C        EPINEPHrine (EPIPEN) 0.3 mg/0.3 mL pen injection 0.3 mg  0.3 mg Intramuscular Once PRN Adela Lucio PA-C        heparin, porcine (PF) 100 unit/mL injection flush 500 Units  500 Units Intravenous PRN Adela Lucio, PA-C        hydrocortisone sodium succinate injection 100 mg  100 mg Intravenous Once PRN Adela Lucio PA-C        pembrolizumab (KEYTRUDA) 400 mg in sodium chloride 0.9% SolP 131 mL infusion  400 mg Intravenous 1 time in Clinic/HOD Adela Lucio PA-C        sodium chloride  "0.9% flush 10 mL  10 mL Intravenous PRN Adela Lucio PA-C          Physical Exam:   /64 (BP Location: Left arm, Patient Position: Sitting, BP Method: Medium (Automatic))   Pulse (!) 53   Ht 5' 6" (1.676 m)   Wt 62.5 kg (137 lb 14.4 oz)   BMI 22.26 kg/m²      Physical Exam  Vitals reviewed.   Constitutional:       General: She is not in acute distress.     Appearance: Normal appearance. She is normal weight. She is not ill-appearing, toxic-appearing or diaphoretic.   HENT:      Head: Normocephalic and atraumatic.      Right Ear: External ear normal.      Left Ear: External ear normal.      Nose: Nose normal.      Mouth/Throat:      Pharynx: Oropharynx is clear.   Eyes:      General: No scleral icterus.     Conjunctiva/sclera: Conjunctivae normal.      Pupils: Pupils are equal, round, and reactive to light.   Cardiovascular:      Rate and Rhythm: Normal rate.   Pulmonary:      Effort: Pulmonary effort is normal. No respiratory distress.      Breath sounds: No wheezing.   Abdominal:      General: There is no distension.      Tenderness: There is no abdominal tenderness.      Comments: Midline abdominal incision well healed   Musculoskeletal:      Cervical back: Normal range of motion.      Right lower leg: No edema.      Left lower leg: No edema.   Skin:     General: Skin is warm and dry.      Coloration: Skin is not jaundiced or pale.      Findings: No bruising, erythema or rash.   Neurological:      General: No focal deficit present.      Mental Status: She is alert and oriented to person, place, and time. Mental status is at baseline.      Cranial Nerves: No cranial nerve deficit.      Sensory: No sensory deficit.      Motor: No weakness.      Gait: Gait normal.   Psychiatric:         Mood and Affect: Mood normal.         Behavior: Behavior normal.         Thought Content: Thought content normal.         Judgment: Judgment normal.         Labs:   Recent Results (from the past 48 hour(s))   CBC " Oncology    Collection Time: 24 10:52 AM   Result Value Ref Range    WBC 5.73 3.90 - 12.70 K/uL    RBC 5.26 4.00 - 5.40 M/uL    Hemoglobin 11.8 (L) 12.0 - 16.0 g/dL    Hematocrit 38.5 37.0 - 48.5 %    MCV 73 (L) 82 - 98 fL    MCH 22.4 (L) 27.0 - 31.0 pg    MCHC 30.6 (L) 32.0 - 36.0 g/dL    RDW 18.9 (H) 11.5 - 14.5 %    Platelets 223 150 - 450 K/uL    MPV 10.9 9.2 - 12.9 fL    Gran # (ANC) 3.2 1.8 - 7.7 K/uL    Immature Grans (Abs) 0.01 0.00 - 0.04 K/uL   Comprehensive Metabolic Panel    Collection Time: 24 10:52 AM   Result Value Ref Range    Sodium 144 136 - 145 mmol/L    Potassium 3.8 3.5 - 5.1 mmol/L    Chloride 105 95 - 110 mmol/L    CO2 31 (H) 23 - 29 mmol/L    Glucose 114 (H) 70 - 110 mg/dL    BUN 13 8 - 23 mg/dL    Creatinine 0.7 0.5 - 1.4 mg/dL    Calcium 9.4 8.7 - 10.5 mg/dL    Total Protein 6.8 6.0 - 8.4 g/dL    Albumin 3.3 (L) 3.5 - 5.2 g/dL    Total Bilirubin 0.5 0.1 - 1.0 mg/dL    Alkaline Phosphatase 94 55 - 135 U/L    AST 29 10 - 40 U/L    ALT 20 10 - 44 U/L    eGFR >60.0 >60 mL/min/1.73 m^2    Anion Gap 8 8 - 16 mmol/L   CEA    Collection Time: 24 10:52 AM   Result Value Ref Range    CEA 6.8 (H) 0.0 - 5.0 ng/mL   TSH    Collection Time: 24 10:52 AM   Result Value Ref Range    TSH 0.356 (L) 0.400 - 4.000 uIU/mL   T4, Free    Collection Time: 24 10:52 AM   Result Value Ref Range    Free T4 0.85 0.71 - 1.51 ng/dL        Lab work reviewed. CEA elevated today, 6.8.      Imagin2024 PET-CT:    We personally reviewed the images which shows no evidence of active disease.  Left ureteral stone is present.    Path:   3/15/23 - Whipple  Final Pathologic Diagnosis      Abnormal   1. Lymph node, hepatic cautery, resection:   - One lymph node negative for metastatic carcinoma (0/1).   - See synoptic report.   2. Gallbladder, cholecystectomy:   - Benign gallbladder with no significant histologic abnormality.   3. Pancreas, duodenum, common bile duct, and partial gastrectomy,    pancreaticoduodenectomy (Whipple specimen):   - Poorly differentiated carcinoma with medullary features.   - See synoptic report.   - See comment.   Synoptic Report   Procedure: Pancreaticoduodenectomy with partial gastrectomy (Whipple   specimen)   Tumor site:  Pylorus and proximal duodenum   Histologic type: Poorly differentiated carcinoma with medullary features   Histologic grade: G3, poorly differentiated   Tumor size:  6.5 cm in greatest dimension grossly   Tumor extent:   Invades through muscularis propria into subserosa, or extends   into nonperitonealized perimuscular tissue (mesentery or retroperitoneum)   without serosal penetration   Macroscopic tumor perforation:  Not identified   Lymphovascular inv asion:  Not identified, see comment   Margin status for invasive carcinoma:  All margins negative for invasive   carcinoma   Margin status for dysplasia:  All margins negative for carcinoma in Situ   (high-grade dysplasia)/adenoma   Regional lymph node status: Regional lymph nodes present        All Regional lymph nodes negative for tumor        Number of lymph nodes examined:  15 (including specimens 1 and 3)   PATHOLOGIC STAGE CLASSIFICATION (pTNM, AJCC 8th Edition):  p T3 N0   Additional findings:  Stomach with intestinal metaplasia (immunostain for   H.pylori negative), 2 lymph nodes with fibrosis and calcifications (GMS stain   for fungal organisms and AFB stain negative)   Ancillary studies:  Immunohistochemistry (IHC) Testing for Mismatch Repair   (MMR) Proteins   MLH1- Loss of nuclear expression   PMS2 - Loss of nuclear expression   MSH2 - Intact nuclear expression   MSH6 - Intact nuclear expression   Background nonneoplastic tissue/internal control with intact nuclear   expression   IHC Interpret ation:  Loss of nuclear expression of MLH1 and PMS2: testing for   methylation of the MLH1 promoter and/or mutation of BRAF is indicated (the   presence of a BRAF V600E mutation and/or MLH1 methylation  suggests that the   tumor is sporadic and germline evaluation is probably not indicated; absence   of both MLH1 methylation and of BRAF V600E mutation suggests the possibility   of Seaman syndrome, and sequencing and/or large deletion/duplication testing   of germline MLH1 may be indicated)   Testing for methylation of the MLH1 promoter and mutation of BRAF is in   process and results will be supplemented.   There are exceptions to the above IHC interpretations. These results should   not be considered in isolation, and clinical correlation with genetic   counseling is recommended to assess the need for germline testing.   COMMENT:  The stomach and duodenum (specimen 3) shows a 6.5 cm mass involving   the pylorus with the majority of the tumor appearing to be in the duodenum.   The tumor shows poorly d ifferentiated sheets of blue cells with necrosis.   There is a significant amount of lymphocytic inflammatory infiltrate around   the edges of the tumor.  Immunostains show the tumor cells to be positive for   CK7 with patchy weak CDX2 and negative for synaptophysin and chromogranin.   There is also loss of MLH1 and PMS2 on MMR stains.  These features are   suggestive of medullary carcinoma.  Select slides reviewed by Dr. ITZEL Sánchez   who agrees with the diagnosis of poorly differentiated carcinoma with   medullary features.  Immunostains for CD 34 performed on blocks 3E and 3H   show no definitive lymphovascular invasion even though some areas suspicious   cells on routine H&E sections.  Appropriate positive controls   VC      Comment: Interp By Meredith Pappas MD, Signed on 04/12/2023 at 16:03   Supplemental Diagnosis      Abnormal   MLH1 Hypermethylation/BRAF Mutation   Result Summary   MLH1 HYPERMETHYLATION PRESENT/NO BRAF MUTATION IDENTIFIED   Result   Provided diagnosis: pylorus and proximal duodenum poorly differentiated   carcinoma with medullary features   Methylation status: Positive for MLH1 promoter  "hypermethylation   BRAF status: Wild-type (SEE INTERPRETATION)   Alexis Sotelo M.D.   Report attached   Performing location:   [place] [address]   "Disclaimer:  This case diagnosis was rendered completely by the outside   consultation pathologist and the case is electronically signed by an [facility]   pathologist listed below solely to release the report into the medical   record."          Assessment:       1. Duodenal adenocarcinoma    2. Metastasis to liver    3. Anemia, unspecified type    4. Decreased appetite    5. Moderate malnutrition    6. Essential hypertension    7. Hyperlipidemia, unspecified hyperlipidemia type    8. Diabetes mellitus type 2, diet-controlled    9. Aortic atherosclerosis    10. Neoplastic (malignant) related fatigue           Plan:     # Duodenal adenocarcinoma   Mrs. Simms is a pleasant 69 y.o. 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 Pathology did reveal some high grade features, including poorly differentiated carcinoma, 6.5 cm size.  We discussed at her initial visit he limited role of adjuvant chemotherapy in the stage II setting for small bowel adenocarcinoma.      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     CT CAP on 7/26/23 demonstrated concern for recurrence in liver.  PET/CT on 8/4/23 showed hypermetabolic inferior R hepatic lobe lesion consistent with metastatic recurrence.  CEA has risen as well.    Given her dMMR/MSI-H status, we recommended to start on single agent pembrolizumab 400 mg q6 weeks.  Patient was consented for immunotherapy. An extensive discussion was had which included a thorough discussion of the risk and benefits of treatment and alternatives. Risks, including but not limited to, immune-mediated toxicities involving multiple body organs including " lungs, kidneys, GI tract, liver, heart, central nervous system, skin, thyroid, pituitary gland, and others were all explained to the patient. The patient agrees with the plan, and all questions have been answered to their satisfaction. Consent was signed the patient, provider, and a third party witness.    She was also enrolled in our protocol: [id number], Disparities in Results of Immune Checkpoint Inhibitor Treatment (DIRECT): A Prospective Cohort Study of Cancer Survivors Treated with anti-PD-L1 Immunotherapy in a Community Oncology Setting.  CRC is [facility] [place].    No toxicities noted from pembrolizumab cycle 1.  PET/CT after cycle 2 shows resolution of liver metastasis.  Question of grade 1 pneumonitis with GGO on that scan but this has since resolved on CT chest done 12/15 which makes pneumonitis less likely diagnosis.  No other IO toxicities.  PET/CT after cycle 4 shows resolution of hypermetabolic nodules and ground-glass in right middle lobe of lung. No evidence of other hypermetabolic areas.   PET/CT after cycle 6 shows no evidence of disease.    Doing well. Continues to tolerate treatment well. Recc to continue  Labs reviewed and adequate for treatment.  No toxicities from IO.  Will proceed with cycle 8 of pembrolizumab today.    [facility] Tissue NGS: MSI-H, TMB 76.3 m/MB  Repeat imaging with PET prior to cycle 9 of pembrolizumab.    # Decreased appetite, malnutrition  -Appetite improved with Marinol. Taking at night. Re-fill given for Marinol  -weight stable. Nutrition following.    # Anemia, neoplasm related fatigue  -Microcytic anemia improving. Suspect thalassemia given longstanding nature.  -No signs of bleeding, platelets normal.   -Asymptomatic. Monitor.   -continue to monitor TSH    # HTN, HLD, DM, aortic atherosclerosis   -BP normal in clinic today.    -Following with PCP.   -Continue medical management.     # L nephrolithiasis  No symptoms currently.  Was having polyuria which has improved with cipro,  Rx from her PCP.  Urology c/s placed.      Patient is in agreement with the proposed treatment plan. All questions were answered to the patient's satisfaction. Pt knows to call clinic if anything is needed before the next clinic visit.        Route Chart for Scheduling    Med Onc Chart Routing      Follow up with physician 6 weeks. See Dr Roberts in 6 weeks and 12 weeks for Pembro   Follow up with YANDEL . See YANDEL in 18 weeks for Pembro   Infusion scheduling note    Injection scheduling note    Labs CBC, CMP, TSH, free T4 and CEA   Scheduling:  Preferred lab:  Lab interval: every 6 weeks     Imaging PET scan   Please schedule prior to clinic visit with Dr Roberts in 6 weeks.   Pharmacy appointment    Other referrals              Treatment Plan Information   OP PEMBROLIZUMAB 400MG Q6W   Eber Roberts MD   Upcoming Treatment Dates - OP PEMBROLIZUMAB 400MG Q6W    7/22/2024       Chemotherapy       pembrolizumab (KEYTRUDA) 400 mg in sodium chloride 0.9% SolP 116 mL infusion  9/2/2024       Chemotherapy       pembrolizumab (KEYTRUDA) 400 mg in sodium chloride 0.9% SolP 116 mL infusion  10/14/2024       Chemotherapy       pembrolizumab (KEYTRUDA) 400 mg in sodium chloride 0.9% SolP 116 mL infusion  11/25/2024       Chemotherapy       pembrolizumab (KEYTRUDA) 400 mg in sodium chloride 0.9% SolP 116 mL infusion

## 2024-06-26 DIAGNOSIS — Z01.818 PREOP TESTING: ICD-10-CM

## 2024-06-26 DIAGNOSIS — Z00.6 CLINICAL TRIAL PARTICIPANT: ICD-10-CM

## 2024-06-26 DIAGNOSIS — C17.0 DUODENAL ADENOCARCINOMA: Primary | ICD-10-CM

## 2024-06-26 RX ORDER — BISACODYL 5 MG
20 TABLET, DELAYED RELEASE (ENTERIC COATED) ORAL ONCE
Qty: 4 TABLET | Refills: 0 | Status: ON HOLD | OUTPATIENT
Start: 2024-07-10 | End: 2024-07-11

## 2024-06-26 RX ORDER — SODIUM PICOSULFATE, MAGNESIUM OXIDE, AND ANHYDROUS CITRIC ACID 12; 3.5; 1 G/175ML; G/175ML; MG/175ML
2 LIQUID ORAL ONCE
Qty: 350 ML | Refills: 0 | Status: ON HOLD | OUTPATIENT
Start: 2024-07-10 | End: 2024-07-11

## 2024-06-27 ENCOUNTER — HOSPITAL ENCOUNTER (OUTPATIENT)
Dept: PREADMISSION TESTING | Facility: HOSPITAL | Age: 69
Discharge: HOME | End: 2024-06-27
Attending: STUDENT IN AN ORGANIZED HEALTH CARE EDUCATION/TRAINING PROGRAM
Payer: MEDICARE

## 2024-06-27 NOTE — DISCHARGE INSTRUCTIONS
Colonoscopy Prep Instructions      Date: Thursday   Arrival time:       IMPORTANT: PLEASE READ YOUR INSTRUCTIONS CAREFULLY. FAILURE TO FOLLOW THESE INSTRUCTIONS MAY RESULT IN YOUR PROCEURE BEING             CANCELED,RESCHEDULED, OR REPEATED.            Clear Liquid Diet     The Wednesday before your procedure you will follow a clear liquid diet ALL day.     You may have any of the following:     Water, tea, coffee (decaffeinated or regular)     Soft drinks (regular or sugar free)     Gelatin dessert (plain or flavored)     Juice, Gatorade, Powerade, Crystal Lite, lemonade, limeade, Jonnie-Aid     Bouillon, clear consommé, 100% fat free beef, chicken, or vegetable broths     Snowballs, popsicles     100% cranberry juice is the only red liquid allowed     Please Avoid the following:     Anything with RED dye     Liquids not specifically listed     Dairy (liquid and powder)     Creamers (liquid and powder)     Alcohol               CLENPIQ Instructions    PLEASE FOLLOW THESE INSTRUCTIONS NOT THE INSTRUCTIONS ON THE BOX    You are scheduled for a colonoscopy with Dr. Hayes on 7/11/24 at [facility].  To ensure that your test is accurate and complete, you MUST follow these instructions listed below.  If you have any questions, please call our office at [phone].  Plan on being at the hospital for your procedure for 3-4 hours. Please contact the office at [phone] two days prior to procedure date if reschedule is needed.      Follow a CLEAR LIQUID DIET for the entire day before your scheduled colonoscopy.  This means no solid food the entire day starting when you wake.  You may have as much of the clear liquids as you want throughout the day.      1.  At 12:00 NOON, take 4 Dulcolax Laxatives with 16 ounces of water.  Typically, it can take 2-3 hours for the effects of this medication to start.    2.  AT 3 pm the evening before your colonoscopy, OPEN ONE (1) BOTTLE OF CLENPIQ AND DRINK THE ENTIRE BOTTLE.   DRINK FIVE (5) 8-OUNCE GLASSES OF WATER (40 OUNCES TOTAL) OVER THE NEXT FIVE (5) HOURS.     3.  The endoscopy department will call you 1 day before your colonoscopy to tell you the exact time to arrive, AND to tell you the exact time to drink the 2nd portion of your prep (which will be FIVE HOURS BEFORE YOUR ARRIVAL TIME).      At your estimated time of __________am:   OPEN THE OTHER ONE (1) BOTTLE OF CLENPIQ AND DRINK THE ENTIRE BOTTLE.  DRINK THREE (3) 8-OUNCE GLASSES OF WATER (24 OUNCES TOTAL) OVER THE NEXT THREE (3) HOURS.       Once this is complete, you can not have anything else by mouth!    4.  You must have someone with you to DRIVE YOU HOME since you will be receiving IV sedation for the colonoscopy.    5.  It is ok to take MOST of your REGULAR MEDICATIONS  in the morning of your test with a SIP of water.  THE ONLY MEDS YOU NEED TO HOLD ARE YOUR DIABETES MEDICATIONS,  SOME BLOOD PRESSURE MEDS, AND BLOOD THINNERS IF OK'D BY YOUR DOCTOR.  Do NOT have anything else to eat or drink the morning of your colonoscopy.  It is ok to brush your teeth.    6.  If you are on blood thinners THAT YOU HAVE BEEN INSTRUCTED TO HOLD BY YOUR DOCTOR FOR THIS PROCEDURE, then do NOT take this the morning of your colonoscopy.  Do NOT stop these medications on your own, they must be approved to be held by your doctor.  Your colonoscopy can NOT be done if you are on these medications.  Examples of blood thinners include: Coumadin, Aggrenox, Plavix, Pradaxa, Reapro, Pletal, Xarelto, Ticagrelor, Brilinta, Eliquis, and high dose aspirin (325 mg).  You do not have to stop baby aspirin 81 mg.    7.  IF YOU ARE DIABETIC:  NO INSULIN OR ORAL MEDICATIONS THE MORNING OF THE COLONOSCOPY.  TAKE ONLY HALF THE DOSE OF YOUR INSULIN THE DAY BEFORE THE COLONOSCOPY.  DO NOT TAKE ANY ORAL DIABETIC MEDICATIONS THE DAY BEFORE THE COLONOSCOPY.  IF YOU ARE AN INSULIN DEPENDENT DIABETIC WITH UNSTABLE BLOOD SUGARS, NOTIFY YOUR PRIMARY CARE PHYSICIAN FOR  INSTRUCTIONS.    8.  Please DO use your inhalers the morning of your procedure.

## 2024-06-28 DIAGNOSIS — D64.9 ANEMIA, UNSPECIFIED TYPE: ICD-10-CM

## 2024-06-28 DIAGNOSIS — R53.0 NEOPLASTIC (MALIGNANT) RELATED FATIGUE: ICD-10-CM

## 2024-07-11 ENCOUNTER — HOSPITAL ENCOUNTER (OUTPATIENT)
Facility: HOSPITAL | Age: 69
Discharge: HOME | End: 2024-07-11
Attending: STUDENT IN AN ORGANIZED HEALTH CARE EDUCATION/TRAINING PROGRAM | Admitting: STUDENT IN AN ORGANIZED HEALTH CARE EDUCATION/TRAINING PROGRAM
Payer: MEDICARE

## 2024-07-11 ENCOUNTER — ANESTHESIA (OUTPATIENT)
Dept: ENDOSCOPY | Facility: HOSPITAL | Age: 69
End: 2024-07-11
Payer: MEDICARE

## 2024-07-11 ENCOUNTER — ANESTHESIA EVENT (OUTPATIENT)
Dept: ENDOSCOPY | Facility: HOSPITAL | Age: 69
End: 2024-07-11
Payer: MEDICARE

## 2024-07-11 VITALS
SYSTOLIC BLOOD PRESSURE: 149 MMHG | DIASTOLIC BLOOD PRESSURE: 65 MMHG | RESPIRATION RATE: 16 BRPM | OXYGEN SATURATION: 98 % | HEART RATE: 51 BPM

## 2024-07-11 DIAGNOSIS — Z12.11 SCREENING FOR MALIGNANT NEOPLASM OF COLON: Primary | ICD-10-CM

## 2024-07-11 PROCEDURE — 45385 COLONOSCOPY W/LESION REMOVAL: CPT | Mod: PT,,, | Performed by: STUDENT IN AN ORGANIZED HEALTH CARE EDUCATION/TRAINING PROGRAM

## 2024-07-11 PROCEDURE — 37000009 HC ANESTHESIA EA ADD 15 MINS: Performed by: STUDENT IN AN ORGANIZED HEALTH CARE EDUCATION/TRAINING PROGRAM

## 2024-07-11 PROCEDURE — 25000003 PHARM REV CODE 250: Performed by: NURSE ANESTHETIST, CERTIFIED REGISTERED

## 2024-07-11 PROCEDURE — 37000008 HC ANESTHESIA 1ST 15 MINUTES: Performed by: STUDENT IN AN ORGANIZED HEALTH CARE EDUCATION/TRAINING PROGRAM

## 2024-07-11 PROCEDURE — 88305 TISSUE EXAM BY PATHOLOGIST: CPT | Mod: 26,,, | Performed by: PATHOLOGY

## 2024-07-11 PROCEDURE — 63600175 PHARM REV CODE 636 W HCPCS: Performed by: NURSE ANESTHETIST, CERTIFIED REGISTERED

## 2024-07-11 PROCEDURE — 45385 COLONOSCOPY W/LESION REMOVAL: CPT | Mod: PT | Performed by: STUDENT IN AN ORGANIZED HEALTH CARE EDUCATION/TRAINING PROGRAM

## 2024-07-11 PROCEDURE — 88305 TISSUE EXAM BY PATHOLOGIST: CPT | Mod: 59 | Performed by: PATHOLOGY

## 2024-07-11 PROCEDURE — 27201089 HC SNARE, DISP (ANY): Performed by: STUDENT IN AN ORGANIZED HEALTH CARE EDUCATION/TRAINING PROGRAM

## 2024-07-11 RX ORDER — LIDOCAINE HYDROCHLORIDE 20 MG/ML
INJECTION, SOLUTION EPIDURAL; INFILTRATION; INTRACAUDAL; PERINEURAL
Status: DISCONTINUED | OUTPATIENT
Start: 2024-07-11 | End: 2024-07-11

## 2024-07-11 RX ORDER — PROPOFOL 10 MG/ML
VIAL (ML) INTRAVENOUS
Status: DISCONTINUED | OUTPATIENT
Start: 2024-07-11 | End: 2024-07-11

## 2024-07-11 RX ADMIN — SODIUM CHLORIDE, SODIUM LACTATE, POTASSIUM CHLORIDE, AND CALCIUM CHLORIDE: .6; .31; .03; .02 INJECTION, SOLUTION INTRAVENOUS at 08:07

## 2024-07-11 RX ADMIN — LIDOCAINE HYDROCHLORIDE 5 ML: 20 INJECTION, SOLUTION EPIDURAL; INFILTRATION; INTRACAUDAL; PERINEURAL at 09:07

## 2024-07-11 RX ADMIN — PROPOFOL 30 MG: 10 INJECTION, EMULSION INTRAVENOUS at 09:07

## 2024-07-11 RX ADMIN — PROPOFOL 100 MG: 10 INJECTION, EMULSION INTRAVENOUS at 09:07

## 2024-07-11 NOTE — ANESTHESIA POSTPROCEDURE EVALUATION
Anesthesia Post Evaluation    Patient: Nan Simms    Procedure(s) Performed: Procedure(s) (LRB):  COLONOSCOPY (N/A)    Final Anesthesia Type: general      Patient location during evaluation: PACU  Patient participation: Yes- Able to Participate  Level of consciousness: awake and alert, oriented and awake  Post-procedure vital signs: reviewed and stable  Pain management: adequate  Airway patency: patent    PONV status at discharge: No PONV  Anesthetic complications: no      Cardiovascular status: blood pressure returned to baseline  Respiratory status: unassisted, spontaneous ventilation and room air  Hydration status: euvolemic  Follow-up not needed.  Comments: [facility]              Vitals Value Taken Time   /65 07/11/24 1002   Temp  07/11/24 1409   Pulse 51 07/11/24 1002   Resp 16 07/11/24 1002   SpO2 98 % 07/11/24 1002         Event Time   Out of Recovery 10:04:00         Pain/Tapan Score: Tapan Score: 10 (7/11/2024 10:02 AM)

## 2024-07-11 NOTE — TRANSFER OF CARE
Anesthesia Transfer of Care Note    Patient: Nan Simms    Procedure(s) Performed: Procedure(s) (LRB):  COLONOSCOPY (N/A)    Patient location: PACU    Anesthesia Type: general    Transport from OR: Transported from OR on room air with adequate spontaneous ventilation    Post pain: adequate analgesia    Post assessment: no apparent anesthetic complications and tolerated procedure well    Post vital signs: stable    Level of consciousness: sedated    Nausea/Vomiting: no nausea/vomiting    Complications: none    Transfer of care protocol was followed      Last vitals: Visit Vitals  BP (!) 121/58   Pulse 68   Resp 16   SpO2 99%   Breastfeeding No

## 2024-07-11 NOTE — ANESTHESIA PREPROCEDURE EVALUATION
"         [facility]  Anesthesia Pre-Operative Evaluation         Patient Name: Nan Simms  YOB: 1955  MRN: [id number]     07/11/2024        Prev airway:   Intubation     Date/Time: 3/10/2023 1:10 PM  Performed by: Celia Delgado CRNA  Authorized by: Yumiko Francis MD      Intubation:     Induction:  Intravenous    Intubated:  Postinduction    Mask Ventilation:  Not attempted    Attempts:  1    Attempted By:  CRNA    Method of Intubation:  Direct    Blade:  Quinteros 2    Laryngeal View Grade: Grade I - full view of cords      Difficult Airway Encountered?: No      Complications:  None    Airway Device:  Oral endotracheal tube    Airway Device Size:  7.0    Style/Cuff Inflation:  Cuffed    Inflation Amount (mL):  6    Tube secured:  21    Secured at:  The lips    Placement Verified By:  Capnometry    Complicating Factors:  None    Findings Post-Intubation:  BS equal bilateral    Drips: None documented.    Patient Active Problem List   Diagnosis    Essential hypertension    Hyperlipidemia    Diabetes mellitus type 2, diet-controlled    Anemia    Esophagitis    Moderate malnutrition    Multiple thyroid nodules    Bradycardia    Aortic atherosclerosis    Gram-negative bacteremia    History of Whipple procedure    Duodenal adenocarcinoma    Hyperthyroidism       Review of patient's allergies indicates:   Allergen Reactions    Aspirin Other (See Comments)     Pt states it is "hard on her stomach" She can tolerate a low dose aspirin    Pcn [penicillins]      Childhood - Tolerated ceftriaxone and cefazolin with no documented issues in the past        Current Outpatient Medications:  No current facility-administered medications for this encounter.    Current Outpatient Medications:     acetaminophen (TYLENOL) 650 MG TbSR, Take 650 mg by mouth as needed., Disp: , Rfl:     atorvastatin (LIPITOR) 40 MG tablet, Take 1 tablet (40 mg total) by mouth once daily., Disp: 90 tablet, " Rfl: 1    ciprofloxacin HCl (CIPRO) 500 MG tablet, Take 1 tablet (500 mg total) by mouth every 12 (twelve) hours., Disp: 14 tablet, Rfl: 0    droNABinol (MARINOL) 5 MG capsule, Take 1 capsule (5 mg total) by mouth 2 (two) times daily before meals., Disp: 60 capsule, Rfl: 2    famotidine (PEPCID) 20 MG tablet, Take 1 tablet (20 mg total) by mouth 2 (two) times daily., Disp: 60 tablet, Rfl: 11    gabapentin (NEURONTIN) 300 MG capsule, Take 1 capsule (300 mg total) by mouth 3 (three) times daily., Disp: 90 capsule, Rfl: 5    lisinopriL-hydrochlorothiazide (PRINZIDE,ZESTORETIC) 20-12.5 mg per tablet, Take 1 tablet by mouth once daily., Disp: 90 tablet, Rfl: 3    omeprazole (PRILOSEC) 40 MG capsule, Take 1 capsule (40 mg total) by mouth once daily., Disp: 30 capsule, Rfl: 5    Past Surgical History:   Procedure Laterality Date    CATARACT EXTRACTION W/  INTRAOCULAR LENS IMPLANT Right 8/8/2019    Procedure: EXTRACTION, CATARACT, WITH IOL INSERTION;  Surgeon: Spenser Lee MD;  Location: [facility];  Service: Ophthalmology;  Laterality: Right;    ESOPHAGOGASTRODUODENOSCOPY N/A 3/10/2023    Procedure: EGD (ESOPHAGOGASTRODUODENOSCOPY);  Surgeon: Shashi Tapia MD;  Location: [address]);  Service: Endoscopy;  Laterality: N/A;    EYE SURGERY      HYSTERECTOMY      OOPHORECTOMY      PHACOEMULSIFICATION OF CATARACT Right 8/8/2019    Procedure: PHACOEMULSIFICATION, CATARACT;  Surgeon: Spenser Lee MD;  Location: [facility];  Service: Ophthalmology;  Laterality: Right;    WHIPPLE PROCEDURE N/A 3/15/2023    Procedure: WHIPPLE PROCEDURE;  Surgeon: Nick Paez MD;  Location: [address];  Service: General;  Laterality: N/A;       Social History     Socioeconomic History    Marital status:    Tobacco Use    Smoking status: Every Day     Current packs/day: 0.25     Average packs/day: 0.3 packs/day for 30.0 years (7.5 ttl pk-yrs)     Types: Cigarettes    Smokeless tobacco: Never     Tobacco comments:     About 7-8 cigs a day   Substance and Sexual Activity    Alcohol use: Yes     Alcohol/week: 1.0 standard drink of alcohol     Types: 1 Cans of beer per week    Drug use: No    Sexual activity: Yes     Partners: Male     Social Determinants of Health     Financial Resource Strain: High Risk (5/3/2024)    Received from [facility] SDOH Screening     In the past year, have you been unable to get any of the following when you really needed them? choose all that apply.: Internet     In the past year, have you been unable to get any of the following when you really needed them? choose all that apply.: Clothing   Food Insecurity: Unknown (4/13/2023)    Hunger Vital Sign     Worried About Running Out of Food in the Last Year: Never true   Physical Activity: Unknown (4/13/2023)    Exercise Vital Sign     Days of Exercise per Week: 7 days   Stress: No Stress Concern Present (8/27/2020)    [employer] - Occupational Stress Questionnaire     Feeling of Stress : Not at all   Housing Stability: High Risk (5/3/2024)    Received from [facility] SDOH Screening     In the past year, have you been unable to get any of the following when you really needed them? choose all that apply.: Utilities (electric, gas, and water)       OBJECTIVE:     Vital Signs Range (Last 24H):         Significant Labs:  Lab Results   Component Value Date    WBC 5.73 06/17/2024    HGB 11.8 (L) 06/17/2024    HCT 38.5 06/17/2024     06/17/2024    CHOL 214 (H) 04/25/2024    TRIG 80 04/25/2024    HDL 84 (H) 04/25/2024    ALT 20 06/17/2024    AST 29 06/17/2024     06/17/2024    K 3.8 06/17/2024     06/17/2024    CREATININE 0.7 06/17/2024    BUN 13 06/17/2024    CO2 31 (H) 06/17/2024    TSH 0.356 (L) 06/17/2024    INR 1.0 03/15/2023    HGBA1C 5.4 04/25/2024       Diagnostic Studies: No relevant studies.    EKG:   Results for orders placed or performed during the  hospital encounter of 07/11/23   EKG 12-lead    Collection Time: 07/11/23  9:08 AM    Narrative    Test Reason : R10.13    Vent. Rate : 048 BPM     Atrial Rate : 048 BPM     P-R Int : 126 ms          QRS Dur : 090 ms      QT Int : 434 ms       P-R-T Axes : 068 053 -10 degrees     QTc Int : 387 ms    Sinus bradycardia with marked sinus arrythmia  Minimal voltage criteria for LVH, may be normal variant  T wave abnormality, consider inferolateral ischemia  Abnormal ECG  When compared with ECG of 11-MAY-2023 14:06,  Vent. rate has decreased BY  33 BPM  Questionable change in The axis  T wave inversion now evident in Inferior leads  Inverted T waves have replaced nonspecific T wave abnormality in   Anterior-lateral leads  QT has shortened  Confirmed by Maicol DIAZ, Feliz BALL ([id number]) on 7/13/2023 7:33:41 AM    Referred By: [email or web address]   SELF           Confirmed By:Feliz Milian MD       2D ECHO:  TTE:  Results for orders placed or performed during the hospital encounter of 03/09/23   Echo   Result Value Ref Range    Ascending aorta 4.31 cm    STJ 3.08 cm    AV mean gradient 8 mmHg    Ao peak mehdi 1.91 m/s    Ao VTI 33.55 cm    IVS 0.80 0.6 - 1.1 cm    LA size 2.91 cm    Left Atrium Major Axis 5.22 cm    Left Atrium Minor Axis 5.66 cm    LVIDd 5.19 3.5 - 6.0 cm    LVIDs 3.36 2.1 - 4.0 cm    LVOT diameter 2.08 cm    LVOT peak VTI 32.41 cm    Posterior Wall 0.77 0.6 - 1.1 cm    MV Peak A Mehdi 0.51 m/s    E wave deceleration time 292.57 msec    MV Peak E Mehdi 0.64 m/s    RA Major Axis 4.48 cm    RA Width 3.59 cm    RVDD 2.91 cm    Sinus 3.48 cm    TAPSE 1.70 cm    TR Max Mehdi 2.61 m/s    TDI LATERAL 0.06 m/s    TDI SEPTAL 0.05 m/s    LA WIDTH 4.26 cm    MV stenosis pressure 1/2 time 84.84 ms    LV Diastolic Volume 128.69 mL    LV Systolic Volume 46.25 mL    RV S' 20.67 cm/s    LVOT peak mehdi 1.63 m/s    LA volume (mod) 69.65 cm3    LV LATERAL E/E' RATIO 10.67 m/s    LV SEPTAL E/E' RATIO 12.80 m/s    FS 35 %    LA volume 57.23 cm3    LV  mass 141.31 g    Left Ventricle Relative Wall Thickness 0.30 cm    AV valve area 3.28 cm2    AV Velocity Ratio 0.85     AV index (prosthetic) 0.97     MV valve area p 1/2 method 2.59 cm2    E/A ratio 1.25     Mean e' 0.06 m/s    LVOT area 3.4 cm2    LVOT stroke volume 110.07 cm3    AV peak gradient 15 mmHg    E/E' ratio 11.64 m/s    Triscuspid Valve Regurgitation Peak Gradient 27 mmHg    BSA 1.84 m2    LV Systolic Volume Index 25.4 mL/m2    LV Diastolic Volume Index 70.71 mL/m2    LA Volume Index 31.4 mL/m2    LV Mass Index 78 g/m2    LA Volume Index (Mod) 38.3 mL/m2    Right Atrial Pressure (from IVC) 3 mmHg    EF 68 %    TV resting pulmonary artery pressure 30 mmHg    Narrative    · The left ventricle is normal in size with normal systolic function.  · The estimated ejection fraction is 68%.  · Normal left ventricular diastolic function.  · Normal right ventricular size with normal right ventricular systolic   function.  · Normal central venous pressure (3 mmHg).  · The estimated PA systolic pressure is 30 mmHg.  · The ascending aorta is mildly dilated.          PATTIE:  No results found for this or any previous visit.    ASSESSMENT/PLAN:           Pre-op Assessment    I have reviewed the Patient Summary Reports.     I have reviewed the Nursing Notes.    I have reviewed the Medications.     Review of Systems  Anesthesia Hx:  No problems with previous Anesthesia   History of prior surgery of interest to airway management or planning:          Denies Family Hx of Anesthesia complications.    Denies Personal Hx of Anesthesia complications.                    Hematology/Oncology:                      Current/Recent Cancer.                Cardiovascular:     Hypertension                                        Pulmonary:       Denies Shortness of breath.                  Hepatic/GI:     GERD   Dilated gastric antrum; NGT placed for decompression          Neurological:       Denies Seizures.                                 Endocrine:  Diabetes  Hyperthyroidism         Psych:   denies anxiety denies depression              Physical Exam  General: Well nourished, Cooperative, Alert and Oriented    Airway:  Mallampati: III   Mouth Opening: Small, but > 3cm  TM Distance: Normal  Neck ROM: Normal ROM  NG tube in place  Dental:  Intact, Dentures        Anesthesia Plan  Type of Anesthesia, risks & benefits discussed:    Anesthesia Type: Gen ETT  Intra-op Monitoring Plan: Standard ASA Monitors and Art Line  Post Op Pain Control Plan: multimodal analgesia and IV/PO Opioids PRN  Induction:  IV  Airway Plan: Direct, Post-Induction  Informed Consent: Informed consent signed with the Patient and all parties understand the risks and agree with anesthesia plan.  All questions answered.   ASA Score: 3  Day of Surgery Review of History & Physical: H&P Update referred to the surgeon/provider.7/11/24.     Ready For Surgery From Anesthesia Perspective.     .

## 2024-07-11 NOTE — OP NOTE
[facility]  Colon and Rectal Surgery    [address]  [place], [place]  Tel: [phone]  Fax: [phone]  [email or web address]   MD Ervin Davies MD Brian Kann, MD W. Forrest Johnston, MD Matthew Giglia, MD Jennifer Paruch, MD William Kethman, MD Danielle Kay, MD     Patient name: Nan Simms   YOB: 1955   MRN: [id number]  Date of procedure: 07/11/2024  Referring Physician: Payal Moreland NP    Colonoscopy Report    Indication: Screening for colon cancer, No family history of colorectal cancer, and Personal history of duodenal malignancy    Procedure: Colonoscopy with removal of polyps or other lesions by snare technique (Modifier-22 due to prolonged time required for multiple polypectomies)    Findings:  Five ascending colon polyps, semi-pedunculated and pedunculated, 4-8 mm removed with combination of cold and hot snare  Five transverse colon polyps, semi-pedunculated and pedunculated, 3-7 mm removed with combination of cold and hot snare  8 mm sigmoid colon polyp removed with hot snare  Sigmoid diverticular disease  Approximate withdrawal time: 27 min    Surgeon: Luis Hayes MD    Procedure:  After pre-operative assessment and review of informed consent, the patient was taken to the procedure room and received monitored anesthesia care. The patient was placed in left lateral decubitus position. A timeout was performed according to [facility] Quality and Safety guidelines.      A detailed perianal and digital rectal exam was performed and the endoscope scope was passed under direct vision from the anus to the Cecum, identified by the ileocecal valve and appendiceal orifice. The endoscope was then slowly withdrawn and the mucosa carefully examined. Throughout the procedure, the patient's blood pressure, pulse, and oxygen saturations were monitored continuously. The colonoscopy was performed without difficulty. The patient tolerated  the procedure well. The quality of the bowel preparation was good . Please see findings as discussed above.     Complications: None  Estimated blood loss: Minimal  Disposition: PACU and then Home      Recommendations:  Repeat exam in 1 year  Return to referring physicians office as previously scheduled, may follow-up with me as needed  High fiber diet      Luis Hayes MD, FACS, FASCRS  Department of Colon & Rectal Surgery  [facility]

## 2024-07-11 NOTE — H&P
[facility]  Colon and Rectal Surgery    [address]  [place], [place]  Tel: [phone]  Fax: [phone]  [email or web address]   MD Ervin Davies MD Brian Kann, MD W. Forrest Johnston, MD Matthew Giglia, MD Jennifer Paruch, MD William Kethman, MD Danielle Kay, MD     Patient name: Nan Simms   YOB: 1955   MRN: [id number]  Date of procedure: 07/11/2024    Procedure: Colonoscopy  Indications: Prior duodenal adenocarcinoma, no family history of CRC, no prior colonoscopy    The patient was informed of the availability of a certified  without charge. A certified  was not necessary for this visit.    Sedation plan: MAC  ASA: ASA 2 - Patient with mild systemic disease with no functional limitations    Review of Systems  See above    Past Medical History:   Diagnosis Date    Abnormal Pap smear of cervix     Arthritis     Duodenal adenocarcinoma     Hypertension     Hyperthyroidism      Past Surgical History:   Procedure Laterality Date    CATARACT EXTRACTION W/  INTRAOCULAR LENS IMPLANT Right 8/8/2019    Procedure: EXTRACTION, CATARACT, WITH IOL INSERTION;  Surgeon: Spenser Lee MD;  Location: [facility];  Service: Ophthalmology;  Laterality: Right;    ESOPHAGOGASTRODUODENOSCOPY N/A 3/10/2023    Procedure: EGD (ESOPHAGOGASTRODUODENOSCOPY);  Surgeon: Shashi Tapia MD;  Location: [address]);  Service: Endoscopy;  Laterality: N/A;    EYE SURGERY      HYSTERECTOMY      OOPHORECTOMY      PHACOEMULSIFICATION OF CATARACT Right 8/8/2019    Procedure: PHACOEMULSIFICATION, CATARACT;  Surgeon: Spenser Lee MD;  Location: [facility];  Service: Ophthalmology;  Laterality: Right;    WHIPPLE PROCEDURE N/A 3/15/2023    Procedure: WHIPPLE PROCEDURE;  Surgeon: Nick Paez MD;  Location: [address];  Service: General;  Laterality: N/A;     Family History   Problem Relation Name Age of Onset     "Cancer Mother      Breast cancer Neg Hx      Colon cancer Neg Hx      Ovarian cancer Neg Hx       Social History     Tobacco Use    Smoking status: Every Day     Current packs/day: 0.25     Average packs/day: 0.3 packs/day for 30.0 years (7.5 ttl pk-yrs)     Types: Cigarettes    Smokeless tobacco: Never    Tobacco comments:     About 7-8 cigs a day   Substance Use Topics    Alcohol use: Yes     Alcohol/week: 1.0 standard drink of alcohol     Types: 1 Cans of beer per week    Drug use: No     Review of patient's allergies indicates:   Allergen Reactions    Aspirin Other (See Comments)     Pt states it is "hard on her stomach" She can tolerate a low dose aspirin    Pcn [penicillins]      Childhood - Tolerated ceftriaxone and cefazolin with no documented issues in the past        Prior to Admission medications    Medication Sig Start Date End Date Taking? Authorizing Provider   atorvastatin (LIPITOR) 40 MG tablet Take 1 tablet (40 mg total) by mouth once daily. 5/15/24  Yes Payal Moreland NP   droNABinol (MARINOL) 5 MG capsule Take 1 capsule (5 mg total) by mouth 2 (two) times daily before meals. 6/17/24  Yes Adela Lucio PA-C   famotidine (PEPCID) 20 MG tablet Take 1 tablet (20 mg total) by mouth 2 (two) times daily. 5/15/24 5/15/25 Yes Payal Moreland NP   lisinopriL-hydrochlorothiazide (PRINZIDE,ZESTORETIC) 20-12.5 mg per tablet Take 1 tablet by mouth once daily. 11/1/23  Yes Payal Moreland NP   omeprazole (PRILOSEC) 40 MG capsule Take 1 capsule (40 mg total) by mouth once daily. 5/15/24  Yes Payal Moreland NP   acetaminophen (TYLENOL) 650 MG TbSR Take 650 mg by mouth as needed.    Provider, Historical   gabapentin (NEURONTIN) 300 MG capsule Take 1 capsule (300 mg total) by mouth 3 (three) times daily. 11/1/23 5/1/24  Payal Moreland, NP   bisacodyL (DULCOLAX) 5 mg EC tablet Take 4 tablets (20 mg total) by mouth once. for 1 dose 7/10/24 7/11/24  Luis Hayes MD   ciprofloxacin HCl " (CIPRO) 500 MG tablet Take 1 tablet (500 mg total) by mouth every 12 (twelve) hours. 5/1/24 7/11/24  Payal Moreland, NP   sod picosulf-mag ox-citric ac (CLENPIQ) 10 mg-3.5 gram- 12 gram/175 mL Soln Take 2 Bottles by mouth once. for 1 dose 7/10/24 7/11/24  Luis Hayes MD       Physical Examination  BP (!) 160/69   Pulse (!) 43   Resp 13   SpO2 98%   Breastfeeding No      Constitutional: well developed, no cough, no dyspnea, alert, and no acute distress    Head: Normocephalic, no lesions, without obvious abnormality  Eye: Normal external eye, conjunctiva, and lids, PERRL  Cardiovascular: regular rate and regular rhythm  Respiratory: normal air entry  Gastrointestinal: soft, non-tender, without masses or organomegaly  Neurologic: alert, oriented, normal speech, no focal findings or movement disorder noted  Psychiatric: appropriate, normal mood    Plan of Care    It was a pleasure meeting Ms. Simms today - we will plan to perform a colonoscopy with monitored anesthesia care. The details of the procedure, the possible need for biopsy or polypectomy and the potential risks including bleeding, perforation, missed polyps were discussed in detail and they consented to undergo the procedure.      Luis Hayes MD, FACS, FASCRS  Department of Colon & Rectal Surgery  [facility]

## 2024-07-11 NOTE — DISCHARGE SUMMARY
Brief Operative Note    Date of surgery: 07/11/2024    Pre-operative Diagnosis:  Screening [Z13.9]     Post-operative Diagnosis:   Same as above    Surgeon: Luis Hayes M.D.    Procedure:  Colonoscopy     Anesthesia: Monitored anesthesia care     Findings:  See operative note    Estimated blood loss: Minimal         Specimens:   See operative note    Discharge Note    Outcome:   Patient tolerated treatment/procedure well without complication and is now ready for discharge.    Disposition:   Home or Self Care    Final diagnosis:    Screening [Z13.9]    Follow-up:   With primary care provider    Discharge Procedure Orders   Diet Adult Regular     No dressing needed     Notify your health care provider if you experience any of the following:  temperature >100.4     Notify your health care provider if you experience any of the following:  persistent nausea and vomiting or diarrhea     Notify your health care provider if you experience any of the following:  severe uncontrolled pain     Notify your health care provider if you experience any of the following:  redness, tenderness, or signs of infection (pain, swelling, redness, odor or green/yellow discharge around incision site)     Notify your health care provider if you experience any of the following:  difficulty breathing or increased cough     Notify your health care provider if you experience any of the following:  severe persistent headache     Notify your health care provider if you experience any of the following:  worsening rash     Notify your health care provider if you experience any of the following:  persistent dizziness, light-headedness, or visual disturbances     Notify your health care provider if you experience any of the following:  increased confusion or weakness     Activity as tolerated       [id number]     Luis Haeys MD

## 2024-07-11 NOTE — DISCHARGE INSTRUCTIONS
If sedated do not drive, smoke or drink alcohol for 10 hours  Avoid heavy lifting,straining or running for 3 days  You may not have a bowel movement for 1-3 days after procedure  You may return to normal activities the day after  You may experience some bloating and excessive gas.  This can be relieved by walking, eating lightly,or a heating pad can be applied to the abdomen.   A small amount of blood from the return is not serious, especially if hemorrhoids are present,  Go to ER if you notice any;   Chills and/or fever over 101   Persistent vomiting or vomiting blood   Severe abdominal pain, other gas cramp   Severe chest pain   Black tarry stools

## 2024-07-15 LAB
FINAL PATHOLOGIC DIAGNOSIS: NORMAL
GROSS: NORMAL
Lab: NORMAL

## 2024-07-16 ENCOUNTER — PATIENT OUTREACH (OUTPATIENT)
Dept: ADMINISTRATIVE | Facility: HOSPITAL | Age: 69
End: 2024-07-16
Payer: MEDICARE

## 2024-07-16 NOTE — PROGRESS NOTES
[facility], Colorectal Cancer Screening.  Chart reviewed, immunization record updated.  No new results noted on [facility] or [facility] web site.  Care Everywhere updated.   Patient care coordination note  LOV with PCP 5/1/2024.  Patient up to date on Colorectal Cancer Screening, completed on 7/11/2024.

## 2024-07-25 ENCOUNTER — HOSPITAL ENCOUNTER (OUTPATIENT)
Dept: RADIOLOGY | Facility: HOSPITAL | Age: 69
Discharge: HOME | End: 2024-07-25
Attending: PHYSICIAN ASSISTANT
Payer: MEDICARE

## 2024-07-25 DIAGNOSIS — C17.0 DUODENAL ADENOCARCINOMA: ICD-10-CM

## 2024-07-25 RX ORDER — FLUDEOXYGLUCOSE F18 500 MCI/ML
12 INJECTION INTRAVENOUS ONCE
Status: DISCONTINUED | OUTPATIENT
Start: 2024-07-25 | End: 2024-07-26 | Stop reason: HOSPADM

## 2024-07-29 ENCOUNTER — OFFICE VISIT (OUTPATIENT)
Dept: HEMATOLOGY/ONCOLOGY | Facility: CLINIC | Age: 69
End: 2024-07-29
Payer: MEDICARE

## 2024-07-29 ENCOUNTER — HOSPITAL ENCOUNTER (OUTPATIENT)
Dept: RADIOLOGY | Facility: HOSPITAL | Age: 69
Discharge: HOME | End: 2024-07-29
Attending: PHYSICIAN ASSISTANT
Payer: MEDICARE

## 2024-07-29 ENCOUNTER — INFUSION (OUTPATIENT)
Dept: INFUSION THERAPY | Facility: HOSPITAL | Age: 69
End: 2024-07-29
Payer: MEDICARE

## 2024-07-29 VITALS
HEART RATE: 55 BPM | SYSTOLIC BLOOD PRESSURE: 132 MMHG | WEIGHT: 131.5 LBS | HEIGHT: 66 IN | RESPIRATION RATE: 18 BRPM | TEMPERATURE: 98 F | OXYGEN SATURATION: 100 % | BODY MASS INDEX: 21.13 KG/M2 | DIASTOLIC BLOOD PRESSURE: 60 MMHG

## 2024-07-29 VITALS
WEIGHT: 131.5 LBS | OXYGEN SATURATION: 100 % | TEMPERATURE: 98 F | SYSTOLIC BLOOD PRESSURE: 130 MMHG | DIASTOLIC BLOOD PRESSURE: 61 MMHG | HEART RATE: 51 BPM | BODY MASS INDEX: 21.13 KG/M2 | RESPIRATION RATE: 16 BRPM | HEIGHT: 66 IN

## 2024-07-29 DIAGNOSIS — R53.0 NEOPLASTIC (MALIGNANT) RELATED FATIGUE: ICD-10-CM

## 2024-07-29 DIAGNOSIS — I10 ESSENTIAL HYPERTENSION: ICD-10-CM

## 2024-07-29 DIAGNOSIS — R63.0 DECREASED APPETITE: ICD-10-CM

## 2024-07-29 DIAGNOSIS — C17.0 DUODENAL ADENOCARCINOMA: Primary | ICD-10-CM

## 2024-07-29 DIAGNOSIS — C78.7 METASTASIS TO LIVER: ICD-10-CM

## 2024-07-29 DIAGNOSIS — E11.9 DIABETES MELLITUS TYPE 2, DIET-CONTROLLED: ICD-10-CM

## 2024-07-29 DIAGNOSIS — D64.9 ANEMIA, UNSPECIFIED TYPE: ICD-10-CM

## 2024-07-29 DIAGNOSIS — E44.0 MODERATE MALNUTRITION: ICD-10-CM

## 2024-07-29 DIAGNOSIS — I70.0 AORTIC ATHEROSCLEROSIS: ICD-10-CM

## 2024-07-29 DIAGNOSIS — E78.5 HYPERLIPIDEMIA, UNSPECIFIED HYPERLIPIDEMIA TYPE: ICD-10-CM

## 2024-07-29 LAB — POCT GLUCOSE: 92 MG/DL (ref 70–110)

## 2024-07-29 PROCEDURE — 25000003 PHARM REV CODE 250: Performed by: INTERNAL MEDICINE

## 2024-07-29 PROCEDURE — 99999 PR PBB SHADOW E&M-EST. PATIENT-LVL III: CPT | Mod: PBBFAC,,, | Performed by: INTERNAL MEDICINE

## 2024-07-29 PROCEDURE — 78815 PET IMAGE W/CT SKULL-THIGH: CPT | Mod: 26,PS,, | Performed by: NUCLEAR MEDICINE

## 2024-07-29 PROCEDURE — 1101F PT FALLS ASSESS-DOCD LE1/YR: CPT | Mod: CPTII,S$GLB,, | Performed by: INTERNAL MEDICINE

## 2024-07-29 PROCEDURE — 96413 CHEMO IV INFUSION 1 HR: CPT

## 2024-07-29 PROCEDURE — 1126F AMNT PAIN NOTED NONE PRSNT: CPT | Mod: CPTII,S$GLB,, | Performed by: INTERNAL MEDICINE

## 2024-07-29 PROCEDURE — 3078F DIAST BP <80 MM HG: CPT | Mod: CPTII,S$GLB,, | Performed by: INTERNAL MEDICINE

## 2024-07-29 PROCEDURE — 3075F SYST BP GE 130 - 139MM HG: CPT | Mod: CPTII,S$GLB,, | Performed by: INTERNAL MEDICINE

## 2024-07-29 PROCEDURE — 1159F MED LIST DOCD IN RCRD: CPT | Mod: CPTII,S$GLB,, | Performed by: INTERNAL MEDICINE

## 2024-07-29 PROCEDURE — A9552 F18 FDG: HCPCS | Performed by: PHYSICIAN ASSISTANT

## 2024-07-29 PROCEDURE — 3008F BODY MASS INDEX DOCD: CPT | Mod: CPTII,S$GLB,, | Performed by: INTERNAL MEDICINE

## 2024-07-29 PROCEDURE — 78815 PET IMAGE W/CT SKULL-THIGH: CPT | Mod: TC

## 2024-07-29 PROCEDURE — 63600175 PHARM REV CODE 636 W HCPCS: Mod: JZ,JG | Performed by: INTERNAL MEDICINE

## 2024-07-29 PROCEDURE — 4010F ACE/ARB THERAPY RXD/TAKEN: CPT | Mod: CPTII,S$GLB,, | Performed by: INTERNAL MEDICINE

## 2024-07-29 PROCEDURE — 99215 OFFICE O/P EST HI 40 MIN: CPT | Mod: S$GLB,,, | Performed by: INTERNAL MEDICINE

## 2024-07-29 PROCEDURE — 3288F FALL RISK ASSESSMENT DOCD: CPT | Mod: CPTII,S$GLB,, | Performed by: INTERNAL MEDICINE

## 2024-07-29 PROCEDURE — G2211 COMPLEX E/M VISIT ADD ON: HCPCS | Mod: S$GLB,,, | Performed by: INTERNAL MEDICINE

## 2024-07-29 PROCEDURE — 3044F HG A1C LEVEL LT 7.0%: CPT | Mod: CPTII,S$GLB,, | Performed by: INTERNAL MEDICINE

## 2024-07-29 RX ORDER — DIPHENHYDRAMINE HYDROCHLORIDE 50 MG/ML
50 INJECTION INTRAMUSCULAR; INTRAVENOUS ONCE AS NEEDED
Status: CANCELLED | OUTPATIENT
Start: 2024-07-29

## 2024-07-29 RX ORDER — EPINEPHRINE 0.3 MG/.3ML
0.3 INJECTION SUBCUTANEOUS ONCE AS NEEDED
Status: CANCELLED | OUTPATIENT
Start: 2024-07-29

## 2024-07-29 RX ORDER — DIPHENHYDRAMINE HYDROCHLORIDE 50 MG/ML
50 INJECTION INTRAMUSCULAR; INTRAVENOUS ONCE AS NEEDED
Status: DISCONTINUED | OUTPATIENT
Start: 2024-07-29 | End: 2024-07-29 | Stop reason: HOSPADM

## 2024-07-29 RX ORDER — SODIUM CHLORIDE 0.9 % (FLUSH) 0.9 %
10 SYRINGE (ML) INJECTION
Status: CANCELLED | OUTPATIENT
Start: 2024-07-29

## 2024-07-29 RX ORDER — EPINEPHRINE 0.3 MG/.3ML
0.3 INJECTION SUBCUTANEOUS ONCE AS NEEDED
Status: DISCONTINUED | OUTPATIENT
Start: 2024-07-29 | End: 2024-07-29 | Stop reason: HOSPADM

## 2024-07-29 RX ORDER — FLUDEOXYGLUCOSE F18 500 MCI/ML
12 INJECTION INTRAVENOUS
Status: COMPLETED | OUTPATIENT
Start: 2024-07-29 | End: 2024-07-29

## 2024-07-29 RX ORDER — SODIUM CHLORIDE 0.9 % (FLUSH) 0.9 %
10 SYRINGE (ML) INJECTION
Status: DISCONTINUED | OUTPATIENT
Start: 2024-07-29 | End: 2024-07-29 | Stop reason: HOSPADM

## 2024-07-29 RX ORDER — HEPARIN 100 UNIT/ML
500 SYRINGE INTRAVENOUS
Status: CANCELLED | OUTPATIENT
Start: 2024-07-29

## 2024-07-29 RX ADMIN — FLUDEOXYGLUCOSE F-18 11.5 MILLICURIE: 500 INJECTION INTRAVENOUS at 08:07

## 2024-07-29 RX ADMIN — SODIUM CHLORIDE 400 MG: 9 INJECTION, SOLUTION INTRAVENOUS at 11:07

## 2024-07-29 NOTE — PLAN OF CARE
"  Problem: Fatigue  Goal: Improved Activity Tolerance  Outcome: Progressing  Intervention: Promote Improved Energy  Flowsheets (Taken 7/29/2024 1101)  Fatigue Management:   activity schedule adjusted   activity assistance provided   fatigue-related activity identified   frequent rest breaks encouraged   paced activity encouraged  Sleep/Rest Enhancement:   awakenings minimized   consistent schedule promoted   family presence promoted   natural light exposure provided   noise level reduced   reading promoted   regular sleep/rest pattern promoted   relaxation techniques promoted  Activity Management:   Ambulated -L4   Up in chair - L3  Environmental Support:   calm environment promoted   caregiver consistency promoted   comfort object encouraged   distractions minimized   environmental consistency promoted   personal routine supported   rest periods encouraged  /61 (Patient Position: Sitting)   Pulse (!) 51   Temp 97.5 °F (36.4 °C)   Resp 16   Ht 5' 6" (1.676 m)   Wt 59.6 kg (131 lb 8.1 oz)   SpO2 100%   BMI 21.23 kg/m² Pleasant, alert and oriented patient to Chemo Infusion per self with  for C9 Keytruda - VSS and PIV started x2 attempts with immediate flashback, flushed with NS, site secured and patient tolerated procedure well - patient tolerated treatment with no AVE's, PIV discontinued, pressure dressing applied, and patient discharged to home with no concerns - RTC on 9/9/24       "

## 2024-07-29 NOTE — PROGRESS NOTES
MEDICAL ONCOLOGY - ESTABLISHED PATIENT VISIT    Reason for visit: duodenal adenocarcinoma     Best Contact Phone Number(s): [phone] (home)      Cancer/Stage/TNM:    Cancer Staging   No matching staging information was found for the patient.       Oncology History   Duodenal adenocarcinoma   3/10/2023 Initial Diagnosis    Duodenal adenocarcinoma     3/15/2023 Surgery    Whipple  dMMR with loss of MLH1/PMS2; MLH1 promoter hypermethylation present suggesting a sporadic tumor.      8/24/2023 -  Chemotherapy    Treatment Summary   Plan Name: OP PEMBROLIZUMAB 400MG Q6W  Treatment Goal: Palliative  Status: Active  Start Date: 8/24/2023  End Date: 6/23/2025 (Planned)  Provider: Eber Roberts MD  Chemotherapy: [No matching medication found in this treatment plan]          Interval History: 69 y.o. female with recurrent MSI-H metastatic duodenal adenocarcinoma who presents for follow-up prior to cycle 9 of pembrolizumab.  She states she feels well.  She denies any diarrhea, dyspnea.  She has no pain.  She has lost a bit of weight and is not sure why.  She says she takes dronabinol just at nighttime as she didn't like the way it made her feel during the day.    Presents to clinic with her . ECOG 0.     ROS:   Review of Systems   Constitutional:  Negative for chills, fever and malaise/fatigue.   HENT:  Negative for nosebleeds and sore throat.    Respiratory:  Negative for cough and shortness of breath.    Cardiovascular:  Negative for chest pain, palpitations and leg swelling.   Gastrointestinal:  Negative for blood in stool, constipation, diarrhea, heartburn, nausea and vomiting.   Genitourinary:  Negative for dysuria, frequency, hematuria and urgency.   Musculoskeletal:  Negative for falls and myalgias.   Skin:  Negative for itching and rash.   Neurological:  Negative for dizziness, tingling, weakness and headaches.   Psychiatric/Behavioral:  The patient is not nervous/anxious and does not have insomnia.         Past Medical History:   Past Medical History:   Diagnosis Date    Abnormal Pap smear of cervix     Arthritis     Duodenal adenocarcinoma     Hypertension     Hyperthyroidism         Past Surgical History:   Past Surgical History:   Procedure Laterality Date    CATARACT EXTRACTION W/  INTRAOCULAR LENS IMPLANT Right 8/8/2019    Procedure: EXTRACTION, CATARACT, WITH IOL INSERTION;  Surgeon: Spenser Lee MD;  Location: [facility];  Service: Ophthalmology;  Laterality: Right;    COLONOSCOPY N/A 7/11/2024    Procedure: COLONOSCOPY;  Surgeon: Luis Hayes MD;  Location: [facility];  Service: Endoscopy;  Laterality: N/A;    ESOPHAGOGASTRODUODENOSCOPY N/A 3/10/2023    Procedure: EGD (ESOPHAGOGASTRODUODENOSCOPY);  Surgeon: Shashi Tapia MD;  Location: [facility] ([address] [place] [address]);  Service: Endoscopy;  Laterality: N/A;    EYE SURGERY      HYSTERECTOMY      OOPHORECTOMY      PHACOEMULSIFICATION OF CATARACT Right 8/8/2019    Procedure: PHACOEMULSIFICATION, CATARACT;  Surgeon: Spenser Lee MD;  Location: [facility];  Service: Ophthalmology;  Laterality: Right;    WHIPPLE PROCEDURE N/A 3/15/2023    Procedure: WHIPPLE PROCEDURE;  Surgeon: Nick Paez MD;  [address];  Service: General;  Laterality: N/A;        Family History:   Family History   Problem Relation Name Age of Onset    Cancer Mother      Breast cancer Neg Hx      Colon cancer Neg Hx      Ovarian cancer Neg Hx          Social History:   Social History     Tobacco Use    Smoking status: Every Day     Current packs/day: 0.25     Average packs/day: 0.3 packs/day for 30.0 years (7.5 ttl pk-yrs)     Types: Cigarettes    Smokeless tobacco: Never    Tobacco comments:     About 7-8 cigs a day   Substance Use Topics    Alcohol use: Yes     Alcohol/week: 1.0 standard drink of alcohol     Types: 1 Cans of beer per week        I have reviewed and updated the patient's past medical, surgical, family and social histories.    Allergies:  "  Review of patient's allergies indicates:   Allergen Reactions    Aspirin Other (See Comments)     Pt states it is "hard on her stomach" She can tolerate a low dose aspirin    Pcn [penicillins]      Childhood - Tolerated ceftriaxone and cefazolin with no documented issues in the past         Medications:   Current Outpatient Medications   Medication Sig Dispense Refill    acetaminophen (TYLENOL) 650 MG TbSR Take 650 mg by mouth as needed.      atorvastatin (LIPITOR) 40 MG tablet Take 1 tablet (40 mg total) by mouth once daily. 90 tablet 1    droNABinol (MARINOL) 5 MG capsule Take 1 capsule (5 mg total) by mouth 2 (two) times daily before meals. 60 capsule 2    famotidine (PEPCID) 20 MG tablet Take 1 tablet (20 mg total) by mouth 2 (two) times daily. 60 tablet 11    lisinopriL-hydrochlorothiazide (PRINZIDE,ZESTORETIC) 20-12.5 mg per tablet Take 1 tablet by mouth once daily. 90 tablet 3    omeprazole (PRILOSEC) 40 MG capsule Take 1 capsule (40 mg total) by mouth once daily. 30 capsule 5    gabapentin (NEURONTIN) 300 MG capsule Take 1 capsule (300 mg total) by mouth 3 (three) times daily. 90 capsule 5     No current facility-administered medications for this visit.      Physical Exam:   /61   Pulse (!) 51   Temp 97.5 °F (36.4 °C) (Oral)   Resp 16   Ht 5' 6" (1.676 m)   Wt 59.6 kg (131 lb 8.1 oz)   SpO2 100%   BMI 21.23 kg/m²      Physical Exam  Vitals reviewed.   Constitutional:       General: She is not in acute distress.     Appearance: Normal appearance. She is normal weight. She is not ill-appearing, toxic-appearing or diaphoretic.   HENT:      Head: Normocephalic and atraumatic.      Right Ear: External ear normal.      Left Ear: External ear normal.      Nose: Nose normal.      Mouth/Throat:      Pharynx: Oropharynx is clear.   Eyes:      General: No scleral icterus.     Conjunctiva/sclera: Conjunctivae normal.      Pupils: Pupils are equal, round, and reactive to light.   Cardiovascular:      Rate " and Rhythm: Normal rate.   Pulmonary:      Effort: Pulmonary effort is normal. No respiratory distress.      Breath sounds: No wheezing.   Abdominal:      General: There is no distension.      Tenderness: There is no abdominal tenderness.      Comments: Midline abdominal incision well healed   Musculoskeletal:      Cervical back: Normal range of motion.      Right lower leg: No edema.      Left lower leg: No edema.   Skin:     General: Skin is warm and dry.      Coloration: Skin is not jaundiced or pale.      Findings: No bruising, erythema or rash.   Neurological:      General: No focal deficit present.      Mental Status: She is alert and oriented to person, place, and time. Mental status is at baseline.      Cranial Nerves: No cranial nerve deficit.      Sensory: No sensory deficit.      Motor: No weakness.      Gait: Gait normal.   Psychiatric:         Mood and Affect: Mood normal.         Behavior: Behavior normal.         Thought Content: Thought content normal.         Judgment: Judgment normal.         Labs:   Recent Results (from the past 48 hour(s))   POCT glucose    Collection Time: 24  8:17 AM   Result Value Ref Range    POCT Glucose 92 70 - 110 mg/dL        Lab work reviewed. CEA elevated today, 6.8.      Imagin2024 PET-CT:    We personally reviewed the images which shows no evidence of active disease.  Left ureteral stone is present.    Path:   3/15/23 - Whipple  Final Pathologic Diagnosis      Abnormal   1. Lymph node, hepatic cautery, resection:   - One lymph node negative for metastatic carcinoma (0/1).   - See synoptic report.   2. Gallbladder, cholecystectomy:   - Benign gallbladder with no significant histologic abnormality.   3. Pancreas, duodenum, common bile duct, and partial gastrectomy,   pancreaticoduodenectomy (Whipple specimen):   - Poorly differentiated carcinoma with medullary features.   - See synoptic report.   - See comment.   Synoptic Report   Procedure:  Pancreaticoduodenectomy with partial gastrectomy (Whipple   specimen)   Tumor site:  Pylorus and proximal duodenum   Histologic type: Poorly differentiated carcinoma with medullary features   Histologic grade: G3, poorly differentiated   Tumor size:  6.5 cm in greatest dimension grossly   Tumor extent:   Invades through muscularis propria into subserosa, or extends   into nonperitonealized perimuscular tissue (mesentery or retroperitoneum)   without serosal penetration   Macroscopic tumor perforation:  Not identified   Lymphovascular inv asion:  Not identified, see comment   Margin status for invasive carcinoma:  All margins negative for invasive   carcinoma   Margin status for dysplasia:  All margins negative for carcinoma in Situ   (high-grade dysplasia)/adenoma   Regional lymph node status: Regional lymph nodes present        All Regional lymph nodes negative for tumor        Number of lymph nodes examined:  15 (including specimens 1 and 3)   PATHOLOGIC STAGE CLASSIFICATION (pTNM, AJCC 8th Edition):  p T3 N0   Additional findings:  Stomach with intestinal metaplasia (immunostain for   H.pylori negative), 2 lymph nodes with fibrosis and calcifications (GMS stain   for fungal organisms and AFB stain negative)   Ancillary studies:  Immunohistochemistry (IHC) Testing for Mismatch Repair   (MMR) Proteins   MLH1- Loss of nuclear expression   PMS2 - Loss of nuclear expression   MSH2 - Intact nuclear expression   MSH6 - Intact nuclear expression   Background nonneoplastic tissue/internal control with intact nuclear   expression   IHC Interpret ation:  Loss of nuclear expression of MLH1 and PMS2: testing for   methylation of the MLH1 promoter and/or mutation of BRAF is indicated (the   presence of a BRAF V600E mutation and/or MLH1 methylation suggests that the   tumor is sporadic and germline evaluation is probably not indicated; absence   of both MLH1 methylation and of BRAF V600E mutation suggests the possibility   of  Seaman syndrome, and sequencing and/or large deletion/duplication testing   of germline MLH1 may be indicated)   Testing for methylation of the MLH1 promoter and mutation of BRAF is in   process and results will be supplemented.   There are exceptions to the above IHC interpretations. These results should   not be considered in isolation, and clinical correlation with genetic   counseling is recommended to assess the need for germline testing.   COMMENT:  The stomach and duodenum (specimen 3) shows a 6.5 cm mass involving   the pylorus with the majority of the tumor appearing to be in the duodenum.   The tumor shows poorly d ifferentiated sheets of blue cells with necrosis.   There is a significant amount of lymphocytic inflammatory infiltrate around   the edges of the tumor.  Immunostains show the tumor cells to be positive for   CK7 with patchy weak CDX2 and negative for synaptophysin and chromogranin.   There is also loss of MLH1 and PMS2 on MMR stains.  These features are   suggestive of medullary carcinoma.  Select slides reviewed by Dr. ITZEL Sánchez   who agrees with the diagnosis of poorly differentiated carcinoma with   medullary features.  Immunostains for CD 34 performed on blocks 3E and 3H   show no definitive lymphovascular invasion even though some areas suspicious   cells on routine H&E sections.  Appropriate positive controls   VC      Comment: Interp By Meredith Pappas MD, Signed on 04/12/2023 at 16:03   Supplemental Diagnosis      Abnormal   MLH1 Hypermethylation/BRAF Mutation   Result Summary   MLH1 HYPERMETHYLATION PRESENT/NO BRAF MUTATION IDENTIFIED   Result   Provided diagnosis: pylorus and proximal duodenum poorly differentiated   carcinoma with medullary features   Methylation status: Positive for MLH1 promoter hypermethylation   BRAF status: Wild-type (SEE INTERPRETATION)   Alexis Sotelo M.D.   Report attached   Performing location:   [facility]  "  [address] [place] [address]   "Disclaimer:  This case diagnosis was rendered completely by the outside   consultation pathologist and the case is electronically signed by an [facility]   pathologist listed below solely to release the report into the medical   record."          Assessment:       1. Duodenal adenocarcinoma    2. Metastasis to liver    3. Moderate malnutrition    4. Decreased appetite    5. Anemia, unspecified type    6. Neoplastic (malignant) related fatigue    7. Essential hypertension    8. Hyperlipidemia, unspecified hyperlipidemia type    9. Diabetes mellitus type 2, diet-controlled    10. Aortic atherosclerosis             Plan:     # Duodenal adenocarcinoma   Mrs. Simms is a pleasant 69 y.o. 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 Pathology did reveal some high grade features, including poorly differentiated carcinoma, 6.5 cm size.  We discussed at her initial visit he limited role of adjuvant chemotherapy in the stage II setting for small bowel adenocarcinoma.      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     CT CAP on 7/26/23 demonstrated concern for recurrence in liver.  PET/CT on 8/4/23 showed hypermetabolic inferior R hepatic lobe lesion consistent with metastatic recurrence.  CEA has risen as well.    Given her dMMR/MSI-H status, we recommended to start on single agent pembrolizumab 400 mg q6 weeks.  Patient was consented for immunotherapy. An extensive discussion was had which included a thorough discussion of the risk and benefits of treatment and alternatives. Risks, including but not limited to, immune-mediated toxicities involving multiple body organs including lungs, kidneys, GI tract, liver, heart, central nervous system, skin, thyroid, pituitary gland, and others were all explained to the patient. The patient agrees with the plan, and all " questions have been answered to their satisfaction. Consent was signed the patient, provider, and a third party witness.    She was also enrolled in our protocol: [id number], Disparities in Results of Immune Checkpoint Inhibitor Treatment (DIRECT): A Prospective Cohort Study of Cancer Survivors Treated with anti-PD-L1 Immunotherapy in a Community Oncology Setting.  CRC is Isabela Church.    No toxicities noted from pembrolizumab cycle 1.  PET/CT after cycle 2 shows resolution of liver metastasis.  Question of grade 1 pneumonitis with GGO on that scan but this has since resolved on CT chest done 12/15 which makes pneumonitis less likely diagnosis.  No other IO toxicities.  PET/CT after cycle 4 shows resolution of hypermetabolic nodules and ground-glass in right middle lobe of lung. No evidence of other hypermetabolic areas.   PET/CT after cycle 6 shows no evidence of disease.  PET/CT after cycle 8 read is pending but by my read shows BIA.    Doing well. Continues to tolerate treatment well. Recc to continue  Labs reviewed and adequate for treatment.  No toxicities from IO.  Will proceed with cycle 9 of pembrolizumab today.    [facility] Tissue NGS: MSI-H, TMB 76.3 m/MB  Repeat imaging with PET prior to cycle 9 of pembrolizumab.    # Decreased appetite, malnutrition  -Appetite ok with Marinol. Taking at night.   -weight mildly decreased. Nutrition following.    # Anemia, neoplasm related fatigue  -Microcytic anemia stable. Suspect thalassemia given longstanding nature.  -No signs of bleeding, platelets normal.   -Asymptomatic. Monitor.   -continue to monitor TSH    # HTN, HLD, DM, aortic atherosclerosis   -BP normal in clinic today.    -Following with PCP.   -Continue medical management.     # L nephrolithiasis  No symptoms currently.  Was having polyuria which has improved with cipro, Rx from her PCP.  Saw Dr. Paul with urology.      Patient is in agreement with the proposed treatment plan. All questions were answered to  the patient's satisfaction. Pt knows to call clinic if anything is needed before the next clinic visit.        Route Chart for Scheduling    Med Onc Chart Routing      Follow up with physician 3 months. for keytruda with PET prior   Follow up with YANDEL 6 weeks. for keytruda   Infusion scheduling note    Injection scheduling note    Labs CBC, CMP, CEA, TSH and free T4   Scheduling:  Preferred lab:  Lab interval: every 6 weeks     Imaging PET scan      Pharmacy appointment    Other referrals              Treatment Plan Information   OP PEMBROLIZUMAB 400MG Q6W   Eber Roberts MD   Upcoming Treatment Dates - OP PEMBROLIZUMAB 400MG Q6W    7/22/2024       Chemotherapy       pembrolizumab (KEYTRUDA) 400 mg in 0.9% NaCl SolP 116 mL infusion  9/2/2024       Chemotherapy       pembrolizumab (KEYTRUDA) 400 mg in 0.9% NaCl SolP 116 mL infusion  10/14/2024       Chemotherapy       pembrolizumab (KEYTRUDA) 400 mg in 0.9% NaCl SolP 116 mL infusion  11/25/2024       Chemotherapy       pembrolizumab (KEYTRUDA) 400 mg in 0.9% NaCl SolP 116 mL infusion

## 2024-08-07 DIAGNOSIS — C17.0 DUODENAL ADENOCARCINOMA: Primary | ICD-10-CM

## 2024-08-07 DIAGNOSIS — R53.0 NEOPLASTIC (MALIGNANT) RELATED FATIGUE: ICD-10-CM

## 2024-08-19 ENCOUNTER — HOSPITAL ENCOUNTER (EMERGENCY)
Facility: HOSPITAL | Age: 69
Discharge: HOME | End: 2024-08-19
Attending: EMERGENCY MEDICINE
Payer: MEDICARE

## 2024-08-19 VITALS
SYSTOLIC BLOOD PRESSURE: 194 MMHG | DIASTOLIC BLOOD PRESSURE: 84 MMHG | HEART RATE: 44 BPM | WEIGHT: 132.69 LBS | RESPIRATION RATE: 20 BRPM | TEMPERATURE: 98 F | OXYGEN SATURATION: 100 % | BODY MASS INDEX: 21.33 KG/M2 | HEIGHT: 66 IN

## 2024-08-19 DIAGNOSIS — R05.9 COUGH: ICD-10-CM

## 2024-08-19 DIAGNOSIS — U07.1 COVID-19 VIRUS INFECTION: Primary | ICD-10-CM

## 2024-08-19 DIAGNOSIS — U07.1 COVID-19 VIRUS DETECTED: ICD-10-CM

## 2024-08-19 LAB — SARS-COV-2 RDRP RESP QL NAA+PROBE: POSITIVE

## 2024-08-19 PROCEDURE — U0002 COVID-19 LAB TEST NON-CDC: HCPCS | Performed by: EMERGENCY MEDICINE

## 2024-08-19 PROCEDURE — 99284 EMERGENCY DEPT VISIT MOD MDM: CPT | Mod: 25

## 2024-08-19 RX ORDER — PROMETHAZINE HYDROCHLORIDE 6.25 MG/5ML
SYRUP ORAL EVERY 6 HOURS PRN
Qty: 120 ML | Refills: 0 | Status: SHIPPED | OUTPATIENT
Start: 2024-08-19 | End: 2024-08-26

## 2024-08-19 RX ORDER — DEXAMETHASONE 6 MG/1
6 TABLET ORAL
Qty: 5 TABLET | Refills: 0 | Status: SHIPPED | OUTPATIENT
Start: 2024-08-19 | End: 2024-08-25

## 2024-08-19 RX ORDER — METHOCARBAMOL 500 MG/1
1000 TABLET, FILM COATED ORAL 3 TIMES DAILY
Qty: 30 TABLET | Refills: 0 | Status: SHIPPED | OUTPATIENT
Start: 2024-08-19 | End: 2024-08-25

## 2024-08-19 RX ORDER — BENZONATATE 100 MG/1
100 CAPSULE ORAL 3 TIMES DAILY PRN
Qty: 20 CAPSULE | Refills: 0 | Status: SHIPPED | OUTPATIENT
Start: 2024-08-19 | End: 2024-08-29

## 2024-08-19 RX ORDER — ALBUTEROL SULFATE 90 UG/1
2 INHALANT RESPIRATORY (INHALATION) EVERY 6 HOURS PRN
Qty: 8.5 G | Refills: 0 | Status: SHIPPED | OUTPATIENT
Start: 2024-08-19 | End: 2024-09-14

## 2024-08-19 NOTE — DISCHARGE INSTRUCTIONS
Take Vitamin D3 125 mg, Vitamin C 1,000 mg zinc 200 mg,Elderberry syrup (strongest strength available) to build up your immune system.  Get a pulse ox device and if you are below 90% return to the ER immediately

## 2024-08-19 NOTE — ED PROVIDER NOTES
"Encounter Date: 8/19/2024       History     Chief Complaint   Patient presents with    General Illness     HPI    Patient is a 69y AAF hx HTN presenting with cough, congestion , sneezing and scratchy throat for the past 3 days.  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  She denies chest pain, shortness of breath or synocpe.    Review of patient's allergies indicates:   Allergen Reactions    Aspirin Other (See Comments)     Pt states it is "hard on her stomach" She can tolerate a low dose aspirin    Pcn [penicillins]      Childhood - Tolerated ceftriaxone and cefazolin with no documented issues in the past      Past Medical History:   Diagnosis Date    Abnormal Pap smear of cervix     Arthritis     Duodenal adenocarcinoma     Hypertension     Hyperthyroidism      Past Surgical History:   Procedure Laterality Date    CATARACT EXTRACTION W/  INTRAOCULAR LENS IMPLANT Right 8/8/2019    Procedure: EXTRACTION, CATARACT, WITH IOL INSERTION;  Surgeon: Spenser Lee MD;  Location: [facility];  Service: Ophthalmology;  Laterality: Right;    COLONOSCOPY N/A 7/11/2024    Procedure: COLONOSCOPY;  Surgeon: Luis Hayes MD;  Location: [facility];  Service: Endoscopy;  Laterality: N/A;    ESOPHAGOGASTRODUODENOSCOPY N/A 3/10/2023    Procedure: EGD (ESOPHAGOGASTRODUODENOSCOPY);  Surgeon: Shashi Tapia MD;  Location: [facility] ([address] [place] [address]);  Service: Endoscopy;  Laterality: N/A;    EYE SURGERY      HYSTERECTOMY      OOPHORECTOMY      PHACOEMULSIFICATION OF CATARACT Right 8/8/2019    Procedure: PHACOEMULSIFICATION, CATARACT;  Surgeon: Spenser Lee MD;  Location: [facility];  Service: Ophthalmology;  Laterality: Right;    WHIPPLE PROCEDURE N/A 3/15/2023    Procedure: WHIPPLE PROCEDURE;  Surgeon: Nick Paez MD;  Location: [facility] OR 2ND FLR;  Service: General;  Laterality: N/A;     Family History   Problem Relation Name Age of Onset    Cancer Mother      Breast cancer Neg Hx  "     Colon cancer Neg Hx      Ovarian cancer Neg Hx       Social History     Tobacco Use    Smoking status: Every Day     Current packs/day: 0.25     Average packs/day: 0.3 packs/day for 30.0 years (7.5 ttl pk-yrs)     Types: Cigarettes    Smokeless tobacco: Never    Tobacco comments:     About 7-8 cigs a day   Substance Use Topics    Alcohol use: Yes     Alcohol/week: 1.0 standard drink of alcohol     Types: 1 Cans of beer per week     Comment: social    Drug use: No     Review of Systems   Constitutional:  Negative for chills and fever.   HENT:  Positive for sinus pressure, sneezing and sore throat.    Respiratory:  Positive for cough.    Cardiovascular:  Negative for chest pain.   Gastrointestinal:  Negative for abdominal pain.   Neurological:  Negative for dizziness and weakness.   All other systems reviewed and are negative.    Social Determinants of Health     Tobacco Use: High Risk (8/19/2024)    Patient History     Smoking Tobacco Use: Every Day     Smokeless Tobacco Use: Never     Passive Exposure: Not on file   Alcohol Use: Not on file   Financial Resource Strain: High Risk (5/3/2024)    [facility]     In the past year, have you been unable to get any of the following when you really needed them? choose all that apply.: Internet     In the past year, have you been unable to get any of the following when you really needed them? choose all that apply.: Clothing   Food Insecurity: Unknown (4/13/2023)    Hunger Vital Sign     Worried About Running Out of Food in the Last Year: Never true     Ran Out of Food in the Last Year: Not on file   Transportation Needs: Not on file   Physical Activity: Unknown (4/13/2023)    Exercise Vital Sign     Days of Exercise per Week: 7 days     Minutes of Exercise per Session: Not on file   Stress: No Stress Concern Present (8/27/2020)    [employer] [place] [employer] - Occupational Stress Questionnaire     Feeling of Stress :  Not at all   Housing Stability: High Risk (5/3/2024)    Received from [facility] SDOH Screening     In the past year, have you been unable to get any of the following when you really needed them? choose all that apply.: Utilities (electric, gas, and water)   Depression: Low Risk  (5/1/2024)    Depression     Last PHQ-4: Flowsheet Data: 0   Utilities: Not on file   Health Literacy: Not on file   Social Isolation: Not on file       Physical Exam     Initial Vitals [08/19/24 0838]   BP Pulse Resp Temp SpO2   (!) 148/94 (!) 44 16 98.1 °F (36.7 °C) 100 %      MAP       --         Physical Exam    Nursing note and vitals reviewed.  Constitutional: She appears well-developed and well-nourished.   HENT:   Head: Normocephalic and atraumatic.   Mouth/Throat: Oropharynx is clear and moist.   Eyes: EOM are normal. Pupils are equal, round, and reactive to light.   Neck: No JVD present.   Normal range of motion.  Cardiovascular:  Normal rate and intact distal pulses.           Pulmonary/Chest: Breath sounds normal. No stridor.   Abdominal: Abdomen is soft.   Musculoskeletal:         General: Normal range of motion.      Cervical back: Normal range of motion.     Neurological: She is alert and oriented to person, place, and time.   Skin: Skin is warm. Capillary refill takes less than 2 seconds.         ED Course   Procedures  Labs Reviewed   SARS-COV-2 RNA AMPLIFICATION, QUAL - Abnormal       Result Value    SARS-CoV-2 RNA, Amplification, Qual Positive (*)           Imaging Results              X-Ray Chest AP Portable (Final result)  Result time 08/19/24 09:42:17      Final result by Rhonda Doty MD (08/19/24 09:42:17)                   Impression:      No acute abnormality.      Electronically signed by: Rhonda Doty MD  Date:    08/19/2024  Time:    09:42               Narrative:    EXAMINATION:  XR CHEST AP PORTABLE    CLINICAL HISTORY:  Cough, unspecified    TECHNIQUE:  Single frontal view of the chest was  performed.    COMPARISON:  04/14/2023    FINDINGS:  The lungs are clear with normal appearance of pulmonary vasculature. No pleural effusion. No evident pneumothorax.    The cardiac silhouette is normal in size. The hilar and mediastinal contours are unremarkable.Calcification of the aortic knob.    Bones are intact.Age-appropriate degenerative changes affect the skeleton.                                       Medications - No data to display  Medical Decision Making    This is an emergent evaluation of a 69y AAF hx HTN presenting with complaint of cough, sore throat and sneezing.  Exam she is non toxic, afebrile with coarse breath sounds.   COVID-19 positive.  CXR on my independent interpretation negative for acute disease.    She is hemodynamically stable and not hypoxic on room air.  Discharged to self care with return precautions.    DDX: viral illness, bronchitis, PNA      Amount and/or Complexity of Data Reviewed  Radiology: ordered.                                      Clinical Impression:  Final diagnoses:  [R05.9] Cough  [U07.1] COVID-19 virus infection (Primary)          ED Disposition Condition    Discharge Stable          ED Prescriptions       Medication Sig Dispense Start Date End Date Auth. Provider    promethazine (PHENERGAN) 6.25 mg/5 mL syrup Take 5mL by mouth every 6 (six) hours as needed for nausea 120 mL 8/19/2024 8/25/2024 Sofía Holloway MD    benzonatate (TESSALON) 100 MG capsule Take 1 capsule (100 mg total) by mouth 3 (three) times daily as needed. 20 capsule 8/19/2024 8/29/2024 Sofía Holloway MD    methocarbamoL (ROBAXIN) 500 MG Tab Take 2 tablets (1,000 mg total) by mouth 3 (three) times daily. for 5 days 30 tablet 8/19/2024 8/24/2024 Sofía Holloway MD    dexAMETHasone (DECADRON) 6 MG tablet Take 1 tablet (6 mg total) by mouth daily with breakfast. for 5 days 5 tablet 8/19/2024 8/24/2024 Sofía Holloway MD    albuterol (PROVENTIL HFA) 90 mcg/actuation inhaler Inhale 2 puffs into  the lungs every 6 (six) hours as needed for Wheezing. Rescue 8.5 g 8/19/2024 9/13/2024 Sofía Holloway MD          Follow-up Information       Follow up With Specialties Details Why Contact Info    Payal Moreland, NP Family Medicine Schedule an appointment as soon as possible for a visit in 1 day As needed [address] [facility] [address]  [phone]               Sofía Holloway MD  08/19/24 0935

## 2024-09-03 ENCOUNTER — OFFICE VISIT (OUTPATIENT)
Dept: UROLOGY | Facility: CLINIC | Age: 69
End: 2024-09-03
Attending: SPECIALIST
Payer: MEDICARE

## 2024-09-03 VITALS
DIASTOLIC BLOOD PRESSURE: 52 MMHG | SYSTOLIC BLOOD PRESSURE: 128 MMHG | HEART RATE: 40 BPM | WEIGHT: 131.19 LBS | HEIGHT: 66 IN | OXYGEN SATURATION: 99 % | BODY MASS INDEX: 21.08 KG/M2

## 2024-09-03 DIAGNOSIS — N20.1 LEFT URETERAL STONE: Primary | ICD-10-CM

## 2024-09-03 PROCEDURE — 3074F SYST BP LT 130 MM HG: CPT | Mod: CPTII,S$GLB,, | Performed by: SPECIALIST

## 2024-09-03 PROCEDURE — 4010F ACE/ARB THERAPY RXD/TAKEN: CPT | Mod: CPTII,S$GLB,, | Performed by: SPECIALIST

## 2024-09-03 PROCEDURE — 1101F PT FALLS ASSESS-DOCD LE1/YR: CPT | Mod: CPTII,S$GLB,, | Performed by: SPECIALIST

## 2024-09-03 PROCEDURE — 3044F HG A1C LEVEL LT 7.0%: CPT | Mod: CPTII,S$GLB,, | Performed by: SPECIALIST

## 2024-09-03 PROCEDURE — 99213 OFFICE O/P EST LOW 20 MIN: CPT | Mod: S$GLB,,, | Performed by: SPECIALIST

## 2024-09-03 PROCEDURE — 3008F BODY MASS INDEX DOCD: CPT | Mod: CPTII,S$GLB,, | Performed by: SPECIALIST

## 2024-09-03 PROCEDURE — 99999 PR PBB SHADOW E&M-EST. PATIENT-LVL III: CPT | Mod: PBBFAC,,, | Performed by: SPECIALIST

## 2024-09-03 PROCEDURE — 1159F MED LIST DOCD IN RCRD: CPT | Mod: CPTII,S$GLB,, | Performed by: SPECIALIST

## 2024-09-03 PROCEDURE — 1126F AMNT PAIN NOTED NONE PRSNT: CPT | Mod: CPTII,S$GLB,, | Performed by: SPECIALIST

## 2024-09-03 PROCEDURE — 3078F DIAST BP <80 MM HG: CPT | Mod: CPTII,S$GLB,, | Performed by: SPECIALIST

## 2024-09-03 PROCEDURE — 3288F FALL RISK ASSESSMENT DOCD: CPT | Mod: CPTII,S$GLB,, | Performed by: SPECIALIST

## 2024-09-03 NOTE — PROGRESS NOTES
"[facility] - Urology   Clinic Note    SUBJECTIVE:     Chief Complaint   Patient presents with    Follow-up       Referral from: No ref. provider found.    History of Present Illness:  Nan Simms is a 69 y.o. female who presents to clinic for left ureteral stone.  Incidental finding on PET scan.  F/U NM PET CT on 6/17/24 "interval resolution: of left ureteral stone.  Patient asymptomatic.    Past Medical History:   Diagnosis Date    Abnormal Pap smear of cervix     Arthritis     Duodenal adenocarcinoma     Hypertension     Hyperthyroidism        Current Outpatient Medications on File Prior to Visit   Medication Sig Dispense Refill    acetaminophen (TYLENOL) 650 MG TbSR Take 650 mg by mouth as needed.      albuterol (PROVENTIL HFA) 90 mcg/actuation inhaler Inhale 2 puffs into the lungs every 6 (six) hours as needed for Wheezing. Rescue 8.5 g 0    atorvastatin (LIPITOR) 40 MG tablet Take 1 tablet (40 mg total) by mouth once daily. 90 tablet 1    droNABinol (MARINOL) 5 MG capsule Take 1 capsule (5 mg total) by mouth 2 (two) times daily before meals. 60 capsule 2    famotidine (PEPCID) 20 MG tablet Take 1 tablet (20 mg total) by mouth 2 (two) times daily. 60 tablet 11    lisinopriL-hydrochlorothiazide (PRINZIDE,ZESTORETIC) 20-12.5 mg per tablet Take 1 tablet by mouth once daily. 90 tablet 3    omeprazole (PRILOSEC) 40 MG capsule Take 1 capsule (40 mg total) by mouth once daily. 30 capsule 5    gabapentin (NEURONTIN) 300 MG capsule Take 1 capsule (300 mg total) by mouth 3 (three) times daily. 90 capsule 5     No current facility-administered medications on file prior to visit.         OBJECTIVE:     Estimated body mass index is 21.17 kg/m² as calculated from the following:    Height as of this encounter: 5' 6" (1.676 m).    Weight as of this encounter: 59.5 kg (131 lb 2.8 oz).    Vital Signs (Most Recent)  Vitals:    09/03/24 1114   BP: (!) 128/52   Pulse: (!) 40       Physical Exam:    Physical Exam " "    GENERAL: patient sitting comfortably  HEENT: normocephalic  NECK: supple, no JVD  PULM: normal chest rise, no increased WOB  HEART: non-diaphoretic  ABDO: soft, nondistended, nontender  BACK: no CVA tenderness bilaterally  SKIN: warm, dry, well perfused  EXT: no bruising or edema  NEURO: grossly normal with no focal deficits  PSYCH: appropriate mood and affect    Genitourinary Exam:  deferred      LABS:     Lab Results   Component Value Date    BUN 13 07/25/2024    CREATININE 0.7 07/25/2024    WBC 4.94 07/25/2024    HGB 11.4 (L) 07/25/2024    HCT 37.5 07/25/2024     07/25/2024    AST 24 07/25/2024    ALT 16 07/25/2024    ALKPHOS 107 07/25/2024    ALBUMIN 3.4 (L) 07/25/2024    HGBA1C 5.4 04/25/2024    INR 1.0 03/15/2023        Urinalysis:   No results found for: "UAREFLEX"       Imaging:  I have personally reviewed all relevant imaging studies.    No results found for this or any previous visit (from the past 2160 hour(s)).  No results found for this or any previous visit (from the past 2160 hour(s)).  X-Ray Chest AP Portable  Narrative: EXAMINATION:  XR CHEST AP PORTABLE    CLINICAL HISTORY:  Cough, unspecified    TECHNIQUE:  Single frontal view of the chest was performed.    COMPARISON:  04/14/2023    FINDINGS:  The lungs are clear with normal appearance of pulmonary vasculature. No pleural effusion. No evident pneumothorax.    The cardiac silhouette is normal in size. The hilar and mediastinal contours are unremarkable.Calcification of the aortic knob.    Bones are intact.Age-appropriate degenerative changes affect the skeleton.  Impression: No acute abnormality.    Electronically signed by: Rhonda Doty MD  Date:    08/19/2024  Time:    09:42         ASSESSMENT     1. Left ureteral stone        PLAN:     Basic preventative measures discussed: Increase p.o. fluids ( > 2.5 L/d ) and citrus fruits/drinks, Low Sodium/Low Oxalate Diet.  RTC one year      Jose Paul MD  Urology  [facility]     Disclaimer: This note has been generated using voice-recognition software. There may be typographical errors that have been missed during proof-reading.

## 2024-09-06 DIAGNOSIS — E11.9 DIABETES MELLITUS TYPE 2, DIET-CONTROLLED: Primary | ICD-10-CM

## 2024-09-09 ENCOUNTER — OFFICE VISIT (OUTPATIENT)
Dept: HEMATOLOGY/ONCOLOGY | Facility: CLINIC | Age: 69
End: 2024-09-09
Payer: MEDICARE

## 2024-09-09 ENCOUNTER — LAB VISIT (OUTPATIENT)
Dept: LAB | Facility: HOSPITAL | Age: 69
End: 2024-09-09
Payer: MEDICARE

## 2024-09-09 ENCOUNTER — INFUSION (OUTPATIENT)
Dept: INFUSION THERAPY | Facility: HOSPITAL | Age: 69
End: 2024-09-09
Payer: MEDICARE

## 2024-09-09 VITALS
DIASTOLIC BLOOD PRESSURE: 65 MMHG | SYSTOLIC BLOOD PRESSURE: 158 MMHG | OXYGEN SATURATION: 100 % | HEART RATE: 41 BPM | TEMPERATURE: 98 F | RESPIRATION RATE: 20 BRPM

## 2024-09-09 VITALS
WEIGHT: 131.31 LBS | HEIGHT: 66 IN | OXYGEN SATURATION: 100 % | HEART RATE: 38 BPM | BODY MASS INDEX: 21.1 KG/M2 | SYSTOLIC BLOOD PRESSURE: 156 MMHG | TEMPERATURE: 98 F | DIASTOLIC BLOOD PRESSURE: 77 MMHG

## 2024-09-09 DIAGNOSIS — R53.0 NEOPLASTIC (MALIGNANT) RELATED FATIGUE: ICD-10-CM

## 2024-09-09 DIAGNOSIS — I10 ESSENTIAL HYPERTENSION: ICD-10-CM

## 2024-09-09 DIAGNOSIS — E11.9 DIABETES MELLITUS TYPE 2, DIET-CONTROLLED: ICD-10-CM

## 2024-09-09 DIAGNOSIS — D64.9 ANEMIA, UNSPECIFIED TYPE: ICD-10-CM

## 2024-09-09 DIAGNOSIS — I70.0 AORTIC ATHEROSCLEROSIS: ICD-10-CM

## 2024-09-09 DIAGNOSIS — E78.5 HYPERLIPIDEMIA, UNSPECIFIED HYPERLIPIDEMIA TYPE: ICD-10-CM

## 2024-09-09 DIAGNOSIS — R63.0 DECREASED APPETITE: ICD-10-CM

## 2024-09-09 DIAGNOSIS — C17.0 DUODENAL ADENOCARCINOMA: ICD-10-CM

## 2024-09-09 DIAGNOSIS — Z00.6 CLINICAL TRIAL PARTICIPANT: ICD-10-CM

## 2024-09-09 DIAGNOSIS — C17.0 DUODENAL ADENOCARCINOMA: Primary | ICD-10-CM

## 2024-09-09 DIAGNOSIS — E44.0 MODERATE MALNUTRITION: ICD-10-CM

## 2024-09-09 DIAGNOSIS — C78.7 METASTASIS TO LIVER: ICD-10-CM

## 2024-09-09 LAB
ALBUMIN SERPL BCP-MCNC: 3.5 G/DL (ref 3.5–5.2)
ALP SERPL-CCNC: 87 U/L (ref 55–135)
ALT SERPL W/O P-5'-P-CCNC: 20 U/L (ref 10–44)
ANION GAP SERPL CALC-SCNC: 9 MMOL/L (ref 8–16)
AST SERPL-CCNC: 28 U/L (ref 10–40)
BILIRUB SERPL-MCNC: 0.5 MG/DL (ref 0.1–1)
BUN SERPL-MCNC: 16 MG/DL (ref 8–23)
CALCIUM SERPL-MCNC: 9.6 MG/DL (ref 8.7–10.5)
CEA SERPL-MCNC: 6.1 NG/ML (ref 0–5)
CHLORIDE SERPL-SCNC: 104 MMOL/L (ref 95–110)
CO2 SERPL-SCNC: 28 MMOL/L (ref 23–29)
CREAT SERPL-MCNC: 0.7 MG/DL (ref 0.5–1.4)
DRUG STUDY SPECIMEN TYPE: NORMAL
DRUG STUDY TEST NAME: NORMAL
DRUG STUDY TEST RESULT: NORMAL
ERYTHROCYTE [DISTWIDTH] IN BLOOD BY AUTOMATED COUNT: 18.7 % (ref 11.5–14.5)
EST. GFR  (NO RACE VARIABLE): >60 ML/MIN/1.73 M^2
GLUCOSE SERPL-MCNC: 83 MG/DL (ref 70–110)
HCT VFR BLD AUTO: 37.3 % (ref 37–48.5)
HGB BLD-MCNC: 11 G/DL (ref 12–16)
IMM GRANULOCYTES # BLD AUTO: 0 K/UL (ref 0–0.04)
MCH RBC QN AUTO: 22 PG (ref 27–31)
MCHC RBC AUTO-ENTMCNC: 29.5 G/DL (ref 32–36)
MCV RBC AUTO: 75 FL (ref 82–98)
NEUTROPHILS # BLD AUTO: 1.6 K/UL (ref 1.8–7.7)
PLATELET # BLD AUTO: 180 K/UL (ref 150–450)
PMV BLD AUTO: 10.7 FL (ref 9.2–12.9)
POTASSIUM SERPL-SCNC: 4.5 MMOL/L (ref 3.5–5.1)
PROT SERPL-MCNC: 7 G/DL (ref 6–8.4)
RBC # BLD AUTO: 5.01 M/UL (ref 4–5.4)
SODIUM SERPL-SCNC: 141 MMOL/L (ref 136–145)
T4 FREE SERPL-MCNC: 0.88 NG/DL (ref 0.71–1.51)
TSH SERPL DL<=0.005 MIU/L-ACNC: 0.57 UIU/ML (ref 0.4–4)
WBC # BLD AUTO: 3.83 K/UL (ref 3.9–12.7)

## 2024-09-09 PROCEDURE — 84439 ASSAY OF FREE THYROXINE: CPT | Performed by: INTERNAL MEDICINE

## 2024-09-09 PROCEDURE — 4010F ACE/ARB THERAPY RXD/TAKEN: CPT | Mod: CPTII,S$GLB,, | Performed by: INTERNAL MEDICINE

## 2024-09-09 PROCEDURE — 85027 COMPLETE CBC AUTOMATED: CPT | Performed by: INTERNAL MEDICINE

## 2024-09-09 PROCEDURE — 99000 SPECIMEN HANDLING OFFICE-LAB: CPT | Performed by: INTERNAL MEDICINE

## 2024-09-09 PROCEDURE — 99215 OFFICE O/P EST HI 40 MIN: CPT | Mod: S$GLB,,, | Performed by: INTERNAL MEDICINE

## 2024-09-09 PROCEDURE — 3288F FALL RISK ASSESSMENT DOCD: CPT | Mod: CPTII,S$GLB,, | Performed by: INTERNAL MEDICINE

## 2024-09-09 PROCEDURE — 96413 CHEMO IV INFUSION 1 HR: CPT

## 2024-09-09 PROCEDURE — 82378 CARCINOEMBRYONIC ANTIGEN: CPT | Performed by: INTERNAL MEDICINE

## 2024-09-09 PROCEDURE — 1101F PT FALLS ASSESS-DOCD LE1/YR: CPT | Mod: CPTII,S$GLB,, | Performed by: INTERNAL MEDICINE

## 2024-09-09 PROCEDURE — 3078F DIAST BP <80 MM HG: CPT | Mod: CPTII,S$GLB,, | Performed by: INTERNAL MEDICINE

## 2024-09-09 PROCEDURE — 25000003 PHARM REV CODE 250: Performed by: INTERNAL MEDICINE

## 2024-09-09 PROCEDURE — 84443 ASSAY THYROID STIM HORMONE: CPT | Performed by: INTERNAL MEDICINE

## 2024-09-09 PROCEDURE — 3008F BODY MASS INDEX DOCD: CPT | Mod: CPTII,S$GLB,, | Performed by: INTERNAL MEDICINE

## 2024-09-09 PROCEDURE — 99999 PR PBB SHADOW E&M-EST. PATIENT-LVL III: CPT | Mod: PBBFAC,,, | Performed by: INTERNAL MEDICINE

## 2024-09-09 PROCEDURE — 3044F HG A1C LEVEL LT 7.0%: CPT | Mod: CPTII,S$GLB,, | Performed by: INTERNAL MEDICINE

## 2024-09-09 PROCEDURE — 3077F SYST BP >= 140 MM HG: CPT | Mod: CPTII,S$GLB,, | Performed by: INTERNAL MEDICINE

## 2024-09-09 PROCEDURE — 1126F AMNT PAIN NOTED NONE PRSNT: CPT | Mod: CPTII,S$GLB,, | Performed by: INTERNAL MEDICINE

## 2024-09-09 PROCEDURE — G2211 COMPLEX E/M VISIT ADD ON: HCPCS | Mod: S$GLB,,, | Performed by: INTERNAL MEDICINE

## 2024-09-09 PROCEDURE — 1159F MED LIST DOCD IN RCRD: CPT | Mod: CPTII,S$GLB,, | Performed by: INTERNAL MEDICINE

## 2024-09-09 PROCEDURE — 80053 COMPREHEN METABOLIC PANEL: CPT | Performed by: INTERNAL MEDICINE

## 2024-09-09 PROCEDURE — 63600175 PHARM REV CODE 636 W HCPCS: Mod: JZ,JG | Performed by: INTERNAL MEDICINE

## 2024-09-09 RX ORDER — EPINEPHRINE 0.3 MG/.3ML
0.3 INJECTION SUBCUTANEOUS ONCE AS NEEDED
Status: DISCONTINUED | OUTPATIENT
Start: 2024-09-09 | End: 2024-09-09 | Stop reason: HOSPADM

## 2024-09-09 RX ORDER — HEPARIN 100 UNIT/ML
500 SYRINGE INTRAVENOUS
Status: CANCELLED | OUTPATIENT
Start: 2024-09-09

## 2024-09-09 RX ORDER — DIPHENHYDRAMINE HYDROCHLORIDE 50 MG/ML
50 INJECTION INTRAMUSCULAR; INTRAVENOUS ONCE AS NEEDED
Status: DISCONTINUED | OUTPATIENT
Start: 2024-09-09 | End: 2024-09-09 | Stop reason: HOSPADM

## 2024-09-09 RX ORDER — SODIUM CHLORIDE 0.9 % (FLUSH) 0.9 %
10 SYRINGE (ML) INJECTION
Status: DISCONTINUED | OUTPATIENT
Start: 2024-09-09 | End: 2024-09-09 | Stop reason: HOSPADM

## 2024-09-09 RX ORDER — DIPHENHYDRAMINE HYDROCHLORIDE 50 MG/ML
50 INJECTION INTRAMUSCULAR; INTRAVENOUS ONCE AS NEEDED
Status: CANCELLED | OUTPATIENT
Start: 2024-09-09

## 2024-09-09 RX ORDER — DRONABINOL 5 MG/1
5 CAPSULE ORAL
Qty: 60 CAPSULE | Refills: 2 | Status: SHIPPED | OUTPATIENT
Start: 2024-09-09

## 2024-09-09 RX ORDER — SODIUM CHLORIDE 0.9 % (FLUSH) 0.9 %
10 SYRINGE (ML) INJECTION
Status: CANCELLED | OUTPATIENT
Start: 2024-09-09

## 2024-09-09 RX ORDER — EPINEPHRINE 0.3 MG/.3ML
0.3 INJECTION SUBCUTANEOUS ONCE AS NEEDED
Status: CANCELLED | OUTPATIENT
Start: 2024-09-09

## 2024-09-09 RX ORDER — HEPARIN 100 UNIT/ML
500 SYRINGE INTRAVENOUS
Status: DISCONTINUED | OUTPATIENT
Start: 2024-09-09 | End: 2024-09-09 | Stop reason: HOSPADM

## 2024-09-09 RX ADMIN — SODIUM CHLORIDE 400 MG: 9 INJECTION, SOLUTION INTRAVENOUS at 10:09

## 2024-09-09 RX ADMIN — SODIUM CHLORIDE: 9 INJECTION, SOLUTION INTRAVENOUS at 10:09

## 2024-09-09 NOTE — PROGRESS NOTES
MEDICAL ONCOLOGY - ESTABLISHED PATIENT VISIT    Reason for visit: duodenal adenocarcinoma     Best Contact Phone Number(s): [phone] (home)      Cancer/Stage/TNM:    Cancer Staging   Duodenal adenocarcinoma  Staging form: Small Intestine - Adenocarcinoma, AJCC 8th Edition  - Pathologic stage from 3/15/2023: Stage IV (pT3, pN0, cM1) - Signed by Eber Roberts MD on 7/29/2024       Oncology History   Duodenal adenocarcinoma   3/10/2023 Initial Diagnosis    Duodenal adenocarcinoma     3/15/2023 Surgery    Whipple  dMMR with loss of MLH1/PMS2; MLH1 promoter hypermethylation present suggesting a sporadic tumor.      3/15/2023 Cancer Staged    Staging form: Small Intestine - Adenocarcinoma, AJCC 8th Edition  - Pathologic stage from 3/15/2023: Stage IV (pT3, pN0, cM1)     8/24/2023 -  Chemotherapy    Treatment Summary   Plan Name: OP PEMBROLIZUMAB 400MG Q6W  Treatment Goal: Palliative  Status: Active  Start Date: 8/24/2023  End Date: 6/23/2025 (Planned)  Provider: Eber Roberts MD  Chemotherapy: [No matching medication found in this treatment plan]          Interval History: 69 y.o. female with recurrent MSI-H metastatic duodenal adenocarcinoma who presents for follow-up prior to cycle 10 of pembrolizumab.  She had Covid a few weeks ago, only symptom was rhinorrhea.  No diarrhea, dyspnea.  Denies any barbra.  Weight is stable. Continues dronabinol just at nighttime as she didn't like the way it made her feel during the day.    Presents to clinic with her . ECOG 0.     ROS:   Review of Systems   Constitutional:  Negative for chills, fever and malaise/fatigue.   HENT:  Negative for nosebleeds and sore throat.    Respiratory:  Negative for cough and shortness of breath.    Cardiovascular:  Negative for chest pain, palpitations and leg swelling.   Gastrointestinal:  Negative for blood in stool, constipation, diarrhea, heartburn, nausea and vomiting.   Genitourinary:  Negative for dysuria, frequency,  hematuria and urgency.   Musculoskeletal:  Negative for falls and myalgias.   Skin:  Negative for itching and rash.   Neurological:  Negative for dizziness, tingling, weakness and headaches.   Psychiatric/Behavioral:  The patient is not nervous/anxious and does not have insomnia.        Past Medical History:   Past Medical History:   Diagnosis Date    Abnormal Pap smear of cervix     Arthritis     Duodenal adenocarcinoma     Hypertension     Hyperthyroidism         Past Surgical History:   Past Surgical History:   Procedure Laterality Date    CATARACT EXTRACTION W/  INTRAOCULAR LENS IMPLANT Right 8/8/2019    Procedure: EXTRACTION, CATARACT, WITH IOL INSERTION;  Surgeon: Spenser Lee MD;  Location: [facility];  Service: Ophthalmology;  Laterality: Right;    COLONOSCOPY N/A 7/11/2024    Procedure: COLONOSCOPY;  Surgeon: Luis Hayes MD;  Location: [facility];  Service: Endoscopy;  Laterality: N/A;    ESOPHAGOGASTRODUODENOSCOPY N/A 3/10/2023    Procedure: EGD (ESOPHAGOGASTRODUODENOSCOPY);  Surgeon: Shashi Tapia MD;  Location: [facility] ([address] [place] [address]);  Service: Endoscopy;  Laterality: N/A;    EYE SURGERY      HYSTERECTOMY      OOPHORECTOMY      PHACOEMULSIFICATION OF CATARACT Right 8/8/2019    Procedure: PHACOEMULSIFICATION, CATARACT;  Surgeon: Spenser Lee MD;  Location: [facility];  Service: Ophthalmology;  Laterality: Right;    WHIPPLE PROCEDURE N/A 3/15/2023    Procedure: WHIPPLE PROCEDURE;  Surgeon: Nick Paez MD;  [address];  Service: General;  Laterality: N/A;        Family History:   Family History   Problem Relation Name Age of Onset    Cancer Mother      Breast cancer Neg Hx      Colon cancer Neg Hx      Ovarian cancer Neg Hx          Social History:   Social History     Tobacco Use    Smoking status: Every Day     Current packs/day: 0.25     Average packs/day: 0.3 packs/day for 30.0 years (7.5 ttl pk-yrs)     Types: Cigarettes    Smokeless tobacco: Never    Tobacco  "comments:     About 7-8 cigs a day   Substance Use Topics    Alcohol use: Yes     Alcohol/week: 1.0 standard drink of alcohol     Types: 1 Cans of beer per week     Comment: social        I have reviewed and updated the patient's past medical, surgical, family and social histories.    Allergies:   Review of patient's allergies indicates:   Allergen Reactions    Aspirin Other (See Comments)     Pt states it is "hard on her stomach" She can tolerate a low dose aspirin    Pcn [penicillins]      Childhood - Tolerated ceftriaxone and cefazolin with no documented issues in the past         Medications:   Current Outpatient Medications   Medication Sig Dispense Refill    acetaminophen (TYLENOL) 650 MG TbSR Take 650 mg by mouth as needed.      albuterol (PROVENTIL HFA) 90 mcg/actuation inhaler Inhale 2 puffs into the lungs every 6 (six) hours as needed for Wheezing. Rescue 8.5 g 0    atorvastatin (LIPITOR) 40 MG tablet Take 1 tablet (40 mg total) by mouth once daily. 90 tablet 1    famotidine (PEPCID) 20 MG tablet Take 1 tablet (20 mg total) by mouth 2 (two) times daily. 60 tablet 11    lisinopriL-hydrochlorothiazide (PRINZIDE,ZESTORETIC) 20-12.5 mg per tablet Take 1 tablet by mouth once daily. 90 tablet 3    omeprazole (PRILOSEC) 40 MG capsule Take 1 capsule (40 mg total) by mouth once daily. 30 capsule 5    droNABinol (MARINOL) 5 MG capsule Take 1 capsule (5 mg total) by mouth 2 (two) times daily before meals. 60 capsule 2    gabapentin (NEURONTIN) 300 MG capsule Take 1 capsule (300 mg total) by mouth 3 (three) times daily. 90 capsule 5     No current facility-administered medications for this visit.      Physical Exam:   BP (!) 156/77 (BP Location: Right arm, Patient Position: Sitting, BP Method: Medium (Automatic))   Pulse (!) 38   Temp 98.3 °F (36.8 °C) (Oral)   Ht 5' 6" (1.676 m)   Wt 59.5 kg (131 lb 4.5 oz)   SpO2 100%   BMI 21.19 kg/m²      Physical Exam  Vitals reviewed.   Constitutional:       General: She " is not in acute distress.     Appearance: Normal appearance. She is normal weight. She is not ill-appearing, toxic-appearing or diaphoretic.   HENT:      Head: Normocephalic and atraumatic.      Right Ear: External ear normal.      Left Ear: External ear normal.      Nose: Nose normal.      Mouth/Throat:      Pharynx: Oropharynx is clear.   Eyes:      General: No scleral icterus.     Conjunctiva/sclera: Conjunctivae normal.      Pupils: Pupils are equal, round, and reactive to light.   Cardiovascular:      Rate and Rhythm: Normal rate.   Pulmonary:      Effort: Pulmonary effort is normal. No respiratory distress.      Breath sounds: No wheezing.   Abdominal:      General: There is no distension.      Tenderness: There is no abdominal tenderness.      Comments: Midline abdominal incision well healed   Musculoskeletal:      Cervical back: Normal range of motion.      Right lower leg: No edema.      Left lower leg: No edema.   Skin:     General: Skin is warm and dry.      Coloration: Skin is not jaundiced or pale.      Findings: No bruising, erythema or rash.   Neurological:      General: No focal deficit present.      Mental Status: She is alert and oriented to person, place, and time. Mental status is at baseline.      Cranial Nerves: No cranial nerve deficit.      Sensory: No sensory deficit.      Motor: No weakness.      Gait: Gait normal.   Psychiatric:         Mood and Affect: Mood normal.         Behavior: Behavior normal.         Thought Content: Thought content normal.         Judgment: Judgment normal.         Labs:   Recent Results (from the past 48 hour(s))   CBC Oncology    Collection Time: 09/09/24  8:58 AM   Result Value Ref Range    WBC 3.83 (L) 3.90 - 12.70 K/uL    RBC 5.01 4.00 - 5.40 M/uL    Hemoglobin 11.0 (L) 12.0 - 16.0 g/dL    Hematocrit 37.3 37.0 - 48.5 %    MCV 75 (L) 82 - 98 fL    MCH 22.0 (L) 27.0 - 31.0 pg    MCHC 29.5 (L) 32.0 - 36.0 g/dL    RDW 18.7 (H) 11.5 - 14.5 %    Platelets 180 150 -  450 K/uL    MPV 10.7 9.2 - 12.9 fL    Gran # (ANC) 1.6 (L) 1.8 - 7.7 K/uL    Immature Grans (Abs) 0.00 0.00 - 0.04 K/uL   Comprehensive Metabolic Panel    Collection Time: 24  8:58 AM   Result Value Ref Range    Sodium 141 136 - 145 mmol/L    Potassium 4.5 3.5 - 5.1 mmol/L    Chloride 104 95 - 110 mmol/L    CO2 28 23 - 29 mmol/L    Glucose 83 70 - 110 mg/dL    BUN 16 8 - 23 mg/dL    Creatinine 0.7 0.5 - 1.4 mg/dL    Calcium 9.6 8.7 - 10.5 mg/dL    Total Protein 7.0 6.0 - 8.4 g/dL    Albumin 3.5 3.5 - 5.2 g/dL    Total Bilirubin 0.5 0.1 - 1.0 mg/dL    Alkaline Phosphatase 87 55 - 135 U/L    AST 28 10 - 40 U/L    ALT 20 10 - 44 U/L    eGFR >60.0 >60 mL/min/1.73 m^2    Anion Gap 9 8 - 16 mmol/L   CEA    Collection Time: 24  8:58 AM   Result Value Ref Range    CEA 6.1 (H) 0.0 - 5.0 ng/mL   TSH    Collection Time: 24  8:58 AM   Result Value Ref Range    TSH 0.575 0.400 - 4.000 uIU/mL   FREE T4    Collection Time: 24  8:58 AM   Result Value Ref Range    Free T4 0.88 0.71 - 1.51 ng/dL   DRUG STUDY SENDOUT, [facility] [place].    Collection Time: 24  8:58 AM   Result Value Ref Range    Drug Study Test Name Drug Study     Drug Study Specimen Type blood     Drug Study Test Result Result sent directly to physician         Lab work reviewed.  Mild leukopenia and anemia. CEA elevated today, 6.1     Imagin2024 PET-CT:    We personally reviewed the images which shows no evidence of active disease.  Left ureteral stone is present.    Path:   3/15/23 - Whipple  Final Pathologic Diagnosis      Abnormal   1. Lymph node, hepatic cautery, resection:   - One lymph node negative for metastatic carcinoma (0/1).   - See synoptic report.   2. Gallbladder, cholecystectomy:   - Benign gallbladder with no significant histologic abnormality.   3. Pancreas, duodenum, common bile duct, and partial gastrectomy,   pancreaticoduodenectomy (Whipple specimen):   - Poorly differentiated carcinoma with medullary features.   -  See synoptic report.   - See comment.   Synoptic Report   Procedure: Pancreaticoduodenectomy with partial gastrectomy (Whipple   specimen)   Tumor site:  Pylorus and proximal duodenum   Histologic type: Poorly differentiated carcinoma with medullary features   Histologic grade: G3, poorly differentiated   Tumor size:  6.5 cm in greatest dimension grossly   Tumor extent:   Invades through muscularis propria into subserosa, or extends   into nonperitonealized perimuscular tissue (mesentery or retroperitoneum)   without serosal penetration   Macroscopic tumor perforation:  Not identified   Lymphovascular inv asion:  Not identified, see comment   Margin status for invasive carcinoma:  All margins negative for invasive   carcinoma   Margin status for dysplasia:  All margins negative for carcinoma in Situ   (high-grade dysplasia)/adenoma   Regional lymph node status: Regional lymph nodes present        All Regional lymph nodes negative for tumor        Number of lymph nodes examined:  15 (including specimens 1 and 3)   PATHOLOGIC STAGE CLASSIFICATION (pTNM, AJCC 8th Edition):  p T3 N0   Additional findings:  Stomach with intestinal metaplasia (immunostain for   H.pylori negative), 2 lymph nodes with fibrosis and calcifications (GMS stain   for fungal organisms and AFB stain negative)   Ancillary studies:  Immunohistochemistry (IHC) Testing for Mismatch Repair   (MMR) Proteins   MLH1- Loss of nuclear expression   PMS2 - Loss of nuclear expression   MSH2 - Intact nuclear expression   MSH6 - Intact nuclear expression   Background nonneoplastic tissue/internal control with intact nuclear   expression   IHC Interpret ation:  Loss of nuclear expression of MLH1 and PMS2: testing for   methylation of the MLH1 promoter and/or mutation of BRAF is indicated (the   presence of a BRAF V600E mutation and/or MLH1 methylation suggests that the   tumor is sporadic and germline evaluation is probably not indicated; absence   of both MLH1  methylation and of BRAF V600E mutation suggests the possibility   of Seaman syndrome, and sequencing and/or large deletion/duplication testing   of germline MLH1 may be indicated)   Testing for methylation of the MLH1 promoter and mutation of BRAF is in   process and results will be supplemented.   There are exceptions to the above IHC interpretations. These results should   not be considered in isolation, and clinical correlation with genetic   counseling is recommended to assess the need for germline testing.   COMMENT:  The stomach and duodenum (specimen 3) shows a 6.5 cm mass involving   the pylorus with the majority of the tumor appearing to be in the duodenum.   The tumor shows poorly d ifferentiated sheets of blue cells with necrosis.   There is a significant amount of lymphocytic inflammatory infiltrate around   the edges of the tumor.  Immunostains show the tumor cells to be positive for   CK7 with patchy weak CDX2 and negative for synaptophysin and chromogranin.   There is also loss of MLH1 and PMS2 on MMR stains.  These features are   suggestive of medullary carcinoma.  Select slides reviewed by Dr. ITZEL Sánchez   who agrees with the diagnosis of poorly differentiated carcinoma with   medullary features.  Immunostains for CD 34 performed on blocks 3E and 3H   show no definitive lymphovascular invasion even though some areas suspicious   cells on routine H&E sections.  Appropriate positive controls   VC      Comment: Interp By Meredith Pappas MD, Signed on 04/12/2023 at 16:03   Supplemental Diagnosis      Abnormal   MLH1 Hypermethylation/BRAF Mutation   Result Summary   MLH1 HYPERMETHYLATION PRESENT/NO BRAF MUTATION IDENTIFIED   Result   Provided diagnosis: pylorus and proximal duodenum poorly differentiated   carcinoma with medullary features   Methylation status: Positive for MLH1 promoter hypermethylation   BRAF status: Wild-type (SEE INTERPRETATION)   Alexis Sotelo M.D.   Report attached  "  Performing location:   [place] [address] [place] [address]   "Disclaimer:  This case diagnosis was rendered completely by the outside   consultation pathologist and the case is electronically signed by an [facility]   pathologist listed below solely to release the report into the medical   record."          Assessment:       1. Duodenal adenocarcinoma    2. Metastasis to liver    3. Moderate malnutrition    4. Decreased appetite    5. Neoplastic (malignant) related fatigue    6. Anemia, unspecified type    7. Essential hypertension    8. Hyperlipidemia, unspecified hyperlipidemia type    9. Diabetes mellitus type 2, diet-controlled    10. Aortic atherosclerosis         Plan:     # Duodenal adenocarcinoma   Mrs. Simms is a pleasant 69 y.o. 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 Pathology did reveal some high grade features, including poorly differentiated carcinoma, 6.5 cm size.  We discussed at her initial visit he limited role of adjuvant chemotherapy in the stage II setting for small bowel adenocarcinoma.      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     CT CAP on 7/26/23 demonstrated concern for recurrence in liver.  PET/CT on 8/4/23 showed hypermetabolic inferior R hepatic lobe lesion consistent with metastatic recurrence.  CEA has risen as well.    Given her dMMR/MSI-H status, we recommended to start on single agent pembrolizumab 400 mg q6 weeks.  Patient was consented for immunotherapy.   She began pembrolizumab 8/24/23.    She was also enrolled in our protocol: [id number], Disparities in Results of Immune Checkpoint Inhibitor Treatment (DIRECT): A Prospective Cohort Study of Cancer Survivors Treated with anti-PD-L1 Immunotherapy in a Community Oncology Setting.  CRC is Isabela Church.    No toxicities noted from pembrolizumab cycle " 1.  PET/CT after cycle 2 shows resolution of liver metastasis.  Question of grade 1 pneumonitis with GGO on that scan but this has since resolved on CT chest done 12/15 which makes pneumonitis less likely diagnosis.  No other IO toxicities.  PET/CT after cycle 4 shows resolution of hypermetabolic nodules and ground-glass in right middle lobe of lung. No evidence of other hypermetabolic areas.   PET/CT after cycle 6 shows no evidence of disease.  PET/CT after cycle 8 shows shows BIA.    Doing well. Continues to tolerate treatment well. Recc to continue  Labs reviewed and adequate for treatment.  No toxicities from IO.  Will proceed with cycle 10 of pembrolizumab today.    [facility] Tissue NGS: MSI-H, TMB 76.3 m/MB  Repeat imaging with PET prior to cycle 11 of pembrolizumab.    # Decreased appetite, malnutrition  -Appetite ok with Marinol. Taking at night.   -weight mildly decreased. Nutrition following.    # Anemia, neoplasm related fatigue  -Microcytic anemia stable. Suspect thalassemia given longstanding nature.  -No signs of bleeding, platelets normal.   -Asymptomatic. Monitor.   -continue to monitor TSH    # HTN, HLD, DM, aortic atherosclerosis   -BP mildly elevated in clinic today.    -Following with PCP.   -Continue medical management.     # L nephrolithiasis  No symptoms currently.  Was having polyuria which has improved with cipro, Rx from her PCP.  Saw Dr. Paul with urology.    Patient is in agreement with the proposed treatment plan. All questions were answered to the patient's satisfaction. Pt knows to call clinic if anything is needed before the next clinic visit.    Eber Roberts MD  Hematology/Oncology  [facility]      Route Chart for Scheduling    Med Onc Chart Routing      Follow up with physician 6 weeks and 3 months. as scheduled   Follow up with YANDEL    Infusion scheduling note    Injection scheduling note    Labs    Imaging    Pharmacy appointment    Other referrals               Treatment Plan Information   OP PEMBROLIZUMAB 400MG Q6W Eber Roberts MD   Associated diagnosis: Duodenal adenocarcinoma Stage IV pT3, pN0, cM1 noted on 4/18/2023   Line of treatment: First Line  Treatment Goal: Palliative     Upcoming Treatment Dates - OP PEMBROLIZUMAB 400MG Q6W    9/2/2024       Chemotherapy       pembrolizumab (KEYTRUDA) 400 mg in 0.9% NaCl SolP 116 mL infusion  10/14/2024       Chemotherapy       pembrolizumab (KEYTRUDA) 400 mg in 0.9% NaCl SolP 116 mL infusion  11/25/2024       Chemotherapy       pembrolizumab (KEYTRUDA) 400 mg in 0.9% NaCl SolP 116 mL infusion  1/6/2025       Chemotherapy       pembrolizumab (KEYTRUDA) 400 mg in 0.9% NaCl SolP 116 mL infusion

## 2024-09-09 NOTE — PLAN OF CARE
Problem: Chemotherapy Effects  Goal: Anemia Symptom Improvement  Outcome: Progressing  Goal: Safety Maintained  Outcome: Progressing  Goal: Absence of Hematuria  Outcome: Progressing  Goal: Nausea and Vomiting Relief  Outcome: Progressing  Goal: Neurotoxicity Symptom Control  Outcome: Progressing  Goal: Absence of Infection  Outcome: Progressing  Goal: Absence of Bleeding  Outcome: Progressing     Ambulatory to clinic with family.  No c/o adverse effects or s/s of infection.  PIV inserted, flushed with out difficulty, blood return noted and infused with no problems. Keytruda tolerated with no problems.  Ambulatory home with family.  NAD.

## 2024-09-18 DIAGNOSIS — E11.9 DIABETES MELLITUS TYPE 2, DIET-CONTROLLED: Primary | ICD-10-CM

## 2024-10-21 ENCOUNTER — LAB VISIT (OUTPATIENT)
Dept: LAB | Facility: HOSPITAL | Age: 69
End: 2024-10-21
Payer: MEDICARE

## 2024-10-21 ENCOUNTER — OFFICE VISIT (OUTPATIENT)
Dept: HEMATOLOGY/ONCOLOGY | Facility: CLINIC | Age: 69
End: 2024-10-21
Payer: MEDICARE

## 2024-10-21 ENCOUNTER — INFUSION (OUTPATIENT)
Dept: INFUSION THERAPY | Facility: HOSPITAL | Age: 69
End: 2024-10-21
Payer: MEDICARE

## 2024-10-21 VITALS
SYSTOLIC BLOOD PRESSURE: 121 MMHG | HEIGHT: 66 IN | WEIGHT: 132.25 LBS | OXYGEN SATURATION: 100 % | HEART RATE: 45 BPM | DIASTOLIC BLOOD PRESSURE: 58 MMHG | BODY MASS INDEX: 21.25 KG/M2 | RESPIRATION RATE: 18 BRPM

## 2024-10-21 VITALS
TEMPERATURE: 97 F | BODY MASS INDEX: 21.25 KG/M2 | OXYGEN SATURATION: 100 % | SYSTOLIC BLOOD PRESSURE: 162 MMHG | HEIGHT: 66 IN | HEART RATE: 54 BPM | WEIGHT: 132.25 LBS | DIASTOLIC BLOOD PRESSURE: 65 MMHG

## 2024-10-21 DIAGNOSIS — R53.0 NEOPLASTIC (MALIGNANT) RELATED FATIGUE: ICD-10-CM

## 2024-10-21 DIAGNOSIS — E44.0 MODERATE MALNUTRITION: ICD-10-CM

## 2024-10-21 DIAGNOSIS — D64.9 ANEMIA, UNSPECIFIED TYPE: ICD-10-CM

## 2024-10-21 DIAGNOSIS — E11.9 DIABETES MELLITUS TYPE 2, DIET-CONTROLLED: ICD-10-CM

## 2024-10-21 DIAGNOSIS — C78.7 METASTASIS TO LIVER: ICD-10-CM

## 2024-10-21 DIAGNOSIS — C17.0 DUODENAL ADENOCARCINOMA: ICD-10-CM

## 2024-10-21 DIAGNOSIS — C17.0 DUODENAL ADENOCARCINOMA: Primary | ICD-10-CM

## 2024-10-21 DIAGNOSIS — I70.0 AORTIC ATHEROSCLEROSIS: ICD-10-CM

## 2024-10-21 DIAGNOSIS — E78.5 HYPERLIPIDEMIA, UNSPECIFIED HYPERLIPIDEMIA TYPE: ICD-10-CM

## 2024-10-21 DIAGNOSIS — I10 ESSENTIAL HYPERTENSION: ICD-10-CM

## 2024-10-21 DIAGNOSIS — R63.0 DECREASED APPETITE: ICD-10-CM

## 2024-10-21 LAB
ALBUMIN SERPL BCP-MCNC: 3.5 G/DL (ref 3.5–5.2)
ALP SERPL-CCNC: 88 U/L (ref 40–150)
ALT SERPL W/O P-5'-P-CCNC: 14 U/L (ref 10–44)
ANION GAP SERPL CALC-SCNC: 8 MMOL/L (ref 8–16)
AST SERPL-CCNC: 20 U/L (ref 10–40)
BASOPHILS # BLD AUTO: 0.03 K/UL (ref 0–0.2)
BASOPHILS NFR BLD: 0.7 % (ref 0–1.9)
BILIRUB SERPL-MCNC: 0.5 MG/DL (ref 0.1–1)
BUN SERPL-MCNC: 15 MG/DL (ref 8–23)
CALCIUM SERPL-MCNC: 9.8 MG/DL (ref 8.7–10.5)
CEA SERPL-MCNC: 5.9 NG/ML (ref 0–5)
CHLORIDE SERPL-SCNC: 103 MMOL/L (ref 95–110)
CO2 SERPL-SCNC: 34 MMOL/L (ref 23–29)
CREAT SERPL-MCNC: 0.6 MG/DL (ref 0.5–1.4)
DIFFERENTIAL METHOD BLD: ABNORMAL
EOSINOPHIL # BLD AUTO: 0.1 K/UL (ref 0–0.5)
EOSINOPHIL NFR BLD: 2.4 % (ref 0–8)
ERYTHROCYTE [DISTWIDTH] IN BLOOD BY AUTOMATED COUNT: 18.6 % (ref 11.5–14.5)
EST. GFR  (NO RACE VARIABLE): >60 ML/MIN/1.73 M^2
GLUCOSE SERPL-MCNC: 92 MG/DL (ref 70–110)
HCT VFR BLD AUTO: 35.7 % (ref 37–48.5)
HGB BLD-MCNC: 11.1 G/DL (ref 12–16)
IMM GRANULOCYTES # BLD AUTO: 0.01 K/UL (ref 0–0.04)
IMM GRANULOCYTES NFR BLD AUTO: 0.2 % (ref 0–0.5)
LYMPHOCYTES # BLD AUTO: 1.4 K/UL (ref 1–4.8)
LYMPHOCYTES NFR BLD: 33.1 % (ref 18–48)
MCH RBC QN AUTO: 22.6 PG (ref 27–31)
MCHC RBC AUTO-ENTMCNC: 31.1 G/DL (ref 32–36)
MCV RBC AUTO: 73 FL (ref 82–98)
MONOCYTES # BLD AUTO: 0.4 K/UL (ref 0.3–1)
MONOCYTES NFR BLD: 9.7 % (ref 4–15)
NEUTROPHILS # BLD AUTO: 2.3 K/UL (ref 1.8–7.7)
NEUTROPHILS NFR BLD: 53.9 % (ref 38–73)
NRBC BLD-RTO: 0 /100 WBC
PLATELET # BLD AUTO: 220 K/UL (ref 150–450)
PMV BLD AUTO: 10.8 FL (ref 9.2–12.9)
POTASSIUM SERPL-SCNC: 3.3 MMOL/L (ref 3.5–5.1)
PROT SERPL-MCNC: 6.9 G/DL (ref 6–8.4)
RBC # BLD AUTO: 4.92 M/UL (ref 4–5.4)
SODIUM SERPL-SCNC: 145 MMOL/L (ref 136–145)
T4 FREE SERPL-MCNC: 1.01 NG/DL (ref 0.71–1.51)
TSH SERPL DL<=0.005 MIU/L-ACNC: 1.02 UIU/ML (ref 0.4–4)
WBC # BLD AUTO: 4.23 K/UL (ref 3.9–12.7)

## 2024-10-21 PROCEDURE — 99999 PR PBB SHADOW E&M-EST. PATIENT-LVL III: CPT | Mod: PBBFAC,,, | Performed by: INTERNAL MEDICINE

## 2024-10-21 PROCEDURE — 3008F BODY MASS INDEX DOCD: CPT | Mod: CPTII,S$GLB,, | Performed by: INTERNAL MEDICINE

## 2024-10-21 PROCEDURE — 25000003 PHARM REV CODE 250: Performed by: INTERNAL MEDICINE

## 2024-10-21 PROCEDURE — G2211 COMPLEX E/M VISIT ADD ON: HCPCS | Mod: S$GLB,,, | Performed by: INTERNAL MEDICINE

## 2024-10-21 PROCEDURE — 63600175 PHARM REV CODE 636 W HCPCS: Mod: JZ,JG | Performed by: INTERNAL MEDICINE

## 2024-10-21 PROCEDURE — 96413 CHEMO IV INFUSION 1 HR: CPT

## 2024-10-21 PROCEDURE — 36415 COLL VENOUS BLD VENIPUNCTURE: CPT | Performed by: PHYSICIAN ASSISTANT

## 2024-10-21 PROCEDURE — 3044F HG A1C LEVEL LT 7.0%: CPT | Mod: CPTII,S$GLB,, | Performed by: INTERNAL MEDICINE

## 2024-10-21 PROCEDURE — 1126F AMNT PAIN NOTED NONE PRSNT: CPT | Mod: CPTII,S$GLB,, | Performed by: INTERNAL MEDICINE

## 2024-10-21 PROCEDURE — 4010F ACE/ARB THERAPY RXD/TAKEN: CPT | Mod: CPTII,S$GLB,, | Performed by: INTERNAL MEDICINE

## 2024-10-21 PROCEDURE — 1101F PT FALLS ASSESS-DOCD LE1/YR: CPT | Mod: CPTII,S$GLB,, | Performed by: INTERNAL MEDICINE

## 2024-10-21 PROCEDURE — 85025 COMPLETE CBC W/AUTO DIFF WBC: CPT | Performed by: PHYSICIAN ASSISTANT

## 2024-10-21 PROCEDURE — 3078F DIAST BP <80 MM HG: CPT | Mod: CPTII,S$GLB,, | Performed by: INTERNAL MEDICINE

## 2024-10-21 PROCEDURE — 3288F FALL RISK ASSESSMENT DOCD: CPT | Mod: CPTII,S$GLB,, | Performed by: INTERNAL MEDICINE

## 2024-10-21 PROCEDURE — 82378 CARCINOEMBRYONIC ANTIGEN: CPT | Performed by: PHYSICIAN ASSISTANT

## 2024-10-21 PROCEDURE — 84439 ASSAY OF FREE THYROXINE: CPT | Performed by: INTERNAL MEDICINE

## 2024-10-21 PROCEDURE — 3077F SYST BP >= 140 MM HG: CPT | Mod: CPTII,S$GLB,, | Performed by: INTERNAL MEDICINE

## 2024-10-21 PROCEDURE — 80053 COMPREHEN METABOLIC PANEL: CPT | Performed by: PHYSICIAN ASSISTANT

## 2024-10-21 PROCEDURE — 84443 ASSAY THYROID STIM HORMONE: CPT | Performed by: PHYSICIAN ASSISTANT

## 2024-10-21 PROCEDURE — 99215 OFFICE O/P EST HI 40 MIN: CPT | Mod: S$GLB,,, | Performed by: INTERNAL MEDICINE

## 2024-10-21 PROCEDURE — 1159F MED LIST DOCD IN RCRD: CPT | Mod: CPTII,S$GLB,, | Performed by: INTERNAL MEDICINE

## 2024-10-21 RX ORDER — EPINEPHRINE 0.3 MG/.3ML
0.3 INJECTION SUBCUTANEOUS ONCE AS NEEDED
Status: CANCELLED | OUTPATIENT
Start: 2024-10-21

## 2024-10-21 RX ORDER — EPINEPHRINE 0.3 MG/.3ML
0.3 INJECTION SUBCUTANEOUS ONCE AS NEEDED
Status: DISCONTINUED | OUTPATIENT
Start: 2024-10-21 | End: 2024-10-21 | Stop reason: HOSPADM

## 2024-10-21 RX ORDER — HEPARIN 100 UNIT/ML
500 SYRINGE INTRAVENOUS
Status: CANCELLED | OUTPATIENT
Start: 2024-10-21

## 2024-10-21 RX ORDER — DIPHENHYDRAMINE HYDROCHLORIDE 50 MG/ML
50 INJECTION INTRAMUSCULAR; INTRAVENOUS ONCE AS NEEDED
Status: DISCONTINUED | OUTPATIENT
Start: 2024-10-21 | End: 2024-10-21 | Stop reason: HOSPADM

## 2024-10-21 RX ORDER — HEPARIN 100 UNIT/ML
500 SYRINGE INTRAVENOUS
Status: DISCONTINUED | OUTPATIENT
Start: 2024-10-21 | End: 2024-10-21 | Stop reason: HOSPADM

## 2024-10-21 RX ORDER — DIPHENHYDRAMINE HYDROCHLORIDE 50 MG/ML
50 INJECTION INTRAMUSCULAR; INTRAVENOUS ONCE AS NEEDED
Status: CANCELLED | OUTPATIENT
Start: 2024-10-21

## 2024-10-21 RX ORDER — SODIUM CHLORIDE 0.9 % (FLUSH) 0.9 %
10 SYRINGE (ML) INJECTION
Status: CANCELLED | OUTPATIENT
Start: 2024-10-21

## 2024-10-21 RX ORDER — SODIUM CHLORIDE 0.9 % (FLUSH) 0.9 %
10 SYRINGE (ML) INJECTION
Status: DISCONTINUED | OUTPATIENT
Start: 2024-10-21 | End: 2024-10-21 | Stop reason: HOSPADM

## 2024-10-21 RX ADMIN — SODIUM CHLORIDE 400 MG: 9 INJECTION, SOLUTION INTRAVENOUS at 10:10

## 2024-10-21 NOTE — PLAN OF CARE
Patient seated in chair accompanied by .  VSS, assessment done. PIV inserted, flushed, blood return noted, started Keytruda .  Patient completed Keytruda infusion, tolerated well.  PIV discontinued without issue.  Patient ambulated off floor accompanied by .  RTC 12/2/24

## 2024-10-21 NOTE — PROGRESS NOTES
MEDICAL ONCOLOGY - ESTABLISHED PATIENT VISIT    Reason for visit: duodenal adenocarcinoma     Best Contact Phone Number(s): [phone] (home)      Cancer/Stage/TNM:    Cancer Staging   Duodenal adenocarcinoma  Staging form: Small Intestine - Adenocarcinoma, AJCC 8th Edition  - Pathologic stage from 3/15/2023: Stage IV (pT3, pN0, cM1) - Signed by Eber Roberts MD on 7/29/2024       Oncology History   Duodenal adenocarcinoma   3/10/2023 Initial Diagnosis    Duodenal adenocarcinoma     3/15/2023 Surgery    Whipple  dMMR with loss of MLH1/PMS2; MLH1 promoter hypermethylation present suggesting a sporadic tumor.      3/15/2023 Cancer Staged    Staging form: Small Intestine - Adenocarcinoma, AJCC 8th Edition  - Pathologic stage from 3/15/2023: Stage IV (pT3, pN0, cM1)     8/24/2023 -  Chemotherapy    Treatment Summary   Plan Name: OP PEMBROLIZUMAB 400MG Q6W  Treatment Goal: Palliative  Status: Active  Start Date: 8/24/2023  End Date: 6/23/2025 (Planned)  Provider: Eber Roberts MD  Chemotherapy: [No matching medication found in this treatment plan]          Interval History: 69 y.o. female with recurrent MSI-H metastatic duodenal adenocarcinoma who presents for follow-up prior to cycle 11 of pembrolizumab.  She is feeling well.  Has some nighttime discomfort in her lower legs/ankles/feet bilaterally which self resolves.  She is eating well. No diarrhea or dyspnea.    Presents to clinic with her . ECOG 0.     ROS:   Review of Systems   Constitutional:  Negative for chills, fever and malaise/fatigue.   HENT:  Negative for nosebleeds and sore throat.    Respiratory:  Negative for cough and shortness of breath.    Cardiovascular:  Negative for chest pain, palpitations and leg swelling.   Gastrointestinal:  Negative for blood in stool, constipation, diarrhea, heartburn, nausea and vomiting.   Genitourinary:  Negative for dysuria, frequency, hematuria and urgency.   Musculoskeletal:  Negative for falls  and myalgias.   Skin:  Negative for itching and rash.   Neurological:  Negative for dizziness, tingling, weakness and headaches.   Psychiatric/Behavioral:  The patient is not nervous/anxious and does not have insomnia.        Past Medical History:   Past Medical History:   Diagnosis Date    Abnormal Pap smear of cervix     Arthritis     Duodenal adenocarcinoma     Hypertension     Hyperthyroidism         Past Surgical History:   Past Surgical History:   Procedure Laterality Date    CATARACT EXTRACTION W/  INTRAOCULAR LENS IMPLANT Right 8/8/2019    Procedure: EXTRACTION, CATARACT, WITH IOL INSERTION;  Surgeon: Spenser Lee MD;  Location: [facility] OR;  Service: Ophthalmology;  Laterality: Right;    COLONOSCOPY N/A 7/11/2024    Procedure: COLONOSCOPY;  Surgeon: Luis Hayes MD;  Location: [facility];  Service: Endoscopy;  Laterality: N/A;    ESOPHAGOGASTRODUODENOSCOPY N/A 3/10/2023    Procedure: EGD (ESOPHAGOGASTRODUODENOSCOPY);  Surgeon: Shashi Tapia MD;  [facility]  Service: Endoscopy;  Laterality: N/A;    EYE SURGERY      HYSTERECTOMY      OOPHORECTOMY      PHACOEMULSIFICATION OF CATARACT Right 8/8/2019    Procedure: PHACOEMULSIFICATION, CATARACT;  Surgeon: Spenser Lee MD;  [facility];  Service: Ophthalmology;  Laterality: Right;    WHIPPLE PROCEDURE N/A 3/15/2023    Procedure: WHIPPLE PROCEDURE;  Surgeon: Nick Paez MD;  Location: [facility] 2ND FLR;  Service: General;  Laterality: N/A;        Family History:   Family History   Problem Relation Name Age of Onset    Cancer Mother      Breast cancer Neg Hx      Colon cancer Neg Hx      Ovarian cancer Neg Hx          Social History:   Social History     Tobacco Use    Smoking status: Every Day     Current packs/day: 0.25     Average packs/day: 0.3 packs/day for 30.0 years (7.5 ttl pk-yrs)     Types: Cigarettes    Smokeless tobacco: Never    Tobacco comments:     About 7-8 cigs a day   Substance Use Topics     "Alcohol use: Yes     Alcohol/week: 1.0 standard drink of alcohol     Types: 1 Cans of beer per week     Comment: social        I have reviewed and updated the patient's past medical, surgical, family and social histories.    Allergies:   Review of patient's allergies indicates:   Allergen Reactions    Aspirin Other (See Comments)     Pt states it is "hard on her stomach" She can tolerate a low dose aspirin    Pcn [penicillins]      Childhood - Tolerated ceftriaxone and cefazolin with no documented issues in the past         Medications:   Current Outpatient Medications   Medication Sig Dispense Refill    acetaminophen (TYLENOL) 650 MG TbSR Take 650 mg by mouth as needed.      atorvastatin (LIPITOR) 40 MG tablet Take 1 tablet (40 mg total) by mouth once daily. 90 tablet 1    droNABinol (MARINOL) 5 MG capsule Take 1 capsule (5 mg total) by mouth 2 (two) times daily before meals. 60 capsule 2    famotidine (PEPCID) 20 MG tablet Take 1 tablet (20 mg total) by mouth 2 (two) times daily. 60 tablet 11    lisinopriL-hydrochlorothiazide (PRINZIDE,ZESTORETIC) 20-12.5 mg per tablet Take 1 tablet by mouth once daily. 90 tablet 3    omeprazole (PRILOSEC) 40 MG capsule Take 1 capsule (40 mg total) by mouth once daily. 30 capsule 5    albuterol (PROVENTIL HFA) 90 mcg/actuation inhaler Inhale 2 puffs into the lungs every 6 (six) hours as needed for Wheezing. Rescue 8.5 g 0    gabapentin (NEURONTIN) 300 MG capsule Take 1 capsule (300 mg total) by mouth 3 (three) times daily. 90 capsule 5     No current facility-administered medications for this visit.      Physical Exam:   BP (!) 162/65 (BP Location: Left arm, Patient Position: Sitting)   Pulse (!) 54   Temp 97 °F (36.1 °C) (Oral)   Ht 5' 6" (1.676 m)   Wt 60 kg (132 lb 4.4 oz)   SpO2 100%   BMI 21.35 kg/m²      Physical Exam  Vitals reviewed.   Constitutional:       General: She is not in acute distress.     Appearance: Normal appearance. She is normal weight. She is not " ill-appearing, toxic-appearing or diaphoretic.   HENT:      Head: Normocephalic and atraumatic.      Right Ear: External ear normal.      Left Ear: External ear normal.      Nose: Nose normal.      Mouth/Throat:      Pharynx: Oropharynx is clear.   Eyes:      General: No scleral icterus.     Conjunctiva/sclera: Conjunctivae normal.      Pupils: Pupils are equal, round, and reactive to light.   Cardiovascular:      Rate and Rhythm: Normal rate.   Pulmonary:      Effort: Pulmonary effort is normal. No respiratory distress.      Breath sounds: No wheezing.   Abdominal:      General: There is no distension.      Tenderness: There is no abdominal tenderness.   Musculoskeletal:      Cervical back: Normal range of motion.      Right lower leg: No edema.      Left lower leg: No edema.   Skin:     General: Skin is warm and dry.      Coloration: Skin is not jaundiced or pale.      Findings: No bruising, erythema or rash.   Neurological:      General: No focal deficit present.      Mental Status: She is alert and oriented to person, place, and time. Mental status is at baseline.      Cranial Nerves: No cranial nerve deficit.      Sensory: No sensory deficit.      Motor: No weakness.      Gait: Gait normal.   Psychiatric:         Mood and Affect: Mood normal.         Behavior: Behavior normal.         Thought Content: Thought content normal.         Judgment: Judgment normal.         Labs:   Recent Results (from the past 48 hours)   CBC W/ AUTO DIFFERENTIAL    Collection Time: 10/21/24  8:34 AM   Result Value Ref Range    WBC 4.23 3.90 - 12.70 K/uL    RBC 4.92 4.00 - 5.40 M/uL    Hemoglobin 11.1 (L) 12.0 - 16.0 g/dL    Hematocrit 35.7 (L) 37.0 - 48.5 %    MCV 73 (L) 82 - 98 fL    MCH 22.6 (L) 27.0 - 31.0 pg    MCHC 31.1 (L) 32.0 - 36.0 g/dL    RDW 18.6 (H) 11.5 - 14.5 %    Platelets 220 150 - 450 K/uL    MPV 10.8 9.2 - 12.9 fL    Immature Granulocytes 0.2 0.0 - 0.5 %    Gran # (ANC) 2.3 1.8 - 7.7 K/uL    Immature Grans (Abs)  0.01 0.00 - 0.04 K/uL    Lymph # 1.4 1.0 - 4.8 K/uL    Mono # 0.4 0.3 - 1.0 K/uL    Eos # 0.1 0.0 - 0.5 K/uL    Baso # 0.03 0.00 - 0.20 K/uL    nRBC 0 0 /100 WBC    Gran % 53.9 38.0 - 73.0 %    Lymph % 33.1 18.0 - 48.0 %    Mono % 9.7 4.0 - 15.0 %    Eosinophil % 2.4 0.0 - 8.0 %    Basophil % 0.7 0.0 - 1.9 %    Differential Method Automated    Comprehensive Metabolic Panel    Collection Time: 10/21/24  8:34 AM   Result Value Ref Range    Sodium 145 136 - 145 mmol/L    Potassium 3.3 (L) 3.5 - 5.1 mmol/L    Chloride 103 95 - 110 mmol/L    CO2 34 (H) 23 - 29 mmol/L    Glucose 92 70 - 110 mg/dL    BUN 15 8 - 23 mg/dL    Creatinine 0.6 0.5 - 1.4 mg/dL    Calcium 9.8 8.7 - 10.5 mg/dL    Total Protein 6.9 6.0 - 8.4 g/dL    Albumin 3.5 3.5 - 5.2 g/dL    Total Bilirubin 0.5 0.1 - 1.0 mg/dL    Alkaline Phosphatase 88 40 - 150 U/L    AST 20 10 - 40 U/L    ALT 14 10 - 44 U/L    eGFR >60.0 >60 mL/min/1.73 m^2    Anion Gap 8 8 - 16 mmol/L        Lab work reviewed.  Mild anemia, hypokalemia.    Imaging:    PET/CT - 7/29/24:    Impression:     1. Postop change of Whipple procedure for duodenal adenocarcinoma without radiotracer uptake to suggest residual recurrent disease.  2. Several new bilateral pulmonary nodules noting there are below size threshold for detection by PET-CT.  Findings may represent infectious or inflammatory process.  Attention on follow-up if not resolved for suspected alternative metastases.  3. Additional findings, as above.    Path:   3/15/23 - Whipple  Final Pathologic Diagnosis      Abnormal   1. Lymph node, hepatic cautery, resection:   - One lymph node negative for metastatic carcinoma (0/1).   - See synoptic report.   2. Gallbladder, cholecystectomy:   - Benign gallbladder with no significant histologic abnormality.   3. Pancreas, duodenum, common bile duct, and partial gastrectomy,   pancreaticoduodenectomy (Whipple specimen):   - Poorly differentiated carcinoma with medullary features.   - See  synoptic report.   - See comment.   Synoptic Report   Procedure: Pancreaticoduodenectomy with partial gastrectomy (Whipple   specimen)   Tumor site:  Pylorus and proximal duodenum   Histologic type: Poorly differentiated carcinoma with medullary features   Histologic grade: G3, poorly differentiated   Tumor size:  6.5 cm in greatest dimension grossly   Tumor extent:   Invades through muscularis propria into subserosa, or extends   into nonperitonealized perimuscular tissue (mesentery or retroperitoneum)   without serosal penetration   Macroscopic tumor perforation:  Not identified   Lymphovascular inv asion:  Not identified, see comment   Margin status for invasive carcinoma:  All margins negative for invasive   carcinoma   Margin status for dysplasia:  All margins negative for carcinoma in Situ   (high-grade dysplasia)/adenoma   Regional lymph node status: Regional lymph nodes present        All Regional lymph nodes negative for tumor        Number of lymph nodes examined:  15 (including specimens 1 and 3)   PATHOLOGIC STAGE CLASSIFICATION (pTNM, AJCC 8th Edition):  p T3 N0   Additional findings:  Stomach with intestinal metaplasia (immunostain for   H.pylori negative), 2 lymph nodes with fibrosis and calcifications (GMS stain   for fungal organisms and AFB stain negative)   Ancillary studies:  Immunohistochemistry (IHC) Testing for Mismatch Repair   (MMR) Proteins   MLH1- Loss of nuclear expression   PMS2 - Loss of nuclear expression   MSH2 - Intact nuclear expression   MSH6 - Intact nuclear expression   Background nonneoplastic tissue/internal control with intact nuclear   expression   IHC Interpret ation:  Loss of nuclear expression of MLH1 and PMS2: testing for   methylation of the MLH1 promoter and/or mutation of BRAF is indicated (the   presence of a BRAF V600E mutation and/or MLH1 methylation suggests that the   tumor is sporadic and germline evaluation is probably not indicated; absence   of both MLH1  methylation and of BRAF V600E mutation suggests the possibility   of Seaman syndrome, and sequencing and/or large deletion/duplication testing   of germline MLH1 may be indicated)   Testing for methylation of the MLH1 promoter and mutation of BRAF is in   process and results will be supplemented.   There are exceptions to the above IHC interpretations. These results should   not be considered in isolation, and clinical correlation with genetic   counseling is recommended to assess the need for germline testing.   COMMENT:  The stomach and duodenum (specimen 3) shows a 6.5 cm mass involving   the pylorus with the majority of the tumor appearing to be in the duodenum.   The tumor shows poorly d ifferentiated sheets of blue cells with necrosis.   There is a significant amount of lymphocytic inflammatory infiltrate around   the edges of the tumor.  Immunostains show the tumor cells to be positive for   CK7 with patchy weak CDX2 and negative for synaptophysin and chromogranin.   There is also loss of MLH1 and PMS2 on MMR stains.  These features are   suggestive of medullary carcinoma.  Select slides reviewed by Dr. ITZEL Sánchez   who agrees with the diagnosis of poorly differentiated carcinoma with   medullary features.  Immunostains for CD 34 performed on blocks 3E and 3H   show no definitive lymphovascular invasion even though some areas suspicious   cells on routine H&E sections.  Appropriate positive controls   VC      Comment: Interp By Meredith Pappas MD, Signed on 04/12/2023 at 16:03   Supplemental Diagnosis      Abnormal   MLH1 Hypermethylation/BRAF Mutation   Result Summary   MLH1 HYPERMETHYLATION PRESENT/NO BRAF MUTATION IDENTIFIED   Result   Provided diagnosis: pylorus and proximal duodenum poorly differentiated   carcinoma with medullary features   Methylation status: Positive for MLH1 promoter hypermethylation   BRAF status: Wild-type (SEE INTERPRETATION)   Alexis Sotelo M.D.   Report attached  "  Performing location:   [place] [address] [place] [address]   "Disclaimer:  This case diagnosis was rendered completely by the outside   consultation pathologist and the case is electronically signed by an [facility]   pathologist listed below solely to release the report into the medical   record."          Assessment:       1. Duodenal adenocarcinoma    2. Metastasis to liver    3. Moderate malnutrition    4. Decreased appetite    5. Neoplastic (malignant) related fatigue    6. Anemia, unspecified type    7. Essential hypertension    8. Hyperlipidemia, unspecified hyperlipidemia type    9. Diabetes mellitus type 2, diet-controlled    10. Aortic atherosclerosis           Plan:     # Duodenal adenocarcinoma   Mrs. Simms is a pleasant 69 y.o. 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 Pathology did reveal some high grade features, including poorly differentiated carcinoma, 6.5 cm size.  We discussed at her initial visit he limited role of adjuvant chemotherapy in the stage II setting for small bowel adenocarcinoma.      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     CT CAP on 7/26/23 demonstrated concern for recurrence in liver.  PET/CT on 8/4/23 showed hypermetabolic inferior R hepatic lobe lesion consistent with metastatic recurrence.  CEA has risen as well.    Given her dMMR/MSI-H status, we recommended to start on single agent pembrolizumab 400 mg q6 weeks.  Patient was consented for immunotherapy.   She began pembrolizumab 8/24/23.    She was also enrolled in our protocol: [id number], Disparities in Results of Immune Checkpoint Inhibitor Treatment (DIRECT): A Prospective Cohort Study of Cancer Survivors Treated with anti-PD-L1 Immunotherapy in a Community Oncology Setting.  CRC is Isabela Church.    No toxicities noted from pembrolizumab " cycle 1.  PET/CT after cycle 2 shows resolution of liver metastasis.  Question of grade 1 pneumonitis with GGO on that scan but this has since resolved on CT chest done 12/15 which makes pneumonitis less likely diagnosis.  No other IO toxicities.  PET/CT after cycle 4 shows resolution of hypermetabolic nodules and ground-glass in right middle lobe of lung. No evidence of other hypermetabolic areas.   PET/CT after cycle 6 shows no evidence of disease.  PET/CT after cycle 8 shows shows BIA.    Doing well. Continues to tolerate treatment well. Recc to continue  Labs reviewed and adequate for treatment.  No toxicities from IO.  Will proceed with cycle 11 of pembrolizumab today.    [facility] Tissue NGS: MSI-H, TMB 76.3 m/MB  Repeat imaging with PET prior to cycle 12 of pembrolizumab.    # Decreased appetite, malnutrition  -Appetite ok with Marinol. Taking at night. Drug on backorder right now and she's doing ok not taking it every night.  -weight stable. Nutrition following.    # Anemia, neoplasm related fatigue  -Microcytic anemia stable. Suspect thalassemia given longstanding nature.  -No signs of bleeding, platelets normal.   -Asymptomatic. Monitor.   -continue to monitor TSH    # HTN, HLD, DM, aortic atherosclerosis   -BP moderately elevated in clinic today.    -Following with PCP.   -Continue medical management.     Patient is in agreement with the proposed treatment plan. All questions were answered to the patient's satisfaction. Pt knows to call clinic if anything is needed before the next clinic visit.    Eber Roberts MD  Hematology/Oncology  [facility]      Route Chart for Scheduling    Med Onc Chart Routing      Follow up with physician 6 weeks. for keytruda with PET prior   Follow up with YANDEL 3 months. for keytruda   Infusion scheduling note    Injection scheduling note    Labs CBC, CMP, CEA and TSH   Scheduling:  Preferred lab:  Lab interval:     Imaging    Pharmacy appointment     Other referrals              Treatment Plan Information   OP PEMBROLIZUMAB 400MG Q6W Eber Roberts MD   Associated diagnosis: Duodenal adenocarcinoma Stage IV pT3, pN0, cM1 noted on 4/18/2023   Line of treatment: First Line  Treatment Goal: Palliative     Upcoming Treatment Dates - OP PEMBROLIZUMAB 400MG Q6W    10/14/2024       Chemotherapy       pembrolizumab (KEYTRUDA) 400 mg in 0.9% NaCl SolP 116 mL infusion  11/25/2024       Chemotherapy       pembrolizumab (KEYTRUDA) 400 mg in 0.9% NaCl SolP 116 mL infusion  1/6/2025       Chemotherapy       pembrolizumab (KEYTRUDA) 400 mg in 0.9% NaCl SolP 116 mL infusion  2/17/2025       Chemotherapy       pembrolizumab (KEYTRUDA) 400 mg in 0.9% NaCl SolP 116 mL infusion

## 2024-10-30 ENCOUNTER — HOSPITAL ENCOUNTER (EMERGENCY)
Facility: HOSPITAL | Age: 69
Discharge: HOME | End: 2024-10-30
Attending: STUDENT IN AN ORGANIZED HEALTH CARE EDUCATION/TRAINING PROGRAM
Payer: MEDICARE

## 2024-10-30 VITALS
BODY MASS INDEX: 21.03 KG/M2 | DIASTOLIC BLOOD PRESSURE: 73 MMHG | RESPIRATION RATE: 18 BRPM | HEART RATE: 64 BPM | OXYGEN SATURATION: 100 % | TEMPERATURE: 99 F | SYSTOLIC BLOOD PRESSURE: 182 MMHG | WEIGHT: 130.31 LBS

## 2024-10-30 DIAGNOSIS — E11.9 TYPE 2 DIABETES MELLITUS WITHOUT COMPLICATION: ICD-10-CM

## 2024-10-30 DIAGNOSIS — R42 DIZZINESS: ICD-10-CM

## 2024-10-30 DIAGNOSIS — R53.1 GENERALIZED WEAKNESS: ICD-10-CM

## 2024-10-30 LAB
ALBUMIN SERPL BCP-MCNC: 3.7 G/DL (ref 3.5–5.2)
ALP SERPL-CCNC: 93 U/L (ref 40–150)
ALT SERPL W/O P-5'-P-CCNC: 22 U/L (ref 10–44)
ANION GAP SERPL CALC-SCNC: 12 MMOL/L (ref 8–16)
AST SERPL-CCNC: 34 U/L (ref 10–40)
BASOPHILS # BLD AUTO: 0.02 K/UL (ref 0–0.2)
BASOPHILS NFR BLD: 0.4 % (ref 0–1.9)
BILIRUB SERPL-MCNC: 0.7 MG/DL (ref 0.1–1)
BILIRUB UR QL STRIP: NEGATIVE
BUN SERPL-MCNC: 11 MG/DL (ref 8–23)
CALCIUM SERPL-MCNC: 9.5 MG/DL (ref 8.7–10.5)
CHLORIDE SERPL-SCNC: 103 MMOL/L (ref 95–110)
CLARITY UR: CLEAR
CO2 SERPL-SCNC: 26 MMOL/L (ref 23–29)
COLOR UR: YELLOW
CREAT SERPL-MCNC: 0.6 MG/DL (ref 0.5–1.4)
DIFFERENTIAL METHOD BLD: ABNORMAL
EOSINOPHIL # BLD AUTO: 0.1 K/UL (ref 0–0.5)
EOSINOPHIL NFR BLD: 2 % (ref 0–8)
ERYTHROCYTE [DISTWIDTH] IN BLOOD BY AUTOMATED COUNT: 18.6 % (ref 11.5–14.5)
EST. GFR  (NO RACE VARIABLE): >60 ML/MIN/1.73 M^2
GLUCOSE SERPL-MCNC: 88 MG/DL (ref 70–110)
GLUCOSE UR QL STRIP: NEGATIVE
HCT VFR BLD AUTO: 34.4 % (ref 37–48.5)
HGB BLD-MCNC: 10.9 G/DL (ref 12–16)
HGB UR QL STRIP: NEGATIVE
IMM GRANULOCYTES # BLD AUTO: 0.01 K/UL (ref 0–0.04)
IMM GRANULOCYTES NFR BLD AUTO: 0.2 % (ref 0–0.5)
KETONES UR QL STRIP: NEGATIVE
LEUKOCYTE ESTERASE UR QL STRIP: NEGATIVE
LYMPHOCYTES # BLD AUTO: 1.4 K/UL (ref 1–4.8)
LYMPHOCYTES NFR BLD: 28 % (ref 18–48)
MCH RBC QN AUTO: 22.7 PG (ref 27–31)
MCHC RBC AUTO-ENTMCNC: 31.7 G/DL (ref 32–36)
MCV RBC AUTO: 72 FL (ref 82–98)
MONOCYTES # BLD AUTO: 0.5 K/UL (ref 0.3–1)
MONOCYTES NFR BLD: 10.3 % (ref 4–15)
NEUTROPHILS # BLD AUTO: 2.9 K/UL (ref 1.8–7.7)
NEUTROPHILS NFR BLD: 59.1 % (ref 38–73)
NITRITE UR QL STRIP: NEGATIVE
NRBC BLD-RTO: 0 /100 WBC
OHS QRS DURATION: 100 MS
OHS QTC CALCULATION: 435 MS
PH UR STRIP: 7 [PH] (ref 5–8)
PLATELET # BLD AUTO: 200 K/UL (ref 150–450)
PMV BLD AUTO: 10.1 FL (ref 9.2–12.9)
POTASSIUM SERPL-SCNC: 3.6 MMOL/L (ref 3.5–5.1)
PROT SERPL-MCNC: 7.1 G/DL (ref 6–8.4)
PROT UR QL STRIP: NEGATIVE
RBC # BLD AUTO: 4.8 M/UL (ref 4–5.4)
SODIUM SERPL-SCNC: 141 MMOL/L (ref 136–145)
SP GR UR STRIP: 1.01 (ref 1–1.03)
TROPONIN I SERPL DL<=0.01 NG/ML-MCNC: <0.006 NG/ML (ref 0–0.03)
TSH SERPL DL<=0.005 MIU/L-ACNC: 0.81 UIU/ML (ref 0.4–4)
URN SPEC COLLECT METH UR: NORMAL
UROBILINOGEN UR STRIP-ACNC: NEGATIVE EU/DL
WBC # BLD AUTO: 4.93 K/UL (ref 3.9–12.7)

## 2024-10-30 PROCEDURE — 99285 EMERGENCY DEPT VISIT HI MDM: CPT | Mod: 25

## 2024-10-30 PROCEDURE — 84443 ASSAY THYROID STIM HORMONE: CPT | Performed by: STUDENT IN AN ORGANIZED HEALTH CARE EDUCATION/TRAINING PROGRAM

## 2024-10-30 PROCEDURE — 80053 COMPREHEN METABOLIC PANEL: CPT | Performed by: STUDENT IN AN ORGANIZED HEALTH CARE EDUCATION/TRAINING PROGRAM

## 2024-10-30 PROCEDURE — 84484 ASSAY OF TROPONIN QUANT: CPT | Performed by: STUDENT IN AN ORGANIZED HEALTH CARE EDUCATION/TRAINING PROGRAM

## 2024-10-30 PROCEDURE — 81003 URINALYSIS AUTO W/O SCOPE: CPT | Performed by: STUDENT IN AN ORGANIZED HEALTH CARE EDUCATION/TRAINING PROGRAM

## 2024-10-30 PROCEDURE — 85025 COMPLETE CBC W/AUTO DIFF WBC: CPT | Performed by: STUDENT IN AN ORGANIZED HEALTH CARE EDUCATION/TRAINING PROGRAM

## 2024-10-30 PROCEDURE — 93010 ELECTROCARDIOGRAM REPORT: CPT | Mod: ,,, | Performed by: INTERNAL MEDICINE

## 2024-10-30 PROCEDURE — 93005 ELECTROCARDIOGRAM TRACING: CPT

## 2024-10-30 NOTE — ED TRIAGE NOTES
69 y.o. female presents to ER Room/bed info not found   Chief Complaint   Patient presents with    Weakness   .   C/o waking up feeling light headed and dizzy

## 2024-10-30 NOTE — ED PROVIDER NOTES
"Encounter Date: 10/30/2024       History     Chief Complaint   Patient presents with    Weakness     69-year-old female with history of hypertension and duodenal adenocarcinoma, presenting with an episode of lightheadedness and generalized weakness that occurred when she woke up.  She felt as if the room was spinning transiently.  Reports that this feeling resolved, but that she wanted to come and get checked out.  Denies any chest pain or shortness of breath associated with the event.  No recent fever, nausea, vomiting, diarrhea, abdominal pain.  Denies dysuria or back pain.  Feels well now.  Patient does have a history of bradycardia.      Review of patient's allergies indicates:   Allergen Reactions    Aspirin Other (See Comments)     Pt states it is "hard on her stomach" She can tolerate a low dose aspirin    Pcn [penicillins]      Childhood - Tolerated ceftriaxone and cefazolin with no documented issues in the past      Past Medical History:   Diagnosis Date    Abnormal Pap smear of cervix     Arthritis     Duodenal adenocarcinoma     Hypertension     Hyperthyroidism      Past Surgical History:   Procedure Laterality Date    CATARACT EXTRACTION W/  INTRAOCULAR LENS IMPLANT Right 8/8/2019    Procedure: EXTRACTION, CATARACT, WITH IOL INSERTION;  Surgeon: Spenser Lee MD;  Location: [facility];  Service: Ophthalmology;  Laterality: Right;    COLONOSCOPY N/A 7/11/2024    Procedure: COLONOSCOPY;  Surgeon: Luis Hayes MD;  Location: [facility];  Service: Endoscopy;  Laterality: N/A;    ESOPHAGOGASTRODUODENOSCOPY N/A 3/10/2023    Procedure: EGD (ESOPHAGOGASTRODUODENOSCOPY);  Surgeon: Shashi Tapia MD;  Location: [facility] ([address] [place] [address]);  Service: Endoscopy;  Laterality: N/A;    EYE SURGERY      HYSTERECTOMY      OOPHORECTOMY      PHACOEMULSIFICATION OF CATARACT Right 8/8/2019    Procedure: PHACOEMULSIFICATION, CATARACT;  Surgeon: Spenser Lee MD;  Location: [facility];  Service: Ophthalmology;  " Laterality: Right;    WHIPPLE PROCEDURE N/A 3/15/2023    Procedure: WHIPPLE PROCEDURE;  Surgeon: Nick Paez MD;  Location: [facility] OR [address] [place] [address];  Service: General;  Laterality: N/A;     Family History   Problem Relation Name Age of Onset    Cancer Mother      Breast cancer Neg Hx      Colon cancer Neg Hx      Ovarian cancer Neg Hx       Social History     Tobacco Use    Smoking status: Every Day     Current packs/day: 0.25     Average packs/day: 0.3 packs/day for 30.0 years (7.5 ttl pk-yrs)     Types: Cigarettes    Smokeless tobacco: Never    Tobacco comments:     About 7-8 cigs a day   Substance Use Topics    Alcohol use: Yes     Alcohol/week: 1.0 standard drink of alcohol     Types: 1 Cans of beer per week     Comment: social    Drug use: No     Review of Systems   Constitutional:  Positive for fatigue. Negative for fever.   HENT:  Negative for sore throat.    Respiratory:  Negative for shortness of breath.    Cardiovascular:  Negative for chest pain.   Gastrointestinal:  Negative for nausea.   Genitourinary:  Negative for dysuria.   Musculoskeletal:  Negative for back pain.   Skin:  Negative for rash.   Neurological:  Positive for light-headedness. Negative for weakness.   Hematological:  Does not bruise/bleed easily.       Physical Exam     Initial Vitals [10/30/24 0842]   BP Pulse Resp Temp SpO2   (!) 196/77 (!) 43 20 98.4 °F (36.9 °C) 100 %      MAP       --         Physical Exam    Nursing note and vitals reviewed.  Constitutional: She appears well-developed. She is not diaphoretic. No distress.   HENT:   Head: Normocephalic.   Eyes: Pupils are equal, round, and reactive to light.   Neck:   Normal range of motion.  Cardiovascular:            No murmur heard.  Pulmonary/Chest: No respiratory distress.   Clear lungs bilaterally.  No respiratory distress.  No wheezing or rales.  Good air movement.     Abdominal: Abdomen is soft.   No TTP diffusely. No guarding, rebound, or masses. No CVAT bilaterally.      Musculoskeletal:         General: No edema.      Cervical back: Normal range of motion.     Neurological: She is alert and oriented to person, place, and time.   Alert and oriented to person place and time. No facial droop. CN 2-12 intact. Fluent speech with expression and comprehension. Strength 5/5 and sensation intact in upper and lower extremities.    Skin: Skin is warm.   Psychiatric: She has a normal mood and affect.         ED Course   Procedures  Labs Reviewed   CBC W/ AUTO DIFFERENTIAL   COMPREHENSIVE METABOLIC PANEL   TROPONIN I   TSH   URINALYSIS, REFLEX TO URINE CULTURE     EKG Readings: (Independently Interpreted)   Initial Reading: No STEMI. Rhythm: Sinus Bradycardia. Heart Rate: 48. Ectopy: No Ectopy. Conduction: Normal.       Imaging Results    None          Medications - No data to display  Medical Decision Making  DDX: Generalized weakness/lightheadedness - r/o infection, significant anemia, ACS, arrhythmia, electrolyte abnormality, MIGUELINA, metabolic abnormality. Unlikely ICH/mass given nonfocal neuro exam, no concerning history, but will consider if change in exam.  DX: BMP, CBC, troponin. EKG. CXR, UA. NCHCT if change in neuro exam/symptoms.  TX: Analgesia PRN. Treatment/consult as indicated by studies.   DISPO: Pending studies.         Amount and/or Complexity of Data Reviewed  Labs: ordered.  Radiology: ordered.                                      Clinical Impression:  Final diagnoses:  [R42] Dizziness  [R53.1] Generalized weakness                 Spenser Rothman MD  10/30/24 0850       Spenser Rothman MD  10/30/24 0884

## 2024-10-31 ENCOUNTER — PATIENT OUTREACH (OUTPATIENT)
Dept: EMERGENCY MEDICINE | Facility: HOSPITAL | Age: 69
End: 2024-10-31
Payer: MEDICARE

## 2024-11-01 ENCOUNTER — PATIENT OUTREACH (OUTPATIENT)
Dept: EMERGENCY MEDICINE | Facility: HOSPITAL | Age: 69
End: 2024-11-01
Payer: MEDICARE

## 2024-11-01 DIAGNOSIS — I10 HYPERTENSION, UNSPECIFIED TYPE: ICD-10-CM

## 2024-11-04 ENCOUNTER — OFFICE VISIT (OUTPATIENT)
Dept: INTERNAL MEDICINE | Facility: CLINIC | Age: 69
End: 2024-11-04
Payer: MEDICARE

## 2024-11-04 VITALS
HEART RATE: 66 BPM | RESPIRATION RATE: 18 BRPM | HEIGHT: 66 IN | WEIGHT: 130.5 LBS | OXYGEN SATURATION: 98 % | SYSTOLIC BLOOD PRESSURE: 140 MMHG | DIASTOLIC BLOOD PRESSURE: 64 MMHG | BODY MASS INDEX: 20.97 KG/M2

## 2024-11-04 DIAGNOSIS — R00.1 BRADYCARDIA: ICD-10-CM

## 2024-11-04 DIAGNOSIS — Z90.410 HISTORY OF WHIPPLE PROCEDURE: ICD-10-CM

## 2024-11-04 DIAGNOSIS — E44.0 MODERATE MALNUTRITION: ICD-10-CM

## 2024-11-04 DIAGNOSIS — E78.5 HYPERLIPIDEMIA, UNSPECIFIED HYPERLIPIDEMIA TYPE: Primary | ICD-10-CM

## 2024-11-04 DIAGNOSIS — I10 ESSENTIAL HYPERTENSION: ICD-10-CM

## 2024-11-04 DIAGNOSIS — Z90.49 HISTORY OF WHIPPLE PROCEDURE: ICD-10-CM

## 2024-11-04 DIAGNOSIS — C17.0 DUODENAL ADENOCARCINOMA: ICD-10-CM

## 2024-11-04 PROCEDURE — 3008F BODY MASS INDEX DOCD: CPT | Mod: CPTII,S$GLB,, | Performed by: NURSE PRACTITIONER

## 2024-11-04 PROCEDURE — 3077F SYST BP >= 140 MM HG: CPT | Mod: CPTII,S$GLB,, | Performed by: NURSE PRACTITIONER

## 2024-11-04 PROCEDURE — 99999 PR PBB SHADOW E&M-EST. PATIENT-LVL III: CPT | Mod: PBBFAC,,, | Performed by: NURSE PRACTITIONER

## 2024-11-04 PROCEDURE — 1159F MED LIST DOCD IN RCRD: CPT | Mod: CPTII,S$GLB,, | Performed by: NURSE PRACTITIONER

## 2024-11-04 PROCEDURE — 99214 OFFICE O/P EST MOD 30 MIN: CPT | Mod: S$GLB,,, | Performed by: NURSE PRACTITIONER

## 2024-11-04 PROCEDURE — 3044F HG A1C LEVEL LT 7.0%: CPT | Mod: CPTII,S$GLB,, | Performed by: NURSE PRACTITIONER

## 2024-11-04 PROCEDURE — 3288F FALL RISK ASSESSMENT DOCD: CPT | Mod: CPTII,S$GLB,, | Performed by: NURSE PRACTITIONER

## 2024-11-04 PROCEDURE — 1126F AMNT PAIN NOTED NONE PRSNT: CPT | Mod: CPTII,S$GLB,, | Performed by: NURSE PRACTITIONER

## 2024-11-04 PROCEDURE — 4010F ACE/ARB THERAPY RXD/TAKEN: CPT | Mod: CPTII,S$GLB,, | Performed by: NURSE PRACTITIONER

## 2024-11-04 PROCEDURE — 1101F PT FALLS ASSESS-DOCD LE1/YR: CPT | Mod: CPTII,S$GLB,, | Performed by: NURSE PRACTITIONER

## 2024-11-04 PROCEDURE — 3078F DIAST BP <80 MM HG: CPT | Mod: CPTII,S$GLB,, | Performed by: NURSE PRACTITIONER

## 2024-11-04 RX ORDER — LISINOPRIL AND HYDROCHLOROTHIAZIDE 12.5; 2 MG/1; MG/1
TABLET ORAL
Qty: 90 TABLET | Refills: 11 | Status: SHIPPED | OUTPATIENT
Start: 2024-11-04

## 2024-11-04 NOTE — PROGRESS NOTES
Subjective:       Patient ID: Nan Simms is a 69 y.o. female.    Chief Complaint: Hospital Follow Up      History of Present Illness    CHIEF COMPLAINT:  Nan presents today for follow-up after an emergency room visit for dizziness.    RECENT EMERGENCY ROOM VISIT:  She reports waking up feeling dizzy, but symptoms had improved by arrival at the emergency room. Various tests were performed including lab work, chest XR, and EKG.    CANCER TREATMENT:  She has completed 11 cancer treatments and is scheduled for a repeat scan. She has a history of lung nodes, which are being monitored. The nodes have not resolved, but there is no radiotracer uptake to suggest residual or recurrent disease.    PREVENTIVE CARE:  She underwent a mammogram in March and a colonoscopy in May. A repeat colonoscopy was recommended in one year. A DEXA scan in 2023 revealed some weakness in the hips.    MEDICATIONS:  She takes Marinol for appetite stimulation, reporting good weight gain. She expresses concern about potentially not receiving her Marinol prescription. She confirms taking blood pressure medication, though forgot to take it on the morning of the visit. She also takes cholesterol medication. She reports inconsistent use of calcium and vitamin D supplements.      ROS:  Constitutional: +dizziness  Neurological: +weakness          Objective:      Physical Exam    Vitals: Blood pressure: 160s.  General: No acute distress. Well-developed. Well-nourished.  Eyes: EOMI. Sclerae anicteric.  HENT: Normocephalic. Atraumatic. Nares patent. Moist oral mucosa.  Ears: Bilateral TMs clear. Bilateral EACs clear.  Cardiovascular: Regular rate. Regular rhythm. No murmurs. No rubs. No gallops. Normal S1, S2.  Respiratory: Normal respiratory effort. Clear to auscultation bilaterally. No rales. No rhonchi. No wheezing.  Abdomen: Soft. Non-tender. Non-distended. Normoactive bowel sounds.  Musculoskeletal: No  obvious deformity.  Extremities: No lower  "extremity edema.  Neurological: Alert & oriented x3. No slurred speech. Normal gait.  Psychiatric: Normal mood. Normal affect. Good insight. Good judgment.  Skin: Warm. Dry. No rash.          Assessment:       1. Hyperlipidemia, unspecified hyperlipidemia type    2. Bradycardia    3. Duodenal adenocarcinoma    4. Essential hypertension    5. History of Whipple procedure    6. Moderate malnutrition        Plan:     Problem List Items Addressed This Visit       Essential hypertension> she did not take her medication today- "forgot" will take her meds when she gets home and will come back later this week to have it retaken    Hyperlipidemia - Primary   on lipitor     Relevant Orders    Lipid Panel    Moderate malnutrition  History of Whipple procedure   Duodenal adenocarcinoma   Cont f/u heme onc   Called pharmacy and her marinol rx from heme onc  is there they will order and have ready tomorrow for 0$ cost to patient     Bradycardia  HR 66             Assessment & Plan    Reviewed recent ER visit for dizziness; labs, chest XR, and EKG showed no new findings  Noted completion of 11th treatment with Dr. Prince; awaiting repeat scan to assess for residual disease  Assessed recent mammogram and colonoscopy results; colonoscopy to be repeated in 1 year  Considered DEXA scan results from '23 showing hip weakness  Evaluated current blood pressure, noting elevation likely due to missed morning dose    HYPERTENSION:  - Explained importance of consistent blood pressure medication adherence to prevent complications like stroke.  - Nan to take blood pressure medication as prescribed every morning.  - Nan to monitor blood pressure at home, aiming for less than 140/90.  - Continued current blood pressure medication.  - Follow up in 1 week for blood pressure check.  - Contact the office to report blood pressure readings or with any concerns.    APPETITE STIMULATION:  - Continued Marinol to stimulate appetite.  Rx will be " ready tomorrow     BONE HEALTH:  - Continued calcium and vitamin D supplements for bone health.  She admittedly forgets to take it     LABS:  - Discussed fasting requirements for upcoming lab work, including allowance for black coffee and water.  - Nan to fast before upcoming lab work on the 29th, allowing only black coffee and water.  - Fasting lipid panel ordered for scheduled lab work on the 29th.  - Follow up on the 29th as scheduled for fasting labs.          This note was generated with the assistance of ambient listening technology. Verbal consent was obtained by the patient and accompanying visitor(s) for the recording of patient appointment to facilitate this note. I attest to having reviewed and edited the generated note for accuracy, though some syntax or spelling errors may persist. Please contact the author of this note for any clarification.         10/21/24 DR VILLALOBOS ONCOLOGIST>>recurrent MSI-H metastatic duodenal adenocarcinoma who presents for follow-up prior to cycle 11 of pembrolizumab.  She is feeling well.  Has some nighttime discomfort in her lower legs/ankles/feet bilaterally which self resolves.  She is eating well. No diarrhea or dyspnea.  Doing well. Continues to tolerate treatment well. Recc to continue  Labs reviewed and adequate for treatment.  No toxicities from IO.  Will proceed with cycle 11 of pembrolizumab today.     [facility] Tissue NGS: MSI-H, TMB 76.3 m/MB  Repeat imaging with PET prior to cycle 12 of pembrolizumab.

## 2024-11-06 ENCOUNTER — TELEPHONE (OUTPATIENT)
Dept: INTERNAL MEDICINE | Facility: CLINIC | Age: 69
End: 2024-11-06
Payer: MEDICARE

## 2024-11-06 NOTE — TELEPHONE ENCOUNTER
Pt states that her medication that helps her eat is on backorder. Informed pt that she can contact her prescribing doctor of that medication to see if she can get it at a different pharmacy. Verbalized understanding.

## 2024-11-06 NOTE — TELEPHONE ENCOUNTER
----- Message from Danita sent at 2024  1:37 PM CST -----  Contact: Pt  Nan Simms  MRN: [id number]  : 1955  PCP: Payal Moreland  Home Phone      [phone]  Work Phone      Not on file.  Mobile          [phone]      MESSAGE:     Pt states one of the medications she was prescribed was on back order and one of the medications isn't covered by insurance. Pt does not know the name of the medications.         Nan   [phone]   Statement Selected

## 2024-11-08 ENCOUNTER — TELEPHONE (OUTPATIENT)
Dept: INTERNAL MEDICINE | Facility: CLINIC | Age: 69
End: 2024-11-08

## 2024-11-08 ENCOUNTER — CLINICAL SUPPORT (OUTPATIENT)
Dept: INTERNAL MEDICINE | Facility: CLINIC | Age: 69
End: 2024-11-08
Payer: MEDICARE

## 2024-11-08 VITALS
OXYGEN SATURATION: 99 % | HEART RATE: 51 BPM | DIASTOLIC BLOOD PRESSURE: 62 MMHG | RESPIRATION RATE: 18 BRPM | SYSTOLIC BLOOD PRESSURE: 130 MMHG

## 2024-11-08 DIAGNOSIS — I10 ESSENTIAL HYPERTENSION: Primary | ICD-10-CM

## 2024-11-08 PROCEDURE — 99999 PR PBB SHADOW E&M-EST. PATIENT-LVL I: CPT | Mod: PBBFAC,,,

## 2024-11-08 NOTE — PROGRESS NOTES
Patient here for BP check. Patient states she took her BP meds at least 2 hours ago.    BP: 130/62  P: 51

## 2024-11-08 NOTE — TELEPHONE ENCOUNTER
Per Payal Moreland NP:  Perfect, please update her bp in chart no changes to meds needed. Thank you

## 2024-11-13 NOTE — PLAN OF CARE
Lai AVILA  Discharge Reassessment    Primary Care Provider: Payal Moreland NP    Expected Discharge Date: 3/23/2023    Reassessment (most recent)       Discharge Reassessment - 03/22/23 1104          Discharge Reassessment    Assessment Type Discharge Planning Reassessment     Discharge Plan A Home Health     Discharge Plan B Home     DME Needed Upon Discharge  walker, rolling     Discharge Barriers Identified None     Why the patient remains in the hospital Requires continued medical care                   Aura Gallagher RN, CM   Ext: [phone]       Will you send in a new RX please?

## 2024-11-15 ENCOUNTER — TELEPHONE (OUTPATIENT)
Dept: INTERNAL MEDICINE | Facility: CLINIC | Age: 69
End: 2024-11-15
Payer: MEDICARE

## 2024-11-15 NOTE — TELEPHONE ENCOUNTER
----- Message from Elis sent at 11/15/2024  2:24 PM CST -----  Contact: pharmacy  Nan Simms  MRN: [id number]  : 1955  PCP: Payal Moreland  Home Phone      [phone]  Work Phone      Not on file.  Mobile          [phone]      MESSAGE:     Pt need refill on atorvastatin (LIPITOR) 40 MG tablet  and omeprazole (PRILOSEC) 40 MG capsule                [facility] - [address] [facility] [address]  [address] [facility] [address] [place] [address]  Phone: [phone] Fax: [phone]  Hours: Not open 24 hours

## 2024-11-18 DIAGNOSIS — K29.70 GASTRITIS, PRESENCE OF BLEEDING UNSPECIFIED, UNSPECIFIED CHRONICITY, UNSPECIFIED GASTRITIS TYPE: ICD-10-CM

## 2024-11-18 DIAGNOSIS — Z90.49 HISTORY OF WHIPPLE PROCEDURE: ICD-10-CM

## 2024-11-18 DIAGNOSIS — Z90.410 HISTORY OF WHIPPLE PROCEDURE: ICD-10-CM

## 2024-11-18 DIAGNOSIS — E78.5 HYPERLIPIDEMIA, UNSPECIFIED HYPERLIPIDEMIA TYPE: ICD-10-CM

## 2024-11-18 RX ORDER — OMEPRAZOLE 40 MG/1
CAPSULE, DELAYED RELEASE ORAL
Qty: 30 CAPSULE | Refills: 11 | Status: SHIPPED | OUTPATIENT
Start: 2024-11-18

## 2024-11-18 RX ORDER — ATORVASTATIN CALCIUM 40 MG/1
TABLET, FILM COATED ORAL
Qty: 90 TABLET | Refills: 11 | Status: SHIPPED | OUTPATIENT
Start: 2024-11-18

## 2024-11-27 ENCOUNTER — PATIENT OUTREACH (OUTPATIENT)
Dept: EMERGENCY MEDICINE | Facility: HOSPITAL | Age: 69
End: 2024-11-27
Payer: MEDICARE

## 2024-11-27 NOTE — PROGRESS NOTES
Pt is unreachable for 2nd F/U. ED navigator will follow-up with patient on/around 12/16/2024 for attempt at 3rd.    Kari Dahl  ED Navigator  [phone]

## 2024-11-27 NOTE — PROGRESS NOTES
Pt was e-mailed the following after she was unable to reach via phone call:    Hi, Ms. [place],     This is Kari, with [facility]. We had spoken a few times before. I was trying to follow-up with you today, as well as remind you of your appointments, which are for Friday, but was unsuccessful. I will leave the appointment details below, but would appreciate it if you can return my call. Thanks in advance!        Date/time: 11/29/2024 @ 8:20 a.m.  Type: Non-fasting lab  Dept: [facility], Lab  Dept Address: [address] [place] [address]. [place] [address]  Dept Phone Number: [phone]     Date/time: 11/29/2024 @ 9:30 a.m.  Type: PET CT scan  Dept: [facility], Radiology  Dept Address: [address] [place] [address]. [place] [address]  Dept Phone Number: [phone]    Kari Dahl  ED Navigator  [phone]

## 2024-11-29 ENCOUNTER — HOSPITAL ENCOUNTER (OUTPATIENT)
Dept: RADIOLOGY | Facility: HOSPITAL | Age: 69
Discharge: HOME | End: 2024-11-29
Attending: INTERNAL MEDICINE
Payer: MEDICARE

## 2024-11-29 DIAGNOSIS — C17.0 DUODENAL ADENOCARCINOMA: ICD-10-CM

## 2024-11-29 LAB — POCT GLUCOSE: 89 MG/DL (ref 70–110)

## 2024-11-29 PROCEDURE — A9552 F18 FDG: HCPCS | Performed by: INTERNAL MEDICINE

## 2024-11-29 PROCEDURE — 78815 PET IMAGE W/CT SKULL-THIGH: CPT | Mod: TC

## 2024-11-29 PROCEDURE — 78815 PET IMAGE W/CT SKULL-THIGH: CPT | Mod: 26,PS,, | Performed by: STUDENT IN AN ORGANIZED HEALTH CARE EDUCATION/TRAINING PROGRAM

## 2024-11-29 RX ORDER — FLUDEOXYGLUCOSE F18 500 MCI/ML
12 INJECTION INTRAVENOUS
Status: COMPLETED | OUTPATIENT
Start: 2024-11-29 | End: 2024-11-29

## 2024-11-29 RX ADMIN — FLUDEOXYGLUCOSE F-18 10.6 MILLICURIE: 500 INJECTION INTRAVENOUS at 10:11

## 2024-12-02 ENCOUNTER — OFFICE VISIT (OUTPATIENT)
Dept: HEMATOLOGY/ONCOLOGY | Facility: CLINIC | Age: 69
End: 2024-12-02
Payer: MEDICARE

## 2024-12-02 ENCOUNTER — INFUSION (OUTPATIENT)
Dept: INFUSION THERAPY | Facility: HOSPITAL | Age: 69
End: 2024-12-02
Payer: MEDICARE

## 2024-12-02 VITALS
DIASTOLIC BLOOD PRESSURE: 66 MMHG | TEMPERATURE: 98 F | WEIGHT: 129.88 LBS | BODY MASS INDEX: 20.87 KG/M2 | HEART RATE: 69 BPM | HEIGHT: 66 IN | SYSTOLIC BLOOD PRESSURE: 151 MMHG | OXYGEN SATURATION: 99 % | RESPIRATION RATE: 18 BRPM

## 2024-12-02 VITALS
DIASTOLIC BLOOD PRESSURE: 50 MMHG | BODY MASS INDEX: 20.87 KG/M2 | HEIGHT: 66 IN | TEMPERATURE: 98 F | HEART RATE: 41 BPM | SYSTOLIC BLOOD PRESSURE: 117 MMHG | WEIGHT: 129.88 LBS

## 2024-12-02 DIAGNOSIS — I10 ESSENTIAL HYPERTENSION: ICD-10-CM

## 2024-12-02 DIAGNOSIS — C78.7 METASTASIS TO LIVER: ICD-10-CM

## 2024-12-02 DIAGNOSIS — I70.0 AORTIC ATHEROSCLEROSIS: ICD-10-CM

## 2024-12-02 DIAGNOSIS — R63.0 DECREASED APPETITE: ICD-10-CM

## 2024-12-02 DIAGNOSIS — C17.0 DUODENAL ADENOCARCINOMA: Primary | ICD-10-CM

## 2024-12-02 DIAGNOSIS — E78.5 HYPERLIPIDEMIA, UNSPECIFIED HYPERLIPIDEMIA TYPE: ICD-10-CM

## 2024-12-02 DIAGNOSIS — E44.0 MODERATE MALNUTRITION: ICD-10-CM

## 2024-12-02 DIAGNOSIS — R53.0 NEOPLASTIC (MALIGNANT) RELATED FATIGUE: ICD-10-CM

## 2024-12-02 DIAGNOSIS — E11.9 DIABETES MELLITUS TYPE 2, DIET-CONTROLLED: ICD-10-CM

## 2024-12-02 DIAGNOSIS — D64.9 ANEMIA, UNSPECIFIED TYPE: ICD-10-CM

## 2024-12-02 PROCEDURE — 25000003 PHARM REV CODE 250: Performed by: INTERNAL MEDICINE

## 2024-12-02 PROCEDURE — 1101F PT FALLS ASSESS-DOCD LE1/YR: CPT | Mod: CPTII,S$GLB,, | Performed by: INTERNAL MEDICINE

## 2024-12-02 PROCEDURE — 3078F DIAST BP <80 MM HG: CPT | Mod: CPTII,S$GLB,, | Performed by: INTERNAL MEDICINE

## 2024-12-02 PROCEDURE — 1159F MED LIST DOCD IN RCRD: CPT | Mod: CPTII,S$GLB,, | Performed by: INTERNAL MEDICINE

## 2024-12-02 PROCEDURE — 99999 PR PBB SHADOW E&M-EST. PATIENT-LVL III: CPT | Mod: PBBFAC,,, | Performed by: INTERNAL MEDICINE

## 2024-12-02 PROCEDURE — 4010F ACE/ARB THERAPY RXD/TAKEN: CPT | Mod: CPTII,S$GLB,, | Performed by: INTERNAL MEDICINE

## 2024-12-02 PROCEDURE — 3288F FALL RISK ASSESSMENT DOCD: CPT | Mod: CPTII,S$GLB,, | Performed by: INTERNAL MEDICINE

## 2024-12-02 PROCEDURE — 1126F AMNT PAIN NOTED NONE PRSNT: CPT | Mod: CPTII,S$GLB,, | Performed by: INTERNAL MEDICINE

## 2024-12-02 PROCEDURE — 99215 OFFICE O/P EST HI 40 MIN: CPT | Mod: S$GLB,,, | Performed by: INTERNAL MEDICINE

## 2024-12-02 PROCEDURE — 3074F SYST BP LT 130 MM HG: CPT | Mod: CPTII,S$GLB,, | Performed by: INTERNAL MEDICINE

## 2024-12-02 PROCEDURE — 3008F BODY MASS INDEX DOCD: CPT | Mod: CPTII,S$GLB,, | Performed by: INTERNAL MEDICINE

## 2024-12-02 PROCEDURE — 96413 CHEMO IV INFUSION 1 HR: CPT

## 2024-12-02 PROCEDURE — 63600175 PHARM REV CODE 636 W HCPCS: Mod: JZ,JG | Performed by: INTERNAL MEDICINE

## 2024-12-02 PROCEDURE — 3044F HG A1C LEVEL LT 7.0%: CPT | Mod: CPTII,S$GLB,, | Performed by: INTERNAL MEDICINE

## 2024-12-02 PROCEDURE — G2211 COMPLEX E/M VISIT ADD ON: HCPCS | Mod: S$GLB,,, | Performed by: INTERNAL MEDICINE

## 2024-12-02 RX ORDER — DIPHENHYDRAMINE HYDROCHLORIDE 50 MG/ML
50 INJECTION INTRAMUSCULAR; INTRAVENOUS ONCE AS NEEDED
Status: CANCELLED | OUTPATIENT
Start: 2024-12-02

## 2024-12-02 RX ORDER — EPINEPHRINE 0.3 MG/.3ML
0.3 INJECTION SUBCUTANEOUS ONCE AS NEEDED
Status: CANCELLED | OUTPATIENT
Start: 2024-12-02

## 2024-12-02 RX ORDER — DIPHENHYDRAMINE HYDROCHLORIDE 50 MG/ML
50 INJECTION INTRAMUSCULAR; INTRAVENOUS ONCE AS NEEDED
Status: DISCONTINUED | OUTPATIENT
Start: 2024-12-02 | End: 2024-12-02 | Stop reason: HOSPADM

## 2024-12-02 RX ORDER — HEPARIN 100 UNIT/ML
500 SYRINGE INTRAVENOUS
Status: CANCELLED | OUTPATIENT
Start: 2024-12-02

## 2024-12-02 RX ORDER — EPINEPHRINE 0.3 MG/.3ML
0.3 INJECTION SUBCUTANEOUS ONCE AS NEEDED
Status: DISCONTINUED | OUTPATIENT
Start: 2024-12-02 | End: 2024-12-02 | Stop reason: HOSPADM

## 2024-12-02 RX ORDER — SODIUM CHLORIDE 0.9 % (FLUSH) 0.9 %
10 SYRINGE (ML) INJECTION
Status: CANCELLED | OUTPATIENT
Start: 2024-12-02

## 2024-12-02 RX ADMIN — SODIUM CHLORIDE 400 MG: 9 INJECTION, SOLUTION INTRAVENOUS at 02:12

## 2024-12-02 NOTE — PLAN OF CARE
"BP (!) 117/50 (Patient Position: Sitting)   Pulse (!) 41   Temp 97.9 °F (36.6 °C)   Resp 18   Ht 5' 6" (1.676 m)   Wt 58.9 kg (129 lb 13.6 oz)   SpO2 99%   BMI 20.96 kg/m² Pleasant, alert and oriented patient to Chemo Infusion per self with  for C12 Keytruda - VSS and PIV started x1 attempt with immediate flashback, flushed with NS, site secured and patient tolerated procedure well - patient tolerated treatment with no AVE's, PIV discontinued, pressure dressing applied, and patient discharged to home with no concerns - RTC on 1/13/25       "

## 2024-12-02 NOTE — PROGRESS NOTES
MEDICAL ONCOLOGY - ESTABLISHED PATIENT VISIT    Reason for visit: duodenal adenocarcinoma     Best Contact Phone Number(s): [phone] (home)      Cancer/Stage/TNM:    Cancer Staging   Duodenal adenocarcinoma  Staging form: Small Intestine - Adenocarcinoma, AJCC 8th Edition  - Pathologic stage from 3/15/2023: Stage IV (pT3, pN0, cM1) - Signed by Eber Roberts MD on 7/29/2024       Oncology History   Duodenal adenocarcinoma   3/10/2023 Initial Diagnosis    Duodenal adenocarcinoma     3/15/2023 Surgery    Whipple  dMMR with loss of MLH1/PMS2; MLH1 promoter hypermethylation present suggesting a sporadic tumor.      3/15/2023 Cancer Staged    Staging form: Small Intestine - Adenocarcinoma, AJCC 8th Edition  - Pathologic stage from 3/15/2023: Stage IV (pT3, pN0, cM1)     8/24/2023 -  Chemotherapy    Treatment Summary   Plan Name: OP PEMBROLIZUMAB 400MG Q6W  Treatment Goal: Palliative  Status: Active  Start Date: 8/24/2023  End Date: 6/23/2025 (Planned)  Provider: Eber Roberts MD  Chemotherapy: [No matching medication found in this treatment plan]          Interval History: 69 y.o. female with recurrent MSI-H metastatic duodenal adenocarcinoma who presents for follow-up prior to cycle 12 of pembrolizumab.  She feels well.  She just returned from a cruise.  She has lost another pound but she feels like she is eating well. No diarrhea or dyspnea.    Presents to clinic with her . ECOG 0.     ROS:   Review of Systems   Constitutional:  Negative for chills, fever and malaise/fatigue.   HENT:  Negative for nosebleeds and sore throat.    Respiratory:  Negative for cough and shortness of breath.    Cardiovascular:  Negative for chest pain, palpitations and leg swelling.   Gastrointestinal:  Negative for blood in stool, constipation, diarrhea, heartburn, nausea and vomiting.   Genitourinary:  Negative for dysuria, frequency, hematuria and urgency.   Musculoskeletal:  Negative for falls and myalgias.    Skin:  Negative for itching and rash.   Neurological:  Negative for dizziness, tingling, weakness and headaches.   Psychiatric/Behavioral:  The patient is not nervous/anxious and does not have insomnia.        Past Medical History:   Past Medical History:   Diagnosis Date    Abnormal Pap smear of cervix     Arthritis     Duodenal adenocarcinoma     Hypertension     Hyperthyroidism         Past Surgical History:   Past Surgical History:   Procedure Laterality Date    CATARACT EXTRACTION W/  INTRAOCULAR LENS IMPLANT Right 8/8/2019    Procedure: EXTRACTION, CATARACT, WITH IOL INSERTION;  Surgeon: Spenser Lee MD;  Location[facility];  Service: Ophthalmology;  Laterality: Right;    COLONOSCOPY N/A 7/11/2024    Procedure: COLONOSCOPY;  Surgeon: Luis Hayes MD;  Location: [facility];  Service: Endoscopy;  Laterality: N/A;    ESOPHAGOGASTRODUODENOSCOPY N/A 3/10/2023    Procedure: EGD (ESOPHAGOGASTRODUODENOSCOPY);  Surgeon: Shashi Tapia MD;  Location: [facility] ([address] [place] [address]);  Service: Endoscopy;  Laterality: N/A;    EYE SURGERY      HYSTERECTOMY      OOPHORECTOMY      PHACOEMULSIFICATION OF CATARACT Right 8/8/2019    Procedure: PHACOEMULSIFICATION, CATARACT;  Surgeon: Spenser Lee MD;  Location: [facility];  Service: Ophthalmology;  Laterality: Right;    WHIPPLE PROCEDURE N/A 3/15/2023    Procedure: WHIPPLE PROCEDURE;  Surgeon: Nick Paez MD;  Location: [address];  Service: General;  Laterality: N/A;        Family History:   Family History   Problem Relation Name Age of Onset    Cancer Mother      Breast cancer Neg Hx      Colon cancer Neg Hx      Ovarian cancer Neg Hx          Social History:   Social History     Tobacco Use    Smoking status: Every Day     Current packs/day: 0.25     Average packs/day: 0.3 packs/day for 30.0 years (7.5 ttl pk-yrs)     Types: Cigarettes    Smokeless tobacco: Never    Tobacco comments:     About 7-8 cigs a day   Substance Use Topics    Alcohol use: Yes  "    Alcohol/week: 1.0 standard drink of alcohol     Types: 1 Cans of beer per week     Comment: social        I have reviewed and updated the patient's past medical, surgical, family and social histories.    Allergies:   Review of patient's allergies indicates:   Allergen Reactions    Aspirin Other (See Comments)     Pt states it is "hard on her stomach" She can tolerate a low dose aspirin    Pcn [penicillins]      Childhood - Tolerated ceftriaxone and cefazolin with no documented issues in the past         Medications:   Current Outpatient Medications   Medication Sig Dispense Refill    acetaminophen (TYLENOL) 650 MG TbSR Take 650 mg by mouth as needed.      atorvastatin (LIPITOR) 40 MG tablet TAKE ONE TABLET BY MOUTH DAILY AT 5PM 90 tablet 11    famotidine (PEPCID) 20 MG tablet Take 1 tablet (20 mg total) by mouth 2 (two) times daily. 60 tablet 11    lisinopriL-hydrochlorothiazide (PRINZIDE,ZESTORETIC) 20-12.5 mg per tablet TAKE ONE TABLET BY MOUTH DAILY AT 9AM 90 tablet 11    omeprazole (PRILOSEC) 40 MG capsule TAKE ONE CAPSULE (40 MG TOTAL) BY MOUTH DAILY AT 9AM 30 capsule 11    droNABinol (MARINOL) 5 MG capsule Take 1 capsule (5 mg total) by mouth 2 (two) times daily before meals. (Patient not taking: Reported on 12/2/2024) 60 capsule 2    gabapentin (NEURONTIN) 300 MG capsule Take 1 capsule (300 mg total) by mouth 3 (three) times daily. 90 capsule 5     No current facility-administered medications for this visit.      Physical Exam:   BP (!) 117/50 (BP Location: Left arm, Patient Position: Sitting)   Pulse (!) 41   Temp 97.9 °F (36.6 °C) (Temporal)   Ht 5' 6" (1.676 m)   Wt 58.9 kg (129 lb 13.6 oz)   SpO2 (P) 99%   BMI 20.96 kg/m²      Physical Exam  Vitals reviewed.   Constitutional:       General: She is not in acute distress.     Appearance: Normal appearance. She is normal weight. She is not ill-appearing, toxic-appearing or diaphoretic.   HENT:      Head: Normocephalic and atraumatic.      Right " Ear: External ear normal.      Left Ear: External ear normal.      Nose: Nose normal.      Mouth/Throat:      Pharynx: Oropharynx is clear.   Eyes:      General: No scleral icterus.     Conjunctiva/sclera: Conjunctivae normal.      Pupils: Pupils are equal, round, and reactive to light.   Cardiovascular:      Rate and Rhythm: Normal rate.   Pulmonary:      Effort: Pulmonary effort is normal. No respiratory distress.      Breath sounds: No wheezing.   Abdominal:      General: There is no distension.      Tenderness: There is no abdominal tenderness.   Musculoskeletal:      Cervical back: Normal range of motion.      Right lower leg: No edema.      Left lower leg: No edema.   Skin:     General: Skin is warm and dry.      Coloration: Skin is not jaundiced or pale.      Findings: No bruising, erythema or rash.   Neurological:      General: No focal deficit present.      Mental Status: She is alert and oriented to person, place, and time. Mental status is at baseline.      Cranial Nerves: No cranial nerve deficit.      Sensory: No sensory deficit.      Motor: No weakness.      Gait: Gait normal.   Psychiatric:         Mood and Affect: Mood normal.         Behavior: Behavior normal.         Thought Content: Thought content normal.         Judgment: Judgment normal.         Labs:   No results found for this or any previous visit (from the past 48 hours).     Lab work reviewed.  Mild anemia.    Imaging:    PET/CT - 11/29/24:    Impression:     Patient with duodenal cancer status post Whipple.  No focal abnormal uptake to suggest recurrent disease or metastasis.     Stable pulmonary micro nodules in the left lung, which are below size threshold for tracer uptake detection by PET-CT.  Pulmonary nodules in the right lung have resolved.    Path:   3/15/23 - Whipple  Final Pathologic Diagnosis      Abnormal   1. Lymph node, hepatic cautery, resection:   - One lymph node negative for metastatic carcinoma (0/1).   - See synoptic  report.   2. Gallbladder, cholecystectomy:   - Benign gallbladder with no significant histologic abnormality.   3. Pancreas, duodenum, common bile duct, and partial gastrectomy,   pancreaticoduodenectomy (Whipple specimen):   - Poorly differentiated carcinoma with medullary features.   - See synoptic report.   - See comment.   Synoptic Report   Procedure: Pancreaticoduodenectomy with partial gastrectomy (Whipple   specimen)   Tumor site:  Pylorus and proximal duodenum   Histologic type: Poorly differentiated carcinoma with medullary features   Histologic grade: G3, poorly differentiated   Tumor size:  6.5 cm in greatest dimension grossly   Tumor extent:   Invades through muscularis propria into subserosa, or extends   into nonperitonealized perimuscular tissue (mesentery or retroperitoneum)   without serosal penetration   Macroscopic tumor perforation:  Not identified   Lymphovascular inv asion:  Not identified, see comment   Margin status for invasive carcinoma:  All margins negative for invasive   carcinoma   Margin status for dysplasia:  All margins negative for carcinoma in Situ   (high-grade dysplasia)/adenoma   Regional lymph node status: Regional lymph nodes present        All Regional lymph nodes negative for tumor        Number of lymph nodes examined:  15 (including specimens 1 and 3)   PATHOLOGIC STAGE CLASSIFICATION (pTNM, AJCC 8th Edition):  p T3 N0   Additional findings:  Stomach with intestinal metaplasia (immunostain for   H.pylori negative), 2 lymph nodes with fibrosis and calcifications (GMS stain   for fungal organisms and AFB stain negative)   Ancillary studies:  Immunohistochemistry (IHC) Testing for Mismatch Repair   (MMR) Proteins   MLH1- Loss of nuclear expression   PMS2 - Loss of nuclear expression   MSH2 - Intact nuclear expression   MSH6 - Intact nuclear expression   Background nonneoplastic tissue/internal control with intact nuclear   expression   IHC Interpret ation:  Loss of nuclear  expression of MLH1 and PMS2: testing for   methylation of the MLH1 promoter and/or mutation of BRAF is indicated (the   presence of a BRAF V600E mutation and/or MLH1 methylation suggests that the   tumor is sporadic and germline evaluation is probably not indicated; absence   of both MLH1 methylation and of BRAF V600E mutation suggests the possibility   of Seaman syndrome, and sequencing and/or large deletion/duplication testing   of germline MLH1 may be indicated)   Testing for methylation of the MLH1 promoter and mutation of BRAF is in   process and results will be supplemented.   There are exceptions to the above IHC interpretations. These results should   not be considered in isolation, and clinical correlation with genetic   counseling is recommended to assess the need for germline testing.   COMMENT:  The stomach and duodenum (specimen 3) shows a 6.5 cm mass involving   the pylorus with the majority of the tumor appearing to be in the duodenum.   The tumor shows poorly d ifferentiated sheets of blue cells with necrosis.   There is a significant amount of lymphocytic inflammatory infiltrate around   the edges of the tumor.  Immunostains show the tumor cells to be positive for   CK7 with patchy weak CDX2 and negative for synaptophysin and chromogranin.   There is also loss of MLH1 and PMS2 on MMR stains.  These features are   suggestive of medullary carcinoma.  Select slides reviewed by Dr. ITZEL Sánchez   who agrees with the diagnosis of poorly differentiated carcinoma with   medullary features.  Immunostains for CD 34 performed on blocks 3E and 3H   show no definitive lymphovascular invasion even though some areas suspicious   cells on routine H&E sections.  Appropriate positive controls   VC      Comment: Interp By Meredith Pappas MD, Signed on 04/12/2023 at 16:03   Supplemental Diagnosis      Abnormal   MLH1 Hypermethylation/BRAF Mutation   Result Summary   MLH1 HYPERMETHYLATION PRESENT/NO BRAF MUTATION  "IDENTIFIED   Result   Provided diagnosis: pylorus and proximal duodenum poorly differentiated   carcinoma with medullary features   Methylation status: Positive for MLH1 promoter hypermethylation   BRAF status: Wild-type (SEE INTERPRETATION)   Alexis Sotelo M.D.   Report attached   Performing location:   [place] [address] [place] [address]   "Disclaimer:  This case diagnosis was rendered completely by the outside   consultation pathologist and the case is electronically signed by an [facility]   pathologist listed below solely to release the report into the medical   record."          Assessment:       1. Duodenal adenocarcinoma    2. Metastasis to liver    3. Moderate malnutrition    4. Decreased appetite    5. Neoplastic (malignant) related fatigue    6. Anemia, unspecified type    7. Essential hypertension    8. Hyperlipidemia, unspecified hyperlipidemia type    9. Diabetes mellitus type 2, diet-controlled    10. Aortic atherosclerosis             Plan:     # Duodenal adenocarcinoma   Mrs. Simms is a pleasant 69 y.o. 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 Pathology did reveal some high grade features, including poorly differentiated carcinoma, 6.5 cm size.  We discussed at her initial visit he limited role of adjuvant chemotherapy in the stage II setting for small bowel adenocarcinoma.      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     CT CAP on 7/26/23 demonstrated concern for recurrence in liver.  PET/CT on 8/4/23 showed hypermetabolic inferior R hepatic lobe lesion consistent with metastatic recurrence.  CEA has risen as well.    Given her dMMR/MSI-H status, we recommended to start on single agent pembrolizumab 400 mg q6 weeks.  Patient was consented for immunotherapy.   She began pembrolizumab 8/24/23.    She was also " enrolled in our protocol: [id number], Disparities in Results of Immune Checkpoint Inhibitor Treatment (DIRECT): A Prospective Cohort Study of Cancer Survivors Treated with anti-PD-L1 Immunotherapy in a Community Oncology Setting.  CRC is Isabela Church.    No toxicities noted from pembrolizumab cycle 1.  PET/CT after cycle 2 shows resolution of liver metastasis.  Question of grade 1 pneumonitis with GGO on that scan but this has since resolved on CT chest done 12/15 which makes pneumonitis less likely diagnosis.  No other IO toxicities.  PET/CT after cycle 4 shows resolution of hypermetabolic nodules and ground-glass in right middle lobe of lung. No evidence of other hypermetabolic areas.   PET/CT after cycle 6 shows no evidence of disease.  PET/CT after cycle 8 shows shows BIA.  PET/CT after cycle 11 shows BIA.    Doing well. Continues to tolerate treatment well. Recommend to continue  Labs reviewed and adequate for treatment.  No toxicities from IO.  Will proceed with cycle 12 of pembrolizumab today.    [facility] Tissue NGS: MSI-H, TMB 76.3 m/MB  Repeat imaging with PET prior to cycle 12 of pembrolizumab.    # Decreased appetite, malnutrition  -Appetite ok with Marinol. Taking at night. Drug on backorder right now and she's doing ok not taking it every night.  -weight stable. Nutrition following.    # Anemia, neoplasm related fatigue  -Microcytic anemia stable. Suspect thalassemia given longstanding nature.  -No signs of bleeding, platelets normal.   -Asymptomatic. Monitor.   -continue to monitor TSH    # HTN, HLD, DM, aortic atherosclerosis   -BP normal today in clinic today.    -Following with PCP.   -Continue medical management.     Patient is in agreement with the proposed treatment plan. All questions were answered to the patient's satisfaction. Pt knows to call clinic if anything is needed before the next clinic visit.    Eber Roberts MD  Hematology/Oncology  [facility]      Route Chart  for Scheduling    Med Onc Chart Routing      Follow up with physician 3 months. for Keytruda   Follow up with YANDEL 6 weeks. for Keytruda   Infusion scheduling note    Injection scheduling note    Labs CBC, CMP, CEA, TSH and free T4   Scheduling:  Preferred lab:  Lab interval: every 6 weeks     Imaging    Pharmacy appointment    Other referrals              Treatment Plan Information   OP PEMBROLIZUMAB 400MG Q6W Eber Roberts MD   Associated diagnosis: Duodenal adenocarcinoma Stage IV pT3, pN0, cM1 noted on 4/18/2023   Line of treatment: First Line  Treatment Goal: Palliative     Upcoming Treatment Dates - OP PEMBROLIZUMAB 400MG Q6W    11/25/2024       Chemotherapy       pembrolizumab (KEYTRUDA) 400 mg in 0.9% NaCl SolP 116 mL infusion  1/6/2025       Chemotherapy       pembrolizumab (KEYTRUDA) 400 mg in 0.9% NaCl SolP 116 mL infusion  2/17/2025       Chemotherapy       pembrolizumab (KEYTRUDA) 400 mg in 0.9% NaCl SolP 116 mL infusion  3/31/2025       Chemotherapy       pembrolizumab (KEYTRUDA) 400 mg in 0.9% NaCl SolP 116 mL infusion

## 2024-12-04 ENCOUNTER — DOCUMENTATION ONLY (OUTPATIENT)
Dept: RESEARCH | Facility: HOSPITAL | Age: 69
End: 2024-12-04
Payer: MEDICARE

## 2024-12-04 NOTE — PROGRESS NOTES
December 4, 2024    Protocol: [id number], Disparities in Results of Immune Checkpoint Inhibitor Treatment (DIRECT): A Prospective Cohort Study of Cancer Survivors Treated with anti-PD-L1 Immunotherapy in a Community Oncology Setting  IRB# 2022.126  Version Date: 4/2/2024  Study ID# [id number]    Cancer Treatment & Toxicity Assessments (3M window until the participant completes or discontinues ICI treatment)    C11 10/21/2024  C12 12/2/2024  C13 1/13/2025 (upcoming)    Portal data updated.

## 2024-12-27 DIAGNOSIS — E11.9 TYPE 2 DIABETES MELLITUS WITHOUT COMPLICATION: ICD-10-CM

## 2025-01-13 ENCOUNTER — INFUSION (OUTPATIENT)
Dept: INFUSION THERAPY | Facility: HOSPITAL | Age: 70
End: 2025-01-13
Payer: MEDICARE

## 2025-01-13 ENCOUNTER — OFFICE VISIT (OUTPATIENT)
Dept: HEMATOLOGY/ONCOLOGY | Facility: CLINIC | Age: 70
End: 2025-01-13
Payer: MEDICARE

## 2025-01-13 VITALS
DIASTOLIC BLOOD PRESSURE: 59 MMHG | TEMPERATURE: 98 F | SYSTOLIC BLOOD PRESSURE: 137 MMHG | HEART RATE: 47 BPM | RESPIRATION RATE: 17 BRPM | OXYGEN SATURATION: 100 % | WEIGHT: 130.19 LBS | BODY MASS INDEX: 20.92 KG/M2 | HEIGHT: 66 IN

## 2025-01-13 VITALS
DIASTOLIC BLOOD PRESSURE: 59 MMHG | SYSTOLIC BLOOD PRESSURE: 137 MMHG | WEIGHT: 130.19 LBS | TEMPERATURE: 98 F | BODY MASS INDEX: 20.92 KG/M2 | HEIGHT: 66 IN | RESPIRATION RATE: 18 BRPM | OXYGEN SATURATION: 100 % | HEART RATE: 47 BPM

## 2025-01-13 DIAGNOSIS — E44.0 MODERATE MALNUTRITION: ICD-10-CM

## 2025-01-13 DIAGNOSIS — I70.0 AORTIC ATHEROSCLEROSIS: ICD-10-CM

## 2025-01-13 DIAGNOSIS — C17.0 DUODENAL ADENOCARCINOMA: Primary | ICD-10-CM

## 2025-01-13 DIAGNOSIS — K29.70 GASTRITIS, PRESENCE OF BLEEDING UNSPECIFIED, UNSPECIFIED CHRONICITY, UNSPECIFIED GASTRITIS TYPE: ICD-10-CM

## 2025-01-13 DIAGNOSIS — D64.9 ANEMIA, UNSPECIFIED TYPE: ICD-10-CM

## 2025-01-13 DIAGNOSIS — E78.5 HYPERLIPIDEMIA, UNSPECIFIED HYPERLIPIDEMIA TYPE: ICD-10-CM

## 2025-01-13 DIAGNOSIS — R63.0 DECREASED APPETITE: ICD-10-CM

## 2025-01-13 DIAGNOSIS — E11.9 DIABETES MELLITUS TYPE 2, DIET-CONTROLLED: ICD-10-CM

## 2025-01-13 DIAGNOSIS — Z90.49 HISTORY OF WHIPPLE PROCEDURE: ICD-10-CM

## 2025-01-13 DIAGNOSIS — I10 ESSENTIAL HYPERTENSION: ICD-10-CM

## 2025-01-13 DIAGNOSIS — Z90.410 HISTORY OF WHIPPLE PROCEDURE: ICD-10-CM

## 2025-01-13 DIAGNOSIS — R53.0 NEOPLASTIC (MALIGNANT) RELATED FATIGUE: ICD-10-CM

## 2025-01-13 DIAGNOSIS — C78.7 METASTASIS TO LIVER: ICD-10-CM

## 2025-01-13 PROCEDURE — 1101F PT FALLS ASSESS-DOCD LE1/YR: CPT | Mod: CPTII,S$GLB,, | Performed by: PHYSICIAN ASSISTANT

## 2025-01-13 PROCEDURE — 3288F FALL RISK ASSESSMENT DOCD: CPT | Mod: CPTII,S$GLB,, | Performed by: PHYSICIAN ASSISTANT

## 2025-01-13 PROCEDURE — 1160F RVW MEDS BY RX/DR IN RCRD: CPT | Mod: CPTII,S$GLB,, | Performed by: PHYSICIAN ASSISTANT

## 2025-01-13 PROCEDURE — 25000003 PHARM REV CODE 250: Performed by: PHYSICIAN ASSISTANT

## 2025-01-13 PROCEDURE — 1126F AMNT PAIN NOTED NONE PRSNT: CPT | Mod: CPTII,S$GLB,, | Performed by: PHYSICIAN ASSISTANT

## 2025-01-13 PROCEDURE — 3008F BODY MASS INDEX DOCD: CPT | Mod: CPTII,S$GLB,, | Performed by: PHYSICIAN ASSISTANT

## 2025-01-13 PROCEDURE — 1159F MED LIST DOCD IN RCRD: CPT | Mod: CPTII,S$GLB,, | Performed by: PHYSICIAN ASSISTANT

## 2025-01-13 PROCEDURE — G2211 COMPLEX E/M VISIT ADD ON: HCPCS | Mod: S$GLB,,, | Performed by: PHYSICIAN ASSISTANT

## 2025-01-13 PROCEDURE — 99999 PR PBB SHADOW E&M-EST. PATIENT-LVL III: CPT | Mod: PBBFAC,,, | Performed by: PHYSICIAN ASSISTANT

## 2025-01-13 PROCEDURE — 99214 OFFICE O/P EST MOD 30 MIN: CPT | Mod: S$GLB,,, | Performed by: PHYSICIAN ASSISTANT

## 2025-01-13 PROCEDURE — 3075F SYST BP GE 130 - 139MM HG: CPT | Mod: CPTII,S$GLB,, | Performed by: PHYSICIAN ASSISTANT

## 2025-01-13 PROCEDURE — 96413 CHEMO IV INFUSION 1 HR: CPT

## 2025-01-13 PROCEDURE — 3078F DIAST BP <80 MM HG: CPT | Mod: CPTII,S$GLB,, | Performed by: PHYSICIAN ASSISTANT

## 2025-01-13 PROCEDURE — 63600175 PHARM REV CODE 636 W HCPCS: Mod: JZ,TB | Performed by: PHYSICIAN ASSISTANT

## 2025-01-13 RX ORDER — DIPHENHYDRAMINE HYDROCHLORIDE 50 MG/ML
50 INJECTION INTRAMUSCULAR; INTRAVENOUS ONCE AS NEEDED
Status: DISCONTINUED | OUTPATIENT
Start: 2025-01-13 | End: 2025-01-13 | Stop reason: HOSPADM

## 2025-01-13 RX ORDER — SODIUM CHLORIDE 0.9 % (FLUSH) 0.9 %
10 SYRINGE (ML) INJECTION
Status: CANCELLED | OUTPATIENT
Start: 2025-01-13

## 2025-01-13 RX ORDER — HEPARIN 100 UNIT/ML
500 SYRINGE INTRAVENOUS
Status: DISCONTINUED | OUTPATIENT
Start: 2025-01-13 | End: 2025-01-13 | Stop reason: HOSPADM

## 2025-01-13 RX ORDER — HEPARIN 100 UNIT/ML
500 SYRINGE INTRAVENOUS
Status: CANCELLED | OUTPATIENT
Start: 2025-01-13

## 2025-01-13 RX ORDER — SODIUM CHLORIDE 0.9 % (FLUSH) 0.9 %
10 SYRINGE (ML) INJECTION
Status: DISCONTINUED | OUTPATIENT
Start: 2025-01-13 | End: 2025-01-13 | Stop reason: HOSPADM

## 2025-01-13 RX ORDER — EPINEPHRINE 0.3 MG/.3ML
0.3 INJECTION SUBCUTANEOUS ONCE AS NEEDED
Status: CANCELLED | OUTPATIENT
Start: 2025-01-13

## 2025-01-13 RX ORDER — DIPHENHYDRAMINE HYDROCHLORIDE 50 MG/ML
50 INJECTION INTRAMUSCULAR; INTRAVENOUS ONCE AS NEEDED
Status: CANCELLED | OUTPATIENT
Start: 2025-01-13

## 2025-01-13 RX ORDER — EPINEPHRINE 0.3 MG/.3ML
0.3 INJECTION SUBCUTANEOUS ONCE AS NEEDED
Status: DISCONTINUED | OUTPATIENT
Start: 2025-01-13 | End: 2025-01-13 | Stop reason: HOSPADM

## 2025-01-13 RX ADMIN — SODIUM CHLORIDE 400 MG: 9 INJECTION, SOLUTION INTRAVENOUS at 12:01

## 2025-01-13 RX ADMIN — SODIUM CHLORIDE: 9 INJECTION, SOLUTION INTRAVENOUS at 11:01

## 2025-01-13 NOTE — PROGRESS NOTES
MEDICAL ONCOLOGY - ESTABLISHED PATIENT VISIT    Reason for visit: duodenal adenocarcinoma     Best Contact Phone Number(s): [phone] (home)      Cancer/Stage/TNM:    Cancer Staging   Duodenal adenocarcinoma  Staging form: Small Intestine - Adenocarcinoma, AJCC 8th Edition  - Pathologic stage from 3/15/2023: Stage IV (pT3, pN0, cM1) - Signed by Eber Roberts MD on 7/29/2024       Oncology History   Duodenal adenocarcinoma   3/10/2023 Initial Diagnosis    Duodenal adenocarcinoma     3/15/2023 Surgery    Whipple  dMMR with loss of MLH1/PMS2; MLH1 promoter hypermethylation present suggesting a sporadic tumor.      3/15/2023 Cancer Staged    Staging form: Small Intestine - Adenocarcinoma, AJCC 8th Edition  - Pathologic stage from 3/15/2023: Stage IV (pT3, pN0, cM1)     8/24/2023 -  Chemotherapy    Treatment Summary   Plan Name: OP PEMBROLIZUMAB 400MG Q6W  Treatment Goal: Palliative  Status: Active  Start Date: 8/24/2023  End Date: 6/23/2025 (Planned)  Provider: Eber Roberts MD  Chemotherapy: [No matching medication found in this treatment plan]          Interval History: 69 y.o. female with recurrent MSI-H metastatic duodenal adenocarcinoma who presents for follow-up prior to cycle 13 of pembrolizumab.  She feels well but reports intermittent burning to the feet, specifically at night. She just returned from a cruise. She is eating well and has a good appetite. Continues to tolerate treatment well.  No diarrhea or dyspnea. No other concerns or complaints today.     Presents to clinic with her . ECOG 0.     ROS:   Review of Systems   Constitutional:  Negative for chills, fever and malaise/fatigue.   HENT:  Negative for nosebleeds and sore throat.    Respiratory:  Negative for cough and shortness of breath.    Cardiovascular:  Negative for chest pain, palpitations and leg swelling.   Gastrointestinal:  Negative for blood in stool, constipation, diarrhea, heartburn, nausea and vomiting.    Genitourinary:  Negative for dysuria, frequency, hematuria and urgency.   Musculoskeletal:  Negative for falls and myalgias.   Skin:  Negative for itching and rash.   Neurological:  Negative for dizziness, tingling, weakness and headaches.   Psychiatric/Behavioral:  The patient is not nervous/anxious and does not have insomnia.        Past Medical History:   Past Medical History:   Diagnosis Date    Abnormal Pap smear of cervix     Arthritis     Duodenal adenocarcinoma     Hypertension     Hyperthyroidism         Past Surgical History:   Past Surgical History:   Procedure Laterality Date    CATARACT EXTRACTION W/  INTRAOCULAR LENS IMPLANT Right 8/8/2019    Procedure: EXTRACTION, CATARACT, WITH IOL INSERTION;  Surgeon: Spenser Lee MD;  Location: [facility];  Service: Ophthalmology;  Laterality: Right;    COLONOSCOPY N/A 7/11/2024    Procedure: COLONOSCOPY;  Surgeon: Luis Hayes MD;  Location: [facility];  Service: Endoscopy;  Laterality: N/A;    ESOPHAGOGASTRODUODENOSCOPY N/A 3/10/2023    Procedure: EGD (ESOPHAGOGASTRODUODENOSCOPY);  Surgeon: Shashi Tapia MD;  Location: [facility] ([address] [place] [address]);  Service: Endoscopy;  Laterality: N/A;    EYE SURGERY      HYSTERECTOMY      OOPHORECTOMY      PHACOEMULSIFICATION OF CATARACT Right 8/8/2019    Procedure: PHACOEMULSIFICATION, CATARACT;  Surgeon: Spenser Lee MD;  Location: [facility];  Service: Ophthalmology;  Laterality: Right;    WHIPPLE PROCEDURE N/A 3/15/2023    Procedure: WHIPPLE PROCEDURE;  Surgeon: Nick Paez MD;  Location: [address];  Service: General;  Laterality: N/A;        Family History:   Family History   Problem Relation Name Age of Onset    Cancer Mother      Breast cancer Neg Hx      Colon cancer Neg Hx      Ovarian cancer Neg Hx          Social History:   Social History     Tobacco Use    Smoking status: Every Day     Current packs/day: 0.25     Average packs/day: 0.3 packs/day for 30.0 years (7.5 ttl pk-yrs)     Types:  "Cigarettes    Smokeless tobacco: Never    Tobacco comments:     About 7-8 cigs a day   Substance Use Topics    Alcohol use: Yes     Alcohol/week: 1.0 standard drink of alcohol     Types: 1 Cans of beer per week     Comment: social        I have reviewed and updated the patient's past medical, surgical, family and social histories.    Allergies:   Review of patient's allergies indicates:   Allergen Reactions    Aspirin Other (See Comments)     Pt states it is "hard on her stomach" She can tolerate a low dose aspirin    Pcn [penicillins]      Childhood - Tolerated ceftriaxone and cefazolin with no documented issues in the past         Medications:   Current Outpatient Medications   Medication Sig Dispense Refill    acetaminophen (TYLENOL) 650 MG TbSR Take 650 mg by mouth as needed.      atorvastatin (LIPITOR) 40 MG tablet TAKE ONE TABLET BY MOUTH DAILY AT 5PM 90 tablet 11    droNABinol (MARINOL) 5 MG capsule Take 1 capsule (5 mg total) by mouth 2 (two) times daily before meals. 60 capsule 2    famotidine (PEPCID) 20 MG tablet Take 1 tablet (20 mg total) by mouth 2 (two) times daily. 60 tablet 11    lisinopriL-hydrochlorothiazide (PRINZIDE,ZESTORETIC) 20-12.5 mg per tablet TAKE ONE TABLET BY MOUTH DAILY AT 9AM 90 tablet 11    omeprazole (PRILOSEC) 40 MG capsule TAKE ONE CAPSULE (40 MG TOTAL) BY MOUTH DAILY AT 9AM 30 capsule 11    gabapentin (NEURONTIN) 300 MG capsule Take 1 capsule (300 mg total) by mouth 3 (three) times daily. 90 capsule 5     No current facility-administered medications for this visit.      Physical Exam:   BP (!) 137/59 (BP Location: Left arm, Patient Position: Sitting)   Pulse (!) 47   Temp 98.1 °F (36.7 °C) (Oral)   Resp 17   Ht 5' 6" (1.676 m)   Wt 59.1 kg (130 lb 2.9 oz)   SpO2 100%   BMI 21.01 kg/m²      Physical Exam  Vitals reviewed.   Constitutional:       General: She is not in acute distress.     Appearance: Normal appearance. She is normal weight. She is not ill-appearing, " toxic-appearing or diaphoretic.   HENT:      Head: Normocephalic and atraumatic.      Right Ear: External ear normal.      Left Ear: External ear normal.      Nose: Nose normal.      Mouth/Throat:      Pharynx: Oropharynx is clear.   Eyes:      General: No scleral icterus.     Conjunctiva/sclera: Conjunctivae normal.      Pupils: Pupils are equal, round, and reactive to light.   Cardiovascular:      Rate and Rhythm: Normal rate.   Pulmonary:      Effort: Pulmonary effort is normal. No respiratory distress.      Breath sounds: No wheezing.   Abdominal:      General: There is no distension.      Tenderness: There is no abdominal tenderness.   Musculoskeletal:      Cervical back: Normal range of motion.      Right lower leg: No edema.      Left lower leg: No edema.   Skin:     General: Skin is warm and dry.      Coloration: Skin is not jaundiced or pale.      Findings: No bruising, erythema or rash.   Neurological:      General: No focal deficit present.      Mental Status: She is alert and oriented to person, place, and time. Mental status is at baseline.      Cranial Nerves: No cranial nerve deficit.      Sensory: No sensory deficit.      Motor: No weakness.      Gait: Gait normal.   Psychiatric:         Mood and Affect: Mood normal.         Behavior: Behavior normal.         Thought Content: Thought content normal.         Judgment: Judgment normal.         Labs:   Recent Results (from the past 48 hours)   CBC Oncology    Collection Time: 01/13/25  8:49 AM   Result Value Ref Range    WBC 4.81 3.90 - 12.70 K/uL    RBC 4.97 4.00 - 5.40 M/uL    Hemoglobin 11.3 (L) 12.0 - 16.0 g/dL    Hematocrit 36.4 (L) 37.0 - 48.5 %    MCV 73 (L) 82 - 98 fL    MCH 22.7 (L) 27.0 - 31.0 pg    MCHC 31.0 (L) 32.0 - 36.0 g/dL    RDW 18.5 (H) 11.5 - 14.5 %    Platelets 162 150 - 450 K/uL    MPV SEE COMMENT 9.2 - 12.9 fL    Gran # (ANC) 2.4 1.8 - 7.7 K/uL    Immature Grans (Abs) 0.01 0.00 - 0.04 K/uL   Comprehensive Metabolic Panel     Collection Time: 01/13/25  8:49 AM   Result Value Ref Range    Sodium 141 136 - 145 mmol/L    Potassium 3.4 (L) 3.5 - 5.1 mmol/L    Chloride 104 95 - 110 mmol/L    CO2 29 23 - 29 mmol/L    Glucose 83 70 - 110 mg/dL    BUN 15 8 - 23 mg/dL    Creatinine 0.6 0.5 - 1.4 mg/dL    Calcium 9.3 8.7 - 10.5 mg/dL    Total Protein 7.1 6.0 - 8.4 g/dL    Albumin 3.5 3.5 - 5.2 g/dL    Total Bilirubin 0.4 0.1 - 1.0 mg/dL    Alkaline Phosphatase 89 40 - 150 U/L    AST 22 10 - 40 U/L    ALT 20 10 - 44 U/L    eGFR >60.0 >60 mL/min/1.73 m^2    Anion Gap 8 8 - 16 mmol/L   CEA    Collection Time: 01/13/25  8:49 AM   Result Value Ref Range    CEA 5.5 (H) 0.0 - 5.0 ng/mL   TSH    Collection Time: 01/13/25  8:49 AM   Result Value Ref Range    TSH 0.965 0.400 - 4.000 uIU/mL        Lab work reviewed.  Mild anemia.    Imaging:    PET/CT - 11/29/24:    Impression:     Patient with duodenal cancer status post Whipple.  No focal abnormal uptake to suggest recurrent disease or metastasis.     Stable pulmonary micro nodules in the left lung, which are below size threshold for tracer uptake detection by PET-CT.  Pulmonary nodules in the right lung have resolved.    Path:   3/15/23 - Whipple  Final Pathologic Diagnosis      Abnormal   1. Lymph node, hepatic cautery, resection:   - One lymph node negative for metastatic carcinoma (0/1).   - See synoptic report.   2. Gallbladder, cholecystectomy:   - Benign gallbladder with no significant histologic abnormality.   3. Pancreas, duodenum, common bile duct, and partial gastrectomy,   pancreaticoduodenectomy (Whipple specimen):   - Poorly differentiated carcinoma with medullary features.   - See synoptic report.   - See comment.   Synoptic Report   Procedure: Pancreaticoduodenectomy with partial gastrectomy (Whipple   specimen)   Tumor site:  Pylorus and proximal duodenum   Histologic type: Poorly differentiated carcinoma with medullary features   Histologic grade: G3, poorly differentiated   Tumor size:   6.5 cm in greatest dimension grossly   Tumor extent:   Invades through muscularis propria into subserosa, or extends   into nonperitonealized perimuscular tissue (mesentery or retroperitoneum)   without serosal penetration   Macroscopic tumor perforation:  Not identified   Lymphovascular inv asion:  Not identified, see comment   Margin status for invasive carcinoma:  All margins negative for invasive   carcinoma   Margin status for dysplasia:  All margins negative for carcinoma in Situ   (high-grade dysplasia)/adenoma   Regional lymph node status: Regional lymph nodes present        All Regional lymph nodes negative for tumor        Number of lymph nodes examined:  15 (including specimens 1 and 3)   PATHOLOGIC STAGE CLASSIFICATION (pTNM, AJCC 8th Edition):  p T3 N0   Additional findings:  Stomach with intestinal metaplasia (immunostain for   H.pylori negative), 2 lymph nodes with fibrosis and calcifications (GMS stain   for fungal organisms and AFB stain negative)   Ancillary studies:  Immunohistochemistry (IHC) Testing for Mismatch Repair   (MMR) Proteins   MLH1- Loss of nuclear expression   PMS2 - Loss of nuclear expression   MSH2 - Intact nuclear expression   MSH6 - Intact nuclear expression   Background nonneoplastic tissue/internal control with intact nuclear   expression   IHC Interpret ation:  Loss of nuclear expression of MLH1 and PMS2: testing for   methylation of the MLH1 promoter and/or mutation of BRAF is indicated (the   presence of a BRAF V600E mutation and/or MLH1 methylation suggests that the   tumor is sporadic and germline evaluation is probably not indicated; absence   of both MLH1 methylation and of BRAF V600E mutation suggests the possibility   of Seaman syndrome, and sequencing and/or large deletion/duplication testing   of germline MLH1 may be indicated)   Testing for methylation of the MLH1 promoter and mutation of BRAF is in   process and results will be supplemented.   There are exceptions  "to the above IHC interpretations. These results should   not be considered in isolation, and clinical correlation with genetic   counseling is recommended to assess the need for germline testing.   COMMENT:  The stomach and duodenum (specimen 3) shows a 6.5 cm mass involving   the pylorus with the majority of the tumor appearing to be in the duodenum.   The tumor shows poorly d ifferentiated sheets of blue cells with necrosis.   There is a significant amount of lymphocytic inflammatory infiltrate around   the edges of the tumor.  Immunostains show the tumor cells to be positive for   CK7 with patchy weak CDX2 and negative for synaptophysin and chromogranin.   There is also loss of MLH1 and PMS2 on MMR stains.  These features are   suggestive of medullary carcinoma.  Select slides reviewed by Dr. ITZEL Sánchez   who agrees with the diagnosis of poorly differentiated carcinoma with   medullary features.  Immunostains for CD 34 performed on blocks 3E and 3H   show no definitive lymphovascular invasion even though some areas suspicious   cells on routine H&E sections.  Appropriate positive controls   VC      Comment: Interp By Meredith Pappas MD, Signed on 04/12/2023 at 16:03   Supplemental Diagnosis      Abnormal   MLH1 Hypermethylation/BRAF Mutation   Result Summary   MLH1 HYPERMETHYLATION PRESENT/NO BRAF MUTATION IDENTIFIED   Result   Provided diagnosis: pylorus and proximal duodenum poorly differentiated   carcinoma with medullary features   Methylation status: Positive for MLH1 promoter hypermethylation   BRAF status: Wild-type (SEE INTERPRETATION)   Alexis Sotelo M.D.   Report attached   Performing location:   [place] [address]   "Disclaimer:  This case diagnosis was rendered completely by the outside   consultation pathologist and the case is electronically signed by an [facility]   pathologist listed below solely to release the report " "into the medical   record."          Assessment:       1. Duodenal adenocarcinoma    2. Metastasis to liver    3. Moderate malnutrition    4. Decreased appetite    5. Neoplastic (malignant) related fatigue    6. Anemia, unspecified type    7. Essential hypertension    8. Hyperlipidemia, unspecified hyperlipidemia type    9. Diabetes mellitus type 2, diet-controlled    10. Aortic atherosclerosis             Plan:     # Duodenal adenocarcinoma   Mrs. Simms is a pleasant 69 y.o. 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 Pathology did reveal some high grade features, including poorly differentiated carcinoma, 6.5 cm size.  We discussed at her initial visit he limited role of adjuvant chemotherapy in the stage II setting for small bowel adenocarcinoma.      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     CT CAP on 7/26/23 demonstrated concern for recurrence in liver.  PET/CT on 8/4/23 showed hypermetabolic inferior R hepatic lobe lesion consistent with metastatic recurrence.  CEA has risen as well.    Given her dMMR/MSI-H status, we recommended to start on single agent pembrolizumab 400 mg q6 weeks.  Patient was consented for immunotherapy.   She began pembrolizumab 8/24/23.    She was also enrolled in our protocol: [id number], Disparities in Results of Immune Checkpoint Inhibitor Treatment (DIRECT): A Prospective Cohort Study of Cancer Survivors Treated with anti-PD-L1 Immunotherapy in a Community Oncology Setting.  CRC is Isabela Lynnette.    No toxicities noted from pembrolizumab cycle 1.  PET/CT after cycle 2 shows resolution of liver metastasis.  Question of grade 1 pneumonitis with GGO on that scan but this has since resolved on CT chest done 12/15 which makes pneumonitis less likely diagnosis.  No other IO toxicities.  PET/CT after cycle 4 shows resolution of hypermetabolic nodules and " ground-glass in right middle lobe of lung. No evidence of other hypermetabolic areas.   PET/CT after cycle 6 shows no evidence of disease.  PET/CT after cycle 8 shows shows BIA.  PET/CT after cycle 11 shows BIA.    Presents for follow-up prior to cycle 13 of pembrolizumab.  She feels very well overall but reports intermittent burning to the feet, specifically at night. She just returned from a cruise. She is eating well and has a good appetite. Continues to tolerate treatment well.  No diarrhea or dyspnea. No other concerns or complaints today. Recc to proceed today  Labs reviewed and adequate for treatment.  No toxicities from IO.  Will proceed with cycle 13 of pembrolizumab today.    [facility] Tissue NGS: MSI-H, TMB 76.3 m/MB  Repeat imaging with PET prior to cycle 12 of pembrolizumab.    # Decreased appetite, malnutrition  -Appetite ok with Marinol. Taking at night. Drug on backorder right now and she's doing ok not taking it every night.  -weight stable. Nutrition following.    # Anemia, neoplasm related fatigue, restless leg syndrome  -Microcytic anemia stable. Suspect thalassemia given longstanding nature.  -No signs of bleeding, platelets normal.   -Asymptomatic. Monitor.   -continue to monitor TSH    -Advised her to resume her iron supplements    # HTN, HLD, DM, aortic atherosclerosis   -BP normal today in clinic today.    -Following with PCP.   -Continue medical management.     Pte and family members displayed understanding of the above encounter and treatment plan. All thoughtful questions were answered to their satisfaction. Pte was advised to notify the care team or proceed to the ER if signs and symptoms worsen.     30 minutes were spent today on this encounter including face to face time with the patient, data gathering/interpretation and documentation. Greater than 50% of this time involved counseling or coordination of care. I have provided the patient with an opportunity to ask questions and have all  questions answered to patient's satisfaction.     Visit today included increased complexity associated with the care of the episodic problem chemotherapy  addressed and managing the longitudinal care of the patient due to the serious and/or complex managed problem(s) GI malignancies/cancer      ABHISHEK Sanchez, PA-C [facility]  Dept of Hematology/Oncology  YANNA to GI Oncology team         Route Chart for Scheduling    Med Onc Chart Routing      Follow up with physician 6 weeks. 6 and 12 weeks, Pembro   Follow up with YANDEL . 18 weeks and 24 weeks, Pembro   Infusion scheduling note    Injection scheduling note    Labs CBC, CMP and CEA   Scheduling:  Preferred lab:  Lab interval: every 6 weeks  TSH   Imaging    Pharmacy appointment    Other referrals              Treatment Plan Information   OP PEMBROLIZUMAB 400MG Q6W Eber Roberts MD   Associated diagnosis: Duodenal adenocarcinoma Stage IV pT3, pN0, cM1 noted on 4/18/2023   Line of treatment: First Line  Treatment Goal: Palliative     Upcoming Treatment Dates - OP PEMBROLIZUMAB 400MG Q6W    1/6/2025       Chemotherapy       pembrolizumab (KEYTRUDA) 400 mg in 0.9% NaCl SolP 116 mL infusion  2/17/2025       Chemotherapy       pembrolizumab (KEYTRUDA) 400 mg in 0.9% NaCl SolP 116 mL infusion  3/31/2025       Chemotherapy       pembrolizumab (KEYTRUDA) 400 mg in 0.9% NaCl SolP 116 mL infusion  5/12/2025       Chemotherapy       pembrolizumab (KEYTRUDA) 400 mg in 0.9% NaCl SolP 116 mL infusion

## 2025-01-13 NOTE — PLAN OF CARE
Pt sitting in chair, VSS, assessment complete. PIV inserted flushed with blood return infusing NS @ 25 cc/hr while waiting for Keytruda.WCTM for safety.

## 2025-01-16 DIAGNOSIS — M79.10 MYALGIA: ICD-10-CM

## 2025-01-16 DIAGNOSIS — Z90.410 HISTORY OF WHIPPLE PROCEDURE: ICD-10-CM

## 2025-01-16 DIAGNOSIS — Z90.49 HISTORY OF WHIPPLE PROCEDURE: ICD-10-CM

## 2025-01-16 DIAGNOSIS — E44.0 MODERATE MALNUTRITION: ICD-10-CM

## 2025-01-16 DIAGNOSIS — E78.5 HYPERLIPIDEMIA, UNSPECIFIED HYPERLIPIDEMIA TYPE: ICD-10-CM

## 2025-01-16 DIAGNOSIS — K29.70 GASTRITIS, PRESENCE OF BLEEDING UNSPECIFIED, UNSPECIFIED CHRONICITY, UNSPECIFIED GASTRITIS TYPE: ICD-10-CM

## 2025-01-16 DIAGNOSIS — I10 HYPERTENSION, UNSPECIFIED TYPE: ICD-10-CM

## 2025-01-16 RX ORDER — LISINOPRIL AND HYDROCHLOROTHIAZIDE 12.5; 2 MG/1; MG/1
1 TABLET ORAL DAILY
Qty: 90 TABLET | Refills: 11 | Status: SHIPPED | OUTPATIENT
Start: 2025-01-16

## 2025-01-16 RX ORDER — GABAPENTIN 300 MG/1
300 CAPSULE ORAL 3 TIMES DAILY
Qty: 90 CAPSULE | Refills: 5 | Status: SHIPPED | OUTPATIENT
Start: 2025-01-16 | End: 2025-07-15

## 2025-01-16 RX ORDER — DRONABINOL 5 MG/1
5 CAPSULE ORAL
Qty: 60 CAPSULE | Refills: 2 | OUTPATIENT
Start: 2025-01-16

## 2025-01-16 RX ORDER — ATORVASTATIN CALCIUM 40 MG/1
40 TABLET, FILM COATED ORAL DAILY
Qty: 90 TABLET | Refills: 11 | Status: SHIPPED | OUTPATIENT
Start: 2025-01-16

## 2025-01-16 RX ORDER — FAMOTIDINE 20 MG/1
20 TABLET, FILM COATED ORAL 2 TIMES DAILY
Qty: 60 TABLET | Refills: 11 | Status: SHIPPED | OUTPATIENT
Start: 2025-01-16 | End: 2026-01-16

## 2025-01-16 RX ORDER — OMEPRAZOLE 40 MG/1
40 CAPSULE, DELAYED RELEASE ORAL EVERY MORNING
Qty: 30 CAPSULE | Refills: 11 | Status: SHIPPED | OUTPATIENT
Start: 2025-01-16

## 2025-01-16 NOTE — TELEPHONE ENCOUNTER
Pt is requesting medications be refilled. The only one that it doesn't look like you have prescribed is Dronabinol. Please advise on this medication, but all medications are pended.     LOV 11/4/2024    Requested Prescriptions     Pending Prescriptions Disp Refills    atorvastatin (LIPITOR) 40 MG tablet 90 tablet 11    droNABinol (MARINOL) 5 MG capsule 60 capsule 2     Sig: Take 1 capsule (5 mg total) by mouth 2 (two) times daily before meals.    famotidine (PEPCID) 20 MG tablet 60 tablet 11     Sig: Take 1 tablet (20 mg total) by mouth 2 (two) times daily.    gabapentin (NEURONTIN) 300 MG capsule 90 capsule 5     Sig: Take 1 capsule (300 mg total) by mouth 3 (three) times daily.    lisinopriL-hydrochlorothiazide (PRINZIDE,ZESTORETIC) 20-12.5 mg per tablet 90 tablet 11    omeprazole (PRILOSEC) 40 MG capsule 30 capsule 11

## 2025-01-16 NOTE — TELEPHONE ENCOUNTER
----- Message from Danita sent at 2025  2:00 PM CST -----  Contact: Pt  Nan Simms  MRN: [id number]  : 1955  PCP: Payal Moreland  Home Phone      [phone]  Work Phone      Not on file.  Mobile          [phone]      MESSAGE:     Pt needs a refill of atorvastatin (LIPITOR) 40 MG tablet, droNABinol (MARINOL) 5 MG capsule, famotidine (PEPCID) 20 MG tablet, gabapentin (NEURONTIN) 300 MG capsule, lisinopriL-hydrochlorothiazide (PRINZIDE,ZESTORETIC) 20-12.5 mg per tablet, and omeprazole (PRILOSEC) 40 MG capsule sent to [facility].       Nan  [phone]

## 2025-01-17 NOTE — TELEPHONE ENCOUNTER
Pt notified of medications being sent and the one needing to come from her other doctor. Verbalized understanding.

## 2025-01-23 ENCOUNTER — PATIENT MESSAGE (OUTPATIENT)
Dept: ADMINISTRATIVE | Facility: HOSPITAL | Age: 70
End: 2025-01-23
Payer: MEDICARE

## 2025-01-27 RX ORDER — DRONABINOL 5 MG/1
5 CAPSULE ORAL
Qty: 60 CAPSULE | Refills: 2 | Status: SHIPPED | OUTPATIENT
Start: 2025-01-27

## 2025-01-27 RX ORDER — FAMOTIDINE 20 MG/1
20 TABLET, FILM COATED ORAL 2 TIMES DAILY
Qty: 60 TABLET | Refills: 11 | Status: SHIPPED | OUTPATIENT
Start: 2025-01-27 | End: 2026-01-27

## 2025-01-27 RX ORDER — OMEPRAZOLE 40 MG/1
40 CAPSULE, DELAYED RELEASE ORAL EVERY MORNING
Qty: 30 CAPSULE | Refills: 11 | Status: SHIPPED | OUTPATIENT
Start: 2025-01-27

## 2025-01-27 RX ORDER — ATORVASTATIN CALCIUM 40 MG/1
40 TABLET, FILM COATED ORAL DAILY
Qty: 90 TABLET | Refills: 11 | Status: SHIPPED | OUTPATIENT
Start: 2025-01-27

## 2025-02-03 DIAGNOSIS — R53.0 NEOPLASTIC (MALIGNANT) RELATED FATIGUE: ICD-10-CM

## 2025-02-03 DIAGNOSIS — C17.0 DUODENAL ADENOCARCINOMA: Primary | ICD-10-CM

## 2025-02-24 ENCOUNTER — OFFICE VISIT (OUTPATIENT)
Dept: HEMATOLOGY/ONCOLOGY | Facility: CLINIC | Age: 70
End: 2025-02-24
Payer: MEDICARE

## 2025-02-24 ENCOUNTER — DOCUMENTATION ONLY (OUTPATIENT)
Dept: RESEARCH | Facility: HOSPITAL | Age: 70
End: 2025-02-24
Payer: MEDICARE

## 2025-02-24 ENCOUNTER — INFUSION (OUTPATIENT)
Dept: INFUSION THERAPY | Facility: HOSPITAL | Age: 70
End: 2025-02-24
Payer: MEDICARE

## 2025-02-24 ENCOUNTER — LAB VISIT (OUTPATIENT)
Dept: LAB | Facility: HOSPITAL | Age: 70
End: 2025-02-24
Payer: MEDICARE

## 2025-02-24 VITALS
SYSTOLIC BLOOD PRESSURE: 155 MMHG | DIASTOLIC BLOOD PRESSURE: 57 MMHG | OXYGEN SATURATION: 93 % | BODY MASS INDEX: 21.17 KG/M2 | WEIGHT: 131.75 LBS | HEART RATE: 41 BPM | HEIGHT: 66 IN

## 2025-02-24 VITALS
OXYGEN SATURATION: 93 % | TEMPERATURE: 98 F | RESPIRATION RATE: 18 BRPM | HEART RATE: 50 BPM | BODY MASS INDEX: 20.83 KG/M2 | SYSTOLIC BLOOD PRESSURE: 142 MMHG | HEIGHT: 66 IN | DIASTOLIC BLOOD PRESSURE: 66 MMHG | WEIGHT: 129.63 LBS

## 2025-02-24 DIAGNOSIS — Z90.410 HISTORY OF WHIPPLE PROCEDURE: ICD-10-CM

## 2025-02-24 DIAGNOSIS — R53.0 NEOPLASTIC (MALIGNANT) RELATED FATIGUE: ICD-10-CM

## 2025-02-24 DIAGNOSIS — C17.0 DUODENAL ADENOCARCINOMA: Primary | ICD-10-CM

## 2025-02-24 DIAGNOSIS — Z90.49 HISTORY OF WHIPPLE PROCEDURE: ICD-10-CM

## 2025-02-24 DIAGNOSIS — E78.5 HYPERLIPIDEMIA, UNSPECIFIED HYPERLIPIDEMIA TYPE: ICD-10-CM

## 2025-02-24 DIAGNOSIS — I10 ESSENTIAL HYPERTENSION: ICD-10-CM

## 2025-02-24 DIAGNOSIS — E44.0 MODERATE MALNUTRITION: ICD-10-CM

## 2025-02-24 DIAGNOSIS — E11.9 DIABETES MELLITUS TYPE 2, DIET-CONTROLLED: ICD-10-CM

## 2025-02-24 DIAGNOSIS — D64.9 ANEMIA, UNSPECIFIED TYPE: ICD-10-CM

## 2025-02-24 DIAGNOSIS — C78.7 METASTASIS TO LIVER: ICD-10-CM

## 2025-02-24 DIAGNOSIS — I70.0 AORTIC ATHEROSCLEROSIS: ICD-10-CM

## 2025-02-24 DIAGNOSIS — C17.0 DUODENAL ADENOCARCINOMA: ICD-10-CM

## 2025-02-24 DIAGNOSIS — R63.0 DECREASED APPETITE: ICD-10-CM

## 2025-02-24 LAB
ALBUMIN SERPL BCP-MCNC: 3.4 G/DL (ref 3.5–5.2)
ALP SERPL-CCNC: 88 U/L (ref 40–150)
ALT SERPL W/O P-5'-P-CCNC: 11 U/L (ref 10–44)
ANION GAP SERPL CALC-SCNC: 10 MMOL/L (ref 8–16)
AST SERPL-CCNC: 24 U/L (ref 10–40)
BASOPHILS # BLD AUTO: 0.02 K/UL (ref 0–0.2)
BASOPHILS NFR BLD: 0.4 % (ref 0–1.9)
BILIRUB SERPL-MCNC: 0.5 MG/DL (ref 0.1–1)
BUN SERPL-MCNC: 13 MG/DL (ref 8–23)
CALCIUM SERPL-MCNC: 9.6 MG/DL (ref 8.7–10.5)
CEA SERPL-MCNC: 6.1 NG/ML (ref 0–5)
CHLORIDE SERPL-SCNC: 101 MMOL/L (ref 95–110)
CO2 SERPL-SCNC: 29 MMOL/L (ref 23–29)
CREAT SERPL-MCNC: 0.6 MG/DL (ref 0.5–1.4)
DIFFERENTIAL METHOD BLD: ABNORMAL
EOSINOPHIL # BLD AUTO: 0.2 K/UL (ref 0–0.5)
EOSINOPHIL NFR BLD: 3.6 % (ref 0–8)
ERYTHROCYTE [DISTWIDTH] IN BLOOD BY AUTOMATED COUNT: 18.1 % (ref 11.5–14.5)
EST. GFR  (NO RACE VARIABLE): >60 ML/MIN/1.73 M^2
GLUCOSE SERPL-MCNC: 84 MG/DL (ref 70–110)
HCT VFR BLD AUTO: 37.8 % (ref 37–48.5)
HGB BLD-MCNC: 11.6 G/DL (ref 12–16)
IMM GRANULOCYTES # BLD AUTO: 0.01 K/UL (ref 0–0.04)
IMM GRANULOCYTES NFR BLD AUTO: 0.2 % (ref 0–0.5)
LYMPHOCYTES # BLD AUTO: 1.8 K/UL (ref 1–4.8)
LYMPHOCYTES NFR BLD: 37.5 % (ref 18–48)
MCH RBC QN AUTO: 22.2 PG (ref 27–31)
MCHC RBC AUTO-ENTMCNC: 30.7 G/DL (ref 32–36)
MCV RBC AUTO: 72 FL (ref 82–98)
MONOCYTES # BLD AUTO: 0.7 K/UL (ref 0.3–1)
MONOCYTES NFR BLD: 13.9 % (ref 4–15)
NEUTROPHILS # BLD AUTO: 2.1 K/UL (ref 1.8–7.7)
NEUTROPHILS NFR BLD: 44.4 % (ref 38–73)
NRBC BLD-RTO: 0 /100 WBC
PLATELET # BLD AUTO: 206 K/UL (ref 150–450)
PMV BLD AUTO: ABNORMAL FL (ref 9.2–12.9)
POTASSIUM SERPL-SCNC: 4.1 MMOL/L (ref 3.5–5.1)
PROT SERPL-MCNC: 7.3 G/DL (ref 6–8.4)
RBC # BLD AUTO: 5.22 M/UL (ref 4–5.4)
SODIUM SERPL-SCNC: 140 MMOL/L (ref 136–145)
TSH SERPL DL<=0.005 MIU/L-ACNC: 0.45 UIU/ML (ref 0.4–4)
WBC # BLD AUTO: 4.69 K/UL (ref 3.9–12.7)

## 2025-02-24 PROCEDURE — 96413 CHEMO IV INFUSION 1 HR: CPT

## 2025-02-24 PROCEDURE — 25000003 PHARM REV CODE 250: Performed by: REGISTERED NURSE

## 2025-02-24 PROCEDURE — 3077F SYST BP >= 140 MM HG: CPT | Mod: CPTII,S$GLB,, | Performed by: REGISTERED NURSE

## 2025-02-24 PROCEDURE — G2211 COMPLEX E/M VISIT ADD ON: HCPCS | Mod: S$GLB,,, | Performed by: REGISTERED NURSE

## 2025-02-24 PROCEDURE — 1160F RVW MEDS BY RX/DR IN RCRD: CPT | Mod: CPTII,S$GLB,, | Performed by: REGISTERED NURSE

## 2025-02-24 PROCEDURE — 85025 COMPLETE CBC W/AUTO DIFF WBC: CPT | Performed by: PHYSICIAN ASSISTANT

## 2025-02-24 PROCEDURE — 82378 CARCINOEMBRYONIC ANTIGEN: CPT | Performed by: REGISTERED NURSE

## 2025-02-24 PROCEDURE — 36415 COLL VENOUS BLD VENIPUNCTURE: CPT | Performed by: PHYSICIAN ASSISTANT

## 2025-02-24 PROCEDURE — 4010F ACE/ARB THERAPY RXD/TAKEN: CPT | Mod: CPTII,S$GLB,, | Performed by: REGISTERED NURSE

## 2025-02-24 PROCEDURE — 99214 OFFICE O/P EST MOD 30 MIN: CPT | Mod: S$GLB,,, | Performed by: REGISTERED NURSE

## 2025-02-24 PROCEDURE — 1159F MED LIST DOCD IN RCRD: CPT | Mod: CPTII,S$GLB,, | Performed by: REGISTERED NURSE

## 2025-02-24 PROCEDURE — 1126F AMNT PAIN NOTED NONE PRSNT: CPT | Mod: CPTII,S$GLB,, | Performed by: REGISTERED NURSE

## 2025-02-24 PROCEDURE — 63600175 PHARM REV CODE 636 W HCPCS: Mod: JZ,TB | Performed by: REGISTERED NURSE

## 2025-02-24 PROCEDURE — 80053 COMPREHEN METABOLIC PANEL: CPT | Performed by: PHYSICIAN ASSISTANT

## 2025-02-24 PROCEDURE — 99999 PR PBB SHADOW E&M-EST. PATIENT-LVL III: CPT | Mod: PBBFAC,,, | Performed by: REGISTERED NURSE

## 2025-02-24 PROCEDURE — 1101F PT FALLS ASSESS-DOCD LE1/YR: CPT | Mod: CPTII,S$GLB,, | Performed by: REGISTERED NURSE

## 2025-02-24 PROCEDURE — 3288F FALL RISK ASSESSMENT DOCD: CPT | Mod: CPTII,S$GLB,, | Performed by: REGISTERED NURSE

## 2025-02-24 PROCEDURE — 3078F DIAST BP <80 MM HG: CPT | Mod: CPTII,S$GLB,, | Performed by: REGISTERED NURSE

## 2025-02-24 PROCEDURE — 84443 ASSAY THYROID STIM HORMONE: CPT | Performed by: PHYSICIAN ASSISTANT

## 2025-02-24 PROCEDURE — 3008F BODY MASS INDEX DOCD: CPT | Mod: CPTII,S$GLB,, | Performed by: REGISTERED NURSE

## 2025-02-24 RX ORDER — SODIUM CHLORIDE 0.9 % (FLUSH) 0.9 %
10 SYRINGE (ML) INJECTION
Status: CANCELLED | OUTPATIENT
Start: 2025-02-24

## 2025-02-24 RX ORDER — DIPHENHYDRAMINE HYDROCHLORIDE 50 MG/ML
50 INJECTION INTRAMUSCULAR; INTRAVENOUS ONCE AS NEEDED
Status: CANCELLED | OUTPATIENT
Start: 2025-02-24

## 2025-02-24 RX ORDER — EPINEPHRINE 0.3 MG/.3ML
0.3 INJECTION SUBCUTANEOUS ONCE AS NEEDED
Status: DISCONTINUED | OUTPATIENT
Start: 2025-02-24 | End: 2025-02-24 | Stop reason: HOSPADM

## 2025-02-24 RX ORDER — HEPARIN 100 UNIT/ML
500 SYRINGE INTRAVENOUS
Status: CANCELLED | OUTPATIENT
Start: 2025-02-24

## 2025-02-24 RX ORDER — DIPHENHYDRAMINE HYDROCHLORIDE 50 MG/ML
50 INJECTION INTRAMUSCULAR; INTRAVENOUS ONCE AS NEEDED
Status: DISCONTINUED | OUTPATIENT
Start: 2025-02-24 | End: 2025-02-24 | Stop reason: HOSPADM

## 2025-02-24 RX ORDER — EPINEPHRINE 0.3 MG/.3ML
0.3 INJECTION SUBCUTANEOUS ONCE AS NEEDED
Status: CANCELLED | OUTPATIENT
Start: 2025-02-24

## 2025-02-24 RX ADMIN — SODIUM CHLORIDE 400 MG: 9 INJECTION, SOLUTION INTRAVENOUS at 11:02

## 2025-02-24 NOTE — PLAN OF CARE
"BP (!) 155/57 (Patient Position: Sitting)   Pulse (!) 41   Temp 98.1 °F (36.7 °C)   Resp 18   Ht 5' 6" (1.676 m)   Wt 58.8 kg (129 lb 10.1 oz)   SpO2 (!) 93%   BMI 20.92 kg/m²   Pleasant, alert and oriented patient to Chemo Infusion per self with  for C14  Keytruda - VSS and PIV started x2 attempt with immediate flashback, flushed with NS, site secured and patient tolerated procedure well - patient tolerated treatment with no AVE's, PIV discontinued, pressure dressing applied, and patient discharged to home with no concerns - RTC on 4/7/25             "

## 2025-02-24 NOTE — PROGRESS NOTES
MEDICAL ONCOLOGY - ESTABLISHED PATIENT VISIT    Reason for visit: duodenal adenocarcinoma     Best Contact Phone Number(s): [phone] (home)      Cancer/Stage/TNM:    Cancer Staging   Duodenal adenocarcinoma  Staging form: Small Intestine - Adenocarcinoma, AJCC 8th Edition  - Pathologic stage from 3/15/2023: Stage IV (pT3, pN0, cM1) - Signed by Eber Roberts MD on 7/29/2024       Oncology History   Duodenal adenocarcinoma   3/10/2023 Initial Diagnosis    Duodenal adenocarcinoma     3/15/2023 Surgery    Whipple  dMMR with loss of MLH1/PMS2; MLH1 promoter hypermethylation present suggesting a sporadic tumor.      3/15/2023 Cancer Staged    Staging form: Small Intestine - Adenocarcinoma, AJCC 8th Edition  - Pathologic stage from 3/15/2023: Stage IV (pT3, pN0, cM1)     8/24/2023 -  Chemotherapy    Treatment Summary   Plan Name: OP PEMBROLIZUMAB 400MG Q6W  Treatment Goal: Palliative  Status: Active  Start Date: 8/24/2023  End Date: 6/23/2025 (Planned)  Provider: Eber Roberts MD  Chemotherapy: [No matching medication found in this treatment plan]          Interval History: 69 y.o. female with recurrent MSI-H metastatic duodenal adenocarcinoma who presents for follow-up prior to cycle 14 of pembrolizumab. Doing very well overall with no new concerns. Energy and appetite are good. Staying hydrated. Denies fevers, chills, SOB, CP, palpitations, nausea, vomiting, diarrhea, constipation, overt bleeding, skin changes or rashes. Due for scans prior to next visit.     Presents to clinic with her . ECOG 0.     ROS:   Review of Systems   Constitutional:  Negative for chills, fever and malaise/fatigue.   HENT:  Negative for nosebleeds and sore throat.    Respiratory:  Negative for cough and shortness of breath.    Cardiovascular:  Negative for chest pain, palpitations and leg swelling.   Gastrointestinal:  Negative for blood in stool, constipation, diarrhea, heartburn, nausea and vomiting.   Genitourinary:   Negative for dysuria, frequency, hematuria and urgency.   Musculoskeletal:  Negative for falls and myalgias.   Skin:  Negative for itching and rash.   Neurological:  Negative for dizziness, tingling, weakness and headaches.   Psychiatric/Behavioral:  The patient is not nervous/anxious and does not have insomnia.        Past Medical History:   Past Medical History:   Diagnosis Date    Abnormal Pap smear of cervix     Arthritis     Duodenal adenocarcinoma     Hypertension     Hyperthyroidism         Past Surgical History:   Past Surgical History:   Procedure Laterality Date    CATARACT EXTRACTION W/  INTRAOCULAR LENS IMPLANT Right 8/8/2019    Procedure: EXTRACTION, CATARACT, WITH IOL INSERTION;  Surgeon: Spenser Lee MD;  Location: [facility];  Service: Ophthalmology;  Laterality: Right;    COLONOSCOPY N/A 7/11/2024    Procedure: COLONOSCOPY;  Surgeon: Luis Hayes MD;  Location: [facility];  Service: Endoscopy;  Laterality: N/A;    ESOPHAGOGASTRODUODENOSCOPY N/A 3/10/2023    Procedure: EGD (ESOPHAGOGASTRODUODENOSCOPY);  Surgeon: Shashi Tapia MD;  Location: [facility] ([address] [place] [address]);  Service: Endoscopy;  Laterality: N/A;    EYE SURGERY      HYSTERECTOMY      OOPHORECTOMY      PHACOEMULSIFICATION OF CATARACT Right 8/8/2019    Procedure: PHACOEMULSIFICATION, CATARACT;  Surgeon: Spenser Lee MD;  Location: [facility];  Service: Ophthalmology;  Laterality: Right;    WHIPPLE PROCEDURE N/A 3/15/2023    Procedure: WHIPPLE PROCEDURE;  Surgeon: Nick Paez MD;  Location: [address];  Service: General;  Laterality: N/A;        Family History:   Family History   Problem Relation Name Age of Onset    Cancer Mother      Breast cancer Neg Hx      Colon cancer Neg Hx      Ovarian cancer Neg Hx          Social History:   Social History     Tobacco Use    Smoking status: Every Day     Current packs/day: 0.25     Average packs/day: 0.3 packs/day for 30.0 years (7.5 ttl pk-yrs)     Types: Cigarettes     "Smokeless tobacco: Never    Tobacco comments:     About 7-8 cigs a day   Substance Use Topics    Alcohol use: Yes     Alcohol/week: 1.0 standard drink of alcohol     Types: 1 Cans of beer per week     Comment: social        I have reviewed and updated the patient's past medical, surgical, family and social histories.    Allergies:   Review of patient's allergies indicates:   Allergen Reactions    Aspirin Other (See Comments)     Pt states it is "hard on her stomach" She can tolerate a low dose aspirin    Pcn [penicillins]      Childhood - Tolerated ceftriaxone and cefazolin with no documented issues in the past         Medications:   Current Outpatient Medications   Medication Sig Dispense Refill    acetaminophen (TYLENOL) 650 MG TbSR Take 650 mg by mouth as needed.      atorvastatin (LIPITOR) 40 MG tablet Take 1 tablet (40 mg total) by mouth once daily. 90 tablet 11    atorvastatin (LIPITOR) 40 MG tablet Take 1 tablet (40 mg total) by mouth once daily. 90 tablet 11    droNABinol (MARINOL) 5 MG capsule Take 1 capsule (5 mg total) by mouth 2 (two) times daily before meals. 60 capsule 2    famotidine (PEPCID) 20 MG tablet Take 1 tablet (20 mg total) by mouth 2 (two) times daily. 60 tablet 11    famotidine (PEPCID) 20 MG tablet Take 1 tablet (20 mg total) by mouth 2 (two) times daily. 60 tablet 11    gabapentin (NEURONTIN) 300 MG capsule Take 1 capsule (300 mg total) by mouth 3 (three) times daily. 90 capsule 5    lisinopriL-hydrochlorothiazide (PRINZIDE,ZESTORETIC) 20-12.5 mg per tablet Take 1 tablet by mouth once daily. 90 tablet 11    omeprazole (PRILOSEC) 40 MG capsule Take 1 capsule (40 mg total) by mouth every morning. 30 capsule 11    omeprazole (PRILOSEC) 40 MG capsule Take 1 capsule (40 mg total) by mouth every morning. 30 capsule 11     No current facility-administered medications for this visit.      Physical Exam:   BP (!) 155/57 (Patient Position: Sitting)   Pulse (!) 41   Ht 5' 6" (1.676 m)   Wt 59.8 " kg (131 lb 11.6 oz)   SpO2 (!) 93%   BMI 21.26 kg/m²      Physical Exam  Vitals reviewed.   Constitutional:       General: She is not in acute distress.     Appearance: Normal appearance. She is normal weight. She is not ill-appearing, toxic-appearing or diaphoretic.   HENT:      Head: Normocephalic and atraumatic.      Right Ear: External ear normal.      Left Ear: External ear normal.      Nose: Nose normal.      Mouth/Throat:      Pharynx: Oropharynx is clear.   Eyes:      General: No scleral icterus.     Conjunctiva/sclera: Conjunctivae normal.      Pupils: Pupils are equal, round, and reactive to light.   Cardiovascular:      Rate and Rhythm: Normal rate.   Pulmonary:      Effort: Pulmonary effort is normal. No respiratory distress.      Breath sounds: No wheezing.   Abdominal:      General: There is no distension.      Tenderness: There is no abdominal tenderness.   Musculoskeletal:      Cervical back: Normal range of motion.      Right lower leg: No edema.      Left lower leg: No edema.   Skin:     General: Skin is warm and dry.      Coloration: Skin is not jaundiced or pale.      Findings: No bruising, erythema or rash.   Neurological:      General: No focal deficit present.      Mental Status: She is alert and oriented to person, place, and time. Mental status is at baseline.      Cranial Nerves: No cranial nerve deficit.      Sensory: No sensory deficit.      Motor: No weakness.      Gait: Gait normal.   Psychiatric:         Mood and Affect: Mood normal.         Behavior: Behavior normal.         Thought Content: Thought content normal.         Judgment: Judgment normal.         Labs:   Recent Results (from the past 48 hours)   CBC W/ AUTO DIFFERENTIAL    Collection Time: 02/24/25  9:31 AM   Result Value Ref Range    WBC 4.69 3.90 - 12.70 K/uL    RBC 5.22 4.00 - 5.40 M/uL    Hemoglobin 11.6 (L) 12.0 - 16.0 g/dL    Hematocrit 37.8 37.0 - 48.5 %    MCV 72 (L) 82 - 98 fL    MCH 22.2 (L) 27.0 - 31.0 pg     MCHC 30.7 (L) 32.0 - 36.0 g/dL    RDW 18.1 (H) 11.5 - 14.5 %    Platelets 206 150 - 450 K/uL    MPV SEE COMMENT 9.2 - 12.9 fL    Immature Granulocytes 0.2 0.0 - 0.5 %    Gran # (ANC) 2.1 1.8 - 7.7 K/uL    Immature Grans (Abs) 0.01 0.00 - 0.04 K/uL    Lymph # 1.8 1.0 - 4.8 K/uL    Mono # 0.7 0.3 - 1.0 K/uL    Eos # 0.2 0.0 - 0.5 K/uL    Baso # 0.02 0.00 - 0.20 K/uL    nRBC 0 0 /100 WBC    Gran % 44.4 38.0 - 73.0 %    Lymph % 37.5 18.0 - 48.0 %    Mono % 13.9 4.0 - 15.0 %    Eosinophil % 3.6 0.0 - 8.0 %    Basophil % 0.4 0.0 - 1.9 %    Differential Method Automated      Lab work reviewed.    Imaging:    PET/CT - 11/29/24:    Impression:     Patient with duodenal cancer status post Whipple.  No focal abnormal uptake to suggest recurrent disease or metastasis.     Stable pulmonary micro nodules in the left lung, which are below size threshold for tracer uptake detection by PET-CT.  Pulmonary nodules in the right lung have resolved.    Path:   3/15/23 - Whipple  Final Pathologic Diagnosis      Abnormal   1. Lymph node, hepatic cautery, resection:   - One lymph node negative for metastatic carcinoma (0/1).   - See synoptic report.   2. Gallbladder, cholecystectomy:   - Benign gallbladder with no significant histologic abnormality.   3. Pancreas, duodenum, common bile duct, and partial gastrectomy,   pancreaticoduodenectomy (Whipple specimen):   - Poorly differentiated carcinoma with medullary features.   - See synoptic report.   - See comment.   Synoptic Report   Procedure: Pancreaticoduodenectomy with partial gastrectomy (Whipple   specimen)   Tumor site:  Pylorus and proximal duodenum   Histologic type: Poorly differentiated carcinoma with medullary features   Histologic grade: G3, poorly differentiated   Tumor size:  6.5 cm in greatest dimension grossly   Tumor extent:   Invades through muscularis propria into subserosa, or extends   into nonperitonealized perimuscular tissue (mesentery or retroperitoneum)   without  serosal penetration   Macroscopic tumor perforation:  Not identified   Lymphovascular inv asion:  Not identified, see comment   Margin status for invasive carcinoma:  All margins negative for invasive   carcinoma   Margin status for dysplasia:  All margins negative for carcinoma in Situ   (high-grade dysplasia)/adenoma   Regional lymph node status: Regional lymph nodes present        All Regional lymph nodes negative for tumor        Number of lymph nodes examined:  15 (including specimens 1 and 3)   PATHOLOGIC STAGE CLASSIFICATION (pTNM, AJCC 8th Edition):  p T3 N0   Additional findings:  Stomach with intestinal metaplasia (immunostain for   H.pylori negative), 2 lymph nodes with fibrosis and calcifications (GMS stain   for fungal organisms and AFB stain negative)   Ancillary studies:  Immunohistochemistry (IHC) Testing for Mismatch Repair   (MMR) Proteins   MLH1- Loss of nuclear expression   PMS2 - Loss of nuclear expression   MSH2 - Intact nuclear expression   MSH6 - Intact nuclear expression   Background nonneoplastic tissue/internal control with intact nuclear   expression   IHC Interpret ation:  Loss of nuclear expression of MLH1 and PMS2: testing for   methylation of the MLH1 promoter and/or mutation of BRAF is indicated (the   presence of a BRAF V600E mutation and/or MLH1 methylation suggests that the   tumor is sporadic and germline evaluation is probably not indicated; absence   of both MLH1 methylation and of BRAF V600E mutation suggests the possibility   of Seaman syndrome, and sequencing and/or large deletion/duplication testing   of germline MLH1 may be indicated)   Testing for methylation of the MLH1 promoter and mutation of BRAF is in   process and results will be supplemented.   There are exceptions to the above IHC interpretations. These results should   not be considered in isolation, and clinical correlation with genetic   counseling is recommended to assess the need for germline testing.  "  COMMENT:  The stomach and duodenum (specimen 3) shows a 6.5 cm mass involving   the pylorus with the majority of the tumor appearing to be in the duodenum.   The tumor shows poorly d ifferentiated sheets of blue cells with necrosis.   There is a significant amount of lymphocytic inflammatory infiltrate around   the edges of the tumor.  Immunostains show the tumor cells to be positive for   CK7 with patchy weak CDX2 and negative for synaptophysin and chromogranin.   There is also loss of MLH1 and PMS2 on MMR stains.  These features are   suggestive of medullary carcinoma.  Select slides reviewed by Dr. ITZEL Sánchez   who agrees with the diagnosis of poorly differentiated carcinoma with   medullary features.  Immunostains for CD 34 performed on blocks 3E and 3H   show no definitive lymphovascular invasion even though some areas suspicious   cells on routine H&E sections.  Appropriate positive controls   VC      Comment: Interp By Meredith Pappas MD, Signed on 04/12/2023 at 16:03   Supplemental Diagnosis      Abnormal   MLH1 Hypermethylation/BRAF Mutation   Result Summary   MLH1 HYPERMETHYLATION PRESENT/NO BRAF MUTATION IDENTIFIED   Result   Provided diagnosis: pylorus and proximal duodenum poorly differentiated   carcinoma with medullary features   Methylation status: Positive for MLH1 promoter hypermethylation   BRAF status: Wild-type (SEE INTERPRETATION)   Alexis Sotelo M.D.   Report attached   Performing location:   [place] [address]   "Disclaimer:  This case diagnosis was rendered completely by the outside   consultation pathologist and the case is electronically signed by an [facility]   pathologist listed below solely to release the report into the medical   record."          Assessment:       1. Duodenal adenocarcinoma    2. Metastasis to liver    3. History of Whipple procedure    4. Moderate malnutrition    5. Decreased appetite  "   6. Anemia, unspecified type    7. Neoplastic (malignant) related fatigue    8. Essential hypertension    9. Hyperlipidemia, unspecified hyperlipidemia type    10. Diabetes mellitus type 2, diet-controlled    11. Aortic atherosclerosis       Plan:     # Duodenal adenocarcinoma   Mrs. Simms is a pleasant 69 y.o. 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 Pathology did reveal some high grade features, including poorly differentiated carcinoma, 6.5 cm size.  We discussed at her initial visit he limited role of adjuvant chemotherapy in the stage II setting for small bowel adenocarcinoma.      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     CT CAP on 7/26/23 demonstrated concern for recurrence in liver.  PET/CT on 8/4/23 showed hypermetabolic inferior R hepatic lobe lesion consistent with metastatic recurrence.  CEA has risen as well.    Given her dMMR/MSI-H status, we recommended to start on single agent pembrolizumab 400 mg q6 weeks.  Patient was consented for immunotherapy.   She began pembrolizumab 8/24/23.    She was also enrolled in our protocol: [id number], Disparities in Results of Immune Checkpoint Inhibitor Treatment (DIRECT): A Prospective Cohort Study of Cancer Survivors Treated with anti-PD-L1 Immunotherapy in a Community Oncology Setting.  CRC is Isabela Lynnette.    No toxicities noted from pembrolizumab cycle 1.  PET/CT after cycle 2 shows resolution of liver metastasis.  Question of grade 1 pneumonitis with GGO on that scan but this has since resolved on CT chest done 12/15 which makes pneumonitis less likely diagnosis.  No other IO toxicities.  PET/CT after cycle 4 shows resolution of hypermetabolic nodules and ground-glass in right middle lobe of lung. No evidence of other hypermetabolic areas.   PET/CT after cycle 6 shows no evidence of disease.  PET/CT after cycle 8 shows shows  BIA.  PET/CT after cycle 11 shows BIA.    Presents for follow-up prior to cycle 14 of pembrolizumab.    Labs reviewed and adequate for treatment.    No toxicities from IO.  Will proceed with cycle 14 of pembrolizumab today.  Repeat imaging prior to cycle 15.     [facility] Tissue NGS: MSI-H, TMB 76.3 m/MB    # Decreased appetite, malnutrition  -Appetite ok with Marinol. Taking at night. Drug on backorder right now and she's doing ok not taking it every night.  -weight stable. Nutrition following.    # Anemia, neoplasm related fatigue, restless leg syndrome  -Microcytic anemia stable. Suspect thalassemia given longstanding nature.  -No signs of bleeding, platelets normal.   -Asymptomatic. Monitor.   -continue to monitor TSH    -Advised her to continue her iron supplements    # HTN, HLD, DM, aortic atherosclerosis   -BP mildly elevated in clinic today. Asymptomatic. Monitor.   -Following with PCP.   -Continue medical management.     Patient is in agreement with the proposed treatment plan. All questions were answered to the patient's satisfaction. Pt knows to call clinic if anything is needed before the next clinic visit.    Patient discussed with collaborating physician, Dr. Roberts.    At least 30 minutes were spent today on this encounter including face to face time with the patient, data gathering/interpretation and documentation.       Radha Padilla, MSN, APRN, [facility]-  Hematology and Medical Oncology  Clinical Nurse Specialist to Dr. Roberts, Dr. Schilling & Dr. Rosales         code applied: patient requires or will require a continuous, longitudinal, and active collaborative plan of care related to this patient's health condition, metastatic duodenal cancer --the management of which requires the direction of a practitioner with specialized clinical knowledge, skill, and expertise.     Route Chart for Scheduling    Med Onc Chart Routing      Follow up with physician . Keep lab and MD visit as scheduled in 6 weeks.  please schedule PET scan 1-2 days prior to her MD visit on 4/7. thank you!   Follow up with YANDEL    Infusion scheduling note    Injection scheduling note    Labs    Imaging    Pharmacy appointment    Other referrals              Treatment Plan Information   OP PEMBROLIZUMAB 400MG Q6W Eber Roberts MD   Associated diagnosis: Duodenal adenocarcinoma Stage IV pT3, pN0, cM1 noted on 4/18/2023   Line of treatment: First Line  Treatment Goal: Palliative     Upcoming Treatment Dates - OP PEMBROLIZUMAB 400MG Q6W    2/17/2025       Chemotherapy       pembrolizumab (KEYTRUDA) 400 mg in 0.9% NaCl SolP 116 mL infusion  3/31/2025       Chemotherapy       pembrolizumab (KEYTRUDA) 400 mg in 0.9% NaCl SolP 116 mL infusion  5/12/2025       Chemotherapy       pembrolizumab (KEYTRUDA) 400 mg in 0.9% NaCl SolP 116 mL infusion  6/23/2025       Chemotherapy       pembrolizumab (KEYTRUDA) 400 mg in 0.9% NaCl SolP 116 mL infusion

## 2025-02-24 NOTE — PROGRESS NOTES
February 24, 2025     Protocol: [id number], Disparities in Results of Immune Checkpoint Inhibitor Treatment (DIRECT): A Prospective Cohort Study of Cancer Survivors Treated with anti-PD-L1 Immunotherapy in a Community Oncology Setting  IRB# 2022.126  Version Date: 4/2/2024  Study ID# [id number]     Cancer Treatment & Toxicity Assessments (3M window until the participant completes or discontinues ICI treatment)  Infusion Docs-   C11 10/21/2024  C12 12/2/2024  C13 1/13/2025   Toxicities-  C14 2/24/2025  Portal data updated.

## 2025-04-04 ENCOUNTER — TELEPHONE (OUTPATIENT)
Dept: HEMATOLOGY/ONCOLOGY | Facility: CLINIC | Age: 70
End: 2025-04-04
Payer: MEDICARE

## 2025-04-04 ENCOUNTER — HOSPITAL ENCOUNTER (OUTPATIENT)
Dept: RADIOLOGY | Facility: HOSPITAL | Age: 70
Discharge: HOME | End: 2025-04-04
Attending: REGISTERED NURSE
Payer: MEDICARE

## 2025-04-04 DIAGNOSIS — Z90.49 HISTORY OF WHIPPLE PROCEDURE: ICD-10-CM

## 2025-04-04 DIAGNOSIS — C78.7 METASTASIS TO LIVER: ICD-10-CM

## 2025-04-04 DIAGNOSIS — Z90.410 HISTORY OF WHIPPLE PROCEDURE: ICD-10-CM

## 2025-04-04 DIAGNOSIS — C17.0 DUODENAL ADENOCARCINOMA: ICD-10-CM

## 2025-04-04 LAB — POCT GLUCOSE: 104 MG/DL (ref 70–110)

## 2025-04-04 PROCEDURE — 78815 PET IMAGE W/CT SKULL-THIGH: CPT | Mod: TC

## 2025-04-04 PROCEDURE — 78815 PET IMAGE W/CT SKULL-THIGH: CPT | Mod: 26,PS,, | Performed by: STUDENT IN AN ORGANIZED HEALTH CARE EDUCATION/TRAINING PROGRAM

## 2025-04-04 PROCEDURE — A9552 F18 FDG: HCPCS | Performed by: REGISTERED NURSE

## 2025-04-04 RX ORDER — FLUDEOXYGLUCOSE F18 500 MCI/ML
12.65 INJECTION INTRAVENOUS
Status: COMPLETED | OUTPATIENT
Start: 2025-04-04 | End: 2025-04-04

## 2025-04-04 RX ADMIN — FLUDEOXYGLUCOSE F-18 12.65 MILLICURIE: 500 INJECTION INTRAVENOUS at 09:04

## 2025-04-07 ENCOUNTER — LAB VISIT (OUTPATIENT)
Dept: LAB | Facility: HOSPITAL | Age: 70
End: 2025-04-07
Payer: MEDICARE

## 2025-04-07 ENCOUNTER — INFUSION (OUTPATIENT)
Dept: INFUSION THERAPY | Facility: HOSPITAL | Age: 70
End: 2025-04-07
Payer: MEDICARE

## 2025-04-07 ENCOUNTER — OFFICE VISIT (OUTPATIENT)
Dept: HEMATOLOGY/ONCOLOGY | Facility: CLINIC | Age: 70
End: 2025-04-07
Payer: MEDICARE

## 2025-04-07 VITALS
WEIGHT: 124.13 LBS | HEIGHT: 66 IN | DIASTOLIC BLOOD PRESSURE: 56 MMHG | SYSTOLIC BLOOD PRESSURE: 142 MMHG | BODY MASS INDEX: 19.95 KG/M2 | TEMPERATURE: 98 F | RESPIRATION RATE: 18 BRPM | HEART RATE: 96 BPM

## 2025-04-07 VITALS
SYSTOLIC BLOOD PRESSURE: 125 MMHG | HEIGHT: 66 IN | HEART RATE: 96 BPM | RESPIRATION RATE: 18 BRPM | TEMPERATURE: 97 F | BODY MASS INDEX: 19.95 KG/M2 | WEIGHT: 124.13 LBS | DIASTOLIC BLOOD PRESSURE: 60 MMHG

## 2025-04-07 DIAGNOSIS — E11.9 DIABETES MELLITUS TYPE 2, DIET-CONTROLLED: ICD-10-CM

## 2025-04-07 DIAGNOSIS — D64.9 ANEMIA, UNSPECIFIED TYPE: ICD-10-CM

## 2025-04-07 DIAGNOSIS — R63.0 DECREASED APPETITE: ICD-10-CM

## 2025-04-07 DIAGNOSIS — C17.0 DUODENAL ADENOCARCINOMA: Primary | ICD-10-CM

## 2025-04-07 DIAGNOSIS — Z90.49 HISTORY OF WHIPPLE PROCEDURE: ICD-10-CM

## 2025-04-07 DIAGNOSIS — I70.0 AORTIC ATHEROSCLEROSIS: ICD-10-CM

## 2025-04-07 DIAGNOSIS — R53.0 NEOPLASTIC (MALIGNANT) RELATED FATIGUE: ICD-10-CM

## 2025-04-07 DIAGNOSIS — E44.0 MODERATE MALNUTRITION: ICD-10-CM

## 2025-04-07 DIAGNOSIS — E78.5 HYPERLIPIDEMIA, UNSPECIFIED HYPERLIPIDEMIA TYPE: ICD-10-CM

## 2025-04-07 DIAGNOSIS — I10 ESSENTIAL HYPERTENSION: ICD-10-CM

## 2025-04-07 DIAGNOSIS — Z90.410 HISTORY OF WHIPPLE PROCEDURE: ICD-10-CM

## 2025-04-07 DIAGNOSIS — C17.0 DUODENAL ADENOCARCINOMA: ICD-10-CM

## 2025-04-07 DIAGNOSIS — C78.7 METASTASIS TO LIVER: ICD-10-CM

## 2025-04-07 LAB
ABSOLUTE NEUTROPHIL COUNT (OHS): 2.4 K/UL (ref 1.8–7.7)
ALBUMIN SERPL BCP-MCNC: 3.3 G/DL (ref 3.5–5.2)
ALP SERPL-CCNC: 85 UNIT/L (ref 40–150)
ALT SERPL W/O P-5'-P-CCNC: 16 UNIT/L (ref 10–44)
ANION GAP (OHS): 9 MMOL/L (ref 8–16)
AST SERPL-CCNC: 26 UNIT/L (ref 11–45)
BILIRUB SERPL-MCNC: 0.5 MG/DL (ref 0.1–1)
BUN SERPL-MCNC: 15 MG/DL (ref 8–23)
CALCIUM SERPL-MCNC: 9.3 MG/DL (ref 8.7–10.5)
CARCINOEMBRYONIC ANTIGEN (OHS): 5.6 NG/ML
CHLORIDE SERPL-SCNC: 104 MMOL/L (ref 95–110)
CO2 SERPL-SCNC: 29 MMOL/L (ref 23–29)
CREAT SERPL-MCNC: 0.6 MG/DL (ref 0.5–1.4)
ERYTHROCYTE [DISTWIDTH] IN BLOOD BY AUTOMATED COUNT: 18.9 % (ref 11.5–14.5)
GFR SERPLBLD CREATININE-BSD FMLA CKD-EPI: >60 ML/MIN/1.73/M2
GLUCOSE SERPL-MCNC: 119 MG/DL (ref 70–110)
HCT VFR BLD AUTO: 39.2 % (ref 37–48.5)
HGB BLD-MCNC: 12.3 GM/DL (ref 12–16)
IMM GRANULOCYTES # BLD AUTO: 0.01 K/UL (ref 0–0.04)
MCH RBC QN AUTO: 22.9 PG (ref 27–31)
MCHC RBC AUTO-ENTMCNC: 31.4 G/DL (ref 32–36)
MCV RBC AUTO: 73 FL (ref 82–98)
PLATELET # BLD AUTO: 181 K/UL (ref 150–450)
PMV BLD AUTO: 10.8 FL (ref 9.2–12.9)
POTASSIUM SERPL-SCNC: 3.5 MMOL/L (ref 3.5–5.1)
PROT SERPL-MCNC: 7 GM/DL (ref 6–8.4)
RBC # BLD AUTO: 5.38 M/UL (ref 4–5.4)
SODIUM SERPL-SCNC: 142 MMOL/L (ref 136–145)
TSH SERPL-ACNC: 0.5 UIU/ML (ref 0.4–4)
WBC # BLD AUTO: 4.97 K/UL (ref 3.9–12.7)

## 2025-04-07 PROCEDURE — 3288F FALL RISK ASSESSMENT DOCD: CPT | Mod: CPTII,S$GLB,, | Performed by: INTERNAL MEDICINE

## 2025-04-07 PROCEDURE — 99999 PR PBB SHADOW E&M-EST. PATIENT-LVL III: CPT | Mod: PBBFAC,,, | Performed by: INTERNAL MEDICINE

## 2025-04-07 PROCEDURE — G2211 COMPLEX E/M VISIT ADD ON: HCPCS | Mod: S$GLB,,, | Performed by: INTERNAL MEDICINE

## 2025-04-07 PROCEDURE — 1159F MED LIST DOCD IN RCRD: CPT | Mod: CPTII,S$GLB,, | Performed by: INTERNAL MEDICINE

## 2025-04-07 PROCEDURE — 4010F ACE/ARB THERAPY RXD/TAKEN: CPT | Mod: CPTII,S$GLB,, | Performed by: INTERNAL MEDICINE

## 2025-04-07 PROCEDURE — 85027 COMPLETE CBC AUTOMATED: CPT

## 2025-04-07 PROCEDURE — 82378 CARCINOEMBRYONIC ANTIGEN: CPT

## 2025-04-07 PROCEDURE — 1126F AMNT PAIN NOTED NONE PRSNT: CPT | Mod: CPTII,S$GLB,, | Performed by: INTERNAL MEDICINE

## 2025-04-07 PROCEDURE — 1101F PT FALLS ASSESS-DOCD LE1/YR: CPT | Mod: CPTII,S$GLB,, | Performed by: INTERNAL MEDICINE

## 2025-04-07 PROCEDURE — 3008F BODY MASS INDEX DOCD: CPT | Mod: CPTII,S$GLB,, | Performed by: INTERNAL MEDICINE

## 2025-04-07 PROCEDURE — 96413 CHEMO IV INFUSION 1 HR: CPT

## 2025-04-07 PROCEDURE — 25000003 PHARM REV CODE 250: Performed by: INTERNAL MEDICINE

## 2025-04-07 PROCEDURE — 84443 ASSAY THYROID STIM HORMONE: CPT

## 2025-04-07 PROCEDURE — 63600175 PHARM REV CODE 636 W HCPCS: Mod: JZ,TB | Performed by: INTERNAL MEDICINE

## 2025-04-07 PROCEDURE — 99215 OFFICE O/P EST HI 40 MIN: CPT | Mod: S$GLB,,, | Performed by: INTERNAL MEDICINE

## 2025-04-07 PROCEDURE — 36415 COLL VENOUS BLD VENIPUNCTURE: CPT

## 2025-04-07 PROCEDURE — 82040 ASSAY OF SERUM ALBUMIN: CPT

## 2025-04-07 RX ORDER — EPINEPHRINE 0.3 MG/.3ML
0.3 INJECTION SUBCUTANEOUS ONCE AS NEEDED
Status: CANCELLED | OUTPATIENT
Start: 2025-04-07

## 2025-04-07 RX ORDER — HEPARIN 100 UNIT/ML
500 SYRINGE INTRAVENOUS
Status: DISCONTINUED | OUTPATIENT
Start: 2025-04-07 | End: 2025-04-07 | Stop reason: HOSPADM

## 2025-04-07 RX ORDER — HEPARIN 100 UNIT/ML
500 SYRINGE INTRAVENOUS
Status: CANCELLED | OUTPATIENT
Start: 2025-04-07

## 2025-04-07 RX ORDER — DIPHENHYDRAMINE HYDROCHLORIDE 50 MG/ML
50 INJECTION, SOLUTION INTRAMUSCULAR; INTRAVENOUS ONCE AS NEEDED
Status: DISCONTINUED | OUTPATIENT
Start: 2025-04-07 | End: 2025-04-07 | Stop reason: HOSPADM

## 2025-04-07 RX ORDER — DIPHENHYDRAMINE HYDROCHLORIDE 50 MG/ML
50 INJECTION, SOLUTION INTRAMUSCULAR; INTRAVENOUS ONCE AS NEEDED
Status: CANCELLED | OUTPATIENT
Start: 2025-04-07

## 2025-04-07 RX ORDER — SODIUM CHLORIDE 0.9 % (FLUSH) 0.9 %
10 SYRINGE (ML) INJECTION
Status: CANCELLED | OUTPATIENT
Start: 2025-04-07

## 2025-04-07 RX ORDER — SODIUM CHLORIDE 0.9 % (FLUSH) 0.9 %
10 SYRINGE (ML) INJECTION
Status: DISCONTINUED | OUTPATIENT
Start: 2025-04-07 | End: 2025-04-07 | Stop reason: HOSPADM

## 2025-04-07 RX ORDER — EPINEPHRINE 0.3 MG/.3ML
0.3 INJECTION SUBCUTANEOUS ONCE AS NEEDED
Status: DISCONTINUED | OUTPATIENT
Start: 2025-04-07 | End: 2025-04-07 | Stop reason: HOSPADM

## 2025-04-07 RX ADMIN — SODIUM CHLORIDE 400 MG: 9 INJECTION, SOLUTION INTRAVENOUS at 02:04

## 2025-04-07 RX ADMIN — SODIUM CHLORIDE: 9 INJECTION, SOLUTION INTRAVENOUS at 02:04

## 2025-04-07 NOTE — PROGRESS NOTES
MEDICAL ONCOLOGY - ESTABLISHED PATIENT VISIT    Reason for visit: duodenal adenocarcinoma     Best Contact Phone Number(s): [phone] (home)      Cancer/Stage/TNM:    Cancer Staging   Duodenal adenocarcinoma  Staging form: Small Intestine - Adenocarcinoma, AJCC 8th Edition  - Pathologic stage from 3/15/2023: Stage IV (pT3, pN0, cM1) - Signed by Eber Roberts MD on 7/29/2024       Oncology History   Duodenal adenocarcinoma   3/10/2023 Initial Diagnosis    Duodenal adenocarcinoma     3/15/2023 Surgery    Whipple  dMMR with loss of MLH1/PMS2; MLH1 promoter hypermethylation present suggesting a sporadic tumor.      3/15/2023 Cancer Staged    Staging form: Small Intestine - Adenocarcinoma, AJCC 8th Edition  - Pathologic stage from 3/15/2023: Stage IV (pT3, pN0, cM1)     8/24/2023 -  Chemotherapy    Treatment Summary   Plan Name: OP PEMBROLIZUMAB 400MG Q6W  Treatment Goal: Palliative  Status: Active  Start Date: 8/24/2023  End Date: 6/23/2025 (Planned)  Provider: Eber Roberts MD  Chemotherapy: [No matching medication found in this treatment plan]          Interval History: 69 y.o. female with recurrent MSI-H metastatic duodenal adenocarcinoma who presents for follow-up prior to cycle 15 of pembrolizumab.  She states she is feeling well.  She eats a lot but notices that she doesn't gain much weight.  She denies any diarrhea, nausea or pain.    Presents to clinic with her . ECOG 0.     ROS:   Review of Systems   Constitutional:  Positive for weight loss. Negative for chills, fever and malaise/fatigue.   HENT:  Negative for nosebleeds and sore throat.    Respiratory:  Negative for cough and shortness of breath.    Cardiovascular:  Negative for chest pain, palpitations and leg swelling.   Gastrointestinal:  Negative for blood in stool, constipation, diarrhea, heartburn, nausea and vomiting.   Genitourinary:  Negative for dysuria, frequency, hematuria and urgency.   Musculoskeletal:  Negative for  falls and myalgias.   Skin:  Negative for itching and rash.   Neurological:  Negative for dizziness, tingling, weakness and headaches.   Psychiatric/Behavioral:  The patient is not nervous/anxious and does not have insomnia.        Past Medical History:   Past Medical History:   Diagnosis Date    Abnormal Pap smear of cervix     Arthritis     Duodenal adenocarcinoma     Hypertension     Hyperthyroidism         Past Surgical History:   Past Surgical History:   Procedure Laterality Date    CATARACT EXTRACTION W/  INTRAOCULAR LENS IMPLANT Right 8/8/2019    Procedure: EXTRACTION, CATARACT, WITH IOL INSERTION;  Surgeon: Spenser Lee MD;  Location: [facility] OR;  Service: Ophthalmology;  Laterality: Right;    COLONOSCOPY N/A 7/11/2024    Procedure: COLONOSCOPY;  Surgeon: Luis Hayes MD;  Location: [facility];  Service: Endoscopy;  Laterality: N/A;    ESOPHAGOGASTRODUODENOSCOPY N/A 3/10/2023    Procedure: EGD (ESOPHAGOGASTRODUODENOSCOPY);  Surgeon: Shashi Tapia MD;  [facility]  Service: Endoscopy;  Laterality: N/A;    EYE SURGERY      HYSTERECTOMY      OOPHORECTOMY      PHACOEMULSIFICATION OF CATARACT Right 8/8/2019    Procedure: PHACOEMULSIFICATION, CATARACT;  Surgeon: Spenser Lee MD;  Location: [facility];  Service: Ophthalmology;  Laterality: Right;    WHIPPLE PROCEDURE N/A 3/15/2023    Procedure: WHIPPLE PROCEDURE;  Surgeon: Nick Paez MD;  Location: [facility] 2ND FLR;  Service: General;  Laterality: N/A;        Family History:   Family History   Problem Relation Name Age of Onset    Cancer Mother      Breast cancer Neg Hx      Colon cancer Neg Hx      Ovarian cancer Neg Hx          Social History:   Social History     Tobacco Use    Smoking status: Every Day     Current packs/day: 0.25     Average packs/day: 0.3 packs/day for 30.0 years (7.5 ttl pk-yrs)     Types: Cigarettes    Smokeless tobacco: Never    Tobacco comments:     About 7-8 cigs a day   Substance Use Topics  "   Alcohol use: Yes     Alcohol/week: 1.0 standard drink of alcohol     Types: 1 Cans of beer per week     Comment: social        I have reviewed and updated the patient's past medical, surgical, family and social histories.    Allergies:   Review of patient's allergies indicates:   Allergen Reactions    Aspirin Other (See Comments)     Pt states it is "hard on her stomach" She can tolerate a low dose aspirin    Pcn [penicillins]      Childhood - Tolerated ceftriaxone and cefazolin with no documented issues in the past         Medications:   Current Outpatient Medications   Medication Sig Dispense Refill    acetaminophen (TYLENOL) 650 MG TbSR Take 650 mg by mouth as needed.      atorvastatin (LIPITOR) 40 MG tablet Take 1 tablet (40 mg total) by mouth once daily. 90 tablet 11    atorvastatin (LIPITOR) 40 MG tablet Take 1 tablet (40 mg total) by mouth once daily. 90 tablet 11    droNABinol (MARINOL) 5 MG capsule Take 1 capsule (5 mg total) by mouth 2 (two) times daily before meals. 60 capsule 2    famotidine (PEPCID) 20 MG tablet Take 1 tablet (20 mg total) by mouth 2 (two) times daily. 60 tablet 11    famotidine (PEPCID) 20 MG tablet Take 1 tablet (20 mg total) by mouth 2 (two) times daily. 60 tablet 11    gabapentin (NEURONTIN) 300 MG capsule Take 1 capsule (300 mg total) by mouth 3 (three) times daily. 90 capsule 5    lisinopriL-hydrochlorothiazide (PRINZIDE,ZESTORETIC) 20-12.5 mg per tablet Take 1 tablet by mouth once daily. 90 tablet 11    omeprazole (PRILOSEC) 40 MG capsule Take 1 capsule (40 mg total) by mouth every morning. 30 capsule 11    omeprazole (PRILOSEC) 40 MG capsule Take 1 capsule (40 mg total) by mouth every morning. 30 capsule 11     No current facility-administered medications for this visit.      Physical Exam:   Temp 96.8 °F (36 °C) (Temporal)   Resp 18   Ht 5' 6" (1.676 m)   Wt 56.3 kg (124 lb 1.9 oz)   BMI 20.03 kg/m²      Physical Exam  Vitals reviewed.   Constitutional:       General: " She is not in acute distress.     Appearance: Normal appearance. She is normal weight. She is not ill-appearing, toxic-appearing or diaphoretic.   HENT:      Head: Normocephalic and atraumatic.      Right Ear: External ear normal.      Left Ear: External ear normal.      Nose: Nose normal.      Mouth/Throat:      Pharynx: Oropharynx is clear.   Eyes:      General: No scleral icterus.     Conjunctiva/sclera: Conjunctivae normal.      Pupils: Pupils are equal, round, and reactive to light.   Cardiovascular:      Rate and Rhythm: Normal rate.   Pulmonary:      Effort: Pulmonary effort is normal. No respiratory distress.      Breath sounds: No wheezing.   Abdominal:      General: There is no distension.      Tenderness: There is no abdominal tenderness.   Musculoskeletal:      Cervical back: Normal range of motion.      Right lower leg: No edema.      Left lower leg: No edema.   Skin:     General: Skin is warm and dry.      Coloration: Skin is not jaundiced or pale.      Findings: No bruising, erythema or rash.   Neurological:      General: No focal deficit present.      Mental Status: She is alert and oriented to person, place, and time. Mental status is at baseline.      Cranial Nerves: No cranial nerve deficit.      Sensory: No sensory deficit.      Motor: No weakness.      Gait: Gait normal.   Psychiatric:         Mood and Affect: Mood normal.         Behavior: Behavior normal.         Thought Content: Thought content normal.         Judgment: Judgment normal.         Labs:   Recent Results (from the past 48 hours)   CEA    Collection Time: 04/07/25 11:24 AM   Result Value Ref Range    Carcinoembryonic Antigen 5.6 (H) <=5.0 ng/mL   Comprehensive Metabolic Panel    Collection Time: 04/07/25 11:24 AM   Result Value Ref Range    Sodium 142 136 - 145 mmol/L    Potassium 3.5 3.5 - 5.1 mmol/L    Chloride 104 95 - 110 mmol/L    CO2 29 23 - 29 mmol/L    Glucose 119 (H) 70 - 110 mg/dL    BUN 15 8 - 23 mg/dL    Creatinine 0.6  0.5 - 1.4 mg/dL    Calcium 9.3 8.7 - 10.5 mg/dL    Protein Total 7.0 6.0 - 8.4 gm/dL    Albumin 3.3 (L) 3.5 - 5.2 g/dL    Bilirubin Total 0.5 0.1 - 1.0 mg/dL    ALP 85 40 - 150 unit/L    AST 26 11 - 45 unit/L    ALT 16 10 - 44 unit/L    Anion Gap 9 8 - 16 mmol/L    eGFR >60 >60 mL/min/1.73/m2   CBC Oncology    Collection Time: 04/07/25 11:24 AM   Result Value Ref Range    WBC 4.97 3.90 - 12.70 K/uL    RBC 5.38 4.00 - 5.40 M/uL    HGB 12.3 12.0 - 16.0 gm/dL    HCT 39.2 37.0 - 48.5 %    MCV 73 (L) 82 - 98 fL    MCH 22.9 (L) 27.0 - 31.0 pg    MCHC 31.4 (L) 32.0 - 36.0 g/dL    RDW 18.9 (H) 11.5 - 14.5 %    Platelet Count 181 150 - 450 K/uL    MPV 10.8 9.2 - 12.9 fL    Neut # 2.40 1.8 - 7.7 K/uL    Imm Grans # 0.01 0.00 - 0.04 K/uL   TSH    Collection Time: 04/07/25 11:24 AM   Result Value Ref Range    TSH 0.499 0.400 - 4.000 uIU/mL     Lab work reviewed.    Imaging:    PET/CT - 4/4/25:    Impression:     History of duodenal cancer, status post Whipple.  New focal uptake about the jet hepatis, as above.  Indeterminate, could represent benign inflammatory uptake about an anastomosis or disease recurrence.  Recommend close follow-up.     Additional findings as above.    Path:   3/15/23 - Whipple  Final Pathologic Diagnosis      Abnormal   1. Lymph node, hepatic cautery, resection:   - One lymph node negative for metastatic carcinoma (0/1).   - See synoptic report.   2. Gallbladder, cholecystectomy:   - Benign gallbladder with no significant histologic abnormality.   3. Pancreas, duodenum, common bile duct, and partial gastrectomy,   pancreaticoduodenectomy (Whipple specimen):   - Poorly differentiated carcinoma with medullary features.   - See synoptic report.   - See comment.   Synoptic Report   Procedure: Pancreaticoduodenectomy with partial gastrectomy (Whipple   specimen)   Tumor site:  Pylorus and proximal duodenum   Histologic type: Poorly differentiated carcinoma with medullary features   Histologic grade: G3,  poorly differentiated   Tumor size:  6.5 cm in greatest dimension grossly   Tumor extent:   Invades through muscularis propria into subserosa, or extends   into nonperitonealized perimuscular tissue (mesentery or retroperitoneum)   without serosal penetration   Macroscopic tumor perforation:  Not identified   Lymphovascular inv asion:  Not identified, see comment   Margin status for invasive carcinoma:  All margins negative for invasive   carcinoma   Margin status for dysplasia:  All margins negative for carcinoma in Situ   (high-grade dysplasia)/adenoma   Regional lymph node status: Regional lymph nodes present        All Regional lymph nodes negative for tumor        Number of lymph nodes examined:  15 (including specimens 1 and 3)   PATHOLOGIC STAGE CLASSIFICATION (pTNM, AJCC 8th Edition):  p T3 N0   Additional findings:  Stomach with intestinal metaplasia (immunostain for   H.pylori negative), 2 lymph nodes with fibrosis and calcifications (GMS stain   for fungal organisms and AFB stain negative)   Ancillary studies:  Immunohistochemistry (IHC) Testing for Mismatch Repair   (MMR) Proteins   MLH1- Loss of nuclear expression   PMS2 - Loss of nuclear expression   MSH2 - Intact nuclear expression   MSH6 - Intact nuclear expression   Background nonneoplastic tissue/internal control with intact nuclear   expression   IHC Interpret ation:  Loss of nuclear expression of MLH1 and PMS2: testing for   methylation of the MLH1 promoter and/or mutation of BRAF is indicated (the   presence of a BRAF V600E mutation and/or MLH1 methylation suggests that the   tumor is sporadic and germline evaluation is probably not indicated; absence   of both MLH1 methylation and of BRAF V600E mutation suggests the possibility   of Seaman syndrome, and sequencing and/or large deletion/duplication testing   of germline MLH1 may be indicated)   Testing for methylation of the MLH1 promoter and mutation of BRAF is in   process and results will be  "supplemented.   There are exceptions to the above IHC interpretations. These results should   not be considered in isolation, and clinical correlation with genetic   counseling is recommended to assess the need for germline testing.   COMMENT:  The stomach and duodenum (specimen 3) shows a 6.5 cm mass involving   the pylorus with the majority of the tumor appearing to be in the duodenum.   The tumor shows poorly d ifferentiated sheets of blue cells with necrosis.   There is a significant amount of lymphocytic inflammatory infiltrate around   the edges of the tumor.  Immunostains show the tumor cells to be positive for   CK7 with patchy weak CDX2 and negative for synaptophysin and chromogranin.   There is also loss of MLH1 and PMS2 on MMR stains.  These features are   suggestive of medullary carcinoma.  Select slides reviewed by Dr. ITZEL Sánchez   who agrees with the diagnosis of poorly differentiated carcinoma with   medullary features.  Immunostains for CD 34 performed on blocks 3E and 3H   show no definitive lymphovascular invasion even though some areas suspicious   cells on routine H&E sections.  Appropriate positive controls   VC      Comment: Interp By Meredith Pappas MD, Signed on 04/12/2023 at 16:03   Supplemental Diagnosis      Abnormal   MLH1 Hypermethylation/BRAF Mutation   Result Summary   MLH1 HYPERMETHYLATION PRESENT/NO BRAF MUTATION IDENTIFIED   Result   Provided diagnosis: pylorus and proximal duodenum poorly differentiated   carcinoma with medullary features   Methylation status: Positive for MLH1 promoter hypermethylation   BRAF status: Wild-type (SEE INTERPRETATION)   Alexis Sotelo M.D.   Report attached   Performing location:   [address] [address]   "Disclaimer:  This case diagnosis was rendered completely by the outside   consultation pathologist and the case is electronically signed by an [facility]   pathologist listed " "below solely to release the report into the medical   record."          Assessment:       1. Duodenal adenocarcinoma    2. Metastasis to liver    3. History of Whipple procedure    4. Moderate malnutrition    5. Decreased appetite    6. Anemia, unspecified type    7. Neoplastic (malignant) related fatigue    8. Essential hypertension    9. Hyperlipidemia, unspecified hyperlipidemia type    10. Diabetes mellitus type 2, diet-controlled    11. Aortic atherosclerosis         Plan:     # Duodenal adenocarcinoma   Mrs. Simms is a pleasant 69 y.o. 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 Pathology did reveal some high grade features, including poorly differentiated carcinoma, 6.5 cm size.  We discussed at her initial visit he limited role of adjuvant chemotherapy in the stage II setting for small bowel adenocarcinoma.      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     CT CAP on 7/26/23 demonstrated concern for recurrence in liver.  PET/CT on 8/4/23 showed hypermetabolic inferior R hepatic lobe lesion consistent with metastatic recurrence.  CEA has risen as well.    Given her dMMR/MSI-H status, we recommended to start on single agent pembrolizumab 400 mg q6 weeks.  Patient was consented for immunotherapy.   She began pembrolizumab 8/24/23.    She was also enrolled in our protocol: [id number], Disparities in Results of Immune Checkpoint Inhibitor Treatment (DIRECT): A Prospective Cohort Study of Cancer Survivors Treated with anti-PD-L1 Immunotherapy in a Community Oncology Setting.  CRC is Isabela Lynnette.    No toxicities noted from pembrolizumab cycle 1.  PET/CT after cycle 2 shows resolution of liver metastasis.  Question of grade 1 pneumonitis with GGO on that scan but this has since resolved on CT chest done 12/15 which makes pneumonitis less likely diagnosis.  No other IO toxicities.  PET/CT after " cycle 4 shows resolution of hypermetabolic nodules and ground-glass in right middle lobe of lung. No evidence of other hypermetabolic areas.   PET/CT after cycle 6 shows no evidence of disease.  PET/CT after cycle 8 shows shows BIA.  PET/CT after cycle 11 shows BIA.  PET/CT after cycle 14 on 4/4/25 showed mild non-specific uptake near jet hepatis.  Will monitor for now.  She is asymptomatic.    Labs reviewed and adequate for treatment.    No toxicities from IO.  Will proceed with cycle 15 of pembrolizumab today.  Repeat imaging prior to cycle 17.     [facility] Tissue NGS: MSI-H, TMB 76.3 m/MB    # Decreased appetite, malnutrition  -Appetite ok with Marinol. Taking at night.   -weight slightly down. Nutrition following.  - Encouraged more protein intake.    # Anemia, neoplasm related fatigue, restless leg syndrome  -Microcytic anemia stable. Suspect thalassemia given longstanding nature.  -No signs of bleeding, platelets normal.   -Asymptomatic. Monitor.   -continue to monitor TSH    -Advised her to continue her iron supplements    # HTN, HLD, DM, aortic atherosclerosis   -BP mildly elevated in clinic today. Asymptomatic. Monitor. Has chronic bradycardia.  -Following with PCP.   -Continue medical management.     Patient is in agreement with the proposed treatment plan. All questions were answered to the patient's satisfaction. Pt knows to call clinic if anything is needed before the next clinic visit.    Eber Roberts MD  Hematology/Oncology  [facility]           code applied: patient requires or will require a continuous, longitudinal, and active collaborative plan of care related to this patient's health condition, metastatic duodenal cancer --the management of which requires the direction of a practitioner with specialized clinical knowledge, skill, and expertise.     Route Chart for Scheduling    Med Onc Chart Routing      Follow up with physician 3 months. for keytruda   Follow up  with YANDEL 6 weeks. for keytruda   Infusion scheduling note    Injection scheduling note    Labs CBC, CMP, CEA, TSH, ferritin and iron and TIBC   Scheduling:  Preferred lab:  Lab interval:     Imaging    Pharmacy appointment    Other referrals              Treatment Plan Information   OP PEMBROLIZUMAB 400MG Q6W Eber Roberts MD   Associated diagnosis: Duodenal adenocarcinoma Stage IV pT3, pN0, cM1 noted on 4/18/2023   Line of treatment: First Line  Treatment Goal: Palliative     Upcoming Treatment Dates - OP PEMBROLIZUMAB 400MG Q6W    3/31/2025       Chemotherapy       pembrolizumab (KEYTRUDA) 400 mg in 0.9% NaCl SolP 116 mL infusion  5/12/2025       Chemotherapy       pembrolizumab (KEYTRUDA) 400 mg in 0.9% NaCl SolP 116 mL infusion  6/23/2025       Chemotherapy       pembrolizumab (KEYTRUDA) 400 mg in 0.9% NaCl SolP 116 mL infusion

## 2025-04-07 NOTE — PLAN OF CARE
1508-Patient tolerated treatment well. PIV was removed and site dressed. Discharged without complaints or S/S of adverse event.   Instructed to call provider for any questions or concerns.

## 2025-04-07 NOTE — PLAN OF CARE
1412-Labs , hx, and medications reviewed, patient was seen in clinic prior to arrival. Assessment completed. Discussed plan of care with patient. Patient in agreement. Chair reclined and warm blanket and snack offered.

## 2025-04-08 ENCOUNTER — RESULTS FOLLOW-UP (OUTPATIENT)
Dept: HEMATOLOGY/ONCOLOGY | Facility: CLINIC | Age: 70
End: 2025-04-08

## 2025-04-08 ENCOUNTER — TELEPHONE (OUTPATIENT)
Dept: INTERNAL MEDICINE | Facility: CLINIC | Age: 70
End: 2025-04-08
Payer: MEDICARE

## 2025-04-08 DIAGNOSIS — Z12.31 ENCOUNTER FOR SCREENING MAMMOGRAM FOR BREAST CANCER: Primary | ICD-10-CM

## 2025-04-08 NOTE — TELEPHONE ENCOUNTER
----- Message from Hina sent at 4/8/2025 12:55 PM CDT -----  Contact: pt  Nan Ronda: [id number]: [id number]: Payal MorelandHome Phone      [phone] Phone      Not on file.Mobile          [phone]: pt states she would like to have her mammogram scheduled, Please advise [phone]

## 2025-04-14 DIAGNOSIS — D64.9 ANEMIA: Primary | ICD-10-CM

## 2025-04-22 ENCOUNTER — RESULTS FOLLOW-UP (OUTPATIENT)
Dept: INTERNAL MEDICINE | Facility: CLINIC | Age: 70
End: 2025-04-22

## 2025-04-22 ENCOUNTER — HOSPITAL ENCOUNTER (OUTPATIENT)
Dept: RADIOLOGY | Facility: HOSPITAL | Age: 70
Discharge: HOME | End: 2025-04-22
Attending: NURSE PRACTITIONER
Payer: MEDICARE

## 2025-04-22 VITALS — WEIGHT: 124 LBS | HEIGHT: 66 IN | BODY MASS INDEX: 19.93 KG/M2

## 2025-04-22 DIAGNOSIS — Z12.31 ENCOUNTER FOR SCREENING MAMMOGRAM FOR BREAST CANCER: ICD-10-CM

## 2025-04-22 PROCEDURE — 77067 SCR MAMMO BI INCL CAD: CPT | Mod: 26,,, | Performed by: RADIOLOGY

## 2025-04-22 PROCEDURE — 77063 BREAST TOMOSYNTHESIS BI: CPT | Mod: 26,,, | Performed by: RADIOLOGY

## 2025-04-22 PROCEDURE — 77067 SCR MAMMO BI INCL CAD: CPT | Mod: TC

## 2025-04-24 ENCOUNTER — PATIENT MESSAGE (OUTPATIENT)
Dept: ADMINISTRATIVE | Facility: HOSPITAL | Age: 70
End: 2025-04-24
Payer: MEDICARE

## 2025-05-19 ENCOUNTER — OFFICE VISIT (OUTPATIENT)
Dept: HEMATOLOGY/ONCOLOGY | Facility: CLINIC | Age: 70
End: 2025-05-19
Payer: MEDICARE

## 2025-05-19 ENCOUNTER — INFUSION (OUTPATIENT)
Dept: INFUSION THERAPY | Facility: HOSPITAL | Age: 70
End: 2025-05-19
Payer: MEDICARE

## 2025-05-19 VITALS
HEIGHT: 66 IN | BODY MASS INDEX: 20.13 KG/M2 | DIASTOLIC BLOOD PRESSURE: 42 MMHG | RESPIRATION RATE: 18 BRPM | HEART RATE: 40 BPM | WEIGHT: 125.25 LBS | TEMPERATURE: 98 F | OXYGEN SATURATION: 100 % | SYSTOLIC BLOOD PRESSURE: 121 MMHG

## 2025-05-19 VITALS
SYSTOLIC BLOOD PRESSURE: 142 MMHG | HEART RATE: 45 BPM | WEIGHT: 125.25 LBS | TEMPERATURE: 98 F | DIASTOLIC BLOOD PRESSURE: 65 MMHG | BODY MASS INDEX: 20.13 KG/M2 | HEIGHT: 66 IN | RESPIRATION RATE: 18 BRPM | OXYGEN SATURATION: 100 %

## 2025-05-19 DIAGNOSIS — C78.7 METASTASIS TO LIVER: ICD-10-CM

## 2025-05-19 DIAGNOSIS — R53.0 NEOPLASTIC (MALIGNANT) RELATED FATIGUE: ICD-10-CM

## 2025-05-19 DIAGNOSIS — Z90.49 HISTORY OF WHIPPLE PROCEDURE: ICD-10-CM

## 2025-05-19 DIAGNOSIS — C17.0 DUODENAL ADENOCARCINOMA: Primary | ICD-10-CM

## 2025-05-19 DIAGNOSIS — D49.9 IMMUNODEFICIENCY SECONDARY TO NEOPLASM: ICD-10-CM

## 2025-05-19 DIAGNOSIS — E11.9 DIABETES MELLITUS TYPE 2, DIET-CONTROLLED: ICD-10-CM

## 2025-05-19 DIAGNOSIS — E44.0 MODERATE MALNUTRITION: ICD-10-CM

## 2025-05-19 DIAGNOSIS — D84.81 IMMUNODEFICIENCY SECONDARY TO NEOPLASM: ICD-10-CM

## 2025-05-19 DIAGNOSIS — I70.0 AORTIC ATHEROSCLEROSIS: ICD-10-CM

## 2025-05-19 DIAGNOSIS — D64.9 ANEMIA, UNSPECIFIED TYPE: ICD-10-CM

## 2025-05-19 DIAGNOSIS — Z90.410 HISTORY OF WHIPPLE PROCEDURE: ICD-10-CM

## 2025-05-19 DIAGNOSIS — E78.5 HYPERLIPIDEMIA, UNSPECIFIED HYPERLIPIDEMIA TYPE: ICD-10-CM

## 2025-05-19 DIAGNOSIS — R63.0 DECREASED APPETITE: ICD-10-CM

## 2025-05-19 DIAGNOSIS — I10 ESSENTIAL HYPERTENSION: ICD-10-CM

## 2025-05-19 PROCEDURE — 99215 OFFICE O/P EST HI 40 MIN: CPT | Mod: S$GLB,,, | Performed by: REGISTERED NURSE

## 2025-05-19 PROCEDURE — 99999 PR PBB SHADOW E&M-EST. PATIENT-LVL IV: CPT | Mod: PBBFAC,,, | Performed by: REGISTERED NURSE

## 2025-05-19 PROCEDURE — 3078F DIAST BP <80 MM HG: CPT | Mod: CPTII,S$GLB,, | Performed by: REGISTERED NURSE

## 2025-05-19 PROCEDURE — G2211 COMPLEX E/M VISIT ADD ON: HCPCS | Mod: S$GLB,,, | Performed by: REGISTERED NURSE

## 2025-05-19 PROCEDURE — 3288F FALL RISK ASSESSMENT DOCD: CPT | Mod: CPTII,S$GLB,, | Performed by: REGISTERED NURSE

## 2025-05-19 PROCEDURE — 63600175 PHARM REV CODE 636 W HCPCS: Mod: JZ,TB | Performed by: REGISTERED NURSE

## 2025-05-19 PROCEDURE — 3008F BODY MASS INDEX DOCD: CPT | Mod: CPTII,S$GLB,, | Performed by: REGISTERED NURSE

## 2025-05-19 PROCEDURE — 4010F ACE/ARB THERAPY RXD/TAKEN: CPT | Mod: CPTII,S$GLB,, | Performed by: REGISTERED NURSE

## 2025-05-19 PROCEDURE — 1160F RVW MEDS BY RX/DR IN RCRD: CPT | Mod: CPTII,S$GLB,, | Performed by: REGISTERED NURSE

## 2025-05-19 PROCEDURE — 1126F AMNT PAIN NOTED NONE PRSNT: CPT | Mod: CPTII,S$GLB,, | Performed by: REGISTERED NURSE

## 2025-05-19 PROCEDURE — 96413 CHEMO IV INFUSION 1 HR: CPT

## 2025-05-19 PROCEDURE — 3074F SYST BP LT 130 MM HG: CPT | Mod: CPTII,S$GLB,, | Performed by: REGISTERED NURSE

## 2025-05-19 PROCEDURE — 1159F MED LIST DOCD IN RCRD: CPT | Mod: CPTII,S$GLB,, | Performed by: REGISTERED NURSE

## 2025-05-19 PROCEDURE — 25000003 PHARM REV CODE 250: Performed by: REGISTERED NURSE

## 2025-05-19 PROCEDURE — 1101F PT FALLS ASSESS-DOCD LE1/YR: CPT | Mod: CPTII,S$GLB,, | Performed by: REGISTERED NURSE

## 2025-05-19 RX ORDER — SODIUM CHLORIDE 0.9 % (FLUSH) 0.9 %
10 SYRINGE (ML) INJECTION
Status: CANCELLED | OUTPATIENT
Start: 2025-05-19

## 2025-05-19 RX ORDER — EPINEPHRINE 0.3 MG/.3ML
0.3 INJECTION SUBCUTANEOUS ONCE AS NEEDED
Status: DISCONTINUED | OUTPATIENT
Start: 2025-05-19 | End: 2025-05-19 | Stop reason: HOSPADM

## 2025-05-19 RX ORDER — HEPARIN 100 UNIT/ML
500 SYRINGE INTRAVENOUS
Status: CANCELLED | OUTPATIENT
Start: 2025-05-19

## 2025-05-19 RX ORDER — DIPHENHYDRAMINE HYDROCHLORIDE 50 MG/ML
50 INJECTION, SOLUTION INTRAMUSCULAR; INTRAVENOUS ONCE AS NEEDED
Status: DISCONTINUED | OUTPATIENT
Start: 2025-05-19 | End: 2025-05-19 | Stop reason: HOSPADM

## 2025-05-19 RX ORDER — DIPHENHYDRAMINE HYDROCHLORIDE 50 MG/ML
50 INJECTION, SOLUTION INTRAMUSCULAR; INTRAVENOUS ONCE AS NEEDED
Status: CANCELLED | OUTPATIENT
Start: 2025-05-19

## 2025-05-19 RX ORDER — EPINEPHRINE 0.3 MG/.3ML
0.3 INJECTION SUBCUTANEOUS ONCE AS NEEDED
Status: CANCELLED | OUTPATIENT
Start: 2025-05-19

## 2025-05-19 RX ADMIN — SODIUM CHLORIDE 400 MG: 9 INJECTION, SOLUTION INTRAVENOUS at 01:05

## 2025-05-19 NOTE — PROGRESS NOTES
MEDICAL ONCOLOGY - ESTABLISHED PATIENT VISIT    Reason for visit: duodenal adenocarcinoma     Best Contact Phone Number(s): [phone] (home)      Cancer/Stage/TNM:    Cancer Staging   Duodenal adenocarcinoma  Staging form: Small Intestine - Adenocarcinoma, AJCC 8th Edition  - Pathologic stage from 3/15/2023: Stage IV (pT3, pN0, cM1) - Signed by Eber Roberts MD on 7/29/2024       Oncology History   Duodenal adenocarcinoma   3/10/2023 Initial Diagnosis    Duodenal adenocarcinoma     3/15/2023 Surgery    Whipple  dMMR with loss of MLH1/PMS2; MLH1 promoter hypermethylation present suggesting a sporadic tumor.      3/15/2023 Cancer Staged    Staging form: Small Intestine - Adenocarcinoma, AJCC 8th Edition  - Pathologic stage from 3/15/2023: Stage IV (pT3, pN0, cM1)     8/24/2023 -  Chemotherapy    Treatment Summary   Plan Name: OP PEMBROLIZUMAB 400MG Q6W  Treatment Goal: Palliative  Status: Active  Start Date: 8/24/2023  End Date: 6/23/2025 (Planned)  Provider: Eber Roberts MD  Chemotherapy: [No matching medication found in this treatment plan]          Interval History: 69 y.o. female with recurrent MSI-H metastatic duodenal adenocarcinoma who presents for follow-up prior to cycle 16 of pembrolizumab. She is doing well overall. She thinks she had a virus last week as she had 2-3 days of diarrhea and low appetite. Now resolved and back to baseline. She is eating better and monitoring her weight. No nausea, vomiting, or pain.     Presents to clinic with her . ECOG 0.     ROS:   Review of Systems   Constitutional:  Positive for weight loss. Negative for chills, fever and malaise/fatigue.   HENT:  Negative for nosebleeds and sore throat.    Respiratory:  Negative for cough and shortness of breath.    Cardiovascular:  Negative for chest pain, palpitations and leg swelling.   Gastrointestinal:  Negative for blood in stool, constipation, diarrhea, heartburn, nausea and vomiting.   Genitourinary:   Negative for dysuria, frequency, hematuria and urgency.   Musculoskeletal:  Negative for falls and myalgias.   Skin:  Negative for itching and rash.   Neurological:  Negative for dizziness, tingling, weakness and headaches.   Psychiatric/Behavioral:  The patient is not nervous/anxious and does not have insomnia.        Past Medical History:   Past Medical History:   Diagnosis Date    Abnormal Pap smear of cervix     Arthritis     Duodenal adenocarcinoma     Hypertension     Hyperthyroidism         Past Surgical History:   Past Surgical History:   Procedure Laterality Date    CATARACT EXTRACTION W/  INTRAOCULAR LENS IMPLANT Right 8/8/2019    Procedure: EXTRACTION, CATARACT, WITH IOL INSERTION;  Surgeon: Spenser Lee MD;  Location[facility];  Service: Ophthalmology;  Laterality: Right;    COLONOSCOPY N/A 7/11/2024    Procedure: COLONOSCOPY;  Surgeon: Luis Hayes MD;  Location: [facility];  Service: Endoscopy;  Laterality: N/A;    ESOPHAGOGASTRODUODENOSCOPY N/A 3/10/2023    Procedure: EGD (ESOPHAGOGASTRODUODENOSCOPY);  Surgeon: Shashi Tapia MD;  Location: [facility] ([address] [place] [address]);  Service: Endoscopy;  Laterality: N/A;    EYE SURGERY      HYSTERECTOMY      OOPHORECTOMY      PHACOEMULSIFICATION OF CATARACT Right 8/8/2019    Procedure: PHACOEMULSIFICATION, CATARACT;  Surgeon: Spenser Lee MD;  Location: [facility];  Service: Ophthalmology;  Laterality: Right;    WHIPPLE PROCEDURE N/A 3/15/2023    Procedure: WHIPPLE PROCEDURE;  Surgeon: Nick Paez MD;  Location: [address];  Service: General;  Laterality: N/A;        Family History:   Family History   Problem Relation Name Age of Onset    Cancer Mother      Breast cancer Neg Hx      Colon cancer Neg Hx      Ovarian cancer Neg Hx          Social History:   Social History     Tobacco Use    Smoking status: Every Day     Current packs/day: 0.25     Average packs/day: 0.3 packs/day for 30.0 years (7.5 ttl pk-yrs)     Types: Cigarettes     "Smokeless tobacco: Never    Tobacco comments:     About 7-8 cigs a day   Substance Use Topics    Alcohol use: Yes     Alcohol/week: 1.0 standard drink of alcohol     Types: 1 Cans of beer per week     Comment: social        I have reviewed and updated the patient's past medical, surgical, family and social histories.    Allergies:   Review of patient's allergies indicates:   Allergen Reactions    Aspirin Other (See Comments)     Pt states it is "hard on her stomach" She can tolerate a low dose aspirin    Pcn [penicillins]      Childhood - Tolerated ceftriaxone and cefazolin with no documented issues in the past         Medications:   Current Outpatient Medications   Medication Sig Dispense Refill    acetaminophen (TYLENOL) 650 MG TbSR Take 650 mg by mouth as needed.      atorvastatin (LIPITOR) 40 MG tablet Take 1 tablet (40 mg total) by mouth once daily. 90 tablet 11    atorvastatin (LIPITOR) 40 MG tablet Take 1 tablet (40 mg total) by mouth once daily. 90 tablet 11    droNABinol (MARINOL) 5 MG capsule Take 1 capsule (5 mg total) by mouth 2 (two) times daily before meals. 60 capsule 2    famotidine (PEPCID) 20 MG tablet Take 1 tablet (20 mg total) by mouth 2 (two) times daily. 60 tablet 11    famotidine (PEPCID) 20 MG tablet Take 1 tablet (20 mg total) by mouth 2 (two) times daily. 60 tablet 11    gabapentin (NEURONTIN) 300 MG capsule Take 1 capsule (300 mg total) by mouth 3 (three) times daily. 90 capsule 5    lisinopriL-hydrochlorothiazide (PRINZIDE,ZESTORETIC) 20-12.5 mg per tablet Take 1 tablet by mouth once daily. 90 tablet 11    omeprazole (PRILOSEC) 40 MG capsule Take 1 capsule (40 mg total) by mouth every morning. 30 capsule 11    omeprazole (PRILOSEC) 40 MG capsule Take 1 capsule (40 mg total) by mouth every morning. 30 capsule 11     No current facility-administered medications for this visit.      Physical Exam:   BP (!) 121/42   Pulse (!) 40   Temp 98.1 °F (36.7 °C) (Oral)   Resp 18   Ht 5' 6" (1.676 " m)   Wt 56.8 kg (125 lb 3.5 oz)   SpO2 100%   BMI 20.21 kg/m²      Physical Exam  Vitals reviewed.   Constitutional:       General: She is not in acute distress.     Appearance: Normal appearance. She is normal weight. She is not ill-appearing, toxic-appearing or diaphoretic.   HENT:      Head: Normocephalic and atraumatic.      Right Ear: External ear normal.      Left Ear: External ear normal.      Nose: Nose normal.      Mouth/Throat:      Pharynx: Oropharynx is clear.   Eyes:      General: No scleral icterus.     Conjunctiva/sclera: Conjunctivae normal.      Pupils: Pupils are equal, round, and reactive to light.   Cardiovascular:      Rate and Rhythm: Normal rate.   Pulmonary:      Effort: Pulmonary effort is normal. No respiratory distress.      Breath sounds: No wheezing.   Abdominal:      General: There is no distension.      Tenderness: There is no abdominal tenderness.   Musculoskeletal:      Cervical back: Normal range of motion.      Right lower leg: No edema.      Left lower leg: No edema.   Skin:     General: Skin is warm and dry.      Coloration: Skin is not jaundiced or pale.      Findings: No bruising, erythema or rash.   Neurological:      General: No focal deficit present.      Mental Status: She is alert and oriented to person, place, and time. Mental status is at baseline.      Cranial Nerves: No cranial nerve deficit.      Sensory: No sensory deficit.      Motor: No weakness.      Gait: Gait normal.   Psychiatric:         Mood and Affect: Mood normal.         Behavior: Behavior normal.         Thought Content: Thought content normal.         Judgment: Judgment normal.         Labs:   Recent Results (from the past 48 hours)   CBC Oncology    Collection Time: 05/19/25 10:02 AM   Result Value Ref Range    WBC 4.84 3.90 - 12.70 K/uL    RBC 5.01 4.00 - 5.40 M/uL    HGB 11.3 (L) 12.0 - 16.0 gm/dL    HCT 36.6 (L) 37.0 - 48.5 %    MCV 73 (L) 82 - 98 fL    MCH 22.6 (L) 27.0 - 31.0 pg    MCHC 30.9 (L)  32.0 - 36.0 g/dL    RDW 18.7 (H) 11.5 - 14.5 %    Platelet Count 220 150 - 450 K/uL    MPV 11.1 9.2 - 12.9 fL    Neut # 1.96 1.8 - 7.7 K/uL    Imm Grans # 0.01 0.00 - 0.04 K/uL     Lab work reviewed.    Imaging:    PET/CT - 4/4/25:    Impression:     History of duodenal cancer, status post Whipple.  New focal uptake about the jet hepatis, as above.  Indeterminate, could represent benign inflammatory uptake about an anastomosis or disease recurrence.  Recommend close follow-up.     Additional findings as above.    Path:   3/15/23 - Whipple  Final Pathologic Diagnosis      Abnormal   1. Lymph node, hepatic cautery, resection:   - One lymph node negative for metastatic carcinoma (0/1).   - See synoptic report.   2. Gallbladder, cholecystectomy:   - Benign gallbladder with no significant histologic abnormality.   3. Pancreas, duodenum, common bile duct, and partial gastrectomy,   pancreaticoduodenectomy (Whipple specimen):   - Poorly differentiated carcinoma with medullary features.   - See synoptic report.   - See comment.   Synoptic Report   Procedure: Pancreaticoduodenectomy with partial gastrectomy (Whipple   specimen)   Tumor site:  Pylorus and proximal duodenum   Histologic type: Poorly differentiated carcinoma with medullary features   Histologic grade: G3, poorly differentiated   Tumor size:  6.5 cm in greatest dimension grossly   Tumor extent:   Invades through muscularis propria into subserosa, or extends   into nonperitonealized perimuscular tissue (mesentery or retroperitoneum)   without serosal penetration   Macroscopic tumor perforation:  Not identified   Lymphovascular inv asion:  Not identified, see comment   Margin status for invasive carcinoma:  All margins negative for invasive   carcinoma   Margin status for dysplasia:  All margins negative for carcinoma in Situ   (high-grade dysplasia)/adenoma   Regional lymph node status: Regional lymph nodes present        All Regional lymph nodes negative for  tumor        Number of lymph nodes examined:  15 (including specimens 1 and 3)   PATHOLOGIC STAGE CLASSIFICATION (pTNM, AJCC 8th Edition):  p T3 N0   Additional findings:  Stomach with intestinal metaplasia (immunostain for   H.pylori negative), 2 lymph nodes with fibrosis and calcifications (GMS stain   for fungal organisms and AFB stain negative)   Ancillary studies:  Immunohistochemistry (IHC) Testing for Mismatch Repair   (MMR) Proteins   MLH1- Loss of nuclear expression   PMS2 - Loss of nuclear expression   MSH2 - Intact nuclear expression   MSH6 - Intact nuclear expression   Background nonneoplastic tissue/internal control with intact nuclear   expression   IHC Interpret ation:  Loss of nuclear expression of MLH1 and PMS2: testing for   methylation of the MLH1 promoter and/or mutation of BRAF is indicated (the   presence of a BRAF V600E mutation and/or MLH1 methylation suggests that the   tumor is sporadic and germline evaluation is probably not indicated; absence   of both MLH1 methylation and of BRAF V600E mutation suggests the possibility   of Seaman syndrome, and sequencing and/or large deletion/duplication testing   of germline MLH1 may be indicated)   Testing for methylation of the MLH1 promoter and mutation of BRAF is in   process and results will be supplemented.   There are exceptions to the above IHC interpretations. These results should   not be considered in isolation, and clinical correlation with genetic   counseling is recommended to assess the need for germline testing.   COMMENT:  The stomach and duodenum (specimen 3) shows a 6.5 cm mass involving   the pylorus with the majority of the tumor appearing to be in the duodenum.   The tumor shows poorly d ifferentiated sheets of blue cells with necrosis.   There is a significant amount of lymphocytic inflammatory infiltrate around   the edges of the tumor.  Immunostains show the tumor cells to be positive for   CK7 with patchy weak CDX2 and negative  "for synaptophysin and chromogranin.   There is also loss of MLH1 and PMS2 on MMR stains.  These features are   suggestive of medullary carcinoma.  Select slides reviewed by Dr. ITZEL Sánchez   who agrees with the diagnosis of poorly differentiated carcinoma with   medullary features.  Immunostains for CD 34 performed on blocks 3E and 3H   show no definitive lymphovascular invasion even though some areas suspicious   cells on routine H&E sections.  Appropriate positive controls   VC      Comment: Interp By Meredith Pappas MD, Signed on 04/12/2023 at 16:03   Supplemental Diagnosis      Abnormal   MLH1 Hypermethylation/BRAF Mutation   Result Summary   MLH1 HYPERMETHYLATION PRESENT/NO BRAF MUTATION IDENTIFIED   Result   Provided diagnosis: pylorus and proximal duodenum poorly differentiated   carcinoma with medullary features   Methylation status: Positive for MLH1 promoter hypermethylation   BRAF status: Wild-type (SEE INTERPRETATION)   Alexis Sotelo M.D.   Report attached   Performing location:   [place] [address]   "Disclaimer:  This case diagnosis was rendered completely by the outside   consultation pathologist and the case is electronically signed by an [facility]   pathologist listed below solely to release the report into the medical   record."          Assessment:       1. Duodenal adenocarcinoma    2. Metastasis to liver    3. Immunodeficiency secondary to neoplasm    4. History of Whipple procedure    5. Moderate malnutrition    6. Decreased appetite    7. Neoplastic (malignant) related fatigue    8. Anemia, unspecified type    9. Essential hypertension    10. Hyperlipidemia, unspecified hyperlipidemia type    11. Diabetes mellitus type 2, diet-controlled    12. Aortic atherosclerosis       Plan:     # Duodenal adenocarcinoma   Mrs. Simms is a pleasant 69 y.o. female with what was initially stage II duodenal adenocarcinoma, dMMR s/p " Whipple procedure on 3/15/23. Pathology did reveal some high grade features, including poorly differentiated carcinoma, 6.5 cm size.  We discussed at her initial visit he limited role of adjuvant chemotherapy in the stage II setting for small bowel adenocarcinoma.      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     CT CAP on 7/26/23 demonstrated concern for recurrence in liver.  PET/CT on 8/4/23 showed hypermetabolic inferior R hepatic lobe lesion consistent with metastatic recurrence.  CEA has risen as well.    Given her dMMR/MSI-H status, we recommended to start on single agent pembrolizumab 400 mg q6 weeks.  Patient was consented for immunotherapy.   She began pembrolizumab 8/24/23.    She was also enrolled in our protocol: [id number], Disparities in Results of Immune Checkpoint Inhibitor Treatment (DIRECT): A Prospective Cohort Study of Cancer Survivors Treated with anti-PD-L1 Immunotherapy in a Community Oncology Setting.  CRC is Isabela Lynnette.    No toxicities noted from pembrolizumab cycle 1.  PET/CT after cycle 2 shows resolution of liver metastasis.  Question of grade 1 pneumonitis with GGO on that scan but this has since resolved on CT chest done 12/15 which makes pneumonitis less likely diagnosis.  No other IO toxicities.  PET/CT after cycle 4 shows resolution of hypermetabolic nodules and ground-glass in right middle lobe of lung. No evidence of other hypermetabolic areas.   PET/CT after cycle 6 shows no evidence of disease.  PET/CT after cycle 8 shows shows BIA.  PET/CT after cycle 11 shows BIA.  PET/CT after cycle 14 on 4/4/25 showed mild non-specific uptake near jet hepatis.  Will monitor for now.  She is asymptomatic.    Labs reviewed and adequate for treatment.    No toxicities from IO.  Will proceed with cycle 16 of pembrolizumab today.  Repeat imaging prior to cycle 17.     [facility] Tissue NGS: MSI-H, TMB 76.3 m/MB    # Decreased  appetite, malnutrition  - Appetite ok with Marinol. Taking at night.   - weight slightly down. Nutrition following.  - Encouraged more protein intake.    # Anemia, neoplasm related fatigue, restless leg syndrome  -Microcytic anemia stable. Suspect thalassemia given longstanding nature.  -No signs of bleeding, platelets normal.   -Asymptomatic. Monitor.   -continue to monitor TSH    -Advised her to continue her iron supplements    # HTN, HLD, DM, aortic atherosclerosis   - BP controlled in clinic today. Monitor. Has chronic bradycardia.  - Following with PCP.   - Continue medical management.     Patient is in agreement with the proposed treatment plan. All questions were answered to the patient's satisfaction. Pt knows to call clinic if anything is needed before the next clinic visit.    Patient discussed with collaborating physician, Dr. Roberts.    At least 40 minutes were spent today on this encounter including face to face time with the patient, data gathering/interpretation and documentation.       Radha Padilla, MSN, APRN, ACCNS-AG  Hematology and Medical Oncology  Clinical Nurse Specialist to Dr. Roberts, Dr. Schilling & Dr. Rosales         code applied: patient requires or will require a continuous, longitudinal, and active collaborative plan of care related to this patient's health condition, colon cancer --the management of which requires the direction of a practitioner with specialized clinical knowledge, skill, and expertise.     Route Chart for Scheduling    Med Onc Chart Routing      Follow up with physician 6 weeks. with labs and scans for infusion   Follow up with YANDEL    Infusion scheduling note   keytruda every 6 weeks   Injection scheduling note    Labs CBC, CMP, CEA and TSH   Scheduling:  Preferred lab:  Lab interval: every 6 weeks     Imaging PET scan   1-2 days prior to MD visit in 6 weeks please   Pharmacy appointment    Other referrals            Treatment Plan Information   OP PEMBROLIZUMAB  400MG Q6W Eber Roberts MD   Associated diagnosis: Duodenal adenocarcinoma Stage IV pT3, pN0, cM1 noted on 4/18/2023   Line of treatment: First Line  Treatment Goal: Palliative     Upcoming Treatment Dates - OP PEMBROLIZUMAB 400MG Q6W    5/12/2025       Chemotherapy       pembrolizumab (KEYTRUDA) 400 mg in 0.9% NaCl SolP 116 mL infusion  6/23/2025       Chemotherapy       pembrolizumab (KEYTRUDA) 400 mg in 0.9% NaCl SolP 116 mL infusion

## 2025-05-19 NOTE — PLAN OF CARE
"BP (!) 121/42 (Patient Position: Sitting)   Pulse (!) 40   Temp 98.1 °F (36.7 °C)   Resp 18   Ht 5' 6" (1.676 m)   Wt 56.8 kg (125 lb 3.5 oz)   SpO2 100%   BMI 20.21 kg/m² \  Pleasant, alert and oriented patient to Chemo Infusion per self with  for C16  Keytruda - VSS and PIV started x2 attempt with immediate flashback, flushed with NS, site secured and patient tolerated procedure well - patient tolerated treatment with no AVE's, PIV discontinued, pressure dressing applied, and patient discharged to home with no concerns - RTC on 6/30/25        "

## 2025-05-27 RX ORDER — GABAPENTIN 300 MG/1
300 CAPSULE ORAL 3 TIMES DAILY
Qty: 90 CAPSULE | Refills: 0 | OUTPATIENT
Start: 2025-05-27 | End: 2025-06-28

## 2025-06-19 NOTE — DISCHARGE INSTRUCTIONS
**Follow up with ortho in 24-48 hours. Return to ER with worsening of symptoms.  Continue Mobic as prescribed.    **Our goal in the emergency department is to always give you outstanding care and exceptional service. You may receive a survey by mail or e-mail in the next week regarding your experience in our ED. We would greatly appreciate your completing and returning the survey. Your feedback provides us with a way to recognize our staff who give very good care and it helps us learn how to improve when your experience was below our aspiration of excellence.    Is This A New Presentation, Or A Follow-Up?: Skin Lesion How Severe Is Your Skin Lesion?: mild Additional History: FBSE.  \\nPt states the lesion on her toe has grown out since she got this appt.

## 2025-06-27 ENCOUNTER — HOSPITAL ENCOUNTER (OUTPATIENT)
Dept: RADIOLOGY | Facility: HOSPITAL | Age: 70
Discharge: HOME | End: 2025-06-27
Attending: REGISTERED NURSE
Payer: MEDICARE

## 2025-06-27 DIAGNOSIS — Z90.410 HISTORY OF WHIPPLE PROCEDURE: ICD-10-CM

## 2025-06-27 DIAGNOSIS — C78.7 METASTASIS TO LIVER: ICD-10-CM

## 2025-06-27 DIAGNOSIS — C17.0 DUODENAL ADENOCARCINOMA: ICD-10-CM

## 2025-06-27 DIAGNOSIS — Z90.49 HISTORY OF WHIPPLE PROCEDURE: ICD-10-CM

## 2025-06-27 LAB — POCT GLUCOSE: 86 MG/DL (ref 70–110)

## 2025-06-27 PROCEDURE — A9552 F18 FDG: HCPCS | Performed by: REGISTERED NURSE

## 2025-06-27 PROCEDURE — 78815 PET IMAGE W/CT SKULL-THIGH: CPT | Mod: 26,PS,, | Performed by: NUCLEAR MEDICINE

## 2025-06-27 PROCEDURE — 78815 PET IMAGE W/CT SKULL-THIGH: CPT | Mod: TC

## 2025-06-27 RX ORDER — FLUDEOXYGLUCOSE F18 500 MCI/ML
12 INJECTION INTRAVENOUS
Status: COMPLETED | OUTPATIENT
Start: 2025-06-27 | End: 2025-06-27

## 2025-06-27 RX ADMIN — FLUDEOXYGLUCOSE F-18 9.58 MILLICURIE: 500 INJECTION INTRAVENOUS at 09:06

## 2025-06-30 ENCOUNTER — OFFICE VISIT (OUTPATIENT)
Dept: HEMATOLOGY/ONCOLOGY | Facility: CLINIC | Age: 70
End: 2025-06-30
Payer: MEDICARE

## 2025-06-30 ENCOUNTER — RESULTS FOLLOW-UP (OUTPATIENT)
Dept: HEMATOLOGY/ONCOLOGY | Facility: CLINIC | Age: 70
End: 2025-06-30

## 2025-06-30 ENCOUNTER — INFUSION (OUTPATIENT)
Dept: INFUSION THERAPY | Facility: HOSPITAL | Age: 70
End: 2025-06-30
Payer: MEDICARE

## 2025-06-30 ENCOUNTER — PATIENT OUTREACH (OUTPATIENT)
Dept: ADMINISTRATIVE | Facility: HOSPITAL | Age: 70
End: 2025-06-30
Payer: MEDICARE

## 2025-06-30 VITALS
WEIGHT: 128.06 LBS | TEMPERATURE: 98 F | HEIGHT: 66 IN | SYSTOLIC BLOOD PRESSURE: 161 MMHG | OXYGEN SATURATION: 99 % | HEART RATE: 40 BPM | BODY MASS INDEX: 20.58 KG/M2 | RESPIRATION RATE: 18 BRPM | DIASTOLIC BLOOD PRESSURE: 43 MMHG

## 2025-06-30 VITALS
WEIGHT: 128.06 LBS | SYSTOLIC BLOOD PRESSURE: 172 MMHG | RESPIRATION RATE: 18 BRPM | HEART RATE: 56 BPM | BODY MASS INDEX: 20.58 KG/M2 | HEIGHT: 66 IN | TEMPERATURE: 98 F | DIASTOLIC BLOOD PRESSURE: 60 MMHG | OXYGEN SATURATION: 98 %

## 2025-06-30 DIAGNOSIS — C78.7 METASTASIS TO LIVER: ICD-10-CM

## 2025-06-30 DIAGNOSIS — E44.0 MODERATE MALNUTRITION: ICD-10-CM

## 2025-06-30 DIAGNOSIS — R53.0 NEOPLASTIC (MALIGNANT) RELATED FATIGUE: ICD-10-CM

## 2025-06-30 DIAGNOSIS — E11.9 DIABETES MELLITUS TYPE 2, DIET-CONTROLLED: Primary | ICD-10-CM

## 2025-06-30 DIAGNOSIS — I70.0 AORTIC ATHEROSCLEROSIS: ICD-10-CM

## 2025-06-30 DIAGNOSIS — D64.9 ANEMIA, UNSPECIFIED TYPE: ICD-10-CM

## 2025-06-30 DIAGNOSIS — D84.81 IMMUNODEFICIENCY SECONDARY TO NEOPLASM: ICD-10-CM

## 2025-06-30 DIAGNOSIS — Z90.410 HISTORY OF WHIPPLE PROCEDURE: ICD-10-CM

## 2025-06-30 DIAGNOSIS — R63.0 DECREASED APPETITE: ICD-10-CM

## 2025-06-30 DIAGNOSIS — C17.0 DUODENAL ADENOCARCINOMA: Primary | ICD-10-CM

## 2025-06-30 DIAGNOSIS — D49.9 IMMUNODEFICIENCY SECONDARY TO NEOPLASM: ICD-10-CM

## 2025-06-30 DIAGNOSIS — I10 ESSENTIAL HYPERTENSION: ICD-10-CM

## 2025-06-30 DIAGNOSIS — Z90.49 HISTORY OF WHIPPLE PROCEDURE: ICD-10-CM

## 2025-06-30 DIAGNOSIS — E11.9 DIABETES MELLITUS TYPE 2, DIET-CONTROLLED: ICD-10-CM

## 2025-06-30 DIAGNOSIS — E78.5 HYPERLIPIDEMIA, UNSPECIFIED HYPERLIPIDEMIA TYPE: ICD-10-CM

## 2025-06-30 PROCEDURE — 63600175 PHARM REV CODE 636 W HCPCS: Mod: JZ,TB | Performed by: INTERNAL MEDICINE

## 2025-06-30 PROCEDURE — 4010F ACE/ARB THERAPY RXD/TAKEN: CPT | Mod: CPTII,S$GLB,, | Performed by: INTERNAL MEDICINE

## 2025-06-30 PROCEDURE — 3288F FALL RISK ASSESSMENT DOCD: CPT | Mod: CPTII,S$GLB,, | Performed by: INTERNAL MEDICINE

## 2025-06-30 PROCEDURE — 3077F SYST BP >= 140 MM HG: CPT | Mod: CPTII,S$GLB,, | Performed by: INTERNAL MEDICINE

## 2025-06-30 PROCEDURE — 1159F MED LIST DOCD IN RCRD: CPT | Mod: CPTII,S$GLB,, | Performed by: INTERNAL MEDICINE

## 2025-06-30 PROCEDURE — 1126F AMNT PAIN NOTED NONE PRSNT: CPT | Mod: CPTII,S$GLB,, | Performed by: INTERNAL MEDICINE

## 2025-06-30 PROCEDURE — G2211 COMPLEX E/M VISIT ADD ON: HCPCS | Mod: S$GLB,,, | Performed by: INTERNAL MEDICINE

## 2025-06-30 PROCEDURE — 3044F HG A1C LEVEL LT 7.0%: CPT | Mod: CPTII,S$GLB,, | Performed by: INTERNAL MEDICINE

## 2025-06-30 PROCEDURE — 3008F BODY MASS INDEX DOCD: CPT | Mod: CPTII,S$GLB,, | Performed by: INTERNAL MEDICINE

## 2025-06-30 PROCEDURE — 1101F PT FALLS ASSESS-DOCD LE1/YR: CPT | Mod: CPTII,S$GLB,, | Performed by: INTERNAL MEDICINE

## 2025-06-30 PROCEDURE — 99999 PR PBB SHADOW E&M-EST. PATIENT-LVL III: CPT | Mod: PBBFAC,,, | Performed by: INTERNAL MEDICINE

## 2025-06-30 PROCEDURE — 99215 OFFICE O/P EST HI 40 MIN: CPT | Mod: S$GLB,,, | Performed by: INTERNAL MEDICINE

## 2025-06-30 PROCEDURE — 25000003 PHARM REV CODE 250: Performed by: INTERNAL MEDICINE

## 2025-06-30 PROCEDURE — 3078F DIAST BP <80 MM HG: CPT | Mod: CPTII,S$GLB,, | Performed by: INTERNAL MEDICINE

## 2025-06-30 PROCEDURE — 96413 CHEMO IV INFUSION 1 HR: CPT

## 2025-06-30 RX ORDER — SODIUM CHLORIDE 0.9 % (FLUSH) 0.9 %
10 SYRINGE (ML) INJECTION
OUTPATIENT
Start: 2025-07-07

## 2025-06-30 RX ORDER — DIPHENHYDRAMINE HYDROCHLORIDE 50 MG/ML
50 INJECTION, SOLUTION INTRAMUSCULAR; INTRAVENOUS ONCE AS NEEDED
Status: CANCELLED | OUTPATIENT
Start: 2025-06-30

## 2025-06-30 RX ORDER — EPINEPHRINE 0.3 MG/.3ML
0.3 INJECTION SUBCUTANEOUS ONCE AS NEEDED
OUTPATIENT
Start: 2025-07-07

## 2025-06-30 RX ORDER — EPINEPHRINE 0.3 MG/.3ML
0.3 INJECTION SUBCUTANEOUS ONCE AS NEEDED
Status: CANCELLED | OUTPATIENT
Start: 2025-06-30

## 2025-06-30 RX ORDER — HEPARIN 100 UNIT/ML
500 SYRINGE INTRAVENOUS
OUTPATIENT
Start: 2025-07-07

## 2025-06-30 RX ORDER — SODIUM CHLORIDE 0.9 % (FLUSH) 0.9 %
10 SYRINGE (ML) INJECTION
Status: CANCELLED | OUTPATIENT
Start: 2025-06-30

## 2025-06-30 RX ORDER — EPINEPHRINE 0.3 MG/.3ML
0.3 INJECTION SUBCUTANEOUS ONCE AS NEEDED
Status: DISCONTINUED | OUTPATIENT
Start: 2025-06-30 | End: 2025-06-30 | Stop reason: HOSPADM

## 2025-06-30 RX ORDER — HEPARIN 100 UNIT/ML
500 SYRINGE INTRAVENOUS
Status: DISCONTINUED | OUTPATIENT
Start: 2025-06-30 | End: 2025-06-30 | Stop reason: HOSPADM

## 2025-06-30 RX ORDER — DIPHENHYDRAMINE HYDROCHLORIDE 50 MG/ML
50 INJECTION, SOLUTION INTRAMUSCULAR; INTRAVENOUS ONCE AS NEEDED
Status: DISCONTINUED | OUTPATIENT
Start: 2025-06-30 | End: 2025-06-30 | Stop reason: HOSPADM

## 2025-06-30 RX ORDER — HEPARIN 100 UNIT/ML
500 SYRINGE INTRAVENOUS
Status: CANCELLED | OUTPATIENT
Start: 2025-06-30

## 2025-06-30 RX ORDER — SODIUM CHLORIDE 0.9 % (FLUSH) 0.9 %
10 SYRINGE (ML) INJECTION
Status: DISCONTINUED | OUTPATIENT
Start: 2025-06-30 | End: 2025-06-30 | Stop reason: HOSPADM

## 2025-06-30 RX ADMIN — SODIUM CHLORIDE 400 MG: 9 INJECTION, SOLUTION INTRAVENOUS at 03:06

## 2025-06-30 RX ADMIN — SODIUM CHLORIDE: 9 INJECTION, SOLUTION INTRAVENOUS at 03:06

## 2025-06-30 NOTE — LETTER
AUTHORIZATION FOR RELEASE OF   CONFIDENTIAL INFORMATION        We are seeing Nan Simms, date of birth 1955, in the clinic at [facility] INTERNAL MEDICINE II. Payal Moreland NP is the patient's PCP. Nan Simms has an outstanding lab/procedure at the time we reviewed her chart. In order to help keep her health information updated, she has authorized us to request the following medical record(s):                                              ( X )  EYE EXAM                    Please fax records to [facility], Daigle, Linsey L., NP  at [phone] or email to [email or web address].                Patient Name: Nan Simms  : 1955  Patient Phone #: [phone]

## 2025-06-30 NOTE — PROGRESS NOTES
Portal active: 6/20/25  Chart reviewed, immunization record updated.  No new results noted on [facility] or [employer] web site.  Care Everywhere updated.   Patient care coordination note  LOV with PCP 11/4/24

## 2025-06-30 NOTE — PROGRESS NOTES
MEDICAL ONCOLOGY - ESTABLISHED PATIENT VISIT    Reason for visit: duodenal adenocarcinoma     Best Contact Phone Number(s): [phone] (home)      Cancer/Stage/TNM:    Cancer Staging   Duodenal adenocarcinoma  Staging form: Small Intestine - Adenocarcinoma, AJCC 8th Edition  - Pathologic stage from 3/15/2023: Stage IV (pT3, pN0, cM1) - Signed by Eber Robrets MD on 7/29/2024       Oncology History   Duodenal adenocarcinoma   3/10/2023 Initial Diagnosis    Duodenal adenocarcinoma     3/15/2023 Surgery    Whipple  dMMR with loss of MLH1/PMS2; MLH1 promoter hypermethylation present suggesting a sporadic tumor.      3/15/2023 Cancer Staged    Staging form: Small Intestine - Adenocarcinoma, AJCC 8th Edition  - Pathologic stage from 3/15/2023: Stage IV (pT3, pN0, cM1)     8/24/2023 -  Chemotherapy    Treatment Summary   Plan Name: OP PEMBROLIZUMAB 400MG Q6W  Treatment Goal: Palliative  Status: Active  Start Date: 8/24/2023  End Date: 6/23/2025 (Planned)  Provider: Eber Roberts MD  Chemotherapy: [No matching medication found in this treatment plan]          Interval History: 70 y.o. female with recurrent MSI-H metastatic duodenal adenocarcinoma who presents for follow-up prior to cycle 17 of pembrolizumab. She is feeling well.  Has some restless legs at night.  No blood in her stool.  No fatigue. No pain.    Presents to clinic with her . ECOG 0.     ROS:   Review of Systems   Constitutional:  Negative for chills, fever and malaise/fatigue.   HENT:  Negative for nosebleeds and sore throat.    Respiratory:  Negative for cough and shortness of breath.    Cardiovascular:  Negative for chest pain, palpitations and leg swelling.   Gastrointestinal:  Negative for blood in stool, constipation, diarrhea, heartburn, nausea and vomiting.   Genitourinary:  Negative for dysuria, frequency, hematuria and urgency.   Musculoskeletal:  Negative for falls and myalgias.   Skin:  Negative for itching and rash.    Neurological:  Negative for dizziness, tingling, weakness and headaches.   Psychiatric/Behavioral:  The patient is not nervous/anxious and does not have insomnia.        Past Medical History:   Past Medical History:   Diagnosis Date    Abnormal Pap smear of cervix     Arthritis     Duodenal adenocarcinoma     Hypertension     Hyperthyroidism         Past Surgical History:   Past Surgical History:   Procedure Laterality Date    CATARACT EXTRACTION W/  INTRAOCULAR LENS IMPLANT Right 8/8/2019    Procedure: EXTRACTION, CATARACT, WITH IOL INSERTION;  Surgeon: Spenser Lee MD;  Location: [facility];  Service: Ophthalmology;  Laterality: Right;    COLONOSCOPY N/A 7/11/2024    Procedure: COLONOSCOPY;  Surgeon: Luis Hayes MD;  Location: [facility];  Service: Endoscopy;  Laterality: N/A;    ESOPHAGOGASTRODUODENOSCOPY N/A 3/10/2023    Procedure: EGD (ESOPHAGOGASTRODUODENOSCOPY);  Surgeon: Shashi Tapia MD;  Location: [facility] ([address] [place] [address]);  Service: Endoscopy;  Laterality: N/A;    EYE SURGERY      HYSTERECTOMY      OOPHORECTOMY      PHACOEMULSIFICATION OF CATARACT Right 8/8/2019    Procedure: PHACOEMULSIFICATION, CATARACT;  Surgeon: Spenser Lee MD;  Location: [facility];  Service: Ophthalmology;  Laterality: Right;    WHIPPLE PROCEDURE N/A 3/15/2023    Procedure: WHIPPLE PROCEDURE;  Surgeon: Nick Paez MD;  Location: [address];  Service: General;  Laterality: N/A;        Family History:   Family History   Problem Relation Name Age of Onset    Cancer Mother      Breast cancer Neg Hx      Colon cancer Neg Hx      Ovarian cancer Neg Hx          Social History:   Social History     Tobacco Use    Smoking status: Every Day     Current packs/day: 0.25     Average packs/day: 0.3 packs/day for 30.0 years (7.5 ttl pk-yrs)     Types: Cigarettes    Smokeless tobacco: Never    Tobacco comments:     About 7-8 cigs a day   Substance Use Topics    Alcohol use: Yes     Alcohol/week: 1.0 standard drink of  "alcohol     Types: 1 Cans of beer per week     Comment: social        I have reviewed and updated the patient's past medical, surgical, family and social histories.    Allergies:   Review of patient's allergies indicates:   Allergen Reactions    Aspirin Other (See Comments)     Pt states it is "hard on her stomach" She can tolerate a low dose aspirin    Pcn [penicillins]      Childhood - Tolerated ceftriaxone and cefazolin with no documented issues in the past         Medications:   Current Outpatient Medications   Medication Sig Dispense Refill    acetaminophen (TYLENOL) 650 MG TbSR Take 650 mg by mouth as needed.      atorvastatin (LIPITOR) 40 MG tablet Take 1 tablet (40 mg total) by mouth once daily. 90 tablet 11    atorvastatin (LIPITOR) 40 MG tablet Take 1 tablet (40 mg total) by mouth once daily. 90 tablet 11    droNABinol (MARINOL) 5 MG capsule Take 1 capsule (5 mg total) by mouth 2 (two) times daily before meals. 60 capsule 2    famotidine (PEPCID) 20 MG tablet Take 1 tablet (20 mg total) by mouth 2 (two) times daily. 60 tablet 11    famotidine (PEPCID) 20 MG tablet Take 1 tablet (20 mg total) by mouth 2 (two) times daily. 60 tablet 11    gabapentin (NEURONTIN) 300 MG capsule Take 1 capsule (300 mg total) by mouth 3 (three) times daily. 90 capsule 5    lisinopriL-hydrochlorothiazide (PRINZIDE,ZESTORETIC) 20-12.5 mg per tablet Take 1 tablet by mouth once daily. 90 tablet 11    omeprazole (PRILOSEC) 40 MG capsule Take 1 capsule (40 mg total) by mouth every morning. 30 capsule 11    omeprazole (PRILOSEC) 40 MG capsule Take 1 capsule (40 mg total) by mouth every morning. 30 capsule 11     No current facility-administered medications for this visit.      Physical Exam:   BP (!) 161/43 (BP Location: Left arm, Patient Position: Sitting)   Pulse (!) 40   Temp 97.9 °F (36.6 °C) (Temporal)   Resp 18   Ht 5' 6" (1.676 m)   Wt 58.1 kg (128 lb 1.4 oz)   SpO2 99%   BMI 20.67 kg/m²      Physical Exam  Vitals " reviewed.   Constitutional:       General: She is not in acute distress.     Appearance: Normal appearance. She is normal weight. She is not ill-appearing, toxic-appearing or diaphoretic.   HENT:      Head: Normocephalic and atraumatic.      Right Ear: External ear normal.      Left Ear: External ear normal.      Nose: Nose normal.      Mouth/Throat:      Pharynx: Oropharynx is clear.   Eyes:      General: No scleral icterus.     Conjunctiva/sclera: Conjunctivae normal.      Pupils: Pupils are equal, round, and reactive to light.   Cardiovascular:      Rate and Rhythm: Normal rate.   Pulmonary:      Effort: Pulmonary effort is normal. No respiratory distress.      Breath sounds: No wheezing.   Abdominal:      General: There is no distension.      Tenderness: There is no abdominal tenderness.   Musculoskeletal:      Cervical back: Normal range of motion.      Right lower leg: No edema.      Left lower leg: No edema.   Skin:     General: Skin is warm and dry.      Coloration: Skin is not jaundiced or pale.      Findings: No bruising, erythema or rash.   Neurological:      General: No focal deficit present.      Mental Status: She is alert and oriented to person, place, and time. Mental status is at baseline.      Cranial Nerves: No cranial nerve deficit.      Sensory: No sensory deficit.      Motor: No weakness.      Gait: Gait normal.   Psychiatric:         Mood and Affect: Mood normal.         Behavior: Behavior normal.         Thought Content: Thought content normal.         Judgment: Judgment normal.         Labs:   No results found for this or any previous visit (from the past 48 hours).    Lab work reviewed.  Iron deficiency anemia    Imaging:    PET/CT - 6/27/25:    Impression:     In this patient with history of duodenal cancer, status post Whipple, there is focal uptake in the anterior abdomen along the duodenum, as above.  This is near anastomotic site and may represent postoperative changes.  Attention on  follow-up.     Interval resolution of previously seen jet hepatis uptake.     Additional findings above.    Path:   3/15/23 - Whipple  Final Pathologic Diagnosis      Abnormal   1. Lymph node, hepatic cautery, resection:   - One lymph node negative for metastatic carcinoma (0/1).   - See synoptic report.   2. Gallbladder, cholecystectomy:   - Benign gallbladder with no significant histologic abnormality.   3. Pancreas, duodenum, common bile duct, and partial gastrectomy,   pancreaticoduodenectomy (Whipple specimen):   - Poorly differentiated carcinoma with medullary features.   - See synoptic report.   - See comment.   Synoptic Report   Procedure: Pancreaticoduodenectomy with partial gastrectomy (Whipple   specimen)   Tumor site:  Pylorus and proximal duodenum   Histologic type: Poorly differentiated carcinoma with medullary features   Histologic grade: G3, poorly differentiated   Tumor size:  6.5 cm in greatest dimension grossly   Tumor extent:   Invades through muscularis propria into subserosa, or extends   into nonperitonealized perimuscular tissue (mesentery or retroperitoneum)   without serosal penetration   Macroscopic tumor perforation:  Not identified   Lymphovascular inv asion:  Not identified, see comment   Margin status for invasive carcinoma:  All margins negative for invasive   carcinoma   Margin status for dysplasia:  All margins negative for carcinoma in Situ   (high-grade dysplasia)/adenoma   Regional lymph node status: Regional lymph nodes present        All Regional lymph nodes negative for tumor        Number of lymph nodes examined:  15 (including specimens 1 and 3)   PATHOLOGIC STAGE CLASSIFICATION (pTNM, AJCC 8th Edition):  p T3 N0   Additional findings:  Stomach with intestinal metaplasia (immunostain for   H.pylori negative), 2 lymph nodes with fibrosis and calcifications (GMS stain   for fungal organisms and AFB stain negative)   Ancillary studies:  Immunohistochemistry (IHC) Testing for  Mismatch Repair   (MMR) Proteins   MLH1- Loss of nuclear expression   PMS2 - Loss of nuclear expression   MSH2 - Intact nuclear expression   MSH6 - Intact nuclear expression   Background nonneoplastic tissue/internal control with intact nuclear   expression   IHC Interpret ation:  Loss of nuclear expression of MLH1 and PMS2: testing for   methylation of the MLH1 promoter and/or mutation of BRAF is indicated (the   presence of a BRAF V600E mutation and/or MLH1 methylation suggests that the   tumor is sporadic and germline evaluation is probably not indicated; absence   of both MLH1 methylation and of BRAF V600E mutation suggests the possibility   of Seaman syndrome, and sequencing and/or large deletion/duplication testing   of germline MLH1 may be indicated)   Testing for methylation of the MLH1 promoter and mutation of BRAF is in   process and results will be supplemented.   There are exceptions to the above IHC interpretations. These results should   not be considered in isolation, and clinical correlation with genetic   counseling is recommended to assess the need for germline testing.   COMMENT:  The stomach and duodenum (specimen 3) shows a 6.5 cm mass involving   the pylorus with the majority of the tumor appearing to be in the duodenum.   The tumor shows poorly d ifferentiated sheets of blue cells with necrosis.   There is a significant amount of lymphocytic inflammatory infiltrate around   the edges of the tumor.  Immunostains show the tumor cells to be positive for   CK7 with patchy weak CDX2 and negative for synaptophysin and chromogranin.   There is also loss of MLH1 and PMS2 on MMR stains.  These features are   suggestive of medullary carcinoma.  Select slides reviewed by Dr. ITZEL Sánchez   who agrees with the diagnosis of poorly differentiated carcinoma with   medullary features.  Immunostains for CD 34 performed on blocks 3E and 3H   show no definitive lymphovascular invasion even though some areas  "suspicious   cells on routine H&E sections.  Appropriate positive controls   VC      Comment: Interp By Meredith Pappas MD, Signed on 04/12/2023 at 16:03   Supplemental Diagnosis      Abnormal   MLH1 Hypermethylation/BRAF Mutation   Result Summary   MLH1 HYPERMETHYLATION PRESENT/NO BRAF MUTATION IDENTIFIED   Result   Provided diagnosis: pylorus and proximal duodenum poorly differentiated   carcinoma with medullary features   Methylation status: Positive for MLH1 promoter hypermethylation   BRAF status: Wild-type (SEE INTERPRETATION)   Alexis Sotelo M.D.   Report attached   Performing location:   [place] [address]   "Disclaimer:  This case diagnosis was rendered completely by the outside   consultation pathologist and the case is electronically signed by an [facility]   pathologist listed below solely to release the report into the medical   record."          Assessment:       1. Duodenal adenocarcinoma    2. Metastasis to liver    3. Immunodeficiency secondary to neoplasm    4. History of Whipple procedure    5. Moderate malnutrition    6. Decreased appetite    7. Neoplastic (malignant) related fatigue    8. Anemia, unspecified type    9. Essential hypertension    10. Hyperlipidemia, unspecified hyperlipidemia type    11. Diabetes mellitus type 2, diet-controlled    12. Aortic atherosclerosis         Plan:     # Duodenal adenocarcinoma   Mrs. Simms is a pleasant 70 y.o. 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 Pathology did reveal some high grade features, including poorly differentiated carcinoma, 6.5 cm size.  We discussed at her initial visit he limited role of adjuvant chemotherapy in the stage II setting for small bowel adenocarcinoma.      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     CT CAP on 7/26/23 " demonstrated concern for recurrence in liver.  PET/CT on 8/4/23 showed hypermetabolic inferior R hepatic lobe lesion consistent with metastatic recurrence.  CEA has risen as well.    Given her dMMR/MSI-H status, we recommended to start on single agent pembrolizumab 400 mg q6 weeks.  Patient was consented for immunotherapy.   She began pembrolizumab 8/24/23.    She was also enrolled in our protocol: [id number], Disparities in Results of Immune Checkpoint Inhibitor Treatment (DIRECT): A Prospective Cohort Study of Cancer Survivors Treated with anti-PD-L1 Immunotherapy in a Community Oncology Setting.  CRC is Isabela Lynnette.    No toxicities noted from pembrolizumab cycle 1.  PET/CT after cycle 2 shows resolution of liver metastasis.  Question of grade 1 pneumonitis with GGO on that scan but this has since resolved on CT chest done 12/15 which makes pneumonitis less likely diagnosis.  No other IO toxicities.  PET/CT after cycle 4 shows resolution of hypermetabolic nodules and ground-glass in right middle lobe of lung. No evidence of other hypermetabolic areas.   PET/CT after cycle 6 shows no evidence of disease.  PET/CT after cycle 8 shows shows BIA.  PET/CT after cycle 11 shows BIA.  PET/CT after cycle 14 on 4/4/25 showed mild non-specific uptake near jet hepatis.  Will monitor for now.  She is asymptomatic.  PET/CT after cycle 16 on 6/27/25 shows resolution of jet hepatis uptake, some focal uptake at the duodenum near anastomosis; nonspecific.  Continue to monitor.    Labs reviewed and adequate for treatment.    No toxicities from IO.  Will proceed with cycle 17 of pembrolizumab today.  Repeat imaging after final cycle, cycle 18.     [facility] Tissue NGS: MSI-H, TMB 76.3 m/MB    # Decreased appetite, malnutrition  - Appetite ok with Marinol. Taking at night.   - weight slightly up. Nutrition following.  - Encouraged more protein intake.    # Anemia, neoplasm related fatigue, restless leg syndrome  -Microcytic anemia  slightly worse. Suspect thalassemia given longstanding nature.  -Will give her IV iron given worsened iron deficiency and restless legs.  -continue to monitor TSH    -Advised her to continue her iron supplements    # HTN, HLD, DM, aortic atherosclerosis   - BP elevated in clinic today. Asymptomatic. Has chronic bradycardia.  - Following with PCP.   - Continue medical management.     Patient is in agreement with the proposed treatment plan. All questions were answered to the patient's satisfaction. Pt knows to call clinic if anything is needed before the next clinic visit.    Eber Roberts MD  Hematology/Oncology  [facility]             code applied: patient requires or will require a continuous, longitudinal, and active collaborative plan of care related to this patient's health condition, colon cancer --the management of which requires the direction of a practitioner with specialized clinical knowledge, skill, and expertise.     Route Chart for Scheduling    Med Onc Chart Routing      Follow up with physician 3 months. for keytruda   Follow up with YANDEL 6 weeks. for keytruda   Infusion scheduling note   please schedule iron infusion in 2-3 weeks   Injection scheduling note    Labs CBC, CMP, CEA and TSH   Scheduling:  Preferred lab:  Lab interval:     Imaging    Pharmacy appointment    Other referrals            Treatment Plan Information   OP PEMBROLIZUMAB 400MG Q6W Eber Roberts MD   Associated diagnosis: Duodenal adenocarcinoma Stage IV pT3, pN0, cM1 noted on 4/18/2023   Line of treatment: First Line  Treatment Goal: Palliative     Upcoming Treatment Dates - OP PEMBROLIZUMAB 400MG Q6W    6/23/2025       Chemotherapy       pembrolizumab (KEYTRUDA) 400 mg in 0.9% NaCl SolP 116 mL infusion    Therapy Plan Information  OP IV IRON THERAPY for Duodenal adenocarcinoma, noted on 4/18/2023  OP IV IRON THERAPY for Anemia, noted on 3/9/2023  Medications  iron dextran (Infed) injection  25 mg  25 mg, Intravenous, Once  iron dextran (Infed) 975 mg in 0.9% NaCl 500 mL IVPB  975 mg, Intravenous, Once  Anaphylaxis/Hypersensitivity  EPINEPHrine (EPIPEN) 0.3 mg/0.3 mL pen injection 0.3 mg  0.3 mg, Intramuscular, PRN  hydrocortisone sodium succinate injection 100 mg  100 mg, Intravenous, PRN  Flushes  0.9% NaCl 250 mL flush bag  Intravenous, Every visit  sodium chloride 0.9% flush 10 mL  10 mL, Intravenous, Every visit  heparin, porcine (PF) 100 unit/mL injection flush 500 Units  500 Units, Intravenous, Every visit  alteplase injection 2 mg  2 mg, Intra-Catheter, Every visit      No therapy plan of the specified type found.    No therapy plan of the specified type found.

## 2025-06-30 NOTE — PLAN OF CARE
1420 Patient is here for C17 keytruda. Labs, meds and hx reviewed. Patient was seen in MD office today and is appropriate for treatment. PIV inserted and NS started. Rafy /62 and patient without complaints. Patterson and snack provided.

## 2025-06-30 NOTE — PLAN OF CARE
1535 Patient tolerated keytruda with no issues. PIV removed and catheter tip intact. AVS was declined and patient discharged with .

## 2025-07-06 ENCOUNTER — HOSPITAL ENCOUNTER (EMERGENCY)
Facility: HOSPITAL | Age: 70
Discharge: HOME | End: 2025-07-06
Attending: STUDENT IN AN ORGANIZED HEALTH CARE EDUCATION/TRAINING PROGRAM
Payer: MEDICARE

## 2025-07-06 VITALS
OXYGEN SATURATION: 100 % | RESPIRATION RATE: 16 BRPM | WEIGHT: 123.25 LBS | TEMPERATURE: 99 F | HEIGHT: 66 IN | DIASTOLIC BLOOD PRESSURE: 79 MMHG | HEART RATE: 49 BPM | BODY MASS INDEX: 19.81 KG/M2 | SYSTOLIC BLOOD PRESSURE: 193 MMHG

## 2025-07-06 DIAGNOSIS — G57.93 NEUROPATHY OF BOTH FEET: Primary | ICD-10-CM

## 2025-07-06 DIAGNOSIS — M79.10 MYALGIA: ICD-10-CM

## 2025-07-06 PROCEDURE — 63600175 PHARM REV CODE 636 W HCPCS: Mod: JZ,TB

## 2025-07-06 PROCEDURE — 96372 THER/PROPH/DIAG INJ SC/IM: CPT

## 2025-07-06 PROCEDURE — 25000003 PHARM REV CODE 250

## 2025-07-06 PROCEDURE — 99284 EMERGENCY DEPT VISIT MOD MDM: CPT | Mod: 25

## 2025-07-06 RX ORDER — GABAPENTIN 300 MG/1
600 CAPSULE ORAL
Status: COMPLETED | OUTPATIENT
Start: 2025-07-06 | End: 2025-07-06

## 2025-07-06 RX ORDER — METHYLPREDNISOLONE SOD SUCC 125 MG
125 VIAL (EA) INJECTION
Status: COMPLETED | OUTPATIENT
Start: 2025-07-06 | End: 2025-07-06

## 2025-07-06 RX ORDER — GABAPENTIN 300 MG/1
600 CAPSULE ORAL 3 TIMES DAILY
Qty: 90 CAPSULE | Refills: 5 | Status: SHIPPED | OUTPATIENT
Start: 2025-07-06 | End: 2026-01-02

## 2025-07-06 RX ORDER — KETOROLAC TROMETHAMINE 30 MG/ML
30 INJECTION, SOLUTION INTRAMUSCULAR; INTRAVENOUS
Status: COMPLETED | OUTPATIENT
Start: 2025-07-06 | End: 2025-07-06

## 2025-07-06 RX ADMIN — GABAPENTIN 600 MG: 300 CAPSULE ORAL at 10:07

## 2025-07-06 RX ADMIN — METHYLPREDNISOLONE SODIUM SUCCINATE 125 MG: 125 INJECTION, POWDER, FOR SOLUTION INTRAMUSCULAR; INTRAVENOUS at 10:07

## 2025-07-06 RX ADMIN — KETOROLAC TROMETHAMINE 30 MG: 30 INJECTION, SOLUTION INTRAMUSCULAR at 10:07

## 2025-07-06 NOTE — ED PROVIDER NOTES
"Encounter Date: 7/6/2025       History     Chief Complaint   Patient presents with    Foot Pain     Pt to ED with c/o bilateral feet pain x weeks. Reports "stinging" sensation to sole.      This note is dictated on M*Modal word recognition program.  There are word recognition mistakes and grammatical errors that are occasionally missed on review.     Nan Simms is a 70 y.o. female with a history of prediabetes, duodenal adenocarcinoma, hypertension, hyper thyroidism received Keytruda every 3 months.  Presents to ER today with complaints of bilateral burning foot pain for several weeks.  She reports a stinging sensation to the soles of both feet.  Patient is already on gabapentin 300 mg 3 times daily.  Saw her PCP recently who will may be her iron-deficiency is causing burning feet.  Will be placed on iron infusion soon IV.  Patient rates pain 7/10 to both feet.  Describes the pain that is burning/stinging sensation      The history is provided by the patient.     Review of patient's allergies indicates:   Allergen Reactions    Aspirin Other (See Comments)     Pt states it is "hard on her stomach" She can tolerate a low dose aspirin    Pcn [penicillins]      Childhood - Tolerated ceftriaxone and cefazolin with no documented issues in the past      Past Medical History:   Diagnosis Date    Abnormal Pap smear of cervix     Arthritis     Duodenal adenocarcinoma     Hypertension     Hyperthyroidism      Past Surgical History:   Procedure Laterality Date    CATARACT EXTRACTION W/  INTRAOCULAR LENS IMPLANT Right 8/8/2019    Procedure: EXTRACTION, CATARACT, WITH IOL INSERTION;  Surgeon: Spenser Lee MD;  Location: [facility];  Service: Ophthalmology;  Laterality: Right;    COLONOSCOPY N/A 7/11/2024    Procedure: COLONOSCOPY;  Surgeon: Luis Hayes MD;  Location: [facility];  Service: Endoscopy;  Laterality: N/A;    ESOPHAGOGASTRODUODENOSCOPY N/A 3/10/2023    Procedure: EGD (ESOPHAGOGASTRODUODENOSCOPY);  " Surgeon: Shashi Tapia MD;  Location: [facility] (2ND FLR);  Service: Endoscopy;  Laterality: N/A;    EYE SURGERY      HYSTERECTOMY      OOPHORECTOMY      PHACOEMULSIFICATION OF CATARACT Right 8/8/2019    Procedure: PHACOEMULSIFICATION, CATARACT;  Surgeon: Spenser Lee MD;  Location: [facility];  Service: Ophthalmology;  Laterality: Right;    WHIPPLE PROCEDURE N/A 3/15/2023    Procedure: WHIPPLE PROCEDURE;  Surgeon: Nick Paez MD;  [facility];  Service: General;  Laterality: N/A;     Family History   Problem Relation Name Age of Onset    Cancer Mother      Breast cancer Neg Hx      Colon cancer Neg Hx      Ovarian cancer Neg Hx       Social History[1]  Review of Systems   Musculoskeletal:  Positive for arthralgias and myalgias.       Physical Exam     Initial Vitals [07/06/25 1025]   BP Pulse Resp Temp SpO2   (!) 142/64 (!) 46 12 98.8 °F (37.1 °C) 99 %      MAP       --         Physical Exam    Nursing note and vitals reviewed.  Constitutional: She appears well-developed and well-nourished.   HENT:   Head: Normocephalic.   Eyes: Pupils are equal, round, and reactive to light.   Neck: Neck supple.   Normal range of motion.  Cardiovascular:  Normal rate, regular rhythm and normal heart sounds.           Pulmonary/Chest: Breath sounds normal.   Abdominal: Abdomen is soft. Bowel sounds are normal. She exhibits no distension.   Musculoskeletal:         General: No tenderness or edema.      Cervical back: Normal range of motion and neck supple.     Neurological: She is alert and oriented to person, place, and time. GCS score is 15. GCS eye subscore is 4. GCS verbal subscore is 5. GCS motor subscore is 6.   Skin: Capillary refill takes less than 2 seconds. No erythema.   Bilateral feet neurovascularly intact no purple/blue/black discoloration pedal pulses intact   Psychiatric: She has a normal mood and affect.         ED Course   Procedures  Labs Reviewed - No data to display       Imaging  Results    None          Medications   ketorolac injection 30 mg (has no administration in time range)   methylPREDNISolone sodium succinate injection 125 mg (has no administration in time range)   gabapentin capsule 600 mg (has no administration in time range)     Medical Decision Making  Differential diagnosis include chronic foot pain, diabetic neuropathy, neuropathy caused by chemotherapy, and a fasciitis    Patient's symptoms are consistent with neuropathy bilateral feet will increase patient's gabapentin dosing  Patient instructed to continue to follow-up with PCP for further treatment and management of bilateral neuropathy of feet  Otherwise vital signs stable at discharge  Both feet neurovascularly intact  Patient stable at time of discharge in no acute distress.  No life-threatening illnesses were found during ER visit today.  Patient was instructed to follow-up with PCP or other recommended specialist within the next 48-72 hours.  Patient was instructed to return to ER immediately for any worsening or concerning symptoms.  All discharge instructions discussed with patient, and patient agrees to comply with discharge instructions given today.     Risk  Prescription drug management.                                      Clinical Impression:  Final diagnoses:  [G57.93] Neuropathy of both feet (Primary)          ED Disposition Condition    Discharge Stable          ED Prescriptions       Medication Sig Dispense Start Date End Date Auth. Provider    gabapentin (NEURONTIN) 300 MG capsule Take 2 capsules (600 mg total) by mouth 3 (three) times daily. 90 capsule 7/6/2025 1/2/2026 Red Olvera NP          Follow-up Information       Follow up With Specialties Details Why Contact Info    Payal Moreland NP Family Medicine In 3 days ER follow-up visits [address] [place] [id number]  [phone]                     [1]   Social History  Tobacco Use    Smoking status: Every Day     Current packs/day: 0.25      Average packs/day: 0.3 packs/day for 30.0 years (7.5 ttl pk-yrs)     Types: Cigarettes    Smokeless tobacco: Never    Tobacco comments:     About 7-8 cigs a day   Substance Use Topics    Alcohol use: Yes     Alcohol/week: 1.0 standard drink of alcohol     Types: 1 Cans of beer per week     Comment: social    Drug use: No        Red Olvera, NP  07/06/25 [id number]

## 2025-07-06 NOTE — ED NOTES
Discharge instructions, prescriptions, follow up and strict return precautions gone over with patient; verbalized understanding.  Ambulatory out of ED in stable condition.

## 2025-07-06 NOTE — ED NOTES
Patient given call bell; instructed to call with any needs or concerns. Verbalized understanding.

## 2025-07-06 NOTE — DISCHARGE INSTRUCTIONS
The dose of your gabapentin was increased.  Please follow new directions on taking your gabapentin.  Please follow-up with PCP for further evaluation of your pain of bilateral feet.  You may return to ER immediately if your symptoms worsen

## 2025-07-07 ENCOUNTER — RESULTS FOLLOW-UP (OUTPATIENT)
Dept: INTERNAL MEDICINE | Facility: CLINIC | Age: 70
End: 2025-07-07

## 2025-07-07 ENCOUNTER — NURSE TRIAGE (OUTPATIENT)
Dept: ADMINISTRATIVE | Facility: CLINIC | Age: 70
End: 2025-07-07
Payer: MEDICARE

## 2025-07-07 NOTE — TELEPHONE ENCOUNTER
Pt calling to inquire about a prescription that was ordered by ER doctor yesterday. She says she has not contacted the pharmacy because they usually notify her when her medicine is ready. I was able to contact pharmacy. Pt prescription is there and ready for . Updated pt and she VU. No further questions or concerns, no worsening symptoms.               Reason for Disposition   Caller with prescription and triager answers question    Protocols used: Medication Refill and Renewal Call-A-OH

## 2025-07-14 ENCOUNTER — TELEPHONE (OUTPATIENT)
Dept: HEMATOLOGY/ONCOLOGY | Facility: CLINIC | Age: 70
End: 2025-07-14
Payer: MEDICARE

## 2025-07-25 NOTE — PLAN OF CARE
[facility]  [address]  [place] [id number]    Subjective    Chief Complaint:   Chief Complaint   Patient presents with    Follow-up     Celebrex is working well pt reports    Medication Refill     Pt reports he needs refill for levothyroxine (SYNTHROID) 137 MCG Tab tablet         History of Present Illness:     Sam Pete is a(n) 77 y.o. male with past medical history as noted below who presents to the clinic today for follow up.    Left leg edema has improved but he has noticed erythema to the distal left lower leg/ankle. He has a history of L>R leg edema, but the color change is new. Recent US negative for acute DVT. X-ray left knee without new concerning findings. He denies fever, chest pain, or shortness of breath. +history venous stripping of LLE.  Pain is controlled with celebrex.      Coronary artery disease, dyslipidemia, aortic atherosclerosis   Status post CABG (7/2023) x 4    Cardiology- MD Valorie   Rx-atorvastatin 40 mg, beta-blocker, Aspirin     H/o smoking   Quit : 1992     Carotid artery disease, history of CVA (1997)   Residual right upper extremity tremor     History of paroxysmal supraventricular tachycardia, Paroxysmal Atrial Fib   No symptoms  Rx : beta blocker     Tricuspid regurgitation  Per Cards      Chronic edema   Rx-Lasix 40 mg QD (left > right)  + daily compression stocking      Essential hypertension   Rx-losartan 50 mg, metoprolol 12.5 mg  Home : sys <140      Chronic low back pain  Rx-gabapentin 300 mg t.i.d., MS Contin 15 mg b.i.d., Narcan, Percocet 10 mg q.4 hours p.r.n., tizanidine  Pain MD  -  MD Trupti (meds)    +    Aram Terrell MD (CARLOS)    PMR : MD Diallo (no f/u planned)   Neurosurgery- Darlene Max MD (pain stimulator)   Prev tx : Surgeries, PT (Francos), CARLOS, Spinal Cord Stimulator in place      Idiopathic neuropathy  Rx-gabapentin 300 mg t.i.d.     Depression / Anxiety   Rx-Wellbutrin 150 mg     History squamous cell  Pt tolerated Keytruda with no issues.  PIV removed. Pt ambulated accompanied by  off unit. NAD.   carcinoma  Dermatology-Joselyn Foss MD     Iatrogenic Hypothyroidism  Rx-levothyroxine 112 mcg  S/p total thyroidectomy : non cancerous nodules      BPH, ED, Hypogonadism      Chronic intermit Wheeze   Rx : albuterol     S/p Rt shoulder / bilateral hip / left knee arthroplasty      Prediabetes  Prev A1c : 6        Problem List: Problem List[1]    Current Outpatient Medications:   Current Outpatient Medications   Medication Instructions    albuterol (VENTOLIN HFA) 90 mcg/actuation inhaler 2 puffs, Inhalation, Every 6 hours PRN, Rescue    aspirin 81 mg, Daily    atorvastatin (LIPITOR) 40 mg, Oral, Nightly    benzonatate (TESSALON) 200 mg, Oral, 3 times daily PRN    buPROPion (WELLBUTRIN XL) 150 MG TB24 tablet TAKE 1 TABLET EVERY DAY    calcitRIOL (ROCALTROL) 0.5 MCG Cap TAKE 1 CAPSULE BY MOUTH ONE TIME DAILY    celecoxib (CELEBREX) 200 mg, Oral, 2 times daily PRN    fluticasone propionate (FLONASE) 50 mcg/actuation nasal spray by Each Nostril route.    furosemide (LASIX) 40 mg, Oral, Daily    gabapentin (NEURONTIN) 300 mg, Oral, 3 times daily    ipratropium (ATROVENT) 21 mcg (0.03 %) nasal spray 2 sprays, Each Nostril, 3 times daily    levothyroxine (SYNTHROID) 137 mcg, Oral, Before breakfast    losartan (COZAAR) 50 MG tablet TAKE 1 TABLET EVERY DAY. TAKE ADDITIONAL 25 MG (1/2 TABLET) DAILY IF BLOOD PRESSURE  OR ABOVE    metoprolol succinate (TOPROL-XL) 25 MG 24 hr tablet TAKE ONE-HALF TABLET (12.5 MG) BY MOUTH EVERY DAY    morphine (MS CONTIN) 15 mg, Oral, 2 times daily    MUCUS RELIEF  mg, Every 12 hours    multivitamin (THERAGRAN) per tablet 1 tablet, Daily    mupirocin (BACTROBAN) 2 % ointment Topical (Top), 3 times daily    naloxone (NARCAN) 0.4 mg, Intravenous, As needed (PRN)    omeprazole (PRILOSEC) 40 MG capsule Take one capsule by mouth daily    oxyCODONE-acetaminophen (PERCOCET)  mg per tablet 1 tablet, Oral, Every 6 hours PRN    sulfamethoxazole-trimethoprim 800-160mg (BACTRIM DS)  800-160 mg Tab 1 tablet, Oral, 2 times daily    tamsulosin (FLOMAX) 0.4 mg, Oral, Nightly    tiZANidine 4 mg, Oral, 3 times daily PRN    UNABLE TO FIND Daily       Surgical History:   Past Surgical History:   Procedure Laterality Date    ANGIOGRAM, CORONARY, WITH LEFT HEART CATHETERIZATION  07/13/2023    Procedure: Left Heart Cath;  Surgeon: Salvador Eugene MD;  Location: [facility] CATH;  Service: Cardiology;;    APPENDECTOMY      ARTHROPLASTY OF SHOULDER Right 03/22/2022    Procedure: ARTHROPLASTY, SHOULDER BRENNAN RIGHT SHOULDER HUMERAL HEAD RESURFACING;  Surgeon: Frankie Joya MD;  Location: [facility] OR;  Service: Orthopedics;  Laterality: Right;    BACK SURGERY      4- back surgery    CAUDAL EPIDURAL STEROID INJECTION N/A 12/16/2021    Procedure: Injection-steroid-epidural-caudal;  Surgeon: Aram Terrell MD;  Location: [facility] OR;  Service: Pain Management;  Laterality: N/A;    CAUDAL EPIDURAL STEROID INJECTION N/A 02/02/2022    Procedure: INJECTION, STEROID, SPINE, EPIDURAL, CAUDAL;  Surgeon: Aram Terrell MD;  Location: [facility] OR;  Service: Pain Management;  Laterality: N/A;    CAUDAL EPIDURAL STEROID INJECTION N/A 07/22/2022    Procedure: Injection-steroid-epidural-caudal;  Surgeon: Aram Terrell MD;  Location: [facility] OR;  Service: Pain Management;  Laterality: N/A;  Caudal CARLOS     CAUDAL EPIDURAL STEROID INJECTION N/A 01/20/2023    Procedure: Injection-steroid-epidural-caudal;  Surgeon: Aram Terrell MD;  Location: [facility] OR;  Service: Pain Management;  Laterality: N/A;  caudal ( melinda)    CAUDAL EPIDURAL STEROID INJECTION N/A 04/21/2023    Procedure: Injection-steroid-epidural-caudal;  Surgeon: Aram Terrell MD;  Location: [facility] OR;  Service: Pain Management;  Laterality: N/A;  caudal    CAUDAL EPIDURAL STEROID INJECTION N/A 07/11/2023    Procedure: Injection-steroid-epidural-caudal;  Surgeon: Aram Terrell MD;  Location: [facility] OR;  Service: Pain Management;  Laterality: N/A;  caudal    CAUDAL EPIDURAL STEROID INJECTION N/A 1/9/2024     Procedure: Injection-steroid-epidural-caudal;  Surgeon: Aram Terrell MD;  Location: [facility] OR;  Service: Anesthesiology;  Laterality: N/A;  caudal    CAUDAL EPIDURAL STEROID INJECTION N/A 6/4/2024    Procedure: Injection-steroid-epidural-caudal;  Surgeon: Aram Terrell MD;  Location: [facility] OR;  Service: Anesthesiology;  Laterality: N/A;    CAUDAL EPIDURAL STEROID INJECTION N/A 6/17/2025    Procedure: Injection-steroid-epidural-caudal;  Surgeon: Evan Lyles MD;  Location: [facility] OR;  Service: Pain Management;  Laterality: N/A;    COLONOSCOPY  07/12/2004    [facility].   Redundant tortuous colon, otherwise normal.    COLONOSCOPY N/A 02/25/2019    Procedure: COLONOSCOPY;  Surgeon: Gilbert Rodriguez Jr., MD;  Location: [facility] ENDO;  Service: Endoscopy;  Laterality: N/A;    CORONARY ANGIOGRAPHY N/A 07/13/2023    Procedure: ANGIOGRAM, CORONARY ARTERY;  Surgeon: Salvador Eugene MD;  Location: [facility] CATH;  Service: Cardiology;  Laterality: N/A;    CORONARY ARTERY BYPASS GRAFT (CABG) N/A 7/19/2023    Procedure: CORONARY ARTERY BYPASS GRAFT (CABG)- x 4;  Surgeon: Sandrine Wagner MD;  Location: [facility] OR;  Service: Cardiothoracic;  Laterality: N/A;    ENDOSCOPIC HARVEST OF VEIN Left 7/19/2023    Procedure: HARVEST-VEIN-ENDOVASCULAR;  Surgeon: Sandrine Wagner MD;  Location: [facility] OR;  Service: Cardiothoracic;  Laterality: Left;    Epidural steroid injection      Pain management    EPIDURAL STEROID INJECTION N/A 10/24/2024    Procedure: Injection, Steroid, Epidural;  Surgeon: Aram Terrell MD;  Location: [facility] OR;  Service: Pain Management;  Laterality: N/A;    ESOPHAGOGASTRODUODENOSCOPY  07/12/2004    [facility].    EXCLUSION, LEFT ATRIAL APPENDAGE, OPEN, AS PART OF OPEN CHEST SURGERY N/A 7/19/2023    Procedure: EXCLUSION, LEFT ATRIAL APPENDAGE, OPEN, AS PART OF OPEN CHEST SURGERY;  Surgeon: Sandrine Wagner MD;  Location: [facility] OR;  Service: Cardiothoracic;  Laterality: N/A;    FRACTURE SURGERY Left     wrist / forearm, total of 5    INSERTION OF  DORSAL COLUMN NERVE STIMULATOR FOR TRIAL N/A 12/12/2019    Procedure: INSERTION, NEUROSTIMULATOR, SPINAL CORD, DORSAL COLUMN, FOR TRIAL;  Surgeon: Evan Lyles MD;  Location: [facility] OR;  Service: Pain Management;  Laterality: N/A;    JOINT REPLACEMENT  02/06/2013    ( rt hip 2007), left hip     JOINT REPLACEMENT Left     total knee    KNEE ARTHROSCOPY W/ DEBRIDEMENT      bilateral knees , total of six    KNEE SURGERY      LAMINECTOMY USING MINIMALLY INVASIVE TECHNIQUE N/A 02/12/2020    Procedure: LAMINECTOMY, SPINE, MINIMALLY INVASIVE /  PLACEMENT OF SPINAL CORD STIMULATOR;  Surgeon: Darlene Max MD;  Location: [facility] OR;  Service: Neurosurgery;  Laterality: N/A;    Nervbe Block injection      Pain management    SPINAL CORD STIMULATOR IMPLANT      TOTAL SHOULDER ARTHROPLASTY Right 03/28/2022    Dr Joya    TOTAL THYROIDECTOMY Bilateral 03/07/2022    Dr Mendoza       Family History:   Family History   Problem Relation Name Age of Onset    Hypertension Father      Heart disease Father      Drug abuse Daughter      Hypertension Son      Lung disease Sister      COPD Sister      Hypertension Sister      Diverticulitis Sister      Gout Sister      Kidney disease Sister      No Known Problems Mother      Eczema Neg Hx      Lupus Neg Hx      Psoriasis Neg Hx      Melanoma Neg Hx         Allergies:   Review of patient's allergies indicates:   Allergen Reactions    Xyzal [levocetirizine] Other (See Comments)     Knocked him out        Tobacco Status:   Tobacco Use: Medium Risk (7/25/2025)    Patient History     Smoking Tobacco Use: Former     Smokeless Tobacco Use: Never     Passive Exposure: Past       Sexual Activity:   Social History     Substance and Sexual Activity   Sexual Activity Not on file       Alcohol Use:   Social History     Substance and Sexual Activity   Alcohol Use Yes    Comment: occ         Objective       Vitals:    07/25/25 0942   BP: 129/76   BP Location: Left arm   Patient Position: Sitting   Pulse:  "75   Resp: 16   Temp: 98.1 °F (36.7 °C)   TempSrc: Oral   SpO2: (!) 94%   Weight: 106.7 kg (235 lb 1.9 oz)   Height: 5' 9" (1.753 m)       Review of Systems   Constitutional:  Negative for chills and fever.   Respiratory:  Negative for cough and shortness of breath.    Cardiovascular:  Negative for chest pain.   Musculoskeletal:  Positive for joint pain.   Skin:  Positive for rash.       Physical Exam  Constitutional:       General: He is not in acute distress.     Appearance: Normal appearance.   HENT:      Head: Normocephalic and atraumatic.   Cardiovascular:      Rate and Rhythm: Normal rate and regular rhythm.      Heart sounds: Normal heart sounds. No murmur heard.  Pulmonary:      Effort: Pulmonary effort is normal. No respiratory distress.      Breath sounds: Normal breath sounds. No wheezing.   Musculoskeletal:         General: Tenderness present.      Right lower leg: No edema.      Left lower leg: No edema.   Skin:     General: Skin is warm.      Findings: Erythema (distal LLE) present.   Neurological:      Mental Status: He is alert and oriented to person, place, and time.   Psychiatric:         Behavior: Behavior normal.           Assessment and Plan:    1. Cellulitis of left lower extremity  -     doxycycline (VIBRA-TABS) 100 MG tablet; Take 1 tablet (100 mg total) by mouth 2 (two) times daily. for 7 days  Dispense: 14 tablet; Refill: 0    2. Localized edema  -     US Lower Extremity Veins Left; Future; Expected date: 07/25/2025    3. Hypothyroidism, unspecified type  -     levothyroxine (SYNTHROID) 137 MCG Tab tablet; Take 1 tablet (137 mcg total) by mouth before breakfast.  Dispense: 30 tablet; Refill: 11        Visit summary:    Sam Pete presented today for follow up.    Suspect cellulitis, as erythema is located to distal LE and not to knee joint itself. Treat with doxycycline. Esophagitis and photosensitivity precautions discussed. Cannot rule out septic arthritis, complete labs as " previously ordered by PCP today including ESR, CRP, CBC, CMP. Follow up with orthopedics scheduled 8/1/2025.     Repeat US on an urgent basis -- patients symptoms are worsening and he is at increased risk of DVT with hx of vascular surgery left leg     Patient was instructed to report to ER if symptoms change, worsen, or become severe.    Follow up: 1 week      Josefina Huitron PA-C    This note was created partially with voice dictation software and is prone to errors. This note has been reviewed by me but some errors are inevitable.          [1]   Patient Active Problem List  Diagnosis    Benign prostatic hyperplasia with lower urinary tract symptoms    Impotence of organic origin    Spinal stenosis in cervical region    Spinal stenosis, lumbar region, without neurogenic claudication    Hereditary and idiopathic peripheral neuropathy    Chronic pain low back and neck with spinal stenosis, djd left arm.  pain contract renewed 9/10/14    Essential (primary) hypertension    Facet arthropathy, lumbar    Atherosclerosis of aorta    PAF (paroxysmal atrial fibrillation)    Lumbar radiculopathy    Chronic pain syndrome    Chronic back pain    Other hyperlipidemia    Gastroesophageal reflux disease without esophagitis    At risk for falls    Primary osteoarthritis of right shoulder    Long term (current) use of anticoagulants    Severe obesity (BMI 35.0-39.9) with comorbidity    REAL (iron deficiency anemia)    Nonrheumatic tricuspid valve regurgitation    Mild pulmonary hypertension    Coronary artery disease involving native coronary artery of native heart without angina pectoris    H/O: CVA (cerebrovascular accident)    S/P CABG (coronary artery bypass graft)    Acquired hypothyroidism    Carotid artery plaque, bilateral    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

## 2025-07-28 ENCOUNTER — TELEPHONE (OUTPATIENT)
Dept: INTERNAL MEDICINE | Facility: CLINIC | Age: 70
End: 2025-07-28
Payer: MEDICARE

## 2025-07-28 ENCOUNTER — TELEPHONE (OUTPATIENT)
Dept: HEMATOLOGY/ONCOLOGY | Facility: CLINIC | Age: 70
End: 2025-07-28
Payer: MEDICARE

## 2025-07-28 NOTE — TELEPHONE ENCOUNTER
Copied from CRM #[id number]. Topic: Appointments - Appointment Rescheduling  >> Jul 28, 2025  4:02 PM Meseret wrote:  Reschedule Existing Appointment     Current appt date:07/25     Type of appt :infusion     Physician:Armando     Reason for rescheduling:Patient is calling to reschedule for ASAP due to family emergency. Requesting a call back     Caller:Nan Simms      Contact Preference:[phone] (home)

## 2025-07-28 NOTE — TELEPHONE ENCOUNTER
----- Message from Maisha sent at 2025 10:26 AM CDT -----  Contact: pt  Nan Simms  MRN: [id number]  : 1955  PCP: Payal Moreland  Home Phone      [phone]  Work Phone      Not on file.  Mobile          [phone]      MESSAGE:     Pt need to reschedule due to death in family. Earliest I can reschedule is the end of August. Please Advise.       [phone]

## 2025-07-28 NOTE — TELEPHONE ENCOUNTER
Patient does not need to reschedule. Patient needs to reschedule her infusion in [place]. Patient stated if they can schedule the infusion in the morning tomorrow, she should be back here in time for her appointment with Payal. Patient will call back and let us know if anything changes

## 2025-07-29 ENCOUNTER — INFUSION (OUTPATIENT)
Dept: INFUSION THERAPY | Facility: HOSPITAL | Age: 70
End: 2025-07-29
Payer: MEDICARE

## 2025-07-29 ENCOUNTER — OFFICE VISIT (OUTPATIENT)
Dept: INTERNAL MEDICINE | Facility: CLINIC | Age: 70
End: 2025-07-29
Payer: MEDICARE

## 2025-07-29 VITALS
OXYGEN SATURATION: 98 % | RESPIRATION RATE: 16 BRPM | TEMPERATURE: 98 F | SYSTOLIC BLOOD PRESSURE: 125 MMHG | HEART RATE: 40 BPM | DIASTOLIC BLOOD PRESSURE: 57 MMHG

## 2025-07-29 VITALS
HEIGHT: 66 IN | WEIGHT: 123.88 LBS | RESPIRATION RATE: 18 BRPM | HEART RATE: 42 BPM | DIASTOLIC BLOOD PRESSURE: 46 MMHG | OXYGEN SATURATION: 99 % | BODY MASS INDEX: 19.91 KG/M2 | SYSTOLIC BLOOD PRESSURE: 120 MMHG

## 2025-07-29 DIAGNOSIS — Z90.49 HISTORY OF WHIPPLE PROCEDURE: ICD-10-CM

## 2025-07-29 DIAGNOSIS — D64.9 ANEMIA, UNSPECIFIED TYPE: ICD-10-CM

## 2025-07-29 DIAGNOSIS — C17.0 DUODENAL ADENOCARCINOMA: Primary | ICD-10-CM

## 2025-07-29 DIAGNOSIS — T45.1X5A CHEMOTHERAPY-INDUCED NEUROPATHY: ICD-10-CM

## 2025-07-29 DIAGNOSIS — Z90.410 HISTORY OF WHIPPLE PROCEDURE: ICD-10-CM

## 2025-07-29 DIAGNOSIS — R53.83 FATIGUE, UNSPECIFIED TYPE: ICD-10-CM

## 2025-07-29 DIAGNOSIS — I10 ESSENTIAL HYPERTENSION: ICD-10-CM

## 2025-07-29 DIAGNOSIS — E78.5 HYPERLIPIDEMIA, UNSPECIFIED HYPERLIPIDEMIA TYPE: ICD-10-CM

## 2025-07-29 DIAGNOSIS — G62.0 CHEMOTHERAPY-INDUCED NEUROPATHY: ICD-10-CM

## 2025-07-29 DIAGNOSIS — M79.10 MYALGIA: ICD-10-CM

## 2025-07-29 DIAGNOSIS — R00.1 BRADYCARDIA: Primary | ICD-10-CM

## 2025-07-29 PROCEDURE — 99214 OFFICE O/P EST MOD 30 MIN: CPT | Mod: S$GLB,,, | Performed by: NURSE PRACTITIONER

## 2025-07-29 PROCEDURE — 4010F ACE/ARB THERAPY RXD/TAKEN: CPT | Mod: CPTII,S$GLB,, | Performed by: NURSE PRACTITIONER

## 2025-07-29 PROCEDURE — 3008F BODY MASS INDEX DOCD: CPT | Mod: CPTII,S$GLB,, | Performed by: NURSE PRACTITIONER

## 2025-07-29 PROCEDURE — 99999 PR PBB SHADOW E&M-EST. PATIENT-LVL III: CPT | Mod: PBBFAC,,, | Performed by: NURSE PRACTITIONER

## 2025-07-29 PROCEDURE — 25000003 PHARM REV CODE 250: Performed by: INTERNAL MEDICINE

## 2025-07-29 PROCEDURE — 96376 TX/PRO/DX INJ SAME DRUG ADON: CPT

## 2025-07-29 PROCEDURE — 1101F PT FALLS ASSESS-DOCD LE1/YR: CPT | Mod: CPTII,S$GLB,, | Performed by: NURSE PRACTITIONER

## 2025-07-29 PROCEDURE — 3078F DIAST BP <80 MM HG: CPT | Mod: CPTII,S$GLB,, | Performed by: NURSE PRACTITIONER

## 2025-07-29 PROCEDURE — 63600175 PHARM REV CODE 636 W HCPCS: Mod: JZ,TB | Performed by: INTERNAL MEDICINE

## 2025-07-29 PROCEDURE — 3074F SYST BP LT 130 MM HG: CPT | Mod: CPTII,S$GLB,, | Performed by: NURSE PRACTITIONER

## 2025-07-29 PROCEDURE — 2023F DILAT RTA XM W/O RTNOPTHY: CPT | Mod: CPTII,S$GLB,, | Performed by: NURSE PRACTITIONER

## 2025-07-29 PROCEDURE — 1159F MED LIST DOCD IN RCRD: CPT | Mod: CPTII,S$GLB,, | Performed by: NURSE PRACTITIONER

## 2025-07-29 PROCEDURE — 1126F AMNT PAIN NOTED NONE PRSNT: CPT | Mod: CPTII,S$GLB,, | Performed by: NURSE PRACTITIONER

## 2025-07-29 PROCEDURE — 96365 THER/PROPH/DIAG IV INF INIT: CPT

## 2025-07-29 PROCEDURE — 3044F HG A1C LEVEL LT 7.0%: CPT | Mod: CPTII,S$GLB,, | Performed by: NURSE PRACTITIONER

## 2025-07-29 PROCEDURE — 3288F FALL RISK ASSESSMENT DOCD: CPT | Mod: CPTII,S$GLB,, | Performed by: NURSE PRACTITIONER

## 2025-07-29 RX ORDER — EPINEPHRINE 0.3 MG/.3ML
0.3 INJECTION SUBCUTANEOUS ONCE AS NEEDED
OUTPATIENT
Start: 2025-07-29

## 2025-07-29 RX ORDER — SODIUM CHLORIDE 0.9 % (FLUSH) 0.9 %
10 SYRINGE (ML) INJECTION
OUTPATIENT
Start: 2025-07-29

## 2025-07-29 RX ORDER — EPINEPHRINE 0.3 MG/.3ML
0.3 INJECTION SUBCUTANEOUS ONCE AS NEEDED
Status: DISCONTINUED | OUTPATIENT
Start: 2025-07-29 | End: 2025-07-29 | Stop reason: HOSPADM

## 2025-07-29 RX ORDER — HEPARIN 100 UNIT/ML
500 SYRINGE INTRAVENOUS
Status: DISCONTINUED | OUTPATIENT
Start: 2025-07-29 | End: 2025-07-29 | Stop reason: HOSPADM

## 2025-07-29 RX ORDER — GABAPENTIN 300 MG/1
300 CAPSULE ORAL 3 TIMES DAILY
Qty: 90 CAPSULE | Refills: 5 | Status: SHIPPED | OUTPATIENT
Start: 2025-07-29 | End: 2026-01-25

## 2025-07-29 RX ORDER — HEPARIN 100 UNIT/ML
500 SYRINGE INTRAVENOUS
OUTPATIENT
Start: 2025-07-29

## 2025-07-29 RX ORDER — SODIUM CHLORIDE 0.9 % (FLUSH) 0.9 %
10 SYRINGE (ML) INJECTION
Status: DISCONTINUED | OUTPATIENT
Start: 2025-07-29 | End: 2025-07-29 | Stop reason: HOSPADM

## 2025-07-29 RX ADMIN — IRON DEXTRAN 25 MG: 50 INJECTION INTRAMUSCULAR; INTRAVENOUS at 08:07

## 2025-07-29 RX ADMIN — SODIUM CHLORIDE 975 MG: 9 INJECTION, SOLUTION INTRAVENOUS at 09:07

## 2025-07-29 RX ADMIN — SODIUM CHLORIDE: 0.9 INJECTION, SOLUTION INTRAVENOUS at 08:07

## 2025-07-29 NOTE — PROGRESS NOTES
Subjective:       Patient ID: Nan Simms is a 70 y.o. female.    Chief Complaint: Follow-up (ER)      History of Present Illness    CHIEF COMPLAINT:  Nan presents today for follow up.    BRADYCARDIA:  She reports heart rates as low as 40 BPM in previous office visits, significantly below the normal threshold of 60. She experiences dizziness and weakness with positional changes, feeling unstable with a sensation of staring when attempting to rise. These symptoms suggest hemodynamic compromise, though no confirmed syncopal episodes. She denies taking any medications known to lower heart rate.    NEUROPATHIC FOOT PAIN:  She reports pain radiating from toes to ankles. She takes Gabapentin 300mg 3 times daily with good compliance and notes improvement in symptoms. She denies breakthrough pain when maintaining medication regimen. She reports improved comfort when wearing specific slides/shoes.    IRON DEFICIENCY:  This will be her first independent iron infusion. She expresses some uncertainty about the procedure and concern regarding potential reactions and full dosing, though expects to tolerate the infusion without significant complications.      ROS:  Constitutional: +fatigue, +dizziness, +lightheadedness  Cardiovascular: +feelings of slow heart rate  Musculoskeletal: +limb pain          Objective:      Physical Exam    General: No acute distress. Well-developed. Well-nourished.  Eyes: EOMI. Sclerae anicteric.  HENT: Normocephalic. Atraumatic. Nares patent. Moist oral mucosa.  Cardiovascular: Regular rate. bradycardia. No murmurs. No rubs. No gallops. Normal S1, S2.  Respiratory: Normal respiratory effort. Clear to auscultation bilaterally. No rales. No rhonchi. No wheezing.  Abdomen: Soft. Non-tender. Non-distended. Normoactive bowel sounds.  Musculoskeletal: No  obvious deformity.  Extremities: No lower extremity edema.  Neurological: Alert & oriented x3. No slurred speech. Normal gait.  Psychiatric: Normal  mood. Normal affect. Good insight. Good judgment.  Skin: Warm. Dry. No rash.          Assessment:       1. Bradycardia    2. Myalgia    3. Fatigue, unspecified type    4. History of Whipple procedure    5. Hyperlipidemia, unspecified hyperlipidemia type    6. Essential hypertension        Plan:     Problem List Items Addressed This Visit       Essential hypertension  Well controlled  Cont lisinopril hctz     Hyperlipidemia  Well controlled   cont lipitor     Bradycardia - Primary    Relevant Orders    Ambulatory referral/consult to Cardiology  Review of chart shows that she has bene in low 40s for her last few visits     History of Whipple procedure   Cont f/u with oncology   on prilosec and pepcid        Myalgia        Relevant Medications    gabapentin (NEURONTIN) 300 MG capsule> reduced back to 300 from 600 TID- she report that the 600 had her too sleepy and did not see a difference in neuropathy   Chemo induced neuropathy        Fatigue, unspecified type      She is receiving fe infusions but could be related to bradycardia as well  Refer to cards- has seen Dr Salas in past           Assessment & Plan    R00.1 Bradycardia, unspecified  R55 Syncope and collapse  D50.9 Iron deficiency anemia, unspecified  E78.5 Hyperlipidemia, unspecified    BRADYCARDIA:  - Nan's heart rate has been as low as 40 bpm in previous visits, significantly below the normal range of 60 bpm, causing fatigue and risk of syncope.  - Referred to Dr. Salas (cardiologist) for evaluation and management with follow-up as soon as appointment is scheduled.  - Will place patient on a monitor to assess how low the heart rate actually drops.  - Placement of a pacemaker to regulate heart rate may be necessary depending on cardiologist assessment.    SYNCOPE AND COLLAPSE:  - Nan experiences dizziness and weakness when rising quickly, likely related to bradycardia.  - No actual syncope episodes have occurred, but postural changes continue to  trigger symptoms.  - This condition is being addressed as part of the cardiac referral.    Chemo induced neuropathy :  - Decreased Gabapentin to 300 mg 3 times daily from previous 600 mg dosage based on patient's reported usage.  - Medication helps with sleep but does not adequately control foot pain.  - Advised patient to continue wearing the new shoes that have been helping with foot discomfort.    IRON DEFICIENCY ANEMIA:  - Nan has received the first dose of iron infusion.  - Will continue to monitor response.    HYPERLIPIDEMIA:  - Cholesterol levels are at satisfactory levels.  - Continue Lipitor at current dosage.    FOLLOW-UP:  - Thyroid function appears normal and is not contributing to fatigue.  - Follow up in 6 months.      Cards appt made for tomorrow     This note was generated with the assistance of ambient listening technology. Verbal consent was obtained by the patient and accompanying visitor(s) for the recording of patient appointment to facilitate this note. I attest to having reviewed and edited the generated note for accuracy, though some syntax or spelling errors may persist. Please contact the author of this note for any clarification.

## 2025-07-29 NOTE — PLAN OF CARE
Problem: Anemia  Goal: Anemia Symptom Improvement  Outcome: Progressing     Ambulatory to clinic; accompanied by . Infed infused through PIV without any issues. No s/s of reaction during 1 hour and 30 min post infusion observation period. PIV removed with catheter tip intact prior to discharge. No other issues noted at this time.

## 2025-08-08 ENCOUNTER — TELEPHONE (OUTPATIENT)
Dept: HEMATOLOGY/ONCOLOGY | Facility: CLINIC | Age: 70
End: 2025-08-08
Payer: MEDICARE

## 2025-08-08 NOTE — TELEPHONE ENCOUNTER
I attempt to reach pt  to remind her upcoming apt ,but unfortunately I was  unable to reach patient   so i left both a voice mail and call back number.

## 2025-08-11 ENCOUNTER — LAB VISIT (OUTPATIENT)
Dept: LAB | Facility: HOSPITAL | Age: 70
End: 2025-08-11
Payer: MEDICARE

## 2025-08-11 ENCOUNTER — INFUSION (OUTPATIENT)
Dept: INFUSION THERAPY | Facility: HOSPITAL | Age: 70
End: 2025-08-11
Payer: MEDICARE

## 2025-08-11 ENCOUNTER — OFFICE VISIT (OUTPATIENT)
Dept: HEMATOLOGY/ONCOLOGY | Facility: CLINIC | Age: 70
End: 2025-08-11
Payer: MEDICARE

## 2025-08-11 ENCOUNTER — HOSPITAL ENCOUNTER (EMERGENCY)
Facility: HOSPITAL | Age: 70
Discharge: HOME | End: 2025-08-11
Payer: MEDICARE

## 2025-08-11 VITALS
OXYGEN SATURATION: 98 % | BODY MASS INDEX: 19.63 KG/M2 | TEMPERATURE: 99 F | RESPIRATION RATE: 18 BRPM | HEIGHT: 66 IN | SYSTOLIC BLOOD PRESSURE: 149 MMHG | DIASTOLIC BLOOD PRESSURE: 48 MMHG | HEART RATE: 46 BPM | WEIGHT: 122.13 LBS

## 2025-08-11 VITALS
DIASTOLIC BLOOD PRESSURE: 59 MMHG | RESPIRATION RATE: 18 BRPM | BODY MASS INDEX: 19.77 KG/M2 | SYSTOLIC BLOOD PRESSURE: 156 MMHG | TEMPERATURE: 99 F | HEIGHT: 66 IN | HEART RATE: 47 BPM | WEIGHT: 123 LBS | OXYGEN SATURATION: 99 %

## 2025-08-11 VITALS
HEART RATE: 57 BPM | BODY MASS INDEX: 19.63 KG/M2 | SYSTOLIC BLOOD PRESSURE: 183 MMHG | RESPIRATION RATE: 18 BRPM | DIASTOLIC BLOOD PRESSURE: 78 MMHG | HEIGHT: 66 IN | WEIGHT: 122.13 LBS | TEMPERATURE: 99 F

## 2025-08-11 DIAGNOSIS — Z90.49 HISTORY OF WHIPPLE PROCEDURE: ICD-10-CM

## 2025-08-11 DIAGNOSIS — I10 ESSENTIAL HYPERTENSION: ICD-10-CM

## 2025-08-11 DIAGNOSIS — C17.0 DUODENAL ADENOCARCINOMA: ICD-10-CM

## 2025-08-11 DIAGNOSIS — E11.9 DIABETES MELLITUS TYPE 2, DIET-CONTROLLED: ICD-10-CM

## 2025-08-11 DIAGNOSIS — E44.0 MODERATE MALNUTRITION: ICD-10-CM

## 2025-08-11 DIAGNOSIS — R53.0 NEOPLASTIC (MALIGNANT) RELATED FATIGUE: ICD-10-CM

## 2025-08-11 DIAGNOSIS — Z01.30 BLOOD PRESSURE CHECK: Primary | ICD-10-CM

## 2025-08-11 DIAGNOSIS — D84.81 IMMUNODEFICIENCY SECONDARY TO NEOPLASM: ICD-10-CM

## 2025-08-11 DIAGNOSIS — C78.7 METASTASIS TO LIVER: ICD-10-CM

## 2025-08-11 DIAGNOSIS — I70.0 AORTIC ATHEROSCLEROSIS: ICD-10-CM

## 2025-08-11 DIAGNOSIS — D49.9 IMMUNODEFICIENCY SECONDARY TO NEOPLASM: ICD-10-CM

## 2025-08-11 DIAGNOSIS — E78.5 HYPERLIPIDEMIA, UNSPECIFIED HYPERLIPIDEMIA TYPE: ICD-10-CM

## 2025-08-11 DIAGNOSIS — C17.0 DUODENAL ADENOCARCINOMA: Primary | ICD-10-CM

## 2025-08-11 DIAGNOSIS — R63.0 DECREASED APPETITE: ICD-10-CM

## 2025-08-11 DIAGNOSIS — E87.6 HYPOKALEMIA: ICD-10-CM

## 2025-08-11 DIAGNOSIS — Z90.410 HISTORY OF WHIPPLE PROCEDURE: ICD-10-CM

## 2025-08-11 DIAGNOSIS — D64.9 ANEMIA, UNSPECIFIED TYPE: ICD-10-CM

## 2025-08-11 DIAGNOSIS — D69.6 THROMBOCYTOPENIA: ICD-10-CM

## 2025-08-11 LAB
ABSOLUTE NEUTROPHIL COUNT (OHS): 6.46 K/UL (ref 1.8–7.7)
ALBUMIN SERPL BCP-MCNC: 3.5 G/DL (ref 3.5–5.2)
ALP SERPL-CCNC: 84 UNIT/L (ref 40–150)
ALT SERPL W/O P-5'-P-CCNC: 19 UNIT/L (ref 0–55)
ANION GAP (OHS): 6 MMOL/L (ref 8–16)
AST SERPL-CCNC: 19 UNIT/L (ref 0–50)
BILIRUB SERPL-MCNC: 0.5 MG/DL (ref 0.1–1)
BUN SERPL-MCNC: 16 MG/DL (ref 8–23)
CALCIUM SERPL-MCNC: 8.8 MG/DL (ref 8.7–10.5)
CARCINOEMBRYONIC ANTIGEN (OHS): 5.9 NG/ML
CHLORIDE SERPL-SCNC: 104 MMOL/L (ref 95–110)
CO2 SERPL-SCNC: 31 MMOL/L (ref 23–29)
CREAT SERPL-MCNC: 0.5 MG/DL (ref 0.5–1.4)
ERYTHROCYTE [DISTWIDTH] IN BLOOD BY AUTOMATED COUNT: 18.4 % (ref 11.5–14.5)
GFR SERPLBLD CREATININE-BSD FMLA CKD-EPI: >60 ML/MIN/1.73/M2
GLUCOSE SERPL-MCNC: 103 MG/DL (ref 70–110)
HCT VFR BLD AUTO: 33.8 % (ref 37–48.5)
HGB BLD-MCNC: 10.9 GM/DL (ref 12–16)
IMM GRANULOCYTES # BLD AUTO: 0.02 K/UL (ref 0–0.04)
MCH RBC QN AUTO: 23.4 PG (ref 27–31)
MCHC RBC AUTO-ENTMCNC: 32.2 G/DL (ref 32–36)
MCV RBC AUTO: 73 FL (ref 82–98)
PLATELET # BLD AUTO: 147 K/UL (ref 150–450)
PMV BLD AUTO: ABNORMAL FL
POTASSIUM SERPL-SCNC: 3.2 MMOL/L (ref 3.5–5.1)
PROT SERPL-MCNC: 7 GM/DL (ref 6–8.4)
RBC # BLD AUTO: 4.66 M/UL (ref 4–5.4)
SODIUM SERPL-SCNC: 141 MMOL/L (ref 136–145)
TSH SERPL-ACNC: 0.43 UIU/ML (ref 0.4–4)
WBC # BLD AUTO: 8.84 K/UL (ref 3.9–12.7)

## 2025-08-11 PROCEDURE — 1101F PT FALLS ASSESS-DOCD LE1/YR: CPT | Mod: CPTII,S$GLB,, | Performed by: REGISTERED NURSE

## 2025-08-11 PROCEDURE — 3288F FALL RISK ASSESSMENT DOCD: CPT | Mod: CPTII,S$GLB,, | Performed by: REGISTERED NURSE

## 2025-08-11 PROCEDURE — 82040 ASSAY OF SERUM ALBUMIN: CPT

## 2025-08-11 PROCEDURE — 25000003 PHARM REV CODE 250: Performed by: REGISTERED NURSE

## 2025-08-11 PROCEDURE — 36415 COLL VENOUS BLD VENIPUNCTURE: CPT

## 2025-08-11 PROCEDURE — 99215 OFFICE O/P EST HI 40 MIN: CPT | Mod: S$GLB,,, | Performed by: REGISTERED NURSE

## 2025-08-11 PROCEDURE — 3008F BODY MASS INDEX DOCD: CPT | Mod: CPTII,S$GLB,, | Performed by: REGISTERED NURSE

## 2025-08-11 PROCEDURE — 85027 COMPLETE CBC AUTOMATED: CPT

## 2025-08-11 PROCEDURE — 1159F MED LIST DOCD IN RCRD: CPT | Mod: CPTII,S$GLB,, | Performed by: REGISTERED NURSE

## 2025-08-11 PROCEDURE — 3078F DIAST BP <80 MM HG: CPT | Mod: CPTII,S$GLB,, | Performed by: REGISTERED NURSE

## 2025-08-11 PROCEDURE — 82378 CARCINOEMBRYONIC ANTIGEN: CPT

## 2025-08-11 PROCEDURE — 3044F HG A1C LEVEL LT 7.0%: CPT | Mod: CPTII,S$GLB,, | Performed by: REGISTERED NURSE

## 2025-08-11 PROCEDURE — 1160F RVW MEDS BY RX/DR IN RCRD: CPT | Mod: CPTII,S$GLB,, | Performed by: REGISTERED NURSE

## 2025-08-11 PROCEDURE — 96413 CHEMO IV INFUSION 1 HR: CPT

## 2025-08-11 PROCEDURE — 99999 PR PBB SHADOW E&M-EST. PATIENT-LVL III: CPT | Mod: PBBFAC,,, | Performed by: REGISTERED NURSE

## 2025-08-11 PROCEDURE — 3077F SYST BP >= 140 MM HG: CPT | Mod: CPTII,S$GLB,, | Performed by: REGISTERED NURSE

## 2025-08-11 PROCEDURE — G2211 COMPLEX E/M VISIT ADD ON: HCPCS | Mod: S$GLB,,, | Performed by: REGISTERED NURSE

## 2025-08-11 PROCEDURE — 4010F ACE/ARB THERAPY RXD/TAKEN: CPT | Mod: CPTII,S$GLB,, | Performed by: REGISTERED NURSE

## 2025-08-11 PROCEDURE — 1126F AMNT PAIN NOTED NONE PRSNT: CPT | Mod: CPTII,S$GLB,, | Performed by: REGISTERED NURSE

## 2025-08-11 PROCEDURE — 84443 ASSAY THYROID STIM HORMONE: CPT

## 2025-08-11 PROCEDURE — 63600175 PHARM REV CODE 636 W HCPCS: Mod: JZ,TB | Performed by: REGISTERED NURSE

## 2025-08-11 PROCEDURE — 99281 EMR DPT VST MAYX REQ PHY/QHP: CPT

## 2025-08-11 RX ORDER — DIPHENHYDRAMINE HYDROCHLORIDE 50 MG/ML
50 INJECTION, SOLUTION INTRAMUSCULAR; INTRAVENOUS ONCE AS NEEDED
Status: CANCELLED | OUTPATIENT
Start: 2025-08-11 | End: 2037-01-06

## 2025-08-11 RX ORDER — POTASSIUM CHLORIDE 20 MEQ/1
40 TABLET, EXTENDED RELEASE ORAL ONCE
Status: COMPLETED | OUTPATIENT
Start: 2025-08-11 | End: 2025-08-11

## 2025-08-11 RX ORDER — HEPARIN 100 UNIT/ML
500 SYRINGE INTRAVENOUS
Status: CANCELLED | OUTPATIENT
Start: 2025-08-11

## 2025-08-11 RX ORDER — SODIUM CHLORIDE 0.9 % (FLUSH) 0.9 %
10 SYRINGE (ML) INJECTION
Status: DISCONTINUED | OUTPATIENT
Start: 2025-08-11 | End: 2025-08-11 | Stop reason: HOSPADM

## 2025-08-11 RX ORDER — EPINEPHRINE 0.3 MG/.3ML
0.3 INJECTION SUBCUTANEOUS ONCE AS NEEDED
Status: CANCELLED | OUTPATIENT
Start: 2025-08-11 | End: 2037-01-06

## 2025-08-11 RX ORDER — POTASSIUM CHLORIDE 20 MEQ/1
40 TABLET, EXTENDED RELEASE ORAL ONCE
Status: CANCELLED
Start: 2025-08-11

## 2025-08-11 RX ORDER — EPINEPHRINE 0.3 MG/.3ML
0.3 INJECTION SUBCUTANEOUS ONCE AS NEEDED
Status: DISCONTINUED | OUTPATIENT
Start: 2025-08-11 | End: 2025-08-11 | Stop reason: HOSPADM

## 2025-08-11 RX ORDER — DIPHENHYDRAMINE HYDROCHLORIDE 50 MG/ML
50 INJECTION, SOLUTION INTRAMUSCULAR; INTRAVENOUS ONCE AS NEEDED
Status: DISCONTINUED | OUTPATIENT
Start: 2025-08-11 | End: 2025-08-11 | Stop reason: HOSPADM

## 2025-08-11 RX ORDER — SODIUM CHLORIDE 0.9 % (FLUSH) 0.9 %
10 SYRINGE (ML) INJECTION
Status: CANCELLED | OUTPATIENT
Start: 2025-08-11

## 2025-08-11 RX ADMIN — POTASSIUM CHLORIDE 40 MEQ: 20 TABLET, EXTENDED RELEASE ORAL at 02:08

## 2025-08-11 RX ADMIN — SODIUM CHLORIDE 400 MG: 9 INJECTION, SOLUTION INTRAVENOUS at 02:08

## 2025-08-12 ENCOUNTER — TELEPHONE (OUTPATIENT)
Dept: INTERNAL MEDICINE | Facility: CLINIC | Age: 70
End: 2025-08-12
Payer: MEDICARE

## 2025-08-13 DIAGNOSIS — C17.0 DUODENAL ADENOCARCINOMA: Primary | ICD-10-CM

## 2025-08-13 DIAGNOSIS — Z00.6 CLINICAL TRIAL PARTICIPANT: ICD-10-CM

## (undated) DEVICE — SUT PROLENE 3-0 SH DA 36 BL

## (undated) DEVICE — SUT SILK 2-0 STRANDS 30IN

## (undated) DEVICE — SUT SILK 3-0 SH 18IN BLACK

## (undated) DEVICE — SUT 2-0 12-18IN SILK

## (undated) DEVICE — DRAIN CHANNEL ROUND 19FR

## (undated) DEVICE — SYR 30CC LUER LOCK

## (undated) DEVICE — PAD K-THERMIA 24IN X 60IN

## (undated) DEVICE — KIT SAHARA DRAPE DRAW/LIFT

## (undated) DEVICE — GAUZE SPONGE PEANUT STRL

## (undated) DEVICE — SUT SILK SH 2-0 30IN MP BLK

## (undated) DEVICE — DRAPE ABDOMINAL TIBURON 14X11

## (undated) DEVICE — GAUZE SPONGE 4X4 12PLY

## (undated) DEVICE — SUT PDS BV-1 7-0 24IN

## (undated) DEVICE — SET DECANTER MEDICHOICE

## (undated) DEVICE — MARGIN MARKER STANDARD 6 COLOR

## (undated) DEVICE — GLOVE 6.5 PROTEXIS PI MICRO

## (undated) DEVICE — APPLIER LIGACLIP SM 9.38IN

## (undated) DEVICE — SEALER LIGASURE MARYLAND 23CM

## (undated) DEVICE — STAPLER ENDO GIA 60MM MED THCK

## (undated) DEVICE — SUT VICRYL+ 1 CT1 18IN

## (undated) DEVICE — ADHESIVE DERMABOND ADVANCED

## (undated) DEVICE — STAPLER GIA HANDLE STD

## (undated) DEVICE — EVACUATOR WOUND BULB 100CC

## (undated) DEVICE — SUT PROLENE RB-1 4-0

## (undated) DEVICE — CONTAINER SPECIMEN STRL 4OZ

## (undated) DEVICE — SUT 5/0 27IN PDS II VIO MO

## (undated) DEVICE — LOOP VESSEL BLUE MAXI

## (undated) DEVICE — TRAY CATH FOL SIL URIMTR 16FR

## (undated) DEVICE — TUBING SUC UNIV W/CONN 12FT

## (undated) DEVICE — SYR 10CC LUER LOCK

## (undated) DEVICE — SUT 5/0 30IN PDS II VIO MO

## (undated) DEVICE — SUT SILK 3-0 STRANDS 30IN

## (undated) DEVICE — STAPLER SKIN PROXIMATE WIDE

## (undated) DEVICE — SPONGE LAP 18X18 PREWASHED

## (undated) DEVICE — DRESSING AQUACEL SACRAL 9 X 9

## (undated) DEVICE — SUT PROLENE 5/0 RB-1 36 IN

## (undated) DEVICE — COVER PROXIMA MAYO STAND

## (undated) DEVICE — SEE MEDLINE ITEM 156902

## (undated) DEVICE — GOWN POLY REINF BRTH SLV XL

## (undated) DEVICE — SHIELD EYE PLASTIC 3100G

## (undated) DEVICE — STAPLER HANDLE XL 26 CM

## (undated) DEVICE — SPONGE IV DRAIN 4X4 STERILE

## (undated) DEVICE — SUT 4/0 27IN PDS II VIO MO

## (undated) DEVICE — TOWEL OR DISP STRL BLUE 4/PK

## (undated) DEVICE — SUT 2-0 VICRYL / CT-1

## (undated) DEVICE — SUT PDS II 5-0 RB-1RB-1 VI

## (undated) DEVICE — SUT SILK 3-0 SH DETACH 30IN

## (undated) DEVICE — DRESSING ADH ISLAND 3.6 X 14

## (undated) DEVICE — SUT 3-0 12-18IN SILK

## (undated) DEVICE — SUT 3-0 VICRYL / SH (J416)

## (undated) DEVICE — TIP I & A

## (undated) DEVICE — SUT CTD VICRYL VIL BR CR/SH

## (undated) DEVICE — TUBE FEEDING PURPLE 8FRX40CM

## (undated) DEVICE — TUBE FEEDING PURPLE 5FRX40CM

## (undated) DEVICE — BLADE 4 INCH EDGE UN-INS

## (undated) DEVICE — ELECTRODE NEEDLE 2.8IN

## (undated) DEVICE — SUT PROLENE 4-0 RB-1 BL MO

## (undated) DEVICE — SUT VICRYL 3-0 27 SH

## (undated) DEVICE — SUT 6/0 30IN PDS II VIO MON

## (undated) DEVICE — GLOVE 7.5 PROTEXIS PI MICRO

## (undated) DEVICE — APPLIER CLIP LIAGCLIP 9.375IN

## (undated) DEVICE — SUT 1 48IN PDS II VIO MONO

## (undated) DEVICE — ELECTRODE REM PLYHSV RETURN 9

## (undated) DEVICE — NDL 18GA X1 1/2 REG BEVEL

## (undated) DEVICE — PACK EYE

## (undated) DEVICE — APPLICATOR CHLORAPREP ORN 26ML

## (undated) DEVICE — BOOT SUTURE AID

## (undated) DEVICE — Device

## (undated) DEVICE — CLIP SPRING 6MM

## (undated) DEVICE — COVER MAYO STND XL 30X57IN

## (undated) DEVICE — SUT ETHILON 2-0 PSLX 30IN

## (undated) DEVICE — GOWN SURGICAL X-LARGE